# Patient Record
Sex: MALE | Race: BLACK OR AFRICAN AMERICAN | Employment: OTHER | URBAN - NONMETROPOLITAN AREA
[De-identification: names, ages, dates, MRNs, and addresses within clinical notes are randomized per-mention and may not be internally consistent; named-entity substitution may affect disease eponyms.]

---

## 2017-01-06 RX ORDER — OXYBUTYNIN CHLORIDE 5 MG/1
5 TABLET ORAL 2 TIMES DAILY
Qty: 90 TABLET | Refills: 1 | Status: SHIPPED | OUTPATIENT
Start: 2017-01-06 | End: 2017-03-02 | Stop reason: SDUPTHER

## 2017-01-06 RX ORDER — FINASTERIDE 5 MG/1
5 TABLET, FILM COATED ORAL DAILY
Qty: 90 TABLET | Refills: 0 | Status: SHIPPED | OUTPATIENT
Start: 2017-01-06 | End: 2017-03-02 | Stop reason: SDUPTHER

## 2017-03-02 ENCOUNTER — OFFICE VISIT (OUTPATIENT)
Dept: UROLOGY | Age: 82
End: 2017-03-02

## 2017-03-02 VITALS
WEIGHT: 231 LBS | SYSTOLIC BLOOD PRESSURE: 134 MMHG | HEIGHT: 69 IN | BODY MASS INDEX: 34.21 KG/M2 | DIASTOLIC BLOOD PRESSURE: 80 MMHG

## 2017-03-02 DIAGNOSIS — N13.8 BPH (BENIGN PROSTATIC HYPERTROPHY) WITH URINARY OBSTRUCTION: Primary | ICD-10-CM

## 2017-03-02 DIAGNOSIS — R31.9 HEMATURIA: ICD-10-CM

## 2017-03-02 DIAGNOSIS — N40.1 BPH (BENIGN PROSTATIC HYPERTROPHY) WITH URINARY OBSTRUCTION: Primary | ICD-10-CM

## 2017-03-02 DIAGNOSIS — C61 PROSTATE CANCER (HCC): ICD-10-CM

## 2017-03-02 DIAGNOSIS — R35.1 NOCTURIA: ICD-10-CM

## 2017-03-02 LAB — POST VOID RESIDUAL (PVR): 12 ML

## 2017-03-02 PROCEDURE — G8427 DOCREV CUR MEDS BY ELIG CLIN: HCPCS | Performed by: UROLOGY

## 2017-03-02 PROCEDURE — G8417 CALC BMI ABV UP PARAM F/U: HCPCS | Performed by: UROLOGY

## 2017-03-02 PROCEDURE — 51798 US URINE CAPACITY MEASURE: CPT | Performed by: UROLOGY

## 2017-03-02 PROCEDURE — 99214 OFFICE O/P EST MOD 30 MIN: CPT | Performed by: UROLOGY

## 2017-03-02 PROCEDURE — 4040F PNEUMOC VAC/ADMIN/RCVD: CPT | Performed by: UROLOGY

## 2017-03-02 PROCEDURE — 1123F ACP DISCUSS/DSCN MKR DOCD: CPT | Performed by: UROLOGY

## 2017-03-02 PROCEDURE — G8598 ASA/ANTIPLAT THER USED: HCPCS | Performed by: UROLOGY

## 2017-03-02 PROCEDURE — 1036F TOBACCO NON-USER: CPT | Performed by: UROLOGY

## 2017-03-02 PROCEDURE — G8484 FLU IMMUNIZE NO ADMIN: HCPCS | Performed by: UROLOGY

## 2017-03-02 RX ORDER — FINASTERIDE 5 MG/1
5 TABLET, FILM COATED ORAL DAILY
Qty: 90 TABLET | Refills: 3 | Status: SHIPPED | OUTPATIENT
Start: 2017-03-02 | End: 2018-04-24 | Stop reason: SDUPTHER

## 2017-03-02 RX ORDER — TAMSULOSIN HYDROCHLORIDE 0.4 MG/1
0.4 CAPSULE ORAL NIGHTLY
Qty: 90 CAPSULE | Refills: 3 | Status: SHIPPED | OUTPATIENT
Start: 2017-03-02 | End: 2017-05-31 | Stop reason: SDUPTHER

## 2017-03-02 RX ORDER — OXYBUTYNIN CHLORIDE 5 MG/1
5 TABLET ORAL 2 TIMES DAILY
Qty: 270 TABLET | Refills: 3 | Status: SHIPPED | OUTPATIENT
Start: 2017-03-02 | End: 2018-04-24 | Stop reason: SDUPTHER

## 2017-03-02 RX ORDER — METOPROLOL SUCCINATE 25 MG/1
25 TABLET, EXTENDED RELEASE ORAL DAILY
COMMUNITY
End: 2017-04-11 | Stop reason: ALTCHOICE

## 2017-03-02 ASSESSMENT — ENCOUNTER SYMPTOMS
COLOR CHANGE: 0
CHEST TIGHTNESS: 0
EYE REDNESS: 0
BACK PAIN: 0
SHORTNESS OF BREATH: 0
VOMITING: 0
DIARRHEA: 0
EYE PAIN: 0
FACIAL SWELLING: 0

## 2017-04-03 RX ORDER — FINASTERIDE 5 MG/1
5 TABLET, FILM COATED ORAL DAILY
Qty: 90 TABLET | Refills: 3 | Status: CANCELLED | OUTPATIENT
Start: 2017-04-03

## 2017-04-06 LAB
ANION GAP SERPL CALCULATED.3IONS-SCNC: 14 MMOL/L
BUN BLDV-MCNC: 22 MG/DL (ref 9–24)
CALCIUM SERPL-MCNC: 10 MG/DL (ref 8.7–10.8)
CHLORIDE BLD-SCNC: 106 MMOL/L (ref 95–111)
CO2: 26 MMOL/L (ref 21–32)
CREAT SERPL-MCNC: 1.2 MG/DL (ref 0.5–1.3)
CREATINE, URINE: 177.2 MG/DL
EGFR AFRICAN AMERICAN: 52
EGFR IF NONAFRICAN AMERICAN: 43
GLUCOSE: 92 MG/DL (ref 70–100)
MICROALBUMIN/CREAT 24H UR: 13.4 MG/DL
MICROALBUMIN/CREAT UR-RTO: 76 UG/MG
POTASSIUM SERPL-SCNC: 4.3 MMOL/L (ref 3.5–5.4)
PROTEIN, URINE: 38 MG/DL
SODIUM BLD-SCNC: 142 MMOL/L (ref 134–147)

## 2017-04-11 ENCOUNTER — OFFICE VISIT (OUTPATIENT)
Dept: NEPHROLOGY | Age: 82
End: 2017-04-11

## 2017-04-11 VITALS
SYSTOLIC BLOOD PRESSURE: 148 MMHG | HEART RATE: 71 BPM | WEIGHT: 232 LBS | DIASTOLIC BLOOD PRESSURE: 100 MMHG | BODY MASS INDEX: 34.26 KG/M2 | OXYGEN SATURATION: 96 %

## 2017-04-11 DIAGNOSIS — I10 HYPERTENSION, ESSENTIAL, BENIGN: ICD-10-CM

## 2017-04-11 DIAGNOSIS — N18.30 CHRONIC KIDNEY DISEASE, STAGE III (MODERATE) (HCC): Primary | ICD-10-CM

## 2017-04-11 DIAGNOSIS — R80.9 PROTEINURIA: ICD-10-CM

## 2017-04-11 PROCEDURE — G8598 ASA/ANTIPLAT THER USED: HCPCS | Performed by: INTERNAL MEDICINE

## 2017-04-11 PROCEDURE — 1036F TOBACCO NON-USER: CPT | Performed by: INTERNAL MEDICINE

## 2017-04-11 PROCEDURE — 1123F ACP DISCUSS/DSCN MKR DOCD: CPT | Performed by: INTERNAL MEDICINE

## 2017-04-11 PROCEDURE — G8417 CALC BMI ABV UP PARAM F/U: HCPCS | Performed by: INTERNAL MEDICINE

## 2017-04-11 PROCEDURE — 99213 OFFICE O/P EST LOW 20 MIN: CPT | Performed by: INTERNAL MEDICINE

## 2017-04-11 PROCEDURE — 4040F PNEUMOC VAC/ADMIN/RCVD: CPT | Performed by: INTERNAL MEDICINE

## 2017-04-11 PROCEDURE — G8427 DOCREV CUR MEDS BY ELIG CLIN: HCPCS | Performed by: INTERNAL MEDICINE

## 2017-04-11 RX ORDER — PRAVASTATIN SODIUM 20 MG
20 TABLET ORAL NIGHTLY
Status: ON HOLD | COMMUNITY
End: 2020-10-23 | Stop reason: HOSPADM

## 2017-05-31 RX ORDER — TAMSULOSIN HYDROCHLORIDE 0.4 MG/1
0.4 CAPSULE ORAL NIGHTLY
Qty: 90 CAPSULE | Refills: 3 | Status: SHIPPED | OUTPATIENT
Start: 2017-05-31 | End: 2018-04-24 | Stop reason: SDUPTHER

## 2018-04-11 LAB
A/G RATIO: NORMAL
ALBUMIN SERPL-MCNC: 4.2 G/DL
ALP BLD-CCNC: 72 U/L
ALT SERPL-CCNC: 13 U/L
AST SERPL-CCNC: 16 U/L
AVERAGE GLUCOSE: NORMAL
BILIRUB SERPL-MCNC: 0.5 MG/DL (ref 0.1–1.4)
BILIRUBIN DIRECT: NORMAL MG/DL
BILIRUBIN, INDIRECT: NORMAL
CHOLESTEROL, TOTAL: 218 MG/DL
CHOLESTEROL/HDL RATIO: 4.1
CREATININE, URINE: 613.7
GLOBULIN: NORMAL
HBA1C MFR BLD: 5.5 %
HDLC SERPL-MCNC: 53 MG/DL (ref 35–70)
LDL CHOLESTEROL CALCULATED: 134 MG/DL (ref 0–160)
MICROALBUMIN/CREAT 24H UR: 22.8 MG/G{CREAT}
MICROALBUMIN/CREAT UR-RTO: 37
PROTEIN TOTAL: 7.6 G/DL
TRIGL SERPL-MCNC: 157 MG/DL
VITAMIN D 25-HYDROXY: 39
VITAMIN D2, 25 HYDROXY: NORMAL
VITAMIN D3,25 HYDROXY: NORMAL
VLDLC SERPL CALC-MCNC: 31 MG/DL

## 2018-04-12 LAB
ANION GAP SERPL CALCULATED.3IONS-SCNC: 13 MEQ/L (ref 10–19)
BUN BLDV-MCNC: 22 MG/DL (ref 8–23)
CALCIUM SERPL-MCNC: 10.1 MG/DL (ref 8.5–10.5)
CHLORIDE BLD-SCNC: 105 MEQ/L (ref 95–107)
CO2: 26 MEQ/L (ref 19–31)
CREAT SERPL-MCNC: 1.2 MG/DL (ref 0.6–1.3)
EGFR AFRICAN AMERICAN: 49.1 ML/MIN/1.73 M2
EGFR IF NONAFRICAN AMERICAN: 42.3 ML/MIN/1.73 M2
GLUCOSE: 81 MG/DL (ref 70–99)
POTASSIUM SERPL-SCNC: 4.8 MEQ/L (ref 3.5–5.4)
PROTEIN, URINE: 178 MG/DL
SODIUM BLD-SCNC: 144 MEQ/L (ref 135–146)

## 2018-04-16 ENCOUNTER — OFFICE VISIT (OUTPATIENT)
Dept: NEPHROLOGY | Age: 83
End: 2018-04-16
Payer: MEDICARE

## 2018-04-16 VITALS
BODY MASS INDEX: 33.26 KG/M2 | DIASTOLIC BLOOD PRESSURE: 82 MMHG | OXYGEN SATURATION: 97 % | WEIGHT: 225.2 LBS | HEART RATE: 73 BPM | SYSTOLIC BLOOD PRESSURE: 158 MMHG

## 2018-04-16 DIAGNOSIS — I10 HYPERTENSION, ESSENTIAL, BENIGN: ICD-10-CM

## 2018-04-16 DIAGNOSIS — N06.9 ISOLATED PROTEINURIA WITH MORPHOLOGIC LESION: ICD-10-CM

## 2018-04-16 DIAGNOSIS — N18.30 CHRONIC KIDNEY DISEASE, STAGE III (MODERATE) (HCC): Primary | ICD-10-CM

## 2018-04-16 LAB
ALBUMIN SERPL-MCNC: 4.2 G/DL
ALP BLD-CCNC: 72 U/L
ALT SERPL-CCNC: 13 U/L
ANION GAP SERPL CALCULATED.3IONS-SCNC: 16 MMOL/L
AST SERPL-CCNC: 21 U/L
AVERAGE GLUCOSE: 105
BILIRUB SERPL-MCNC: 0.9 MG/DL (ref 0.1–1.4)
BUN BLDV-MCNC: 17 MG/DL
CALCIUM SERPL-MCNC: 9.9 MG/DL
CHLORIDE BLD-SCNC: 108 MMOL/L
CHOLESTEROL, TOTAL: 247 MG/DL
CHOLESTEROL/HDL RATIO: 4.5
CO2: 22 MMOL/L
CREAT SERPL-MCNC: 1.2 MG/DL
CREATININE, URINE: 381
GFR CALCULATED: 70
GLUCOSE BLD-MCNC: 87 MG/DL
HBA1C MFR BLD: 5.3 %
HDLC SERPL-MCNC: 55 MG/DL (ref 35–70)
LDL CHOLESTEROL CALCULATED: 162 MG/DL (ref 0–160)
MICROALBUMIN/CREAT 24H UR: 20 MG/G{CREAT}
MICROALBUMIN/CREAT UR-RTO: 52
POTASSIUM SERPL-SCNC: 4.3 MMOL/L
SODIUM BLD-SCNC: 142 MMOL/L
TOTAL PROTEIN: 7.5
TRIGL SERPL-MCNC: 149 MG/DL
VITAMIN D 25-HYDROXY: 47
VITAMIN D2, 25 HYDROXY: NORMAL
VITAMIN D3,25 HYDROXY: NORMAL
VLDLC SERPL CALC-MCNC: 30 MG/DL

## 2018-04-16 PROCEDURE — G8598 ASA/ANTIPLAT THER USED: HCPCS | Performed by: INTERNAL MEDICINE

## 2018-04-16 PROCEDURE — 99213 OFFICE O/P EST LOW 20 MIN: CPT | Performed by: INTERNAL MEDICINE

## 2018-04-16 PROCEDURE — 4040F PNEUMOC VAC/ADMIN/RCVD: CPT | Performed by: INTERNAL MEDICINE

## 2018-04-16 PROCEDURE — G8427 DOCREV CUR MEDS BY ELIG CLIN: HCPCS | Performed by: INTERNAL MEDICINE

## 2018-04-16 PROCEDURE — G8417 CALC BMI ABV UP PARAM F/U: HCPCS | Performed by: INTERNAL MEDICINE

## 2018-04-16 PROCEDURE — 1123F ACP DISCUSS/DSCN MKR DOCD: CPT | Performed by: INTERNAL MEDICINE

## 2018-04-16 PROCEDURE — 1036F TOBACCO NON-USER: CPT | Performed by: INTERNAL MEDICINE

## 2018-04-16 RX ORDER — AMPICILLIN 500 MG/1
500 CAPSULE ORAL 3 TIMES DAILY
COMMUNITY
End: 2019-04-15

## 2018-04-16 RX ORDER — FAMOTIDINE 20 MG/1
20 TABLET, FILM COATED ORAL NIGHTLY
COMMUNITY
End: 2019-04-15

## 2018-04-24 ENCOUNTER — OFFICE VISIT (OUTPATIENT)
Dept: UROLOGY | Age: 83
End: 2018-04-24
Payer: MEDICARE

## 2018-04-24 VITALS
BODY MASS INDEX: 32.88 KG/M2 | DIASTOLIC BLOOD PRESSURE: 95 MMHG | WEIGHT: 222 LBS | SYSTOLIC BLOOD PRESSURE: 151 MMHG | HEIGHT: 69 IN

## 2018-04-24 DIAGNOSIS — N40.1 BENIGN PROSTATIC HYPERPLASIA WITH URINARY OBSTRUCTION: ICD-10-CM

## 2018-04-24 DIAGNOSIS — C61 PROSTATE CANCER (HCC): Primary | ICD-10-CM

## 2018-04-24 DIAGNOSIS — N13.8 BENIGN PROSTATIC HYPERPLASIA WITH URINARY OBSTRUCTION: ICD-10-CM

## 2018-04-24 LAB — POST VOID RESIDUAL (PVR): 25 ML

## 2018-04-24 PROCEDURE — G8598 ASA/ANTIPLAT THER USED: HCPCS | Performed by: NURSE PRACTITIONER

## 2018-04-24 PROCEDURE — 1123F ACP DISCUSS/DSCN MKR DOCD: CPT | Performed by: NURSE PRACTITIONER

## 2018-04-24 PROCEDURE — 4040F PNEUMOC VAC/ADMIN/RCVD: CPT | Performed by: NURSE PRACTITIONER

## 2018-04-24 PROCEDURE — G8417 CALC BMI ABV UP PARAM F/U: HCPCS | Performed by: NURSE PRACTITIONER

## 2018-04-24 PROCEDURE — 99213 OFFICE O/P EST LOW 20 MIN: CPT | Performed by: NURSE PRACTITIONER

## 2018-04-24 PROCEDURE — G8428 CUR MEDS NOT DOCUMENT: HCPCS | Performed by: NURSE PRACTITIONER

## 2018-04-24 PROCEDURE — 51798 US URINE CAPACITY MEASURE: CPT | Performed by: NURSE PRACTITIONER

## 2018-04-24 PROCEDURE — 1036F TOBACCO NON-USER: CPT | Performed by: NURSE PRACTITIONER

## 2018-04-24 RX ORDER — FINASTERIDE 5 MG/1
5 TABLET, FILM COATED ORAL DAILY
Qty: 90 TABLET | Refills: 3 | Status: SHIPPED | OUTPATIENT
Start: 2018-04-24 | End: 2019-04-23 | Stop reason: SDUPTHER

## 2018-04-24 RX ORDER — TAMSULOSIN HYDROCHLORIDE 0.4 MG/1
0.4 CAPSULE ORAL NIGHTLY
Qty: 90 CAPSULE | Refills: 3 | Status: SHIPPED | OUTPATIENT
Start: 2018-04-24 | End: 2019-04-23 | Stop reason: SDUPTHER

## 2018-04-24 RX ORDER — OXYBUTYNIN CHLORIDE 5 MG/1
5 TABLET ORAL 2 TIMES DAILY
Qty: 270 TABLET | Refills: 3 | Status: SHIPPED | OUTPATIENT
Start: 2018-04-24 | End: 2019-04-23 | Stop reason: SDUPTHER

## 2019-04-12 LAB
ANION GAP SERPL CALCULATED.3IONS-SCNC: 13 MMOL/L (ref 4–12)
BUN BLDV-MCNC: 22 MG/DL (ref 5–27)
CALCIUM SERPL-MCNC: 9.6 MG/DL (ref 8.5–10.5)
CHLORIDE BLD-SCNC: 106 MMOL/L (ref 98–109)
CO2: 26 MMOL/L (ref 22–32)
CREAT SERPL-MCNC: 1.35 MG/DL (ref 0.4–1)
CREATINE, URINE: 444.9 MG/DL (ref 28–217)
EGFR AFRICAN AMERICAN: 45 ML/MIN/1.73SQ.M
EGFR IF NONAFRICAN AMERICAN: 38 ML/MIN/1.73SQ.M
GLUCOSE: 102 MG/DL (ref 65–99)
MICROALBUMIN/CREAT 24H UR: 6.7 MG/DL (ref 1.2–40)
MICROALBUMIN/CREAT UR-RTO: 15 MG/G
POTASSIUM SERPL-SCNC: 4 MMOL/L (ref 3.5–5)
PROTEIN, URINE: 51 MG/DL
SODIUM BLD-SCNC: 145 MMOL/L (ref 134–146)

## 2019-04-15 ENCOUNTER — OFFICE VISIT (OUTPATIENT)
Dept: NEPHROLOGY | Age: 84
End: 2019-04-15
Payer: MEDICARE

## 2019-04-15 VITALS
SYSTOLIC BLOOD PRESSURE: 136 MMHG | BODY MASS INDEX: 33.52 KG/M2 | HEART RATE: 78 BPM | WEIGHT: 227 LBS | DIASTOLIC BLOOD PRESSURE: 83 MMHG | OXYGEN SATURATION: 95 %

## 2019-04-15 DIAGNOSIS — N18.30 CKD (CHRONIC KIDNEY DISEASE), STAGE III (HCC): Primary | ICD-10-CM

## 2019-04-15 DIAGNOSIS — I10 HYPERTENSION, ESSENTIAL, BENIGN: ICD-10-CM

## 2019-04-15 PROCEDURE — G8417 CALC BMI ABV UP PARAM F/U: HCPCS | Performed by: INTERNAL MEDICINE

## 2019-04-15 PROCEDURE — G8427 DOCREV CUR MEDS BY ELIG CLIN: HCPCS | Performed by: INTERNAL MEDICINE

## 2019-04-15 PROCEDURE — 4040F PNEUMOC VAC/ADMIN/RCVD: CPT | Performed by: INTERNAL MEDICINE

## 2019-04-15 PROCEDURE — 99213 OFFICE O/P EST LOW 20 MIN: CPT | Performed by: INTERNAL MEDICINE

## 2019-04-15 PROCEDURE — 1036F TOBACCO NON-USER: CPT | Performed by: INTERNAL MEDICINE

## 2019-04-15 PROCEDURE — G8598 ASA/ANTIPLAT THER USED: HCPCS | Performed by: INTERNAL MEDICINE

## 2019-04-15 PROCEDURE — 1123F ACP DISCUSS/DSCN MKR DOCD: CPT | Performed by: INTERNAL MEDICINE

## 2019-04-15 RX ORDER — BUMETANIDE 1 MG/1
2 TABLET ORAL PRN
Status: ON HOLD | COMMUNITY
End: 2019-04-28

## 2019-04-15 RX ORDER — ALBUTEROL SULFATE 90 UG/1
1 AEROSOL, METERED RESPIRATORY (INHALATION) EVERY 4 HOURS PRN
COMMUNITY

## 2019-04-15 RX ORDER — DILTIAZEM HYDROCHLORIDE 120 MG/1
120 CAPSULE, EXTENDED RELEASE ORAL DAILY
Status: ON HOLD | COMMUNITY
End: 2019-04-28

## 2019-04-15 RX ORDER — NAPROXEN 375 MG/1
325 TABLET ORAL EVERY 12 HOURS PRN
Status: ON HOLD | COMMUNITY
End: 2019-04-29 | Stop reason: HOSPADM

## 2019-04-15 NOTE — PROGRESS NOTES
tablet 3    oxybutynin (DITROPAN) 5 MG tablet Take 1 tablet by mouth 2 times daily 270 tablet 3    tamsulosin (FLOMAX) 0.4 MG capsule Take 1 capsule by mouth nightly 90 capsule 3    metoprolol tartrate (LOPRESSOR) 25 MG tablet Take 25 mg by mouth 2 times daily      pravastatin (PRAVACHOL) 20 MG tablet Take 20 mg by mouth nightly      Cholecalciferol (VITAMIN D3) 5000 UNITS TABS Take 2,000 Units by mouth daily       acetaminophen (TYLENOL) 500 MG tablet Take 500 mg by mouth every 4 hours as needed for Pain      aspirin 325 MG tablet Take 325 mg by mouth daily         Vitals     /83 (Site: Right Upper Arm, Position: Sitting, Cuff Size: Medium Adult)   Pulse 78   Wt 227 lb (103 kg)   SpO2 95%   BMI 33.52 kg/m²  Wt Readings from Last 3 Encounters:   04/15/19 227 lb (103 kg)   04/24/18 222 lb (100.7 kg)   04/16/18 225 lb 3.2 oz (102.2 kg)        Physical Exam     General -- no distress  Lungs -- clear  Heart -- S1, S2 heard, JVD - no  Abdomen - soft, non-tender  Extremities -- mild edema  CNS - awake and alert    Labs, Radiology and Tests    Labs -    4/16 4/17 4/18 4/19        Potassium 4.2 4.3 4.8 4.0        BUN 21 22 22 22        Creatinine 1.3 1.2 1.2 1.35        eGFR  52 49 45                    UPCR  250 290 100        UMCR  38 37 15                      Renal Ultrasound Scan -- 3/2016  Right kidney 12 cm left kidney 12.3 cm   Bilateral hydronephrosis and left hydroureter nephrosis   Increase in resistive indices consistent with medical renal disease   2.9 cm cyst on the right kidney      Assessment    1. Chronic kidney disease stage III - chronic kidney disease secondary to contrast-induced nephropathy and long-standing hypertension and senile nephrosclerosis. - Patient's GFR is 45 ML/minute this is very stable at this time   - take only Prn Naproxen  2. Essential hypertension-running well continue current meds  3. Extensive DVT in the past - currently is only on aspirin.   4. BPH-stable,

## 2019-04-17 LAB — PSA, ULTRASENSITIVE: 0.52 NG/ML (ref 0–4)

## 2019-04-23 ENCOUNTER — OFFICE VISIT (OUTPATIENT)
Dept: UROLOGY | Age: 84
End: 2019-04-23
Payer: MEDICARE

## 2019-04-23 VITALS
WEIGHT: 230 LBS | HEIGHT: 69 IN | DIASTOLIC BLOOD PRESSURE: 88 MMHG | BODY MASS INDEX: 34.07 KG/M2 | SYSTOLIC BLOOD PRESSURE: 138 MMHG

## 2019-04-23 DIAGNOSIS — N13.8 BENIGN PROSTATIC HYPERPLASIA WITH URINARY OBSTRUCTION: ICD-10-CM

## 2019-04-23 DIAGNOSIS — C61 PROSTATE CANCER (HCC): Primary | ICD-10-CM

## 2019-04-23 DIAGNOSIS — N40.1 BENIGN PROSTATIC HYPERPLASIA WITH URINARY OBSTRUCTION: ICD-10-CM

## 2019-04-23 PROCEDURE — 4040F PNEUMOC VAC/ADMIN/RCVD: CPT | Performed by: NURSE PRACTITIONER

## 2019-04-23 PROCEDURE — G8598 ASA/ANTIPLAT THER USED: HCPCS | Performed by: NURSE PRACTITIONER

## 2019-04-23 PROCEDURE — 1123F ACP DISCUSS/DSCN MKR DOCD: CPT | Performed by: NURSE PRACTITIONER

## 2019-04-23 PROCEDURE — G8417 CALC BMI ABV UP PARAM F/U: HCPCS | Performed by: NURSE PRACTITIONER

## 2019-04-23 PROCEDURE — G8427 DOCREV CUR MEDS BY ELIG CLIN: HCPCS | Performed by: NURSE PRACTITIONER

## 2019-04-23 PROCEDURE — 1036F TOBACCO NON-USER: CPT | Performed by: NURSE PRACTITIONER

## 2019-04-23 PROCEDURE — 99213 OFFICE O/P EST LOW 20 MIN: CPT | Performed by: NURSE PRACTITIONER

## 2019-04-23 RX ORDER — TAMSULOSIN HYDROCHLORIDE 0.4 MG/1
0.4 CAPSULE ORAL NIGHTLY
Qty: 90 CAPSULE | Refills: 3 | Status: SHIPPED | OUTPATIENT
Start: 2019-04-23 | End: 2020-05-12 | Stop reason: SDUPTHER

## 2019-04-23 RX ORDER — OXYBUTYNIN CHLORIDE 5 MG/1
5 TABLET ORAL 2 TIMES DAILY
Qty: 270 TABLET | Refills: 3 | Status: SHIPPED | OUTPATIENT
Start: 2019-04-23 | End: 2020-05-12 | Stop reason: SDUPTHER

## 2019-04-23 RX ORDER — FINASTERIDE 5 MG/1
5 TABLET, FILM COATED ORAL DAILY
Qty: 90 TABLET | Refills: 3 | Status: SHIPPED | OUTPATIENT
Start: 2019-04-23 | End: 2020-05-12 | Stop reason: SDUPTHER

## 2019-04-23 NOTE — PROGRESS NOTES
[address] [place]  Dept: [phone]  Loc: [phone]  Visit Date: 4/23/2019      HPI:     Severo Beth f/u today for Prostate Cancer. Patient has a hx of Adenocarcinoma of the Prostate, [place] score 7, PSA 7, Clinical stage, T1c. He had Interstitial Brachytherapy treatment in 2006. Trend of PSA:  0.52 04/2019  0.28 01/2013  He continues to do well, is currently on Flomax, Finasteride & Ditropan 5 mg BID. Has nocturia x 3, denies weak stream, urgency or frequency. PVR is 15 ml. No side effects reported. He comes in today by himself. Hx is obtained from the patient and medical record.      Current Outpatient Medications   Medication Sig Dispense Refill    tamsulosin (FLOMAX) 0.4 MG capsule Take 1 capsule by mouth nightly 90 capsule 3    oxybutynin (DITROPAN) 5 MG tablet Take 1 tablet by mouth 2 times daily 270 tablet 3    finasteride (PROSCAR) 5 MG tablet Take 1 tablet by mouth daily 90 tablet 3    mometasone-formoterol (DULERA) 100-5 MCG/ACT inhaler Inhale 2 puffs into the lungs 2 times daily      naproxen (NAPROSYN) 375 MG tablet Take 325 mg by mouth every 12 hours as needed for Pain      bumetanide (BUMEX) 1 MG tablet Take 2 mg by mouth as needed      diltiazem (CARDIZEM 12 HR) 120 MG extended release capsule Take 120 mg by mouth daily      albuterol sulfate  (90 Base) MCG/ACT inhaler Inhale 1 puff into the lungs every 4 hours as needed for Wheezing      tiotropium (SPIRIVA) 18 MCG inhalation capsule Inhale 18 mcg into the lungs daily      metoprolol tartrate (LOPRESSOR) 25 MG tablet Take 25 mg by mouth 2 times daily      pravastatin (PRAVACHOL) 20 MG tablet Take 20 mg by mouth nightly      Cholecalciferol (VITAMIN D3) 5000 UNITS TABS Take 2,000 Units by mouth daily       acetaminophen (TYLENOL) 500 MG tablet Take 500 mg by mouth every 4 hours as needed for Pain      aspirin 325 MG tablet Take 325 mg by mouth daily       No current facility-administered medications for this visit. Past Medical History  Anya Hopson  has a past medical history of ALDA (acute kidney injury) ([facility] [address] Blood circulation, collateral, CAD (coronary artery disease), Cancer ([facility] [address] Chronic kidney disease, stage III (moderate) ([facility] [address] Hypercholesteremia, Hypertension, and Osteoarthritis. Past Surgical History  The patient  has a past surgical history that includes knee surgery; joint replacement; Neck surgery; Rotator cuff repair; Cardiac surgery; back surgery (2006); other surgical history (5/29/2015); Endoscopy, colon, diagnostic; Cosmetic surgery; and Total hip arthroplasty (Bilateral). Family History  This patient's family history includes Heart Disease in his mother; High Blood Pressure in his mother. Social History  Stone  reports that he quit smoking about 22 years ago. His smoking use included cigarettes. He has a 12.50 pack-year smoking history. He has never used smokeless tobacco. He reports that he does not drink alcohol or use drugs. Subjective:     Review of Systems  No problems with ears, nose or throat. No problems with eyes. No chest pain, shortness of breath, abdominal pain, extremity pain or weakness, and no neurological deficits. No rashes.  symptoms per HPI. The remainder of the review of symptoms is negative. Objective:     PE:   Vitals:    04/23/19 1306   BP: 138/88   Weight: 230 lb (104.3 kg)   Height: 5' 9\" (1.753 m)       Constitutional: Alert and oriented times 3, no acute distress and cooperative to examination with appropriate mood and affect. HENT:   Head:        Normocephalic and atraumatic. Mouth/Throat:         Mucous membranes are normal.   Eyes:         EOM are normal. No scleral icterus. PERRLA. Neck:        Supple, symmetrical, trachea midline  Pulmonary/Chest:      Chest symmetric with normal A/P diameter. Normal respiratory rate and rhthym.   No use of accessory muscles. Abdominal:         Soft. No tenderness. Bowel sounds present. Musculoskeletal:         Normal range of motion. No edema or tenderness of lower extremities. Ambulates with a cane, unsteady gait    Extremities: No cyanosis, clubbing, or edema present. Neurological:        Alert and oriented. No cranial nerve deficit. Psychiatric:        Normal mood and affect. Labs   Urine dip demonstrates   No results found for this visit on 04/23/19. Patients recent PSA values are as follows  No results found for: PSA, PSADIA     Recent BUN/Creatinine:  Lab Results   Component Value Date    BUN 22 04/11/2019    CREATININE 1.35 04/11/2019       Radiology  No recent imaging        Assessment & Plan:     BPH w/ LUTS  Prostate Cancer    Stone f/u today for Prostate cancer & BPH. LUTS are stable, PVR is 15 ml. Remains on Flomax, Proscar and Ditropan, no side effects reported. PSA stable 0.52, showing excellent cancer control      Return in about 1 year (around 4/23/2020) for Prostate Cancer, BPH.     ALESSANDRA Valentin  Urology

## 2019-04-28 ENCOUNTER — APPOINTMENT (OUTPATIENT)
Dept: CT IMAGING | Age: 84
DRG: 071 | End: 2019-04-28
Payer: MEDICARE

## 2019-04-28 ENCOUNTER — HOSPITAL ENCOUNTER (INPATIENT)
Age: 84
LOS: 1 days | Discharge: HOME | DRG: 071 | End: 2019-04-29
Attending: FAMILY MEDICINE
Payer: MEDICARE

## 2019-04-28 DIAGNOSIS — R41.82 ALTERED MENTAL STATUS, UNSPECIFIED ALTERED MENTAL STATUS TYPE: Primary | ICD-10-CM

## 2019-04-28 PROBLEM — G93.40 ENCEPHALOPATHY: Status: ACTIVE | Noted: 2019-04-28

## 2019-04-28 LAB
ALBUMIN SERPL-MCNC: 3.6 G/DL (ref 3.5–5.1)
ALP BLD-CCNC: 65 U/L (ref 38–126)
ALT SERPL-CCNC: 10 U/L (ref 11–66)
ANION GAP SERPL CALCULATED.3IONS-SCNC: 14 MEQ/L (ref 8–16)
APTT: 26.4 SECONDS (ref 22–38)
AST SERPL-CCNC: 20 U/L (ref 5–40)
BASOPHILS # BLD: 0.7 %
BASOPHILS ABSOLUTE: 0 THOU/MM3 (ref 0–0.1)
BILIRUB SERPL-MCNC: 0.9 MG/DL (ref 0.3–1.2)
BILIRUBIN URINE: NEGATIVE
BLOOD, URINE: NEGATIVE
BUN BLDV-MCNC: 22 MG/DL (ref 7–22)
CALCIUM SERPL-MCNC: 8.8 MG/DL (ref 8.5–10.5)
CHARACTER, URINE: CLEAR
CHLORIDE BLD-SCNC: 104 MEQ/L (ref 98–111)
CO2: 23 MEQ/L (ref 23–33)
COLOR: YELLOW
CREAT SERPL-MCNC: 1.4 MG/DL (ref 0.4–1.2)
EOSINOPHIL # BLD: 2.6 %
EOSINOPHILS ABSOLUTE: 0.1 THOU/MM3 (ref 0–0.4)
ERYTHROCYTE [DISTWIDTH] IN BLOOD BY AUTOMATED COUNT: 13.2 % (ref 11.5–14.5)
ERYTHROCYTE [DISTWIDTH] IN BLOOD BY AUTOMATED COUNT: 47.2 FL (ref 35–45)
GFR SERPL CREATININE-BSD FRML MDRD: 59 ML/MIN/1.73M2
GLUCOSE BLD-MCNC: 145 MG/DL (ref 70–108)
GLUCOSE URINE: NEGATIVE MG/DL
HCT VFR BLD CALC: 49.8 % (ref 42–52)
HEMOGLOBIN: 15.5 GM/DL (ref 14–18)
IMMATURE GRANS (ABS): 0.02 THOU/MM3 (ref 0–0.07)
IMMATURE GRANULOCYTES: 0.4 %
INR BLD: 1.02 (ref 0.85–1.13)
KETONES, URINE: NEGATIVE
LEUKOCYTE ESTERASE, URINE: NEGATIVE
LIPASE: 13.6 U/L (ref 5.6–51.3)
LYMPHOCYTES # BLD: 14.2 %
LYMPHOCYTES ABSOLUTE: 0.8 THOU/MM3 (ref 1–4.8)
MCH RBC QN AUTO: 30.3 PG (ref 26–33)
MCHC RBC AUTO-ENTMCNC: 31.1 GM/DL (ref 32.2–35.5)
MCV RBC AUTO: 97.5 FL (ref 80–94)
MONOCYTES # BLD: 5.5 %
MONOCYTES ABSOLUTE: 0.3 THOU/MM3 (ref 0.4–1.3)
NITRITE, URINE: NEGATIVE
NUCLEATED RED BLOOD CELLS: 0 /100 WBC
OSMOLALITY CALCULATION: 287.2 MOSMOL/KG (ref 275–300)
PH UA: 6 (ref 5–9)
PLATELET # BLD: 172 THOU/MM3 (ref 130–400)
PMV BLD AUTO: 9.8 FL (ref 9.4–12.4)
POTASSIUM SERPL-SCNC: 4 MEQ/L (ref 3.5–5.2)
PRO-BNP: 90.6 PG/ML (ref 0–1800)
PROTEIN UA: NEGATIVE
RBC # BLD: 5.11 MILL/MM3 (ref 4.7–6.1)
SEG NEUTROPHILS: 76.6 %
SEGMENTED NEUTROPHILS ABSOLUTE COUNT: 4.1 THOU/MM3 (ref 1.8–7.7)
SODIUM BLD-SCNC: 141 MEQ/L (ref 135–145)
SPECIFIC GRAVITY, URINE: 1.01 (ref 1–1.03)
T4 FREE: 1.22 NG/DL (ref 0.93–1.76)
TOTAL PROTEIN: 6.8 G/DL (ref 6.1–8)
TROPONIN T: < 0.01 NG/ML
TROPONIN T: < 0.01 NG/ML
TSH SERPL DL<=0.05 MIU/L-ACNC: 9.24 UIU/ML (ref 0.4–4.2)
UROBILINOGEN, URINE: 1 EU/DL (ref 0–1)
WBC # BLD: 5.4 THOU/MM3 (ref 4.8–10.8)

## 2019-04-28 PROCEDURE — 84443 ASSAY THYROID STIM HORMONE: CPT

## 2019-04-28 PROCEDURE — 36415 COLL VENOUS BLD VENIPUNCTURE: CPT

## 2019-04-28 PROCEDURE — 99285 EMERGENCY DEPT VISIT HI MDM: CPT

## 2019-04-28 PROCEDURE — 85730 THROMBOPLASTIN TIME PARTIAL: CPT

## 2019-04-28 PROCEDURE — 83880 ASSAY OF NATRIURETIC PEPTIDE: CPT

## 2019-04-28 PROCEDURE — 84439 ASSAY OF FREE THYROXINE: CPT

## 2019-04-28 PROCEDURE — 1200000003 HC TELEMETRY R&B

## 2019-04-28 PROCEDURE — 85025 COMPLETE CBC W/AUTO DIFF WBC: CPT

## 2019-04-28 PROCEDURE — 6370000000 HC RX 637 (ALT 250 FOR IP): Performed by: FAMILY MEDICINE

## 2019-04-28 PROCEDURE — 94640 AIRWAY INHALATION TREATMENT: CPT

## 2019-04-28 PROCEDURE — 80053 COMPREHEN METABOLIC PANEL: CPT

## 2019-04-28 PROCEDURE — 81003 URINALYSIS AUTO W/O SCOPE: CPT

## 2019-04-28 PROCEDURE — 2500000003 HC RX 250 WO HCPCS: Performed by: FAMILY MEDICINE

## 2019-04-28 PROCEDURE — 96374 THER/PROPH/DIAG INJ IV PUSH: CPT

## 2019-04-28 PROCEDURE — 2580000003 HC RX 258: Performed by: NURSE PRACTITIONER

## 2019-04-28 PROCEDURE — 2709999900 HC NON-CHARGEABLE SUPPLY

## 2019-04-28 PROCEDURE — 93005 ELECTROCARDIOGRAM TRACING: CPT | Performed by: FAMILY MEDICINE

## 2019-04-28 PROCEDURE — 6360000002 HC RX W HCPCS: Performed by: NURSE PRACTITIONER

## 2019-04-28 PROCEDURE — 6370000000 HC RX 637 (ALT 250 FOR IP): Performed by: NURSE PRACTITIONER

## 2019-04-28 PROCEDURE — 99223 1ST HOSP IP/OBS HIGH 75: CPT | Performed by: NURSE PRACTITIONER

## 2019-04-28 PROCEDURE — 85610 PROTHROMBIN TIME: CPT

## 2019-04-28 PROCEDURE — 70450 CT HEAD/BRAIN W/O DYE: CPT

## 2019-04-28 PROCEDURE — 84484 ASSAY OF TROPONIN QUANT: CPT

## 2019-04-28 PROCEDURE — 83690 ASSAY OF LIPASE: CPT

## 2019-04-28 RX ORDER — SODIUM CHLORIDE 0.9 % (FLUSH) 0.9 %
10 SYRINGE (ML) INJECTION EVERY 12 HOURS SCHEDULED
Status: DISCONTINUED | OUTPATIENT
Start: 2019-04-28 | End: 2019-04-29 | Stop reason: HOSPADM

## 2019-04-28 RX ORDER — ATORVASTATIN CALCIUM 40 MG/1
40 TABLET, FILM COATED ORAL NIGHTLY
Status: DISCONTINUED | OUTPATIENT
Start: 2019-04-28 | End: 2019-04-28 | Stop reason: ALTCHOICE

## 2019-04-28 RX ORDER — PRAVASTATIN SODIUM 20 MG
20 TABLET ORAL NIGHTLY
Status: DISCONTINUED | OUTPATIENT
Start: 2019-04-28 | End: 2019-04-29 | Stop reason: HOSPADM

## 2019-04-28 RX ORDER — TAMSULOSIN HYDROCHLORIDE 0.4 MG/1
0.4 CAPSULE ORAL NIGHTLY
Status: DISCONTINUED | OUTPATIENT
Start: 2019-04-28 | End: 2019-04-29 | Stop reason: HOSPADM

## 2019-04-28 RX ORDER — ASPIRIN 81 MG/1
324 TABLET, CHEWABLE ORAL ONCE
Status: COMPLETED | OUTPATIENT
Start: 2019-04-28 | End: 2019-04-28

## 2019-04-28 RX ORDER — ONDANSETRON 2 MG/ML
4 INJECTION INTRAMUSCULAR; INTRAVENOUS EVERY 6 HOURS PRN
Status: DISCONTINUED | OUTPATIENT
Start: 2019-04-28 | End: 2019-04-29 | Stop reason: HOSPADM

## 2019-04-28 RX ORDER — NITROGLYCERIN 0.4 MG/1
0.4 TABLET SUBLINGUAL EVERY 5 MIN PRN
Status: DISCONTINUED | OUTPATIENT
Start: 2019-04-28 | End: 2019-04-29 | Stop reason: HOSPADM

## 2019-04-28 RX ORDER — ASPIRIN 325 MG
325 TABLET ORAL DAILY
Status: DISCONTINUED | OUTPATIENT
Start: 2019-04-28 | End: 2019-04-29 | Stop reason: HOSPADM

## 2019-04-28 RX ORDER — FINASTERIDE 5 MG/1
5 TABLET, FILM COATED ORAL DAILY
Status: DISCONTINUED | OUTPATIENT
Start: 2019-04-28 | End: 2019-04-29 | Stop reason: HOSPADM

## 2019-04-28 RX ORDER — OXYBUTYNIN CHLORIDE 5 MG/1
5 TABLET ORAL 2 TIMES DAILY
Status: DISCONTINUED | OUTPATIENT
Start: 2019-04-28 | End: 2019-04-29 | Stop reason: HOSPADM

## 2019-04-28 RX ORDER — SENNA PLUS 8.6 MG/1
1 TABLET ORAL DAILY PRN
Status: DISCONTINUED | OUTPATIENT
Start: 2019-04-28 | End: 2019-04-29 | Stop reason: HOSPADM

## 2019-04-28 RX ORDER — SODIUM CHLORIDE 0.9 % (FLUSH) 0.9 %
10 SYRINGE (ML) INJECTION PRN
Status: DISCONTINUED | OUTPATIENT
Start: 2019-04-28 | End: 2019-04-29 | Stop reason: HOSPADM

## 2019-04-28 RX ORDER — ALBUTEROL SULFATE 90 UG/1
1 AEROSOL, METERED RESPIRATORY (INHALATION) EVERY 4 HOURS PRN
Status: DISCONTINUED | OUTPATIENT
Start: 2019-04-28 | End: 2019-04-29 | Stop reason: HOSPADM

## 2019-04-28 RX ORDER — BUMETANIDE 0.25 MG/ML
2 INJECTION, SOLUTION INTRAMUSCULAR; INTRAVENOUS ONCE
Status: COMPLETED | OUTPATIENT
Start: 2019-04-28 | End: 2019-04-28

## 2019-04-28 RX ORDER — FAMOTIDINE 20 MG/1
20 TABLET, FILM COATED ORAL NIGHTLY
Status: DISCONTINUED | OUTPATIENT
Start: 2019-04-28 | End: 2019-04-29 | Stop reason: HOSPADM

## 2019-04-28 RX ORDER — FAMOTIDINE 20 MG/1
20 TABLET, FILM COATED ORAL NIGHTLY
COMMUNITY
End: 2020-07-07 | Stop reason: ALTCHOICE

## 2019-04-28 RX ADMIN — BUMETANIDE 2 MG: 0.25 INJECTION INTRAMUSCULAR; INTRAVENOUS at 13:04

## 2019-04-28 RX ADMIN — FAMOTIDINE 20 MG: 20 TABLET ORAL at 20:19

## 2019-04-28 RX ADMIN — Medication 10 ML: at 20:19

## 2019-04-28 RX ADMIN — TAMSULOSIN HYDROCHLORIDE 0.4 MG: 0.4 CAPSULE ORAL at 20:19

## 2019-04-28 RX ADMIN — Medication 2 PUFF: at 21:59

## 2019-04-28 RX ADMIN — ENOXAPARIN SODIUM 40 MG: 40 INJECTION SUBCUTANEOUS at 17:34

## 2019-04-28 RX ADMIN — METOPROLOL TARTRATE 25 MG: 25 TABLET ORAL at 20:19

## 2019-04-28 RX ADMIN — PRAVASTATIN SODIUM 20 MG: 20 TABLET ORAL at 20:19

## 2019-04-28 RX ADMIN — ASPIRIN 81 MG 324 MG: 81 TABLET ORAL at 13:04

## 2019-04-28 RX ADMIN — OXYBUTYNIN CHLORIDE 5 MG: 5 TABLET ORAL at 20:19

## 2019-04-28 ASSESSMENT — ENCOUNTER SYMPTOMS
SHORTNESS OF BREATH: 0
NAUSEA: 0
RESPIRATORY NEGATIVE: 1
VOMITING: 0
ABDOMINAL PAIN: 0
VOICE CHANGE: 0
BACK PAIN: 0
COUGH: 0
TROUBLE SWALLOWING: 0

## 2019-04-28 NOTE — ED TRIAGE NOTES
Pt BIBA from home where he was unresponsive this morning. Pt wife states he wasn't responding when she was shaking him. Pt states he remembers hearing his wife but was in pain and that's why he didn't respond.

## 2019-04-28 NOTE — ED PROVIDER NOTES
pain and neck pain. Skin: Negative. Neurological: Positive for weakness. Negative for dizziness, syncope, speech difficulty, numbness and headaches. Hematological: Negative. All other systems reviewed and are negative. PASTMEDICAL HISTORY   [unfilled]    SURGICAL HISTORY      has a past surgical history that includes knee surgery; joint replacement; Neck surgery; Rotator cuff repair; Cardiac surgery; back surgery (); other surgical history (2015); Endoscopy, colon, diagnostic; Cosmetic surgery; and Total hip arthroplasty (Bilateral). CURRENT MEDICATIONS       Previous Medications    ACETAMINOPHEN (TYLENOL) 500 MG TABLET    Take 500 mg by mouth every 4 hours as needed for Pain    ALBUTEROL SULFATE  (90 BASE) MCG/ACT INHALER    Inhale 1 puff into the lungs every 4 hours as needed for Wheezing    ASPIRIN 325 MG TABLET    Take 325 mg by mouth daily    BUMETANIDE (BUMEX) 1 MG TABLET    Take 2 mg by mouth as needed    CHOLECALCIFEROL (VITAMIN D3) 5000 UNITS TABS    Take 2,000 Units by mouth daily     DILTIAZEM (CARDIZEM 12 HR) 120 MG EXTENDED RELEASE CAPSULE    Take 120 mg by mouth daily    FINASTERIDE (PROSCAR) 5 MG TABLET    Take 1 tablet by mouth daily    METOPROLOL TARTRATE (LOPRESSOR) 25 MG TABLET    Take 25 mg by mouth 2 times daily    MOMETASONE-FORMOTEROL (DULERA) 100-5 MCG/ACT INHALER    Inhale 2 puffs into the lungs 2 times daily    NAPROXEN (NAPROSYN) 375 MG TABLET    Take 325 mg by mouth every 12 hours as needed for Pain    OXYBUTYNIN (DITROPAN) 5 MG TABLET    Take 1 tablet by mouth 2 times daily    PRAVASTATIN (PRAVACHOL) 20 MG TABLET    Take 20 mg by mouth nightly    TAMSULOSIN (FLOMAX) 0.4 MG CAPSULE    Take 1 capsule by mouth nightly    TIOTROPIUM (SPIRIVA) 18 MCG INHALATION CAPSULE    Inhale 18 mcg into the lungs daily       ALLERGIES     has No Known Allergies. FAMILY HISTORY     indicated that his mother is . He indicated that his father is .  He indicated RESULTS     EKG: All EKG's are interpreted by the Emergency Department Physician who either signs or Co-signs this chart inthe absence of a cardiologist.  Normal sinus rhythm 63 bpm normal intervals QTc of 419 slightly peaked T waves in inferior leads nonspecific at this time. RADIOLOGY: non-plain film images(s) such as CT, Ultrasound and MRI are read by the radiologist.  Plain radiographic images are visualized and preliminarily interpreted by the emergency physician unless otherwise stated below. CT Head WO Contrast   Final Result   No acute intracranial process. No change from prior study. **This report has been created using voice recognition software. It may contain minor errors which are inherent in voice recognition technology. **      Final report electronically signed by Dr. Cameron Issa on 4/28/2019 2:40 PM          LABS:   Labs Reviewed   CBC WITH AUTO DIFFERENTIAL - Abnormal; Notable for the following components:       Result Value    MCV 97.5 (*)     MCHC 31.1 (*)     RDW-SD 47.2 (*)     Lymphocytes # 0.8 (*)     Monocytes # 0.3 (*)     All other components within normal limits   COMPREHENSIVE METABOLIC PANEL - Abnormal; Notable for the following components:    Glucose 145 (*)     CREATININE 1.4 (*)     ALT 10 (*)     All other components within normal limits   GLOMERULAR FILTRATION RATE, ESTIMATED - Abnormal; Notable for the following components:    Est, Glom Filt Rate 59 (*)     All other components within normal limits   TSH WITHOUT REFLEX - Abnormal; Notable for the following components:    TSH 9.240 (*)     All other components within normal limits   ANION GAP   OSMOLALITY   LIPASE   TROPONIN   BRAIN NATRIURETIC PEPTIDE   PROTIME-INR   APTT   URINE RT REFLEX TO CULTURE       EMERGENCY DEPARTMENT COURSE:   Vitals:    Vitals:    04/28/19 1128 04/28/19 1232 04/28/19 1330 04/28/19 1449   BP: 130/77 132/82 (!) 153/96 136/87   Pulse: 69 70 76 83   Resp: 20 18 22 22   Temp: 97.5 °F (36.4 °C)      TempSrc: Oral      SpO2: 92% 94% 91% 95%     MDM    Patient presented with   acute confusional state and slumped over the table -now resolved. Because of multiple comorbidities he will need a period  observation and rule out acute coronary syndrome among others. He is noted to be throwing multiple PVCs but the rest of the workup including urinalysis CBC chemistries troponin CT head and chest x-ray are normal.    CONSULTS:    2:50 PM patient is discussed with the hospitalist who kindly agrees to the patient. FINAL IMPRESSION      1. Altered mental status, unspecified altered mental status type          DISPOSITION/PLAN       DISPOSITION patient is admitted. PATIENT REFERRED TO:  No follow-up provider specified.     DISCHARGE MEDICATIONS:  New Prescriptions    No medications on file       (Please note that portions of this note were completed with avoice recognition program.  Efforts were made to edit the dictations but occasionally words are mis-transcribed.)    MD Janice Cheng MD  04/28/19 [id number]

## 2019-04-28 NOTE — ED NOTES
Pt BIBA from home this AM where pt wife states he was not responsive. Pt states he felt a lot of pain in his back and he was overcome by the pain. Pt was diaphoretic upon EMS arrival. Pt attached to cardiac monitor, EKG done at bedside, IV started and blood values drawn. Pt wife at bedside states this has happened once before and they checked him out and everything was ok.       Robert Wolf RN  04/28/19 [id number]

## 2019-04-28 NOTE — H&P
History & Physical        Patient:  Neville Nunez  YOB: 1935    MRN: [id number]     Acct: [de-identified]    PCP: Dimitrios Agosto MD    Date of Admission: 4/28/2019    Date of Service: Pt seen/examined on 04/28/19  and Admitted to Inpatient with expected LOS greater than two midnights due to medical therapy. Chief Complaint:  LOC      History Of Present Illness:    81 y.o. male who presented to [address] [place] [address] with LOC at home; wife states he was sitting at the table and she thought he was eating breakfast however he was slumped over the table and would not respond to her; she thinks this was going on for about 15 minutes and then she called EMS; he was diaphoretic; he is currently awake and alert and denies any complaints at all except chronic low back pain; wife at bedside; both are poor historians; in ED work up (-) however multiple PVC's noted on tele; he is being admitted to the Hospitalist Service for further care and evaluation. Past Medical History:          Diagnosis Date    ALDA (acute kidney injury) ([address] 7/17/2015    Blood circulation, collateral     CAD (coronary artery disease)     Cancer (HCC)     prostate; seed implants    Chronic kidney disease, stage III (moderate) (HCC)     Hypercholesteremia     Hypertension     Osteoarthritis        Past Surgical History:          Procedure Laterality Date    BACK SURGERY  2006    fusion    CARDIAC SURGERY      2 stents    COSMETIC SURGERY      ENDOSCOPY, COLON, DIAGNOSTIC      JOINT REPLACEMENT      Bilateral hips    KNEE SURGERY      NECK SURGERY      OTHER SURGICAL HISTORY  5/29/2015    DECOMPRESSIVE LUMBAR LAMINECTOMY L2-3    ROTATOR CUFF REPAIR      TOTAL HIP ARTHROPLASTY Bilateral        Medications Prior to Admission:      Prior to Admission medications    Medication Sig Start Date End Date Taking?  Authorizing Provider   tamsulosin (FLOMAX) 0.4 MG capsule Take 1 capsule by mouth nightly 4/23/19 4/22/20 Hui Peak, APRN - CNP   oxybutynin (DITROPAN) 5 MG tablet Take 1 tablet by mouth 2 times daily 4/23/19   Hui Peak, APRN - CNP   finasteride (PROSCAR) 5 MG tablet Take 1 tablet by mouth daily 4/23/19   Hui Peak, APRN - CNP   mometasone-formoterol (DULERA) 100-5 MCG/ACT inhaler Inhale 2 puffs into the lungs 2 times daily    Historical Provider, MD   naproxen (NAPROSYN) 375 MG tablet Take 325 mg by mouth every 12 hours as needed for Pain    Historical Provider, MD   bumetanide (BUMEX) 1 MG tablet Take 2 mg by mouth as needed    Historical Provider, MD   diltiazem (CARDIZEM 12 HR) 120 MG extended release capsule Take 120 mg by mouth daily    Historical Provider, MD   albuterol sulfate  (90 Base) MCG/ACT inhaler Inhale 1 puff into the lungs every 4 hours as needed for Wheezing    Historical Provider, MD   tiotropium (SPIRIVA) 18 MCG inhalation capsule Inhale 18 mcg into the lungs daily    Historical Provider, MD   metoprolol tartrate (LOPRESSOR) 25 MG tablet Take 25 mg by mouth 2 times daily    Historical Provider, MD   pravastatin (PRAVACHOL) 20 MG tablet Take 20 mg by mouth nightly    Historical Provider, MD   Cholecalciferol (VITAMIN D3) 5000 UNITS TABS Take 2,000 Units by mouth daily     Historical Provider, MD   acetaminophen (TYLENOL) 500 MG tablet Take 500 mg by mouth every 4 hours as needed for Pain    Historical Provider, MD   aspirin 325 MG tablet Take 325 mg by mouth daily    Historical Provider, MD       Allergies:  Patient has no known allergies. Social History:      The patient currently lives with wife. TOBACCO:   reports that he quit smoking about 22 years ago. His smoking use included cigarettes. He has a 12.50 pack-year smoking history. He has never used smokeless tobacco.  ETOH:   reports that he does not drink alcohol.       Family History:      Positive as follows:        Problem Relation Age of Onset    Heart Disease Mother     High Blood Pressure Mother     Prostate Cancer Neg Hx     Cancer Neg Hx     Diabetes Neg Hx     Kidney Disease Neg Hx     Stroke Neg Hx        REVIEW OF SYSTEMS:     Constitutional: ROS: negative for - chills or fever  Head: no headache, no head injury, no migraine   Eyes ROS: denies blurred/double vision  Ears ROS: no hearing difficulty, no tinnitus  Mouth and Throat ROS: no ulceration, dysphagia, dental caries  Psychological ROS: no depression, no anxiety, no panic attacks, denies suicide/homicide ideation  Endocrine ROS: denies polyuria, polydypsia, no heat or cold intolerance  Respiratory ROS: no cough, shortness of breath, or wheezing  Cardiovascular ROS: no chest pain or dyspnea on exertion  Gastrointestinal ROS: no abdominal pain, change in bowel habits, or black or bloody stools  Genito-Urinary ROS: denies dysuria, frequency, urgency; denies hematuria  Musculoskeletal ROS: positive for - pain in lower, chronic back  Neurological ROS: no syncope, no seizures, no numbness or tingling of hands, no numbness or tingling of feet, no paresis  Dermatology: no skin rash, no eczema  Endocrine: no polyuria, polydypsia, no heat/cold intolerance  Hematology: denies bruising easily, denies bleeding problems, denies clotting disorders    PHYSICAL EXAM:    /87   Pulse 83   Temp 97.5 °F (36.4 °C) (Oral)   Resp 22   SpO2 95%     General appearance:  No apparent distress, appears stated age and cooperative. HEENT:  Normal cephalic, atraumatic without obvious deformity. Pupils equal, round, and reactive to light. Conjunctivae/corneas clear. Neck: Supple, with full range of motion. No jugular venous distention. Trachea midline. Respiratory:  Normal respiratory effort. Clear to auscultation, bilaterally without Rales/Wheezes/Rhonchi. Cardiovascular:  Regular rate and rhythm with normal S1/S2 without murmurs, rubs or gallops. Abdomen: Soft, non-tender, non-distended with normal bowel sounds. Musculoskeletal:  (++) edema bilaterally.   Full range of motion without deformity. Skin: Skin color, texture, turgor normal.   Neurologic:  Neurovascularly intact without any focal sensory/motor deficits. Cranial nerves: II-XII intact, grossly non-focal.  Psychiatric:  Alert and oriented x 4, thought content appropriate  Capillary Refill: Brisk,< 3 seconds   Peripheral Pulses: +2 palpable, equal bilaterally       Labs:     Recent Labs     04/28/19  1137   WBC 5.4   HGB 15.5   HCT 49.8        Recent Labs     04/28/19  1137      K 4.0      CO2 23   BUN 22   CREATININE 1.4*   CALCIUM 8.8     Recent Labs     04/28/19  1137   AST 20   ALT 10*   BILITOT 0.9   ALKPHOS 65     Recent Labs     04/28/19  1137   INR 1.02     No results for input(s): Anila Elizabethitz in the last 72 hours. Urinalysis:      Lab Results   Component Value Date    NITRU NEGATIVE 04/28/2019    WBCUA > 200 03/30/2016    BACTERIA NONE 03/30/2016    RBCUA 10-15 03/30/2016    BLOODU NEGATIVE 04/28/2019    SPECGRAV 1.022 03/30/2016    GLUCOSEU NEGATIVE 04/28/2019       Radiology:       CT Head WO Contrast   Final Result   No acute intracranial process. No change from prior study. **This report has been created using voice recognition software. It may contain minor errors which are inherent in voice recognition technology. **      Final report electronically signed by Dr. Jeromy Singh on 4/28/2019 2:40 PM          EKG:  I have reviewed the EKG with the following interpretation: SR HR 65    DVT prophylaxis: [x] Lovenox                                 [] SCDs                                 [] SQ Heparin                                 [] Encourage ambulation           [] Already on Anticoagulation    Code Status: Prior      Disposition:    [x] Home       [] TCU       [] Rehab       [] Psych       [] SNF       [] [place]       [] Other-    PROBLEM LIST:    C/Marian Ruiz 1106 Problems    Diagnosis Date Noted    Encephalopathy [G93.40] 04/28/2019 ASSESSMENT/PLAN:    1. Altered mental status--R/O ACS; maintain tele; check orthostatics; U/A is clean; CT head (-) acute; resolved now   2. Possible Acute on Chronic Diastolic HF--EF was 60-21% on 6/24/2015; received Bumex in ED; BNP normal  3. Possible Hypothyroidism--check FT4  4. Essential HTN, uncontrolled--BB; monitor  5. CAD--follows with Dr Carolina Blandon; on BB, ASA, statin  6. Prostate cancer--[place] score 7, PSA 7, Clinical stage, T1c; had Interstitial Brachytherapy treatment in 2006--follows with Dr Kaley Linda; on Flomax, Proscar, Ditropan  7. CBP          Thank you Stephanie Marina MD for the opportunity to be involved in this patient's care.     Electronically signed by ALESSANDRA Padilla CNP on 4/28/2019 at 3:05 PM

## 2019-04-29 ENCOUNTER — APPOINTMENT (OUTPATIENT)
Dept: GENERAL RADIOLOGY | Age: 84
DRG: 071 | End: 2019-04-29
Payer: MEDICARE

## 2019-04-29 VITALS
HEART RATE: 60 BPM | WEIGHT: 217 LBS | SYSTOLIC BLOOD PRESSURE: 178 MMHG | BODY MASS INDEX: 32.14 KG/M2 | TEMPERATURE: 97.7 F | DIASTOLIC BLOOD PRESSURE: 83 MMHG | HEIGHT: 69 IN | OXYGEN SATURATION: 96 % | RESPIRATION RATE: 18 BRPM

## 2019-04-29 LAB
ANION GAP SERPL CALCULATED.3IONS-SCNC: 15 MEQ/L (ref 8–16)
BUN BLDV-MCNC: 20 MG/DL (ref 7–22)
CALCIUM SERPL-MCNC: 8.8 MG/DL (ref 8.5–10.5)
CHLORIDE BLD-SCNC: 106 MEQ/L (ref 98–111)
CO2: 23 MEQ/L (ref 23–33)
CREAT SERPL-MCNC: 1.2 MG/DL (ref 0.4–1.2)
EKG ATRIAL RATE: 63 BPM
EKG ATRIAL RATE: 65 BPM
EKG P AXIS: 45 DEGREES
EKG P AXIS: 50 DEGREES
EKG P-R INTERVAL: 180 MS
EKG P-R INTERVAL: 192 MS
EKG Q-T INTERVAL: 410 MS
EKG Q-T INTERVAL: 414 MS
EKG QRS DURATION: 82 MS
EKG QRS DURATION: 82 MS
EKG QTC CALCULATION (BAZETT): 419 MS
EKG QTC CALCULATION (BAZETT): 430 MS
EKG R AXIS: -15 DEGREES
EKG R AXIS: 56 DEGREES
EKG T AXIS: 56 DEGREES
EKG T AXIS: 65 DEGREES
EKG VENTRICULAR RATE: 63 BPM
EKG VENTRICULAR RATE: 65 BPM
ERYTHROCYTE [DISTWIDTH] IN BLOOD BY AUTOMATED COUNT: 13.3 % (ref 11.5–14.5)
ERYTHROCYTE [DISTWIDTH] IN BLOOD BY AUTOMATED COUNT: 49.3 FL (ref 35–45)
FOLATE: 18.6 NG/ML (ref 4.8–24.2)
GFR SERPL CREATININE-BSD FRML MDRD: 70 ML/MIN/1.73M2
GLUCOSE BLD-MCNC: 87 MG/DL (ref 70–108)
HCT VFR BLD CALC: 47.3 % (ref 42–52)
HEMOGLOBIN: 14.5 GM/DL (ref 14–18)
MAGNESIUM: 2.2 MG/DL (ref 1.6–2.4)
MCH RBC QN AUTO: 30.5 PG (ref 26–33)
MCHC RBC AUTO-ENTMCNC: 30.7 GM/DL (ref 32.2–35.5)
MCV RBC AUTO: 99.6 FL (ref 80–94)
PLATELET # BLD: 161 THOU/MM3 (ref 130–400)
PMV BLD AUTO: 10.3 FL (ref 9.4–12.4)
POTASSIUM REFLEX MAGNESIUM: 3.7 MEQ/L (ref 3.5–5.2)
RBC # BLD: 4.75 MILL/MM3 (ref 4.7–6.1)
SODIUM BLD-SCNC: 144 MEQ/L (ref 135–145)
TROPONIN T: < 0.01 NG/ML
VITAMIN B-12: 683 PG/ML (ref 211–911)
WBC # BLD: 4 THOU/MM3 (ref 4.8–10.8)

## 2019-04-29 PROCEDURE — 94640 AIRWAY INHALATION TREATMENT: CPT

## 2019-04-29 PROCEDURE — 82607 VITAMIN B-12: CPT

## 2019-04-29 PROCEDURE — 99233 SBSQ HOSP IP/OBS HIGH 50: CPT | Performed by: HOSPITALIST

## 2019-04-29 PROCEDURE — 85027 COMPLETE CBC AUTOMATED: CPT

## 2019-04-29 PROCEDURE — 6370000000 HC RX 637 (ALT 250 FOR IP): Performed by: NURSE PRACTITIONER

## 2019-04-29 PROCEDURE — 2580000003 HC RX 258: Performed by: NURSE PRACTITIONER

## 2019-04-29 PROCEDURE — 80048 BASIC METABOLIC PNL TOTAL CA: CPT

## 2019-04-29 PROCEDURE — 36415 COLL VENOUS BLD VENIPUNCTURE: CPT

## 2019-04-29 PROCEDURE — 83735 ASSAY OF MAGNESIUM: CPT

## 2019-04-29 PROCEDURE — 71046 X-RAY EXAM CHEST 2 VIEWS: CPT

## 2019-04-29 PROCEDURE — 93010 ELECTROCARDIOGRAM REPORT: CPT | Performed by: INTERNAL MEDICINE

## 2019-04-29 PROCEDURE — 82746 ASSAY OF FOLIC ACID SERUM: CPT

## 2019-04-29 PROCEDURE — 6370000000 HC RX 637 (ALT 250 FOR IP): Performed by: HOSPITALIST

## 2019-04-29 PROCEDURE — 84484 ASSAY OF TROPONIN QUANT: CPT

## 2019-04-29 RX ORDER — LEVOTHYROXINE SODIUM 0.05 MG/1
50 TABLET ORAL DAILY
Qty: 30 TABLET | Refills: 1 | Status: ON HOLD | OUTPATIENT
Start: 2019-04-29 | End: 2020-10-22

## 2019-04-29 RX ORDER — AMLODIPINE BESYLATE 5 MG/1
5 TABLET ORAL DAILY
Status: DISCONTINUED | OUTPATIENT
Start: 2019-04-29 | End: 2019-04-29 | Stop reason: HOSPADM

## 2019-04-29 RX ORDER — LEVOTHYROXINE SODIUM 0.05 MG/1
50 TABLET ORAL DAILY
Status: DISCONTINUED | OUTPATIENT
Start: 2019-04-29 | End: 2019-04-29 | Stop reason: HOSPADM

## 2019-04-29 RX ORDER — AMLODIPINE BESYLATE 5 MG/1
5 TABLET ORAL DAILY
Qty: 30 TABLET | Refills: 1 | Status: ON HOLD | OUTPATIENT
Start: 2019-04-29 | End: 2020-10-22

## 2019-04-29 RX ADMIN — LEVOTHYROXINE SODIUM 50 MCG: 50 TABLET ORAL at 13:52

## 2019-04-29 RX ADMIN — METOPROLOL TARTRATE 25 MG: 25 TABLET ORAL at 09:07

## 2019-04-29 RX ADMIN — Medication 10 ML: at 09:07

## 2019-04-29 RX ADMIN — VITAMIN D, TAB 1000IU (100/BT) 2000 UNITS: 25 TAB at 09:07

## 2019-04-29 RX ADMIN — OXYBUTYNIN CHLORIDE 5 MG: 5 TABLET ORAL at 09:07

## 2019-04-29 RX ADMIN — FINASTERIDE 5 MG: 5 TABLET, FILM COATED ORAL at 09:07

## 2019-04-29 RX ADMIN — ASPIRIN 325 MG: 325 TABLET ORAL at 09:07

## 2019-04-29 RX ADMIN — AMLODIPINE BESYLATE 5 MG: 5 TABLET ORAL at 13:51

## 2019-04-29 RX ADMIN — Medication 2 PUFF: at 09:46

## 2019-04-29 NOTE — CARE COORDINATION
4/29/19, 12:39 PM      Jorge A Quevedo       Admitted from: ED 4/28/2019/ [facility] day: 1   Location: [address] [place] [address] Reason for admit: Encephalopathy [G93.40] Status: IP  Admit order signed?: yes  PMH:  has a past medical history of ALDA (acute kidney injury) ([facility] [address] Blood circulation, collateral, CAD (coronary artery disease), Cancer ([facility] [address] Chronic kidney disease, stage III (moderate) ([facility] [address] Hypercholesteremia, Hypertension, and Osteoarthritis. Procedure: None  Pertinent abnormal Imaging: None  Medications:  Scheduled Meds:   levothyroxine  50 mcg Oral Daily    amLODIPine  5 mg Oral Daily    aspirin  325 mg Oral Daily    vitamin D  2,000 Units Oral Daily    famotidine  20 mg Oral Nightly    finasteride  5 mg Oral Daily    metoprolol tartrate  25 mg Oral BID    mometasone-formoterol  2 puff Inhalation BID    oxybutynin  5 mg Oral BID    pravastatin  20 mg Oral Nightly    tamsulosin  0.4 mg Oral Nightly    sodium chloride flush  10 mL Intravenous 2 times per day    enoxaparin  40 mg Subcutaneous Daily     Continuous Infusions:   Pertinent Info/Orders/Treatment Plan:  Pt admitted through ED with complaints of altered mental status, pt was found slumped over on the table at home. Wife called the squad and brought pt to the ED. By the time he arrived to ED he was back to baseline. Troponin levels negative. Diet: DIET LOW SODIUM 2 GM; No Caffeine   Smoking status:  reports that he quit smoking about 22 years ago. His smoking use included cigarettes. He has a 12.50 pack-year smoking history.  He has never used smokeless tobacco.   PCP: Owens Spurling, MD  Readmission: No  Readmission Risk Score: 10%    Discharge Planning  Current Residence:  Private Residence  Living Arrangements:  Spouse/Significant Other   Support Systems:  Spouse/Significant Other, Children  Current Services PTA:     Potential Assistance Needed:  N/A  Potential Assistance Purchasing Medications:  No  Does patient want to participate in local refill/ meds to beds program?  No  Type of Home Care Services:  None  Patient expects to be discharged to:  home with wife  Expected Discharge date:  05/02/19  Follow Up Appointment: Best Day/ Time: Wednesday AM    Discharge Plan: Pt lives at home with wife. Plans are to return there at discharge. Pt states he uses a cane at home. States his wife drives him to his appointments as he does not drive. His wife is currently being seen at home by [facility], but pt denies any needs for himself. Discharge orders received. Planning home today.  Evaluation: no    4/29/19, 12:45 PM    Discharge plan discussed by  and . Discharge plan reviewed with patient/ family. Patient/ family verbalize understanding of discharge plan and are in agreement with plan. Understanding was demonstrated using the teach back method.        IMM Letter  IMM Letter given to Patient/Family/Significant other/Guardian/POA/by[de-identified] Pt Access  IMM Letter date given[de-identified] 04/28/19  IMM Letter time given[de-identified] 0027

## 2019-04-29 NOTE — PLAN OF CARE
Problem: Falls - Risk of:  Goal: Will remain free from falls  Description  Will remain free from falls  Outcome: Ongoing  Note:   No falls noted at this time. Bed in low position. Call light within reach. Problem: Daily Care:  Goal: Daily care needs are met  Description  Daily care needs are met  Outcome: Ongoing  Note:   Care needs met. Pt able to make needs known and use call system. Problem: Discharge Planning:  Goal: Patients continuum of care needs are met  Description  Patients continuum of care needs are met  Outcome: Ongoing  Note:   Pt plans to be discharged home at time of discharge. Care plan reviewed with patient and family. Patient and family verbalize understanding of the plan of care and contribute to goal setting.

## 2019-04-29 NOTE — PLAN OF CARE
Problem: Impaired respiratory status  Goal: Clear lung sounds  Outcome: Ongoing   Breath sounds are clear and diminished at this time. Continue with maintenance/home medication.

## 2019-04-29 NOTE — DISCHARGE SUMMARY
Hospital Medicine Discharge Summary      Patient Identification:   Suman Larios   : 1935  MRN: [id number]   Account: [de-identified]      Patient's PCP: Jeremías Rosas MD    Admit Date: 2019     Discharge Date:   2019    Admitting Physician: Chavo Hernandez MD     Discharge Physician: Shelley Caldera MD     Discharge Diagnoses: Active Hospital Problems    Diagnosis Date Noted    Encephalopathy [G93.40] 2019    Altered mental status [R41.82]        The patient was seen and examined on day of discharge and this discharge summary is in conjunction with any daily progress note from day of discharge. Hospital Course:   Suman Larios is a 81 y.o. male admitted to [address] [place] [address] on 2019 for query AMS. He remained clinically stable and work-up for AMS was non-contributory. He was discharged home in stable medical condition. Assessment/Plan:    1. - Query AMS: no syncope, no H/I or fall. W/u non-contributpry. Tele except from PVCs last night unremarkable. Serial trop negative and ECG negative for any new/dynamic ischemic changes. Mg 2.2. CXR aside from hyperaeration showed nil acute. No CHF. Folate and Vit B12 levels sent as well. Lastly suggested we could consider doing MR brain although yield would be low. Pt and wife understand and appreciate similar episode happen on two separate occasions for which w/u was non-contributory. 2. TSH elevated at 9.2 with free T4 WNLs. C/w subclinical Hypothyroidism, started on low-dose 50 mcg synthroid. Will need repeat TSH in 6 wks with dose adjsutment accordingly  3. Essential HTN: on Metoprolol 25 mg BID. Will add 2th agent Amlodipine 5 mg QD. D/c'ed home NSAID. PCP to monitor BP and adjust regimen accoridngly  4. CAD- follows with Dr Fox Russell; on BB, ASA, statin. stable  5.  Prostate cancer--Summit score 7, PSA 7, Clinical stage, T1c; had Interstitial Brachytherapy treatment in --follows with Dr Deepthi Chadwick; on Flomax, Proscar, Ditropan  6. Dispo: pt lives at home with wife and wishes to return home            Exam:     Vitals:  Vitals:    04/28/19 2014 04/28/19 2326 04/29/19 0502 04/29/19 0845   BP: (!) 147/75 (!) 142/69 (!) 159/71 (!) 144/65   Pulse: 77 63 71 77   Resp: 18 16 16 18   Temp: 98.2 °F (36.8 °C) 98 °F (36.7 °C) 97.4 °F (36.3 °C) 97.6 °F (36.4 °C)   TempSrc: Oral Oral Oral Oral   SpO2: 93% 96% 97% 96%   Weight:   217 lb (98.4 kg)    Height:         Weight: Weight: 217 lb (98.4 kg)     24 hour intake/output:    Intake/Output Summary (Last 24 hours) at 4/29/2019 1203  Last data filed at 4/29/2019 0505  Gross per 24 hour   Intake 680 ml   Output 760 ml   Net -80 ml           General appearance: A&O x3, Not ill or toxic, in no apparent distress  HEENT:  ARIS  EOM intact. Neck: Supple, with full range of motion. No jugular venous distention. Trachea midline. Respiratory:   NL A/E bilat with no adventitious sounds   Cardiovascular:  normal S1/S2 with no murmurs/gallops  Abdomen: Soft, non-tender, non-distended, no rigidity or peritoneal signs  Musculoskeletal: NL symmetrical A/PROM bilat U/L extremities   Skin: No rashes. No edema  Neurologic:  CN II-XII intact. NL symmetrical reflexes. NL gait and stance. NL Cerebellar exam. Power 5/5 all muscle groups U/L extremities. Toes downgoing  Capillary Refill: Brisk,< 3 seconds   Peripheral Pulses: +2 palpable, equal bilaterally              Labs:  For convenience and continuity at follow-up the following most recent labs are provided:      CBC:    Lab Results   Component Value Date    WBC 4.0 04/29/2019    HGB 14.5 04/29/2019    HCT 47.3 04/29/2019     04/29/2019       Renal:    Lab Results   Component Value Date     04/29/2019    K 3.7 04/29/2019     04/29/2019    CO2 23 04/29/2019    BUN 20 04/29/2019    CREATININE 1.2 04/29/2019    CALCIUM 8.8 04/29/2019    PHOS 3.0 11/02/2015         Significant Diagnostic Studies    Radiology:   XR CHEST STANDARD (2 VW)   Final Result   1. No perihilar infiltrates are demonstrated and may be related to minimal underlying interstitial edema. 2. Hyperinflation is noted. **This report has been created using voice recognition software. It may contain minor errors which are inherent in voice recognition technology. **      Final report electronically signed by Dr. Bishop Juarez on 4/29/2019 10:33 AM      CT Head WO Contrast   Final Result   No acute intracranial process. No change from prior study. **This report has been created using voice recognition software. It may contain minor errors which are inherent in voice recognition technology. **      Final report electronically signed by Dr. Malick Saldaña on 4/28/2019 2:40 PM             Consults:     None    Disposition:    [x] Home       [] TCU       [] Rehab       [] Psych       [] SNF       [] [place]       [] Other-    Condition at Discharge: Stable    Code Status:  Full Code     Patient Instructions:    Discharge lab work: TSH in 6 wks  Activity: activity as tolerated  Diet: DIET LOW SODIUM 2 GM; No Caffeine      Follow-up visits:   Benita Conroy MD  [place] [phone]               Discharge Medications:      Meghan Herrera   Home Medication Instructions [facility]:[id number]    Printed on:04/29/19 1203   Medication Information                      acetaminophen (TYLENOL) 500 MG tablet  Take 500 mg by mouth every 4 hours as needed for Pain             albuterol sulfate  (90 Base) MCG/ACT inhaler  Inhale 1 puff into the lungs every 4 hours as needed for Wheezing             aspirin 325 MG tablet  Take 325 mg by mouth daily             Cholecalciferol (VITAMIN D3) 5000 UNITS TABS  Take 2,000 Units by mouth daily              famotidine (PEPCID) 20 MG tablet  Take 20 mg by mouth nightly             finasteride (PROSCAR) 5 MG tablet  Take 1 tablet by mouth daily             metoprolol tartrate (LOPRESSOR) 25 MG tablet  Take 25 mg by mouth 2 times daily             mometasone-formoterol (DULERA) 100-5 MCG/ACT inhaler  Inhale 2 puffs into the lungs 2 times daily             naproxen (NAPROSYN) 375 MG tablet  Take 325 mg by mouth every 12 hours as needed for Pain             oxybutynin (DITROPAN) 5 MG tablet  Take 1 tablet by mouth 2 times daily             pravastatin (PRAVACHOL) 20 MG tablet  Take 20 mg by mouth nightly             tamsulosin (FLOMAX) 0.4 MG capsule  Take 1 capsule by mouth nightly                 Time Spent on discharge is more than 30 minutes in the examination, evaluation, counseling and review of medications and discharge plan. With RN in room, patient and wife were updated about the treatment plan, all the questions and concerns were addressed. Alarming signs and symptoms to return to ED were explained in length. Signed: Thank you Bessie Nagel MD for the opportunity to be involved in this patient's care.     Electronically signed by Jacqueline Sousa MD on 4/29/2019 at 12:03 PM

## 2019-07-10 LAB
ANION GAP SERPL CALCULATED.3IONS-SCNC: 14 MMOL/L (ref 4–12)
BUN BLDV-MCNC: 24 MG/DL (ref 5–27)
CALCIUM SERPL-MCNC: 9.8 MG/DL (ref 8.5–10.5)
CHLORIDE BLD-SCNC: 107 MMOL/L (ref 98–109)
CO2: 25 MMOL/L (ref 22–32)
CREAT SERPL-MCNC: 1.31 MG/DL (ref 0.4–1)
CREATINE, URINE: 438.37 MG/DL
EGFR AFRICAN AMERICAN: 47 ML/MIN/1.73SQ.M
EGFR IF NONAFRICAN AMERICAN: 39 ML/MIN/1.73SQ.M
GLUCOSE: 100 MG/DL (ref 65–99)
MICROALBUMIN/CREAT 24H UR: 8.3 MG/DL (ref 0–1.9)
MICROALBUMIN/CREAT UR-RTO: 18.9 MG/G CREAT (ref 0–30)
POTASSIUM SERPL-SCNC: 4.1 MMOL/L (ref 3.5–5)
PROTEIN, URINE: 470 MG/L
SODIUM BLD-SCNC: 146 MMOL/L (ref 134–146)

## 2020-05-12 NOTE — TELEPHONE ENCOUNTER
Negar Melendez called requesting a refill on the following medications:  Requested Prescriptions     Pending Prescriptions Disp Refills    oxybutynin (DITROPAN) 5 MG tablet 180 tablet 0     Sig: Take 1 tablet by mouth 2 times daily    finasteride (PROSCAR) 5 MG tablet 90 tablet 0     Sig: Take 1 tablet by mouth daily    tamsulosin (FLOMAX) 0.4 MG capsule 90 capsule 0     Sig: Take 1 capsule by mouth nightly       Date of last visit: 4/23/19  Date of next visit (if applicable): 8/6/68     Gave him enough to last until next appt.

## 2020-05-13 RX ORDER — TAMSULOSIN HYDROCHLORIDE 0.4 MG/1
0.4 CAPSULE ORAL NIGHTLY
Qty: 90 CAPSULE | Refills: 0 | Status: SHIPPED | OUTPATIENT
Start: 2020-05-13 | End: 2020-08-19

## 2020-05-13 RX ORDER — FINASTERIDE 5 MG/1
5 TABLET, FILM COATED ORAL DAILY
Qty: 90 TABLET | Refills: 0 | Status: SHIPPED | OUTPATIENT
Start: 2020-05-13 | End: 2020-08-21

## 2020-05-13 RX ORDER — OXYBUTYNIN CHLORIDE 5 MG/1
5 TABLET ORAL 2 TIMES DAILY
Qty: 180 TABLET | Refills: 0 | Status: SHIPPED | OUTPATIENT
Start: 2020-05-13 | End: 2020-09-25

## 2020-06-18 LAB — PSA, ULTRASENSITIVE: 0.59 NG/ML (ref 0–4)

## 2020-07-07 ENCOUNTER — OFFICE VISIT (OUTPATIENT)
Dept: UROLOGY | Age: 85
End: 2020-07-07
Payer: MEDICARE

## 2020-07-07 VITALS — HEIGHT: 69 IN | WEIGHT: 224 LBS | TEMPERATURE: 96.7 F | BODY MASS INDEX: 33.18 KG/M2

## 2020-07-07 LAB
BACTERIA: ABNORMAL /HPF
BILIRUBIN URINE: NEGATIVE
BILIRUBIN URINE: NEGATIVE
BLOOD URINE, POC: ABNORMAL
BLOOD, URINE: ABNORMAL
CASTS 2: ABNORMAL /LPF
CASTS UA: ABNORMAL /LPF
CHARACTER, URINE: ABNORMAL
CHARACTER, URINE: CLEAR
COLOR, URINE: YELLOW
COLOR: YELLOW
CRYSTALS, UA: ABNORMAL
EPITHELIAL CELLS, UA: ABNORMAL /HPF
GLUCOSE URINE: NEGATIVE MG/DL
GLUCOSE URINE: NEGATIVE MG/DL
KETONES, URINE: ABNORMAL
KETONES, URINE: NEGATIVE
LEUKOCYTE CLUMPS, URINE: ABNORMAL
LEUKOCYTE ESTERASE, URINE: ABNORMAL
MISCELLANEOUS 2: ABNORMAL
NITRITE, URINE: NEGATIVE
NITRITE, URINE: POSITIVE
PH UA: 5 (ref 5–9)
PH, URINE: 5.5 (ref 5–9)
POST VOID RESIDUAL (PVR): 0 ML
PROTEIN UA: 30
PROTEIN, URINE: 30 MG/DL
RBC URINE: ABNORMAL /HPF
RENAL EPITHELIAL, UA: ABNORMAL
SPECIFIC GRAVITY, URINE: 1.02 (ref 1–1.03)
SPECIFIC GRAVITY, URINE: 1.02 (ref 1–1.03)
UROBILINOGEN, URINE: 0.2 EU/DL (ref 0–1)
UROBILINOGEN, URINE: 1 EU/DL (ref 0–1)
WBC UA: > 200 /HPF
YEAST: ABNORMAL

## 2020-07-07 PROCEDURE — 99214 OFFICE O/P EST MOD 30 MIN: CPT | Performed by: NURSE PRACTITIONER

## 2020-07-07 PROCEDURE — 51798 US URINE CAPACITY MEASURE: CPT | Performed by: NURSE PRACTITIONER

## 2020-07-07 PROCEDURE — 4040F PNEUMOC VAC/ADMIN/RCVD: CPT | Performed by: NURSE PRACTITIONER

## 2020-07-07 PROCEDURE — 1123F ACP DISCUSS/DSCN MKR DOCD: CPT | Performed by: NURSE PRACTITIONER

## 2020-07-07 PROCEDURE — G8427 DOCREV CUR MEDS BY ELIG CLIN: HCPCS | Performed by: NURSE PRACTITIONER

## 2020-07-07 PROCEDURE — 1036F TOBACCO NON-USER: CPT | Performed by: NURSE PRACTITIONER

## 2020-07-07 PROCEDURE — 81003 URINALYSIS AUTO W/O SCOPE: CPT | Performed by: NURSE PRACTITIONER

## 2020-07-07 PROCEDURE — G8417 CALC BMI ABV UP PARAM F/U: HCPCS | Performed by: NURSE PRACTITIONER

## 2020-07-07 RX ORDER — DILTIAZEM HYDROCHLORIDE 120 MG/1
120 CAPSULE, EXTENDED RELEASE ORAL DAILY
COMMUNITY
End: 2020-07-28

## 2020-07-07 RX ORDER — CIPROFLOXACIN 500 MG/1
500 TABLET, FILM COATED ORAL 2 TIMES DAILY
Qty: 20 TABLET | Refills: 0 | Status: SHIPPED | OUTPATIENT
Start: 2020-07-07 | End: 2020-07-17

## 2020-07-07 RX ORDER — BUMETANIDE 1 MG/1
2 TABLET ORAL DAILY
COMMUNITY
End: 2020-07-28

## 2020-07-07 NOTE — PROGRESS NOTES
pravastatin (PRAVACHOL) 20 MG tablet Take 20 mg by mouth nightly      Cholecalciferol (VITAMIN D3) 5000 UNITS TABS Take 2,000 Units by mouth daily       acetaminophen (TYLENOL) 500 MG tablet Take 500 mg by mouth every 4 hours as needed for Pain      aspirin 325 MG tablet Take 325 mg by mouth daily      levothyroxine (SYNTHROID) 50 MCG tablet Take 1 tablet by mouth Daily 30 tablet 1    amLODIPine (NORVASC) 5 MG tablet Take 1 tablet by mouth daily 30 tablet 1     No current facility-administered medications for this visit. Past Medical History  Laverne Reaves  has a past medical history of ALDA (acute kidney injury) ([facility] [address] Blood circulation, collateral, CAD (coronary artery disease), Cancer ([facility] [address] Chronic kidney disease, stage III (moderate) ([facility] [address] Hypercholesteremia, Hypertension, and Osteoarthritis. Past Surgical History  The patient  has a past surgical history that includes knee surgery; joint replacement; Neck surgery; Rotator cuff repair; Cardiac surgery; back surgery (2006); other surgical history (5/29/2015); Endoscopy, colon, diagnostic; Cosmetic surgery; and Total hip arthroplasty (Bilateral). Family History  This patient's family history includes Heart Disease in his mother; High Blood Pressure in his mother. Social History  Stone  reports that he quit smoking about 23 years ago. His smoking use included cigarettes. He has a 12.50 pack-year smoking history. He has never used smokeless tobacco. He reports that he does not drink alcohol or use drugs. Subjective:     Review of Systems  No problems with ears, nose or throat. No problems with eyes. No chest pain, shortness of breath, abdominal pain, extremity pain or weakness, and no neurological deficits. No rashes.  symptoms per HPI. The remainder of the review of symptoms is negative.     Objective:     PE:   Vitals:    07/07/20 1300   Temp: 96.7 °F (35.9 °C)   TempSrc: Temporal   Weight: 224 lb (101.6 kg)   Height: 5' 9\" (1.753 m) Constitutional: Alert and oriented times 3, no acute distress and cooperative to examination with appropriate mood and affect. HENT:   Head:        Normocephalic and atraumatic. Mouth/Throat:         Mucous membranes are normal.   Eyes:         EOM are normal. No scleral icterus. PERRLA. Neck:        Supple, symmetrical, trachea midline  Cardiovascular:        Normal rate, regular rhythm, S1 S2 heart sounds. No murmurs, rub, or gallops. Pulses:       Radial pulses are 2+/4 bilateral and equal. Posterior tibialis 2+/4 bilateral and equal  Pulmonary/Chest:      Chest symmetric with normal A/P diameter,  CTA with no wheezes, rales, or rhonchi noted. Normal respiratory rate and rhthym. No use of accessory muscles. Abdominal:         Soft. No tenderness. Bowel sounds present. :        The prostate is 5x5 cm, soft and benign in consistency. The median furrow is palpable. The lateral sulci are easily appreciated. The prostate is freely moveable in the pelvis. There are no other rectal masses. Scrotum slightly enlarged, no testicular abnormalities on palpation, mild left>right hydroceles, fluid accumulation, no erythema or indication of infection. Normal uncircumcised penis with easily retracted foreskin. Midline urethral meatus. Musculoskeletal:         Eleuterio Asher for mobility. +2 edema of lower extremities. Extremities: No cyanosis, clubbing, present. Neurological:        Alert and oriented. No cranial nerve deficit. [facility]:        Normal mood and affect.       Labs   Urine dip demonstrates   Results for POC orders placed in visit on 07/07/20   POCT Urinalysis No Micro (Auto)   Result Value Ref Range    Glucose, Ur Negative NEGATIVE mg/dl    Bilirubin Urine Negative     Ketones, Urine Negative NEGATIVE    Specific Gravity, Urine 1.025 1.002 - 1.03    Blood, UA POC Moderate (A) NEGATIVE    pH, Urine 5.50 5.0 - 9.0    Protein, Urine 30 (A) NEGATIVE mg/dl    Urobilinogen, Urine 0.20 0.0 - 1.0 eu/dl    Nitrite, Urine Positive (A) NEGATIVE    Leukocyte Clumps, Urine Moderate (A) NEGATIVE    Color, Urine Yellow YELLOW-STR    Character, Urine Clear [id number]   poct post void residual   Result Value Ref Range    post void residual 0 ml       Patients recent PSA values are as follows  No results found for: PSA, PSADIA     Recent BUN/Creatinine:  Lab Results   Component Value Date    BUN 24 07/09/2019    CREATININE 1.31 07/09/2019          Assessment & Plan:     Malu Lloyd was seen today for follow-up. Diagnoses and all orders for this visit:    Prostate cancer ([facility] [address]  -     POCT Urinalysis No Micro (Auto)  -     poct post void residual  -     PSA Prostatic Specific Antigen; Future    BPH with obstruction/lower urinary tract symptoms  -     POCT Urinalysis No Micro (Auto)  -     poct post void residual    Scrotal swelling    Acute cystitis without hematuria    Other orders  -     ciprofloxacin (CIPRO) 500 MG tablet; Take 1 tablet by mouth 2 times daily for 10 days    Stone was seen today in follow-up for prostate cancer and BPH. His PSA is 0.59 and shows great cancer control. LUTS are worse at night. He is supposed to be taking oxybutynin twice daily but currently only taking once daily in the morning. Advised he should also take at night. PVR today 0. His urine positive nitrites, moderate leuk esterase, moderate blood- send urine for culture. Cipro 500 mg twice daily for 10 days. Advised to call our office if he has not improved or symptoms worsen. For scrotal swelling appears to be from dependent fluid in a combination of hydrocele. Advised for him to elevate his scrotum and feet when dependent and use a sling for support. Continue finasteride and Flomax. Return in about 1 year (around 7/7/2021) for PSA prior.     ALESSANDRA N[place]-CNP  Urology

## 2020-07-09 ENCOUNTER — TELEPHONE (OUTPATIENT)
Dept: UROLOGY | Age: 85
End: 2020-07-09

## 2020-07-09 LAB
ORGANISM: ABNORMAL
URINE CULTURE REFLEX: ABNORMAL

## 2020-07-21 LAB
BUN BLDV-MCNC: 23 MG/DL
CALCIUM SERPL-MCNC: 9.7 MG/DL
CHLORIDE BLD-SCNC: 106 MMOL/L
CO2: 28 MMOL/L
CREAT SERPL-MCNC: 1.32 MG/DL
CREATININE, URINE: 262.68
GFR CALCULATED: 52
GLUCOSE BLD-MCNC: 104 MG/DL
MICROALBUMIN/CREAT 24H UR: 3.9 MG/G{CREAT}
MICROALBUMIN/CREAT UR-RTO: 14.8
POTASSIUM SERPL-SCNC: 4.2 MMOL/L
SODIUM BLD-SCNC: 146 MMOL/L

## 2020-07-22 LAB
ANION GAP SERPL CALCULATED.3IONS-SCNC: 12 MMOL/L (ref 5–15)
BUN BLDV-MCNC: 23 MG/DL (ref 5–27)
CALCIUM SERPL-MCNC: 9.7 MG/DL (ref 8.5–10.5)
CHLORIDE BLD-SCNC: 106 MMOL/L (ref 98–109)
CO2: 28 MMOL/L (ref 22–32)
CREAT SERPL-MCNC: 1.32 MG/DL (ref 0.6–1.3)
CREATINE, URINE: 262.68 MG/DL
CREATINE, URINE: 262.68 MG/DL
EGFR AFRICAN AMERICAN: >60 ML/MIN/1.73SQ.M
EGFR IF NONAFRICAN AMERICAN: 52 ML/MIN/1.73SQ.M
GLUCOSE: 104 MG/DL (ref 65–99)
MICROALBUMIN/CREAT 24H UR: 3.9 MG/DL (ref 0–1.9)
MICROALBUMIN/CREAT UR-RTO: 14.8 MG/G CREAT (ref 0–30)
POTASSIUM SERPL-SCNC: 4.2 MMOL/L (ref 3.5–5)
PROTEIN, URINE: 230 MG/L
PROTEIN/CREAT RATIO: 0.09
SODIUM BLD-SCNC: 146 MMOL/L (ref 134–146)

## 2020-07-28 ENCOUNTER — OFFICE VISIT (OUTPATIENT)
Dept: NEPHROLOGY | Age: 85
End: 2020-07-28
Payer: MEDICARE

## 2020-07-28 VITALS
TEMPERATURE: 98 F | OXYGEN SATURATION: 95 % | SYSTOLIC BLOOD PRESSURE: 138 MMHG | HEART RATE: 70 BPM | WEIGHT: 226 LBS | DIASTOLIC BLOOD PRESSURE: 68 MMHG | BODY MASS INDEX: 33.37 KG/M2

## 2020-07-28 PROCEDURE — G8427 DOCREV CUR MEDS BY ELIG CLIN: HCPCS | Performed by: INTERNAL MEDICINE

## 2020-07-28 PROCEDURE — 1036F TOBACCO NON-USER: CPT | Performed by: INTERNAL MEDICINE

## 2020-07-28 PROCEDURE — G8417 CALC BMI ABV UP PARAM F/U: HCPCS | Performed by: INTERNAL MEDICINE

## 2020-07-28 PROCEDURE — 99213 OFFICE O/P EST LOW 20 MIN: CPT | Performed by: INTERNAL MEDICINE

## 2020-07-28 PROCEDURE — 4040F PNEUMOC VAC/ADMIN/RCVD: CPT | Performed by: INTERNAL MEDICINE

## 2020-07-28 PROCEDURE — 1123F ACP DISCUSS/DSCN MKR DOCD: CPT | Performed by: INTERNAL MEDICINE

## 2020-07-28 RX ORDER — BUMETANIDE 1 MG/1
1 TABLET ORAL DAILY
Qty: 30 TABLET | Refills: 3 | Status: SHIPPED | OUTPATIENT
Start: 2020-07-28 | End: 2021-07-27

## 2020-07-28 NOTE — PROGRESS NOTES
pravastatin (PRAVACHOL) 20 MG tablet Take 20 mg by mouth nightly      Cholecalciferol (VITAMIN D3) 5000 UNITS TABS Take 2,000 Units by mouth daily       acetaminophen (TYLENOL) 500 MG tablet Take 500 mg by mouth every 4 hours as needed for Pain      aspirin 325 MG tablet Take 325 mg by mouth daily         Vitals     /68 (Site: Right Upper Arm, Position: Sitting, Cuff Size: Medium Adult)   Pulse 70   Temp 98 °F (36.7 °C) (Temporal)   Wt 226 lb (102.5 kg)   SpO2 95%   BMI 33.37 kg/m²  Wt Readings from Last 3 Encounters:   07/28/20 226 lb (102.5 kg)   07/07/20 224 lb (101.6 kg)   04/29/19 217 lb (98.4 kg)        Physical Exam     General -- no distress  Lungs -- clear  Heart -- S1, S2 heard, JVD - no  Abdomen - soft, non-tender  Extremities -- edema noted  CNS - awake and alert    Labs, Radiology and Tests    Labs -    4/16 4/17 4/18 4/19 7/20       Potassium 4.2 4.3 4.8 4.0 4.2       BUN 21 22 22 22 23       Creatinine 1.3 1.2 1.2 1.35 1.32       eGFR  52 49 45 52                   UPCR  250 290 100 90       UMCR  38 37 15 3.9                     Renal Ultrasound Scan -- 3/2016  Right kidney 12 cm left kidney 12.3 cm   Bilateral hydronephrosis and left hydroureter nephrosis   Increase in resistive indices consistent with medical renal disease   2.9 cm cyst on the right kidney      Assessment    1. Chronic kidney disease stage III - chronic kidney disease secondary to contrast-induced nephropathy and long-standing hypertension and senile nephrosclerosis. - Patient's GFR is stable. Fluid status is worsening   -He is only taking Bumex as needed advised to take Bumex every day 1 mg   -We will check a BMP in 2 weeks  2. Essential hypertension-running well continue current meds  3. Extensive DVT in the past - currently is only on aspirin. 4. BPH/prostrate cancer-stable, follows up with urology. 5. Cyst on the right kidney. 6. Fluid overload - plan as above  7.  FU in 1 year    Tests and orders placed this Encounter     Orders Placed This Encounter   Procedures    Protein / creatinine ratio, urine    Urinalysis with Microscopic    Microalbumin / Creatinine Urine Ratio    Comprehensive Metabolic Panel    BUN    Creatinine, Serum    Potassium       Sonali Navarro M.D  [facility].

## 2020-08-11 LAB — PSA, ULTRASENSITIVE: 0.58 NG/ML (ref 0–4)

## 2020-08-17 ENCOUNTER — TELEPHONE (OUTPATIENT)
Dept: UROLOGY | Age: 85
End: 2020-08-17

## 2020-08-17 NOTE — TELEPHONE ENCOUNTER
----- [facility] ALESSANDRA Ballesteros CNP sent at 8/16/2020  3:15 PM EDT -----  psa stable at 0.58

## 2020-08-19 RX ORDER — TAMSULOSIN HYDROCHLORIDE 0.4 MG/1
CAPSULE ORAL
Qty: 90 CAPSULE | Refills: 3 | Status: ON HOLD | OUTPATIENT
Start: 2020-08-19 | End: 2020-12-16 | Stop reason: SDUPTHER

## 2020-08-19 NOTE — TELEPHONE ENCOUNTER
Pharmacy is requesting a refill on the following medications:  Requested Prescriptions     Pending Prescriptions Disp Refills    tamsulosin (FLOMAX) 0.4 MG capsule [Pharmacy Med Name: TAMSULOSIN HCL CAPS 0.4MG] 90 capsule 3     Sig: TAKE 1 CAPSULE NIGHTLY     Pharmacy verified:  [email or web address]      Date of last visit: 07/07/20  Date of next visit (if applicable): Visit date not found

## 2020-08-21 RX ORDER — FINASTERIDE 5 MG/1
TABLET, FILM COATED ORAL
Qty: 90 TABLET | Refills: 3 | Status: SHIPPED | OUTPATIENT
Start: 2020-08-21 | End: 2021-09-30

## 2020-08-21 NOTE — TELEPHONE ENCOUNTER
Pharmacy is requesting a refill on the following medications:  Requested Prescriptions     Pending Prescriptions Disp Refills    finasteride (PROSCAR) 5 MG tablet [Pharmacy Med Name: FINASTERIDE TABS 5MG 5MG] 90 tablet 3     Sig: TAKE 1 TABLET DAILY     Pharmacy verified:  [email or web address]      Date of last visit:  07/07/21   Date of next visit (if applicable): Visit date not found

## 2020-09-25 RX ORDER — OXYBUTYNIN CHLORIDE 5 MG/1
TABLET ORAL
Qty: 180 TABLET | Refills: 3 | Status: SHIPPED | OUTPATIENT
Start: 2020-09-25 | End: 2021-12-01

## 2020-09-25 NOTE — TELEPHONE ENCOUNTER
Quang Giles called requesting a refill on the following medications:  Requested Prescriptions     Pending Prescriptions Disp Refills    oxybutynin (DITROPAN) 5 MG tablet [Pharmacy Med Name: OXYBUTYNIN CHLORIDE TABS 5MG] 180 tablet 3     Sig: TAKE 1 [place] verified:  .[email or web address]      Date of last visit: 07/07/2020  Date of next visit (if applicable): 41/56/4492

## 2020-10-19 ENCOUNTER — HOSPITAL ENCOUNTER (OUTPATIENT)
Age: 85
Setting detail: SPECIMEN
Discharge: HOME | End: 2020-10-19
Payer: MEDICARE

## 2020-10-19 PROCEDURE — U0003 INFECTIOUS AGENT DETECTION BY NUCLEIC ACID (DNA OR RNA); SEVERE ACUTE RESPIRATORY SYNDROME CORONAVIRUS 2 (SARS-COV-2) (CORONAVIRUS DISEASE [COVID-19]), AMPLIFIED PROBE TECHNIQUE, MAKING USE OF HIGH THROUGHPUT TECHNOLOGIES AS DESCRIBED BY CMS-2020-01-R: HCPCS

## 2020-10-20 LAB
PERFORMING LAB: NORMAL
REPORT: NORMAL
SARS-COV-2: NOT DETECTED

## 2020-11-03 PROBLEM — I10 HYPERTENSION: Status: RESOLVED | Noted: 2020-11-03 | Resolved: 2020-11-03

## 2020-11-18 ENCOUNTER — HOSPITAL ENCOUNTER (OUTPATIENT)
Dept: INTERVENTIONAL RADIOLOGY/VASCULAR | Age: 85
Discharge: HOME | End: 2020-11-18
Payer: MEDICARE

## 2020-11-18 PROCEDURE — 93970 EXTREMITY STUDY: CPT

## 2020-11-26 ENCOUNTER — APPOINTMENT (OUTPATIENT)
Dept: GENERAL RADIOLOGY | Age: 85
DRG: 083 | End: 2020-11-26
Payer: MEDICARE

## 2020-11-26 ENCOUNTER — HOSPITAL ENCOUNTER (INPATIENT)
Age: 85
LOS: 4 days | Discharge: HOME | DRG: 083 | End: 2020-12-01
Attending: EMERGENCY MEDICINE | Admitting: SURGERY
Payer: MEDICARE

## 2020-11-26 ENCOUNTER — APPOINTMENT (OUTPATIENT)
Dept: CT IMAGING | Age: 85
DRG: 083 | End: 2020-11-26
Payer: MEDICARE

## 2020-11-26 LAB
ALBUMIN SERPL-MCNC: 3.8 G/DL (ref 3.5–5.1)
ALP BLD-CCNC: 63 U/L (ref 38–126)
ALT SERPL-CCNC: 18 U/L (ref 11–66)
ANION GAP SERPL CALCULATED.3IONS-SCNC: 11 MEQ/L (ref 8–16)
APTT: 26.2 SECONDS (ref 22–38)
AST SERPL-CCNC: 21 U/L (ref 5–40)
BACTERIA: ABNORMAL /HPF
BASOPHILS # BLD: 0.8 %
BASOPHILS ABSOLUTE: 0 THOU/MM3 (ref 0–0.1)
BILIRUB SERPL-MCNC: 0.5 MG/DL (ref 0.3–1.2)
BILIRUBIN URINE: NEGATIVE
BLOOD, URINE: NEGATIVE
BUN BLDV-MCNC: 26 MG/DL (ref 7–22)
CALCIUM SERPL-MCNC: 9.8 MG/DL (ref 8.5–10.5)
CASTS 2: ABNORMAL /LPF
CASTS UA: ABNORMAL /LPF
CHARACTER, URINE: CLEAR
CHLORIDE BLD-SCNC: 107 MEQ/L (ref 98–111)
CO2: 24 MEQ/L (ref 23–33)
COLOR: YELLOW
CREAT SERPL-MCNC: 1.4 MG/DL (ref 0.4–1.2)
CRYSTALS, UA: ABNORMAL
EKG ATRIAL RATE: 58 BPM
EKG P AXIS: 79 DEGREES
EKG P-R INTERVAL: 204 MS
EKG Q-T INTERVAL: 394 MS
EKG QRS DURATION: 78 MS
EKG QTC CALCULATION (BAZETT): 386 MS
EKG R AXIS: 36 DEGREES
EKG T AXIS: 68 DEGREES
EKG VENTRICULAR RATE: 58 BPM
EOSINOPHIL # BLD: 2.5 %
EOSINOPHILS ABSOLUTE: 0.1 THOU/MM3 (ref 0–0.4)
EPITHELIAL CELLS, UA: ABNORMAL /HPF
ERYTHROCYTE [DISTWIDTH] IN BLOOD BY AUTOMATED COUNT: 12.8 % (ref 11.5–14.5)
ERYTHROCYTE [DISTWIDTH] IN BLOOD BY AUTOMATED COUNT: 47.3 FL (ref 35–45)
GFR SERPL CREATININE-BSD FRML MDRD: 58 ML/MIN/1.73M2
GLUCOSE BLD-MCNC: 93 MG/DL (ref 70–108)
GLUCOSE URINE: NEGATIVE MG/DL
HCT VFR BLD CALC: 49.4 % (ref 42–52)
HEMOGLOBIN: 15.4 GM/DL (ref 14–18)
IMMATURE GRANS (ABS): 0.1 THOU/MM3 (ref 0–0.07)
IMMATURE GRANULOCYTES: 2.1 %
INR BLD: 1.03 (ref 0.85–1.13)
KETONES, URINE: NEGATIVE
LEUKOCYTE ESTERASE, URINE: NEGATIVE
LYMPHOCYTES # BLD: 16.3 %
LYMPHOCYTES ABSOLUTE: 0.8 THOU/MM3 (ref 1–4.8)
MCH RBC QN AUTO: 31.2 PG (ref 26–33)
MCHC RBC AUTO-ENTMCNC: 31.2 GM/DL (ref 32.2–35.5)
MCV RBC AUTO: 100.2 FL (ref 80–94)
MISCELLANEOUS 2: ABNORMAL
MONOCYTES # BLD: 10.8 %
MONOCYTES ABSOLUTE: 0.5 THOU/MM3 (ref 0.4–1.3)
NITRITE, URINE: NEGATIVE
NUCLEATED RED BLOOD CELLS: 0 /100 WBC
OSMOLALITY CALCULATION: 287.6 MOSMOL/KG (ref 275–300)
PH UA: 5 (ref 5–9)
PLATELET # BLD: 153 THOU/MM3 (ref 130–400)
PMV BLD AUTO: 10.9 FL (ref 9.4–12.4)
POTASSIUM REFLEX MAGNESIUM: 5.4 MEQ/L (ref 3.5–5.2)
PRO-BNP: 275.1 PG/ML (ref 0–1800)
PROTEIN UA: 30
RBC # BLD: 4.93 MILL/MM3 (ref 4.7–6.1)
RBC URINE: ABNORMAL /HPF
RENAL EPITHELIAL, UA: ABNORMAL
SEG NEUTROPHILS: 67.5 %
SEGMENTED NEUTROPHILS ABSOLUTE COUNT: 3.2 THOU/MM3 (ref 1.8–7.7)
SODIUM BLD-SCNC: 142 MEQ/L (ref 135–145)
SPECIFIC GRAVITY, URINE: 1.03 (ref 1–1.03)
TOTAL PROTEIN: 6.5 G/DL (ref 6.1–8)
TROPONIN T: 0.01 NG/ML
UROBILINOGEN, URINE: 0.2 EU/DL (ref 0–1)
WBC # BLD: 4.8 THOU/MM3 (ref 4.8–10.8)
WBC UA: ABNORMAL /HPF
YEAST: ABNORMAL

## 2020-11-26 PROCEDURE — 72125 CT NECK SPINE W/O DYE: CPT

## 2020-11-26 PROCEDURE — 6370000000 HC RX 637 (ALT 250 FOR IP): Performed by: STUDENT IN AN ORGANIZED HEALTH CARE EDUCATION/TRAINING PROGRAM

## 2020-11-26 PROCEDURE — 93005 ELECTROCARDIOGRAM TRACING: CPT | Performed by: EMERGENCY MEDICINE

## 2020-11-26 PROCEDURE — 99223 1ST HOSP IP/OBS HIGH 75: CPT | Performed by: SURGERY

## 2020-11-26 PROCEDURE — 71045 X-RAY EXAM CHEST 1 VIEW: CPT

## 2020-11-26 PROCEDURE — 81001 URINALYSIS AUTO W/SCOPE: CPT

## 2020-11-26 PROCEDURE — 80053 COMPREHEN METABOLIC PANEL: CPT

## 2020-11-26 PROCEDURE — 85025 COMPLETE CBC W/AUTO DIFF WBC: CPT

## 2020-11-26 PROCEDURE — 36415 COLL VENOUS BLD VENIPUNCTURE: CPT

## 2020-11-26 PROCEDURE — 99285 EMERGENCY DEPT VISIT HI MDM: CPT

## 2020-11-26 PROCEDURE — 83880 ASSAY OF NATRIURETIC PEPTIDE: CPT

## 2020-11-26 PROCEDURE — APPSS60 APP SPLIT SHARED TIME 46-60 MINUTES: Performed by: PHYSICIAN ASSISTANT

## 2020-11-26 PROCEDURE — 6820000001 HC L2 TRAUMA SURGERY EVALUATION: Performed by: SURGERY

## 2020-11-26 PROCEDURE — 85610 PROTHROMBIN TIME: CPT

## 2020-11-26 PROCEDURE — 6360000004 HC RX CONTRAST MEDICATION: Performed by: EMERGENCY MEDICINE

## 2020-11-26 PROCEDURE — 71275 CT ANGIOGRAPHY CHEST: CPT

## 2020-11-26 PROCEDURE — 70486 CT MAXILLOFACIAL W/O DYE: CPT

## 2020-11-26 PROCEDURE — 71046 X-RAY EXAM CHEST 2 VIEWS: CPT

## 2020-11-26 PROCEDURE — 70450 CT HEAD/BRAIN W/O DYE: CPT

## 2020-11-26 PROCEDURE — 85730 THROMBOPLASTIN TIME PARTIAL: CPT

## 2020-11-26 PROCEDURE — 84484 ASSAY OF TROPONIN QUANT: CPT

## 2020-11-26 RX ORDER — ACETAMINOPHEN 325 MG/1
650 TABLET ORAL ONCE
Status: COMPLETED | OUTPATIENT
Start: 2020-11-26 | End: 2020-11-26

## 2020-11-26 RX ORDER — ASPIRIN 81 MG/1
324 TABLET, CHEWABLE ORAL ONCE
Status: DISCONTINUED | OUTPATIENT
Start: 2020-11-26 | End: 2020-11-26

## 2020-11-26 RX ORDER — METOPROLOL TARTRATE 50 MG/1
25 TABLET, FILM COATED ORAL ONCE
Status: COMPLETED | OUTPATIENT
Start: 2020-11-26 | End: 2020-11-26

## 2020-11-26 RX ADMIN — ACETAMINOPHEN 650 MG: 325 TABLET ORAL at 22:59

## 2020-11-26 RX ADMIN — IOPAMIDOL 80 ML: 755 INJECTION, SOLUTION INTRAVENOUS at 22:44

## 2020-11-26 RX ADMIN — METOPROLOL TARTRATE 25 MG: 50 TABLET, FILM COATED ORAL at 22:59

## 2020-11-26 ASSESSMENT — PAIN SCALES - GENERAL: PAINLEVEL_OUTOF10: 4

## 2020-11-27 ENCOUNTER — APPOINTMENT (OUTPATIENT)
Dept: CT IMAGING | Age: 85
DRG: 083 | End: 2020-11-27
Payer: MEDICARE

## 2020-11-27 PROBLEM — S22.42XA: Status: ACTIVE | Noted: 2020-11-27

## 2020-11-27 PROBLEM — R77.8 ELEVATED TROPONIN: Status: ACTIVE | Noted: 2020-11-27

## 2020-11-27 PROBLEM — I61.5 INTRAVENTRICULAR HEMORRHAGE (HCC): Status: ACTIVE | Noted: 2020-11-27

## 2020-11-27 PROBLEM — S02.32XA FRACTURE OF LEFT ORBITAL FLOOR (HCC): Status: ACTIVE | Noted: 2020-11-27

## 2020-11-27 PROBLEM — S02.2XXA NASAL BONE FRACTURE: Status: ACTIVE | Noted: 2020-11-27

## 2020-11-27 PROBLEM — R79.89 ELEVATED TROPONIN: Status: ACTIVE | Noted: 2020-11-27

## 2020-11-27 LAB
ANION GAP SERPL CALCULATED.3IONS-SCNC: 11 MEQ/L (ref 8–16)
BASOPHILS # BLD: 0.3 %
BASOPHILS ABSOLUTE: 0 THOU/MM3 (ref 0–0.1)
BUN BLDV-MCNC: 23 MG/DL (ref 7–22)
CALCIUM SERPL-MCNC: 9.3 MG/DL (ref 8.5–10.5)
CHLORIDE BLD-SCNC: 107 MEQ/L (ref 98–111)
CO2: 22 MEQ/L (ref 23–33)
CREAT SERPL-MCNC: 1.3 MG/DL (ref 0.4–1.2)
EOSINOPHIL # BLD: 1.2 %
EOSINOPHILS ABSOLUTE: 0.1 THOU/MM3 (ref 0–0.4)
ERYTHROCYTE [DISTWIDTH] IN BLOOD BY AUTOMATED COUNT: 12.8 % (ref 11.5–14.5)
ERYTHROCYTE [DISTWIDTH] IN BLOOD BY AUTOMATED COUNT: 45.9 FL (ref 35–45)
GFR SERPL CREATININE-BSD FRML MDRD: 64 ML/MIN/1.73M2
GLUCOSE BLD-MCNC: 130 MG/DL (ref 70–108)
HCT VFR BLD CALC: 47 % (ref 42–52)
HEMOGLOBIN: 14.7 GM/DL (ref 14–18)
IMMATURE GRANS (ABS): 0.05 THOU/MM3 (ref 0–0.07)
IMMATURE GRANULOCYTES: 0.7 %
LYMPHOCYTES # BLD: 9 %
LYMPHOCYTES ABSOLUTE: 0.7 THOU/MM3 (ref 1–4.8)
MCH RBC QN AUTO: 30.9 PG (ref 26–33)
MCHC RBC AUTO-ENTMCNC: 31.3 GM/DL (ref 32.2–35.5)
MCV RBC AUTO: 98.7 FL (ref 80–94)
MONOCYTES # BLD: 6.8 %
MONOCYTES ABSOLUTE: 0.5 THOU/MM3 (ref 0.4–1.3)
MRSA SCREEN RT-PCR: NEGATIVE
NUCLEATED RED BLOOD CELLS: 0 /100 WBC
PLATELET # BLD: 144 THOU/MM3 (ref 130–400)
PMV BLD AUTO: 10.5 FL (ref 9.4–12.4)
POTASSIUM REFLEX MAGNESIUM: 4.1 MEQ/L (ref 3.5–5.2)
RBC # BLD: 4.76 MILL/MM3 (ref 4.7–6.1)
SEG NEUTROPHILS: 82 %
SEGMENTED NEUTROPHILS ABSOLUTE COUNT: 6.1 THOU/MM3 (ref 1.8–7.7)
SODIUM BLD-SCNC: 140 MEQ/L (ref 135–145)
TROPONIN T: 0.02 NG/ML
VANCOMYCIN RESISTANT ENTEROCOCCUS: NEGATIVE
WBC # BLD: 7.4 THOU/MM3 (ref 4.8–10.8)

## 2020-11-27 PROCEDURE — 99232 SBSQ HOSP IP/OBS MODERATE 35: CPT | Performed by: SURGERY

## 2020-11-27 PROCEDURE — 94010 BREATHING CAPACITY TEST: CPT

## 2020-11-27 PROCEDURE — 93010 ELECTROCARDIOGRAM REPORT: CPT | Performed by: INTERNAL MEDICINE

## 2020-11-27 PROCEDURE — 2500000003 HC RX 250 WO HCPCS: Performed by: PHYSICIAN ASSISTANT

## 2020-11-27 PROCEDURE — 97535 SELF CARE MNGMENT TRAINING: CPT

## 2020-11-27 PROCEDURE — 2580000003 HC RX 258: Performed by: STUDENT IN AN ORGANIZED HEALTH CARE EDUCATION/TRAINING PROGRAM

## 2020-11-27 PROCEDURE — 80048 BASIC METABOLIC PNL TOTAL CA: CPT

## 2020-11-27 PROCEDURE — 6370000000 HC RX 637 (ALT 250 FOR IP): Performed by: PHYSICIAN ASSISTANT

## 2020-11-27 PROCEDURE — 84484 ASSAY OF TROPONIN QUANT: CPT

## 2020-11-27 PROCEDURE — 92523 SPEECH SOUND LANG COMPREHEN: CPT

## 2020-11-27 PROCEDURE — 97166 OT EVAL MOD COMPLEX 45 MIN: CPT

## 2020-11-27 PROCEDURE — 2500000003 HC RX 250 WO HCPCS: Performed by: STUDENT IN AN ORGANIZED HEALTH CARE EDUCATION/TRAINING PROGRAM

## 2020-11-27 PROCEDURE — 70450 CT HEAD/BRAIN W/O DYE: CPT

## 2020-11-27 PROCEDURE — 87641 MR-STAPH DNA AMP PROBE: CPT

## 2020-11-27 PROCEDURE — 99222 1ST HOSP IP/OBS MODERATE 55: CPT | Performed by: NEUROLOGICAL SURGERY

## 2020-11-27 PROCEDURE — 36415 COLL VENOUS BLD VENIPUNCTURE: CPT

## 2020-11-27 PROCEDURE — 97530 THERAPEUTIC ACTIVITIES: CPT

## 2020-11-27 PROCEDURE — 94799 UNLISTED PULMONARY SVC/PX: CPT

## 2020-11-27 PROCEDURE — 87081 CULTURE SCREEN ONLY: CPT

## 2020-11-27 PROCEDURE — 85025 COMPLETE CBC W/AUTO DIFF WBC: CPT

## 2020-11-27 PROCEDURE — 2580000003 HC RX 258: Performed by: PHYSICIAN ASSISTANT

## 2020-11-27 PROCEDURE — 87500 VANOMYCIN DNA AMP PROBE: CPT

## 2020-11-27 PROCEDURE — 92610 EVALUATE SWALLOWING FUNCTION: CPT

## 2020-11-27 PROCEDURE — 2000000000 HC ICU R&B

## 2020-11-27 PROCEDURE — APPSS60 APP SPLIT SHARED TIME 46-60 MINUTES: Performed by: PHYSICIAN ASSISTANT

## 2020-11-27 PROCEDURE — 99223 1ST HOSP IP/OBS HIGH 75: CPT | Performed by: INTERNAL MEDICINE

## 2020-11-27 RX ORDER — HYDROCODONE BITARTRATE AND ACETAMINOPHEN 5; 325 MG/1; MG/1
2 TABLET ORAL EVERY 4 HOURS PRN
Status: DISCONTINUED | OUTPATIENT
Start: 2020-11-27 | End: 2020-12-01 | Stop reason: HOSPADM

## 2020-11-27 RX ORDER — PRAVASTATIN SODIUM 40 MG
40 TABLET ORAL DAILY
Status: DISCONTINUED | OUTPATIENT
Start: 2020-11-27 | End: 2020-12-01 | Stop reason: HOSPADM

## 2020-11-27 RX ORDER — TAMSULOSIN HYDROCHLORIDE 0.4 MG/1
0.4 CAPSULE ORAL NIGHTLY
Status: DISCONTINUED | OUTPATIENT
Start: 2020-11-27 | End: 2020-12-01 | Stop reason: HOSPADM

## 2020-11-27 RX ORDER — BUMETANIDE 1 MG/1
1 TABLET ORAL DAILY
Status: DISCONTINUED | OUTPATIENT
Start: 2020-11-27 | End: 2020-12-01 | Stop reason: HOSPADM

## 2020-11-27 RX ORDER — MORPHINE SULFATE 4 MG/ML
4 INJECTION, SOLUTION INTRAMUSCULAR; INTRAVENOUS
Status: DISCONTINUED | OUTPATIENT
Start: 2020-11-27 | End: 2020-12-01 | Stop reason: HOSPADM

## 2020-11-27 RX ORDER — LOSARTAN POTASSIUM 50 MG/1
50 TABLET ORAL DAILY
Status: DISCONTINUED | OUTPATIENT
Start: 2020-11-27 | End: 2020-11-29

## 2020-11-27 RX ORDER — HYDROCODONE BITARTRATE AND ACETAMINOPHEN 5; 325 MG/1; MG/1
1 TABLET ORAL EVERY 4 HOURS PRN
Status: DISCONTINUED | OUTPATIENT
Start: 2020-11-27 | End: 2020-12-01 | Stop reason: HOSPADM

## 2020-11-27 RX ORDER — LIDOCAINE 4 G/G
1 PATCH TOPICAL DAILY
Status: DISCONTINUED | OUTPATIENT
Start: 2020-11-27 | End: 2020-12-01 | Stop reason: HOSPADM

## 2020-11-27 RX ORDER — SODIUM CHLORIDE 0.9 % (FLUSH) 0.9 %
10 SYRINGE (ML) INJECTION EVERY 12 HOURS SCHEDULED
Status: DISCONTINUED | OUTPATIENT
Start: 2020-11-27 | End: 2020-12-01 | Stop reason: HOSPADM

## 2020-11-27 RX ORDER — PROMETHAZINE HYDROCHLORIDE 25 MG/1
12.5 TABLET ORAL EVERY 6 HOURS PRN
Status: DISCONTINUED | OUTPATIENT
Start: 2020-11-27 | End: 2020-12-01 | Stop reason: HOSPADM

## 2020-11-27 RX ORDER — ONDANSETRON 2 MG/ML
4 INJECTION INTRAMUSCULAR; INTRAVENOUS EVERY 6 HOURS PRN
Status: DISCONTINUED | OUTPATIENT
Start: 2020-11-27 | End: 2020-12-01 | Stop reason: HOSPADM

## 2020-11-27 RX ORDER — MORPHINE SULFATE 2 MG/ML
2 INJECTION, SOLUTION INTRAMUSCULAR; INTRAVENOUS
Status: DISCONTINUED | OUTPATIENT
Start: 2020-11-27 | End: 2020-12-01 | Stop reason: HOSPADM

## 2020-11-27 RX ORDER — POLYETHYLENE GLYCOL 3350 17 G/17G
17 POWDER, FOR SOLUTION ORAL DAILY PRN
Status: DISCONTINUED | OUTPATIENT
Start: 2020-11-27 | End: 2020-12-01 | Stop reason: HOSPADM

## 2020-11-27 RX ORDER — ACETAMINOPHEN 325 MG/1
650 TABLET ORAL EVERY 6 HOURS PRN
Status: DISCONTINUED | OUTPATIENT
Start: 2020-11-27 | End: 2020-12-01 | Stop reason: HOSPADM

## 2020-11-27 RX ORDER — SODIUM CHLORIDE 0.9 % (FLUSH) 0.9 %
10 SYRINGE (ML) INJECTION PRN
Status: DISCONTINUED | OUTPATIENT
Start: 2020-11-27 | End: 2020-12-01 | Stop reason: HOSPADM

## 2020-11-27 RX ORDER — SODIUM CHLORIDE 9 MG/ML
INJECTION, SOLUTION INTRAVENOUS CONTINUOUS
Status: DISCONTINUED | OUTPATIENT
Start: 2020-11-27 | End: 2020-11-28

## 2020-11-27 RX ORDER — ACETAMINOPHEN 650 MG/1
650 SUPPOSITORY RECTAL EVERY 6 HOURS PRN
Status: DISCONTINUED | OUTPATIENT
Start: 2020-11-27 | End: 2020-12-01 | Stop reason: HOSPADM

## 2020-11-27 RX ADMIN — ACETAMINOPHEN 650 MG: 325 TABLET ORAL at 12:17

## 2020-11-27 RX ADMIN — NICARDIPINE HYDROCHLORIDE 8.5 MG/HR: 2.5 INJECTION, SOLUTION INTRAVENOUS at 14:42

## 2020-11-27 RX ADMIN — SODIUM CHLORIDE: 9 INJECTION, SOLUTION INTRAVENOUS at 01:06

## 2020-11-27 RX ADMIN — METOPROLOL TARTRATE 25 MG: 25 TABLET, FILM COATED ORAL at 20:27

## 2020-11-27 RX ADMIN — PRAVASTATIN SODIUM 40 MG: 40 TABLET ORAL at 12:16

## 2020-11-27 RX ADMIN — NICARDIPINE HYDROCHLORIDE 5 MG/HR: 2.5 INJECTION, SOLUTION INTRAVENOUS at 00:00

## 2020-11-27 RX ADMIN — NICARDIPINE HYDROCHLORIDE 8.5 MG/HR: 2.5 INJECTION, SOLUTION INTRAVENOUS at 06:26

## 2020-11-27 RX ADMIN — METOPROLOL TARTRATE 25 MG: 25 TABLET, FILM COATED ORAL at 12:16

## 2020-11-27 RX ADMIN — NICARDIPINE HYDROCHLORIDE 11 MG/HR: 2.5 INJECTION, SOLUTION INTRAVENOUS at 03:02

## 2020-11-27 RX ADMIN — BUMETANIDE 1 MG: 1 TABLET ORAL at 12:17

## 2020-11-27 RX ADMIN — ACETAMINOPHEN 650 MG: 325 TABLET ORAL at 20:27

## 2020-11-27 RX ADMIN — NICARDIPINE HYDROCHLORIDE 6.5 MG/HR: 2.5 INJECTION, SOLUTION INTRAVENOUS at 18:53

## 2020-11-27 RX ADMIN — NICARDIPINE HYDROCHLORIDE 8.5 MG/HR: 2.5 INJECTION, SOLUTION INTRAVENOUS at 10:30

## 2020-11-27 RX ADMIN — SODIUM CHLORIDE: 9 INJECTION, SOLUTION INTRAVENOUS at 14:42

## 2020-11-27 RX ADMIN — FAMOTIDINE 20 MG: 10 INJECTION INTRAVENOUS at 12:17

## 2020-11-27 RX ADMIN — NICARDIPINE HYDROCHLORIDE 5.5 MG/HR: 2.5 INJECTION, SOLUTION INTRAVENOUS at 23:46

## 2020-11-27 RX ADMIN — LOSARTAN POTASSIUM 50 MG: 50 TABLET, FILM COATED ORAL at 12:17

## 2020-11-27 RX ADMIN — TAMSULOSIN HYDROCHLORIDE 0.4 MG: 0.4 CAPSULE ORAL at 20:27

## 2020-11-27 ASSESSMENT — ENCOUNTER SYMPTOMS
SHORTNESS OF BREATH: 0
CHEST TIGHTNESS: 0
BACK PAIN: 0
WHEEZING: 0
NAUSEA: 0
COUGH: 0
EYE PAIN: 0
COLOR CHANGE: 0
VOMITING: 0
CONSTIPATION: 0
ABDOMINAL DISTENTION: 0
SINUS PAIN: 0
SORE THROAT: 0
ABDOMINAL PAIN: 0
SINUS PRESSURE: 0
SHORTNESS OF BREATH: 1
EYE REDNESS: 0
DIARRHEA: 0

## 2020-11-27 ASSESSMENT — PAIN DESCRIPTION - PAIN TYPE
TYPE: CHRONIC PAIN
TYPE: ACUTE PAIN
TYPE: CHRONIC PAIN

## 2020-11-27 ASSESSMENT — PAIN DESCRIPTION - PROGRESSION
CLINICAL_PROGRESSION: NOT CHANGED

## 2020-11-27 ASSESSMENT — PAIN SCALES - GENERAL
PAINLEVEL_OUTOF10: 8
PAINLEVEL_OUTOF10: 5
PAINLEVEL_OUTOF10: 7
PAINLEVEL_OUTOF10: 0
PAINLEVEL_OUTOF10: 8
PAINLEVEL_OUTOF10: 8

## 2020-11-27 ASSESSMENT — PAIN - FUNCTIONAL ASSESSMENT
PAIN_FUNCTIONAL_ASSESSMENT: ACTIVITIES ARE NOT PREVENTED

## 2020-11-27 ASSESSMENT — PAIN DESCRIPTION - ORIENTATION
ORIENTATION: OTHER (COMMENT)
ORIENTATION: RIGHT;LEFT
ORIENTATION: LEFT;RIGHT

## 2020-11-27 ASSESSMENT — PAIN DESCRIPTION - LOCATION
LOCATION: KNEE

## 2020-11-27 ASSESSMENT — PAIN DESCRIPTION - DESCRIPTORS
DESCRIPTORS: ACHING;CONSTANT
DESCRIPTORS: ACHING;CONSTANT
DESCRIPTORS: ACHING;SHOOTING

## 2020-11-27 ASSESSMENT — PAIN DESCRIPTION - FREQUENCY
FREQUENCY: CONTINUOUS

## 2020-11-27 ASSESSMENT — PAIN DESCRIPTION - ONSET
ONSET: ON-GOING

## 2020-11-27 NOTE — ED NOTES
Pt. Resting in bed with even and unlabored respirations. Pt. Denies any pain at this time. Pt. Updated about plan of care and treatment. Family at bedside. Per wife pt has been repeating himself and states she feels the pt mentation has changed. Pt however is alert and oriented to person, place and time. Pt. Has no further concerns, questions or needs at this time. Call light within reach.         Fran Acosta RN  11/26/20 [id number]

## 2020-11-27 NOTE — PROGRESS NOTES
[address] THERAPY  STR ICU 4D  Speech - Language - Cognitive Evaluation + Bedside Swallow Evaluation    SLP Individual Minutes  Time In: 5730  Time Out: 0940  Minutes: 35  Timed Code Treatment Minutes: 0 Minutes   BSE= 10 minutes   Cognitive assessment= 25 minutes        Date: 2020  Patient Name: Abbe Benavides      CSN: [id number]   : 1935  (81 y.o.)  Gender: male   Referring Physician:  ASMITA Vanegas   Diagnosis: Intraventricular hemorrhage   Secondary Diagnosis: Cognitive-linguistic deficits  Precautions: Fall risk   History of Present Illness/Injury: Pt admit with above dx. Per chart review, pt with syncope, collapse and head trauma. CT of head results indicative of:   1.  Small focal area of intraventricular hemorrhage in the midportion of    the septum pellucidum measuring 0.7 x 0.4 cm. 2.  Hemorrhagic fluid in the left maxillary sinus. Please refer to H&P for full pt medical hx. ST consult for BSE and cognitive evaluation to determine pt appropriateness to initiate PO means of nutrition and further establish Goals/POC as indicated given presence of CHI. Past Medical History:   Diagnosis Date    ALDA (acute kidney injury) ([facility] [address] 2015    Blood circulation, collateral     CAD (coronary artery disease)     Cancer (HCC)     prostate; seed implants    Chronic kidney disease, stage III (moderate)     Hypercholesteremia     Hypertension     Osteoarthritis        Pain: No pain reported. Subjective:  Pt seen upright in bed. Alert and cooperative. No family present. SOCIAL HISTORY:   Living Arrangements: Lives with wife-- has 4 children who live within the area per pt report   Work History: Retired from [employer] Level: 11 years   Driving Status: Does not drive  Finance Management: Dependent/Unable  Medication Management: Dependent/Unable  ADL's: Independent. Pt reports he was fully independent with toileting and bathing tasks.  Wife manages cooking/cleaning tasks. Hobbies: N/a   Vision Status: Wears glasses for reading   Hearing: No assistive devices, repetitions required given decreased hearing acuity        SPEECH / VOICE:  Speech: Mild dysarthria characterized by blended word boundaries. Pt reports speech clarity is at prior baseline. Speech intelligibility at 50-75% within conversation. Voice: WFL    LANGUAGE:  Receptive:  2 Step Commands: 2/3 indep, 1/3 extra time   Complex Yes/No Questions: 2/4  3 step commands: 1/1    Expressive:  Confrontational Naming: 3/3 objects- 1/3 MOCA  Responsive Namin/3  Divergent Naming (concrete): x5 indep 1 minute (N=11)  Divergent Naming (abstract): x3 indep 1 minute (N=11)  Sentence Formulation: WFL  Conversational Speech: WFL  Paraphasias: None noted   Repetition: sentence level- 0/2    COGNITION:  Gerald Cognitive Assessment (MOCA) version 7.2 completed. Pt scored 9/30. Normal is greater than or equal to 26/30.   Inclusion of +1 point given highest level of education achieved less than/equal to 12th grade or GED with limited-0 post-secondary schooling     Orientation: 4/6  Immediate Recall: 3/5  Short-Term Recall: 2/5 indep, 1/5 FO2, 2/5 unable to elicit despite cues   Divergent Naming: impaired (see above)   Problem Solving: impaired basic problem solving  Reasoning: verbal abstraction- 0/2  Thought Organization: poor   Insight: poor  Attention: poor basic and higher level attention   Math Computation: 0/1  Executive Functionin/5    SWALLOWING:    Respiratory Status: Independent      Behavioral Observation: Alert and cooperative     ORAL MECHANISM EVALUATION:      Facial / Labial WFL    Lingual Impaired Decreased coordination    Dentition Impaired Natural dentition, missing some teeth    Velum Not Tested Unable to visualize    Vocal Quality WFL    Sensation Not Tested    Cough Not Tested      PATIENT WAS EVALUATED USING:  Puree, hard solid, thin liquids by cup/straw     ORAL PHASE: WFL    PHARYNGEAL PHASE:  WFL:  Pharyngeal phase appears WFL but cannot rule out pharyngeal phase deficits from a bedside swallowing evaluation alone. SIGNS AND SYMPTOMS OF LARYNGEAL PENETRATION / ASPIRATION:  No signs/symptoms of aspiration evident in this evaluation, but cannot rule out silent aspiration. Modified Barium Swallow: Not indicated    DIET RECOMMENDATIONS:  Regular diet and thin liquids     STRATEGIES: Full Upright Position, Small Bite/Sip, Pulmonary Monitoring, Direct 1:1 Supervision, Alternate Solids and Liquids, Limit Distractions and Monitor for Fatigue          RECOMMENDATIONS/ASSESSMENT:  DIAGNOSTIC IMPRESSIONS:  Pt presents with severe cognitive impairments evidenced by derived MOCA score of 9/30. Deficits within the areas of functional carryover (I.e. orientation, immediate/delayed recall, working memory), impaired functional problem solving, insight and basic safety awareness, poor verbal reasoning, thought organization, mathematical computation and executive functioning. Pt with low level cognitive demands within the home setting; however, fully independent with ability to complete basic ADL's (I.e. toileting/bathing) per pt reports. Pt with at least mild receptive language deficits evidenced by decreased auditory comprehension with poor ability to answer complex yes/no questions and mild difficulty following complex commands with reliance on extra time d/t slow processing and impaired organization/mental manipulation. Pt with mild-moderate expressive language deficits evidenced by impairments within higher level naming tasks (confrontational, responsive), basic organizational naming, presence of anomia and impaired sentence repetition. Mild dysarthria characterized by blended word boundaries.  Pt would highly benefit from ongoing ST intervention in order to progress cognitive-linguistic functioning to a supervision level of assist for safe, functional return to home setting with wife upon discharge. In regards to swallowing performance, pt presenting with Mod I level of oropharyngeal swallow function. Demonstrations of slow; however, effective mastication and AP transit when provided with extra time and liquid wash. Appropriate oral clearance to follow. Pt with what appeared to be decreased laryngeal elevation upon manual palpation; however, effective tolerance with absence of s/s of aspiration noted s/p consumption of PO intake. Certainly cannot r/o silent aspiration and/or pharyngeal dysfunction without completion of formal imaging. Pt with impulsivity evidenced by large PO consumption of thin liquids by cup/straw as well as large bites of hard solids. Recommendations to initiate a regular diet and thin liquids with supervision intermittently provided to ensure successful recall/carryover of skilled swallowing strategies. Rehabilitation Potential: excellent    EDUCATION:  Learner: Patient  Education:  Reviewed results and recommendations of this evaluation, Reviewed diet and strategies, Reviewed ST goals and Plan of Care and Reviewed recommendations for follow-up  Evaluation of Education: Verbalizes understanding, Demonstrates with assistance, Needs further instruction and Family not present    PLAN:  Skilled SLP intervention on acute care 3-5 x per week or until goals met and/or pt plateaus in function. Specific interventions for next session may include: memory, organization, verbal expression, complex auditory comprehension . PATIENT GOAL:    Did not state. Will further assess during treatment. SHORT TERM GOALS:  Short-term Goals  Timeframe for Short-term Goals: 2 weeks  Goal 1: Pt will complete functional carryover tasks (i.e. orientation, immediate/delayed, working) with 60% accuracy, mod cues and focus on compensatory memory strategies to enhance retention of newly learned medical information.   Goal 2: Pt will complete functional problem solving/safety awareness (i.e. time management, safety scenarios, verbal reasoning) with 60% accuracy, mod cues for enhanced safety and ADL contribution. Goal 3: Pt will complete expressive language tasks (i.e. higher level confrontational/responsive naming, verbal sequencing, divergent naming) with 80% accuracy, mod cues for improved expression of complex thoughts. Goal 4: Pt will complete high level auditory/reading comprehension tasks (i.e. complex y/n questions, multi step and complex commands, prescription labels/directories, etc) with 80% accuracy, mod cues for improved understanding of complex medical information. Goal 5: Pt will consume a regular diet and thin liquids with adherence to skilled swallowing strategies (i.e. small bites/sips, slow rate) without oert s/s of aspiration to meet nutrition/hydration measures safely.     LONG TERM GOALS:  No established LTG's given short ARTURO Varela M.S. Mario Hyman 11/27/2020

## 2020-11-27 NOTE — PROGRESS NOTES
[facility] RESPIRATORY ASSESSMENT PROGRAM (RAP)  30 kg and over      [facility]"" Room#: 4D-13013-A : 1935     Admitting diagnosis:      ASSESSMENT     Vitals  Pulse: 79   Resp: 20  BP: 128/69  SpO2: 96 %  Temp: 98.8 °F (37.1 °C)  Breath Sounds:  [facility] RESPIRATORY ASSESSMENT PROGRAM (RAP)  30 kg and over    [facility]: [facility]"" Room#: 4D-13013-A : 1935     Admitting diagnosis: Intraventricular hemorrhage ([facility] [address] [I61.5]        PATIENT HISTORY AND PHYSICAL ASSESSMENT     Surgical History   [x] None  []General []Lower abdominal []Thoracic or upper  []Thoracic or upper with pulmonary disease  Chest X-ray    []Clear or none available   []Chronic radiological changes   []Infiltrates or atelectasis in one lobe   [x]Infiltrates or atelectasis in one or more lobe or rib fractures   []Infiltrates or atelectasis in one or more lobe AND rib fractures, two or more  Pulmonary History   [] Non- smoker no previous history   []Smoking hx quit > 6 months   []Current smoking history   []Previous pulmonary disease or acute pulmonary process    []Severe dyspnea or exacerbated chronic lung disease  Current Respiratory status   [x] Regular pattern: RR 10-20 bpm   [] Tachypnea RR 20-25 bpm   [] Dyspnea on exertion.  Irregular pattern   [] Use of accessory muscles, prolonged exhalation or RR > 25bpm   [] Use of accessory muscles, prolonged exhalation or RR > 25bpm   [] Severe dyspnea; use of accessory muscles or RR > 30 bpm  Cough   [x]Strong non-productive   []Strong productive   []Weak, non-productive    []Weak, productive    []No spontaneous cough/may require suctioning  Sputum Color   []Clear/white   []Yellow   []Green   []Brown/tan/bloody   [x]None observed  Sputum Amount   [x]None   []Scant < 1 ml   []Small 1-3 ml   []Moderate 4-10 ml   []Large > 10 ml  Sputum Consistency   []Thin   []Thick   []Tenacious   []Unknown   []Other   Mental Status   [x]Alert, oriented and cooperative   []Disoriented sedated but follow commands   []Uncooperative or combative, does not follow commands   []Obtunded   []Comatose  Level of Activity   []Ambulatory   [x]Ambulates with assistance   []Temporarily non-ambulatory   []Paraplegic or bed rest, able to position self   []Quadriplegic or bed rest, unable to position self  Pulmonary Function Test   [x]None available   []Normal   []Obstructive  []Restrictive   []Diffusion defect        LAB  Hematology:   Lab Results   Component Value Date    WBC 7.4 11/27/2020    RBC 4.76 11/27/2020    RBC 0 02/26/2016    HGB 14.7 11/27/2020    HCT 47.0 11/27/2020     11/27/2020     Chemistry:   Lab Results   Component Value Date    PH 5.5 02/26/2016     Blood Culture: none  Sputum Culture: none    Home pulmonary medications: albuterol dulera  Home Bipap or CPAP No  Pulmonary Diagnosis none      Comment:     PEF   Predicted:   Personal Best:      Inspiratory Capacity:   Preoperative/predictive value: 2700 ml   33% of value: 890 ml      75% of value: 2025 ml   10 ml/kg of IBW: 710 ml   Patient's Actual Inspiratory Capacity: 2000 ml    CARE PLAN  All Care Plan selections must be based on the approved algorithms located on line in the Policy and Procedures under Respiratory Assessment Adult Protocol Handbook    INDICATIONS FOR THERAPY BASED ON HISTORY AND ASSESSMENT    Bronchial Hygiene Goal: Improvement in sputum mobilization in patients with ineffective airway clearance. Reverse the atelectasis. No indications  Volume Expansion Goal: Prevent atelectasis, Absence or improvement in signs of atelectasis, improve respiratory muscle performance   Presence of pulmonary atelectasis or conditions predisposing to the development of pulmonary atelectasis. Example: Upper/lower abdominal surgery, thoracic surgery, surgery in COPD patient, prolonged bed rest, lack of pain control, presence of thoracic or abdominal binders.   Inhaled Medications Goal: Improve respiratory functions in patients with airway disease and decrease WOB  Albuterol Indications   No indications. Ipratropium Bromide Indications   No indications  Oxygenation Goal: Reverse hypoxemia and improve tissue oxygenation. (See oxygen protocol order)   No indications    THERAPIES SELECTED BASED ON ALGORITHMS    Bronchial Hygiene   No therapy recommended  Volume expansion   Incentive spirometry  Inhaled Medication   No therapy recommended   Oxygenation   No therapy recommended      ASSESSMENT OUTCOMES     Bronchial Hygiene    Not assessed. Volume Expansion   Other cont with IS     Inhaled Medication   Not assessed. Oxygenation   Not assessed. Action Plan Based on Assessment:    Continue plan as ordered.     Comments:

## 2020-11-27 NOTE — ED PROVIDER NOTES
[address] [address]          Pt Name: Teresa Washburn  MRN: [id number]  [place] 1935  Date of evaluation: 11/26/2020  Treating Resident Physician: Florinda Salinas MD  Supervising Physician: Dr. Larry Nava, DO    [address]       Chief Complaint   Patient presents with    Loss of Consciousness     History obtained from the patient and wife at bedside. HISTORY OF PRESENT ILLNESS    HPI  Teresa Washburn is a 81 y.o. male who presents to the emergency department for evaluation of syncope with head trauma on anticoagulation. Patient is on Brilinta. Patient's wife at bedside states she returned from the restroom and found patient laying on floor in her kitchen. Patient has abrasion to left forehead and will lower lip laceration. Patient does not remember this event and is unable to confirm or deny any symptoms prior to this event. In ED patient denies any pain. Patient denies any head or neck pain. Patient denies any chest pain shortness of breath. Patient's wife states that he is acting different and that he is asking repetitive questions. Upon initial evaluation patient does not ask any repetitive questions, but nursing staff and attending physician patient repetitive questioning. Patient is alert and oriented x3. Patient denies pain anywhere else. Patient denies any headache fever chills cough chest pain shortness of breath abdominal pain nausea vomiting diarrhea constipation leg swelling    Patient has a past medical history that includes: Hypertension, hypercholesterolemia, coronary artery disease, DVT, IVC filter      Patient denies any new headache fever chills cough chest pain shortness of breath abdominal pain nausea vomiting diarrhea constipation leg swelling. The patient has no other acute complaints at this time. REVIEW OF SYSTEMS   Review of Systems   Constitutional: Negative for chills and fever.    HENT: Negative for ear pain and sinus pain. Eyes: Negative for pain. Respiratory: Negative for cough and shortness of breath. Cardiovascular: Negative for chest pain and leg swelling. Gastrointestinal: Negative for abdominal pain, constipation, diarrhea, nausea and vomiting. Genitourinary: Negative for dysuria. Musculoskeletal: Negative for back pain and neck pain. Skin: Positive for wound. Neurological: Positive for syncope. Negative for headaches. Psychiatric/Behavioral: Positive for confusion.          PAST MEDICAL AND SURGICAL HISTORY     Past Medical History:   Diagnosis Date    ALDA (acute kidney injury) ([facility] [address] 7/17/2015    Blood circulation, collateral     CAD (coronary artery disease)     Cancer (HCC)     prostate; seed implants    Chronic kidney disease, stage III (moderate)     Hypercholesteremia     Hypertension     Osteoarthritis      Past Surgical History:   Procedure Laterality Date    BACK SURGERY  2006    fusion    CARDIAC SURGERY      2 stents    COSMETIC SURGERY      ENDOSCOPY, COLON, DIAGNOSTIC      JOINT REPLACEMENT      Bilateral hips    KNEE SURGERY      NECK SURGERY      OTHER SURGICAL HISTORY  5/29/2015    DECOMPRESSIVE LUMBAR LAMINECTOMY L2-3    ROTATOR CUFF REPAIR      TOTAL HIP ARTHROPLASTY Bilateral          MEDICATIONS     Current Facility-Administered Medications:     sodium chloride flush 0.9 % injection 10 mL, 10 mL, Intravenous, 2 times per day, Carvel Patella Pappa, PA    sodium chloride flush 0.9 % injection 10 mL, 10 mL, Intravenous, PRN, Carvel Patella Shanepa, PA    acetaminophen (TYLENOL) tablet 650 mg, 650 mg, Oral, Q6H PRN **OR** acetaminophen (TYLENOL) suppository 650 mg, 650 mg, Rectal, Q6H PRN, Sotero Lynnpa, PA    polyethylene glycol (GLYCOLAX) packet 17 g, 17 g, Oral, Daily PRN, Cardonna Elizalde, PA    promethazine (PHENERGAN) tablet 12.5 mg, 12.5 mg, Oral, Q6H PRN **OR** ondansetron (ZOFRAN) injection 4 mg, 4 mg, Intravenous, Q6H PRN, ASMITA Gallo   0.9 % sodium chloride infusion, , Intravenous, Continuous, ASMITA Torrez, Last Rate: 75 mL/hr at 20    famotidine (PEPCID) injection 20 mg, 20 mg, Intravenous, Daily, Rick Single Pappa, PA    lidocaine 4 % external patch 1 patch, 1 patch, Transdermal, Daily, Rick Single Pappa, PA    morphine (PF) injection 2 mg, 2 mg, Intravenous, Q2H PRN **OR** morphine injection 4 mg, 4 mg, Intravenous, Q2H PRN, Rick Single Pappa, PA    niCARdipine (CARDENE) 25 mg in dextrose 5 % 250 mL infusion, 5 mg/hr, Intravenous, Continuous, Elana Lozano MD, Last Rate: 110 mL/hr at 20, 11 mg/hr at 20      SOCIAL HISTORY     Social History     Social History Narrative    Not on file     Social History     Tobacco Use    Smoking status: Former Smoker     Packs/day: 0.50     Years: 25.00     Pack years: 12.50     Types: Cigarettes     Last attempt to quit: 1997     Years since quittin.6    Smokeless tobacco: Never Used   Substance Use Topics    Alcohol use: No     Alcohol/week: 0.0 standard drinks    Drug use: No         ALLERGIES   No Known Allergies      FAMILY HISTORY     Family History   Problem Relation Age of Onset    Heart Disease Mother     High Blood Pressure Mother     Prostate Cancer Neg Hx     Cancer Neg Hx     Diabetes Neg Hx     Kidney Disease Neg Hx     Stroke Neg Hx          PREVIOUS RECORDS   Previous records reviewed: Patient was seen most recently in hospital on 10/22/2020 where he had a cardiac cath procedure performed by Dr. Kelin Saleem. PHYSICAL EXAM     ED Triage Vitals [20]   BP Temp Temp src Pulse Resp SpO2 Height Weight   (!) 194/73 98.1 °F (36.7 °C) -- 68 16 95 % 5' 9\" (1.753 m) 225 lb (102.1 kg)     Initial vital signs and nursing assessment reviewed and vitals are/show: abnormal from Hypertensive. Pulsoximetry is normal per my interpretation.     Additional Vital Signs:  Vitals:    20 0130   BP: (!) 149/126   Pulse: 88   Resp: 28   Temp: SpO2: 94%       Physical Exam  Constitutional:       General: He is not in acute distress. Appearance: Normal appearance. He is obese. He is not ill-appearing, toxic-appearing or diaphoretic. HENT:      Head: Normocephalic. Comments: Abrasion to left forehead  Minimal laceration to bottom lip  No duggan sign  No raccoon eyes  No CSF rhinorrhea  No hemotympanum   No palpable skull fractures     Right Ear: Ear canal and external ear normal. There is impacted cerumen. Left Ear: Tympanic membrane, ear canal and external ear normal.      Nose: Nose normal.   Eyes:      General: No scleral icterus. Right eye: No discharge. Left eye: No discharge. Extraocular Movements: Extraocular movements intact. Conjunctiva/sclera: Conjunctivae normal.      Pupils: Pupils are equal, round, and reactive to light. Neck:      Musculoskeletal: Normal range of motion and neck supple. No muscular tenderness. Comments: No midline cervical spinal tenderness to palpation  Cardiovascular:      Rate and Rhythm: Normal rate and regular rhythm. Pulses: Normal pulses. Heart sounds: Normal heart sounds. No murmur. No friction rub. No gallop. Pulmonary:      Effort: Pulmonary effort is normal. No respiratory distress. Breath sounds: Normal breath sounds. No wheezing or rales. Chest:      Chest wall: No tenderness. Abdominal:      General: Abdomen is flat. There is no distension. Palpations: Abdomen is soft. Tenderness: There is no abdominal tenderness. There is no guarding or rebound. Musculoskeletal:      Right lower leg: No edema. Left lower leg: No edema. Lymphadenopathy:      Cervical: No cervical adenopathy. Skin:     General: Skin is warm and dry. Comments: Abrasion to left forehead  Laceration to inferior lip   Neurological:      Mental Status: He is alert and oriented to person, place, and time. Mental status is at baseline.       Sensory: No sensory deficit. Motor: No weakness. Gait: Gait normal.      Comments: ACS 15  Alert and oriented x3  PERRLA  EOM intact  Bilateral facial motor and sensation intact in distribution of V1 V2 V3  Bilateral upper extremity motor sensation intact  Bilateral lower extremity motor and sensation intact  No gait ataxia  No weakness  No repetitive questioning during examination for me   Psychiatric:         Mood and Affect: Mood normal.         Behavior: Behavior normal.         Thought Content: Thought content normal.         Judgment: Judgment normal.             MEDICAL DECISION MAKING   Initial Assessment:   Syncope on anticoagulation with head trauma and AMS    Plan:     Labs: CBC, CMP, APTT, BNP, troponin, INR, UA  Imaging: CT head, CT cervical spine, CT facial bones, chest x-ray  EKG  Analgesia   Level 2 trauma called. Later downgraded to trauma consult    Lab work showed a slight elevation of troponin and patient has history of DVT with IVC filter. CTA chest ordered. Patient's blood pressure dropped was 185, he states he has not taken his second dose of metoprolol. Ordered oral antihypertensive. Imaging shows small intraventricular hemorrhage and left rib fracture, no PE. Additional IV antihypertensive ordered.    sICH score: 1 = 1.5% no need for CTA    Trauma admission        ED RESULTS   Laboratory results:  Labs Reviewed   CBC WITH AUTO DIFFERENTIAL - Abnormal; Notable for the following components:       Result Value    .2 (*)     MCHC 31.2 (*)     RDW-SD 47.3 (*)     Lymphocytes Absolute 0.8 (*)     Immature Grans (Abs) 0.10 (*)     All other components within normal limits   COMPREHENSIVE METABOLIC PANEL W/ REFLEX TO MG FOR LOW K - Abnormal; Notable for the following components:    CREATININE 1.4 (*)     BUN 26 (*)     Potassium reflex Magnesium 5.4 (*)     All other components within normal limits   TROPONIN - Abnormal; Notable for the following components:    Troponin T 0.012 (*) All other components within normal limits   GLOMERULAR FILTRATION RATE, ESTIMATED - Abnormal; Notable for the following components:    Est, Glom Filt Rate 58 (*)     All other components within normal limits   URINE WITH REFLEXED MICRO - Abnormal; Notable for the following components:    Protein, UA 30 (*)     All other components within normal limits   CULTURE, MRSA, SCREENING   VRE SCREEN BY PCR   ANION GAP   OSMOLALITY   PROTIME-INR   APTT   BRAIN NATRIURETIC PEPTIDE   BASIC METABOLIC PANEL W/ REFLEX TO MG FOR LOW K   CBC WITH AUTO DIFFERENTIAL   TROPONIN   TROPONIN   MRSA BY PCR       Radiologic studies results:  CT Head WO Contrast   Final Result   1. Small focal area of intraventricular hemorrhage in the midportion of    the septum pellucidum measuring 0.7 x 0.4 cm.   2.  Hemorrhagic fluid in the left maxillary sinus. This document has been electronically signed by: Anahy Quinones MD on    11/26/2020 11:26 PM      All CT scans at this facility use dose modulation, iterative    reconstruction, and/or weight-based   dosing when appropriate to reduce radiation dose to as low as reasonably    achievable. CT Cervical Spine WO Contrast   Final Result   1. No evidence of cervical spine fracture. 2. Prior anterior metallic and interbody fusion extending from C4-C6. Appropriate alignment. No evidence of hardware failure. This document has been electronically signed by: Flaco Vaughn MD on    11/26/2020 11:37 PM      All CT scans at this facility use dose modulation, iterative    reconstruction, and/or weight-based   dosing when appropriate to reduce radiation dose to as low as reasonably    achievable. CTA CHEST W WO CONTRAST   Final Result   1. Nondisplaced left anterior 6th rib fracture. No pneumothorax or other    acute visceral or vascular injury. 2.  Small hiatal hernia.       This document has been electronically signed by: Anahy Quinones MD on    11/26/2020 11:34 PM      All CT scans at this facility use dose modulation, iterative    reconstruction, and/or weight-based   dosing when appropriate to reduce radiation dose to as low as reasonably    achievable. CT FACIAL BONES WO CONTRAST   Final Result   1. Large displaced left orbital floor fracture. Herniation of the inferior    rectus muscle through the resulting orbital floor defect. 2. Fracture of the posterior aspect of the left lamina papyracea. 3. Deformity of the nasal bone compatible with fracture, of uncertain age. This document has been electronically signed by: Maday Lazo MD on    11/27/2020 01:28 AM      All CT scans at this facility use dose modulation, iterative    reconstruction, and/or weight-based   dosing when appropriate to reduce radiation dose to as low as reasonably    achievable. XR CHEST PORTABLE   Final Result   No acute findings.       This document has been electronically signed by: Yareli Downs MD on    11/26/2020 10:57 PM             ED Medications administered this visit:   Medications   niCARdipine (CARDENE) 25 mg in dextrose 5 % 250 mL infusion (11 mg/hr Intravenous Rate/Dose Change 11/27/20 0105)   sodium chloride flush 0.9 % injection 10 mL (has no administration in time range)   sodium chloride flush 0.9 % injection 10 mL (has no administration in time range)   acetaminophen (TYLENOL) tablet 650 mg (has no administration in time range)     Or   acetaminophen (TYLENOL) suppository 650 mg (has no administration in time range)   polyethylene glycol (GLYCOLAX) packet 17 g (has no administration in time range)   promethazine (PHENERGAN) tablet 12.5 mg (has no administration in time range)     Or   ondansetron (ZOFRAN) injection 4 mg (has no administration in time range)   0.9 % sodium chloride infusion ( Intravenous New Bag 11/27/20 0106)   famotidine (PEPCID) injection 20 mg (has no administration in time range)   lidocaine 4 % external patch 1 patch (has no administration in time range)   morphine (PF) injection 2 mg (has no administration in time range)     Or   morphine injection 4 mg (has no administration in time range)   acetaminophen (TYLENOL) tablet 650 mg (650 mg Oral Given 11/26/20 2259)   metoprolol tartrate (LOPRESSOR) tablet 25 mg (25 mg Oral Given 11/26/20 2259)   iopamidol (ISOVUE-370) 76 % injection 80 mL (80 mLs Intravenous Given 11/26/20 2244)     EKG Interpretation:   No STEMI  Sinus bradycardia with PVCs  Ventricular Rate: 58 bpm  NV Interval: 204 ms   QRS Duration: 78 ms   QT Interval: 394 ms   QTc: 386 ms      ED COURSE             MEDICATION CHANGES     Current Discharge Medication List            FINAL DISPOSITION     Final diagnoses:   Syncope and collapse   Closed fracture of one rib of left side, initial encounter   Intraventricular hemorrhage (HCC)     Condition: condition: stable  Dispo: Admit to med/surg floor      This transcription was electronically signed. Parts of this transcriptions may have been dictated by use of voice recognition software and electronically transcribed, and parts may have been transcribed with the assistance of an ED scribe. The transcription may contain errors not detected in proofreading. Please refer to my supervising physician's documentation if my documentation differs.     Electronically Signed: Modesta Carr, 11/27/20, 1:43 AM       Modesta Carr, MD  Resident  11/27/20 0108

## 2020-11-27 NOTE — PROGRESS NOTES
Attempted SBIRT per trauma services. Multiple medical staff with patient at this time. Unable to complete.      Brief Intervention and Referral to Treatment Summary    Patient was provided PHQ-9, AUDIT and DAST Screening:      PHQ-9 Score:   AUDIT Score:    DAST Score:      Patients substance use is considered     Low Risk/Healthy  Moderate Risk  Harmful  Dependent    Patients depression is considered:     Minimal  Mild   Moderate  Moderately Severe  Severe    Brief Education Was Provided    Patient was receptive  Patient was not receptive      Brief Intervention Is Provided (Only for AUDIT or DAST)     Patient reports readiness to decrease and/or stop use and a plan was discussed   Patient denies readiness to decrease and/or stop use and a plan was not discussed      Recommendations/Referrals for Brief and/or Specialized Treatment Provided to Patient

## 2020-11-27 NOTE — CONSULTS
[facility] [address]                                          NEUROSURGICAL CONSULTATION NOTE       Gisella Sandy   YOB: 1935  Account Number: [de-identified]   Date of Examination: 11/27/2020    ASSESSMENT:    -Patient was seen and examined in the ICU. -This is an 81-year-old male S/P  ground-level fall who underwent brain CT that showed small intracranial bleed. -There is no neuro focal neurological deficit at this time. - GCS: 15/15  - Following command by 4 good strength throughout.  -Repeated brain CT showed stable exam.    PLAN:    -Admit to the ICU . -Medical management per ICU team and patient primary. -TXA trauma dose to be given to the patient.  -Keep systolic blood pressure less than 160 and above 100.  -Coagulation profile.  -She can be transferred to the floor tomorrow morning if there is no deterioration in his neurological status and if he does well with PT and OT.  -New brain CT after 2 days (long as there is no deterioration patient's neurological status). -Management of other injuries are per the corresponding service.  -Stop all anticoagulation medications at this time.  -Neurosurgery will follow. HISTORY OF PRESENT ILLNESS:    Gisella Sandy is a 81 y.o. male, admitted on :11/26/2020  8:15 PM  This is an 81-year-old male who was brought to the ER after he was experienced a ground-level fall with positive history of loss of consciousness. Upon patient arrival to the ER he underwent brain CT that showed:    1.  Small focal area of intraventricular hemorrhage in the midportion of    the septum pellucidum measuring 0.7 x 0.4 cm. 2.  Hemorrhagic fluid in the left maxillary sinus. This reason neurosurgery team was consulted. No history of new focal weakness or numbness. Upon patient arrival evaluation his GCS was 15/15, he was following command by 4 good strength throughout.   Patient is on Brilinta. ROS:    Review of Systems   HENT: Negative for tinnitus. Eyes: Positive for visual disturbance. Respiratory: Negative for chest tightness. Cardiovascular: Negative for chest pain. Gastrointestinal: Negative for abdominal pain. Genitourinary: Negative for difficulty urinating. Musculoskeletal: Negative for back pain. Neurological: Negative for weakness. Psychiatric/Behavioral: Positive for confusion. Negative for agitation. PROBLEM LIST:  Patient Active Problem List   Diagnosis    CA prostate, adenoca ([facility] [address]    Chronic kidney disease, stage III (moderate)    Hypercholesteremia    Osteoarthritis    CAD (coronary artery disease)    H/O class III angina pectoris    Gastritis    Hypertension, essential, benign    HLD (hyperlipidemia)    Spinal stenosis, lumbar, s/p laminectomy    S/P laminectomy    ALDA (acute kidney injury) ([facility] [address]    DVT, lower extremity (HCC)    Fluid overload    Benign prostatic hyperplasia with urinary obstruction    PVD (peripheral vascular disease) ([facility] [address]    DVT (deep venous thrombosis) (HCC)    Presence of IVC filter    Gross hematuria    Hematuria    History of DVT (deep vein thrombosis)    Benign non-nodular prostatic hyperplasia with lower urinary tract symptoms    History of prostate cancer    H/O prostate cancer    Proteinuria    Encephalopathy    Altered mental status    Intraventricular hemorrhage (HCC)    Elevated troponin       MEDICATIONS:   Prior to Admission medications    Medication Sig Start Date End Date Taking?  Authorizing Provider   ticagrelor (BRILINTA) 90 MG TABS tablet Take 1 tablet by mouth 2 times daily 10/23/20  Yes Gema English MD   losartan (COZAAR) 50 MG tablet Take 1 tablet by mouth daily 10/23/20  Yes Gema English MD   aspirin 81 MG chewable tablet Take 1 tablet by mouth daily 10/24/20  Yes Gema English MD   pravastatin (PRAVACHOL) 40 MG tablet Take 1 tablet by mouth daily 10/23/20  Yes Lord Rayna CHOI Lyly Gibbs MD   potassium chloride (KLOR-CON) 20 MEQ packet Take 20 mEq by mouth daily   Yes Historical Provider, MD   oxybutynin (DITROPAN) 5 MG tablet TAKE 1 TABLET TWICE A DAY 9/25/20  Yes Brandy Thomson PA-C   finasteride (PROSCAR) 5 MG tablet TAKE 1 TABLET DAILY 8/21/20  Yes Leah Sorensen MD   tamsulosin (FLOMAX) 0.4 MG capsule TAKE 1 CAPSULE NIGHTLY 8/19/20  Yes Charity Huh PingleALESSANDRA - CNP   bumetanide (BUMEX) 1 MG tablet Take 1 tablet by mouth daily 7/28/20  Yes Radha Castro MD   mometasone-formoterol (DULERA) 100-5 MCG/ACT inhaler Inhale 2 puffs into the lungs 2 times daily   Yes Historical Provider, MD   metoprolol tartrate (LOPRESSOR) 25 MG tablet Take 25 mg by mouth 2 times daily   Yes Historical Provider, MD   Cholecalciferol (VITAMIN D3) 5000 UNITS TABS Take 2,000 Units by mouth daily    Yes Historical Provider, MD   acetaminophen (TYLENOL) 500 MG tablet Take 500 mg by mouth every 4 hours as needed for Pain   Yes Historical Provider, MD   nitroGLYCERIN (NITROSTAT) 0.4 MG SL tablet Place 1 tablet under the tongue every 5 minutes as needed for Chest pain up to max of 3 total doses.  If no relief after 1 dose, call 911. 10/23/20   Claudia Thakur MD   aspirin EC 81 MG EC tablet Take 1 tablet by mouth daily 10/23/20   Souleymane Hawthorne MD   albuterol sulfate  (90 Base) MCG/ACT inhaler Inhale 1 puff into the lungs every 4 hours as needed for Wheezing    Historical Provider, MD       Current Facility-Administered Medications   Medication Dose Route Frequency Provider Last Rate Last Dose    sodium chloride flush 0.9 % injection 10 mL  10 mL Intravenous 2 times per day ASMITA Hernandez        sodium chloride flush 0.9 % injection 10 mL  10 mL Intravenous PRN ASMITA Hernandez        acetaminophen (TYLENOL) tablet 650 mg  650 mg Oral Q6H PRN ASMITA Torrez        Or    acetaminophen (TYLENOL) suppository 650 mg  650 mg Rectal Q6H PRN ASMITA Banuelos        polyethylene glycol (GLYCOLAX) packet 17 g  17 g Oral Daily PRN ASMITA Santamaria        promethazine (PHENERGAN) tablet 12.5 mg  12.5 mg Oral Q6H PRN ASMITA Torrez        Or    ondansetron (ZOFRAN) injection 4 mg  4 mg Intravenous Q6H PRN ASMITA Torrez        0.9 % sodium chloride infusion   Intravenous Continuous ASMITA Torrez 75 mL/hr at 11/27/20 0106      famotidine (PEPCID) injection 20 mg  20 mg Intravenous Daily ASMITA Torrez        lidocaine 4 % external patch 1 patch  1 patch Transdermal Daily ASMITA Torrez        morphine (PF) injection 2 mg  2 mg Intravenous Q2H PRN ASMITA Torrez        Or    morphine injection 4 mg  4 mg Intravenous Q2H PRN ASMITA Torrez        niCARdipine (CARDENE) 25 mg in dextrose 5 % 250 mL infusion  5 mg/hr Intravenous Continuous Cornel Muniz MD 85 mL/hr at 11/27/20 0626 8.5 mg/hr at 11/27/20 0626        sodium chloride flush, 10 mL, PRN  acetaminophen, 650 mg, Q6H PRN    Or  acetaminophen, 650 mg, Q6H PRN  polyethylene glycol, 17 g, Daily PRN  promethazine, 12.5 mg, Q6H PRN    Or  ondansetron, 4 mg, Q6H PRN  morphine, 2 mg, Q2H PRN    Or  morphine, 4 mg, Q2H PRN         sodium chloride 75 mL/hr at 11/27/20 0106    niCARdipine 8.5 mg/hr (11/27/20 0626)         ALLERGIES:   Patient has no known allergies. PAST MEDICAL  HISTORY:    has a past medical history of ALDA (acute kidney injury) ([facility] [address] Blood circulation, collateral, CAD (coronary artery disease), Cancer ([facility] [address] Chronic kidney disease, stage III (moderate), Hypercholesteremia, Hypertension, and Osteoarthritis. PAST SURGICAL  HISTORY:    has a past surgical history that includes knee surgery; joint replacement; Neck surgery; Rotator cuff repair; Cardiac surgery; back surgery (2006); other surgical history (5/29/2015); Endoscopy, colon, diagnostic; Cosmetic surgery; and Total hip arthroplasty (Bilateral).     SOCIAL HISTORY:   Social History     Tobacco Use    Smoking status: Former Smoker     Packs/day: 0.50 Years: 25.00     Pack years: 12.50     Types: Cigarettes     Last attempt to quit: 1997     Years since quittin.6    Smokeless tobacco: Never Used   Substance Use Topics    Alcohol use: No     Alcohol/week: 0.0 standard drinks       FAMILY HISTORY:  Family History   Problem Relation Age of Onset    Heart Disease Mother     High Blood Pressure Mother     Prostate Cancer Neg Hx     Cancer Neg Hx     Diabetes Neg Hx     Kidney Disease Neg Hx     Stroke Neg Hx        LABS  CBC:   Lab Results   Component Value Date    WBC 7.4 2020    RBC 4.76 2020    RBC 0 2016    HGB 14.7 2020    HCT 47.0 2020    MCV 98.7 2020    MCH 30.9 2020    MCHC 31.3 2020    RDW 14.8 2016     2020    MPV 10.5 2020     BMP:    Lab Results   Component Value Date     2020    K 4.1 2020     2020    CO2 22 2020    BUN 23 2020    LABALBU 3.8 2020    CREATININE 1.3 2020    CALCIUM 9.3 2020    LABGLOM 64 2020    GLUCOSE 130 2020    GLUCOSE 104 2020     Uric Acid:    Lab Results   Component Value Date    URICACID 7.2 2012       RADIOLOGY:  Pertinent images have been reviewed. Lovina Avery CT showed:    1.  Small focal area of intraventricular hemorrhage in the midportion of    the septum pellucidum measuring 0.7 x 0.4 cm. 2.  Hemorrhagic fluid in the left maxillary sinus. EXAMINATION:    Patient awake alert and oriented x3 (slightly confused). GCS: 15/15   Pupils: equal and reactive   FCx4 with good strength through out   Sensory: grossly intact  Reflexes: 1+ through out. Planter reflex: Down response    *. The case was discussed in detalied with the trauma team, with ICU team and with patient. **Time spent with the patient was 50 minutes. More than 50% was spent counseling/coordinating the patient's care.        Neri Tang MD  Electronically signed 2020

## 2020-11-27 NOTE — CARE COORDINATION
DISASTER CHARTING    11/27/20, 11:44 AM EST    DISCHARGE ONGOING EVALUATION:     [address] day: 0  Location: 4D-13/013-A Reason for admit: Intraventricular hemorrhage ([facility] [address] [I61.5]   Barriers to Discharge: not medically ready  PCP: Rakesh Perez MD  Patient Goals/Plan/Treatment Preferences: Presented following unwitnessed fall with unknown LOC. Wife was in bathroom, heard sound in kitchen, opened bathroom door to see her  lying face down on the ground. He did have his walker at time of fall. Wife reports he appeared confused after fall with repetitive speech. Pt c/o mild discomfort over left face. Neurosurgery consulted for intraventricular hemorrhage. ENT consulted for nasal fractures. Opthamology consulted for orbital floor blow out fracture with herniating rectus muscle. Intensivist consulted for management of hypertension; started on cardene drip. Pt also has Left 6th rib fracture - on rib fracture protocol. SLP/PT/OT. Tmax 99.9. NSR. On room air. Ox4. NIH 0. IVF, cardene @ 8.5 mg/hr, po bumex daily, pepcid, lidoderm patch x1, cozaar, lopressor. K+ 5.4 - now 4.1, bun 26 - now 23, Creat 1.4 - now 1.3, trop 0.012 - then 0.017. Spoke with Pelon Uribe; states he lives at home with his wife and uses a walker and cares for himself independently. He did not have any HH services PTA. He does not drive, his wife takes him to appointments and he has a PCP. Pelon Uribe states he plans to return home with his wife at discharge, denies needs, and declines HH. Monitor therapy evals for possible needs.

## 2020-11-27 NOTE — FLOWSHEET NOTE
11/27/20 1123   Encounter Summary   Services provided to: Patient   Referral/Consult From: Rounding   Continue Visiting Yes  (11/27 NR)   Complexity of Encounter Low   Length of Encounter 15 minutes   Routine   Type Initial   Assessment Unable to respond   Intervention Nurtured hope   Outcome Did not respond   In my encounter with the 81 yr old patient, I attempted to see the patient but they were unresponsive at this time. No family was present in the room. A  will attempt to see the patient at a later time as a follow up.

## 2020-11-27 NOTE — CONSULTS
Patient:  Libby Buerger    Unit/Bed:4D-13/013-A  MRN: [id number]   PCP: Day Antonio MD  Date of Admission: 11/26/2020    Assessment and Plan(All pulmonary edema, renal failure, PE, and respiratory failure diagnoses are acute in nature unless otherwise specified):        1. Intraventricular hemorrhage: Secondary to fall. Small on CT head 11/26/2020. Neurosurgery following. Repeat CT today. Keep -160. HOB 30. PT/OT/ST. NIH per protocol, neuro checks. 2. Left orbital floor fracture with herniation of the inferior rectus, fracture posterior left lamina papyracea: plastics, ENT, opthalmology consulted. Per Dr. Yamilex Suárez the patient will be added to his OR scheduled Monday. Pain control. 3. Elevated troponin: See #4. Troponin 0.017. Repeat. Denies chest pain. Mild dyspnea, unchanged from baseline, but reports it started about one month ago. ECG without acute ischemic changes. 4. CAD: Last cardiac cath 10/22/2020, EF 60%, showing significant disease of circumflex and LAD. S/p stent of left circumflex at that time by Dr. Carin Marcus. He was to be considered for rotablator pending his clinical course. Discharged w/ follow-up instructions to start dual-antiplatelet therapy, start cardiac rehab. Patient reports he did not follow up. Also reports non-adherence to recommended antiplatelet therapy. Denies chest pain. Mild dyspnea at rest. Troponin 0.017. ECG without acute ischemic changes. Recommend considering consult to cardiology, Dr. Carin Marcus. Hold aspirin, brilinta until cleared by neurosurgery, pending repeat CT head results. 5. HTN: Keep -160 as above. On nicardipine drip. Reinstitute home BP meds today: losartan, metoprolol. Wean nicardipine drip as able. 6. Left sixth rib fracture: rib fracture protocol. Incentive spirometry. Pain control. 7. CKD stage III:  Creatinine 1.3, baseline. Urine output adequate. I/O q8h. BMP daily. Avoid nephrotoxic substances. 8. HLD, history of: continue statin. CC:  Loss of consciousness  HPI: Patrick Akhtar is an 82 y/o male former-smoker w/ PMH of CAD s/p stent, CKD stage III, HTN, HLD, DVT status post IVC filter, osteoarthritis, prostate cancer, back surgery (2006). He presented to the emergency department on 11/26/2020 for evaluation of fall with head trauma. He is on chronic aspirin, and prescribed Brilinta but has not been taking. Patient's wife is available for history. She reported that she returned from the restroom and found the patient lying on the floor in her kitchen. Patient stated he normally ambulates with a walker, and did have a walker at the time of fall. The patient had an abrasion to the left forehead and lower lip laceration. The patient did not remember this event, and was unable to confirm or deny any symptoms prior to the event. It is unclear whether this was preceded by a syncopal episode or not. In the ED, patient denied any pain. He denied any chest pain, shortness of breath. He denied headache, fever, chills, cough, chest pain, shortness of breath, abdominal pain, nausea, vomiting, diarrhea, patient, leg swelling. The patient's wife stated that he has been acting differently post-fall, and that he is been asking repetitive questions. Upon initial evaluation, the patient was not asking any repetitive questions. He is alert and oriented x3. He had no other acute complaints. /126, HR 88, SPO2 94%. CT imaging showed showed small intraventricular hemorrhage and left anterior sixth rib fracture, no PE. There is also a large displaced left orbital floor fracture with herniation of the inferior rectus, and fracture posterior left lamina papyracea. There is no cervical spine fracture. He was admitted under trauma services, and brought to the ICU. Neurosurgery, ENT, Ophthalmology, plastics were consulted. ICU team was consulted.     Of note, he had a recent cardiac cath procedure completed 10/22/2020 under Dr. Yazmin Le, showing significant disease of circumflex and LAD. S/p stent of left circumflex at that time by Dr. Zoltan Day. He was to be considered for rotablator pending his clinical course. Discharged w/ follow-up instructions to start dual-antiplatelet therapy, start cardiac rehab. Pt reportedly declined cardiac rehab. Please see assessment and pan for further details. ROS: Review of Systems   Constitutional: Negative for chills, diaphoresis and fever. HENT: Negative for hearing loss, sinus pressure and sore throat. Eyes: Negative for redness and visual disturbance. Respiratory: Positive for shortness of breath (\"a little bit. \" \"No more than usual.\" \"started about a month ago. \"). Negative for cough, chest tightness and wheezing. Cardiovascular: Negative for chest pain, palpitations and leg swelling. Gastrointestinal: Negative for abdominal distention, abdominal pain, nausea and vomiting. Genitourinary: Negative for difficulty urinating and dysuria. Musculoskeletal: Negative for back pain and neck pain. Skin: Negative for color change and pallor. Neurological: Positive for weakness (\"been feeling weak in the knees lately. \" \"I use a walker at home. \"). Negative for dizziness, seizures, facial asymmetry, speech difficulty, light-headedness, numbness and headaches. Psychiatric/Behavioral: Negative for agitation and confusion. The patient is not nervous/anxious. PMH:  Per HPI  SHX:  Former-smoker. Quit 1997.  12.5-pack-year history. Denies alcohol use. Denies substance use. FHX: mother-heart disease, hypertension.   Allergies: NKDA  Medications:     sodium chloride 75 mL/hr at 11/27/20 0106    niCARdipine 8.5 mg/hr (11/27/20 0626)      sodium chloride flush  10 mL Intravenous 2 times per day    famotidine (PEPCID) injection  20 mg Intravenous Daily    lidocaine  1 patch Transdermal Daily    bumetanide  1 mg Oral Daily    losartan  50 mg Oral Daily    metoprolol tartrate  25 mg Oral BID  pravastatin  40 mg Oral Daily    tamsulosin  0.4 mg Oral Nightly       Vital Signs:   T: 98.8: P: 80 RR: 20 B/P: 144/75: FiO2: RA: O2 Sat:96%: I/O: 304/550 (+354) GCS: 15  Body mass index is 33.23 kg/m². Agnes Da General:   Acute on chronically ill adult male. Appears stated age. HEENT:  normocephalic and atraumatic. No scleral icterus. PERR. Left forehead abrasion, lower lip. Neck: supple. No Thyromegaly. Lungs: clear to auscultation. Normal rate, pattern. No retractions  Cardiac: RRR. S1/S2. No murmur. No JVD. Abdomen: soft. Obese. Non-distended. Nontender. BS active x4. Extremities:  No clubbing, cyanosis. 1+ LE edema bilaterally. Vasculature: capillary refill < 3 seconds. Palpable 1+ dorsalis pedis pulses. Skin:  warm and dry. Psych:  Alert and oriented x3. Affect appropriate  Lymph:  No supraclavicular adenopathy. Neurologic:  No focal deficit. No seizures. Follows commands. Appropriate. Purposeful x4 extremities. Strong upper extremities x2. Moderate strength LE's x2. Data: (All radiographs, tracings, PFTs, and imaging are personally viewed and interpreted unless otherwise noted).  Telemetry: NSR. HR 80. No ectopy.  12-lead ECG 11/26/2020: Sinus bradycardia with occasional PACs. Ventricular rate 58.  Diagnostic cardiac cath 10/22/2020 report: Preserved systolic function of the left ventricle, ejection fraction 60%. No mitral regurg. No gradient across the aortic valve. Mild diastolic dysfunction of the left ventricle. Moderate disease of the left proximal RCA, diffuse disease mid RCA, RCA is codominant. Subtotal stenosis mid circumflex and subtotal stenosis of the marginal branch of the circumflex. A 70% narrowing of the mid LAD, to be followed by another 90% narrowing, severe stenosis of the apical portion of the LAD.   Subtotal complex angioplasty and stenting of the marginal branch of the circumflex utilizing resolute drug-eluting stent reducing the stenosis from 90% to 0% NIKKI-3 flow.  The patient could be considered for Rotablator of the LAD pending his clinical course.  Sodium 140 potassium 4.1 chloride 107 CO2 22 BUN 23 creatinine 1.3 glucose 130 calcium 9.3   WBC 7.4 hemoglobin 14.7 hematocrit 47.0 platelets 653   proBNP 275.1 troponin 0.017   Albumin 3.8 alk phos 63 ALT 18 AST 21 bilirubin 0.5 total protein 6.5   INR 1.03 APTT 26.2   UA: Yellow, protein 30, negative leukocyte, WBC 2-4, no bacteria   MRSA screen: negative   VRE screen: Negative   CT head 11/26/2020 report: Small focal area of intraventricular hemorrhage in the midportion of the septum pellucidum measuring 0.7 x 0.4 cm. Hemorrhagic fluid in the left maxillary sinus.  CT facial bones w/out contrast report: Large displaced left orbital floor fracture. Herniation of the inferior rectus muscle through the resulting orbital floor defect. Fracture of the posterior aspect of the left lamina papyracea. Deformity of the nasal bone compatible with fracture, of uncertain age.  CTA chest w/ and w/out contrast report: Nondisplaced left anterior 6th rib fracture.  No pneumothorax or other acute visceral or vascular injury. Small hiatal hernia.  CT cervical spine without contrast 11/26/2020 report: No evidence of cervical spine fracture. Prior anterior metallic and interbody fusion extending from C4-C6. Appropriate alignment. No evidence of hardware failure.  CXR 11/26/2020 report: No acute findings. Case discussed w/ Dr. Tyron Cervantes.     Electronically signed by ALESSANDRA So-CNP

## 2020-11-27 NOTE — ED NOTES
Pt down graded to a trauma consult at this time.  No fast exam needed per trauma ASMITA Vaughn, RN  11/26/20 5339

## 2020-11-27 NOTE — PROGRESS NOTES
I have independently performed an evaluation on Stone . I have reviewed the above documentation completed by the [facility]. Please see my additional contributions to the HPI, physical exam, assessment/medical decision making. Pain controlled, resting. Repeat scan pending at time of exam. Periorbital edema and bruising unchanged. Plastics tentatively planning for OR Monday. Repeat CT head today   Continue ICU per NS    Electronically signed by Yash Gomez MD on 11/27/2020 at 1:24 PM    Samantha Bhandari  Daily Progress Note    Pt Name: Lexy De La Paz Record Number: [id number]  Date of Birth 1935   Today's Date: 11/27/2020    HD: # 0    CC: None offered. ASSESSMENT    Active Hospital Problems    Diagnosis Date Noted    Intraventricular hemorrhage ([address] [I61.5] 11/27/2020    Elevated troponin [R77.8] 11/27/2020    Fracture of left orbital floor ([address] [S02.32XA] 11/27/2020    Nasal bone fracture [S02. 2XXA] 11/27/2020    Closed fracture of six ribs of left side [S22.42XA] 11/27/2020       PLAN  Patient admitted under Trauma Services to ICU    Intraventricular hemorrhage              - Consult Neurosurgery   - Maintain systolic blood pressure between 100-160   - Seizure precautions              - Neuro checks   - Repeat CT head this AM pending   - Further intervention pending Neurosurgery recommendations    Left 6th rib fracture              - Rib fracture protocol   - IS/C&DB   - Monitor respiratory status              - Flexeril   - Lidoderm patches              - Pain control                Displaced left orbital floor fracture with herniation of inferior rectus, fracture posterior left lamina papyracea, deformity of nasal bone suspicious for fracture              - Consult Plastics, Ophthalmology and ENT   - Per Dr. Colt Donald the patient will be added to his OR scheduled Monday   - Visual checks              - Ice PRN   - Pain control     Elevated troponin              - Consult Intensivist              - Serial troponins ordered   - Remains on Cardene drip at this time    Consults: Neurosurgery, Marcelo Lancaster, Ophthalmology, ENT, Plastics     Pain Management              - Morphine, Norco     Prophylaxis: SCDs, Incentive Spirometry, GlycoLax, Pepcid, Zofran     NPO pending repeat CT head     IVF Management  Regular Neurovascular Checks  Repeat Labs Tomorrow AM  PT/OT/SLP Eval and Treat  Bedrest pending NS evaluation     Planned Discharge discharge pending clinical course    SUBJECTIVE  Patient seen in the ICU this morning. He denies any pain or complaints. He states he does not remember why he fell. He is asking to eat this morning. He reports overnight he felt a little short of breath. He states this has since resolved. He denies any headaches, dizziness, chest pain, abdominal pain, nausea, vomiting. Repeat CT head and further Neurosurgery evaluation pending this AM. Intensivist on board managing HTN and elevated troponins. He is being evaluated for orbital floor blowout fracture and nasal bone fracture this morning as well. Per Dr. Shelton Ames the patient will be added to his OR scheduled Monday. He remains stable from a Trauma perspective.     Wt Readings from Last 3 Encounters:   11/26/20 225 lb (102.1 kg)   10/22/20 225 lb (102.1 kg)   10/15/20 225 lb (102.1 kg)     Temp Readings from Last 3 Encounters:   11/27/20 98.8 °F (37.1 °C) (Oral)   10/23/20 98.5 °F (36.9 °C) (Oral)   07/28/20 98 °F (36.7 °C) (Temporal)     BP Readings from Last 3 Encounters:   11/27/20 (!) 152/80   10/23/20 135/86   07/28/20 138/68     Pulse Readings from Last 3 Encounters:   11/27/20 85   10/23/20 75   07/28/20 70       24 HR INTAKE/OUTPUT :     Intake/Output Summary (Last 24 hours) at 11/27/2020 0830  Last data filed at 11/27/2020 0600  Gross per 24 hour   Intake 903.89 ml   Output 550 ml   Net 353.89 ml     Diet NPO Effective Now    OBJECTIVE  CURRENT VITALS BP (!) 152/80   Pulse 85   Temp 98.8 °F (37.1 °C) (Oral)   Resp 23   Ht 5' 9\" (1.753 m)   Wt 225 lb (102.1 kg)   SpO2 95%   BMI 33.23 kg/m²     GENERAL: Awake, alert NAD. Left periorbital swelling and ecchymosis. NEURO: PERRL. CN 2-12 grossly intact. Moves all extremities spontaneously. LUNGS: Clear to auscultation bilaterally- no wheezes, rales or rhonchi, normal air movement, no respiratory distress. HEART: Normal rate, normal S1 and S2, no gallops and intact distal pulses. ABDOMEN: Abdomen is softly distended with normoactive bowel sounds. WOUNDS: Abrasion to lip without any bleeding or drainage. EXTREMITY: No cyanosis and no clubbing. LABS  CBC :   Recent Labs     11/26/20 2115 11/27/20 0328   WBC 4.8 7.4   HGB 15.4 14.7   HCT 49.4 47.0   .2* 98.7*    144     BMP:   Recent Labs     11/26/20 2115 11/27/20 0328    140   K 5.4* 4.1    107   CO2 24 22*   BUN 26* 23*   CREATININE 1.4* 1.3*     COAGS:   Recent Labs     11/26/20 2115   APTT 26.2   PROT 6.5   INR 1.03     Pancreas/HFP:  No results for input(s): LIPASE, AMYLASE in the last 72 hours. Recent Labs     11/26/20 2115   AST 21   ALT 18   BILITOT 0.5   ALKPHOS 63     RADIOLOGY:  Repeat CT head this AM pending.     Electronically signed by Rocky Guzman PA-C on 11/27/2020 at 8:30 AM

## 2020-11-27 NOTE — ED TRIAGE NOTES
Pt presents to the ED by EMS with c/o loss of conciousness. Pt was at home when he blacnked out and fell and hit his head. Pt states he is not having any pain. Abrasion noted to left side of forehead and lip. Bleeding controlled. Pt not on any blood thinners. Pt oriented to person and place. EKG completed. Tele applied.

## 2020-11-27 NOTE — ED NOTES
Patient Alert and oriented to place, name, and situation, but disoriented to time at this time. Patient is able to name the president.      Tyrese Serrano RN  11/26/20 4142

## 2020-11-27 NOTE — CONSULTS
 Proteinuria    Encephalopathy    Altered mental status    Intraventricular hemorrhage (HCC)    Elevated troponin    Fracture of left orbital floor (HCC)    Nasal bone fracture    Closed fracture of six ribs of left side       Plan:    Patient admitted under Trauma Services to ICU    Intraventricular hemorrhage   -Consult to neurosurgery    -Regular neuro checks   -Maintain systolic blood pressure between 100-160   -repeat head CT in a.m.   -Further intervention pending neurosurgery recommendation    Left 6th rib fracture   -Rib fracture protocol   -Flexeril   -Pain control   -Lidoderm patches   -Incentive spirometry/cough and deep breathing   -Monitor respiratory status    Displaced left orbital floor fracture with herniation of inferior rectus, fracture posterior left lamina papyracea, deformity of nasal bone suspicious for fracture.   -Consult ophthalmology for orbital floor fracture and herniation   -Consult to ENT for nasal fractures   -Elevate head of bed    Elevated troponin, hypertension   -Consult to intensivist for management   -Serial troponins ordered   -On Cardene drip from ED    Consults neurosurgery, ophthalmology, otolaryngology, intensivist    Pain Management   -Morphine, Norco    Prophylaxis: SCD's, Incentive Spirometry, GlycoLax, Pepcid, Zofran    General Diet    IVF Management  Regular Neurovascular Checks  Repeat Labs Tomorrow AM  PT/OT/SLP Eval and Treat  Activity as tolerated, pt up with assistance    Planned Discharge discharge pending clinical course      Activation:    [] Level I (Trauma Alert)   [x] Level II (Injury Call)   [x] Level III (Trauma Consult)   [x] Downgraded (Time: 3864)   Mode of Arrival: EMS transportation  Referring Facility: NA  Loss of Consciousness []No []Yes[x]Unknown  Duration(min)  Mechanism of Injury:  []Motor Vehicle crash   []Single Vehicle [] []Passenger []Scene Fatality []Front Seat  []Restrained   []Air Bag Deployed   []Ejected []Rollover with walker, did have a walker at the time of fall, unclear if fall was precipitated by syncopal episode. Wife states that  does appear to be confused post fall, has been repetitive in speech since incident. Patient reports some mild discomfort over left face otherwise has no complaints on exam. Patient shortness of breath, numbness/paresthesias, nausea, vomiting, or weakness. States that patient takes 81 mg of aspirin daily, has not taken 325 mg aspirin in over 2 months, does have prescription for Brilinta to be taken post stent, wife states she has not refilled Brilinta or been aware of patient taking occasion in recent past. Care in coordination with trauma surgeon Dr. Alanda Favre. Review of Systems:   Patient unable to answer remaining review of system questions secondary to fixation on facial abrasions. Patient has no known allergies.   Past Surgical History:   Procedure Laterality Date    BACK SURGERY  2006    fusion    CARDIAC SURGERY      2 stents    COSMETIC SURGERY      ENDOSCOPY, COLON, DIAGNOSTIC      JOINT REPLACEMENT      Bilateral hips    KNEE SURGERY      NECK SURGERY      OTHER SURGICAL HISTORY  5/29/2015    DECOMPRESSIVE LUMBAR LAMINECTOMY L2-3    ROTATOR CUFF REPAIR      TOTAL HIP ARTHROPLASTY Bilateral      Past Medical History:   Diagnosis Date    ALDA (acute kidney injury) ([address] 7/17/2015    Blood circulation, collateral     CAD (coronary artery disease)     Cancer (HCC)     prostate; seed implants    Chronic kidney disease, stage III (moderate)     Hypercholesteremia     Hypertension     Osteoarthritis      Past Surgical History:   Procedure Laterality Date    BACK SURGERY  2006    fusion    CARDIAC SURGERY      2 stents    COSMETIC SURGERY      ENDOSCOPY, COLON, DIAGNOSTIC      JOINT REPLACEMENT      Bilateral hips    KNEE SURGERY      NECK SURGERY      OTHER SURGICAL HISTORY  5/29/2015    DECOMPRESSIVE LUMBAR LAMINECTOMY L2-3    ROTATOR CUFF REPAIR  TOTAL HIP ARTHROPLASTY Bilateral      Social History     Socioeconomic History    Marital status:      Spouse name: Agnes Gonzalez Number of children: None    Years of education: None    Highest education level: None   Occupational History    None   Social Needs    Financial resource strain: None    Food insecurity     Worry: None     Inability: None    Transportation needs     Medical: None     Non-medical: None   Tobacco Use    Smoking status: Former Smoker     Packs/day: 0.50     Years: 25.00     Pack years: 12.50     Types: Cigarettes     Last attempt to quit: 1997     Years since quittin.6    Smokeless tobacco: Never Used   Substance and Sexual Activity    Alcohol use: No     Alcohol/week: 0.0 standard drinks    Drug use: No    Sexual activity: Not Currently   Lifestyle    Physical activity     Days per week: None     Minutes per session: None    Stress: None   Relationships    Social connections     Talks on phone: None     Gets together: None     Attends [employer] service: None     Active member of club or organization: None     Attends meetings of clubs or organizations: None     Relationship status: None    Intimate partner violence     Fear of current or ex partner: None     Emotionally abused: None     Physically abused: None     Forced sexual activity: None   Other Topics Concern    None   Social History Narrative    None     Family History   Problem Relation Age of Onset    Heart Disease Mother     High Blood Pressure Mother     Prostate Cancer Neg Hx     Cancer Neg Hx     Diabetes Neg Hx     Kidney Disease Neg Hx     Stroke Neg Hx        Home medications:    Current Discharge Medication List      CONTINUE these medications which have NOT CHANGED    Details   ticagrelor (BRILINTA) 90 MG TABS tablet Take 1 tablet by mouth 2 times daily  Qty: 60 tablet, Refills: 3      losartan (COZAAR) 50 MG tablet Take 1 tablet by mouth daily  Qty: 30 tablet, Refills: 3 aspirin 81 MG chewable tablet Take 1 tablet by mouth daily  Qty: 30 tablet, Refills: 3      pravastatin (PRAVACHOL) 40 MG tablet Take 1 tablet by mouth daily  Qty: 30 tablet, Refills: 3      potassium chloride (KLOR-CON) 20 MEQ packet Take 20 mEq by mouth daily      oxybutynin (DITROPAN) 5 MG tablet TAKE 1 TABLET TWICE A DAY  Qty: 180 tablet, Refills: 3      finasteride (PROSCAR) 5 MG tablet TAKE 1 TABLET DAILY  Qty: 90 tablet, Refills: 3      tamsulosin (FLOMAX) 0.4 MG capsule TAKE 1 CAPSULE NIGHTLY  Qty: 90 capsule, Refills: 3      bumetanide (BUMEX) 1 MG tablet Take 1 tablet by mouth daily  Qty: 30 tablet, Refills: 3      mometasone-formoterol (DULERA) 100-5 MCG/ACT inhaler Inhale 2 puffs into the lungs 2 times daily      metoprolol tartrate (LOPRESSOR) 25 MG tablet Take 25 mg by mouth 2 times daily      Cholecalciferol (VITAMIN D3) 5000 UNITS TABS Take 2,000 Units by mouth daily       acetaminophen (TYLENOL) 500 MG tablet Take 500 mg by mouth every 4 hours as needed for Pain      nitroGLYCERIN (NITROSTAT) 0.4 MG SL tablet Place 1 tablet under the tongue every 5 minutes as needed for Chest pain up to max of 3 total doses. If no relief after 1 dose, call 911.   Qty: 25 tablet, Refills: 3      aspirin EC 81 MG EC tablet Take 1 tablet by mouth daily  Qty: 30 tablet, Refills: 0      albuterol sulfate  (90 Base) MCG/ACT inhaler Inhale 1 puff into the lungs every 4 hours as needed for Wheezing             Hospital medications:  Scheduled Meds:   sodium chloride flush  10 mL Intravenous 2 times per day    famotidine (PEPCID) injection  20 mg Intravenous Daily    lidocaine  1 patch Transdermal Daily     Continuous Infusions:   sodium chloride 75 mL/hr at 11/27/20 0106    niCARdipine 8.5 mg/hr (11/27/20 0626)     PRN Meds:  Objective   ED TRIAGE VITALS  BP: (!) 152/80, Temp: 98.8 °F (37.1 °C), Pulse: 85, Resp: 23, SpO2: 95 %  [place] Coma Scale  Eye Opening: Spontaneous  Best Verbal Response: Oriented  Best mOsmol/kg   Protime-INR   Result Value Ref Range    INR 1.03 0.85 - 1.13   APTT   Result Value Ref Range    aPTT 26.2 22.0 - 38.0 seconds   Brain Natriuretic Peptide   Result Value Ref Range    Pro-.1 0.0 - 1800.0 pg/mL   Urine with Reflexed Micro   Result Value Ref Range    Glucose, Ur NEGATIVE NEGATIVE mg/dl    Bilirubin Urine NEGATIVE NEGATIVE    Ketones, Urine NEGATIVE NEGATIVE    Specific Gravity, Urine 1.029 1.002 - 1.030    Blood, Urine NEGATIVE NEGATIVE    pH, UA 5.0 5.0 - 9.0    Protein, UA 30 (A) NEGATIVE    Urobilinogen, Urine 0.2 0.0 - 1.0 eu/dl    Nitrite, Urine NEGATIVE NEGATIVE    Leukocyte Esterase, Urine NEGATIVE NEGATIVE    Color, UA YELLOW STRAW-YELLOW    Character, Urine CLEAR CLEAR-SL CLOUD    RBC, UA 0-2 0-2/hpf /hpf    WBC, UA 2-4 0-4/hpf /hpf    Epithelial Cells, UA 0-2 3-5/hpf /hpf    Bacteria, UA NONE SEEN FEW/NONE SEEN /hpf    Casts UA 4-8 HYALINE NONE SEEN /lpf    Crystals, UA NONE SEEN NONE SEEN    Renal Epithelial, UA NONE SEEN NONE SEEN    Yeast, UA NONE SEEN NONE SEEN    CASTS 2 NONE SEEN NONE SEEN /lpf    MISCELLANEOUS 2 NONE SEEN    Basic Metabolic Panel w/ Reflex to MG   Result Value Ref Range    Sodium 140 135 - 145 meq/L    Potassium reflex Magnesium 4.1 3.5 - 5.2 meq/L    Chloride 107 98 - 111 meq/L    CO2 22 (L) 23 - 33 meq/L    Glucose 130 (H) 70 - 108 mg/dL    BUN 23 (H) 7 - 22 mg/dL    CREATININE 1.3 (H) 0.4 - 1.2 mg/dL    Calcium 9.3 8.5 - 10.5 mg/dL   CBC auto differential   Result Value Ref Range    WBC 7.4 4.8 - 10.8 thou/mm3    RBC 4.76 4.70 - 6.10 mill/mm3    Hemoglobin 14.7 14.0 - 18.0 gm/dl    Hematocrit 47.0 42.0 - 52.0 %    MCV 98.7 (H) 80.0 - 94.0 fL    MCH 30.9 26.0 - 33.0 pg    MCHC 31.3 (L) 32.2 - 35.5 gm/dl    RDW-CV 12.8 11.5 - 14.5 %    RDW-SD 45.9 (H) 35.0 - 45.0 fL    Platelets 546 556 - 241 thou/mm3    MPV 10.5 9.4 - 12.4 fL    Seg Neutrophils 82.0 %    Lymphocytes 9.0 %    Monocytes 6.8 %    Eosinophils 1.2 %    Basophils 0.3 %    Immature 11/27/20 0006 (!) 185/86 -- -- 66 21 95 % -- --   11/26/20 2352 (!) 178/93 -- -- 65 23 95 % -- --   11/26/20 2340 (!) 178/93 -- -- 63 23 95 % -- --   11/26/20 2259 (!) 187/70 -- -- 70 -- -- -- --   11/26/20 2258 (!) 187/70 -- -- 70 24 97 % -- --   11/26/20 2222 (!) 183/81 -- -- 70 20 97 % -- --   11/26/20 2132 (!) 179/68 -- -- 60 18 95 % -- --   11/26/20 2017 (!) 194/73 98.1 °F (36.7 °C) -- 68 16 95 % 5' 9\" (1.753 m) 225 lb (102.1 kg)     Primary Assessment:  Airway: Patent, trachea midline  Breathing: Breath sounds present and equal bilaterally, spontaneous, and unlabored  Circulation: Hemodynamically stable, 2+  peripheral pulses. Disability: BIANCHI x 4, following commands. GCS =15    Secondary Assessment:  GENERAL: Presents sitting upright in bed unassisted, A&Ox4 to person, place, time, and events; In no acute distress and well nourished  HEAD: Small abrasion noted to left forehead and lower lip. Swelling noticed over bridge of nose, no tenderness to palpation. No raccoon eyes, duggan signs or visible CSF leakage. EYES: No apparent trauma, discharge, or hematoma bilaterally. PERRL at 3mm, EOM intact. Left subconjunctival hemorrhage. EARS: Set evenly on head. No apparent external trauma, bleeding, or deformity. Auditory canal without erythema, edema, lesions, bleeding, or discharge. Hearing 4/4. TM grey and translucent, with anterior/inferior position of cone of light, no fluid lines, bubbles, or hemotympanum. THROAT: Teeth present with none missing, cracked or bleeding. Mucosa is pink, moist, absent of lesions, bleeding, or patches. Tongue mobile, no deviation, no vesiculations, trauma, or bleeding. NECK: Neck is atraumatic, trachea midline, no visible lacerations, step off deformity, or expanding swelling. Trachea is midline, neck freely mobile in all direction without pain. No tenderness to palpation. CARDIO: No visible chest wall deformity. No heaves or lifts. Strong/regular S1/S2 rate and rhythm.  No rubs murmurs, or gallops. 2+ radial, brachial, femoral, posterior tibial, and dorsalis pedal pulses. Capillary refill <2 sec. No extremity edema noted. PULMONARY:  Trachea midline, no audible wheezing, increased respiratory effort, accessory muscle use, or asymmetrical chest wall movement. Lung sounds are clear and audible to ascultation in superior and inferior fields. No wheezing, crackles, or rhonchi. ABDOMEN: Abdomen is protuberant, but not abnormally distended. Normoactive bowel sounds. Firm and nontender to palpation in all quadrants  MSK: Extremities intact and present. No evident bruising, swelling, deformity, discoloration or bleeding. No tenderness to muscular or bony structure palpation. Comparments soft and no joint laxity. All extremity joints mobile with 5/5 strength and full ROM.  strength 5/5 and equal bilaterally. Cervical, thoracic, and lumbar spine abdomen without tenderness at midline, no step-off bruising or paraspinal muscular tenderness. NEURO: Follows commands, unable to recalls recent events. PMS intact, moves limbs freely. No focal neurological deficits  SKIN: Pink, warm and dry. WOUND: Small abrasions noted to left forehead and anterior lower lip. No surrounding calor, dolor, rubor odor, discharge, swelling, bleeding present, or foreign body suspected. Medical Decision Making: On arrival patient vital signs stable. Patient sent for CT head, neck, maxillofacial bones, and CTA chest showing mall intraventricular hemorrhage, left anterior sixth rib fracture, and multiple facial fractures with orbital floor fracture and inferior rectus muscle herniation. Neurosurgery consulted for management of intraventricular hemorrhage, service recommended admit to ICU, [facility] surgery and ophthalmology consulted for maxillofacial/orbital fractures. Patient to be mated to ICU for further monitoring.      Radiology:     CT Head WO Contrast   Final Result   1. Small focal area of intraventricular hemorrhage in the midportion of    the septum pellucidum measuring 0.7 x 0.4 cm.   2.  Hemorrhagic fluid in the left maxillary sinus. This document has been electronically signed by: Myriam Fung MD on    11/26/2020 11:26 PM      All CT scans at this facility use dose modulation, iterative    reconstruction, and/or weight-based   dosing when appropriate to reduce radiation dose to as low as reasonably    achievable. CT Cervical Spine WO Contrast   Final Result   1. No evidence of cervical spine fracture. 2. Prior anterior metallic and interbody fusion extending from C4-C6. Appropriate alignment. No evidence of hardware failure. This document has been electronically signed by: Conrad Almeida MD on    11/26/2020 11:37 PM      All CT scans at this facility use dose modulation, iterative    reconstruction, and/or weight-based   dosing when appropriate to reduce radiation dose to as low as reasonably    achievable. CTA CHEST W WO CONTRAST   Final Result   1. Nondisplaced left anterior 6th rib fracture. No pneumothorax or other    acute visceral or vascular injury. 2.  Small hiatal hernia. This document has been electronically signed by: Myriam Fung MD on    11/26/2020 11:34 PM      All CT scans at this facility use dose modulation, iterative    reconstruction, and/or weight-based   dosing when appropriate to reduce radiation dose to as low as reasonably    achievable. CT FACIAL BONES WO CONTRAST   Final Result   1. Large displaced left orbital floor fracture. Herniation of the inferior    rectus muscle through the resulting orbital floor defect. 2. Fracture of the posterior aspect of the left lamina papyracea. 3. Deformity of the nasal bone compatible with fracture, of uncertain age.       This document has been electronically signed by: Conrad Almeida MD on    11/27/2020 01:28 AM      All CT scans at this facility use dose modulation, iterative    reconstruction, and/or weight-based   dosing when appropriate to reduce radiation dose to as low as reasonably    achievable. XR CHEST PORTABLE   Final Result   No acute findings.       This document has been electronically signed by: Sherry Marquez MD on    11/26/2020 10:57 PM         CT HEAD WO CONTRAST    (Results Pending)     Fast Exam: No -isolated head injury      Electronically signed by ASMITA Cohen on 11/27/2020 at 8:19 AM

## 2020-11-27 NOTE — ED NOTES
Rn spoke with wife about current med list. Pt had stents placed around the end of october and was discharged with brilinta. Wife states she fills the pt medications and does not recall the pt taking that medication. Pt states he does not remember taking that medication as well. Pt call out as a level 2  trauma at this time.       Karen Ha RN  11/26/20 7874

## 2020-11-27 NOTE — ED NOTES
Patient remains AOx4. Vitals stable at this time. Patient resting in bed. Respirations easy and unlabored. No distress noted.         Kari Hendricks RN  11/27/20 0406

## 2020-11-27 NOTE — ED NOTES
Pt. Off the floor with RN in stable condition for CT scan.         Irwin Padilla, RN  11/26/20 [address], RN  11/26/20 7580

## 2020-11-27 NOTE — PROGRESS NOTES
Respiratory Care is following the rib fracture order set. Patient's position when testing was done was semifowlers. A Negative Inspiratory Force (NIF) was performed with patient achieving a NIF of >-40 cm H2O. The NIF was greater than 25 cm H2O. A Forced Vital Capacity (FVC) was obtained with patient achieving an FVC of 2.09 liters. The patient's calculated ideal body weight, (IBW) is  71 kg. 0.020 liters/kg of the patient's IBW is 1.42 liters. The patient's FVC was greater than 0.020 liters/kg of IBW. Based on the spirometry measurement alone, patient does not meet ICU admission criteria. Previous FVC was 1st attempt liters and previous NIF was 1st attempt cm H2O. Patient's NIF and FVC show no change from previous screening(s). Last pain medication was given on  11/27 @ 1217. Physician was not called regarding spirometry measurement.

## 2020-11-28 LAB
ANION GAP SERPL CALCULATED.3IONS-SCNC: 10 MEQ/L (ref 8–16)
AVERAGE GLUCOSE: 96 MG/DL (ref 70–126)
BUN BLDV-MCNC: 17 MG/DL (ref 7–22)
CALCIUM SERPL-MCNC: 9.2 MG/DL (ref 8.5–10.5)
CHLORIDE BLD-SCNC: 107 MEQ/L (ref 98–111)
CHOLESTEROL, TOTAL: 166 MG/DL (ref 100–199)
CO2: 21 MEQ/L (ref 23–33)
CREAT SERPL-MCNC: 1.2 MG/DL (ref 0.4–1.2)
ERYTHROCYTE [DISTWIDTH] IN BLOOD BY AUTOMATED COUNT: 12.8 % (ref 11.5–14.5)
ERYTHROCYTE [DISTWIDTH] IN BLOOD BY AUTOMATED COUNT: 47.8 FL (ref 35–45)
GFR SERPL CREATININE-BSD FRML MDRD: 70 ML/MIN/1.73M2
GLUCOSE BLD-MCNC: 118 MG/DL (ref 70–108)
HBA1C MFR BLD: 5.2 % (ref 4.4–6.4)
HCT VFR BLD CALC: 47.4 % (ref 42–52)
HDLC SERPL-MCNC: 49 MG/DL
HEMOGLOBIN: 14.6 GM/DL (ref 14–18)
LDL CHOLESTEROL CALCULATED: 98 MG/DL
MCH RBC QN AUTO: 31.1 PG (ref 26–33)
MCHC RBC AUTO-ENTMCNC: 30.8 GM/DL (ref 32.2–35.5)
MCV RBC AUTO: 101.1 FL (ref 80–94)
PLATELET # BLD: 134 THOU/MM3 (ref 130–400)
PMV BLD AUTO: 10.3 FL (ref 9.4–12.4)
POTASSIUM SERPL-SCNC: 4 MEQ/L (ref 3.5–5.2)
RBC # BLD: 4.69 MILL/MM3 (ref 4.7–6.1)
SODIUM BLD-SCNC: 138 MEQ/L (ref 135–145)
TRIGL SERPL-MCNC: 96 MG/DL (ref 0–199)
WBC # BLD: 6 THOU/MM3 (ref 4.8–10.8)

## 2020-11-28 PROCEDURE — 99291 CRITICAL CARE FIRST HOUR: CPT | Performed by: INTERNAL MEDICINE

## 2020-11-28 PROCEDURE — 99221 1ST HOSP IP/OBS SF/LOW 40: CPT | Performed by: PSYCHIATRY & NEUROLOGY

## 2020-11-28 PROCEDURE — 80061 LIPID PANEL: CPT

## 2020-11-28 PROCEDURE — 83036 HEMOGLOBIN GLYCOSYLATED A1C: CPT

## 2020-11-28 PROCEDURE — 6370000000 HC RX 637 (ALT 250 FOR IP): Performed by: PHYSICIAN ASSISTANT

## 2020-11-28 PROCEDURE — 85027 COMPLETE CBC AUTOMATED: CPT

## 2020-11-28 PROCEDURE — APPSS180 APP SPLIT SHARED TIME > 60 MINUTES: Performed by: NURSE PRACTITIONER

## 2020-11-28 PROCEDURE — 2580000003 HC RX 258: Performed by: NURSE PRACTITIONER

## 2020-11-28 PROCEDURE — 2500000003 HC RX 250 WO HCPCS: Performed by: STUDENT IN AN ORGANIZED HEALTH CARE EDUCATION/TRAINING PROGRAM

## 2020-11-28 PROCEDURE — 2580000003 HC RX 258: Performed by: PHYSICIAN ASSISTANT

## 2020-11-28 PROCEDURE — 6360000002 HC RX W HCPCS: Performed by: NURSE PRACTITIONER

## 2020-11-28 PROCEDURE — 36415 COLL VENOUS BLD VENIPUNCTURE: CPT

## 2020-11-28 PROCEDURE — APPSS60 APP SPLIT SHARED TIME 46-60 MINUTES: Performed by: NURSE PRACTITIONER

## 2020-11-28 PROCEDURE — 99232 SBSQ HOSP IP/OBS MODERATE 35: CPT | Performed by: SURGERY

## 2020-11-28 PROCEDURE — 80048 BASIC METABOLIC PNL TOTAL CA: CPT

## 2020-11-28 PROCEDURE — 94010 BREATHING CAPACITY TEST: CPT

## 2020-11-28 PROCEDURE — 99233 SBSQ HOSP IP/OBS HIGH 50: CPT | Performed by: NEUROLOGICAL SURGERY

## 2020-11-28 PROCEDURE — 2580000003 HC RX 258: Performed by: STUDENT IN AN ORGANIZED HEALTH CARE EDUCATION/TRAINING PROGRAM

## 2020-11-28 PROCEDURE — 94760 N-INVAS EAR/PLS OXIMETRY 1: CPT

## 2020-11-28 PROCEDURE — APPSS30 APP SPLIT SHARED TIME 16-30 MINUTES: Performed by: PHYSICIAN ASSISTANT

## 2020-11-28 PROCEDURE — 2060000000 HC ICU INTERMEDIATE R&B

## 2020-11-28 PROCEDURE — 2500000003 HC RX 250 WO HCPCS: Performed by: PHYSICIAN ASSISTANT

## 2020-11-28 RX ORDER — METOPROLOL TARTRATE 50 MG/1
50 TABLET, FILM COATED ORAL 2 TIMES DAILY
Status: DISCONTINUED | OUTPATIENT
Start: 2020-11-28 | End: 2020-12-01 | Stop reason: HOSPADM

## 2020-11-28 RX ADMIN — NICARDIPINE HYDROCHLORIDE 6.5 MG/HR: 2.5 INJECTION, SOLUTION INTRAVENOUS at 03:59

## 2020-11-28 RX ADMIN — PRAVASTATIN SODIUM 40 MG: 40 TABLET ORAL at 07:42

## 2020-11-28 RX ADMIN — METOPROLOL TARTRATE 25 MG: 25 TABLET, FILM COATED ORAL at 07:42

## 2020-11-28 RX ADMIN — BUMETANIDE 1 MG: 1 TABLET ORAL at 07:42

## 2020-11-28 RX ADMIN — SODIUM CHLORIDE: 9 INJECTION, SOLUTION INTRAVENOUS at 03:32

## 2020-11-28 RX ADMIN — SODIUM CHLORIDE, PRESERVATIVE FREE 10 ML: 5 INJECTION INTRAVENOUS at 20:03

## 2020-11-28 RX ADMIN — METOPROLOL TARTRATE 50 MG: 50 TABLET, FILM COATED ORAL at 20:03

## 2020-11-28 RX ADMIN — NICARDIPINE HYDROCHLORIDE 4 MG/HR: 2.5 INJECTION, SOLUTION INTRAVENOUS at 08:33

## 2020-11-28 RX ADMIN — LEVETIRACETAM 1000 MG: 100 INJECTION, SOLUTION INTRAVENOUS at 01:41

## 2020-11-28 RX ADMIN — FAMOTIDINE 20 MG: 10 INJECTION INTRAVENOUS at 07:43

## 2020-11-28 RX ADMIN — LOSARTAN POTASSIUM 50 MG: 50 TABLET, FILM COATED ORAL at 07:42

## 2020-11-28 RX ADMIN — SODIUM CHLORIDE, PRESERVATIVE FREE 10 ML: 5 INJECTION INTRAVENOUS at 07:43

## 2020-11-28 RX ADMIN — TAMSULOSIN HYDROCHLORIDE 0.4 MG: 0.4 CAPSULE ORAL at 20:03

## 2020-11-28 ASSESSMENT — PAIN SCALES - GENERAL
PAINLEVEL_OUTOF10: 0

## 2020-11-28 ASSESSMENT — ENCOUNTER SYMPTOMS
VOMITING: 0
SORE THROAT: 0
TROUBLE SWALLOWING: 0
SHORTNESS OF BREATH: 0
PHOTOPHOBIA: 0
BACK PAIN: 0
CHEST TIGHTNESS: 0
NAUSEA: 0
WHEEZING: 0
COLOR CHANGE: 0

## 2020-11-28 NOTE — PROGRESS NOTES
I have independently performed an evaluation on Stone . I have reviewed the above documentation completed by the [facility]. Please see my additional contributions to the HPI, physical exam, assessment/medical decision making. Complains only of pain with inspiration. Denies any chest pain. CT head yesterday stable, repeat tomorrow plan per neurosurgery. ICU team following troponin syncopal event. Work-up per them. Plastics plans to eventually Monday. Will be n.p.o. Sunday at midnight. Electronically signed by Joshua Hendrickson MD on 11/28/2020 at 1:58 PM    Holly Babb  Daily Progress Note    Pt Name: Gladis Santana  Medical Record Number: [id number]  Date of Birth 1935   Today's Date: 11/28/2020    HD: # 1    CC: None offered. ASSESSMENT    Active Hospital Problems    Diagnosis Date Noted    Intraventricular hemorrhage ([facility] [address] [I61.5] 11/27/2020    Elevated troponin [R77.8] 11/27/2020    Fracture of left orbital floor ([address] [S02.32XA] 11/27/2020    Nasal bone fracture [S02. 2XXA] 11/27/2020    Closed fracture of six ribs of left side [S22.42XA] 11/27/2020    Closed fracture of one rib of left side [S22.32XA]        PLAN  Patient admitted under Trauma Services to ICU   - May transfer to  today    Intraventricular hemorrhage              - Neurosurgery consulted, managing non operatively    - Maintain systolic blood pressure between 100-160   - Seizure precautions              - Neuro checks   - Repeat CT head yesterday stable, repeat again tomorrow morning    Left 6th rib fracture              - Rib fracture protocol - tolerating well   - IS/C&DB   - Monitor respiratory status              - Flexeril   - Lidoderm patches              - Pain control                Displaced left orbital floor fracture with herniation of inferior rectus, fracture posterior left lamina papyracea, deformity of nasal bone suspicious for fracture              - Consults to Plastics, Ophthalmology and ENT   - Per Dr. Glendy Schmitt the patient will be added to his OR scheduled Monday   - Visual checks              - Ice PRN   - Pain control     Elevated troponin              - Intensivist managing - to hand off to Hospitalist at transfer              - Serial troponins ordered   - Remains on Cardene drip at this time    Syncope and collapse   - Echo pending   - Holter at discharge due to bradycardia   - Continue telemetry monitor   - Further work up per hospitalist    HTN   - Remains on Cardene gtt   - Wean as able   - Home meds resumed   - Case discussed with Hospitalist    Consults: Neurosurgery, Deette Dynes, Ophthalmology, ENT, Plastics     Pain Management              - Morphine, Norco     Prophylaxis: SCDs, Incentive Spirometry, GlycoLax, Pepcid, Zofran    General diet   IVF Management  Regular Neurovascular Checks  Repeat Labs Tomorrow AM  PT/OT/SLP Eval and Treat  Bedrest pending NS evaluation     Planned Discharge discharge pending clinical course    SUBJECTIVE  Patient seen in the ICU this morning. He denies any headache. States that he feels pretty good, just has some pain in the left side with deep inspiration. Tolerating a general diet. Has not been out of bed yet. Will be transferred to  when bed is available.       Wt Readings from Last 3 Encounters:   11/28/20 229 lb 8 oz (104.1 kg)   10/22/20 225 lb (102.1 kg)   10/15/20 225 lb (102.1 kg)     Temp Readings from Last 3 Encounters:   11/28/20 97.9 °F (36.6 °C) (Oral)   10/23/20 98.5 °F (36.9 °C) (Oral)   07/28/20 98 °F (36.7 °C) (Temporal)     BP Readings from Last 3 Encounters:   11/28/20 134/64   10/23/20 135/86   07/28/20 138/68     Pulse Readings from Last 3 Encounters:   11/28/20 80   10/23/20 75   07/28/20 70       24 HR INTAKE/OUTPUT :     Intake/Output Summary (Last 24 hours) at 11/28/2020 0906  Last data filed at 11/28/2020 0330  Gross per 24 hour   Intake 3529 ml   Output 2220 ml   Net 1309 ml     DIET GENERAL; No Added Salt (3-4 GM); Cardiac    OBJECTIVE  CURRENT VITALS /64   Pulse 80   Temp 97.9 °F (36.6 °C) (Oral)   Resp 16   Ht 5' 9\" (1.753 m)   Wt 229 lb 8 oz (104.1 kg)   SpO2 94%   BMI 33.89 kg/m²     GENERAL: Awake, alert NAD. Left periorbital swelling and ecchymosis. NEURO: PERRL. CN 2-12 grossly intact. Moves all extremities spontaneously. LUNGS: Clear to auscultation bilaterally- no wheezes, rales or rhonchi, normal air movement, no respiratory distress. HEART: Normal rate, normal S1 and S2, no gallops and intact distal pulses. ABDOMEN: Abdomen is softly distended with normoactive bowel sounds. WOUNDS: Abrasion to lip without any bleeding or drainage. EXTREMITY: No cyanosis and no clubbing. LABS  CBC :   Recent Labs     11/26/20 2115 11/27/20 0328 11/28/20  0402   WBC 4.8 7.4 6.0   HGB 15.4 14.7 14.6   HCT 49.4 47.0 47.4   .2* 98.7* 101.1*    144 134     BMP:   Recent Labs     11/26/20 2115 11/27/20 0328 11/28/20  0402    140 138   K 5.4* 4.1 4.0    107 107   CO2 24 22* 21*   BUN 26* 23* 17   CREATININE 1.4* 1.3* 1.2     COAGS:   Recent Labs     11/26/20 2115   APTT 26.2   PROT 6.5   INR 1.03     Pancreas/HFP:  No results for input(s): LIPASE, AMYLASE in the last 72 hours. Recent Labs     11/26/20 2115   AST 21   ALT 18   BILITOT 0.5   ALKPHOS 63     RADIOLOGY:  Narrative    CT head without contrast         Comparison:  CT,SR  - CT HEAD WO CONTRAST  - 11/26/2020 10:44 PM EST         Findings:    No intracranial mass, midline shift or hydrocephalus. Involutional change of brain parenchyma, compatible with advanced age. Severe white matter disease.         Hematoma within the left maxillary sinus. Left orbital floor fracture without change.              Impression    1. Small focus of intraventricular hemorrhage associated with the    midportion of the septum pellucidum, without significant change.     2. Left orbital floor fracture with herniation of periorbital fat and the    inferior rectus muscle into the maxillary sinus.         This document has been electronically signed by: Katie Handy MD on    11/28/2020 12:27 AM         All CT scans at this facility use dose modulation, iterative    reconstruction, and/or weight-based    dosing when appropriate to reduce radiation dose to as low as reasonably    achievable. Narrative    PROCEDURE: CT HEAD WO CONTRAST         CLINICAL INFORMATION: follow-up intraventricular hemorrhage. Headache.         COMPARISON: Head CT 11/26/2020.         TECHNIQUE: Noncontrast 5 mm axial images were obtained through the brain. Sagittal and coronal reconstructions were obtained.         All CT scans at this facility use dose modulation, iterative reconstruction, and/or weight-based dosing when appropriate to reduce radiation dose to as low as reasonably achievable.         FINDINGS:                   There are blood products layering in the occipital horns of the lateral ventricles bilaterally consistent with intraventricular hemorrhage. There is a stable area of hemorrhage within the septum pellucidum. No parenchymal hemorrhage is noted. There is a    moderate-severe amount of abnormal low attenuation in the white matter the brain suggesting chronic small vessel ischemic changes. This is stable.  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         There is no midline shift. There is no hydrocephalus.         The paranasal sinuses are normally aerated.  There is some fluid attenuation in the inferior mastoid air cells on the right. There is no suspicious calvarial abnormality.                               Impression         1. Small amount of intraventricular hemorrhage layering in the occipital horns of lateral ventricle. There is also small amount of hemorrhage within the septum pellucidum.     2. Moderate-severe chronic small vessel ischemic changes.                        **This report has been created using voice recognition software. It may contain minor errors which are inherent in voice recognition technology. **         Final report electronically signed by Dr. Gypsy Miller on 11/27/2020 1:18 PM          Electronically signed by ALESSANDRA Lyle CNP on 11/28/2020 at 9:06 AM

## 2020-11-28 NOTE — PROGRESS NOTES
[address] [address] THERAPY  [facility] ICU 4D  EVALUATION    Time:   Time In:   Time Out:   Timed Code Treatment Minutes: 23 Minutes  Minutes: 38          Date: 2020  Patient Name: Bala Duque,   Gender: male      MRN: [id number]  : 1935  (81 y.o.)  Referring Practitioner: ASMITA Thomas  Diagnosis: Intraventricular Hemorrhage  Additional Pertinent Hx: Pt presenting at [facility] by activation of level 2 trauma, brought by EMS following a unwitnessed fall with unknown LOC; past medical history includes recent cardiac sten placement x2, hypertension, chronic kidney disease, CAD. Per wife report, she was standing in the bathroom when she heard a sound in the kitchen she open the bathroom door to see her  laying face down on the ground. Patient states he normally ambulates with walker, did have a walker at the time of fall, unclear if fall was precipitated by syncopal episode. Wife states that  does appear to be confused post fall, has been repetitive in speech since incident. Patient reports some mild discomfort over left face otherwise has no complaints on exam. Patient  had no shortness of breath, numbness/paresthesias, nausea, vomiting, or weakness upon admission. Restrictions/Precautions:  Restrictions/Precautions: Fall Risk  Position Activity Restriction  Other position/activity restrictions: Keep systolic blood pressure between 100-160 while moving. Subjective  Chart Reviewed: Yes, Orders, History and Physical    Subjective: Pleasant and cooperative  Comments: RN approved session. Pt agreed to try sitting up in the chair to eat supper. He reported pain in his L rib especially while coughing and also in his knees when standing and walking. Pt did not request any medication before eating his supper. His nurse was notified of his pain complaint.     Pain:  Pain Assessment  Patient Currently in Pain: Yes  Pain Assessment: 0-10  Pain Level: Report: Diplopia, Unable to keep objects in focus  Vision Comments: Pt did suffer L orbital fx during his fall. Functional Mobility:  Bed mobility  Rolling to Left: Stand by assistance(using the bedrail)  Supine to Sit: Minimal assistance  Scooting: Contact guard assistance    Functional Mobility  Functional - Mobility Device: Standard Walker  Activity: Other(2 ft forward,turning and 2 ft backwards as he went from the bed to the chair)  Assist Level: Minimal assistance  Functional Mobility Comments: Pt had very short shuffling steps and kept the walker close at all times. Unsteadiness noted. Balance:  Balance  Sitting Balance: Supervision  Standing Balance: Contact guard assistance(preparing to walk)  Standing Balance  Time: 25 seconds  Activity: preparing to walk    Transfers:  Sit to stand: Minimal assistance(from the edge of bed with cues to push off of the matress)  Stand to sit: Contact guard assistance(to the chair with armrests with tactile cues to reach back)       Upper Extremity Assessment:Hand Dominance: Right  LUE AROM : WFL  Left Hand AROM: WFL  RUE AROM : WFL  Right Hand AROM: WFL    LUE Strength  L Hand General: 4/5  LUE Strength Comment: NT secondary to pain in his ribs on this side. RUE Strength  R Hand General: 4/5  RUE Strength Comment: 3+/5 deltoid; 3+/5 pectoral; 4-/5 biceps/triceps      Sensation  Overall Sensation Status: WFL  Fine Motor Skills  Fine Motor Comment: Pt could use B hands for functional tasks. Pt showed that he struggled with using his fork to cut the meat on the plate. Activity Tolerance: Patient limited by fatigue, Patient limited by pain  Pt was SOB while geting to the chair. His O2 saturation remained at 94% or higher throughout. Pt was using room air. Pt's pain level was elevated after getting up to the chair. He agreed to remain in the chair at least until he finished eating. His nurse was notified of his pain complaint.     Assessment:  Assessment: Patient would benefit from continued skilled OT services to address above deficits. He presents with intraventricular hemorrhage. He had fallen at home and sustained a rib fx and L orbital fx. Pt has a history of chronic pain in B knees. He walked with standard walker short distances prior to admission. He did his own self care. He had help with transportation. Pt demonstrated transfer from the bed with MIN A and taking a few steps with a standard walker with MIN A. He had increased SOB and knee pain with standing/walking. Pt tolerated standing for a very short period. Pt used the urinal with Supervision. Pt had reported double vision since his fall. Performance deficits / Impairments: Decreased functional mobility , Decreased ADL status, Decreased ROM, Decreased endurance, Decreased vision/visual deficit, Decreased safe awareness  Prognosis: Good  REQUIRES OT FOLLOW UP: Yes  Decision Making: Medium Complexity    Treatment Initiated: Treatment and education initiated within context of evaluation. Evaluation time included review of current medical information, gathering information related to past medical, social and functional history, completion of standardized testing, formal and informal observation of tasks, assessment of data and development of plan of care and goals. Treatment time included skilled education and facilitation of tasks to increase safety and independence with ADL's for improved functional independence and quality of life. Discussed with pt his goal.  Pt agreed to work with therapy. He indicates that he has not been able to ambulate long distances secondary to chronic knee pain. The possibility of his new onset of double vision being related to the injury to his L orbit was discussed and that the surgeon was considering to doing surgery in the next several days to address the fx.     Discharge Recommendations:  Patient would benefit from continued therapy after discharge, Continue to assess pending progress, IP Rehab    Patient Education:  OT Education: OT Role, Plan of Care, Low Vision Education    Equipment Recommendations:  Equipment Needed: Yes  Other: May princess from [facility] and an elevated toilet seat with armrests or commode. Plan:  Times per week: 6x  Current Treatment Recommendations: Functional Mobility Training, Endurance Training, Self-Care / ADL, Safety Education & Training  Plan Comment: Pt would benefit from continued skilled OT services when medically stable and discharged from Acute. IP Rehab would benefit. Specific instructions for Next Treatment: Functional mobility; ADLs and adaptations; upper body exercises and steady breathing    Goals:  Patient goals : \"To get back to doing my home routine. \" pt states. Short term goals  Time Frame for Short term goals: By discharge  Short term goal 1: Pt will demonstrate functional mobility walking to/from the bathroom or bedside chair with SBA while using his rolling walker with min cues for aligning himself as neeed to prepare for doing self care. Short term goal 2: Pt will complete lower body ADLs while using any AE needed with CGA to increase his independence with self care. Short term goal 3: Pt will complete transfers to/from various surfaces including a bedside commode with armrests with SBA and min cues for alignment prior to sitting to increase his independence with toileting routine. Short term goal 4: Pt will complete BUE ROM and light resistance exercises while following verbal cues to increase his pain free movement for ease of doing self care. See long-term goal time frame for expected duration of plan of care. If no long-term goals established, a short length of stay is anticipated. Following session, patient left in safe position with all fall risk precautions in place.

## 2020-11-28 NOTE — PROGRESS NOTES
CRITICAL CARE PROGRESS NOTE      Patient:  Myrna Enamorado    Unit/Bed:4D-13/013-A  YOB: 1935  MRN: [id number]   PCP: Man Sanchez MD  Date of Admission: 11/26/2020  Chief Complaint:-LOC    Assessment and Plan:      1. Intraventricular hemorrhage: Neurosurgery following. Stable. Plans for repeat head CT tomorrow. Maintain blood pressure 100-160.  2. Syncope and collapse: Echo cardiogram pending, patient does have bradycardia and may need to be considered for Holter monitor on discharge. Known CAD and follows with Dr. Ebony Do. 3. Left orbital fracture: Ophthalmology consulted, plastics consulted. Plans for OR Monday  4. Elevated troponin: Mildly elevated troponin, she has known CAD and does follow with Dr. Ebony Do outpatient. Recent PCI noted. 5. CAD: ASA held secondary to #1. Recent PCI, patient does state he is noncompliant with his DAPT. 6. Hypertension: Cozaar, Lopressor. Wean off Cardene drip  7. Left 6 rib fracture: Known, followed with trauma services, IS. Pain control  8. CKD stage III: At baseline  9. Hyperlipidemia: Pravastatin      INITIAL H AND P AND ICU COURSE:  Nia Long is an 81-year-old black male who presented to [facility] on [facility]/2020 to be evaluated after suffering a fall with head trauma. He has a past medical history of reformed smoker, CAD, CKD stage III, hypertension, hyperlipidemia, DVT, prostate cancer, back surgery, osteoarthritis. Per report patient is on chronic aspirin and was prescribed Brilinta but was not taking it. Per report wife states that she was in the restroom when she returned she found the patient laying on the kitchen floor. Patient normally at baseline ambulates with a walker. He did have a noted abrasion to his left forehead and a lower lip laceration. Patient does not remember the event. He was unable to provide any information on symptoms prior to the event.   In the emergency department he denied any shortness of breath, chest pain. The wife did state that post fall he was acting different and asking questions repetitively. CT head did reveal a small intraventricular hemorrhage and a left anterior sixth rib fracture. He also had noted large displaced left orbital floor fracture with herniation of the anterior rectus. There was no cervical spine fracture. He was admitted to ICU via trauma services with a consult to the ICU team.    Past Medical History: Per HPI  Family History: Mother: Heart disease, hypertension  Social History: Reformed smoker, denies alcohol use, denies drug use. Review of Systems   Constitutional: Negative for fatigue and fever. HENT: Negative for sore throat and trouble swallowing. Eyes: Negative for photophobia and visual disturbance. Respiratory: Negative for chest tightness, shortness of breath and wheezing. Cardiovascular: Negative for chest pain and leg swelling. Gastrointestinal: Negative for nausea and vomiting. Endocrine: Negative for polydipsia and polyphagia. Genitourinary: Negative for decreased urine volume and flank pain. Musculoskeletal: Negative for back pain and neck pain. Skin: Positive for wound. Negative for color change, pallor and rash. Allergic/Immunologic: Negative for food allergies and immunocompromised state. Neurological: Positive for weakness. Negative for dizziness and numbness. Hematological: Negative for adenopathy. Psychiatric/Behavioral: Negative for agitation and confusion. The patient is not nervous/anxious.            Scheduled Meds:   levetiracetam  500 mg Intravenous Q12H    sodium chloride flush  10 mL Intravenous 2 times per day    famotidine (PEPCID) injection  20 mg Intravenous Daily    lidocaine  1 patch Transdermal Daily    bumetanide  1 mg Oral Daily    losartan  50 mg Oral Daily    metoprolol tartrate  25 mg Oral BID    pravastatin  40 mg Oral Daily    tamsulosin  0.4 mg Oral Nightly     Continuous Infusions:   sodium chloride 75 mL/hr at 11/28/20 0332    niCARdipine 2 mg/hr (11/28/20 1030)       PHYSICAL EXAMINATION:  T:  97.9.  P:  80. RR:  16. B/P:  134/  FiO2:  0. O2 Sat:  94.  I/O:  3529/2200  Body mass index is 33.89 kg/m². GCS: 15  General:   Acute on chronically ill-appearing black male  HEENT:  normocephalic and + traumatic. No scleral icterus. PERR. Left forehead abrasion  Neck: supple. No Thyromegaly. Lungs: clear to auscultation. No retractions  Cardiac: RRR. No JVD. Abdomen: soft. Nontender. Extremities:  No clubbing, cyanosis, or edema x 4. Vasculature: capillary refill < 3 seconds. Palpable dorsalis pedis pulses. Skin:  warm and dry. Psych:  Alert and oriented x3. Affect appropriate  Lymph:  No supraclavicular adenopathy. Neurologic:  No focal deficit. No seizures. Data: (All radiographs, tracings, PFTs, and imaging are personally viewed and interpreted unless otherwise noted).  Sodium 138, potassium 4.0, chloride 107, CO2 21, BUN 17, creatinine 1.2, anion gap 10.0, glucose 118.  WBC 6.0, hemoglobin 14.6, hematocrit 47.4, platelet count 828   Telemetry shows normal sinus rhythm   CT head 11/27/2020 reports small focus of intraventricular hemorrhage. Left orbital floor fracture.  CT facial bones 11/26/2020 reports large displaced left orbital floor fracture, herniation of the inferior rectus muscle through and resulting in orbital floor defect.  CTA chest 11/26/2020 reports nondisplaced left anterior rib fracture, no pneumothorax, no PE   CT cervical spine 11/26/2020 no evidence of  cervical spine fracture.      Chest x-ray 11/26/2020 reports no acute findings   EKG 11/26/2020 shows sinus bradycardia with PVCs   Echocardiogram 6/24/2015 reports ejection fraction 65%      Meets Continued ICU Level Care Criteria:    [] Yes   [x] No - Transfer Planned to listed location: 4A  [x] HOSPITALIST ASMITA Ramey    Case and plan discussed with Dr. Arsenio Hussein, CNP trauma patient and Dr. Janet Francisco         Electronically signed by Criss Yanez.  ALESSANDRA Flores - CNP  CRITICAL CARE SPECIALIST

## 2020-11-28 NOTE — PLAN OF CARE
Hospitalist Progress Note      Patient:  Jarad Montejo    Unit/Bed:4A-07/007-A  YOB: 1935  MRN: [id number]   Acct: [de-identified]     PCP: Sam Aguayo MD  Date of Admission: 11/26/2020      Received sign out from Heladio Martinez CNP. Patient on Cardene drip with ICH to maintain SBP <160. Home medications were resumed, but patient's BP still mildly elevated. Uptitrate BB to 50mg BID and monitor response, goal wean Cardene off. Hospitalist to follow.      Meagan Simmons PA-C

## 2020-11-28 NOTE — PROGRESS NOTES
Respiratory Care is following the rib fracture order set. Patient's position when testing was done was semi-fowlers. A Negative Inspiratory Force (NIF) was performed with patient achieving a NIF of >-40 cm H2O. The NIF was greater than 25 cm H2O. A Forced Vital Capacity (FVC) was obtained with patient achieving an FVC of 1.85 liters. The patient's calculated ideal body weight, (IBW) is  71 kg. 0.020 liters/kg of the patient's IBW is 1.42 liters. The patient's FVC was greater than 0.020 liters/kg of IBW. Based on the spirometry measurement alone, patient does not meet ICU admission criteria. Previous FVC was 2.09 liters and previous NIF was >-40 cm H2O. Patient's NIF show no change and the FVC is lower than  from previous screening(s). Last pain medication was given on  20 @ 0742. Physician was not called regarding spirometry measurement. [facility] RESPIRATORY ASSESSMENT PROGRAM (RAP)  30 kg and over      Patient Name: Myrtle Acosta Room#: 4D-13/013-A : 1935     Admitting diagnosis:      ASSESSMENT     Vitals  Pulse: 80   Resp: 16  BP: 134/64  SpO2: 94 %  Temp: 97.9 °F (36.6 °C)  Breath Sounds:clear and diminished  Comment: strong npc    PEF   Predicted:  Personal Best:      Inspiratory Capacity:   Preoperative/predictive value: 2700 ml   33% of value: 890 ml      75% of value: 2025 ml   10 ml/kg of IBW: 710 ml   Patient's Actual Inspiratory Capacity: 7293-9239 ml    CARE PLAN  All Care Plan selections must be based on the approved algorithms located on line in the Policy and Procedures under Respiratory Assessment Adult Protocol Handbook    INDICATIONS FOR THERAPY BASED ON HISTORY AND ASSESSMENT    Bronchial Hygiene Goal: Improvement in sputum mobilization in patients with ineffective airway clearance. Reverse the atelectasis.    No indications  Volume Expansion Goal: Prevent atelectasis, Absence or improvement in signs of atelectasis, improve respiratory muscle performance   Presence of pulmonary atelectasis or conditions predisposing to the development of pulmonary atelectasis. Example: Upper/lower abdominal surgery, thoracic surgery, surgery in COPD patient, prolonged bed rest, lack of pain control, presence of thoracic or abdominal binders. Inhaled Medications Goal: Improve respiratory functions in patients with airway disease and decrease WOB  Albuterol Indications   No indications. Ipratropium Bromide Indications   No indications  Oxygenation Goal: Reverse hypoxemia and improve tissue oxygenation. (See oxygen protocol order)   No indications    THERAPIES SELECTED BASED ON ALGORITHMS    Bronchial Hygiene   No therapy recommended  Volume expansion   Incentive spirometry - rib fracture pt  Inhaled Medication   No therapy recommended   Oxygenation   No therapy recommended    ASSESSMENT OUTCOMES     Bronchial Hygiene    Not assessed. Volume Expansion   Return of IC to 75% of preop/predicted goal or an increase to 15 ml/kg of ideal body weight      Inhaled Medication   Not assessed. Oxygenation   Not assessed. Action Plan Based on Assessment:    Continue plan as ordered. Comments: RCP encourage pt to keep working on his IS Q 1-2 hrs w/a.

## 2020-11-28 NOTE — PROGRESS NOTES
Addendum by Dr. Maryam Alejandro MD:  I have seen and examined the patient independently. Face to face evaluation and examination was performed. The below evaluation and note have been reviewed. Labs and radiographs were reviewed. I Have discussed with Neurosurgery PA about this patient in detail. The above assessment and plan have been reviewed. Please see my modifications mentioned below. My additional comments and modifications:    -No acute event over the night.  -There is no significant change in patient surgical exam comparing with yesterday. Patient is awake and alert but confused, follows command by 4 good strength throughout.  -Continue the current medical management per ICU team.  -Follow-up the neurology recommendation.  -I discussed the case with Dr. Amy Red and we in agreement to request for the patient brain MRI with and without contrast to rule out any potential tumor or stroke tomorrow.  -Seizures precaution. -PT and OT.  -Up to the chair.  -Neurosurgery will follow. Rk Bhakta MD    * I spend a total of 35 minutes with greater than 60% of time spent face to face, counseling, coordinating care, examining patient, reviewing images and labs personally, and speaking with team.      Neurosurgery Progress Note    Patient:  Zoë Anderson      Unit/Bed:4D-13/013-A    YOB: 1935    MRN: [id number]     Acct: [de-identified]     Admit date: 11/26/2020    Chief Complaint   Patient presents with    Loss of Consciousness       Patient Seen, Chart, Physician notes, Labs, Radiology studies reviewed. Subjective: Patient is seen and evaluated in the ICU setting with evaluation and exam findings discussed with Dr. Jessica Roth. Patient is comfortable today and without significant complaints with pain well-controlled. Denied any headache or visual disturbances on exam this morning. Past, Family, Social History unchanged from admission.     Diet:  DIET GENERAL; No Added Salt (3-4 GM); Cardiac    Medications:  Scheduled Meds:   levetiracetam  500 mg Intravenous Q12H    sodium chloride flush  10 mL Intravenous 2 times per day    famotidine (PEPCID) injection  20 mg Intravenous Daily    lidocaine  1 patch Transdermal Daily    bumetanide  1 mg Oral Daily    losartan  50 mg Oral Daily    metoprolol tartrate  25 mg Oral BID    pravastatin  40 mg Oral Daily    tamsulosin  0.4 mg Oral Nightly     Continuous Infusions:   sodium chloride 75 mL/hr at 11/28/20 0332    niCARdipine 4 mg/hr (11/28/20 0833)     PRN Meds:sodium chloride flush, acetaminophen **OR** acetaminophen, polyethylene glycol, promethazine **OR** ondansetron, morphine **OR** morphine, HYDROcodone 5 mg - acetaminophen **OR** HYDROcodone 5 mg - acetaminophen    Objective: Is sitting up in bed with a head of the bed elevated greater than 30 degrees enjoying breakfast this morning was without significant complaints on exam.  He remained stable and grossly intact to his baseline on exam with no changes noted to the patient chart overnight. GCS remains 15 out of 15 and he is following commands with excellent strength for upper and lower extremities bilaterally and symmetrically and was intact to pinprick sensation. Vitals: /64   Pulse 80   Temp 97.9 °F (36.6 °C) (Oral)   Resp 16   Ht 5' 9\" (1.753 m)   Wt 229 lb 8 oz (104.1 kg)   SpO2 94%   BMI 33.89 kg/m²   Physical Exam:  Alert and attentive. Language appropriate, with no aphasia. Pupils equal.  Facial strength symmetric. Physical Exam  Patient remained stable and grossly intact on exam this morning with no significant complaints and no changes noted to the patient chart overnight. And is tolerating PT and OT.    ROS:  Review of Systems  HENT: Negative for tinnitus. Eyes: Positive for visual disturbance. Respiratory: Negative for chest tightness. Cardiovascular: Negative for chest pain. Gastrointestinal: Negative for abdominal pain.    Genitourinary: has been electronically signed by: Laurie Shah MD on 11/28/2020 12:27 AM All CT scans at this facility use dose modulation, iterative reconstruction, and/or weight-based dosing when appropriate to reduce radiation dose to as low as reasonably achievable. Ct Head Wo Contrast    Result Date: 11/27/2020  PROCEDURE: CT HEAD WO CONTRAST CLINICAL INFORMATION: follow-up intraventricular hemorrhage. Headache. COMPARISON: Head CT 11/26/2020. TECHNIQUE: Noncontrast 5 mm axial images were obtained through the brain. Sagittal and coronal reconstructions were obtained. All CT scans at this facility use dose modulation, iterative reconstruction, and/or weight-based dosing when appropriate to reduce radiation dose to as low as reasonably achievable. FINDINGS: There are blood products layering in the occipital horns of the lateral ventricles bilaterally consistent with intraventricular hemorrhage. There is a stable area of hemorrhage within the septum pellucidum. No parenchymal hemorrhage is noted. There is a moderate-severe amount of abnormal low attenuation in the white matter the brain suggesting chronic small vessel ischemic changes. This is stable. 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 There is no midline shift. There is no hydrocephalus. The paranasal sinuses are normally aerated. There is some fluid attenuation in the inferior mastoid air cells on the right. There is no suspicious calvarial abnormality. 1. Small amount of intraventricular hemorrhage layering in the occipital horns of lateral ventricle. There is also small amount of hemorrhage within the septum pellucidum. 2. Moderate-severe chronic small vessel ischemic changes. **This report has been created using voice recognition software. It may contain minor errors which are inherent in voice recognition technology. ** Final report electronically signed by Dr. London Gauthier on 11/27/2020 1:18 PM    Ct Head Wo Contrast    Result as reasonably achievable. Cta Chest W Wo Contrast    Result Date: 11/26/2020  CT angiography chest with contrast. 3D Postprocessing. Comparison:  CR,SR  - XR CHEST PORTABLE  - 11/26/2020 10:02 PM EST Findings: The pulmonary arteries are well opacified. Scattered plaque is seen in the thoracic aorta without aneurysm or dissection. The heart is not enlarged. There is no pericardial effusion. Coronary artery calcifications are seen. There are no enlarged mediastinal or hilar lymph nodes. The thyroid is unremarkable. No focal airspace consolidation, pleural effusion, or pneumothorax is seen. There are no worrisome pulmonary masses or nodules. Hypodense cysts in the liver. Small hiatal hernia. Nondisplaced left anterior 6th rib fracture. Degenerative changes in the spine with intact anterior cervical fusion. 1.  Nondisplaced left anterior 6th rib fracture. No pneumothorax or other acute visceral or vascular injury. 2.  Small hiatal hernia. This document has been electronically signed by: Destinee Penn MD on 11/26/2020 11:34 PM All CT scans at this facility use dose modulation, iterative reconstruction, and/or weight-based dosing when appropriate to reduce radiation dose to as low as reasonably achievable. Ct Cervical Spine Wo Contrast    Result Date: 11/26/2020  CT cervical spine without contrast Comparison: None Findings: Visualized intracranial contents are unremarkable. No cervical fluid collections or masses. Lung apices are clear. Normal vertebral body alignment. No acute fractures or dislocations. 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 1. No evidence of cervical spine fracture. 2. Prior anterior metallic and interbody fusion extending from C4-C6. Appropriate alignment. No evidence of hardware failure.  This document has been electronically signed by: Jodi Jones MD on 11/26/2020 11:37 PM All CT scans at this facility use dose modulation, iterative reconstruction, and/or weight-based dosing when appropriate to reduce radiation dose to as low as reasonably achievable. Xr Chest Portable    Result Date: 11/26/2020  1 view chest x-ray Comparison:  CR,SR  - XR CHEST STANDARD (2 VW)  - 04/29/2019 09:36 AM EDT Findings: The lungs are clear. Heart size is normal. No pulmonary vascular congestion. Stable cervical fusion hardware. Stable degenerative changes in the right shoulder. No acute findings. This document has been electronically signed by: Lorenza Viramontes MD on 11/26/2020 10:57 PM     Vl Dup Lower Extremity Venous Bilateral    Result Date: 11/19/2020  PROCEDURE: VL DUP LOWER EXTREMITY VENOUS BILATERAL CLINICAL INFORMATION: 21-year-old male with bilateral leg pain. COMPARISON: Ultrasound dated 3/30/2016. TECHNIQUE: Venous doppler ultrasound was performed of the bilateral lower extremities using gray scale, color flow and spectral doppler imaging. FINDINGS: There is normal color flow, spectral analysis and compressibility of the common femoral vein, superficial femoral vein and popliteal vein bilaterally . There is normal color flow and compressibility in the posterior tibial veins, anterior tibial veins and peroneal veins. No evidence of a DVT. **This report has been created using voice recognition software. It may contain minor errors which are inherent in voice recognition technology. ** Final report electronically signed by Dr Jessica Silverman on 11/19/2020 8:16 AM    A/P: S/P: Patient is seen and evaluated in the ICU setting and again this morning with evaluation and exam findings discussed and reviewed with Dr. Afua Graham. Patient remained stable and grossly neurologically intact on exam with no significant changes noted the patient chart overnight. He is awake alert and oriented and denied headache on exam this morning was enjoying breakfast at the time of evaluation.   CT scan of the head imaged without contrast today reveals a small focus of intraventricular hemorrhage associated with the midportion of the septum pellucidum without any significant changes as compared to prior imaging. A repeat of the CT scan is recommended by neurosurgery for comparison and review in the morning with a transition from ICU to the floor as space is available.   Neurosurgery to follow    Electronically signed by Jarek Reaves PA-C on 11/28/2020 at 10:11 AM

## 2020-11-28 NOTE — FLOWSHEET NOTE
11/27/20 2307   Provider Notification   Reason for Communication Evaluate   Provider Name Shira Henao   Provider Notification Physician   Method of Communication Secure Message   Response See orders   Notification Time 2307     2307- RN notified Dr. Shira Henao patient's NIH was 2, when it has been a 0 previously. Patient have difficulty identifying objects and has left arm drift. 200- Dr. Shira Henao ordered CT head and requested for stroke/neurology team to be notified.

## 2020-11-28 NOTE — SIGNIFICANT EVENT
Change in neuro assessment was noted by nursing. NIH equals 2 based on left upper extremity arm drift and difficulty in identifying objects. Dr. Jason Braga was notified of such and requested evaluation by neurology via stroke bot. 11/27/2020 2346 a STAT CT of Head was completed-small focus of intraventricular hemorrhage associated with the mid portion of the septum pellucidum without significant change. Dr. Ronald Trujillo reviewed above assessment with request to start the patient on Keppra. Neurology consult has been placed.

## 2020-11-28 NOTE — FLOWSHEET NOTE
11/28/20 1201   NIH/MNHISS Stroke Scale   Interval Reassessment   Level of Consciousness (1a. ) 0   LOC Questions (1b. ) 0   LOC Commands (1c. ) 0   Best Gaze (2. ) 0   Visual (3. ) 0   Facial Palsy (4. ) (!) 1   Motor Arm, Left (5a. ) 1   Motor Arm, Right (5b. ) 0   Motor Leg, Left (6a. ) 1   Motor Leg, Right (6b. ) 0   Limb Ataxia (7. ) (!) 1   Sensory (8. ) 0   Best Language (9. ) 1   Dysarthria (10. ) 1   Extinction and Inattention (11) 0   Modified Total 6   Total 6     Dr. Olegario Cardona notified of NIH score. No orders at this time.

## 2020-11-28 NOTE — CONSULTS
NEUROLOGY CONSULT NOTE      Requesting Physician: Dia Kirkland MD    Reason for Consult:  Evaluate for sep    History of Present Illness:  Vadim Smith is a 81 y.o. male admitted to [address] [place] [address] on 11/26/2020.        81year old man with CAD on aspirin and brillinta, prostate cancer, HTN, patient came to the ED yesterday because he fell and loss conscious, and had abrasion to the fore headache. Patient denies any symptoms prior to that and can't remember anything during the fall. In the ED, he was found to have a small septum pellucidum ICH. Overnight patient was reported to have a left upper arm drift. Reports no chest pain. No shortness of breath with exertion. Reports no neck pain. No vision changes. No dysphagia. No fever. No rash. No weight loss.     Past Medical History:        Diagnosis Date    ALDA (acute kidney injury) ([facility] [address] 7/17/2015    Blood circulation, collateral     CAD (coronary artery disease)     Cancer (HCC)     prostate; seed implants    Chronic kidney disease, stage III (moderate)     Hypercholesteremia     Hypertension     Osteoarthritis            Procedure Laterality Date    BACK SURGERY  2006    fusion    CARDIAC SURGERY      2 stents    COSMETIC SURGERY      ENDOSCOPY, COLON, DIAGNOSTIC      JOINT REPLACEMENT      Bilateral hips    KNEE SURGERY      NECK SURGERY      OTHER SURGICAL HISTORY  5/29/2015    DECOMPRESSIVE LUMBAR LAMINECTOMY L2-3    ROTATOR CUFF REPAIR      TOTAL HIP ARTHROPLASTY Bilateral        Allergies:    No Known Allergies     Current Medications:   levETIRAcetam (KEPPRA) 500 mg in sodium chloride 0.9 % 100 mL IVPB, Q12H  sodium chloride flush 0.9 % injection 10 mL, 2 times per day  sodium chloride flush 0.9 % injection 10 mL, PRN  acetaminophen (TYLENOL) tablet 650 mg, Q6H PRN    Or  acetaminophen (TYLENOL) suppository 650 mg, Q6H PRN  polyethylene glycol (GLYCOLAX) packet 17 g, Daily PRN  promethazine (PHENERGAN) tablet 12.5 mg, Q6H PRN    Or  ondansetron (ZOFRAN) injection 4 mg, Q6H PRN  famotidine (PEPCID) injection 20 mg, Daily  lidocaine 4 % external patch 1 patch, Daily  morphine (PF) injection 2 mg, Q2H PRN    Or  morphine injection 4 mg, Q2H PRN  HYDROcodone-acetaminophen (NORCO) 5-325 MG per tablet 1 tablet, Q4H PRN    Or  HYDROcodone-acetaminophen (NORCO) 5-325 MG per tablet 2 tablet, Q4H PRN  bumetanide (BUMEX) tablet 1 mg, Daily  losartan (COZAAR) tablet 50 mg, Daily  metoprolol tartrate (LOPRESSOR) tablet 25 mg, BID  pravastatin (PRAVACHOL) tablet 40 mg, Daily  tamsulosin (FLOMAX) capsule 0.4 mg, Nightly  niCARdipine (CARDENE) 25 mg in dextrose 5 % 250 mL infusion, Continuous         Social History:  Social History     Tobacco Use   Smoking Status Former Smoker    Packs/day: 0.50    Years: 25.00    Pack years: 12.50    Types: Cigarettes    Last attempt to quit: 1997    Years since quittin.6   Smokeless Tobacco Never Used     Social History     Substance and Sexual Activity   Alcohol Use No    Alcohol/week: 0.0 standard drinks     Social History     Substance and Sexual Activity   Drug Use No         Family History:       Problem Relation Age of Onset    Heart Disease Mother     High Blood Pressure Mother     Prostate Cancer Neg Hx     Cancer Neg Hx     Diabetes Neg Hx     Kidney Disease Neg Hx     Stroke Neg Hx        Review of Systems:  All systems reviewed and are all negative except what is mentioned in history of present illness. I reviewed the patient PMH,medications, family history, social history. Physical Exam:  /77   Pulse 76   Temp 97.6 °F (36.4 °C) (Oral)   Resp 17   Ht 5' 9\" (1.753 m)   Wt 229 lb 8 oz (104.1 kg)   SpO2 94%   BMI 33.89 kg/m²  I Body mass index is 33.89 kg/m².  I   Wt Readings from Last 1 Encounters:   20 229 lb 8 oz (104.1 kg)           General appearance - alert, in no distress, oriented to person, place, and time and weight  Mental status -Level of Alertness: awake  Orientation: person, place, time  Memory: normal  Fund of Knowledge: normal  Attention/Concentration: normal  Language: normal. Mood is normal  Neck - supple, no neck adenopathy, carotids upstroke normal bilaterally, no carotid bruits. There is no LAP in the neck. There is no thyroid enlargement. Neurological -   Cranial Lhkwze-KU-BGR:   Cranial nerve II: Normal. There is full visual fields  Cranial nerve III: Pupils: equal, round, reactive to light   Cranial nerves III, IV, VI: Extraocular Movements: intact   Cranial nerve V: Facial sensation: intact   Cranial nerve VII:Facial strength: intact   Cranial nerve VIII: Hearing: intact   Cranial nerve IX: Palate Elevation intact bilaterally  Cranial nerve XI: Shoulder shrug intact bilaterally  Cranial nerve XII: Tongue midline   neck supple without rigidity    Motor exam is 5/5 in the upper and lower extremities with left arm mild drift. Normal muscle tone . There is no muscle atrophy. There is no muscle fasciculation   Sensory is intact   Coordination: finger to nose intact  Gait and station not tested    Abnormal movement none. Long tracts are intact    Skin - no rashes or lesions, warm and dry to touch  Superficial temporal artery pulses are normal.   Musculoskeletal: Has no hand arthritis, no limitation of ROM in any of the four extremities, . no joint tenderness, deformity or swelling. There is no leg edema. The Heart was regular in rate and rhythm. No heart murmur  Chest- Clear  Abdomen: soft, intact bowel sounds.         Labs:    CBC:   Recent Labs     11/26/20 2115 11/27/20 0328 11/28/20  0402   WBC 4.8 7.4 6.0   HGB 15.4 14.7 14.6    144 134   .2* 98.7* 101.1*   MCH 31.2 30.9 31.1   MCHC 31.2* 31.3* 30.8*     CMP:  Recent Labs     11/26/20 2115 11/27/20 0328 11/28/20  0402    140 138   K 5.4* 4.1 4.0    107 107   CO2 24 22* 21*   BUN 26* 23* 17   CREATININE 1.4* 1.3* 1.2   LABGLOM 58* 64* 70* GLUCOSE 93 130* 118*   CALCIUM 9.8 9.3 9.2     Liver:   Recent Labs     11/26/20  2115   AST 21   ALT 18   ALKPHOS 63   PROT 6.5   LABALBU 3.8   BILITOT 0.5     MRI BRAIN:  No results found for this or any previous visit. No results found for this or any previous visit. No results found for this or any previous visit. No results found for this or any previous visit. No results found for this or any previous visit. No results found for this or any previous visit. No results found for this or any previous visit. Results for orders placed during the hospital encounter of 11/26/20   CT HEAD WO CONTRAST    Narrative CT head without contrast    Comparison:  CT,SR  - CT HEAD WO CONTRAST  - 11/26/2020 10:44 PM EST    Findings:  No intracranial mass, midline shift or hydrocephalus. Involutional change of brain parenchyma, compatible with advanced age. Severe white matter disease. Hematoma within the left maxillary sinus. Left orbital floor fracture without change. Impression 1. Small focus of intraventricular hemorrhage associated with the   midportion of the septum pellucidum, without significant change. 2. Left orbital floor fracture with herniation of periorbital fat and the   inferior rectus muscle into the maxillary sinus. This document has been electronically signed by: Laurie Shah MD on   11/28/2020 12:27 AM    All CT scans at this facility use dose modulation, iterative   reconstruction, and/or weight-based  dosing when appropriate to reduce radiation dose to as low as reasonably   achievable.            We reviewed the patient records and available information in the EHR     ICH Score       Lisset Coma Score           Last Lisset Coma Scale Score: 15  13-15= 0 points   5-12= 1 point       3-4= 2 points 0   Age greater than or equal to [de-identified]          81 y.o.     Yes= 1 point       No= 0 points 1   ICH Volume greater than or equal to 30ml    Yes= 1 point        No= 0 points 0 Intraventricular Hemorrhage   Yes= 1 point      No=0 points 1   Infratentorial origin of hemorrhage    Yes= 1 point        No= 0 points 0    Total= 2     Score interpretation:    ICH Score Mortality Rate   0 0%   1 13%   2 26%   3 72%   4 94%   5 100%   6 100%             Impression:    81year old man with hx of CAD with stent on aspirin and brillinta, now fell and found to have a septum pallucidum small ICH. ICH score of 2 and NIHSS of 1    Recommendations:    - hold all antiplatelets for now  - this is a very unusal ICH location  - this is not due to hypertension  - this could be due to the combination of aspirin and brillinta  - will get MRI brain w/wo contrast to r/o a stroke or tumor  - SCD for DVT prophylaxis  - resume all home BP meds, keep SBP below 160 at all time. - reversal of antiplatelets not recommended  - pt has no seizure, this bleeding is also not in a cortical location, no keppra not needed, seizure prophylaxis not recommended per 2015 AHA/ASA ICH guideline. 1. Discussed with primary service. 2. Please call if any questions. Time spent was at least 63 min of time evaluating patient, discussion with staff,  planning for treatment and evaluation. The time was spent examining patient, reviewing the images personally, reviewing the chart, perform high complexity decision making and speaking with the nursing staff regarding recommendations.      Electronically signed by Gillian Juan MD on 11/28/2020 at 1:00 PM    Davian Álvarez MD  Attending Neurologist/Neurointensivist

## 2020-11-29 ENCOUNTER — APPOINTMENT (OUTPATIENT)
Dept: MRI IMAGING | Age: 85
DRG: 083 | End: 2020-11-29
Payer: MEDICARE

## 2020-11-29 LAB
ANION GAP SERPL CALCULATED.3IONS-SCNC: 11 MEQ/L (ref 8–16)
BUN BLDV-MCNC: 20 MG/DL (ref 7–22)
CALCIUM SERPL-MCNC: 9.3 MG/DL (ref 8.5–10.5)
CHLORIDE BLD-SCNC: 106 MEQ/L (ref 98–111)
CO2: 23 MEQ/L (ref 23–33)
CREAT SERPL-MCNC: 0.9 MG/DL (ref 0.4–1.2)
ERYTHROCYTE [DISTWIDTH] IN BLOOD BY AUTOMATED COUNT: 12.7 % (ref 11.5–14.5)
ERYTHROCYTE [DISTWIDTH] IN BLOOD BY AUTOMATED COUNT: 46.8 FL (ref 35–45)
GFR SERPL CREATININE-BSD FRML MDRD: > 90 ML/MIN/1.73M2
GLUCOSE BLD-MCNC: 106 MG/DL (ref 70–108)
HCT VFR BLD CALC: 49.8 % (ref 42–52)
HEMOGLOBIN: 15.5 GM/DL (ref 14–18)
MCH RBC QN AUTO: 31 PG (ref 26–33)
MCHC RBC AUTO-ENTMCNC: 31.1 GM/DL (ref 32.2–35.5)
MCV RBC AUTO: 99.6 FL (ref 80–94)
MRSA SCREEN: NORMAL
PLATELET # BLD: 150 THOU/MM3 (ref 130–400)
PMV BLD AUTO: 10.3 FL (ref 9.4–12.4)
POTASSIUM SERPL-SCNC: 4.2 MEQ/L (ref 3.5–5.2)
RBC # BLD: 5 MILL/MM3 (ref 4.7–6.1)
SARS-COV-2, PCR: NOT DETECTED
SODIUM BLD-SCNC: 140 MEQ/L (ref 135–145)
WBC # BLD: 6.2 THOU/MM3 (ref 4.8–10.8)

## 2020-11-29 PROCEDURE — 2580000003 HC RX 258: Performed by: PHYSICIAN ASSISTANT

## 2020-11-29 PROCEDURE — APPSS30 APP SPLIT SHARED TIME 16-30 MINUTES: Performed by: PHYSICIAN ASSISTANT

## 2020-11-29 PROCEDURE — 2060000000 HC ICU INTERMEDIATE R&B

## 2020-11-29 PROCEDURE — 99232 SBSQ HOSP IP/OBS MODERATE 35: CPT | Performed by: SURGERY

## 2020-11-29 PROCEDURE — 6370000000 HC RX 637 (ALT 250 FOR IP): Performed by: PHYSICIAN ASSISTANT

## 2020-11-29 PROCEDURE — A9579 GAD-BASE MR CONTRAST NOS,1ML: HCPCS | Performed by: PSYCHIATRY & NEUROLOGY

## 2020-11-29 PROCEDURE — 97530 THERAPEUTIC ACTIVITIES: CPT

## 2020-11-29 PROCEDURE — 97112 NEUROMUSCULAR REEDUCATION: CPT

## 2020-11-29 PROCEDURE — 6360000004 HC RX CONTRAST MEDICATION: Performed by: PSYCHIATRY & NEUROLOGY

## 2020-11-29 PROCEDURE — 99233 SBSQ HOSP IP/OBS HIGH 50: CPT | Performed by: PHYSICIAN ASSISTANT

## 2020-11-29 PROCEDURE — 70553 MRI BRAIN STEM W/O & W/DYE: CPT

## 2020-11-29 PROCEDURE — 99233 SBSQ HOSP IP/OBS HIGH 50: CPT | Performed by: PSYCHIATRY & NEUROLOGY

## 2020-11-29 PROCEDURE — 97162 PT EVAL MOD COMPLEX 30 MIN: CPT

## 2020-11-29 PROCEDURE — 97116 GAIT TRAINING THERAPY: CPT

## 2020-11-29 PROCEDURE — U0002 COVID-19 LAB TEST NON-CDC: HCPCS

## 2020-11-29 PROCEDURE — 80048 BASIC METABOLIC PNL TOTAL CA: CPT

## 2020-11-29 PROCEDURE — 36415 COLL VENOUS BLD VENIPUNCTURE: CPT

## 2020-11-29 PROCEDURE — 85027 COMPLETE CBC AUTOMATED: CPT

## 2020-11-29 PROCEDURE — 99231 SBSQ HOSP IP/OBS SF/LOW 25: CPT | Performed by: NEUROLOGICAL SURGERY

## 2020-11-29 PROCEDURE — APPSS60 APP SPLIT SHARED TIME 46-60 MINUTES: Performed by: PHYSICIAN ASSISTANT

## 2020-11-29 RX ORDER — SODIUM CHLORIDE 9 MG/ML
INJECTION, SOLUTION INTRAVENOUS CONTINUOUS
Status: DISCONTINUED | OUTPATIENT
Start: 2020-11-30 | End: 2020-11-30

## 2020-11-29 RX ORDER — LOSARTAN POTASSIUM 100 MG/1
100 TABLET ORAL DAILY
Status: DISCONTINUED | OUTPATIENT
Start: 2020-11-29 | End: 2020-12-01 | Stop reason: HOSPADM

## 2020-11-29 RX ORDER — HYDRALAZINE HYDROCHLORIDE 20 MG/ML
5 INJECTION INTRAMUSCULAR; INTRAVENOUS EVERY 6 HOURS PRN
Status: DISCONTINUED | OUTPATIENT
Start: 2020-11-29 | End: 2020-11-30

## 2020-11-29 RX ADMIN — METOPROLOL TARTRATE 50 MG: 50 TABLET, FILM COATED ORAL at 21:42

## 2020-11-29 RX ADMIN — PRAVASTATIN SODIUM 40 MG: 40 TABLET ORAL at 08:43

## 2020-11-29 RX ADMIN — TAMSULOSIN HYDROCHLORIDE 0.4 MG: 0.4 CAPSULE ORAL at 21:42

## 2020-11-29 RX ADMIN — SODIUM CHLORIDE, PRESERVATIVE FREE 10 ML: 5 INJECTION INTRAVENOUS at 09:00

## 2020-11-29 RX ADMIN — BUMETANIDE 1 MG: 1 TABLET ORAL at 08:43

## 2020-11-29 RX ADMIN — METOPROLOL TARTRATE 50 MG: 50 TABLET, FILM COATED ORAL at 08:43

## 2020-11-29 RX ADMIN — SODIUM CHLORIDE, PRESERVATIVE FREE 10 ML: 5 INJECTION INTRAVENOUS at 21:42

## 2020-11-29 RX ADMIN — GADOTERIDOL 15 ML: 279.3 INJECTION, SOLUTION INTRAVENOUS at 13:23

## 2020-11-29 RX ADMIN — SODIUM CHLORIDE: 9 INJECTION, SOLUTION INTRAVENOUS at 23:32

## 2020-11-29 RX ADMIN — LOSARTAN POTASSIUM 100 MG: 100 TABLET, FILM COATED ORAL at 08:43

## 2020-11-29 ASSESSMENT — PAIN SCALES - GENERAL
PAINLEVEL_OUTOF10: 0

## 2020-11-29 NOTE — PROGRESS NOTES
Addendum by Dr. Zahra Xavier MD:  I have seen and examined the patient independently. Face to face evaluation and examination was performed. The below evaluation and note have been reviewed. Labs and radiographs were reviewed. I Have discussed with Neurosurgery PA about this patient in detail. The below assessment and plan have been reviewed. Please see my modifications mentioned below.      My additional comments and modifications:     -No acute event over the night.  -There is no significant change in patient surgical exam comparing with yesterday. Patient is awake and alert but confused, follows command by 4 good strength throughout.  -Continue the current medical management per hospitalist. service and pt's primary.  -Follow-up the neurology recommendation.  -Follow the results of brain MRI.  -PT and OT.  -Up to the chair.  -Neurosurgery will follow.  Meme Rogers MD    Neurosurgery Progress Note    Patient:  Reji Lacy      Unit/Bed:4A-07/007-A    YOB: 1935    MRN: [id number]     Acct: [de-identified]     Admit date: 11/26/2020    Chief Complaint   Patient presents with    Loss of Consciousness       Patient Seen, Chart, Physician notes, Labs, Radiology studies reviewed. Subjective: Mr Jenniffer Jones is seen and evaluated on the floor having transition from the ICU without incident with exam findings discussed with Dr. Juan Cameron. Patient is comfortable today and without significant complaints with pain well-controlled. Denied any headache or visual disturbances on exam again this morning. Past, Family, Social History unchanged from admission. Diet:  DIET GENERAL; No Added Salt (3-4 GM);  Cardiac    Medications:  Scheduled Meds:   losartan  100 mg Oral Daily    metoprolol tartrate  50 mg Oral BID    sodium chloride flush  10 mL Intravenous 2 times per day    lidocaine  1 patch Transdermal Daily    bumetanide  1 mg Oral Daily    pravastatin  40 mg Oral Daily    tamsulosin  0.4 mg Oral Nightly     Continuous Infusions:   niCARdipine Stopped (11/28/20 1226)     PRN Meds:sodium chloride flush, acetaminophen **OR** acetaminophen, polyethylene glycol, promethazine **OR** ondansetron, morphine **OR** morphine, HYDROcodone 5 mg - acetaminophen **OR** HYDROcodone 5 mg - acetaminophen    Objective: Is sitting up in bed with a head of the bed elevated greater than 30 degrees enjoying breakfast this morning was without significant complaints on exam.  He remained stable and grossly intact to his baseline on exam with no changes noted to the patient chart overnight. GCS remains 15 out of 15 and he is following commands with excellent strength for upper and lower extremities bilaterally and symmetrically and was intact to pinprick sensation. Vitals: BP (!) 140/83   Pulse 107   Temp 98.3 °F (36.8 °C) (Oral)   Resp 17   Ht 5' 9\" (1.753 m)   Wt 224 lb 12.8 oz (102 kg)   SpO2 97%   BMI 33.20 kg/m²   Physical Exam:  Alert and attentive. Language appropriate, with no aphasia. Pupils equal.  Facial strength symmetric. Physical Exam  Patient remained stable and grossly intact on exam this morning with no significant complaints and no changes noted to the patient chart overnight. And is tolerating PT and OT.     ROS:  Review of Systems  Review of Systems  HENT: Negative for tinnitus.    Eyes: Positive for visual disturbance. Respiratory: Negative for chest tightness.    Cardiovascular: Negative for chest pain. Gastrointestinal: Negative for abdominal pain. Genitourinary: Negative for difficulty urinating. Musculoskeletal: Negative for back pain. Neurological: Negative for weakness. Psychiatric/Behavioral: Positive for confusion, improved from yesterday. . Negative for agitation    24 hour intake/output:    Intake/Output Summary (Last 24 hours) at 11/29/2020 0939  Last data filed at 11/29/2020 0325  Gross per 24 hour   Intake 120 ml   Output 1150 ml   Net -1030 ml Last 3 weights: Wt Readings from Last 3 Encounters:   11/29/20 224 lb 12.8 oz (102 kg)   10/22/20 225 lb (102.1 kg)   10/15/20 225 lb (102.1 kg)         CBC:   Recent Labs     11/27/20 0328 11/28/20 0402 11/29/20  0355   WBC 7.4 6.0 6.2   HGB 14.7 14.6 15.5    134 150     BMP:    Recent Labs     11/27/20 0328 11/28/20 0402 11/29/20  0355    138 140   K 4.1 4.0 4.2    107 106   CO2 22* 21* 23   BUN 23* 17 20   CREATININE 1.3* 1.2 0.9   GLUCOSE 130* 118* 106     Calcium:  Recent Labs     11/29/20 0355   CALCIUM 9.3     Magnesium:No results for input(s): MG in the last 72 hours. Glucose:No results for input(s): POCGLU in the last 72 hours. HgbA1C:   Recent Labs     11/28/20 0402   LABA1C 5.2     Lipids:   Recent Labs     11/28/20 0402   CHOL 166   TRIG 96   HDL 49   LDLCALC 98       Radiology reports as per the Radiologist  Radiology: Ct Head Wo Contrast    Result Date: 11/28/2020  CT head without contrast Comparison:  CT,SR  - CT HEAD WO CONTRAST  - 11/26/2020 10:44 PM EST Findings: No intracranial mass, midline shift or hydrocephalus. Involutional change of brain parenchyma, compatible with advanced age. Severe white matter disease. Hematoma within the left maxillary sinus. Left orbital floor fracture without change. 1. Small focus of intraventricular hemorrhage associated with the midportion of the septum pellucidum, without significant change. 2. Left orbital floor fracture with herniation of periorbital fat and the inferior rectus muscle into the maxillary sinus. This document has been electronically signed by: Sheila Og MD on 11/28/2020 12:27 AM All CT scans at this facility use dose modulation, iterative reconstruction, and/or weight-based dosing when appropriate to reduce radiation dose to as low as reasonably achievable. Ct Head Wo Contrast    Result Date: 11/27/2020  PROCEDURE: CT HEAD WO CONTRAST CLINICAL INFORMATION: follow-up intraventricular hemorrhage. Headache. COMPARISON: Head CT 11/26/2020. TECHNIQUE: Noncontrast 5 mm axial images were obtained through the brain. Sagittal and coronal reconstructions were obtained. All CT scans at this facility use dose modulation, iterative reconstruction, and/or weight-based dosing when appropriate to reduce radiation dose to as low as reasonably achievable. FINDINGS: There are blood products layering in the occipital horns of the lateral ventricles bilaterally consistent with intraventricular hemorrhage. There is a stable area of hemorrhage within the septum pellucidum. No parenchymal hemorrhage is noted. There is a moderate-severe amount of abnormal low attenuation in the white matter the brain suggesting chronic small vessel ischemic changes. This is stable. 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 There is no midline shift. There is no hydrocephalus. The paranasal sinuses are normally aerated. There is some fluid attenuation in the inferior mastoid air cells on the right. There is no suspicious calvarial abnormality. 1. Small amount of intraventricular hemorrhage layering in the occipital horns of lateral ventricle. There is also small amount of hemorrhage within the septum pellucidum. 2. Moderate-severe chronic small vessel ischemic changes. **This report has been created using voice recognition software. It may contain minor errors which are inherent in voice recognition technology. ** Final report electronically signed by Dr. Gypsy Miller on 11/27/2020 1:18 PM    Ct Head Wo Contrast    Result Date: 11/26/2020   CT head without contrast Comparison:  CT,SR  - CT HEAD WO CONTRAST  - 04/28/2019 02:18 PM EDT Findings: New 0.7 x 0.4 cm hyperdense area in the midportion of the septum pellucidum. No midline shift or hydrocephalus. Stable mild generalized atrophy. Stable periventricular and centrum semiovale hypoattenuation may relate to chronic small vessel ischemic changes.  Stable old lacunar infarct in the left basal ganglia. Hemorrhagic secretions in the left maxillary sinus. Partial opacification of the right mastoid air cells inferiorly. The orbits are unremarkable. No skull fracture. 1.  Small focal area of intraventricular hemorrhage in the midportion of the septum pellucidum measuring 0.7 x 0.4 cm. 2.  Hemorrhagic fluid in the left maxillary sinus. This document has been electronically signed by: Annabelle Medina MD on 11/26/2020 11:26 PM All CT scans at this facility use dose modulation, iterative reconstruction, and/or weight-based dosing when appropriate to reduce radiation dose to as low as reasonably achievable. Ct Facial Bones Wo Contrast    Result Date: 11/27/2020  CT maxillofacial without contrast Comparison:  CT,SR  - CT HEAD WO CONTRAST  - 04/28/2019 02:18 PM EDT Findings: Temporomandibular joints are intact. Small hematoma within the left maxillary sinus. Visualized intracranial contents are within normal limits. Postsurgical changes of the cervical spine. No foreign bodies. 1. Large displaced left orbital floor fracture. Herniation of the inferior rectus muscle through the resulting orbital floor defect. 2. Fracture of the posterior aspect of the left lamina papyracea. 3. Deformity of the nasal bone compatible with fracture, of uncertain age. This document has been electronically signed by: Benjamín Verduzco MD on 11/27/2020 01:28 AM All CT scans at this facility use dose modulation, iterative reconstruction, and/or weight-based dosing when appropriate to reduce radiation dose to as low as reasonably achievable. Cta Chest W Wo Contrast    Result Date: 11/26/2020  CT angiography chest with contrast. 3D Postprocessing. Comparison:  CR,SR  - XR CHEST PORTABLE  - 11/26/2020 10:02 PM EST Findings: The pulmonary arteries are well opacified. Scattered plaque is seen in the thoracic aorta without aneurysm or dissection. The heart is not enlarged. There is no pericardial effusion. Coronary artery calcifications are seen. There are no enlarged mediastinal or hilar lymph nodes. The thyroid is unremarkable. No focal airspace consolidation, pleural effusion, or pneumothorax is seen. There are no worrisome pulmonary masses or nodules. Hypodense cysts in the liver. Small hiatal hernia. Nondisplaced left anterior 6th rib fracture. Degenerative changes in the spine with intact anterior cervical fusion. 1.  Nondisplaced left anterior 6th rib fracture. No pneumothorax or other acute visceral or vascular injury. 2.  Small hiatal hernia. This document has been electronically signed by: Deborah Jules MD on 11/26/2020 11:34 PM All CT scans at this facility use dose modulation, iterative reconstruction, and/or weight-based dosing when appropriate to reduce radiation dose to as low as reasonably achievable. Ct Cervical Spine Wo Contrast    Result Date: 11/26/2020  CT cervical spine without contrast Comparison: None Findings: Visualized intracranial contents are unremarkable. No cervical fluid collections or masses. Lung apices are clear. Normal vertebral body alignment. No acute fractures or dislocations. 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 1. No evidence of cervical spine fracture. 2. Prior anterior metallic and interbody fusion extending from C4-C6. Appropriate alignment. No evidence of hardware failure. This document has been electronically signed by: Katie Handy MD on 11/26/2020 11:37 PM All CT scans at this facility use dose modulation, iterative reconstruction, and/or weight-based dosing when appropriate to reduce radiation dose to as low as reasonably achievable. Xr Chest Portable    Result Date: 11/26/2020  1 view chest x-ray Comparison:  CR,SR  - XR CHEST STANDARD (2 VW)  - 04/29/2019 09:36 AM EDT Findings: The lungs are clear. Heart size is normal. No pulmonary vascular congestion. Stable cervical fusion hardware. Stable degenerative changes in the right shoulder. No acute findings. This document has been electronically signed by: Juancho Zaidi MD on 11/26/2020 10:57 PM     Vl Dup Lower Extremity Venous Bilateral    Result Date: 11/19/2020  PROCEDURE: VL DUP LOWER EXTREMITY VENOUS BILATERAL CLINICAL INFORMATION: 81-year-old male with bilateral leg pain. COMPARISON: Ultrasound dated 3/30/2016. TECHNIQUE: Venous doppler ultrasound was performed of the bilateral lower extremities using gray scale, color flow and spectral doppler imaging. FINDINGS: There is normal color flow, spectral analysis and compressibility of the common femoral vein, superficial femoral vein and popliteal vein bilaterally . There is normal color flow and compressibility in the posterior tibial veins, anterior tibial veins and peroneal veins. No evidence of a DVT. **This report has been created using voice recognition software. It may contain minor errors which are inherent in voice recognition technology. ** Final report electronically signed by Dr Carmen Byrd on 11/19/2020 8:16 AM    A/P: S/P patient is seen and evaluated on the floor having transition from the ICU setting without incident. Examination and evaluation findings are discussed and reviewed with Dr. Sean Lacey. The patient remains stable and grossly intact neurologically to his baseline with no significant changes noted to the patient chart overnight. Vivian awake and oriented without headache on exam this morning and was enjoying breakfast at the time of this morning's evaluation. Allergy has ordered an MRI which is pending review. Neurosurgery to follow.     Electronically signed by Renetta Pardo PA-C on 11/29/2020 at 9:59 AM

## 2020-11-29 NOTE — PROGRESS NOTES
I have independently performed an evaluation on Stone . I have reviewed the above documentation completed by the [facility]. Please see my additional contributions to the HPI, physical exam, assessment/medical decision making. Patientw ithout new complaints, miniaml pain with inspiration. Medicine manageing troponin and hypertension and syncope with plans for outpatient holter monitor. Plastics following for orbital floor frature with plan for OR tomrorow. Continue pain controla nd pulmonary tolilet     Electronically signed by Daisy Shea MD on 11/30/2020 at 12:46 PM    [facility] - Dr. Eder Rodriguez  Daily Progress Note    Pt Name: [address] [place] [address] Record Number: [id number]  Date of Birth 1935   Today's Date: 11/29/2020    HD: # 2    CC: \"Pretty good\"    [address] Problems    Diagnosis Date Noted    Intraventricular hemorrhage ([facility] [address] [I61.5] 11/27/2020    Elevated troponin [R77.8] 11/27/2020    Fracture of left orbital floor ([facility] [address] Reilly Dross 11/27/2020    Nasal bone fracture [S02. 2XXA] 11/27/2020    Closed fracture of six ribs of left side [S22.42XA] 11/27/2020    Closed fracture of one rib of left side [S22.32XA]        PLAN  Patient admitted under Trauma Services to ICU   - Transferred to  yesterday    Intraventricular hemorrhage              - Neurosurgery consulted, managing non operatively    - Maintain systolic blood pressure between 100-160   - Seizure precautions              - Neuro checks   - Repeat CT head 11/27 stable   - Hold all antiplatelets   - MRI brain today to rule out stroke or tumor   - Neurology following    Left 6th rib fracture              - Rib fracture protocol - tolerating well   - IS/C&DB   - Monitor respiratory status              - Flexeril   - Lidoderm patches              - Pain control                Displaced left orbital floor fracture with herniation of inferior rectus, fracture posterior left lamina papyracea, deformity of nasal bone suspicious for fracture              - Consults to Plastics, Ophthalmology and ENT   - Per Dr. Justina Meckel the patient will be added to his OR scheduled Monday   - Visual checks              - Ice PRN   - Pain control     Elevated troponin              - Intensivist managing - to hand off to Hospitalist at transfer              - Serial troponins ordered   - Cardiology consulted    Syncope and collapse   - Hospitalist managing   - Echo pending   - Holter at discharge    - Continue telemetry monitor    HTN   - Weaned off Cardene gtt yesterday   - Hospitalist managing   - Home meds resumed    Consults: Neurosurgery, Crystal Barnhart, Ophthalmology, ENT, Plastics, Hospitalist, Cardiology, PM&R     Pain Management              - Morphine, Norco     Prophylaxis: SCDs, Incentive Spirometry, GlycoLax, Pepcid, Zofran   - Lovenox added today per Neurology    General diet   NPO after midnight, IVF at midnight  Regular Neurovascular Checks  Repeat Labs   PT/OT/SLP Eval and Treat  Up with assistance     Planned Discharge discharge pending clinical course   - PT/OT recommending IP rehab, PM&R consulted    SUBJECTIVE  Patient seen on 4A this morning. Patient said he is doing \"pretty good\". Patient denied any acute pain or complaints. Patient only endorsed chronic and unchanged pain in his knees. Patient did endorse intermittent diplopia. Patient denied any headaches, lightheadedness, dizziness, chest pain, shortness of breath, abdominal pain, nausea/vomiting, and paresthesias. On exam patient was alert and orient x3, PMS intact in all 4 extremities, and strength 5/5 bilaterally. Extraocular motion appears intact. Patient denied any pain with eye movement. MRI of the brain was obtained today which showed no acute findings with subacute and old infarcts with small amount of recent hemorrhage. Neurology ordered Lovenox for DVT prophylaxis. Neurosurgery continue to follows.   Hospitalist following for medical management. Cardiology consulted. Reported plan for surgery tomorrow for displaced orbital floor fracture with plastic surgery. Order placed for n.p.o. at midnight and IV fluids. PT/OT recommending IP rehab, PM&R consulted. Case discussed with trauma surgeon, Dr. Estephania Mccabe. Wt Readings from Last 3 Encounters:   11/29/20 224 lb 12.8 oz (102 kg)   10/22/20 225 lb (102.1 kg)   10/15/20 225 lb (102.1 kg)     Temp Readings from Last 3 Encounters:   11/29/20 97.7 °F (36.5 °C) (Oral)   10/23/20 98.5 °F (36.9 °C) (Oral)   07/28/20 98 °F (36.7 °C) (Temporal)     BP Readings from Last 3 Encounters:   11/29/20 (!) 151/80   10/23/20 135/86   07/28/20 138/68     Pulse Readings from Last 3 Encounters:   11/29/20 76   10/23/20 75   07/28/20 70       24 HR INTAKE/OUTPUT :     Intake/Output Summary (Last 24 hours) at 11/29/2020 0837  Last data filed at 11/29/2020 0325  Gross per 24 hour   Intake 120 ml   Output 1150 ml   Net -1030 ml     DIET GENERAL; No Added Salt (3-4 GM); Cardiac    OBJECTIVE  CURRENT VITALS BP (!) 151/80   Pulse 76   Temp 97.7 °F (36.5 °C) (Oral)   Resp 16   Ht 5' 9\" (1.753 m)   Wt 224 lb 12.8 oz (102 kg)   SpO2 95%   BMI 33.20 kg/m²     GENERAL: Awake, alert, NAD. Very pleasant and cooperative with exam  EYES: Left periorbital swelling and ecchymosis. EOM appears intact. NEURO: Alert and oriented x3, conversing appropriately, PMS intact in all 4 extremities, strength 5/5 and equal bilaterally, no signs of focal neurological deficits  LUNGS: Clear to auscultation bilaterally- no wheezes, rales or rhonchi, normal air movement, no respiratory distress. HEART: Normal rate, normal S1 and S2, no gallops and intact distal pulses. ABDOMEN: Abdomen is soft, non-distended, non-tender, with normoactive bowel sounds. WOUNDS: Abrasion to left side of forehead without any bleeding or drainage. EXTREMITY: No cyanosis and no clubbing. PMS intact. Strength equal and strong bilaterally.  Incision scar noted to left knee. LABS  CBC :   Recent Labs     11/27/20 0328 11/28/20 0402 11/29/20  0355   WBC 7.4 6.0 6.2   HGB 14.7 14.6 15.5   HCT 47.0 47.4 49.8   MCV 98.7* 101.1* 99.6*    134 150     BMP:   Recent Labs     11/27/20 0328 11/28/20 0402 11/29/20  0355    138 140   K 4.1 4.0 4.2    107 106   CO2 22* 21* 23   BUN 23* 17 20   CREATININE 1.3* 1.2 0.9     COAGS:   Recent Labs     11/26/20 2115   APTT 26.2   PROT 6.5   INR 1.03     Pancreas/HFP:  No results for input(s): LIPASE, AMYLASE in the last 72 hours. Recent Labs     11/26/20 2115   AST 21   ALT 18   BILITOT 0.5   ALKPHOS 63     RADIOLOGY:  Narrative    PROCEDURE: MRI BRAIN W WO CONTRAST         CLINICAL INFORMATIONunusual septum pellucidum ICH/IVH, r/o tumor, stroke. Head trauma. Loss of consciousness.         COMPARISON: Head CT 11/27/2020.         TECHNIQUE: Multiplanar and multiple spin echo T1 and T2-weighted images were obtained through the brain before and after the administration of intravenous contrast.         FINDINGS:                   On the diffusion-weighted images, there is a faint area of high signal in the right thalamus. This does not have low signal on the ADC map. There is some high signal on the FLAIR and T2-weighted sequences. This is not consistent with an acute infarct. This may represent a late subacute infarct. The brain volume is reduced.         There is an area of susceptibility artifact within the leaflets of the septum pellucidum consistent with a small site of hemorrhage. There is also some blood products layering in the occipital horns of the lateral ventricles consistent with    intraventricular hemorrhage. There is mineralization in the medial aspects of the basal ganglia bilaterally.  There is an old microhemorrhage in the left frontal lobe. Suann Pillar is no hydrocephalus, midline shift or mass effect.              On the FLAIR and T2-weighted sequences, there is a moderate-severe amount of abnormal signal in the white matter the brain suggesting chronic small vessel ischemic changes. There are old lacunar type infarcts in the thalami and basal ganglia bilaterally.         There is no abnormal enhancement in the brain. The major intracranial vascular flow voids are present.  The midline craniocervical junction structures are normal.  The brainstem and pituitary gland are normal.                             Impression         1. No acute findings. 2. 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 3. 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 4. Moderate-severe chronic small vessel ischemic changes. 5. Small amount of recent hemorrhage at the level septum pellucidum and in the occipital horns of the lateral ventricles.              **This report has been created using voice recognition software. It may contain minor errors which are inherent in voice recognition technology. **              Final report electronically signed by Dr. Pearl Holguin on 11/29/2020 1:32 PM            Electronically signed by Tena Adler PA-C on 11/29/2020 at 8:37 AM

## 2020-11-29 NOTE — PROGRESS NOTES
Hospitalist Progress Note      Patient:  Erin Field    Unit/Bed:4A-07/007-A  YOB: 1935  MRN: [id number]   Acct: [de-identified]   PCP: Yasir Gutierrez MD  Date of Admission: 11/26/2020    Assessment/Plan:    1. Intraventricular Hemorrhage: Trauma primary. Neurosurgery following and stable. Goal is to maintain -160, anti-HTN meds as below. 1. Neurology consulted, recs for MRI brain, SCD for DVT prophylaxis, concern it is 2/2 DAPT and lower suspicion 2/2 HTN. 2. Syncope and Collapse: Echocardiogram pending. Reportedly bradycardic at times although VS appear HR in 70's-80's. Known history of CAD and follows with Dr. Sellers Nicely. Recommend Holter monitor on discharge for further evaluation. 3. Left orbital fracture: Ophtalmology/Plastic surgery consulted. Plan OR 11/30/20.   4. Mildly elevated Troponin, Hx of CAD: Follows with Dr. Sellers Niceranjit OP, with recent PCI to circumflex on 10/22/20 with recommendations for DAPT, and consideration of rotablator of LAD in future. 1. Trop on admission trended 0.012-0.017 with no acute chest pain. 2. ASA held on admission and noncompliant with DAPT OP. With recent PCI, elevated troponin, holding DAPT 2/2 IVH, recommend cardiology consult for further recommendations regarding antiplatelet regimen. 5. Essential HTN: Home meds Losartan/Lopressor. On Cardene drip in ICU with goal SBP <160. Weaned off Cardene 11/28/20, however still 's. Uptitrated BB and Losartan, monitor response. 6. Left 6th rib fracture: Trauma managing, IS, pain control. 7. CKD Stage III: Stable. 8. HLD: Statin. Disposition: OT recommends IPR when stable for discharge. Chief Complaint: LOC    Initial H and P:-    David Lopez is an 60-year-old black male who presented to [facility] on 11/26/2020 to be evaluated after suffering a fall with head trauma.   He has a past medical history of reformed smoker, CAD, CKD stage III, hypertension, hyperlipidemia, DVT, prostate cancer, back surgery, osteoarthritis. Per report patient is on chronic aspirin and was prescribed Brilinta but was not taking it. Per report wife states that she was in the restroom when she returned she found the patient laying on the kitchen floor. Patient normally at baseline ambulates with a walker. He did have a noted abrasion to his left forehead and a lower lip laceration. Patient does not remember the event. He was unable to provide any information on symptoms prior to the event. In the emergency department he denied any shortness of breath, chest pain. The wife did state that post fall he was acting different and asking questions repetitively. CT head did reveal a small intraventricular hemorrhage and a left anterior sixth rib fracture. He also had noted large displaced left orbital floor fracture with herniation of the anterior rectus. There was no cervical spine fracture. He was admitted to ICU via trauma services with a consult to the ICU team.    Subjective (past 24 hours):   Patient evaluated resting in his chair. He was eating his breakfast, denies any concerns or complaints. Patient denies feeling weak, but states that he did have trouble with there. .  Denies difficulty swallowing. No chest pain or shortness of breath. Past medical history, family history, social history and allergies reviewed again and is unchanged since admission. ROS (12 point review of systems completed. Pertinent positives noted.  Otherwise ROS is negative)     Medications:  Reviewed    Infusion Medications    niCARdipine Stopped (11/28/20 1226)     Scheduled Medications    losartan  100 mg Oral Daily    metoprolol tartrate  50 mg Oral BID    sodium chloride flush  10 mL Intravenous 2 times per day    lidocaine  1 patch Transdermal Daily    bumetanide  1 mg Oral Daily    pravastatin  40 mg Oral Daily    tamsulosin  0.4 mg Oral Nightly PRN Meds: sodium chloride flush, acetaminophen **OR** acetaminophen, polyethylene glycol, promethazine **OR** ondansetron, morphine **OR** morphine, HYDROcodone 5 mg - acetaminophen **OR** HYDROcodone 5 mg - acetaminophen      Intake/Output Summary (Last 24 hours) at 11/29/2020 0948  Last data filed at 11/29/2020 0325  Gross per 24 hour   Intake 120 ml   Output 1150 ml   Net -1030 ml       Diet:  DIET GENERAL; No Added Salt (3-4 GM); Cardiac    Exam:  BP (!) 140/83   Pulse 107   Temp 98.3 °F (36.8 °C) (Oral)   Resp 17   Ht 5' 9\" (1.753 m)   Wt 224 lb 12.8 oz (102 kg)   SpO2 97%   BMI 33.20 kg/m²   General appearance: No apparent distress, appears stated age and cooperative. 16-year-old -American male. HEENT: Pupils equal, round, and reactive to light. Conjunctivae/corneas clear. Left forehead abrasion. Neck: Supple, with full range of motion. No jugular venous distention. Trachea midline. Respiratory:  Normal respiratory effort. Clear to auscultation, bilaterally without Rales/Wheezes/Rhonchi. Cardiovascular: Regular rate and rhythm with normal S1/S2 without murmurs, rubs or gallops. Abdomen: Soft, non-tender, non-distended with normal bowel sounds. Musculoskeletal: passive and active ROM x 4 extremities. Skin: Skin color, texture, turgor normal.  No rashes or lesions. Neurologic:  Neurovascularly intact without any focal sensory/motor deficits, slow speech. . Cranial nerves: II-XII intact, grossly non-focal.  Psychiatric: Alert and oriented, thought content appropriate, normal insight  Capillary Refill: Brisk,< 3 seconds   Peripheral Pulses: +2 palpable, equal bilaterally     Labs:   Recent Labs     11/27/20 0328 11/28/20 0402 11/29/20  0355   WBC 7.4 6.0 6.2   HGB 14.7 14.6 15.5   HCT 47.0 47.4 49.8    134 150     Recent Labs     11/27/20 0328 11/28/20 0402 11/29/20  0355    138 140   K 4.1 4.0 4.2    107 106   CO2 22* 21* 23   BUN 23* 17 20   CREATININE 1.3* 1.2 0. 9   CALCIUM 9.3 9.2 9.3     Recent Labs     11/26/20 2115   AST 21   ALT 18   BILITOT 0.5   ALKPHOS 63     Recent Labs     11/26/20 2115   INR 1.03     No results for input(s): Lexii Garcia in the last 72 hours. Microbiology:    Blood culture #1:   Lab Results   Component Value Date    BC No growth-preliminary  No growth   12/30/2014       Blood culture #2:No results found for: Madi Baileykatarzynaamalia    Organism:  Lab Results   Component Value Date    ORG Escherichia coli 07/07/2020         Lab Results   Component Value Date    LABGRAM  04/24/2014     No segmented neutrophils observed. No epithelial cells observed. No bacteria seen. MRSA culture only:No results found for: [address] [place] [address]    Urine culture:   Lab Results   Component Value Date    LABURIN  03/30/2016     Current antibiotic therapy ineffective in vitro for  isolate. LABURIN [place] count: >100,000 CFU/mL 03/30/2016       Respiratory culture: No results found for: CULTRESP    Aerobic and Anaerobic :  Lab Results   Component Value Date    LABAERO  04/24/2014     right hip fluid-syringe  No growth-preliminary  No growth     Lab Results   Component Value Date    LABANAE No growth-preliminary  No growth 04/24/2014       Urinalysis:      Lab Results   Component Value Date    NITRU NEGATIVE 11/26/2020    WBCUA 2-4 11/26/2020    BACTERIA NONE SEEN 11/26/2020    RBCUA 0-2 11/26/2020    BLOODU NEGATIVE 11/26/2020    SPECGRAV 1.022 03/30/2016    GLUCOSEU NEGATIVE 11/26/2020       Radiology:  CT HEAD WO CONTRAST   Final Result   1. Small focus of intraventricular hemorrhage associated with the    midportion of the septum pellucidum, without significant change. 2. Left orbital floor fracture with herniation of periorbital fat and the    inferior rectus muscle into the maxillary sinus.       This document has been electronically signed by: Osvaldo Terrell MD on    11/28/2020 12:27 AM      All CT scans at this facility use dose modulation, iterative reconstruction, and/or weight-based   dosing when appropriate to reduce radiation dose to as low as reasonably    achievable. CT HEAD WO CONTRAST   Final Result       1. Small amount of intraventricular hemorrhage layering in the occipital horns of lateral ventricle. There is also small amount of hemorrhage within the septum pellucidum. 2. Moderate-severe chronic small vessel ischemic changes. **This report has been created using voice recognition software. It may contain minor errors which are inherent in voice recognition technology. **      Final report electronically signed by Dr. Bassam Lucio on 11/27/2020 1:18 PM      CT Head WO Contrast   Final Result   1. Small focal area of intraventricular hemorrhage in the midportion of    the septum pellucidum measuring 0.7 x 0.4 cm.   2.  Hemorrhagic fluid in the left maxillary sinus. This document has been electronically signed by: Ivey Collet, MD on    11/26/2020 11:26 PM      All CT scans at this facility use dose modulation, iterative    reconstruction, and/or weight-based   dosing when appropriate to reduce radiation dose to as low as reasonably    achievable. CT Cervical Spine WO Contrast   Final Result   1. No evidence of cervical spine fracture. 2. Prior anterior metallic and interbody fusion extending from C4-C6. Appropriate alignment. No evidence of hardware failure. This document has been electronically signed by: Jacky Laboy MD on    11/26/2020 11:37 PM      All CT scans at this facility use dose modulation, iterative    reconstruction, and/or weight-based   dosing when appropriate to reduce radiation dose to as low as reasonably    achievable. CTA CHEST W WO CONTRAST   Final Result   1. Nondisplaced left anterior 6th rib fracture. No pneumothorax or other    acute visceral or vascular injury. 2.  Small hiatal hernia.       This document has been electronically signed by: Ivey Collet, MD on 11/26/2020 11:34 PM      All CT scans at this facility use dose modulation, iterative    reconstruction, and/or weight-based   dosing when appropriate to reduce radiation dose to as low as reasonably    achievable. CT FACIAL BONES WO CONTRAST   Final Result   1. Large displaced left orbital floor fracture. Herniation of the inferior    rectus muscle through the resulting orbital floor defect. 2. Fracture of the posterior aspect of the left lamina papyracea. 3. Deformity of the nasal bone compatible with fracture, of uncertain age. This document has been electronically signed by: Prasad Russell MD on    11/27/2020 01:28 AM      All CT scans at this facility use dose modulation, iterative    reconstruction, and/or weight-based   dosing when appropriate to reduce radiation dose to as low as reasonably    achievable. XR CHEST PORTABLE   Final Result   No acute findings. This document has been electronically signed by: Becky Sands MD on    11/26/2020 10:57 PM         MRI BRAIN [address]    (Results Pending)     Ct Head Wo Contrast    Result Date: 11/28/2020  CT head without contrast Comparison:  CT,SR  - CT HEAD WO CONTRAST  - 11/26/2020 10:44 PM EST Findings: No intracranial mass, midline shift or hydrocephalus. Involutional change of brain parenchyma, compatible with advanced age. Severe white matter disease. Hematoma within the left maxillary sinus. Left orbital floor fracture without change. 1. Small focus of intraventricular hemorrhage associated with the midportion of the septum pellucidum, without significant change. 2. Left orbital floor fracture with herniation of periorbital fat and the inferior rectus muscle into the maxillary sinus.  This document has been electronically signed by: Prasad Russell MD on 11/28/2020 12:27 AM All CT scans at this facility use dose modulation, iterative reconstruction, and/or weight-based dosing when appropriate to reduce radiation dose to head without contrast Comparison:  CT,SR  - CT HEAD WO CONTRAST  - 04/28/2019 02:18 PM EDT Findings: New 0.7 x 0.4 cm hyperdense area in the midportion of the septum pellucidum. No midline shift or hydrocephalus. Stable mild generalized atrophy. Stable periventricular and centrum semiovale hypoattenuation may relate to chronic small vessel ischemic changes. Stable old lacunar infarct in the left basal ganglia. Hemorrhagic secretions in the left maxillary sinus. Partial opacification of the right mastoid air cells inferiorly. The orbits are unremarkable. No skull fracture. 1.  Small focal area of intraventricular hemorrhage in the midportion of the septum pellucidum measuring 0.7 x 0.4 cm. 2.  Hemorrhagic fluid in the left maxillary sinus. This document has been electronically signed by: Marc Cherry MD on 11/26/2020 11:26 PM All CT scans at this facility use dose modulation, iterative reconstruction, and/or weight-based dosing when appropriate to reduce radiation dose to as low as reasonably achievable. Ct Facial Bones Wo Contrast    Result Date: 11/27/2020  CT maxillofacial without contrast Comparison:  CT,SR  - CT HEAD WO CONTRAST  - 04/28/2019 02:18 PM EDT Findings: Temporomandibular joints are intact. Small hematoma within the left maxillary sinus. Visualized intracranial contents are within normal limits. Postsurgical changes of the cervical spine. No foreign bodies. 1. Large displaced left orbital floor fracture. Herniation of the inferior rectus muscle through the resulting orbital floor defect. 2. Fracture of the posterior aspect of the left lamina papyracea. 3. Deformity of the nasal bone compatible with fracture, of uncertain age.  This document has been electronically signed by: Javier Ahn MD on 11/27/2020 01:28 AM All CT scans at this facility use dose modulation, iterative reconstruction, and/or weight-based dosing when appropriate to reduce radiation dose to as low as reasonably achievable. Cta Chest W Wo Contrast    Result Date: 11/26/2020  CT angiography chest with contrast. 3D Postprocessing. Comparison:  CR,SR  - XR CHEST PORTABLE  - 11/26/2020 10:02 PM EST Findings: The pulmonary arteries are well opacified. Scattered plaque is seen in the thoracic aorta without aneurysm or dissection. The heart is not enlarged. There is no pericardial effusion. Coronary artery calcifications are seen. There are no enlarged mediastinal or hilar lymph nodes. The thyroid is unremarkable. No focal airspace consolidation, pleural effusion, or pneumothorax is seen. There are no worrisome pulmonary masses or nodules. Hypodense cysts in the liver. Small hiatal hernia. Nondisplaced left anterior 6th rib fracture. Degenerative changes in the spine with intact anterior cervical fusion. 1.  Nondisplaced left anterior 6th rib fracture. No pneumothorax or other acute visceral or vascular injury. 2.  Small hiatal hernia. This document has been electronically signed by: Flory Gillis MD on 11/26/2020 11:34 PM All CT scans at this facility use dose modulation, iterative reconstruction, and/or weight-based dosing when appropriate to reduce radiation dose to as low as reasonably achievable. Ct Cervical Spine Wo Contrast    Result Date: 11/26/2020  CT cervical spine without contrast Comparison: None Findings: Visualized intracranial contents are unremarkable. No cervical fluid collections or masses. Lung apices are clear. Normal vertebral body alignment. No acute fractures or dislocations. 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 1. No evidence of cervical spine fracture. 2. Prior anterior metallic and interbody fusion extending from C4-C6. Appropriate alignment. No evidence of hardware failure.  This document has been electronically signed by: Evi Le MD on 11/26/2020 11:37 PM All CT scans at this facility

## 2020-11-29 NOTE — PROGRESS NOTES
[facility]  INPATIENT PHYSICAL THERAPY  EVALUATION  [facility] 4A - 4A-07/007-A    Time In: 3271  Time Out: 8369  Timed Code Treatment Minutes: 23 Minutes  Minutes: 35          Date: 2020  Patient Name: Bala Duque,  Gender:  male        MRN: [id number]  : 1935  (81 y.o.)  Referral Date : 20   Referring Practitioner: Kristi Burris PA-C  Diagnosis: Intraventricular hemorrhage  Additional Pertinent Hx: Pt presenting at [facility] by activation of level 2 trauma, brought by EMS following a unwitnessed fall with unknown LOC; past medical history includes recent cardiac sten placement x2, hypertension, chronic kidney disease, CAD. Per wife report, she was standing in the bathroom when she heard a sound in the kitchen she open the bathroom door to see her  laying face down on the ground. Patient states he normally ambulates with walker, did have a walker at the time of fall, unclear if fall was precipitated by syncopal episode. Wife states that  does appear to be confused post fall, has been repetitive in speech since incident. Patient reports some mild discomfort over left face otherwise has no complaints on exam.  Found to have an intraventricular hemorrhage, a left orbital fx, nasal bone fx, and left 6th rib fx. Restrictions/Precautions:  Restrictions/Precautions: Fall Risk  Position Activity Restriction  Other position/activity restrictions: Keep systolic blood pressure between 100-160 while moving. Subjective:  Chart Reviewed: Yes  Patient assessed for rehabilitation services?: Yes  Family / Caregiver Present: No  Subjective: RN approved PT session. Patient in bed upon PT arrival, pleasant and agreeable to therapy.     General:  Follows Commands: Within Functional Limits    Vision: Impaired  Vision Exceptions: Wears glasses for reading    Hearing: Within functional limits         Pain: denies    Social/Functional History:    Lives With: Spouse  Type of Home: House  Home Layout: One level  Home Access: Stairs to enter with rails  Entrance Stairs - Number of Steps: 1 step to enter and 1 step once inside the house  Home Equipment: Standard walker     Bathroom Shower/Tub: Tub/Shower unit, Shower chair with back  [employer]: Grab bars in shower  Bathroom Accessibility: Accessible    Receives Help From: Family  ADL Assistance: Independent  Homemaking Assistance: Needs assistance  Homemaking Responsibilities: No  Ambulation Assistance: Independent  Transfer Assistance: Independent    Active : No  Occupation: Retired  Type of occupation: [employer]  Leisure & Hobbies: Traveling to visit friends or family; watching old reruns on TV  Additional Comments: Pt ambualated short distances with a standard walker. He did his own self care. He had help with all of the cooking and cleaning prior to admission. OBJECTIVE:  Range of Motion:  Right Lower Extremity: WNL  Left Lower Extremity: WNL    Strength:  Right Lower Extremity: hip flex 3+/5, knee flex/ext 4-/5  Left Lower Extremity: hip flex 3+/5, knee flex/ext 4-/5    Balance:  Static Standing Balance: Contact Guard Assistance  Dynamic Standing Balance: Minimal Assistance   --with B UE support on walker    Bed Mobility:  Supine to Sit: Moderate Assistance, with head of bed raised, with increased time for completion    Transfers:  Sit to Stand: Moderate Assistance, with increased time for completion, with verbal cues; from bed and toilet. Cues for upright posture once standing. Stand to Sit:Moderate Assistance    Ambulation:  Minimal Assistance, with verbal cues , with increased time for completion  Distance: 15', 15'  Surface: Level Tile  Device:Standard Walker  Gait Deviations:   Forward Flexed Posture, Slow [place], Decreased Step Length Bilaterally, Decreased Heel Strike Bilaterally and Unsteady Gait      Functional Outcome Measures: Completed  AM-PAC Inpatient Mobility Raw Score : 15  AM-PAC Inpatient T-Scale Score : 39.45    ASSESSMENT:  Activity Tolerance:  Patient tolerance of  treatment: fair. Treatment Initiated: Treatment and education initiated within context of evaluation. Evaluation time included review of current medical information, gathering information related to past medical, social and functional history, completion of standardized testing, formal and informal observation of tasks, assessment of data and development of plan of care and goals. Treatment time included skilled education and facilitation of tasks to increase safety and independence with functional mobility for improved independence and quality of life. Assessment: Body structures, Functions, Activity limitations: Decreased functional mobility , Decreased balance, Decreased posture, Decreased strength, Decreased endurance, Decreased safe awareness  Prognosis: Good    REQUIRES PT FOLLOW UP: Yes    Discharge Recommendations:  Discharge Recommendations: IP Rehab    Patient Education:  PT Education: PT Role, Plan of Care, Functional Mobility Training    Equipment Recommendations:  Equipment Needed: No    Plan:  Times per week: 6x N  Current Treatment Recommendations: Strengthening, Safety Education & Training, Balance Training, Endurance Training, Functional Mobility Training, Patient/Caregiver Education & Training, Transfer Training, Gait Training    Goals:  Patient goals : None stated  Short term goals  Time Frame for Short term goals: by discharge  Short term goal 1: Patient will complete bed mobility with SBA in order to get into/out of bed. Short term goal 2: Patient will transfer sit <--> stand with SBA in order to get up to ambulate. Short term goal 3: Patient will ambulate 48' with SBA and a SW for household mobility. Long term goals  Time Frame for Long term goals : no LTGs due to short ELOS    Following session, patient left in safe position with all fall risk precautions in place.     Pepe Schneider

## 2020-11-29 NOTE — PROGRESS NOTES
[address]  Occupational Therapy  Daily Note  Time:    Time In: 8178  Time Out: 0920  Timed Code Treatment Minutes: 24 Minutes  Minutes: 24      Date: 2020  Patient Name: Myrtle Acosta,   Gender: male      Room: [facility]007-A  MRN: [id number]  : 1935  (81 y.o.)  Referring Practitioner: ASMITA Chang  Diagnosis: Intraventricular Hemorrhage  Additional Pertinent Hx: Pt presenting at [address] [place] [address] by activation of level 2 trauma, brought by EMS following a unwitnessed fall with unknown LOC; past medical history includes recent cardiac sten placement x2, hypertension, chronic kidney disease, CAD. Per wife report, she was standing in the bathroom when she heard a sound in the kitchen she open the bathroom door to see her  laying face down on the ground. Patient states he normally ambulates with walker, did have a walker at the time of fall, unclear if fall was precipitated by syncopal episode. Wife states that  does appear to be confused post fall, has been repetitive in speech since incident. Patient reports some mild discomfort over left face otherwise has no complaints on exam. Patient  had no shortness of breath, numbness/paresthesias, nausea, vomiting, or weakness upon admission. Restrictions/Precautions:  Restrictions/Precautions: Fall Risk  Position Activity Restriction  Other position/activity restrictions: Keep systolic blood pressure between 100-160 while moving. SUBJECTIVE: First attempt, pt eating breakfast. 2nd attempt, pt finished and agreeable to OT. Nursing in during session and reports pt's HR elevated to 120-130 during ambulation. PAIN: 0 at rest 9/10 subhash knees: when standing or walking,      COGNITION: Slow Processing    BALANCE:  Sitting Balance:  Supervision. Standing Balance: Contact Guard Assistance, with verbal cues .  for upright posture as pt tends to lean over onto SW    Patient identified one of their personal goals is to be able to sustain functional standing positions in order to complete various ADL and IADL skills in standing position, such as sinkside grooming or washing dishes. Dynamic standing task was then facilitated. Patient required CGA, and demo'ed an endurance of 3 minutes. BED MOBILITY:  Sit to Supine: Moderate Assistance A with BLEs into bed     TRANSFERS:  Sit to Stand:  Minimal Assistance, X 1, with increased time for completion. Stand to Sit: Contact Guard Assistance. good hand placement, reaching back to EOB     FUNCTIONAL MOBILITY:  Assistive Device: Patient's SW from home   Assist Level:  Contact Guard Assistance. Distance: To and from bathroom, slow pace, HR increased to 130s      ASSESSMENT:  Activity Tolerance:  Patient tolerance of  treatment: fair. Discharge Recommendations: Patient would benefit from continued therapy after discharge, Continue to assess pending progress, IP Rehab    Equipment Recommendations: Equipment Needed: Yes  Other: Eve sánchez from [facility] and an elevated toilet seat with armrests or commode. Plan: Times per week: 6x  Specific instructions for Next Treatment: Functional mobility; ADLs and adaptations; upper body exercises and steady breathing  Current Treatment Recommendations: Functional Mobility Training, Endurance Training, Self-Care / ADL, Safety Education & Training  Plan Comment: Pt would benefit from continued skilled OT services when medically stable and discharged from Acute. IP Rehab would benefit. Patient Education  Patient Education: Reviewed Prior Education, Home Safety and Assistive Device Safety, improving safety at home,     Goals  Short term goals  Time Frame for Short term goals: By discharge  Short term goal 1: Pt will demonstrate functional mobility walking to/from the bathroom or bedside chair with SBA while using his rolling walker with min cues for aligning himself as neeed to prepare for doing self care.   Short term goal 2: Pt will complete lower body ADLs while using any AE needed with CGA to increase his independence with self care. Short term goal 3: Pt will complete transfers to/from various surfaces including a bedside commode with armrests with SBA and min cues for alignment prior to sitting to increase his independence with toileting routine. Short term goal 4: Pt will complete BUE ROM and light resistance exercises while following verbal cues to increase his pain free movement for ease of doing self care. Following session, patient left in safe position with all fall risk precautions in place.

## 2020-11-30 LAB
ANION GAP SERPL CALCULATED.3IONS-SCNC: 13 MEQ/L (ref 8–16)
BUN BLDV-MCNC: 23 MG/DL (ref 7–22)
CALCIUM SERPL-MCNC: 9 MG/DL (ref 8.5–10.5)
CHLORIDE BLD-SCNC: 105 MEQ/L (ref 98–111)
CO2: 23 MEQ/L (ref 23–33)
CREAT SERPL-MCNC: 1 MG/DL (ref 0.4–1.2)
ERYTHROCYTE [DISTWIDTH] IN BLOOD BY AUTOMATED COUNT: 12.9 % (ref 11.5–14.5)
ERYTHROCYTE [DISTWIDTH] IN BLOOD BY AUTOMATED COUNT: 47.5 FL (ref 35–45)
GFR SERPL CREATININE-BSD FRML MDRD: 86 ML/MIN/1.73M2
GLUCOSE BLD-MCNC: 99 MG/DL (ref 70–108)
HCT VFR BLD CALC: 48.8 % (ref 42–52)
HEMOGLOBIN: 15 GM/DL (ref 14–18)
LV EF: 58 %
LVEF MODALITY: NORMAL
MCH RBC QN AUTO: 30.8 PG (ref 26–33)
MCHC RBC AUTO-ENTMCNC: 30.7 GM/DL (ref 32.2–35.5)
MCV RBC AUTO: 100.2 FL (ref 80–94)
PLATELET # BLD: 141 THOU/MM3 (ref 130–400)
PMV BLD AUTO: 10.2 FL (ref 9.4–12.4)
POTASSIUM SERPL-SCNC: 4.1 MEQ/L (ref 3.5–5.2)
RBC # BLD: 4.87 MILL/MM3 (ref 4.7–6.1)
SODIUM BLD-SCNC: 141 MEQ/L (ref 135–145)
WBC # BLD: 6 THOU/MM3 (ref 4.8–10.8)

## 2020-11-30 PROCEDURE — 93306 TTE W/DOPPLER COMPLETE: CPT

## 2020-11-30 PROCEDURE — 6360000002 HC RX W HCPCS: Performed by: PSYCHIATRY & NEUROLOGY

## 2020-11-30 PROCEDURE — 97535 SELF CARE MNGMENT TRAINING: CPT

## 2020-11-30 PROCEDURE — 99232 SBSQ HOSP IP/OBS MODERATE 35: CPT | Performed by: PHYSICIAN ASSISTANT

## 2020-11-30 PROCEDURE — 6370000000 HC RX 637 (ALT 250 FOR IP): Performed by: PHYSICIAN ASSISTANT

## 2020-11-30 PROCEDURE — 2060000000 HC ICU INTERMEDIATE R&B

## 2020-11-30 PROCEDURE — 85027 COMPLETE CBC AUTOMATED: CPT

## 2020-11-30 PROCEDURE — 92507 TX SP LANG VOICE COMM INDIV: CPT

## 2020-11-30 PROCEDURE — 97116 GAIT TRAINING THERAPY: CPT

## 2020-11-30 PROCEDURE — 99223 1ST HOSP IP/OBS HIGH 75: CPT | Performed by: SURGERY

## 2020-11-30 PROCEDURE — 2500000003 HC RX 250 WO HCPCS: Performed by: PHYSICIAN ASSISTANT

## 2020-11-30 PROCEDURE — 99232 SBSQ HOSP IP/OBS MODERATE 35: CPT | Performed by: SURGERY

## 2020-11-30 PROCEDURE — 97530 THERAPEUTIC ACTIVITIES: CPT

## 2020-11-30 PROCEDURE — APPSS30 APP SPLIT SHARED TIME 16-30 MINUTES: Performed by: PHYSICIAN ASSISTANT

## 2020-11-30 PROCEDURE — 80048 BASIC METABOLIC PNL TOTAL CA: CPT

## 2020-11-30 PROCEDURE — 2580000003 HC RX 258: Performed by: PHYSICIAN ASSISTANT

## 2020-11-30 PROCEDURE — 92526 ORAL FUNCTION THERAPY: CPT

## 2020-11-30 PROCEDURE — 94760 N-INVAS EAR/PLS OXIMETRY 1: CPT

## 2020-11-30 PROCEDURE — 95819 EEG AWAKE AND ASLEEP: CPT | Performed by: PSYCHIATRY & NEUROLOGY

## 2020-11-30 PROCEDURE — 95819 EEG AWAKE AND ASLEEP: CPT

## 2020-11-30 PROCEDURE — 94010 BREATHING CAPACITY TEST: CPT

## 2020-11-30 PROCEDURE — 36415 COLL VENOUS BLD VENIPUNCTURE: CPT

## 2020-11-30 PROCEDURE — 99231 SBSQ HOSP IP/OBS SF/LOW 25: CPT | Performed by: NEUROLOGICAL SURGERY

## 2020-11-30 PROCEDURE — 94640 AIRWAY INHALATION TREATMENT: CPT

## 2020-11-30 PROCEDURE — 94799 UNLISTED PULMONARY SVC/PX: CPT

## 2020-11-30 RX ORDER — BUDESONIDE AND FORMOTEROL FUMARATE DIHYDRATE 80; 4.5 UG/1; UG/1
2 AEROSOL RESPIRATORY (INHALATION) 2 TIMES DAILY
Status: DISCONTINUED | OUTPATIENT
Start: 2020-11-30 | End: 2020-12-01 | Stop reason: HOSPADM

## 2020-11-30 RX ORDER — ENALAPRILAT 2.5 MG/2ML
1.25 INJECTION INTRAVENOUS EVERY 6 HOURS PRN
Status: DISCONTINUED | OUTPATIENT
Start: 2020-11-30 | End: 2020-12-01 | Stop reason: HOSPADM

## 2020-11-30 RX ORDER — AMLODIPINE BESYLATE 5 MG/1
5 TABLET ORAL DAILY
Status: DISCONTINUED | OUTPATIENT
Start: 2020-11-30 | End: 2020-12-01 | Stop reason: HOSPADM

## 2020-11-30 RX ORDER — ASPIRIN 81 MG/1
81 TABLET, CHEWABLE ORAL DAILY
Status: DISCONTINUED | OUTPATIENT
Start: 2020-12-01 | End: 2020-12-01 | Stop reason: HOSPADM

## 2020-11-30 RX ORDER — ASPIRIN 81 MG/1
81 TABLET ORAL ONCE
Status: COMPLETED | OUTPATIENT
Start: 2020-11-30 | End: 2020-11-30

## 2020-11-30 RX ORDER — ALBUTEROL SULFATE 2.5 MG/3ML
2.5 SOLUTION RESPIRATORY (INHALATION) EVERY 6 HOURS PRN
Status: DISCONTINUED | OUTPATIENT
Start: 2020-11-30 | End: 2020-12-01 | Stop reason: HOSPADM

## 2020-11-30 RX ADMIN — ENALAPRILAT 1.25 MG: 2.5 INJECTION INTRAVENOUS at 21:19

## 2020-11-30 RX ADMIN — SODIUM CHLORIDE, PRESERVATIVE FREE 10 ML: 5 INJECTION INTRAVENOUS at 20:27

## 2020-11-30 RX ADMIN — TAMSULOSIN HYDROCHLORIDE 0.4 MG: 0.4 CAPSULE ORAL at 20:27

## 2020-11-30 RX ADMIN — AMLODIPINE BESYLATE 5 MG: 5 TABLET ORAL at 10:07

## 2020-11-30 RX ADMIN — METOPROLOL TARTRATE 50 MG: 50 TABLET, FILM COATED ORAL at 20:27

## 2020-11-30 RX ADMIN — ASPIRIN 81 MG: 81 TABLET, COATED ORAL at 14:25

## 2020-11-30 RX ADMIN — BUDESONIDE AND FORMOTEROL FUMARATE DIHYDRATE 2 PUFF: 80; 4.5 AEROSOL RESPIRATORY (INHALATION) at 17:42

## 2020-11-30 RX ADMIN — PRAVASTATIN SODIUM 40 MG: 40 TABLET ORAL at 14:33

## 2020-11-30 RX ADMIN — METOPROLOL TARTRATE 50 MG: 50 TABLET, FILM COATED ORAL at 08:37

## 2020-11-30 RX ADMIN — LOSARTAN POTASSIUM 100 MG: 100 TABLET, FILM COATED ORAL at 08:37

## 2020-11-30 RX ADMIN — ENOXAPARIN SODIUM 40 MG: 40 INJECTION SUBCUTANEOUS at 14:25

## 2020-11-30 ASSESSMENT — PAIN SCALES - GENERAL: PAINLEVEL_OUTOF10: 0

## 2020-11-30 NOTE — PROGRESS NOTES
[facility] Laboratory Technician Worksheet      EEG Date: 2020    Name: Ed Schwab   : 1935   Age: 81 y.o. SEX: male    ROOM: [place] [address] MRN: [id number]           CSN: [id number]      Ordering Provider: Joan Alfredo  EEG Number: [id number] Time of Test:  6275    Hand: Right   Sedation: no    H.V. Done: No NOT DONE Photic: Yes    Sleep: Yes  Drowsy: Yes   Sleep Deprived: No    Seizures observed: no    Mentality: alert      Clinical History:FALL AT HOME MRI   1. No acute findings. 2. 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 3. 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 4. Moderate-severe chronic small vessel ischemic changes.     5. Small amount of recent hemorrhage at the level septum pellucidum and in the occipital horns of the lateral ventricles         Past Medical History:       Diagnosis Date    ALDA [facility]acute [facility] injury) ([facility] [address] 2015    Blood circulation, collateral     CAD (coronary artery disease)     Cancer (HCC)     prostate; seed implants    Chronic kidney disease, stage III (moderate)     Hypercholesteremia     Hypertension     Osteoarthritis        Scheduled Meds:   amLODIPine  5 mg Oral Daily    budesonide-formoterol  2 puff Inhalation BID    [START ON 2020] aspirin  81 mg Oral Daily    losartan  100 mg Oral Daily    enoxaparin  40 mg Subcutaneous Q24H    metoprolol tartrate  50 mg Oral BID    sodium chloride flush  10 mL Intravenous 2 times per day    lidocaine  1 patch Transdermal Daily    bumetanide  1 mg Oral Daily    pravastatin  40 mg Oral Daily    tamsulosin  0.4 mg Oral Nightly     Continuous Infusions:   sodium chloride 50 mL/hr at 20 1002    niCARdipine Stopped (20 1226)     PRN Meds:.enalaprilat, albuterol, sodium chloride flush, acetaminophen **OR** acetaminophen, polyethylene glycol, promethazine **OR** ondansetron, morphine **OR** morphine, HYDROcodone 5 mg - acetaminophen

## 2020-11-30 NOTE — PROGRESS NOTES
neurosurgical services, neurosurgery okay with order. Left 6th rib fracture              - Rib fracture protocol - tolerating well   - IS/C&DB   - Monitor respiratory status              - Flexeril   - Lidoderm patches              - Pain control                Displaced left orbital floor fracture with herniation of inferior rectus, fracture posterior left lamina papyracea, deformity of nasal bone suspicious for fracture              - Consults to Plastics, Ophthalmology and ENT   - Per Dr. Gricel Walters, surgery will be postponed until clarification of MRI results with radiology. - Visual checks              - Ice PRN   - Pain control     Elevated troponin              - Intensivist managing - to hand off to Hospitalist at transfer              - Serial troponins ordered   - Cardiology consulted requests to restart of an antiplatelet medication due to drug-eluting stent. States otherwise stable from a cardiac standpoint. Syncope and collapse   - Hospitalist managing   - Echo pending   - Holter at discharge    - Continue telemetry monitor    HTN   - Weaned off Cardene gtt 11/28   - Hospitalist managing   - Home meds resumed    Consults: Neurosurgery, [facility], Ophthalmology, ENT, Plastics, Hospitalist, Cardiology, PM&R     Pain Management              - Morphine, Norco     Prophylaxis: SCDs, Incentive Spirometry, GlycoLax, Pepcid, Zofran   - Lovenox added today per Neurology   -Start aspirin 81 mg following plastic surgery    General diet   NPO after midnight, IVF at midnight  Regular Neurovascular Checks  Repeat Labs   PT/OT/SLP Eval and Treat  Up with assistance     Planned Discharge discharge pending clinical course   - PT/OT recommending IP rehab, PM&R consulted    SUBJECTIVE  He is a 81 y.o. male who continues to present at [facility] on 4A following a a fall and intraventricular hemorrhage with left orbital floor fracture.  Patient reports he is feeling well today, is feeling some discomfort in left eye, and diplopia when he eats. Patient states that diplopia has been ongoing for some time, usually notices it when he watches TV. Patient states no other new symptoms, has no additional complaints on exam. Patient denies chest pain, shortness of breath, cough, headache, dizziness, lightheadedness, numbness, paraesthesias, weakness, chills, fevers, abdominal pain, nausea, vomiting, neck pain, or back pain. Nursing staff states patient is planning to go to the OR for surgical repair of orbital floor fracture with Dr. Angel Lewis at 1500 today. Plan to restart aspirin 81 mg after surgery, inpatient rehab consulted for placement discharge. Hemoglobin stable, WBC stable, no electrolyte abnormality,  vital signs mildly hypertensive on exam (hospitalist managing). Care in coordination with trauma surgeon Dr. Cy Daniel. Nursing communication to this provider indicated that Dr. Edilson Butcher was present in room to evaluate patient prior to surgery. Dr. Edilson Butcher is elected to cancel surgery today in order to further review MRI results with radiologist.  Hospitalist start aspirin 81 mg per neurology/cardiology recommendation, this provider cleared aspirin restart with neurosurgical services.       Wt Readings from Last 3 Encounters:   11/30/20 226 lb 3.2 oz (102.6 kg)   10/22/20 225 lb (102.1 kg)   10/15/20 225 lb (102.1 kg)     Temp Readings from Last 3 Encounters:   11/30/20 98.2 °F (36.8 °C) (Oral)   10/23/20 98.5 °F (36.9 °C) (Oral)   07/28/20 98 °F (36.7 °C) (Temporal)     BP Readings from Last 3 Encounters:   11/30/20 (!) 145/76   10/23/20 135/86   07/28/20 138/68     Pulse Readings from Last 3 Encounters:   11/30/20 62   10/23/20 75   07/28/20 70       24 HR INTAKE/OUTPUT :     Intake/Output Summary (Last 24 hours) at 11/30/2020 1119  Last data filed at 11/30/2020 0839  Gross per 24 hour   Intake 578.92 ml   Output 225 ml   Net 353.92 ml     Diet NPO, After Midnight    OBJECTIVE  CURRENT VITALS BP (!) 145/76 Pulse 62   Temp 98.2 °F (36.8 °C) (Oral)   Resp 17   Ht 5' 9\" (1.753 m)   Wt 226 lb 3.2 oz (102.6 kg)   SpO2 95%   BMI 33.40 kg/m²     GENERAL: Presents sitting upright in bed unassisted, Awake and alert x4 to person, place, time and event; In no acute distress and well nourished. SKIN: Appropriate for ethnicity, warm and dry. EYES: Reduced edema to left orbital region, left subconjunctival hemorrhage, PERRL at 3mm, extraocular movements appear to be intact, though patient does complain of left eye discomfort with movement of globe. CARDIO: No visible chest wall deformity. No heaves or lifts. Strong/regular S1/S2 rate and rhythm. No rubs murmurs, or gallops. 2+ radial and dorsalis pedal pulses. Capillary refill <2 sec. No extremity edema noted. PULMONARY:  Trachea midline, no audible wheezing, increased respiratory effort, accessory muscle use, or asymmetrical chest wall movement. Lung sounds are clear to ascultation in superior and inferior fields. No wheezing, crackles, or rhonchi. ABDOMEN: Abdomen is non distended. Bowel sounds present in all four quadrants. Soft and non tender to palpation in all quadrants. NEURO: Follows commands. PMS intact, moves limbs freely. No focal neurological deficits  MSK: Extremities intact and present. No new bruising, swelling, deformity, discoloration or bleeding. No new tenderness to muscular or bony structure palpation.  strength 5/5 and equal bilaterally. LABS  CBC :   Recent Labs     11/28/20  0402 11/29/20 0355 11/30/20  0331   WBC 6.0 6.2 6.0   HGB 14.6 15.5 15.0   HCT 47.4 49.8 48.8   .1* 99.6* 100.2*    150 141     BMP:   Recent Labs     11/28/20 0402 11/29/20  0355 11/30/20  0331    140 141   K 4.0 4.2 4.1    106 105   CO2 21* 23 23   BUN 17 20 23*   CREATININE 1.2 0.9 1.0     COAGS:   No results for input(s): APTT, PROT, INR in the last 72 hours.   Pancreas/HFP:  No results for input(s): LIPASE, AMYLASE in the last 72 hours. No results for input(s): AST, ALT, BILIDIR, BILITOT, ALKPHOS in the last 72 hours. RADIOLOGY:  Narrative    PROCEDURE: MRI BRAIN W WO CONTRAST         CLINICAL INFORMATIONunusual septum pellucidum ICH/IVH, r/o tumor, stroke. Head trauma. Loss of consciousness.         COMPARISON: Head CT 11/27/2020.         TECHNIQUE: Multiplanar and multiple spin echo T1 and T2-weighted images were obtained through the brain before and after the administration of intravenous contrast.         FINDINGS:                   On the diffusion-weighted images, there is a faint area of high signal in the right thalamus. This does not have low signal on the ADC map. There is some high signal on the FLAIR and T2-weighted sequences. This is not consistent with an acute infarct. This may represent a late subacute infarct. The brain volume is reduced.         There is an area of susceptibility artifact within the leaflets of the septum pellucidum consistent with a small site of hemorrhage. There is also some blood products layering in the occipital horns of the lateral ventricles consistent with    intraventricular hemorrhage. There is mineralization in the medial aspects of the basal ganglia bilaterally. There is an old microhemorrhage in the left frontal lobe. Sean Honour is no hydrocephalus, midline shift or mass effect.              On the FLAIR and T2-weighted sequences, there is a moderate-severe amount of abnormal signal in the white matter the brain suggesting chronic small vessel ischemic changes. There are old lacunar type infarcts in the thalami and basal ganglia bilaterally.         There is no abnormal enhancement in the brain. The major intracranial vascular flow voids are present.  The midline craniocervical junction structures are normal.  The brainstem and pituitary gland are normal.                             Impression         1. No acute findings. 2. 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     3. 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 4. Moderate-severe chronic small vessel ischemic changes. 5. Small amount of recent hemorrhage at the level septum pellucidum and in the occipital horns of the lateral ventricles.              **This report has been created using voice recognition software. It may contain minor errors which are inherent in voice recognition technology. **              Final report electronically signed by Dr. Marce Falcon on 11/29/2020 1:32 PM            Electronically signed by ASMITA Abdi on 11/30/2020 at 11:19 AM

## 2020-11-30 NOTE — CONSULTS
Consult History & Physical      Patient:  Lonnie Chapman  YOB: 1935    MRN: [id number]     Acct: [de-identified]      Chief Complaint:  Facial fractures    Date of Service: Pt seen/examined in consultation on 11/30/20    History Of Present Illness:      81 y.o. male who we are asked to see/evaluate by Vipul Stovall MD for medical management of facial fractures, specifically a left orbital floor fracture with blowout and nasal bone fractures. He was admitted on 11/26 following a fall at home as he was found at home by his wife after she her him fall in the kitchen. Unknown LOC, but he was confused and his speech was repetitive. He was brought to the ER by EMS and workup noted cerebral hemorrhage, rib fractures, and facial fractures. CT scan reading noted orbital floor fracture with possible impingement upon the inferior rectus muscle. .  Past Medical History:        Diagnosis Date    ALDA (acute kidney injury) ([facility] [address] 7/17/2015    Blood circulation, collateral     CAD (coronary artery disease)     Cancer (HCC)     prostate; seed implants    Chronic kidney disease, stage III (moderate)     Hypercholesteremia     Hypertension     Osteoarthritis        Past Surgical History:        Procedure Laterality Date    BACK SURGERY  2006    fusion    CARDIAC SURGERY      2 stents    COSMETIC SURGERY      ENDOSCOPY, COLON, DIAGNOSTIC      JOINT REPLACEMENT      Bilateral hips    KNEE SURGERY      NECK SURGERY      OTHER SURGICAL HISTORY  5/29/2015    DECOMPRESSIVE LUMBAR LAMINECTOMY L2-3    ROTATOR CUFF REPAIR      TOTAL HIP ARTHROPLASTY Bilateral        Medications Prior to Admission:    Prior to Admission medications    Medication Sig Start Date End Date Taking?  Authorizing Provider   ticagrelor (BRILINTA) 90 MG TABS tablet Take 1 tablet by mouth 2 times daily 10/23/20  Yes Juliana Lai MD   losartan (COZAAR) 50 MG tablet Take 1 tablet by mouth daily 10/23/20  Yes [employer] M Marlo Greene MD   aspirin 81 MG chewable tablet Take 1 tablet by mouth daily 10/24/20  Yes Ila Wilson MD   pravastatin (PRAVACHOL) 40 MG tablet Take 1 tablet by mouth daily 10/23/20  Yes Ila Wilson MD   potassium chloride (KLOR-CON) 20 MEQ packet Take 20 mEq by mouth daily   Yes Historical Provider, MD   oxybutynin (DITROPAN) 5 MG tablet TAKE 1 TABLET TWICE A DAY 9/25/20  Yes Brandy Thomson PA-C   finasteride (PROSCAR) 5 MG tablet TAKE 1 TABLET DAILY 8/21/20  Yes Kp Sylvester MD   tamsulosin (FLOMAX) 0.4 MG capsule TAKE 1 CAPSULE NIGHTLY 8/19/20  Yes ALESSANDRA Rojo CNP   bumetanide (BUMEX) 1 MG tablet Take 1 tablet by mouth daily 7/28/20  Yes Cole Harden MD   mometasone-formoterol (DULERA) 100-5 MCG/ACT inhaler Inhale 2 puffs into the lungs 2 times daily   Yes Historical Provider, MD   metoprolol tartrate (LOPRESSOR) 25 MG tablet Take 25 mg by mouth 2 times daily   Yes Historical Provider, MD   Cholecalciferol (VITAMIN D3) 5000 UNITS TABS Take 2,000 Units by mouth daily    Yes Historical Provider, MD   acetaminophen (TYLENOL) 500 MG tablet Take 500 mg by mouth every 4 hours as needed for Pain   Yes Historical Provider, MD   nitroGLYCERIN (NITROSTAT) 0.4 MG SL tablet Place 1 tablet under the tongue every 5 minutes as needed for Chest pain up to max of 3 total doses. If no relief after 1 dose, call 911. 10/23/20   Ila Wilson MD   aspirin EC 81 MG EC tablet Take 1 tablet by mouth daily 10/23/20   Ila Wilson MD   albuterol sulfate  (90 Base) MCG/ACT inhaler Inhale 1 puff into the lungs every 4 hours as needed for Wheezing    Historical Provider, MD       Allergies:  Patient has no known allergies. Social History:      The patient currently lives at home with his wife. TOBACCO:   reports that he quit smoking about 23 years ago. His smoking use included cigarettes. He has a 12.50 pack-year smoking history.  He has never used smokeless tobacco.  ETOH:   reports no history of alcohol use. Family History:      Reviewed in detail and negative for DM, CAD, Cancer, CVA. Positive as follows:        Problem Relation Age of Onset    Heart Disease Mother     High Blood Pressure Mother     Prostate Cancer Neg Hx     Cancer Neg Hx     Diabetes Neg Hx     Kidney Disease Neg Hx     Stroke Neg Hx        Diet:  Diet NPO, After Midnight    REVIEW OF SYSTEMS:   Pertinent positives as noted in the HPI. All other systems reviewed and negative. PHYSICAL EXAM:  BP (!) 182/82   Pulse 75   Temp 98.2 °F (36.8 °C) (Oral)   Resp 17   Ht 5' 9\" (1.753 m)   Wt 226 lb 3.2 oz (102.6 kg)   SpO2 95%   BMI 33.40 kg/m²   General appearance: No apparent distress, appears stated age and cooperative. HEENT: Normal cephalic, left periorbital edema and ecchymosis. Pupils equal, round, and reactive to light. Extra ocular muscles intact without dyplopia on upward gaze. Tenderness along the left infraorbital rim. Conjunctivae/corneas clear. Neck: Supple, with full range of motion. No jugular venous distention. Trachea midline. Respiratory:  Normal respiratory effort. Clear to auscultation, bilaterally without Rales/Wheezes/Rhonchi. Cardiovascular: Regular rate and rhythm with normal S1/S2 without murmurs, rubs or gallops. Abdomen: Soft, non-tender, non-distended with normal bowel sounds. Musculoskeletal:  No clubbing, cyanosis or edema bilaterally. Skin: Skin color, texture, turgor normal.  No rashes or lesions. Neurologic:  Neurovascularly intact without any focal sensory/motor deficits.  Cranial nerves: II-XII intact, grossly non-focal.  Psychiatric: Alert and oriented, thought content appropriate, normal insight  Capillary Refill: Brisk,< 3 seconds   Peripheral Pulses: +2 palpable, equal bilaterally     Labs:     Recent Labs     11/28/20  0402 11/29/20  0355 11/30/20  0331   WBC 6.0 6.2 6.0   HGB 14.6 15.5 15.0   HCT 47.4 49.8 48.8    150 141     Recent Labs     11/28/20  0402 Final Result       1. Small amount of intraventricular hemorrhage layering in the occipital horns of lateral ventricle. There is also small amount of hemorrhage within the septum pellucidum. 2. Moderate-severe chronic small vessel ischemic changes. **This report has been created using voice recognition software. It may contain minor errors which are inherent in voice recognition technology. **      Final report electronically signed by Dr. Juan Manuel Ross on 11/27/2020 1:18 PM      CT Head WO Contrast   Final Result   1. Small focal area of intraventricular hemorrhage in the midportion of    the septum pellucidum measuring 0.7 x 0.4 cm.   2.  Hemorrhagic fluid in the left maxillary sinus. This document has been electronically signed by: Elian Brumfield MD on    11/26/2020 11:26 PM      All CT scans at this facility use dose modulation, iterative    reconstruction, and/or weight-based   dosing when appropriate to reduce radiation dose to as low as reasonably    achievable. CT Cervical Spine WO Contrast   Final Result   1. No evidence of cervical spine fracture. 2. Prior anterior metallic and interbody fusion extending from C4-C6. Appropriate alignment. No evidence of hardware failure. This document has been electronically signed by: Damion Jones MD on    11/26/2020 11:37 PM      All CT scans at this facility use dose modulation, iterative    reconstruction, and/or weight-based   dosing when appropriate to reduce radiation dose to as low as reasonably    achievable. CTA CHEST W WO CONTRAST   Final Result   1. Nondisplaced left anterior 6th rib fracture. No pneumothorax or other    acute visceral or vascular injury. 2.  Small hiatal hernia.       This document has been electronically signed by: Elian Brumfield MD on    11/26/2020 11:34 PM      All CT scans at this facility use dose modulation, iterative    reconstruction, and/or weight-based   dosing when appropriate to of the left orbital floor without clinical evidence of inferior rectus impingement. No need to surgical intervention for the nasal bone fractures. Thank you for the consultation.     Electronically signed by Ezequiel Bennett MD on 11/30/2020 at 9:21 AM

## 2020-11-30 NOTE — CONSULTS
[address] [place] [address]                                  CONSULTATION    PATIENT NAME: Pete Peña                    :        1935  MED REC NO:   [id number]                           ROOM:       0007  ACCOUNT NO:   [de-identified]                           ADMIT DATE: 2020  PROVIDER:     Sharad Palacio. Bassam Vazquez M.D.    Andrea Master:  2020    HISTORY OF PRESENT ILLNESS:  This is a patient, who is an 81-year-old  gentleman, who is known to me from prior encounter. He did have  intervention of the circumflex artery recently. Apparently, the patient  was at home and his wife heard a sound in the kitchen and she found him  lying in the kitchen. He was brought to the emergency room where he was  found to have a thalamic infarct with mild bleed. The patient admitted to the ICU. This patient seen by neurology service. When I saw the patient, he  denies any chest pain. He denies palpitation. The patient, however, is  a very poor historian. Very hard to obtain appropriate history from  him. REVIEW OF SYSTEMS:  At this time, he presented with no neurological  deficit at this point. The patient has a history of hypertension, on medical treatment; history  of coronary artery disease, prior intervention. He does have a history  of dyslipidemia. He has chronic renal insufficiency, has a history of  prior infarction, has a history of prostate cancer. He has no fever or  chills or rigors. He has no change in his weight. No GI bleed. The  patient does have a history of rotator cuff surgery, hip surgery, and  knee surgery. The patient also had back surgery. SOCIAL HISTORY:  The patient denies smoking or alcohol abuse. He is a  former smoker. ALLERGIES:  He has no known allergies. CODE STATUS:  He is a full code. PHYSICAL EXAMINATION:  VITAL SIGNS:  Showed his blood pressure was 140/76.   He was in sinus  rhythm. He was afebrile. Respiratory rate was 18. NEUROLOGIC:  He has no motor deficit. He was slow in response to  questions. NECK:  The patient has no JVD. HEART:  There was no S3.  LUNGS:  Showed scattered expiratory rhonchi. ABDOMEN:  Soft. GENITAL/RECTAL:  Deferred. EXTREMITIES:  Lower limbs, there is no edema. LABORATORY DATA:  His lab work showed his BUN 23, creatinine is 1. His  troponin is slightly elevated at 0.012. The hemoglobin is 15.4,  hematocrit is 49. His cholesterol is 166. The patient's EKG showed sinus rhythm as well as sinus bradycardia. No  acute changes. IMPRESSION:  1. The patient with syncopal episode. 2.  The patient has a thalamic infarct with mild intraventricular bleed. 3.  Fracture of the floor of the left orbit. 4.  History of hypertension. 5.  Coronary artery disease with recent intervention of the circumflex  artery. 6.  History of hypercholesterolemia. 7.  History of hip and knee replacement. 8.  History of back surgery. 9.  History of neck surgery. From the cardiac standpoint, the patient is stable. The patient does  have drug-eluting stent which was recently placed into the circumflex  artery, thus carrying a high risk for stent thrombosis if antiplatelets  will not be considered; at least should be considered if this is okay  with the neuro service. Otherwise, cardiac wise is stable. Continue  monitoring. Thank you very much for allowing us to share in the management of this  patient.         Wes Mack M.D.    D: 11/30/2020 4:30:10       T: 11/30/2020 5:56:53     [email or web address]  Job#: [id number]     Doc#: [id number]    CC:

## 2020-11-30 NOTE — PROGRESS NOTES
Yamilet Kellogg [address] [place] [address]  Occupational Therapy  Daily Note  Time:   Time In: 7754  Time Out: 1653  Timed Code Treatment Minutes: 45 Minutes  Minutes: 38          Date: 2020  Patient Name: Natty Watts,   Gender: male      Room: [facility]-A  MRN: [id number]  : 1935  (81 y.o.)  Referring Practitioner: ASMITA Larsen  Diagnosis: Intraventricular Hemorrhage  Additional Pertinent Hx: Pt presenting at [facility] by activation of level 2 trauma, brought by EMS following a unwitnessed fall with unknown LOC; past medical history includes recent cardiac sten placement x2, hypertension, chronic kidney disease, CAD. Per wife report, she was standing in the bathroom when she heard a sound in the kitchen she open the bathroom door to see her  laying face down on the ground. Patient states he normally ambulates with walker, did have a walker at the time of fall, unclear if fall was precipitated by syncopal episode. Wife states that  does appear to be confused post fall, has been repetitive in speech since incident. Patient reports some mild discomfort over left face otherwise has no complaints on exam. Patient  had no shortness of breath, numbness/paresthesias, nausea, vomiting, or weakness upon admission. Restrictions/Precautions:  Restrictions/Precautions: Fall Risk  Position Activity Restriction  Other position/activity restrictions: Keep systolic blood pressure between 100-160 while moving. SUBJECTIVE: pt sitting up in bed when arrived. Pt agreeable to OT with some encouragement. PAIN: pt did state pain in eye area from fall and lip     COGNITION: Decreased Insight, Decreased Problem Solving and Decreased Safety Awareness    ADL:   Toileting: Maximum Assistance. to wipe bottom   Toilet Transfer: Minimal Assistance, Moderate Assistance, with verbal cues  and with increased time for completion. to [facility] .     BALANCE:  Standing Balance: Minimal Assistance, with verbal cues , with increased time for completion. at [facility]     BED MOBILITY:  Supine to Sit: Moderate Assistance, with head of bed raised, with verbal cues , with increased time for completion with use of bedrail and A for trunk off bed     TRANSFERS:  Sit to Stand: Moderate Assistance, with increased time for completion, cues for hand placement. from EOB and BSC   Stand to Sit: Moderate Assistance, Maximum Assistance, with increased time for completion, cues for hand placement. to [facility] and recliner     FUNCTIONAL MOBILITY:  Assistive Device: Walker  Assist Level:  Minimal Assistance, Moderate Assistance, with verbal cues  and with increased time for completion. Distance: from EOB to [facility] and BSC to recliner   Pt had no LOB, very unsteady and moves very slow with SW.        ASSESSMENT:     Activity Tolerance:  Patient tolerance of  treatment: fair. Discharge Recommendations: Patient would benefit from continued therapy after discharge, Continue to assess pending progress, IP Rehab   Equipment Recommendations: Equipment Needed: Yes  Other: May benfit from [facility] and an elevated toilet seat with armrests or commode. Plan: Times per week: 6x  Specific instructions for Next Treatment: Functional mobility; ADLs and adaptations; upper body exercises and steady breathing  Current Treatment Recommendations: Functional Mobility Training, Endurance Training, Self-Care / ADL, Safety Education & Training  Plan Comment: Pt would benefit from continued skilled OT services when medically stable and discharged from Acute. IP Rehab would benefit. Patient Education  Patient Education: ADL's and Importance of Increasing Activity    Goals  Short term goals  Time Frame for Short term goals: By discharge  Short term goal 1: Pt will demonstrate functional mobility walking to/from the bathroom or bedside chair with SBA while using his rolling walker with min cues for aligning himself as neeed to prepare for doing self care.   Short term goal 2: Pt will complete lower body ADLs while using any AE needed with CGA to increase his independence with self care. Short term goal 3: Pt will complete transfers to/from various surfaces including a bedside commode with armrests with SBA and min cues for alignment prior to sitting to increase his independence with toileting routine. Short term goal 4: Pt will complete BUE ROM and light resistance exercises while following verbal cues to increase his pain free movement for ease of doing self care. Following session, patient left in safe position with all fall risk precautions in place.

## 2020-11-30 NOTE — PROGRESS NOTES
[address] [place] [address]  INPATIENT PHYSICAL THERAPY  DAILY NOTE  [facility] - 4A-07/007-A    Time In: 1010  Time Out: 1049  Timed Code Treatment Minutes: 44 Minutes  Minutes: 39          Date: 2020  Patient Name: Zoë Anderson,  Gender:  male        MRN: [id number]  : 1935  (81 y.o.)  Referral Date : 20  Referring Practitioner: Brisa Webster PA-C  Diagnosis: Intraventricular hemorrhage  Additional Pertinent Hx: Pt presenting at [facility] by activation of level 2 trauma, brought by EMS following a unwitnessed fall with unknown LOC; past medical history includes recent cardiac sten placement x2, hypertension, chronic kidney disease, CAD. Per wife report, she was standing in the bathroom when she heard a sound in the kitchen she open the bathroom door to see her  laying face down on the ground. Patient states he normally ambulates with walker, did have a walker at the time of fall, unclear if fall was precipitated by syncopal episode. Wife states that  does appear to be confused post fall, has been repetitive in speech since incident. Patient reports some mild discomfort over left face otherwise has no complaints on exam.  Found to have an intraventricular hemorrhage, a left orbital fx, nasal bone fx, and left 6th rib fx.      Prior Level of Function:  Lives With: Spouse  Type of Home: House  Home Layout: One level  Home Access: Stairs to enter with rails  Entrance Stairs - Number of Steps: 1 step to enter and 1 step once inside the house  Home Equipment: Standard walker   Bathroom Shower/Tub: Tub/Shower unit, Shower chair with back  [employer]: Grab bars in shower  Bathroom Accessibility: Accessible    Receives Help From: Family  ADL Assistance: Independent  Homemaking Assistance: Needs assistance  Homemaking Responsibilities: No  Ambulation Assistance: Independent  Transfer Assistance: Independent  Active : No  Additional Comments: Pt ambualated short distances with a standard walker. He did his own self care. He had help with all of the cooking and cleaning prior to admission. Restrictions/Precautions:  Restrictions/Precautions: Fall Risk  Position Activity Restriction  Other position/activity restrictions: Keep systolic blood pressure between 100-160 while moving. SUBJECTIVE: RN approved PT session. Pt leaving for surgery secondary to orbital fracture later this afternoon. Pt in bedside chair upon entry and was agreeable to PT interventions. Post session, pt in bedside chair with all needs in reach. Chair alarm on. PAIN: 87/10: bilat knees    OBJECTIVE:  Bed Mobility:  Not Tested    Transfers:  Sit to Stand: Contact Guard Assistance, Minimal Assistance, with increased time for completion, cues for hand placement, to/from chair with arms  Stand to [facility], with increased time for completion, cues for hand placement, to/from chair with arms  Vmasi initially - pt progressing to [facility] with significant amount of time to complete transfers. Increased cueing provided for improved sequencing and safety, especially for anterior weight shifting and hand placement. Pt completed a total 5 stands this date. 1 major LOB with modAx1 for safety. Ambulation:  Contact Guard Assistance, Minimal Assistance, with cues for safety, with verbal cues , with increased time for completion  Distance: 15, 3+3  Surface: Level Tile  Device:SW and RW  Gait Deviations: Forward Flexed Posture, Slow [place], Decreased Step Length Bilaterally, Decreased Weight Shift Bilaterally and limited foot clearance, short and shuffling gait, pt froze when getting to threshold, VERY short step lengths  Cueing for improved sequencing, safety, heel strike, foot clearance and step lengths. Visual aide assisted in step lengths. Pt requires physical assist at times for 2WW mgmt.     Balance:  Static Standing Balance: Contact Guard Assistance, Minimal Assistance  Dynamic

## 2020-11-30 NOTE — PROGRESS NOTES
BMP:    Recent Labs     11/27/20  0328 11/28/20  0402 11/29/20  0355    138 140   K 4.1 4.0 4.2    107 106   CO2 22* 21* 23   BUN 23* 17 20   CREATININE 1.3* 1.2 0.9   GLUCOSE 130* 118* 106           No results found for this or any previous visit. No results found for this or any previous visit. No results found for this or any previous visit. No results found for this or any previous visit. No results found for this or any previous visit. Results for orders placed during the hospital encounter of 11/26/20   MRI BRAIN W WO CONTRAST    Narrative PROCEDURE: MRI BRAIN W [address] INFORMATIONunusual septum pellucidum ICH/IVH, r/o tumor, stroke. Head trauma. Loss of consciousness. COMPARISON: Head CT 11/27/2020. TECHNIQUE: Multiplanar and multiple spin echo T1 and T2-weighted images were obtained through the brain before and after the administration of intravenous contrast.    FINDINGS:        On the diffusion-weighted images, there is a faint area of high signal in the right thalamus. This does not have low signal on the ADC map. There is some high signal on the FLAIR and T2-weighted sequences. This is not consistent with an acute infarct. This may represent a late subacute infarct. The brain volume is reduced. There is an area of susceptibility artifact within the leaflets of the septum pellucidum consistent with a small site of hemorrhage. There is also some blood products layering in the occipital horns of the lateral ventricles consistent with   intraventricular hemorrhage. There is mineralization in the medial aspects of the basal ganglia bilaterally. There is an old microhemorrhage in the left frontal lobe. There is no hydrocephalus, midline shift or mass effect. On the FLAIR and T2-weighted sequences, there is a moderate-severe amount of abnormal signal in the white matter the brain suggesting chronic small vessel ischemic changes.  There are old lacunar type Problems:    Intraventricular hemorrhage (HCC)    Elevated troponin    Fracture of left orbital floor (HCC)    Nasal bone fracture    Closed fracture of six ribs of left side    Closed fracture of one rib of left side    Intracranial bleed ([address]  Resolved Problems:    * No resolved hospital problems. *    81 year old man with hx of CAD with stent on aspirin and brillinta, now fell and found to have a septum pallucidum small ICH. Plan:    1. MRI brain showed a stable small ICH in the septum pallucidum  2. Given patient cardiac stent, he needs to be on at least one antiplatelet, aspirin 22JQ daily can be resumed after his eye surgery  3. lovenox SQ for DVT prophylaxis  4. Patient can be discharged from the neurology standpoint after surgery  5.  Patient should see me in the neurology clinic somewhere in 2-4 weeks time for ICH follow up    I spend a total of 36 minutes with greater than 60% of time spent face to face, counseling, coordinating care, examining patient, reviewing images and labs personally, and speaking with team.      Delores Michaels MD, 11/29/2020 9:18 PM     Kyung Jimenez MD  Attending Neurologist/Neurointensivist

## 2020-11-30 NOTE — CARE COORDINATION
DISASTER CHARTING    11/30/20, 3:42 PM EST    DISCHARGE ONGOING EVALUATION:     [address] day: 3  Location: 4A-07/007-A Reason for admit: Intraventricular hemorrhage ([facility] [I61.5]   Barriers to Discharge:  transferred to [facility] from ICU: NPO for surgery today with Dr Catherine Corona, surgery on hold physician needing to clarify rads reading of scan, PT/OT, neurosurgery, trauma, IVF, Cardene gtt. PCP: Rachel Moreland MD  Patient Goals/Plan/Treatment Preferences: from home with wife; [facility].

## 2020-11-30 NOTE — PROGRESS NOTES
Respiratory Care is following the rib fracture order set. Patient's position when testing was done was fowlers. A Negative Inspiratory Force (NIF) was performed with patient achieving a NIF of >-40 cm H2O. The NIF was greater than 25 cm H2O. A Forced Vital Capacity (FVC) was obtained with patient achieving an FVC of 1.92 liters. The patient's calculated ideal body weight, (IBW) is  71 kg. 0.020 liters/kg of the patient's IBW is 1.42 liters. The patient's FVC was greater than 0.020 liters/kg of IBW. Based on the spirometry measurement alone, patient does not meet ICU admission criteria. Previous FVC was 1.42 liters and previous NIF was >-40 cm H2O. Patient's NIF and FVC are improving from previous screening(s). Physician was not called regarding spirometry measurement.

## 2020-11-30 NOTE — PROGRESS NOTES
Hospitalist Progress Note      Patient:  Odilia Austin    Unit/Bed:4A-07/007-A  YOB: 1935  MRN: [id number]   Acct: [de-identified]   PCP: Tr Granados MD  Date of Admission: 11/26/2020    Assessment/Plan:    1. Intraventricular Hemorrhage: Trauma primary. Neurosurgery/neurology following and stable. Goal is to maintain -160, anti-HTN meds as below. 1. 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 2. Lovenox for DVT prophylaxis post-operatively. 3. Okay to resume ASA qd following surgery per neuro. Recommend resuming Brilinta on OP basis after f/u with neurology. 2. Syncope and Collapse: Reportedly bradycardic at times although VS appear HR in 70's-80's. Known history of CAD and follows with Dr. Dirk Foley. 1. Echocardiogram pending. 2. Recommend Holter monitor on discharge for further evaluation. 3. Left orbital fracture: Ophtalmology/Plastic surgery consulted. Plan OR 11/30/20 with Dr. Tricia Carroll. 4. Mildly elevated Troponin, Hx of CAD: Follows with Dr. Dirk Foley OP, with recent PCI to circumflex on 10/22/20 with recommendations for DAPT, and consideration of rotablator of LAD in future. 1. Trop on admission trended 0.012-0.017 with no acute chest pain. 2. Neurology/Cardiology assisted. Resume antiplatelet as soon as possible to prevent in-stent restenosis. Following surgery, will reevaluate balance at outpatient. 5. Essential HTN: Home meds Losartan/Lopressor. On Cardene drip in ICU with goal SBP <160. Weaned off Cardene 11/28/20  1. BB/Losartan uptitrated during admission. Still HTN at times, added Norvasc to regimen, expect improvement. Monitor. 6. Left 6th rib fracture: Trauma managing, IS, pain control. 7. CKD Stage III: Stable. 8. HLD: Statin. Addendum: Per primary RN, no plans for surgical intervention with plastics today. Will resume ASA/Lovenox.      Chief Complaint: LOC    Initial H and P:-    \"Stone Enrique is an 26-year-old black male who presented to [facility] on 11/26/2020 to be evaluated after suffering a fall with head trauma. Sita Aldrich has a past medical history of reformed smoker, CAD, CKD stage III, hypertension, hyperlipidemia, DVT, prostate cancer, back surgery, osteoarthritis.  Per report patient is on chronic aspirin and was prescribed Brilinta but was not taking it.  Per report wife states that she was in the restroom when she returned she found the patient laying on the kitchen floor.  Patient normally at baseline ambulates with a walker. Sita Aldrich did have a noted abrasion to his left forehead and a lower lip laceration.  Patient does not remember the event. Sita Aldrich was unable to provide any information on symptoms prior to the event.  In the emergency department he denied any shortness of breath, chest pain.  The wife did state that post fall he was acting different and asking questions repetitively.  CT head did reveal a small intraventricular hemorrhage and a left anterior sixth rib fracture.  He also had noted large displaced left orbital floor fracture with herniation of the anterior rectus.  There was no cervical spine fracture.  He was admitted to ICU via trauma services with a consult to the ICU team.   \"    Subjective (past 24 hours):   Denies any new concerns or complaints at this time. States that he has pain in his left eye upward gaze. No blurred vision or double vision. Denies any unilateral numbness or weakness, states he has chronic bilateral knee pain. Denies any difficulty swallowing, dizziness, headache, palpitations, chest pain, or shortness of breath. Past medical history, family history, social history and allergies reviewed again and is unchanged since admission. ROS (12 point review of systems completed. Pertinent positives noted.  Otherwise ROS is negative)     Medications:  Reviewed    Infusion Medications    sodium chloride 50 mL/hr at intact, grossly non-focal.  Psychiatric: Alert and oriented, thought content appropriate, normal insight  Capillary Refill: Brisk,< 3 seconds   Peripheral Pulses: +2 palpable, equal bilaterally     Labs:   Recent Labs     11/28/20  0402 11/29/20  0355 11/30/20  0331   WBC 6.0 6.2 6.0   HGB 14.6 15.5 15.0   HCT 47.4 49.8 48.8    150 141     Recent Labs     11/28/20  0402 11/29/20  0355 11/30/20  0331    140 141   K 4.0 4.2 4.1    106 105   CO2 21* 23 23   BUN 17 20 23*   CREATININE 1.2 0.9 1.0   CALCIUM 9.2 9.3 9.0     No results for input(s): AST, ALT, BILIDIR, BILITOT, ALKPHOS in the last 72 hours. No results for input(s): INR in the last 72 hours. No results for input(s): Afsaneh Belts in the last 72 hours. Microbiology:    Blood culture #1:   Lab Results   Component Value Date    BC No growth-preliminary  No growth   12/30/2014       Blood culture #2:No results found for: Enma Cordova    Organism:  Lab Results   Component Value Date    ORG Escherichia coli 07/07/2020         Lab Results   Component Value Date    LABGRAM  04/24/2014     No segmented neutrophils observed. No epithelial cells observed. No bacteria seen. MRSA culture only:No results found for: [address] [place] [address]    Urine culture:   Lab Results   Component Value Date    LABURIN  03/30/2016     Current antibiotic therapy ineffective in vitro for  isolate.       LABURIN [place] count: >100,000 CFU/mL 03/30/2016       Respiratory culture: No results found for: CULTRESP    Aerobic and Anaerobic :  Lab Results   Component Value Date    LABAERO  04/24/2014     right hip fluid-syringe  No growth-preliminary  No growth     Lab Results   Component Value Date    LABANAE No growth-preliminary  No growth 04/24/2014       Urinalysis:      Lab Results   Component Value Date    NITRU NEGATIVE 11/26/2020    WBCUA 2-4 11/26/2020    BACTERIA NONE SEEN 11/26/2020    RBCUA 0-2 11/26/2020    BLOODU NEGATIVE 11/26/2020    SPECGRAV 1.022 03/30/2016 GLUCOSEU NEGATIVE 11/26/2020       Radiology:  MRI BRAIN W WO CONTRAST   Final Result       1. No acute findings. 2. 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 3. 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 4. Moderate-severe chronic small vessel ischemic changes. 5. Small amount of recent hemorrhage at the level septum pellucidum and in the occipital horns of the lateral ventricles. **This report has been created using voice recognition software. It may contain minor errors which are inherent in voice recognition technology. **         Final report electronically signed by Dr. Guicho Hernandez on 11/29/2020 1:32 PM      CT HEAD WO CONTRAST   Final Result   1. Small focus of intraventricular hemorrhage associated with the    midportion of the septum pellucidum, without significant change. 2. Left orbital floor fracture with herniation of periorbital fat and the    inferior rectus muscle into the maxillary sinus. This document has been electronically signed by: Wanda Rodriguez MD on    11/28/2020 12:27 AM      All CT scans at this facility use dose modulation, iterative    reconstruction, and/or weight-based   dosing when appropriate to reduce radiation dose to as low as reasonably    achievable. CT HEAD WO CONTRAST   Final Result       1. Small amount of intraventricular hemorrhage layering in the occipital horns of lateral ventricle. There is also small amount of hemorrhage within the septum pellucidum. 2. Moderate-severe chronic small vessel ischemic changes. **This report has been created using voice recognition software. It may contain minor errors which are inherent in voice recognition technology. **      Final report electronically signed by Dr. Guicho Hernandez on 11/27/2020 1:18 PM      CT Head WO Contrast   Final Result   1.   Small focal area of intraventricular hemorrhage in the midportion of    the septum pellucidum measuring 0.7 x 0.4 cm.   2. reasonably    achievable. XR CHEST PORTABLE   Final Result   No acute findings. This document has been electronically signed by: Venu Stratton MD on    11/26/2020 10:57 PM           Ct Head Wo Contrast    Result Date: 11/28/2020  CT head without contrast Comparison:  CT,SR  - CT HEAD WO CONTRAST  - 11/26/2020 10:44 PM EST Findings: No intracranial mass, midline shift or hydrocephalus. Involutional change of brain parenchyma, compatible with advanced age. Severe white matter disease. Hematoma within the left maxillary sinus. Left orbital floor fracture without change. 1. Small focus of intraventricular hemorrhage associated with the midportion of the septum pellucidum, without significant change. 2. Left orbital floor fracture with herniation of periorbital fat and the inferior rectus muscle into the maxillary sinus. This document has been electronically signed by: Felicity Do MD on 11/28/2020 12:27 AM All CT scans at this facility use dose modulation, iterative reconstruction, and/or weight-based dosing when appropriate to reduce radiation dose to as low as reasonably achievable. Ct Head Wo Contrast    Result Date: 11/27/2020  PROCEDURE: CT HEAD WO CONTRAST CLINICAL INFORMATION: follow-up intraventricular hemorrhage. Headache. COMPARISON: Head CT 11/26/2020. TECHNIQUE: Noncontrast 5 mm axial images were obtained through the brain. Sagittal and coronal reconstructions were obtained. All CT scans at this facility use dose modulation, iterative reconstruction, and/or weight-based dosing when appropriate to reduce radiation dose to as low as reasonably achievable. FINDINGS: There are blood products layering in the occipital horns of the lateral ventricles bilaterally consistent with intraventricular hemorrhage. There is a stable area of hemorrhage within the septum pellucidum. No parenchymal hemorrhage is noted.  There is a moderate-severe amount of abnormal low attenuation in the white matter the brain suggesting chronic small vessel ischemic changes. This is stable. 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 There is no midline shift. There is no hydrocephalus. The paranasal sinuses are normally aerated. There is some fluid attenuation in the inferior mastoid air cells on the right. There is no suspicious calvarial abnormality. 1. Small amount of intraventricular hemorrhage layering in the occipital horns of lateral ventricle. There is also small amount of hemorrhage within the septum pellucidum. 2. Moderate-severe chronic small vessel ischemic changes. **This report has been created using voice recognition software. It may contain minor errors which are inherent in voice recognition technology. ** Final report electronically signed by Dr. Yonny Ellis on 11/27/2020 1:18 PM    Ct Head Wo Contrast    Result Date: 11/26/2020  ** ADDENDUM #1 ** This report was discussed with Arturo Yadav RN on Nov 26, 2020 23:28:00 EST. This document has been electronically signed by: Rina Lorenzana on 11/26/2020 11:28 PM ** ORIGINAL REPORT ** CT head without contrast Comparison:  CT,SR  - CT HEAD WO CONTRAST  - 04/28/2019 02:18 PM EDT Findings: New 0.7 x 0.4 cm hyperdense area in the midportion of the septum pellucidum. No midline shift or hydrocephalus. Stable mild generalized atrophy. Stable periventricular and centrum semiovale hypoattenuation may relate to chronic small vessel ischemic changes. Stable old lacunar infarct in the left basal ganglia. Hemorrhagic secretions in the left maxillary sinus. Partial opacification of the right mastoid air cells inferiorly. The orbits are unremarkable. No skull fracture. 1.  Small focal area of intraventricular hemorrhage in the midportion of the septum pellucidum measuring 0.7 x 0.4 cm. 2.  Hemorrhagic fluid in the left maxillary sinus.  This document has been electronically signed by: Venu Stratton MD on 11/26/2020 11:26 PM All CT scans at this facility use dose modulation, iterative reconstruction, and/or weight-based dosing when appropriate to reduce radiation dose to as low as reasonably achievable. Ct Facial Bones Wo Contrast    Result Date: 11/27/2020  CT maxillofacial without contrast Comparison:  CT,SR  - CT HEAD WO CONTRAST  - 04/28/2019 02:18 PM EDT Findings: Temporomandibular joints are intact. Small hematoma within the left maxillary sinus. Visualized intracranial contents are within normal limits. Postsurgical changes of the cervical spine. No foreign bodies. 1. Large displaced left orbital floor fracture. Herniation of the inferior rectus muscle through the resulting orbital floor defect. 2. Fracture of the posterior aspect of the left lamina papyracea. 3. Deformity of the nasal bone compatible with fracture, of uncertain age. This document has been electronically signed by: Benjamín Verduzco MD on 11/27/2020 01:28 AM All CT scans at this facility use dose modulation, iterative reconstruction, and/or weight-based dosing when appropriate to reduce radiation dose to as low as reasonably achievable. Cta Chest W Wo Contrast    Result Date: 11/26/2020  CT angiography chest with contrast. 3D Postprocessing. Comparison:  CR,SR  - XR CHEST PORTABLE  - 11/26/2020 10:02 PM EST Findings: The pulmonary arteries are well opacified. Scattered plaque is seen in the thoracic aorta without aneurysm or dissection. The heart is not enlarged. There is no pericardial effusion. Coronary artery calcifications are seen. There are no enlarged mediastinal or hilar lymph nodes. The thyroid is unremarkable. No focal airspace consolidation, pleural effusion, or pneumothorax is seen. There are no worrisome pulmonary masses or nodules. Hypodense cysts in the liver. Small hiatal hernia. Nondisplaced left anterior 6th rib fracture. Degenerative changes in the spine with intact anterior cervical fusion. 1.  Nondisplaced left anterior 6th rib fracture.   No pneumothorax or other acute visceral or vascular injury. 2.  Small hiatal hernia. This document has been electronically signed by: Hill Cruz MD on 11/26/2020 11:34 PM All CT scans at this facility use dose modulation, iterative reconstruction, and/or weight-based dosing when appropriate to reduce radiation dose to as low as reasonably achievable. Ct Cervical Spine Wo Contrast    Result Date: 11/26/2020  CT cervical spine without contrast Comparison: None Findings: Visualized intracranial contents are unremarkable. No cervical fluid collections or masses. Lung apices are clear. Normal vertebral body alignment. No acute fractures or dislocations. 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 1. No evidence of cervical spine fracture. 2. Prior anterior metallic and interbody fusion extending from C4-C6. Appropriate alignment. No evidence of hardware failure. This document has been electronically signed by: Frank Britt MD on 11/26/2020 11:37 PM All CT scans at this facility use dose modulation, iterative reconstruction, and/or weight-based dosing when appropriate to reduce radiation dose to as low as reasonably achievable. Xr Chest Portable    Result Date: 11/26/2020  1 view chest x-ray Comparison:  CR,SR  - XR CHEST STANDARD (2 VW)  - 04/29/2019 09:36 AM EDT Findings: The lungs are clear. Heart size is normal. No pulmonary vascular congestion. Stable cervical fusion hardware. Stable degenerative changes in the right shoulder. No acute findings.  This document has been electronically signed by: Hill Cruz MD on 11/26/2020 10:57 PM       Electronically signed by Rachel Bailey PA-C on 11/30/2020 at 11:11 AM

## 2020-11-30 NOTE — CONSULTS
Patient was seen and examined face-to-face by me (Dr. Duane Ours, MD). The chart, progress notes, labs and radiographs were reviewed. Case discussed with patient's primary nurse. Questions and concerns were addressed.   I agree with the note as created with additional comments as noted    Dr. Duane Ours, MD   Department of Cardiology

## 2020-11-30 NOTE — PROGRESS NOTES
[facility] RESPIRATORY ASSESSMENT PROGRAM (RAP)  30 kg and over      Patient Name: Jarad Montejo Room#: 4A-07/007-A : 1935     Admitting diagnosis:      ASSESSMENT     Vitals  Pulse: 75   Resp: 17  BP: (!) 182/82  SpO2: 95 %  Temp: 98.2 °F (36.8 °C)  Breath Sounds: clear and dim      Inspiratory Capacity:   Preoperative/predictive value: 2700 ml   33% of value: 890 ml      75% of value: 2025 ml   10 ml/kg of IBW: 710 ml   Patient's Actual Inspiratory Capacity: 1500 ml    CARE PLAN  All Care Plan selections must be based on the approved algorithms located on line in the Policy and Procedures under Respiratory Assessment Adult Protocol Handbook    INDICATIONS FOR THERAPY BASED ON HISTORY AND ASSESSMENT    Bronchial Hygiene Goal: Improvement in sputum mobilization in patients with ineffective airway clearance. Reverse the atelectasis. No indications  Volume Expansion Goal: Prevent atelectasis, Absence or improvement in signs of atelectasis, improve respiratory muscle performance   Presence of pulmonary atelectasis or conditions predisposing to the development of pulmonary atelectasis. Example: Upper/lower abdominal surgery, thoracic surgery, surgery in COPD patient, prolonged bed rest, lack of pain control, presence of thoracic or abdominal binders. Inhaled Medications Goal: Improve respiratory functions in patients with airway disease and decrease WOB  Albuterol Indications   No indications. Ipratropium Bromide Indications   No indications  Oxygenation Goal: Reverse hypoxemia and improve tissue oxygenation. (See oxygen protocol order)   No indications    THERAPIES SELECTED BASED ON ALGORITHMS    Bronchial Hygiene   No therapy recommended  Volume expansion   Incentive spirometry  Inhaled Medication   No therapy recommended   Oxygenation   No therapy recommended  ASSESSMENT OUTCOMES     Bronchial Hygiene    Other . Volume Expansion   Other . Inhaled Medication   Other .     Oxygenation   Other .    Action Plan Based on Assessment:    Continue plan as ordered. Comments: Encouraged pt to work on IS on his own.

## 2020-11-30 NOTE — PROGRESS NOTES
[address] [address]  INPATIENT SPEECH THERAPY  [facility]A  DAILY NOTE    TIME   SLP Individual Minutes  Time In: 0747  Time Out: 1391  Minutes: 23  Timed Code Treatment Minutes: 0 Minutes      Dysphagia therapy - 8 minutes  Speech/Laguage therapy - 15 minutes    Date: 2020  Patient Name: Yves Gomez      CSN: [id number]   : 1935  (81 y.o.)  Gender: male   Referring Physician:  ASMITA Gore  Diagnosis: Intraventricular hemorrhage   Secondary Diagnosis: Cognitive-linguistic deficits  Precautions: Fall risk     Current Diet: Regular diet, Thin liquids   Swallowing Strategies: Standard Universal Swallow Precautions and Full upright position, small bite/sip, pulomary monitoring, direct 1:1 supervision, alternate solids and liquids, limit distractions and monitor for fatigue  Date of Last MBS: Not Applicable    Pain:  No pain reported. Subjective:  Pt was alert and resting in bed upon arrival. Spouse present. Short-Term Goals:  SHORT TERM GOAL #1:  Goal 1: Pt will complete functional carryover tasks (i.e. orientation, immediate/delayed, working) with 60% accuracy, mod cues and focus on compensatory memory strategies to enhance retention of newly learned medical information. INTERVENTIONS: Pt was oriented to self and place, but needed mod A for date and situation. Not aware that he hit his head. SHORT TERM GOAL #2:  Goal 2: Pt will complete functional problem solving/safety awareness (i.e. time management, safety scenarios, verbal reasoning) with 60% accuracy, mod cues for enhanced safety and ADL contribution. INTERVENTIONS: Discussed potential rehab post discharged. Asked pt to provide reasons why therapy may be beneficial following discharge. MAX A required to problem solve through this scenario. Pt felt that he was safe to return home, but also stated that he had difficulty walking with physical therapy earlier today.      SHORT TERM GOAL #3:  Goal 3: Pt will complete pathologist to progress toward achievement of established goals and plan of care. .     Plan for Next Session: cognitive and swallow therapy     Mihai Pereyra M.S. [id number] [facility]

## 2020-12-01 ENCOUNTER — HOSPITAL ENCOUNTER (INPATIENT)
Age: 85
LOS: 15 days | Discharge: HOME HEALTH | DRG: 949 | End: 2020-12-16
Attending: PHYSICAL MEDICINE & REHABILITATION | Admitting: PHYSICAL MEDICINE & REHABILITATION
Payer: MEDICARE

## 2020-12-01 VITALS
OXYGEN SATURATION: 96 % | SYSTOLIC BLOOD PRESSURE: 138 MMHG | TEMPERATURE: 98.2 F | RESPIRATION RATE: 18 BRPM | WEIGHT: 221.9 LBS | HEART RATE: 79 BPM | HEIGHT: 69 IN | BODY MASS INDEX: 32.87 KG/M2 | DIASTOLIC BLOOD PRESSURE: 75 MMHG

## 2020-12-01 LAB
ANION GAP SERPL CALCULATED.3IONS-SCNC: 13 MEQ/L (ref 8–16)
BUN BLDV-MCNC: 26 MG/DL (ref 7–22)
CALCIUM SERPL-MCNC: 9.5 MG/DL (ref 8.5–10.5)
CHLORIDE BLD-SCNC: 104 MEQ/L (ref 98–111)
CO2: 23 MEQ/L (ref 23–33)
CREAT SERPL-MCNC: 1.2 MG/DL (ref 0.4–1.2)
ERYTHROCYTE [DISTWIDTH] IN BLOOD BY AUTOMATED COUNT: 12.8 % (ref 11.5–14.5)
ERYTHROCYTE [DISTWIDTH] IN BLOOD BY AUTOMATED COUNT: 47.8 FL (ref 35–45)
GFR SERPL CREATININE-BSD FRML MDRD: 70 ML/MIN/1.73M2
GLUCOSE BLD-MCNC: 97 MG/DL (ref 70–108)
HCT VFR BLD CALC: 49.8 % (ref 42–52)
HEMOGLOBIN: 15 GM/DL (ref 14–18)
MCH RBC QN AUTO: 30.7 PG (ref 26–33)
MCHC RBC AUTO-ENTMCNC: 30.1 GM/DL (ref 32.2–35.5)
MCV RBC AUTO: 101.8 FL (ref 80–94)
PLATELET # BLD: 140 THOU/MM3 (ref 130–400)
PMV BLD AUTO: 10.4 FL (ref 9.4–12.4)
POTASSIUM SERPL-SCNC: 4.7 MEQ/L (ref 3.5–5.2)
RBC # BLD: 4.89 MILL/MM3 (ref 4.7–6.1)
SODIUM BLD-SCNC: 140 MEQ/L (ref 135–145)
WBC # BLD: 5.5 THOU/MM3 (ref 4.8–10.8)

## 2020-12-01 PROCEDURE — 97535 SELF CARE MNGMENT TRAINING: CPT

## 2020-12-01 PROCEDURE — 94760 N-INVAS EAR/PLS OXIMETRY 1: CPT

## 2020-12-01 PROCEDURE — 97530 THERAPEUTIC ACTIVITIES: CPT

## 2020-12-01 PROCEDURE — 93270 REMOTE 30 DAY ECG REV/REPORT: CPT

## 2020-12-01 PROCEDURE — 6360000002 HC RX W HCPCS: Performed by: SURGERY

## 2020-12-01 PROCEDURE — APPSS30 APP SPLIT SHARED TIME 16-30 MINUTES: Performed by: PHYSICIAN ASSISTANT

## 2020-12-01 PROCEDURE — 6370000000 HC RX 637 (ALT 250 FOR IP): Performed by: PHYSICIAN ASSISTANT

## 2020-12-01 PROCEDURE — 99232 SBSQ HOSP IP/OBS MODERATE 35: CPT | Performed by: PHYSICIAN ASSISTANT

## 2020-12-01 PROCEDURE — 99231 SBSQ HOSP IP/OBS SF/LOW 25: CPT | Performed by: NEUROLOGICAL SURGERY

## 2020-12-01 PROCEDURE — APPSS60 APP SPLIT SHARED TIME 46-60 MINUTES: Performed by: NURSE PRACTITIONER

## 2020-12-01 PROCEDURE — 94640 AIRWAY INHALATION TREATMENT: CPT

## 2020-12-01 PROCEDURE — 36415 COLL VENOUS BLD VENIPUNCTURE: CPT

## 2020-12-01 PROCEDURE — 1180000000 HC REHAB R&B

## 2020-12-01 PROCEDURE — 2580000003 HC RX 258: Performed by: PHYSICIAN ASSISTANT

## 2020-12-01 PROCEDURE — 6370000000 HC RX 637 (ALT 250 FOR IP): Performed by: SURGERY

## 2020-12-01 PROCEDURE — 80048 BASIC METABOLIC PNL TOTAL CA: CPT

## 2020-12-01 PROCEDURE — 2580000003 HC RX 258: Performed by: SURGERY

## 2020-12-01 PROCEDURE — 85027 COMPLETE CBC AUTOMATED: CPT

## 2020-12-01 RX ORDER — HYDROCODONE BITARTRATE AND ACETAMINOPHEN 5; 325 MG/1; MG/1
2 TABLET ORAL EVERY 4 HOURS PRN
Status: CANCELLED | OUTPATIENT
Start: 2020-12-01

## 2020-12-01 RX ORDER — PRAVASTATIN SODIUM 40 MG
40 TABLET ORAL DAILY
Status: CANCELLED | OUTPATIENT
Start: 2020-12-02

## 2020-12-01 RX ORDER — BUDESONIDE AND FORMOTEROL FUMARATE DIHYDRATE 80; 4.5 UG/1; UG/1
2 AEROSOL RESPIRATORY (INHALATION) 2 TIMES DAILY
Status: CANCELLED | OUTPATIENT
Start: 2020-12-01

## 2020-12-01 RX ORDER — METOPROLOL TARTRATE 50 MG/1
50 TABLET, FILM COATED ORAL 2 TIMES DAILY
Status: DISCONTINUED | OUTPATIENT
Start: 2020-12-01 | End: 2020-12-09

## 2020-12-01 RX ORDER — HYDROCODONE BITARTRATE AND ACETAMINOPHEN 5; 325 MG/1; MG/1
1 TABLET ORAL EVERY 4 HOURS PRN
Status: CANCELLED | OUTPATIENT
Start: 2020-12-01

## 2020-12-01 RX ORDER — PROMETHAZINE HYDROCHLORIDE 25 MG/1
12.5 TABLET ORAL EVERY 6 HOURS PRN
Status: CANCELLED | OUTPATIENT
Start: 2020-12-01

## 2020-12-01 RX ORDER — ACETAMINOPHEN 650 MG/1
650 SUPPOSITORY RECTAL EVERY 6 HOURS PRN
Status: DISCONTINUED | OUTPATIENT
Start: 2020-12-01 | End: 2020-12-16 | Stop reason: HOSPADM

## 2020-12-01 RX ORDER — ACETAMINOPHEN 325 MG/1
650 TABLET ORAL EVERY 6 HOURS PRN
Status: DISCONTINUED | OUTPATIENT
Start: 2020-12-01 | End: 2020-12-16 | Stop reason: HOSPADM

## 2020-12-01 RX ORDER — HYDROCODONE BITARTRATE AND ACETAMINOPHEN 5; 325 MG/1; MG/1
1 TABLET ORAL EVERY 4 HOURS PRN
Status: DISCONTINUED | OUTPATIENT
Start: 2020-12-01 | End: 2020-12-01

## 2020-12-01 RX ORDER — ACETAMINOPHEN 325 MG/1
650 TABLET ORAL EVERY 6 HOURS PRN
Status: CANCELLED | OUTPATIENT
Start: 2020-12-01

## 2020-12-01 RX ORDER — ENALAPRILAT 2.5 MG/2ML
1.25 INJECTION INTRAVENOUS EVERY 6 HOURS PRN
Status: DISCONTINUED | OUTPATIENT
Start: 2020-12-01 | End: 2020-12-05

## 2020-12-01 RX ORDER — MORPHINE SULFATE 2 MG/ML
2 INJECTION, SOLUTION INTRAMUSCULAR; INTRAVENOUS
Status: DISCONTINUED | OUTPATIENT
Start: 2020-12-01 | End: 2020-12-01

## 2020-12-01 RX ORDER — LOSARTAN POTASSIUM 100 MG/1
100 TABLET ORAL DAILY
Status: DISCONTINUED | OUTPATIENT
Start: 2020-12-02 | End: 2020-12-16 | Stop reason: HOSPADM

## 2020-12-01 RX ORDER — AMLODIPINE BESYLATE 5 MG/1
5 TABLET ORAL DAILY
Status: DISCONTINUED | OUTPATIENT
Start: 2020-12-02 | End: 2020-12-16 | Stop reason: HOSPADM

## 2020-12-01 RX ORDER — ACETAMINOPHEN 650 MG/1
650 SUPPOSITORY RECTAL EVERY 6 HOURS PRN
Status: CANCELLED | OUTPATIENT
Start: 2020-12-01

## 2020-12-01 RX ORDER — PROMETHAZINE HYDROCHLORIDE 25 MG/1
12.5 TABLET ORAL EVERY 6 HOURS PRN
Status: DISCONTINUED | OUTPATIENT
Start: 2020-12-01 | End: 2020-12-16 | Stop reason: HOSPADM

## 2020-12-01 RX ORDER — ONDANSETRON 2 MG/ML
4 INJECTION INTRAMUSCULAR; INTRAVENOUS EVERY 6 HOURS PRN
Status: DISCONTINUED | OUTPATIENT
Start: 2020-12-01 | End: 2020-12-05

## 2020-12-01 RX ORDER — ASPIRIN 81 MG/1
81 TABLET, CHEWABLE ORAL DAILY
Status: CANCELLED | OUTPATIENT
Start: 2020-12-02

## 2020-12-01 RX ORDER — SODIUM CHLORIDE 0.9 % (FLUSH) 0.9 %
10 SYRINGE (ML) INJECTION PRN
Status: CANCELLED | OUTPATIENT
Start: 2020-12-01

## 2020-12-01 RX ORDER — ENALAPRILAT 2.5 MG/2ML
1.25 INJECTION INTRAVENOUS EVERY 6 HOURS PRN
Status: CANCELLED | OUTPATIENT
Start: 2020-12-01

## 2020-12-01 RX ORDER — SENNA PLUS 8.6 MG/1
2 TABLET ORAL NIGHTLY PRN
Status: DISCONTINUED | OUTPATIENT
Start: 2020-12-02 | End: 2020-12-16 | Stop reason: HOSPADM

## 2020-12-01 RX ORDER — ONDANSETRON 2 MG/ML
4 INJECTION INTRAMUSCULAR; INTRAVENOUS EVERY 6 HOURS PRN
Status: CANCELLED | OUTPATIENT
Start: 2020-12-01

## 2020-12-01 RX ORDER — LOSARTAN POTASSIUM 100 MG/1
100 TABLET ORAL DAILY
Status: CANCELLED | OUTPATIENT
Start: 2020-12-02

## 2020-12-01 RX ORDER — MORPHINE SULFATE 2 MG/ML
4 INJECTION, SOLUTION INTRAMUSCULAR; INTRAVENOUS
Status: DISCONTINUED | OUTPATIENT
Start: 2020-12-01 | End: 2020-12-01

## 2020-12-01 RX ORDER — PRAVASTATIN SODIUM 40 MG
40 TABLET ORAL DAILY
Status: DISCONTINUED | OUTPATIENT
Start: 2020-12-02 | End: 2020-12-16 | Stop reason: HOSPADM

## 2020-12-01 RX ORDER — ALBUTEROL SULFATE 2.5 MG/3ML
2.5 SOLUTION RESPIRATORY (INHALATION) EVERY 6 HOURS PRN
Status: CANCELLED | OUTPATIENT
Start: 2020-12-01

## 2020-12-01 RX ORDER — METOPROLOL TARTRATE 50 MG/1
50 TABLET, FILM COATED ORAL 2 TIMES DAILY
Status: CANCELLED | OUTPATIENT
Start: 2020-12-01

## 2020-12-01 RX ORDER — SODIUM CHLORIDE 0.9 % (FLUSH) 0.9 %
10 SYRINGE (ML) INJECTION PRN
Status: DISCONTINUED | OUTPATIENT
Start: 2020-12-01 | End: 2020-12-05

## 2020-12-01 RX ORDER — HYDROCODONE BITARTRATE AND ACETAMINOPHEN 5; 325 MG/1; MG/1
2 TABLET ORAL EVERY 4 HOURS PRN
Status: DISCONTINUED | OUTPATIENT
Start: 2020-12-01 | End: 2020-12-01

## 2020-12-01 RX ORDER — TAMSULOSIN HYDROCHLORIDE 0.4 MG/1
0.4 CAPSULE ORAL NIGHTLY
Status: DISCONTINUED | OUTPATIENT
Start: 2020-12-01 | End: 2020-12-16 | Stop reason: HOSPADM

## 2020-12-01 RX ORDER — SODIUM CHLORIDE 0.9 % (FLUSH) 0.9 %
10 SYRINGE (ML) INJECTION EVERY 12 HOURS SCHEDULED
Status: CANCELLED | OUTPATIENT
Start: 2020-12-01

## 2020-12-01 RX ORDER — ASPIRIN 81 MG/1
81 TABLET, CHEWABLE ORAL DAILY
Status: DISCONTINUED | OUTPATIENT
Start: 2020-12-02 | End: 2020-12-16 | Stop reason: HOSPADM

## 2020-12-01 RX ORDER — LIDOCAINE 4 G/G
1 PATCH TOPICAL DAILY
Status: CANCELLED | OUTPATIENT
Start: 2020-12-02

## 2020-12-01 RX ORDER — BUMETANIDE 1 MG/1
1 TABLET ORAL DAILY
Status: DISCONTINUED | OUTPATIENT
Start: 2020-12-02 | End: 2020-12-16 | Stop reason: HOSPADM

## 2020-12-01 RX ORDER — ALBUTEROL SULFATE 2.5 MG/3ML
2.5 SOLUTION RESPIRATORY (INHALATION) EVERY 6 HOURS PRN
Status: DISCONTINUED | OUTPATIENT
Start: 2020-12-01 | End: 2020-12-16 | Stop reason: HOSPADM

## 2020-12-01 RX ORDER — SODIUM CHLORIDE 0.9 % (FLUSH) 0.9 %
10 SYRINGE (ML) INJECTION EVERY 12 HOURS SCHEDULED
Status: DISCONTINUED | OUTPATIENT
Start: 2020-12-01 | End: 2020-12-05

## 2020-12-01 RX ORDER — BUDESONIDE AND FORMOTEROL FUMARATE DIHYDRATE 80; 4.5 UG/1; UG/1
2 AEROSOL RESPIRATORY (INHALATION) 2 TIMES DAILY
Status: DISCONTINUED | OUTPATIENT
Start: 2020-12-01 | End: 2020-12-16 | Stop reason: HOSPADM

## 2020-12-01 RX ORDER — MORPHINE SULFATE 2 MG/ML
2 INJECTION, SOLUTION INTRAMUSCULAR; INTRAVENOUS
Status: CANCELLED | OUTPATIENT
Start: 2020-12-01

## 2020-12-01 RX ORDER — POLYETHYLENE GLYCOL 3350 17 G/17G
17 POWDER, FOR SOLUTION ORAL DAILY PRN
Status: CANCELLED | OUTPATIENT
Start: 2020-12-01

## 2020-12-01 RX ORDER — DOCUSATE SODIUM 100 MG/1
100 CAPSULE, LIQUID FILLED ORAL 2 TIMES DAILY PRN
Status: DISCONTINUED | OUTPATIENT
Start: 2020-12-01 | End: 2020-12-16 | Stop reason: HOSPADM

## 2020-12-01 RX ORDER — POLYETHYLENE GLYCOL 3350 17 G/17G
17 POWDER, FOR SOLUTION ORAL DAILY PRN
Status: DISCONTINUED | OUTPATIENT
Start: 2020-12-01 | End: 2020-12-16 | Stop reason: HOSPADM

## 2020-12-01 RX ORDER — LIDOCAINE 4 G/G
1 PATCH TOPICAL DAILY
Status: DISCONTINUED | OUTPATIENT
Start: 2020-12-02 | End: 2020-12-16 | Stop reason: HOSPADM

## 2020-12-01 RX ORDER — MORPHINE SULFATE 4 MG/ML
4 INJECTION, SOLUTION INTRAMUSCULAR; INTRAVENOUS
Status: CANCELLED | OUTPATIENT
Start: 2020-12-01

## 2020-12-01 RX ORDER — AMLODIPINE BESYLATE 5 MG/1
5 TABLET ORAL DAILY
Status: CANCELLED | OUTPATIENT
Start: 2020-12-02

## 2020-12-01 RX ORDER — TAMSULOSIN HYDROCHLORIDE 0.4 MG/1
0.4 CAPSULE ORAL NIGHTLY
Status: CANCELLED | OUTPATIENT
Start: 2020-12-01

## 2020-12-01 RX ORDER — BUMETANIDE 1 MG/1
1 TABLET ORAL DAILY
Status: CANCELLED | OUTPATIENT
Start: 2020-12-02

## 2020-12-01 RX ADMIN — ASPIRIN 81 MG: 81 TABLET, CHEWABLE ORAL at 10:07

## 2020-12-01 RX ADMIN — BUMETANIDE 1 MG: 1 TABLET ORAL at 10:07

## 2020-12-01 RX ADMIN — BUDESONIDE AND FORMOTEROL FUMARATE DIHYDRATE 2 PUFF: 80; 4.5 AEROSOL RESPIRATORY (INHALATION) at 18:36

## 2020-12-01 RX ADMIN — PRAVASTATIN SODIUM 40 MG: 40 TABLET ORAL at 10:07

## 2020-12-01 RX ADMIN — SODIUM CHLORIDE, PRESERVATIVE FREE 10 ML: 5 INJECTION INTRAVENOUS at 10:06

## 2020-12-01 RX ADMIN — BUDESONIDE AND FORMOTEROL FUMARATE DIHYDRATE 2 PUFF: 80; 4.5 AEROSOL RESPIRATORY (INHALATION) at 07:38

## 2020-12-01 RX ADMIN — AMLODIPINE BESYLATE 5 MG: 5 TABLET ORAL at 10:07

## 2020-12-01 RX ADMIN — METOPROLOL TARTRATE 50 MG: 50 TABLET, FILM COATED ORAL at 20:24

## 2020-12-01 RX ADMIN — TAMSULOSIN HYDROCHLORIDE 0.4 MG: 0.4 CAPSULE ORAL at 20:24

## 2020-12-01 RX ADMIN — SODIUM CHLORIDE, PRESERVATIVE FREE 10 ML: 5 INJECTION INTRAVENOUS at 20:24

## 2020-12-01 RX ADMIN — LOSARTAN POTASSIUM 100 MG: 100 TABLET, FILM COATED ORAL at 10:07

## 2020-12-01 RX ADMIN — ENOXAPARIN SODIUM 40 MG: 40 INJECTION SUBCUTANEOUS at 17:56

## 2020-12-01 RX ADMIN — METOPROLOL TARTRATE 50 MG: 50 TABLET, FILM COATED ORAL at 10:07

## 2020-12-01 ASSESSMENT — PAIN SCALES - GENERAL: PAINLEVEL_OUTOF10: 0

## 2020-12-01 NOTE — PROGRESS NOTES
Event monitor applied. Patient belongings gathered. Pt has no further questions at this time. Target Cumulative Dosage (In Mg/Kg): 135

## 2020-12-01 NOTE — PROGRESS NOTES
Spoke with patient about inpatient rehab. Patient stated that he would like to come to inpatient rehab. Plan rehab today. Patient will go to  [phone]. Yareli Xie RN made aware.

## 2020-12-01 NOTE — PROCEDURES
[address] [id number]                          ELECTROENCEPHALOGRAM REPORT    PATIENT NAME: Nathaly Caceres                    :        1935  MED REC NO:   [id number]                           ROOM:       0007  ACCOUNT NO:   [de-identified]                           ADMIT DATE: 2020  PROVIDER:     Kayy Thomas. Barb Corado MD    DATE OF EE2020    REFERRING PROVIDER:  Leanne Sandy. HERMINIA Junior    CLINICAL HISTORY:  An 81-year-old male presenting with fall at home. Medications listed are amlodipine, losartan, Lovenox, metoprolol,  lidocaine, bumetanide, pravastatin, tamsulosin, nicardipine. CLINICAL INTERPRETATION:  This is a 17-channel EEG performed without  sleep deprivation. Hyperventilation was not performed. Photic  stimulation was performed. The patient was described as alert. The background rhythm activity is noted to be 7 to 8 Hz in the posterior  parietal area, symmetric. Excessive slowing was seen in theta and  occasional delta range activity suggestive of cortical dysfunction such  as seen with encephalopathy. Clinical correlation is recommended. There was no definitive evidence of epileptiform activity appreciated  throughout this recording. Intermittent polymorphic delta activity was  noted suggestive of a paradoxical cortical dysfunction, or may indicate  seizure tendency in the proper clinical context. Light stages of sleep  are seen during the recording. IMPRESSION:  This is an abnormal EEG due to the presence of intermittent  polymorphic delta activity observed during the recording that are of  questionable significance though they may indicate paradoxical cortical  dysfunction, or if seizure tendency in the proper clinical context. No  clinical seizures were observed.   In addition, excessive slowing was  seen in theta and delta range activity suggestive of cortical  dysfunction such as seen with encephalopathy.         Balbina Wing MD    D: 12/01/2020 9:29:20       T: 12/01/2020 9:36:20     [email or web address]  Job#: [id number]     Doc#: [id number]    CC:

## 2020-12-01 NOTE — PROGRESS NOTES
Called report to [employer]. Notified EKG to apply 30 day event monitor today, per Rachel TIAN request.      Called spouse, Alfredo Bowers, and updated her on transfer to [facility] today.

## 2020-12-01 NOTE — PROCEDURES
30 day cardiac event monitor applied. Instructions given. Prep taught. Patient seemed overwhelmed with instructions. Reinforced instructions to patient. Patient seemed more comfortable with instructions. No further questions.   Jennifer, CCT

## 2020-12-01 NOTE — PROGRESS NOTES
Admitted to the Inpatient Rehabilitation Unit via wheelchair. Patient was then oriented to room and unit. Education provided on the rehabilitation routine: three hours of therapy five days per week and dining room for lunch. Explained patients right to have family, representative or physician notified of their admission. Patient has Declined for physician to be notified. Patient has Declined for family/representative to be notified. Admitting medication orders compared with acute stay medications; home medication list reviewed with patient/family. Medication issues identified No  Medication issue: No.  If yes, physician notified Dr Jesus Jackson. Bladder and Bowel Function Assessment:  1. Prior history of bladder problems: no problems with bladder  2. Number of pads used per day: 1  3. Frequency of night time voiding: twice  4. Fluid intake volume and pattern: 6-8 glasses  5. Last BM: 12/1/2020  6. Prior history of bowel problems: No      Incontinence      Frequent diarrhea      Constipation      Hemorrhoids      Diverticulitis      Bowel Surgery     Two nurse skin assessment performed by Shanda QUEEN  and Veronica Adame LPN. Care plan was created with patient's input and goals were agreed upon. Admission folder provided with education regarding patients diagnoses, fall prevention, skin care, and M in the box. \"Data Collection Information Summary for Patients in Inpatient Rehabilitation Facilities\" and \"Privacy Act Statement - Health Care Records\" provided. Please refer to the admission navigator for further information.

## 2020-12-01 NOTE — CARE COORDINATION
12/1/20, 11:10 AM EST    Patient goals/plan/ treatment preferences discussed by  and . Patient goals/plan/ treatment preferences reviewed with patient/ family. Patient/ family verbalize understanding of discharge plan and are in agreement with goal/plan/treatment preferences. Understanding was demonstrated using the teach back method. AVS provided by RN at time of discharge, which includes all necessary medical information pertaining to the patients current course of illness, treatment, post-discharge goals of care, and treatment preferences. IMM Letter  IMM Letter given to Patient/Family/Significant other/Guardian/POA/by[de-identified]   IMM Letter date given[de-identified] 12/01/20  IMM Letter time given[de-identified] 8023     Pt to be discharged to IP Rehab. He is in agreement.

## 2020-12-01 NOTE — PROGRESS NOTES
admission. Current functional status for upper extremity ADLs: minimal assistance    Current functional status for lower extremity ADLs:  Maximum assistance    Current functional status for bed, chair, wheelchair transfers: moderate assistance    Current functional status for toilet transfers: moderate assistance    Current functional status for locomotion:   Assist Level:  Minimal Assistance, Moderate Assistance, with verbal cues  and with increased time for completion. Distance: [facility] EOB [facility]   Pt had no LOB, very unsteady and moves very slow with SW.      Current functional status for bladder management: Modified independence    Current functional status for bowel management:Modified independence     Current functional status for comprehension: Moderate assistance    Current functional status for expression: Moderate assistance    Current functional status for social interaction: Moderate assistance    Current functional status for problem solving: Moderate assistance    Current functional status for memory:  Moderate assistance    Expected level of Improvement in Self-Care:  Complete independence    Expected level of Improvement in Sphincter Control:  Complete independence    Expected level of Improvement in Transfers: Complete independence    Expected level of Improvement in Locomotion:  Complete independence    Expected level of Improvement in Communication and Social Cognition: Complete independence    Expected length of time to achieve that level of improvement: 2 weeks    Current rehab issues: ADL dysfunction,bladder management,bowel management,carry over of therapy techniques, discharge planning, disease and co-morbidity management, gait/mobility dysfunction, medication management, nutrition and hydration management,Ongoing assessment of safety, Pain management, Patient and family education, Prevention of secondary complications, Skin Integrity,cognitive impairment, communication impairment. Required therapy: Physical Therapy, Occupational Therapy and Speech Therapy 3 hours per day, 5-6 days per week. Recreational Therapy 1 hour per week. Expected Discharge Destination: Home    Expected Post Discharge Treatments: Out Patient    Other information relevant to the care needs:     Acute Inpatient Rehabilitation Disclosure Statement provided to patient. Patient verbalized understanding. I have reviewed and concur with the findings and results of the pre-admission screening assessment completed by the Inpatient Rehabilitation Admissions Coordinator.     Tacos Duenas MD

## 2020-12-01 NOTE — PROGRESS NOTES
Addendum by Dr. Faviola Mcclure MD:  I have seen and examined the patient independently. Face to face evaluation and examination was performed. The below evaluation and note have been reviewed. Labs and radiographs were reviewed. I Have discussed with Neurosurgery PA about this patient in detail. The below assessment and plan have been reviewed. Please see my modifications mentioned below.      My additional comments and modifications:     -No acute event over the night.  -There is no significant change in patient surgical exam comparing with yesterday.  Patient is awake and alert but confused, follows command by 4 good strength throughout.  -Continue the current medical management per hospitalist. service and pt's primary.  -Follow-up the neurology recommendation.  -Brain MRI :   1. No acute findings. 2. 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 3. 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 4. Moderate-severe chronic small vessel ischemic changes. 5. Small amount of recent hemorrhage at the level septum pellucidum and in the occipital horns of the lateral ventricles.       -No acute neurosurgical intervention is indicated at this time. -PT and OT.  -Up to the chair.  -Patient can be discharged from neurosurgical perspective. He needs to follow-up with neurosurgery after 2 weeks with a new brain CT(this can be performed as an E-visit)  Maik Carter MD        Neurosurgery Progress Note    Patient:  Bala Duque      Unit/Bed:4A-07/007-A    YOB: 1935    MRN: [id number]     Acct: [de-identified]     Admit date: 11/26/2020    Chief Complaint   Patient presents with    Loss of Consciousness       Patient Seen, Chart, Physician notes, Labs, Radiology studies reviewed. Subjective: Patient is seen and evaluated on the floor this morning with evaluation and exam findings discussed and reviewed with Dr. Rosi Roberts.   Patient was comfortable this morning without any significant complaints and was enjoying physical therapy at the time of exam.    Past, Family, Social History unchanged from admission. Diet:  DIET GENERAL; No Added Salt (3-4 GM); Cardiac    Medications:  Scheduled Meds:   amLODIPine  5 mg Oral Daily    budesonide-formoterol  2 puff Inhalation BID    aspirin  81 mg Oral Daily    losartan  100 mg Oral Daily    enoxaparin  40 mg Subcutaneous Q24H    metoprolol tartrate  50 mg Oral BID    sodium chloride flush  10 mL Intravenous 2 times per day    lidocaine  1 patch Transdermal Daily    bumetanide  1 mg Oral Daily    pravastatin  40 mg Oral Daily    tamsulosin  0.4 mg Oral Nightly     Continuous Infusions:   niCARdipine Stopped (11/28/20 1226)     PRN Meds:enalaprilat, albuterol, sodium chloride flush, acetaminophen **OR** acetaminophen, polyethylene glycol, promethazine **OR** ondansetron, morphine **OR** morphine, HYDROcodone 5 mg - acetaminophen **OR** HYDROcodone 5 mg - acetaminophen    Objective: Patient is sitting up at the edge of the bed enjoying physical therapy with a therapist in the room at the time of exam.  Is comfortable and was without significant complaints and seemed more oriented and alert today on exam.  He is stable and grossly intact with no significant changes noted the patient chart overnight in anticipation of a transfer to TCU for additional inpatient rehabilitation anticipated. Vitals: /75   Pulse 79   Temp 98.2 °F (36.8 °C) (Oral)   Resp 18   Ht 5' 9\" (1.753 m)   Wt 221 lb 14.4 oz (100.7 kg)   SpO2 96%   BMI 32.77 kg/m²   Physical Exam:  Alert and attentive. Language appropriate, with no aphasia. Pupils equal.  Facial strength symmetric. Physical Exam  Patient is comfortable and remains grossly intact on exam to his baseline with no significant changes noted the patient chart overnight.   He was enjoying physical therapy with therapist in the room at the time of exam.      ROS:  Review of Systems  Denied headache, nausea or vomiting, or visual disturbances. He denied chest pain or shortness of breath. The review of systems is otherwise unremarkable. 24 hour intake/output:    Intake/Output Summary (Last 24 hours) at 12/1/2020 1008  Last data filed at 12/1/2020 0407  Gross per 24 hour   Intake 800 ml   Output 375 ml   Net 425 ml     Last 3 weights: Wt Readings from Last 3 Encounters:   12/01/20 221 lb 14.4 oz (100.7 kg)   10/22/20 225 lb (102.1 kg)   10/15/20 225 lb (102.1 kg)         CBC:   Recent Labs     11/29/20 0355 11/30/20  0331 12/01/20  0331   WBC 6.2 6.0 5.5   HGB 15.5 15.0 15.0    141 140     BMP:    Recent Labs     11/29/20 0355 11/30/20  0331 12/01/20  0331    141 140   K 4.2 4.1 4.7    105 104   CO2 23 23 23   BUN 20 23* 26*   CREATININE 0.9 1.0 1.2   GLUCOSE 106 99 97     Calcium:  Recent Labs     12/01/20 0331   CALCIUM 9.5     Magnesium:No results for input(s): MG in the last 72 hours. Glucose:No results for input(s): POCGLU in the last 72 hours. HgbA1C: No results for input(s): LABA1C in the last 72 hours. Lipids: No results for input(s): CHOL, TRIG, HDL, LDLCALC in the last 72 hours. Invalid input(s): LDL    Radiology reports as per the Radiologist  Radiology: Ct Head Wo Contrast    Result Date: 11/28/2020  CT head without contrast Comparison:  CT,SR  - CT HEAD WO CONTRAST  - 11/26/2020 10:44 PM EST Findings: No intracranial mass, midline shift or hydrocephalus. Involutional change of brain parenchyma, compatible with advanced age. Severe white matter disease. Hematoma within the left maxillary sinus. Left orbital floor fracture without change. 1. Small focus of intraventricular hemorrhage associated with the midportion of the septum pellucidum, without significant change. 2. Left orbital floor fracture with herniation of periorbital fat and the inferior rectus muscle into the maxillary sinus.  This document has been electronically signed by: Dewayne Ahmadi MD on 11/28/2020 12:27 AM All CT scans at this facility use dose modulation, iterative reconstruction, and/or weight-based dosing when appropriate to reduce radiation dose to as low as reasonably achievable. Ct Head Wo Contrast    Result Date: 11/27/2020  PROCEDURE: CT HEAD WO CONTRAST CLINICAL INFORMATION: follow-up intraventricular hemorrhage. Headache. COMPARISON: Head CT 11/26/2020. TECHNIQUE: Noncontrast 5 mm axial images were obtained through the brain. Sagittal and coronal reconstructions were obtained. All CT scans at this facility use dose modulation, iterative reconstruction, and/or weight-based dosing when appropriate to reduce radiation dose to as low as reasonably achievable. FINDINGS: There are blood products layering in the occipital horns of the lateral ventricles bilaterally consistent with intraventricular hemorrhage. There is a stable area of hemorrhage within the septum pellucidum. No parenchymal hemorrhage is noted. There is a moderate-severe amount of abnormal low attenuation in the white matter the brain suggesting chronic small vessel ischemic changes. This is stable. 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 There is no midline shift. There is no hydrocephalus. The paranasal sinuses are normally aerated. There is some fluid attenuation in the inferior mastoid air cells on the right. There is no suspicious calvarial abnormality. 1. Small amount of intraventricular hemorrhage layering in the occipital horns of lateral ventricle. There is also small amount of hemorrhage within the septum pellucidum. 2. Moderate-severe chronic small vessel ischemic changes. **This report has been created using voice recognition software. It may contain minor errors which are inherent in voice recognition technology. ** Final report electronically signed by Dr. Raffi Stevenson on 11/27/2020 1:18 PM    Ct Head Wo Contrast    Result Date: 11/26/2020  CT head without contrast Comparison: CT,SR  - CT HEAD WO CONTRAST  - 04/28/2019 02:18 PM EDT Findings: New 0.7 x 0.4 cm hyperdense area in the midportion of the septum pellucidum. No midline shift or hydrocephalus. Stable mild generalized atrophy. Stable periventricular and centrum semiovale hypoattenuation may relate to chronic small vessel ischemic changes. Stable old lacunar infarct in the left basal ganglia. Hemorrhagic secretions in the left maxillary sinus. Partial opacification of the right mastoid air cells inferiorly. The orbits are unremarkable. No skull fracture. 1.  Small focal area of intraventricular hemorrhage in the midportion of the septum pellucidum measuring 0.7 x 0.4 cm. 2.  Hemorrhagic fluid in the left maxillary sinus. This document has been electronically signed by: Annabelle Medina MD on 11/26/2020 11:26 PM All CT scans at this facility use dose modulation, iterative reconstruction, and/or weight-based dosing when appropriate to reduce radiation dose to as low as reasonably achievable. Ct Facial Bones Wo Contrast    Result Date: 11/27/2020  CT maxillofacial without contrast Comparison:  CT,SR  - CT HEAD WO CONTRAST  - 04/28/2019 02:18 PM EDT Findings: Temporomandibular joints are intact. Small hematoma within the left maxillary sinus. Visualized intracranial contents are within normal limits. Postsurgical changes of the cervical spine. No foreign bodies. 1. Large displaced left orbital floor fracture. Herniation of the inferior rectus muscle through the resulting orbital floor defect. 2. Fracture of the posterior aspect of the left lamina papyracea. 3. Deformity of the nasal bone compatible with fracture, of uncertain age. This document has been electronically signed by: Benjamín Verduzco MD on 11/27/2020 01:28 AM All CT scans at this facility use dose modulation, iterative reconstruction, and/or weight-based dosing when appropriate to reduce radiation dose to as low as reasonably achievable.      Cta Chest W Wo Contrast    Result Date: 11/26/2020  CT angiography chest with contrast. 3D Postprocessing. Comparison:  CR,SR  - XR CHEST PORTABLE  - 11/26/2020 10:02 PM EST Findings: The pulmonary arteries are well opacified. Scattered plaque is seen in the thoracic aorta without aneurysm or dissection. The heart is not enlarged. There is no pericardial effusion. Coronary artery calcifications are seen. There are no enlarged mediastinal or hilar lymph nodes. The thyroid is unremarkable. No focal airspace consolidation, pleural effusion, or pneumothorax is seen. There are no worrisome pulmonary masses or nodules. Hypodense cysts in the liver. Small hiatal hernia. Nondisplaced left anterior 6th rib fracture. Degenerative changes in the spine with intact anterior cervical fusion. 1.  Nondisplaced left anterior 6th rib fracture. No pneumothorax or other acute visceral or vascular injury. 2.  Small hiatal hernia. This document has been electronically signed by: Annabelle Medina MD on 11/26/2020 11:34 PM All CT scans at this facility use dose modulation, iterative reconstruction, and/or weight-based dosing when appropriate to reduce radiation dose to as low as reasonably achievable. Ct Cervical Spine Wo Contrast    Result Date: 11/26/2020  CT cervical spine without contrast Comparison: None Findings: Visualized intracranial contents are unremarkable. No cervical fluid collections or masses. Lung apices are clear. Normal vertebral body alignment. No acute fractures or dislocations. 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 1. No evidence of cervical spine fracture. 2. Prior anterior metallic and interbody fusion extending from C4-C6. Appropriate alignment. No evidence of hardware failure.  This document has been electronically signed by: Benjamín Verduzco MD on 11/26/2020 11:37 PM All CT scans at this facility use dose modulation, iterative reconstruction, and/or weight-based dosing when appropriate to reduce radiation dose to as low as reasonably achievable. Xr Chest Portable    Result Date: 11/26/2020  1 view chest x-ray Comparison:  CR,SR  - XR CHEST STANDARD (2 VW)  - 04/29/2019 09:36 AM EDT Findings: The lungs are clear. Heart size is normal. No pulmonary vascular congestion. Stable cervical fusion hardware. Stable degenerative changes in the right shoulder. No acute findings. This document has been electronically signed by: Myriam Fung MD on 11/26/2020 10:57 PM     Vl Dup Lower Extremity Venous Bilateral    Result Date: 11/19/2020  PROCEDURE: VL DUP LOWER EXTREMITY VENOUS BILATERAL CLINICAL INFORMATION: 73-year-old male with bilateral leg pain. COMPARISON: Ultrasound dated 3/30/2016. TECHNIQUE: Venous doppler ultrasound was performed of the bilateral lower extremities using gray scale, color flow and spectral doppler imaging. FINDINGS: There is normal color flow, spectral analysis and compressibility of the common femoral vein, superficial femoral vein and popliteal vein bilaterally . There is normal color flow and compressibility in the posterior tibial veins, anterior tibial veins and peroneal veins. No evidence of a DVT. **This report has been created using voice recognition software. It may contain minor errors which are inherent in voice recognition technology. ** Final report electronically signed by Dr Jon Hill on 11/19/2020 8:16 AM    Mri Brain W Wo Contrast    Result Date: 11/29/2020  PROCEDURE: MRI BRAIN W WO CONTRAST CLINICAL INFORMATIONunusual septum pellucidum ICH/IVH, r/o tumor, stroke. Head trauma. Loss of consciousness. COMPARISON: Head CT 11/27/2020.  TECHNIQUE: Multiplanar and multiple spin echo T1 and T2-weighted images were obtained through the brain before and after the administration of intravenous contrast. FINDINGS: On the diffusion-weighted images, there is a faint area of high signal in the right thalamus. This does not have low signal on the ADC map. There is some high signal on the FLAIR and T2-weighted sequences. This is not consistent with an acute infarct. This may represent a late subacute infarct. The brain volume is reduced. There is an area of susceptibility artifact within the leaflets of the septum pellucidum consistent with a small site of hemorrhage. There is also some blood products layering in the occipital horns of the lateral ventricles consistent with intraventricular hemorrhage. There is mineralization in the medial aspects of the basal ganglia bilaterally. There is an old microhemorrhage in the left frontal lobe. There is no hydrocephalus, midline shift or mass effect. On the FLAIR and T2-weighted sequences, there is a moderate-severe amount of abnormal signal in the white matter the brain suggesting chronic small vessel ischemic changes. There are old lacunar type infarcts in the thalami and basal ganglia bilaterally. There is no abnormal enhancement in the brain. The major intracranial vascular flow voids are present. The midline craniocervical junction structures are normal.  The brainstem and pituitary gland are normal.      1. No acute findings. 2. 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 3. 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 4. Moderate-severe chronic small vessel ischemic changes. 5. Small amount of recent hemorrhage at the level septum pellucidum and in the occipital horns of the lateral ventricles. **This report has been created using voice recognition software. It may contain minor errors which are inherent in voice recognition technology. ** Final report electronically signed by Dr. Leigha Montgomery on 11/29/2020 1:32 PM    A/P: S/P patient is seen and evaluated on the floor with evaluation and exam findings discussed and reviewed with Dr. Clayton Stanley.   He was sitting up at the edge of the bed today enjoying physical therapy with the therapist present during the exam process this morning without significant complaints. He remained stable and grossly intact with no significant changes noted to the patient's chart overnight. Anticipated transfer to TCU for additional inpatient rehab is scheduled for today. Neurosurgery follow.     Electronically signed by Mark Conley PA-C on 12/1/2020 at 10:08 AM

## 2020-12-01 NOTE — PROGRESS NOTES
1.03 11/26/2020       Assessment:     Active Problems:    Intraventricular hemorrhage (HCC)    Elevated troponin    Fracture of left orbital floor (HCC)    Nasal bone fracture    Closed fracture of six ribs of left side    Closed fracture of one rib of left side    Intracranial bleed (HCC)  Resolved Problems:    * No resolved hospital problems.  *      Plan:     **patient denied chest pain    stable neurological status    will need antiplatlets bp is under control    will see at the office

## 2020-12-01 NOTE — PROGRESS NOTES
[address]  Occupational Therapy  Daily Note  Time:   Time In: 827  Time Out:   Timed Code Treatment Minutes: 45 Minutes  Minutes: 38          Date: 2020  Patient Name: Jose Maria Beaver,   Gender: male      Room: [facility]007-A  MRN: [id number]  : 1935  (81 y.o.)  Referring Practitioner: ASMITA Ivy  Diagnosis: Intraventricular Hemorrhage  Additional Pertinent Hx: Pt presenting at [facility] by activation of level 2 trauma, brought by EMS following a unwitnessed fall with unknown LOC; past medical history includes recent cardiac sten placement x2, hypertension, chronic kidney disease, CAD. Per wife report, she was standing in the bathroom when she heard a sound in the kitchen she open the bathroom door to see her  laying face down on the ground. Patient states he normally ambulates with walker, did have a walker at the time of fall, unclear if fall was precipitated by syncopal episode. Wife states that  does appear to be confused post fall, has been repetitive in speech since incident. Patient reports some mild discomfort over left face otherwise has no complaints on exam. Patient  had no shortness of breath, numbness/paresthesias, nausea, vomiting, or weakness upon admission. Restrictions/Precautions:  Restrictions/Precautions: Fall Risk  Position Activity Restriction  Other position/activity restrictions: Keep systolic blood pressure between 100-160 while moving. SUBJECTIVE: Pt seated upright in bed upon arrival, agreeable to OT session. PAIN: No c/o. COGNITION: Slow Processing, Decreased Recall, Decreased Insight, Decreased Problem Solving, Decreased Safety Awareness and Impaired Attention    ADL:   Feeding: with set-up. For breakfast tray. Grooming: Stand By Assistance. Washign face and applying deodorant seated EOB. Bathing: Moderate Assistance. MaxA for jacinto care and washing bottom in standing.  Pt able to complete UB care and washing BLE above/below knees with SBA seated EOB. Upper Extremity Dressing: Moderate Assistance. ModA bringing hospital shirt ovwerhead. Lower Extremity Dressing: Maximum Assistance. Threading BLE into undergarment/shorts and managaing over hips. Dina Money BALANCE:  Sitting Balance:  Stand By Assistance. EOB  Standing Balance: Minimal Assistance, Moderate Assistance, X 1.    x1 minute static  prep for mobility, within ADL task    BED MOBILITY:  Supine to Sit: Minimal Assistance    Scooting: Moderate Assistance EOB    TRANSFERS:  Sit to Stand: Moderate Assistance, X 1. Form elevated EOB  Stand to Sit: Contact Guard Assistance. To chair. FUNCTIONAL MOBILITY:  Assistive Device: Walker  Assist Level:  Minimal Assistance, Moderate Assistance and X 1. Distance:   Completed functional mobility short distance within pt room from EOB to chair at VERY slow pace, no LOB noted- small shuffling steps with mod assist for weight shifting required to build momentum. Pt requires mod cues for walker safety- lenghty seated rest break after trial of mobility, mod fatigue noted. ASSESSMENT:     Activity Tolerance:  Patient tolerance of  treatment: fair. Pt limited by decreased endurance. Discharge Recommendations: Patient would benefit from continued therapy after discharge, Continue to assess pending progress, IP Rehab   Equipment Recommendations: Equipment Needed: Yes  Other: May benfit from [facility] and an elevated toilet seat with armrests or commode. Plan: Times per week: 6x  Specific instructions for Next Treatment: Functional mobility; ADLs and adaptations; upper body exercises and steady breathing  Current Treatment Recommendations: Functional Mobility Training, Endurance Training, Self-Care / ADL, Safety Education & Training  Plan Comment: Pt would benefit from continued skilled OT services when medically stable and discharged from Acute. IP Rehab would benefit.     Patient Education  Patient Education: ADL's, Importance of Increasing Activity, Assistive Device Safety and Safety with transfers and mobility. Goals  Short term goals  Time Frame for Short term goals: By discharge  Short term goal 1: Pt will demonstrate functional mobility walking to/from the bathroom or bedside chair with SBA while using his rolling walker with min cues for aligning himself as neeed to prepare for doing self care. Short term goal 2: Pt will complete lower body ADLs while using any AE needed with CGA to increase his independence with self care. Short term goal 3: Pt will complete transfers to/from various surfaces including a bedside commode with armrests with SBA and min cues for alignment prior to sitting to increase his independence with toileting routine. Short term goal 4: Pt will complete BUE ROM and light resistance exercises while following verbal cues to increase his pain free movement for ease of doing self care. Following session, patient left in safe position with all fall risk precautions in place.

## 2020-12-01 NOTE — PROGRESS NOTES
Hospitalist Progress Note      Patient:  Bowen Sepulveda    Unit/Bed:4A-07/007-A  YOB: 1935  MRN: [id number]   Acct: [de-identified]   PCP: Saul Welsh MD  Date of Admission: 11/26/2020    Assessment/Plan:    1. Intraventricular Hemorrhage: Trauma primary. Neurosurgery/neurology following and stable. Goal is to maintain -160, anti-HTN meds as below. 1. 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 2. Lovenox for DVT prophylaxis post-operatively. 3. ASA resumed 11/30/20 per neurology recs. Recommend resuming Brilinta on OP basis after f/u with neurology/cardiology in 2 weeks. 2. Syncope and Collapse: Reportedly bradycardic at times although VS appear HR in 70's-80's.  Known history of CAD and follows with Dr. Isai Cifuentes.    1. Echocardiogram with EF 86%, grade 1 diastolic dysfunction. No AS noted. 2. Holter monitor on discharge. 3. Left orbital fracture: Ophtalmology/Plastic surgery consulted. Dr. Doris Rand, plastics reviewed imaging and notes no emergent need for surgical intervention without inferior rectus impingement, conservative managemetn. 4. Mildly elevated Troponin, Hx of CAD: Follows with Dr. Isai Cifuentes OP, with recent PCI to circumflex on 10/22/20 with recommendations for DAPT, and consideration of rotablator of LAD in future. 1. Trop on admission trended 0.012-0.017 with no acute chest pain. 2. Neurology/Cardiology assisted. Resumed ASA, needs close f/u with neurology/cardiology in 2 weeks to review Brilinta. 5. Essential HTN: Home meds Losartan/Lopressor. On Cardene drip in ICU with goal SBP <160. Weaned off Cardene 11/28/20  1. BB/Losartan uptitrated during admission. Norvasc added with improvement. Continue current BP meds. 6. Left 6th rib fracture: Trauma managing, IS, pain control. 7. CKD Stage III: Stable. 8. HLD: Statin.     Disposition: Stable from hospitalist, accepted to IPR. BP improved with addition of Norvasc. Holter monitor on d/c, f/u with Dr. Jhonny Harp neurology and Dr. Ryan Castaneda cardiology in 2 weeks to reevaluate Brilinta. Chief Complaint: LOC    Initial H and P:-    \"\"Stone Umana is an 52-year-old black male who presented to [facility] on 11/26/2020 to be evaluated after suffering a fall with head trauma. Sumeet Roberts has a past medical history of reformed smoker, CAD, CKD stage III, hypertension, hyperlipidemia, DVT, prostate cancer, back surgery, osteoarthritis.  Per report patient is on chronic aspirin and was prescribed Brilinta but was not taking it.  Per report wife states that she was in the restroom when she returned she found the patient laying on the kitchen floor.  Patient normally at baseline ambulates with a walker. Sumeet Roberts did have a noted abrasion to his left forehead and a lower lip laceration.  Patient does not remember the event. Sumeet Roberts was unable to provide any information on symptoms prior to the event.  In the emergency department he denied any shortness of breath, chest pain.  The wife did state that post fall he was acting different and asking questions repetitively.  CT head did reveal a small intraventricular hemorrhage and a left anterior sixth rib fracture.  He also had noted large displaced left orbital floor fracture with herniation of the anterior rectus.  There was no cervical spine fracture.  He was admitted to ICU via trauma services with a consult to the ICU team.\"    Subjective (past 24 hours):   Patient resting comfortably in chair. States that he has had improvement in his left eye pain. Denies any chest pain, palpitations, lightheadedness, dizziness, near syncope. Tolerating diet without difficulty. Past medical history, family history, social history and allergies reviewed again and is unchanged since admission. ROS (12 point review of systems completed. Pertinent positives noted.  Otherwise ROS is negative)     Medications: Reviewed    Infusion Medications    niCARdipine Stopped (11/28/20 1226)     Scheduled Medications    amLODIPine  5 mg Oral Daily    budesonide-formoterol  2 puff Inhalation BID    aspirin  81 mg Oral Daily    losartan  100 mg Oral Daily    enoxaparin  40 mg Subcutaneous Q24H    metoprolol tartrate  50 mg Oral BID    sodium chloride flush  10 mL Intravenous 2 times per day    lidocaine  1 patch Transdermal Daily    bumetanide  1 mg Oral Daily    pravastatin  40 mg Oral Daily    tamsulosin  0.4 mg Oral Nightly     PRN Meds: enalaprilat, albuterol, sodium chloride flush, acetaminophen **OR** acetaminophen, polyethylene glycol, promethazine **OR** ondansetron, morphine **OR** morphine, HYDROcodone 5 mg - acetaminophen **OR** HYDROcodone 5 mg - acetaminophen      Intake/Output Summary (Last 24 hours) at 12/1/2020 1019  Last data filed at 12/1/2020 0407  Gross per 24 hour   Intake 800 ml   Output 375 ml   Net 425 ml       Diet:  DIET GENERAL; No Added Salt (3-4 GM); Cardiac    Exam:  /75   Pulse 79   Temp 98.2 °F (36.8 °C) (Oral)   Resp 18   Ht 5' 9\" (1.753 m)   Wt 221 lb 14.4 oz (100.7 kg)   SpO2 96%   BMI 32.77 kg/m²   General appearance: No apparent distress, appears stated age and cooperative. HEENT: Pupils equal, round, and reactive to light. Conjunctivae/corneas clear. Neck: Supple, with full range of motion. No jugular venous distention. Trachea midline. Respiratory:  Normal respiratory effort. Clear to auscultation, bilaterally without Rales/Wheezes/Rhonchi. Cardiovascular: Regular rate and rhythm with normal S1/S2 without murmurs, rubs or gallops. Abdomen: Soft, non-tender, non-distended with normal bowel sounds. Musculoskeletal: passive and active ROM x 4 extremities. Skin: Skin color, texture, turgor normal.  No rashes or lesions. Neurologic:  Neurovascularly intact without any focal sensory/motor deficits.  Cranial nerves: II-XII intact, grossly non-focal.  Psychiatric: Alert and oriented, thought content appropriate, normal insight  Capillary Refill: Brisk,< 3 seconds   Peripheral Pulses: +2 palpable, equal bilaterally     Labs:   Recent Labs     11/29/20 0355 11/30/20 0331 12/01/20  0331   WBC 6.2 6.0 5.5   HGB 15.5 15.0 15.0   HCT 49.8 48.8 49.8    141 140     Recent Labs     11/29/20  0355 11/30/20 0331 12/01/20  0331    141 140   K 4.2 4.1 4.7    105 104   CO2 23 23 23   BUN 20 23* 26*   CREATININE 0.9 1.0 1.2   CALCIUM 9.3 9.0 9.5     No results for input(s): AST, ALT, BILIDIR, BILITOT, ALKPHOS in the last 72 hours. No results for input(s): INR in the last 72 hours. No results for input(s): Jadene Moh in the last 72 hours. Microbiology:    Blood culture #1:   Lab Results   Component Value Date    BC No growth-preliminary  No growth   12/30/2014       Blood culture #2:No results found for: Alida Llanes    Organism:  Lab Results   Component Value Date    ORG Escherichia coli 07/07/2020         Lab Results   Component Value Date    LABGRAM  04/24/2014     No segmented neutrophils observed. No epithelial cells observed. No bacteria seen. MRSA culture only:No results found for: [address] [place] [address]    Urine culture:   Lab Results   Component Value Date    LABURIN  03/30/2016     Current antibiotic therapy ineffective in vitro for  isolate.       LABURIN [place] count: >100,000 CFU/mL 03/30/2016       Respiratory culture: No results found for: CULTRESP    Aerobic and Anaerobic :  Lab Results   Component Value Date    LABAERO  04/24/2014     right hip fluid-syringe  No growth-preliminary  No growth     Lab Results   Component Value Date    LABANAE No growth-preliminary  No growth 04/24/2014       Urinalysis:      Lab Results   Component Value Date    NITRU NEGATIVE 11/26/2020    WBCUA 2-4 11/26/2020    BACTERIA NONE SEEN 11/26/2020    RBCUA 0-2 11/26/2020    BLOODU NEGATIVE 11/26/2020    SPECGRAV 1.022 03/30/2016    GLUCOSEU NEGATIVE 11/26/2020 Radiology:  MRI BRAIN W WO CONTRAST   Final Result       1. No acute findings. 2. 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 3. 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 4. Moderate-severe chronic small vessel ischemic changes. 5. Small amount of recent hemorrhage at the level septum pellucidum and in the occipital horns of the lateral ventricles. **This report has been created using voice recognition software. It may contain minor errors which are inherent in voice recognition technology. **         Final report electronically signed by Dr. Matheus Keller on 11/29/2020 1:32 PM      CT HEAD WO CONTRAST   Final Result   1. Small focus of intraventricular hemorrhage associated with the    midportion of the septum pellucidum, without significant change. 2. Left orbital floor fracture with herniation of periorbital fat and the    inferior rectus muscle into the maxillary sinus. This document has been electronically signed by: Jazmín Britton MD on    11/28/2020 12:27 AM      All CT scans at this facility use dose modulation, iterative    reconstruction, and/or weight-based   dosing when appropriate to reduce radiation dose to as low as reasonably    achievable. CT HEAD WO CONTRAST   Final Result       1. Small amount of intraventricular hemorrhage layering in the occipital horns of lateral ventricle. There is also small amount of hemorrhage within the septum pellucidum. 2. Moderate-severe chronic small vessel ischemic changes. **This report has been created using voice recognition software. It may contain minor errors which are inherent in voice recognition technology. **      Final report electronically signed by Dr. Matheus Keller on 11/27/2020 1:18 PM      CT Head WO Contrast   Final Result   1.   Small focal area of intraventricular hemorrhage in the midportion of    the septum pellucidum measuring 0.7 x 0.4 cm.   2.  Hemorrhagic fluid in the left maxillary sinus. This document has been electronically signed by: Henok Ayala MD on    11/26/2020 11:26 PM      All CT scans at this facility use dose modulation, iterative    reconstruction, and/or weight-based   dosing when appropriate to reduce radiation dose to as low as reasonably    achievable. CT Cervical Spine WO Contrast   Final Result   1. No evidence of cervical spine fracture. 2. Prior anterior metallic and interbody fusion extending from C4-C6. Appropriate alignment. No evidence of hardware failure. This document has been electronically signed by: Daniel Weir MD on    11/26/2020 11:37 PM      All CT scans at this facility use dose modulation, iterative    reconstruction, and/or weight-based   dosing when appropriate to reduce radiation dose to as low as reasonably    achievable. CTA CHEST W WO CONTRAST   Final Result   1. Nondisplaced left anterior 6th rib fracture. No pneumothorax or other    acute visceral or vascular injury. 2.  Small hiatal hernia. This document has been electronically signed by: Henok Ayala MD on    11/26/2020 11:34 PM      All CT scans at this facility use dose modulation, iterative    reconstruction, and/or weight-based   dosing when appropriate to reduce radiation dose to as low as reasonably    achievable. CT FACIAL BONES WO CONTRAST   Final Result   1. Large displaced left orbital floor fracture. Herniation of the inferior    rectus muscle through the resulting orbital floor defect. 2. Fracture of the posterior aspect of the left lamina papyracea. 3. Deformity of the nasal bone compatible with fracture, of uncertain age. This document has been electronically signed by: Daniel Weir MD on    11/27/2020 01:28 AM      All CT scans at this facility use dose modulation, iterative    reconstruction, and/or weight-based   dosing when appropriate to reduce radiation dose to as low as reasonably    achievable.          XR CHEST PORTABLE   Final Result   No acute findings. This document has been electronically signed by: Lyubov Soto MD on    11/26/2020 10:57 PM           Ct Head Wo Contrast    Result Date: 11/28/2020  CT head without contrast Comparison:  CT,SR  - CT HEAD WO CONTRAST  - 11/26/2020 10:44 PM EST Findings: No intracranial mass, midline shift or hydrocephalus. Involutional change of brain parenchyma, compatible with advanced age. Severe white matter disease. Hematoma within the left maxillary sinus. Left orbital floor fracture without change. 1. Small focus of intraventricular hemorrhage associated with the midportion of the septum pellucidum, without significant change. 2. Left orbital floor fracture with herniation of periorbital fat and the inferior rectus muscle into the maxillary sinus. This document has been electronically signed by: Laureano Jaramillo MD on 11/28/2020 12:27 AM All CT scans at this facility use dose modulation, iterative reconstruction, and/or weight-based dosing when appropriate to reduce radiation dose to as low as reasonably achievable. Ct Head Wo Contrast    Result Date: 11/27/2020  PROCEDURE: CT HEAD WO CONTRAST CLINICAL INFORMATION: follow-up intraventricular hemorrhage. Headache. COMPARISON: Head CT 11/26/2020. TECHNIQUE: Noncontrast 5 mm axial images were obtained through the brain. Sagittal and coronal reconstructions were obtained. All CT scans at this facility use dose modulation, iterative reconstruction, and/or weight-based dosing when appropriate to reduce radiation dose to as low as reasonably achievable. FINDINGS: There are blood products layering in the occipital horns of the lateral ventricles bilaterally consistent with intraventricular hemorrhage. There is a stable area of hemorrhage within the septum pellucidum. No parenchymal hemorrhage is noted.  There is a moderate-severe amount of abnormal low attenuation in the white matter the brain suggesting chronic small vessel ischemic changes. This is stable. 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 There is no midline shift. There is no hydrocephalus. The paranasal sinuses are normally aerated. There is some fluid attenuation in the inferior mastoid air cells on the right. There is no suspicious calvarial abnormality. 1. Small amount of intraventricular hemorrhage layering in the occipital horns of lateral ventricle. There is also small amount of hemorrhage within the septum pellucidum. 2. Moderate-severe chronic small vessel ischemic changes. **This report has been created using voice recognition software. It may contain minor errors which are inherent in voice recognition technology. ** Final report electronically signed by Dr. Bassam Lucio on 11/27/2020 1:18 PM    Ct Head Wo Contrast    Result Date: 11/26/2020  ** ADDENDUM #1 ** This report was discussed with Devan Rasheed RN on Nov 26, 2020 23:28:00 EST. This document has been electronically signed by: Anmol Lewis on 11/26/2020 11:28 PM ** ORIGINAL REPORT** CT head without contrast Comparison:  CT,SR  - CT HEAD WO CONTRAST  - 04/28/2019 02:18 PM EDT Findings: New 0.7 x 0.4 cm hyperdense area in the midportion of the septum pellucidum. No midline shift or hydrocephalus. Stable mild generalized atrophy. Stable periventricular and centrum semiovale hypoattenuation may relate to chronic small vessel ischemic changes. Stable old lacunar infarct in the left basal ganglia. Hemorrhagic secretions in the left maxillary sinus. Partial opacification of the right mastoid air cells inferiorly. The orbits are unremarkable. No skull fracture. 1.  Small focal area of intraventricular hemorrhage in the midportion of the septum pellucidum measuring 0.7 x 0.4 cm. 2.  Hemorrhagic fluid in the left maxillary sinus.  This document has been electronically signed by: Ivey Collet, MD on 11/26/2020 11:26 PM All CT scans at this facility use dose modulation, iterative reconstruction, and/or weight-based dosing when appropriate to reduce radiation dose to as low as reasonably achievable. Ct Facial Bones Wo Contrast    Result Date: 11/27/2020  CT maxillofacial without contrast Comparison:  CT,SR  - CT HEAD WO CONTRAST  - 04/28/2019 02:18 PM EDT Findings: Temporomandibular joints are intact. Small hematoma within the left maxillary sinus. Visualized intracranial contents are within normal limits. Postsurgical changes of the cervical spine. No foreign bodies. 1. Large displaced left orbital floor fracture. Herniation of the inferior rectus muscle through the resulting orbital floor defect. 2. Fracture of the posterior aspect of the left lamina papyracea. 3. Deformity of the nasal bone compatible with fracture, of uncertain age. This document has been electronically signed by: Katie Handy MD on 11/27/2020 01:28 AM All CT scans at this facility use dose modulation, iterative reconstruction, and/or weight-based dosing when appropriate to reduce radiation dose to as low as reasonably achievable. Cta Chest W Wo Contrast    Result Date: 11/26/2020  CT angiography chest with contrast. 3D Postprocessing. Comparison:  CR,SR  - XR CHEST PORTABLE  - 11/26/2020 10:02 PM EST Findings: The pulmonary arteries are well opacified. Scattered plaque is seen in the thoracic aorta without aneurysm or dissection. The heart is not enlarged. There is no pericardial effusion. Coronary artery calcifications are seen. There are no enlarged mediastinal or hilar lymph nodes. The thyroid is unremarkable. No focal airspace consolidation, pleural effusion, or pneumothorax is seen. There are no worrisome pulmonary masses or nodules. Hypodense cysts in the liver. Small hiatal hernia. Nondisplaced left anterior 6th rib fracture. Degenerative changes in the spine with intact anterior cervical fusion. 1.  Nondisplaced left anterior 6th rib fracture.   No pneumothorax or other acute visceral or vascular injury. 2.  Small hiatal hernia. This document has been electronically signed by: Juancho Zaidi MD on 11/26/2020 11:34 PM All CT scans at this facility use dose modulation, iterative reconstruction, and/or weight-based dosing when appropriate to reduce radiation dose to as low as reasonably achievable. Ct Cervical Spine Wo Contrast    Result Date: 11/26/2020  CT cervical spine without contrast Comparison: None Findings: Visualized intracranial contents are unremarkable. No cervical fluid collections or masses. Lung apices are clear. Normal vertebral body alignment. No acute fractures or dislocations. 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 1. No evidence of cervical spine fracture. 2. Prior anterior metallic and interbody fusion extending from C4-C6. Appropriate alignment. No evidence of hardware failure. This document has been electronically signed by: Luz Hubbard MD on 11/26/2020 11:37 PM All CT scans at this facility use dose modulation, iterative reconstruction, and/or weight-based dosing when appropriate to reduce radiation dose to as low as reasonably achievable. Xr Chest Portable    Result Date: 11/26/2020  1 view chest x-ray Comparison:  CR,SR  - XR CHEST STANDARD (2 VW)  - 04/29/2019 09:36 AM EDT Findings: The lungs are clear. Heart size is normal. No pulmonary vascular congestion. Stable cervical fusion hardware. Stable degenerative changes in the right shoulder. No acute findings.  This document has been electronically signed by: Juancho Zaidi MD on 11/26/2020 10:57 PM       Electronically signed by Shama Wills PA-C on 12/1/2020 at 10:19 AM

## 2020-12-01 NOTE — PROGRESS NOTES
Patient incontinent of bowel and bladder upon admission, walked to bathroom with 2 assist and a walker. Patient needed 2 assist for toileting, and cleanup, 2 assist to walk back to chair.

## 2020-12-01 NOTE — PROGRESS NOTES
Jess Cavazos  Daily Progress Note    Pt Name: [facility] Record Number: [id number]  Date of Birth 1935   Today's Date: 12/1/2020    HD: # 4    CC: \"Not bad\"    [address] Problems    Diagnosis Date Noted    Intracranial bleed ([address] [I62.9]     Intraventricular hemorrhage ([address] [I61.5] 11/27/2020    Elevated troponin [R77.8] 11/27/2020    Fracture of left orbital floor ([address] [S02.32XA] 11/27/2020    Nasal bone fracture [S02. 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 Okay for discharge from neurology standpoint. Recommends follow-up in neurology clinic in 2 to 4 weeks   -81 mg aspirin order discussed with neurosurgical services, neurosurgery okay with order.     Left 6th rib fracture              - Rib fracture protocol - tolerating well   - IS/C&DB   - Monitor respiratory status              - Flexeril   - Lidoderm patches              - Pain control                Displaced left orbital floor fracture with herniation of inferior rectus, fracture posterior left lamina papyracea, deformity of nasal bone suspicious for fracture              - Conservative management of orbital floor fracture   - Visual checks              - Ice PRN   - Pain control     Elevated troponin              - Hospitalist managing              - Serial troponins 0.012, 0.017   - Cardiology consulted requests to restart of an antiplatelet medication due to drug-eluting stent. States otherwise stable from a cardiac standpoint. Syncope and collapse   - Hospitalist managing   - Echo noted   - Holter at discharge    - Continue telemetry monitor    HTN   - Weaned off Cardene gtt 11/28   - [facility] meds resumed     Pain Management              - Morphine, Norco     Prophylaxis: SCDs, Incentive Spirometry, GlycoLax, Pepcid, Zofran   - Lovenox added today per Neurology   -Start aspirin 81 mg following plastic surgery    General diet   Regular Neurovascular Checks  Repeat Labs   PT/OT/SLP continue to treat  Up with assistance     Planned Discharge discharge pending clinical course   - IPR today    Yady Bass continues on 4A. Plans for discharge to IPR today. He denies any overt pain except for tenderness where his rib fractures are located. Denies any cardiac originated chest pain. He is tolerating a general diet and is participating with therapies. He is passing flatus and has had a bowel movement. Wt Readings from Last 3 Encounters:   12/01/20 221 lb 14.4 oz (100.7 kg)   10/22/20 225 lb (102.1 kg)   10/15/20 225 lb (102.1 kg)     Temp Readings from Last 3 Encounters:   12/01/20 97.8 °F (36.6 °C) (Oral)   10/23/20 98.5 °F (36.9 °C) (Oral)   07/28/20 98 °F (36.7 °C) (Temporal)     BP Readings from Last 3 Encounters:   12/01/20 (!) 149/86   10/23/20 135/86   07/28/20 138/68     Pulse Readings from Last 3 Encounters:   12/01/20 77   10/23/20 75   07/28/20 70       24 HR INTAKE/OUTPUT :     Intake/Output Summary (Last 24 hours) at 12/1/2020 0817  Last data filed at 12/1/2020 0407  Gross per 24 hour   Intake 1000 ml   Output 500 ml   Net 500 ml     DIET GENERAL; No Added Salt (3-4 GM);  Cardiac    OBJECTIVE  CURRENT VITALS BP (!) 149/86   Pulse 77   Temp 97.8 °F (36.6 °C) (Oral)   Resp 16   Ht 5' 9\" (1.753 m)   Wt 221 lb 14.4 oz (100.7 kg)   SpO2 95%   BMI 32.77 kg/m²     GENERAL: Awake, alert, NAD. Very pleasant and cooperative with exam  EYES: Left periorbital swelling and ecchymosis. EOM appears intact. NEURO: Alert and oriented x3, conversing appropriately, PMS intact in all 4 extremities, strength 5/5 and equal bilaterally, no signs of focal neurological deficits  LUNGS: Clear to auscultation bilaterally- no wheezes, rales or rhonchi, normal air movement, no respiratory distress. HEART: Normal rate, normal S1 and S2, no gallops and intact distal pulses. ABDOMEN: Abdomen is soft, non-distended, non-tender, with normoactive bowel sounds. WOUNDS: Abrasion to left side of forehead without any bleeding or drainage. EXTREMITY: No cyanosis and no clubbing. PMS intact. Strength equal and strong bilaterally. Incision scar noted to left knee. LABS  CBC :   Recent Labs     11/29/20  0355 11/30/20 0331 12/01/20 0331   WBC 6.2 6.0 5.5   HGB 15.5 15.0 15.0   HCT 49.8 48.8 49.8   MCV 99.6* 100.2* 101.8*    141 140     BMP:   Recent Labs     11/29/20  0355 11/30/20 0331 12/01/20 0331    141 140   K 4.2 4.1 4.7    105 104   CO2 23 23 23   BUN 20 23* 26*   CREATININE 0.9 1.0 1.2     COAGS:   No results for input(s): APTT, PROT, INR in the last 72 hours. Pancreas/HFP:  No results for input(s): LIPASE, AMYLASE in the last 72 hours. No results for input(s): AST, ALT, BILIDIR, BILITOT, ALKPHOS in the last 72 hours.       RADIOLOGY:  No new results      Electronically signed by ALESSANDRA Shah CNP on 12/1/2020 at 8:17 AM

## 2020-12-01 NOTE — CONSULTS
Patient seen and examined for fall at home resulting in an intraventricular hemorrhage, left sixth rib fracture, displaced left orbital floor fracture with deficits of gait instability and severe cognitive deficits with a MOCA score of 9/30 on 11/27.     Patient is a good candidate for rehab admission and is agreeable to do so. He will not require a prior authorization under his Medicare insurance benefits.   The expected length of stay was discussed with the patient to be 10 to 14 days.     Thank you for the consult.     Gala Huitron MD

## 2020-12-02 LAB
ALBUMIN SERPL-MCNC: 3.4 G/DL (ref 3.5–5.1)
ALP BLD-CCNC: 64 U/L (ref 38–126)
ALT SERPL-CCNC: 22 U/L (ref 11–66)
ANION GAP SERPL CALCULATED.3IONS-SCNC: 9 MEQ/L (ref 8–16)
AST SERPL-CCNC: 21 U/L (ref 5–40)
BILIRUB SERPL-MCNC: 0.7 MG/DL (ref 0.3–1.2)
BUN BLDV-MCNC: 24 MG/DL (ref 7–22)
CALCIUM SERPL-MCNC: 9.4 MG/DL (ref 8.5–10.5)
CHLORIDE BLD-SCNC: 104 MEQ/L (ref 98–111)
CO2: 27 MEQ/L (ref 23–33)
CREAT SERPL-MCNC: 1.2 MG/DL (ref 0.4–1.2)
ERYTHROCYTE [DISTWIDTH] IN BLOOD BY AUTOMATED COUNT: 12.7 % (ref 11.5–14.5)
ERYTHROCYTE [DISTWIDTH] IN BLOOD BY AUTOMATED COUNT: 46.5 FL (ref 35–45)
GFR SERPL CREATININE-BSD FRML MDRD: 70 ML/MIN/1.73M2
GLUCOSE BLD-MCNC: 103 MG/DL (ref 70–108)
HCT VFR BLD CALC: 47.7 % (ref 42–52)
HEMOGLOBIN: 15 GM/DL (ref 14–18)
MCH RBC QN AUTO: 31.3 PG (ref 26–33)
MCHC RBC AUTO-ENTMCNC: 31.4 GM/DL (ref 32.2–35.5)
MCV RBC AUTO: 99.6 FL (ref 80–94)
PLATELET # BLD: 145 THOU/MM3 (ref 130–400)
PMV BLD AUTO: 10.6 FL (ref 9.4–12.4)
POTASSIUM SERPL-SCNC: 4.4 MEQ/L (ref 3.5–5.2)
RBC # BLD: 4.79 MILL/MM3 (ref 4.7–6.1)
SODIUM BLD-SCNC: 140 MEQ/L (ref 135–145)
TOTAL PROTEIN: 6.3 G/DL (ref 6.1–8)
VITAMIN D 25-HYDROXY: 68 NG/ML (ref 30–100)
WBC # BLD: 5 THOU/MM3 (ref 4.8–10.8)

## 2020-12-02 PROCEDURE — 97542 WHEELCHAIR MNGMENT TRAINING: CPT

## 2020-12-02 PROCEDURE — 6370000000 HC RX 637 (ALT 250 FOR IP): Performed by: SURGERY

## 2020-12-02 PROCEDURE — 97161 PT EVAL LOW COMPLEX 20 MIN: CPT

## 2020-12-02 PROCEDURE — 6360000002 HC RX W HCPCS: Performed by: SURGERY

## 2020-12-02 PROCEDURE — 92610 EVALUATE SWALLOWING FUNCTION: CPT

## 2020-12-02 PROCEDURE — 2580000003 HC RX 258: Performed by: SURGERY

## 2020-12-02 PROCEDURE — 99233 SBSQ HOSP IP/OBS HIGH 50: CPT | Performed by: SURGERY

## 2020-12-02 PROCEDURE — 80053 COMPREHEN METABOLIC PANEL: CPT

## 2020-12-02 PROCEDURE — 94760 N-INVAS EAR/PLS OXIMETRY 1: CPT

## 2020-12-02 PROCEDURE — 97110 THERAPEUTIC EXERCISES: CPT

## 2020-12-02 PROCEDURE — 82306 VITAMIN D 25 HYDROXY: CPT

## 2020-12-02 PROCEDURE — 97116 GAIT TRAINING THERAPY: CPT

## 2020-12-02 PROCEDURE — 1180000000 HC REHAB R&B

## 2020-12-02 PROCEDURE — 85027 COMPLETE CBC AUTOMATED: CPT

## 2020-12-02 PROCEDURE — 97535 SELF CARE MNGMENT TRAINING: CPT

## 2020-12-02 PROCEDURE — 97530 THERAPEUTIC ACTIVITIES: CPT

## 2020-12-02 PROCEDURE — 92523 SPEECH SOUND LANG COMPREHEN: CPT

## 2020-12-02 PROCEDURE — 36415 COLL VENOUS BLD VENIPUNCTURE: CPT

## 2020-12-02 PROCEDURE — 94640 AIRWAY INHALATION TREATMENT: CPT

## 2020-12-02 PROCEDURE — 97166 OT EVAL MOD COMPLEX 45 MIN: CPT

## 2020-12-02 RX ADMIN — LOSARTAN POTASSIUM 100 MG: 100 TABLET, FILM COATED ORAL at 07:54

## 2020-12-02 RX ADMIN — SODIUM CHLORIDE, PRESERVATIVE FREE 10 ML: 5 INJECTION INTRAVENOUS at 08:02

## 2020-12-02 RX ADMIN — BUDESONIDE AND FORMOTEROL FUMARATE DIHYDRATE 2 PUFF: 80; 4.5 AEROSOL RESPIRATORY (INHALATION) at 18:05

## 2020-12-02 RX ADMIN — METOPROLOL TARTRATE 50 MG: 50 TABLET, FILM COATED ORAL at 19:29

## 2020-12-02 RX ADMIN — BUDESONIDE AND FORMOTEROL FUMARATE DIHYDRATE 2 PUFF: 80; 4.5 AEROSOL RESPIRATORY (INHALATION) at 07:30

## 2020-12-02 RX ADMIN — PRAVASTATIN SODIUM 40 MG: 40 TABLET ORAL at 07:55

## 2020-12-02 RX ADMIN — BUMETANIDE 1 MG: 1 TABLET ORAL at 07:54

## 2020-12-02 RX ADMIN — ENOXAPARIN SODIUM 40 MG: 40 INJECTION SUBCUTANEOUS at 14:48

## 2020-12-02 RX ADMIN — METOPROLOL TARTRATE 50 MG: 50 TABLET, FILM COATED ORAL at 07:54

## 2020-12-02 RX ADMIN — AMLODIPINE BESYLATE 5 MG: 5 TABLET ORAL at 07:54

## 2020-12-02 RX ADMIN — ACETAMINOPHEN 650 MG: 325 TABLET ORAL at 07:55

## 2020-12-02 RX ADMIN — SODIUM CHLORIDE, PRESERVATIVE FREE 10 ML: 5 INJECTION INTRAVENOUS at 19:29

## 2020-12-02 RX ADMIN — TAMSULOSIN HYDROCHLORIDE 0.4 MG: 0.4 CAPSULE ORAL at 19:29

## 2020-12-02 RX ADMIN — ASPIRIN 81 MG: 81 TABLET, CHEWABLE ORAL at 07:54

## 2020-12-02 ASSESSMENT — PAIN SCALES - GENERAL
PAINLEVEL_OUTOF10: 0
PAINLEVEL_OUTOF10: 4

## 2020-12-02 NOTE — PLAN OF CARE
Individualized Plan of [facility] Inpatient Rehabilitation Unit    Rehabilitation physician: Kathie Mi MD     Admit Date: 12/1/2020     Rehabilitation Diagnosis: Intraventricular hemorrhage ([address] [I61.5]     Rehabilitation impairments: self care, mobility, bowel/bladder management, safety, cognitive function, communication and dysphagia    Factors facilitating achievement of predicted outcomes: Family support, Motivated, Cooperative and Pleasant  Barriers to the achievement of predicted outcomes: Cognitive deficit, Limited insight into deficits and dysphagia    Patient Goals: Improve independence with mobility, Increase overall strength and endurance, Increase balance, Increase independence with activities of daily living, Improve cognition, Increase self-awareness, Increase safety awareness, Increase community integration, Increase socialization, Functional communication with caregivers, Assure adequate nutritional option for discharge, Continence of bowel and bladder and Provide appropriate patient and family education    NURSING:  Nursing goals for Fouzia Sinks while on the rehabilitation unit will include:  Continence of bowel and bladder, Adequate number of bowel movements, Urinate with no urinary retention >300ml in bladder, Maintain O2 SATs at an acceptable level during stay, Effective pain management while on the rehabilitation unit, Establish adequate pain control plan for discharge, Absence of skin breakdown while on the rehabilitation unit, Improved skin integrity via assessments including wound measurements, Avoidance of any hospital acquired infections, No signs/symptoms of infection at the wound site, Freedom from injury during hospitalization and Complete education with patient/family with understanding demonstrated regarding disease process and resultant impairment     In order to achieve these goals, nursing interventions may include bowel/bladder training, education for medical assistive devices, medication education, O2 saturation management, energy conservation, stress management techniques, fall prevention, alarms protocol, seating and positioning, skin/wound care, pressure relief instruction, dressing changes, infection protection, DVT prophylaxis, assistance with safe transfers , and/or assistance with bathroom activities and hygiene. PHYSICAL THERAPY:  Goals:        Short term goals  Time Frame for Short term goals: 2 weeks  Short term goal 1: Pt to transfer supine <--> sit CGA to enable pt to get in/out of bed. Short term goal 2: Pt to transfer sit <--> stand CGA for increased functional mobility. Short term goal 3: Pt to ambulate 50 feet with SW/RW CGA for household ambulation. Long term goals  Time Frame for Long term goals : 4 weeks  Long term goal 1: Pt to transfer supine <--> sit SBA to enable pt to get in/out of bed. Long term goal 2: Pt to transfer sit <--> stand SBA for increased functional mobility. Long term goal 3: Pt to ambulate >100 feet with SW/RW SBA for household ambulation. Long term goal 4: Pt to ascend/descend 1 step with SW/RW CGA for home entry. Long term goal 5: Pt to perform car transfer CGA to enable pt to get in/out of the car. Long term goal 6: Pt to improve TUG test score to <3 minutes to indicate improvement in overall mobility. Plan of Care:  Patient to be seen by physical therapy services 90 minutes per day Monday through Friday and 30 minutes on Saturday or Sunday    Anticipated interventions may include therapeutic exercises, gait training, neuromuscular re-ed, transfer training, community reintegration, bed mobility, w/c mobility and training.     OCCUPATIONAL THERAPY:  Goals:             Short term goals  Time Frame for Short term goals: 1 week  Short term goal 1: Pt will demonstrate functional mobility walking to/from the bathroom or bedside chair with SBA and std walker with min cues for aligning himself as neeed to prepare for doing self care. Short term goal 2: Pt will complete ADL routine with no > min A and min cues for problem solving and task completion to increase independence in self care tasks  Short term goal 3: Pt will complete toileting routine and transfer with no > CGA and min cues for safe tech to increase independence in self toileting tasks  Short term goal 4: Pt will complete BUE ROM and light resistance exercises while following verbal cues to increase his pain free movement for ease of doing self care. Short term goal 5: Pt will complete 1 step homemaking tasks with CGA and no > min cues for problem solving to increase ability to retrieve a snack  Long term goals  Time Frame for Long term goals : 2-3 weeks  Long term goal 1: Pt will complete ADL routine with S and no  cues for problem solving to increase independence in self care tasks  Long term goal 2: Pt will complete 1 step homemaking tasks with SBA and no cues for safe tech to increase ability to retrieve a glass of water. Plan of Care:  Patient to be seen by occupational therapy services 90 minutes per day Monday through Friday and 30 minutes on Saturday or Sunday    Anticipated interventions may include ADL and IADL retraining, strengthening, safety education and training, patient/caregiver education and training, equipment evaluation/ training/procurement, neuromuscular reeducation, wheelchair mobility training. SPEECH THERAPY:   Goals:  Short-term Goals  Timeframe for Short-term Goals: 2 weeks  Goal 1: Pt will complete functional carryover tasks (i.e. orientation, immediate/delayed, working) with 60% accuracy, MOD cues and focus on compensatory memory strategies to enhance retention of newly learned medical information. Goal 2: Pt will complete functional problem solving/safety awareness (i.e. time management, safety scenarios, verbal reasoning) with 60% accuracy, mod cues for enhanced safety and ADL contribution.   Goal 3: Pt will complete potential to achieve the above medical and rehabilitative goals is very good. This plan of care has been developed with the assistance and input of the multidisciplinary rehabilitation team.  The plan was reviewed with the patient. The patient has had the opportunity to provide input to the therapy team.    I have reviewed this Individualized Plan of Care and agree with its contents. Above documentation has been expanded, modified, adjusted to reflect the findings of my evaluations and goals for the patient.     Physician:  Leah Rice MD

## 2020-12-02 NOTE — PROGRESS NOTES
Physical Medicine & Rehabilitation  Progress Note    Chief Complaint:   Intracranial and intraventricular hemorrhage/right thalamic CVA    Subjective: Speech today still noted to be mildly dysarthric and mildly slowed processing persists. He is participating well with therapies and has ambulated within the parallel bars today without issues. His labs from today were discussed. He notes that he is tolerating the therapies well and denies any headaches or lightheadedness at this time. He did not have any other concerns or questions at this time. Rehabilitation:   Physical Therapy:  OBJECTIVE:  Bed Mobility:  Rolling to Right: Stand By Assistance, with verbal cues , with increased time for completion   Supine to Sit: Minimal Assistance, with verbal cues , with increased time for completion, reported dizziness following supine>sit which quickly subsided with seated rest break  Sit to Supine: Moderate Assistance, with verbal cues , with increased time for completion      Transfers:  Sit to Stand: Moderate Assistance, with increased time for completion, to/from chair with arms  Stand to [facility] 68, with increased time for completion, cues for hand placement     Ambulation:  [address], with verbal cues , with increased time for completion, max cues throughout ambulation for increased step length with little carryover. Cues for erect posture throughout and to ensure pt remains within RW. Distance: 30ft and 40ft gait trial following gait training  Surface: Level Tile  Device:Rolling Walker  Gait Deviations: Forward Flexed Posture, Slow Anne Marie, Significant decrease in Step Length Bilaterally and Decreased Gait Speed     Gait Training:  Pt amb within //bars with B UE and markers on ground for foot placement x2 lengths of //bars x2 trials. Performed to promote increased step length B. Demonstrated improved step length however decreased fluidity demonstrated during gait.   Pt ambulated length of //bars and this PT counted number of steps pt took to amb ~10 ft. Pt required 13 steps. Discussed with pt the number of steps it would take to ambulate 1 length of //bars. Pt determined it would take 5 steps. Instructed pt to ambulate 1 length of //bars and steps were counted x2 trials. Counted outloud for emphasis on number of steps. First trial x11 steps, second trial x8 steps. Then performed 40 ft ambulation trial, with improved carryover of step length B initially (~15ft) however still decreased compared to \"normal\". Also counted number of steps required to turn within //bars. Pt determined it would take 3 steps to turn 180 deg within //bars. Pt required ~11 steps to complete each turn within //bars. Pt unable to reduce number of steps to complete a turn.     Balance:  Static Standing Balance: Contact Guard Assistance  Dynamic Standing Balance: Contact Guard Assistance, with RW     Exercise:  Patient was guided in 1 set(s) 10 reps of exercise to both lower extremities. Ankle pumps, Glut sets, Quad sets, Heelslides, Hip abduction/adduction and Straight leg raises. Exercises were completed for increased independence with functional mobility.     Wheelchair Mobility:  Stand By Assistance, with verbal cues , with increased time for completion, cues for path to avoid obstacles. x3 rest breaks secondary to B UE fatigue  Extremities Used: Bilateral Upper Extremities  Type of Chair:Manual  Surface: Level Tile  Distance: 150ft  Quality: Slow Velocity, Short Strokes and Veers Right    Functional Outcome Measures: Not completed     ASSESSMENT:  Assessment: Patient progressing toward established goals. Activity Tolerance:  Patient tolerance of  treatment: good. Pt demonstrates significant decrease in step length B. Improvements in B step length demonstrated following gait training, however limited carryover. Pt reported fatigue at end of session. Equipment Recommendations: Other: Has SW, will monitor for needs, may need w/c or RW  Discharge Recommendations:  24 hour supervision or assist, Continue to assess pending progress    Occupational Therapy:  Cognition/Orientation:   Slow processign speed, cues for problem solving with ADls.    ADL's:      EATING:Supervision or touching assistance. Darlynn Magic CARE Score: 4. ORAL HYGIENE:Supervision or touching assistance. seated to brush teeth , wash face and hands,. CARE Score: 4. TOILETING HYGIENE:Substantial/maximal assistance. for cleaning and pulling clothing up. Darlynn Magic CARE Score: 2. SHOWERING/BATHING:Partial/moderate assistance. assist for washing LEs and bottom. Sponge bath completed this session. Increased time needed for problem solving and task completion. Darlynn Magic CARE Score: 3.     UPPER BODY DRESSING:Substantial/maximal assistance. mod A for t shirt, max A for button up shirt due to rib pain with attempted tasks. Darlynn Magic CARE Score: 2. LOWER BODY DRESSING:Substantial/maximal assistance. for pants over feet and to knees. Min A for pulling up in the back. Darlynn Magic CARE Score: 2. FOOTWEAR:Dependent  for socks. Darlynn Magic CARE Score: 1.     TOILET TRANSFER: Substantial/maximal assistance. mod A sit to stand from [facility], min A stand to sit with slow control. Darlynn Magic CARE Score: 2. Functional Mobility:  Bed mobility  Rolling to Right: Minimal assistance  Supine to Sit: Minimal assistance  Scooting: Contact guard assistance     Functional Mobility  Functional - Mobility Device: Standard Walker  Activity: (x 3 feet to BSC, x 4 feet to recliner chair. Increased time for completion as well as cues for sustaining upright posture and keepign the std walker close to him.)  Assist Level: Minimal assistance      Balance:  Balance  Sitting Balance: Supervision  Standing Balance: Contact guard assistance     Transfers:  Sit to stand: Minimal assistance  Stand to sit: Minimal assistance  Transfer Comments: increased time to complete as well as mod cues for safe tech.   Toilet Transfers  Toilet - impaired sentence repetition. Mild dysarthria characterized by blended word boundaries. Pt would highly benefit from ongoing ST intervention in order to progress cognitive-linguistic functioning to a supervision level of assist for safe, functional return to home setting with wife upon discharge. Rehabilitation Potential: good    Review of Systems:  Review of Systems   Constitutional: Positive for activity change. HENT: Negative for trouble swallowing and voice change. Eyes: Positive for visual disturbance. Respiratory: Negative for shortness of breath. Cardiovascular: Positive for leg swelling. Gastrointestinal: Negative for constipation, diarrhea and nausea. Endocrine: Negative for cold intolerance. Genitourinary: Positive for frequency. Negative for urgency. Musculoskeletal: Positive for gait problem. Skin: Negative for pallor. Allergic/Immunologic: Negative. Neurological: Positive for speech difficulty and weakness. Negative for dizziness and light-headedness. Hematological: Negative. Psychiatric/Behavioral: Positive for decreased concentration. Negative for confusion and dysphoric mood. The patient is not nervous/anxious. All other systems reviewed and are negative. Objective:  BP (!) 157/80   Pulse 76   Temp 98.1 °F (36.7 °C) (Oral)   Resp 16   Ht 5' 9\" (1.753 m)   Wt 218 lb 14.4 oz (99.3 kg)   SpO2 95%   BMI 32.33 kg/m²   CURRENT VITALS:  height is 5' 9\" (1.753 m) and weight is 218 lb 14.4 oz (99.3 kg). His oral temperature is 98.1 °F (36.7 °C). His blood pressure is 157/80 (abnormal) and his pulse is 76. His respiration is 16 and oxygen saturation is 95%. Body mass index is 32.33 kg/m².   Temperature Range (24h):Temp: 98.1 °F (36.7 °C) Temp  Av.3 °F (36.8 °C)  Min: 97.8 °F (36.6 °C)  Max: 99.5 °F (37.5 °C)  BP Range (85O): Systolic (00GVB), ELMER:785 , Min:133 , PZB:392     Diastolic (05UZI), MSE:74, Min:75, Max:87    Pulse Range (24h): Pulse  Av.5  Min: - 52.0 %    .8 (H) 80.0 - 94.0 fL    MCH 30.7 26.0 - 33.0 pg    MCHC 30.1 (L) 32.2 - 35.5 gm/dl    RDW-CV 12.8 11.5 - 14.5 %    RDW-SD 47.8 (H) 35.0 - 45.0 fL    Platelets 169 675 - 687 thou/mm3    MPV 10.4 9.4 - 12.4 fL   Anion Gap    Collection Time: 12/01/20  3:31 AM   Result Value Ref Range    Anion Gap 13.0 8.0 - 16.0 meq/L   Glomerular Filtration Rate, Estimated    Collection Time: 12/01/20  3:31 AM   Result Value Ref Range    Est, Glom Filt Rate 70 (A) ml/min/1.73m2     Labs Renal Latest Ref Rng & Units 12/1/2020 11/30/2020 11/29/2020 11/28/2020 11/27/2020   BUN 7 - 22 mg/dL 26(H) 23(H) 20 17 23(H)   Cr 0.4 - 1.2 mg/dL 1.2 1.0 0.9 1.2 1.3(H)   K 3.5 - 5.2 meq/L 4.7 4.1 4.2 4.0 4.1   Na 135 - 145 meq/L 140 141 140 138 140      Recent Labs     12/01/20  0331 11/30/20  0331 11/29/20  0355   WBC 5.5 6.0 6.2   HGB 15.0 15.0 15.5   HCT 49.8 48.8 49.8   .8* 100.2* 99.6*    141 150      Impression:  1. Ground-level fall at home. 2. 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 3. 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 4. Left sixth rib fracture. 5. Abrasion to left forehead. 6. Laceration of left lower lip. 7. Subacute infarct of the right thalamus. 8. Severe cognitive impairments. (MOCA) version 7.2 completed. Patient scored 9/30.   9. Mild oral dysphagia. 10. Mild dysarthria. 11. Mild-moderate expressive language deficits. 12. Left orbital floor fracture with posterior blowout component. 13. Nasal bone fracture. 14. Hematuria. 15. Coronary artery disease with history of 2 stents and now with 90% severe stenosis of the apical portion of the LAD and mild diastolic dysfunction of the left ventricle noted on heart catheterization completed on 10/22/2020 by Dr. Harley Coughlin with deployment of PCI to circumflex artery.   16. Medication noncompliance; did not start Brilinta and aspirin for his post stent medical management. 17. Hypertension. 18. Hyperlipidemia. 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 20. CKD 3.  21. Lumbar stenosis with neurogenic claudication status post lumbar laminectomy from L2-L3 bilaterally by Dr. Jet Luu on 5/29/2015. 22. 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 23. Prostate cancer status post brachytherapy. Plan:     Medical management: Per primary team and Dr. Elvira Marie. Consultants:  Trauma Surgery, Neurosurgery, Critical Care, Neurology, Cardiology, Plastic Surgery, Family Medicine, Physical Medicine    Narcotic usage:  Not applicable     Last BM:  Stool Amount: Medium (12/01/20 [id number])    FUNCTIONAL OUTCOMES TOOLS:    HOUSE -      Tinetti -      TUG -      Acute/Rehabilitation Problems:  1. Ground-level fall at home. 2. 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 1. Initiate TBI guidelines as needed for low stimulation environment. 3. 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 1. Neurosurgery following with no interventions planned. 4. Left sixth rib fracture. 1. Pain control. 1. Lidoderm patches. 2. Tylenol. 2. Symbicort twice daily. 3. Albuterol nebulizer every 6 hours as needed. 5. Abrasion to left forehead. 1. Wound care. 6. Laceration of left lower lip. 1. Healing appropriately. 7. Subacute infarct of the right thalamus. 1. Stable. 2. Continue with therapies. 8. Severe cognitive impairments/Mild oral dysphagia/Mild dysarthria/Mild-moderate expressive language deficits. 1. (MOCA) version 7.2 completed. Patient scored 9/30. Repeat on 12/2 - [facility]) version 8.1 completed. Patient scored 10/30. 2. SLP. 3. Diet downgraded on 12/2 to soft and bite-sized within liquids. 9. Left orbital floor fracture with posterior blowout component/Nasal bone fracture.   1. Conservative treatment of this fracture as the components involve the posterior aspect of the left orbital floor without clinical evidence of inferior rectus impingement. No need to surgical intervention for the nasal bone fractures. 10. Hematuria. 1. Resolved. 11. Coronary artery disease with history of 2 stents and now with 90% severe stenosis of the apical portion of the LAD and mild diastolic dysfunction of the left ventricle noted on heart catheterization completed on 10/22/2020 by Dr. Eliu Valera with deployment of PCI to circumflex artery/Medication nonadherence to recommended dual antiplatelet therapy and cardiac rehabilitation following recent cardiac stenting. 1. Aspirin has been started. 2. Holding Brilinta for now. 12. Nutrition:  Consultation to dietician for nutritional counseling and recommendations. 1. Total protein 6.3 and albumin slightly low at 3.4 on 12/2/2020.  2. Vitamin D 25 hydroxy normal at 68 on 12/2/2020. 13. Electrolytes. 1. Normal on 12/2. 14. Bladder: Noted on admission. Follow for now. 15. Bowel: Senna, colace, MOM  16. Rehabilitation nursing will be involved for bowel, bladder, skin, and pain management. Nursing will also provide education and training to patient and family. 17. Prophylaxis:  DVT: Lovenox. GI: None. 18.  and case management consultations for coordination of care and discharge planning. Chronic Problems:  1. Hypertension. 1. Amlodipine. 2. Bumex. 3. Cozaar. 2. Hyperlipidemia/Statin induced myositis. 1. Pravachol. 3. CKD 3.  1. Cr/BUN/GFR on 12/2 was 1.2/24/70 which is stable over prior labs. 4. Lumbar stenosis with neurogenic claudication status post lumbar laminectomy from L2-L3 bilaterally by Dr. Bjorn Osborne on 5/29/2015.  5. 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 6. Prostate cancer status post brachytherapy. 1. Tamsulosin. Labs reviewed on:  1. 12/1.  2. 12/2. Infectious Disease:  1.  N/A    Missed Therapy Time:  None     DME:    Discharge Plan:      Greater than 50% of 30 minutes was spent with the patient reviewing his labs, progress with therapies, and current level of pain control. .  An additional 10 minutes of patient related documentation for completion of the IPOC was in addition to the time previously noted.       Leah Rice MD

## 2020-12-02 NOTE — PROGRESS NOTES
RECREATIONAL THERAPY  MISSED TREATMENT    []Transitional Care Unit  [x]Inpatient Rehabilitation    Date:  12/2/2020            Patient Name: Ovi Howell           MRN: [id number]  Ruperto: [de-identified]          YOB: 1935 (81 y.o.)       Gender: male   Diagnosis: Intraventricular Hemorrhage  Physician: Referring Practitioner: Dr. Ever Lomas:    []Hold treatment per nursing request  []Patient refusal  []Family declined treatment  []Patient at testing and/or off the floor  []Patient unavailable, with PT/OT/nursing, etc.  [x]Other pt asleep this am and was unable to evaluate for RT-will attempt again tomorrow   Valentin Oconnell, [address] 12/2/2020

## 2020-12-02 NOTE — PROGRESS NOTES
Not Tested    Cough Not Tested      PATIENT WAS EVALUATED USING:  Thin via ice/tsp/cup/straw, puree, minced, and soft and advanced solids. ORAL PHASE:  Impaired:  Impaired Mastication and Reduced Bolus Formation    PHARYNGEAL PHASE:  WFL:  Pharyngeal phase appears WFL but cannot rule out pharyngeal phase deficits from a bedside swallowing evaluation alone. SIGNS AND SYMPTOMS OF LARYNGEAL PENETRATION / ASPIRATION:  No signs/symptoms of aspiration evident in this evaluation, but cannot rule out silent aspiration. Modified Barium Swallow: Not indicated    DIET RECOMMENDATIONS:  Soft & bite size solids with thin liquids     STRATEGIES: Full Upright Position, Small Bite/Sip, Alternate Solids and Liquids, Limit Distractions and Monitor for Fatigue     Comprehension: 4 - Patient understands basic needs 75-90%+ of the time  Expression: 4 - Expresses basic ideas/needs 75-90%+ of the time  Social Interaction: 5 - Patient is appropriate with supervision/cues  Problem Solvin - Patient solves simple/routine tasks < 25%  Memory: 1 - Patient remembers < 25% of the time    RECOMMENDATIONS/ASSESSMENT:  DIAGNOSTIC IMPRESSIONS:  Patient seen with RN permission on this date. The pt presented with mild oral dysphagia characterized by prolonged and uncoordinated mastication with a rotary munching pattern, along with decreased bolus formation. Pt required min verbal cues to alternate liquids/solids to clear dry solids from oral cavity. Pharyngeal phase of the swallow is essentially within functional limites, while is CANNOT rule out pharyngeal dysfunction without formal imaging. Recommended diet downgrade to soft & bite size solids with thin liquids and continued skilled dysphagia treatment. Pt presents with severe cognitive impairments evidenced by derived MOCA score of 10/30.  Deficits within the areas of functional carryover (I.e. orientation, immediate/delayed recall, working memory), impaired functional problem solving, insight and basic safety awareness, poor verbal reasoning, thought organization, mathematical computation and executive functioning. Pt with low level cognitive demands within the home setting; however, fully independent with ability to complete basic ADL's (I.e. toileting/bathing) per pt reports. Pt with at least mild receptive language deficits evidenced by decreased auditory comprehension with poor ability to answer complex yes/no questions and mild difficulty following complex commands with reliance on extra time d/t slow processing and impaired organization/mental manipulation. Pt with mild-moderate expressive language deficits evidenced by impairments within higher level naming tasks (confrontational, responsive), basic organizational naming, presence of anomia and impaired sentence repetition. Mild dysarthria characterized by blended word boundaries. Pt would highly benefit from ongoing ST intervention in order to progress cognitive-linguistic functioning to a supervision level of assist for safe, functional return to home setting with wife upon discharge. Rehabilitation Potential: good    EDUCATION:  Learner: Patient  Education:  Reviewed results and recommendations of this evaluation, Reviewed diet and strategies, Reviewed signs, symptoms and risks of aspiration, Reviewed ST goals and Plan of Care and Reviewed recommendations for follow-up  Evaluation of Education: Needs further instruction, No evidence of learning and Family not present    PLAN:  Skilled SLP intervention on IP Rehab or TCU 30 minutes per day 5 days per week. Specific interventions for next session may include: basic problem solving, organization, dietary ananlysis, and/or advanced PO trials    PATIENT GOAL:    Return to prior level of function.     SHORT TERM GOALS:  Short-term Goals  Timeframe for Short-term Goals: 2 weeks  Goal 1: Pt will complete functional carryover tasks (i.e. orientation, immediate/delayed, working) with 60% accuracy, MOD cues and focus on compensatory memory strategies to enhance retention of newly learned medical information. Goal 2: Pt will complete functional problem solving/safety awareness (i.e. time management, safety scenarios, verbal reasoning) with 60% accuracy, mod cues for enhanced safety and ADL contribution. Goal 3: Pt will complete expressive language tasks (i.e. higher level confrontational/responsive naming, verbal sequencing, divergent naming) with 80% accuracy, mod cues for improved expression of complex thoughts. Goal 4: Pt will complete high level auditory/reading comprehension tasks (i.e. complex y/n questions, multi step and complex commands, prescription labels/directories, etc) with 80% accuracy, mod cues for improved understanding of complex medical information. Goal 5: Pt will consume a soft & bite size diet and thin liquids with adherence to skilled swallowing strategies (i.e. small bites/sips, slow rate) without oert s/s of aspiration to meet nutrition/hydration measures safely. LONG TERM GOALS:  Long-term Goals  Timeframe for Long-term Goals: 4 weeks  Goal 1: Patient will improve cognitive status to a min A level to safely return to [facility] home with wife, nearly 24 hour SUP      Awilda Chiu MA., CCC-SLP

## 2020-12-02 NOTE — DISCHARGE SUMMARY
Physical Medicine & Rehabilitation  Discharge Summary     Patient Identification:  Graham Cardona  : 1935  Admit date: 2020  Discharge date:  2020   Attending provider: David Lopez MD        Primary care provider: Gary Azevedo MD     Discharge Diagnoses:   Primary impairment requiring rehabilitation: 2.22 Traumatic, Closed brain injury     Etiologic Diagnosis that led to the condition: Intracranial and intraventricular hemorrhage     Comorbid conditions affecting rehabilitation:  1. Ground-level fall at home. 2. 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 3. Intracranial and intraventricular hemorrhage at the level septum pellucidum and in the occipital horns of the lateral ventricles. 4. Gait instability. 5. Left sixth rib fracture. 6. Abrasion to left forehead. 7. Laceration of left lower lip. 8. Subacute infarct of the right thalamus. 9. Severe cognitive impairments.     10. Independent level of oropharyngeal swallow function. 11. Mild dysarthria. 12. Mild-moderate expressive language deficits. 13. Left orbital floor fracture with posterior blowout component. 14. Nasal bone fracture. 15. Hematuria. 16. Coronary artery disease with history of 2 stents and now with 90% severe stenosis of the apical portion of the LAD and mild diastolic dysfunction of the left ventricle noted on heart catheterization completed on 10/22/2020 by Dr. Kathy Gomez with deployment of PCI to circumflex artery. 17. Medication nonadherence to recommended dual antiplatelet therapy and cardiac rehabilitation following recent cardiac stenting. 18. Hypertension. 19. Hyperlipidemia. 20. CKD 3.  21. Lumbar stenosis with neurogenic claudication status post lumbar laminectomy from L2-L3 bilaterally by Dr. Harpreet Beaver on 2015. 22. 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 23. Prostate cancer status post brachytherapy. 24. Postural hypotension.     Discharge Functional Status:    Physical therapy:  OBJECTIVE:  Bed Mobility:  Supine to Sit: Modified Independent, with head of bed raised, with increased time for completion     Transfers:  Sit to Stand: Stand By Assistance  Stand to Sit:Stand By Assistance     Ambulation:  Stand By Assistance  Distance: 15ft x2  Surface: Level Tile  Device:Rolling Walker  Gait Deviations:  Slow Anne Marie, Decreased Step Length Bilaterally, Decreased Gait Speed, Decreased Heel Strike Bilaterally and Mild Path Deviations     Stairs:  Platform:  6\" platform X 1 using Rolling Walker and Contact Guard Assistance, with increased time for completion.     Balance:  Dynamic Standing Balance: Stand By Assistance, in restroom for lower body clothing donning and doffing and hand washing and brushing teeth at sink. no LOB noted, total ~15 minutes in restroom     Exercise:  none     Functional Outcome Measures: Not completed     ASSESSMENT:  Assessment: Patient progressing toward established goals.      Stone has met 3/3 STGs and 2 LTGs during his stay. He continues to show improved gait mechanics and safety awareness throughout his stay. He is limited by decreased tolerance to activity and intermittent instability as well as generalized weakness. He will be discharged to home this date with continued HHPT services to ensure safe return to home and further improve level of independence. The patient has agreed to this plan during his stay.      Activity Tolerance:  Patient tolerance of  treatment: good.       Equipment Recommendations:Equipment Needed: No, reports having RW on 12/14  Discharge Recommendations:  24 hour supervision or assist, Continue to assess pending progress, Home with Home Health PT     Plan: d/c to home with assist as needed and Home Health PT     Patient Education  Patient Education: Plan of Care, Precautions/Restrictions, Transfers, Gait, Home Safety Education     Goals:  **Goal assessment from recent therapy sessions  Patient goals : does not state  Short term goals  Time Frame for Short term goals: 2 weeks  Short term goal 1: Pt to transfer supine <--> sit SBA to enable pt to get in/out of bed. - GOAL MET  Short term goal 2: Pt to transfer sit <--> stand SBA for increased functional mobility. - GOAL MET  Short term goal 3: Pt to ambulate 100 feet with SW/RW CGA for household and community ambulation. - GOAL MET  Long term goals  Time Frame for Long term goals : 4 weeks  Long term goal 1: Pt to transfer supine <--> sit Mod I to enable pt to get in/out of bed. - GOAL MET  Long term goal 2: Pt to transfer sit <--> stand supervision for increased functional mobility. - NOT MET  Long term goal 3: Pt to ambulate >150 feet with SW/RW SBA for household and community ambulation. - NOT MET  Long term goal 4: Pt to ascend/descend 1 step with SW/RW SBA for home entry. - NOT MET  Long term goal 5: Pt to perform car transfer CGA to enable pt to get in/out of the car. - GOAL MET  Long term goal 6: Pt to improve TUG test score to <45 sec to indicate improvement in overall mobility. - NOT MET    PT Equipment Recommendations  Equipment Needed: Yes  Mobility Devices: Luberta Steve: Rolling  Other: Has SW, will monitor for needs,  RW, Assessment: Pt tolerates session fair, limited by weakness, slow moving, high fall risk, cognition. Pt requires consistent verbal cue reminders for safe transfer and gait technique, limited carryover. PT to continue to progress strength and functional mobility. Occupational therapy  COGNITION: Slow Processing     ADL:   No ADL's completed this session. .     BALANCE:  Sitting Balance:  Modified Independent.       ADDITIONAL ACTIVITIES:  Patient identified a personal goal to increase UB strength and improve overall endurance so they can complete their toilet & shower transfers; skilled edu on UE strengthening and patient completed BUE strengthening exercises x15 reps x1 set this date with a medium resistive band in all joints and all planes. Patient tolerated fair, requiring mod rest breaks. Pt required mod cues for technique.     ASSESSMENT:  Activity Tolerance:  Patient tolerance of  treatment: good. Assessment: Derek Christian has made steady progress on IP rehab. He has progressed to a MI level with his rote BADL routines. He does demo some slow processing which affects his IADLs however at baseline his wife did primarily the IADLs at home. He does have an UB HEP for home use. He greatly would benefit from [facility] to progress with strengthening, endurance, and to decrease fall risk and safety within his ADL / IADL routines. Discharge Recommendations: Home with nursing aide, Home with Home health OT  Equipment Recommendations: Other: Per pt's wife 12/14 has a toilet raiser and shower seat- denies need for BSC. Plan: Discharge home with supportive wife to assist PRN and [facility] OT. Patient Education  Patient Education: Home Exercise Program     Goals   Short term goals  Time Frame for Short term goals: 1 week  Short term goal 1: Pt will demonstrate functional mobility walking to/from the bathroom or bedside chair with SBA and RW  with min cues for aligning himself as neeed to prepare for doing self care. GOAL MET   Short term goal 2: Pt will complete ADL routine with no > min A and min cues for problem solving and task completion to increase independence in self care tasks GOAL MET   Short term goal 3: Pt will complete toileting routine and transfer with no SBA and min cues for safe tech to increase independence in self toileting tasks GOAL MET   Short term goal 4: Pt will complete BUE ROM and light resistance exercises while following verbal cues to increase his pain free movement for ease of doing self care.  GOAL MET   Short term goal 5: Pt will complete 1 step homemaking tasks with CGA and no > min cues for problem solving to increase ability to retrieve a snack GOAL MET   Long term goals  Time Frame for Long term goals : 2 week  Long term goal 1: Pt will complete ADL routine with S and no  cues for problem solving to increase independence in self care tasks GOAL MET   Long term goal 2: Pt will complete 1 step homemaking tasks with SBA and no cues for safe tech to increase ability to retrieve a glass of water. GOAL MET     Speech therapy:  Short-Term Goals:  SHORT TERM GOAL #1:  Goal 1: Pt will complete functional carryover tasks (i.e. orientation, immediate/delayed, working) with 80% accuracy, MOD cues and focus on compensatory memory strategies to enhance retention of newly learned medical information. - GOAL NOT MET   INTERVENTIONS: Not addressed this date. PREVIOUS SESSION:   Orientation - 100% indep with use of calendar     Immediate retention of visual information (picture scene) - 3/10 indep, 2/10 min cues, 1/10 mod cues, 4/10 max cues  *decreased attention to detail   *decreased immediate recall     SHORT TERM GOAL #2:  Goal 2: Pt will complete functional problem solving/safety awareness (i.e. time management, safety scenarios, verbal reasoning) with 80% accuracy, mod cues for enhanced safety and ADL contribution. - GOAL MET   INTERVENTIONS: Not addressed this date. PREVIOUS SESSION:   Organization of functional words into proper category - 21/30 indep, 3/30 min cues, 3/30 mod cues, 3/30 max cues  *decreased mental flexibility  *slow processing speed      SHORT TERM GOAL #3:  Goal 3: Pt will complete expressive language tasks (i.e. higher level confrontational/responsive naming, verbal sequencing, divergent naming) with 80% accuracy, min cues for improved expression of complex thoughts  INTERVENTIONS: Not addressed this date.  - GOAL MET   PREVIOUS SESSION:   Verbal sequencing of ADL's (in 4-6 steps)  Brushing teeth - 3/4 indep, 1/4 min cues for detail  Morning routine - 5/6 indep, 1/6 max cues to \"get dressed\"  Preparing cereal - 3/5 indep, 2/5 min cues to identify need for bowl and spoon  Taking medications - 3/4 indep, 1/4 min cues for detail  *decreased verbal sequencing, however, majority of errors r/t lack of detail      SHORT TERM GOAL #4:  Goal 4: Pt will complete high level auditory/reading comprehension tasks (i.e. complex y/n questions, multi step and complex commands, prescription labels/directories, etc) with 90% accuracy, mod cues for improved understanding of complex medical information. - GOAL NOT MET   INTERVENTIONS: Not addressed this date. PREVIOUS SESSION:   Executing 1-step commands -- 2/5 indep, 3/5 no attempt despite max cues    Answering yes/no questions -- 2/5 indep, 3/5 no attempt despite max cues   *suspect overall receptive and expressive language skills fully impacted overall success this date      SHORT TERM GOAL #5:  Goal 5: Pt will consume a soft & bite size diet and thin liquids with adherence to skilled swallowing strategies (i.e. small bites/sips, slow rate) without oert s/s of aspiration to meet nutrition/hydration measures safely. - GOAL MET   INTERVENTIONS: Not addressed this date. PREVIOUS SESSION:   Completed a skilled dietary analysis on this date to determine recommendations to resume current diet level vs recommendations for initiation of advanced (regular) dietary analysis. Meal tray included soft & bite size sausage and pancakess with thin liquids via cup. The pt demonstrated great success with his meal demonstrated by timely mastication, good bolus control/formation, and timely AP movement with essentially functional pharyngeal phase of the swallow -- no overt s/s of aspiration/penetration across all PO trials. *Patient did present with slight impulsive eating patterns on this date, requiring min A to \"'slow down\" between bites. *Pt verbalized satisfaction with soft & bite size solids.    *recommended remain on soft & bite size solids with reinforcement to \"slow down\" with PO intake         Long-Term Goals:  Timeframe for Long-term Goals: 4 weeks     LONG TERM GOAL #1:  Goal 1: Patient will improve Comprehension: 4 - Patient understands basic needs 75-90%+ of the time  Expression: 4 - Expresses basic ideas/needs 75-90%+ of the time  Social Interaction: 6 - Patient requires medication for mood and/or effect  Problem Solvin - Patient solves simple/routine tasks 75-90%+   Memory: 3 - Patient remembers 50%-74% of the time    Wt Readings from Last 3 Encounters:   12/15/20 214 lb 3.2 oz (97.2 kg)   20 221 lb 14.4 oz (100.7 kg)   10/22/20 225 lb (102.1 kg)     Vitals:    12/15/20 1725 12/15/20 2004 20 0350 20 0838   BP:  131/76  136/64   Pulse:  94  77   Resp: 16   16   Temp:  96.3 °F (35.7 °C)  97.7 °F (36.5 °C)   TempSrc:  Oral  Oral   SpO2:  94% 94% 92%   Weight:       Height:          Inpatient Rehabilitation Course:   Gladis Santana is a 81 y.o. male admitted to inpatient rehabilitation on 2020. Original admission was on 2020 after coming into the emergency department for evaluation of a syncopal episode resulting in head trauma. The patient was found at home by his spouse; she had gone to the to the bathroom and returned to the kitchen to find him lying on the floor with a an abrasion to his left forehead and a left lower lip laceration resulting from a tooth piercing his lip. The patient was unaware of what had happened and unable to state if he was feeling lightheaded or dizzy prior to his fall. He was noted to be confused after this fall with repetition of the same questions over and over per the spouse. Sustained injuries from this fall included a a left orbital floor fracture with posterior blowout component, nasal bone fracture, and left sixth rib fracture. On MRI of the brain he was noted to have intracranial and intraventricular hemorrhages at the level of the septum pellucidum and in the occipital horns of the lateral ventricles as well as a subacute infarct of the right thalamus with noted chronic infarcts of the bilateral basal ganglia and thalami.   He does have a history of prostate cancer status post brachytherapy and did have issues with hematuria for which resolved without intervention. Of note the patient did have stenting of his circumflex artery via a heart catheterization on 10/22/2020 by Dr. Kathy Gomez at which time he was prescribed to be on aspirin and Brilinta for maintenance of patency of the stent. It is documented that the patient never picked up these medications as directed. On initial consultation exam the patient was noted to be resting comfortably in his hospital bed with noted swelling, bruising and conjunctival hemorrhages over the left eye consistent with his known orbital wall fractures. He endorses that his left rib fracture is not painful at this time. The laceration of his left lower lip and abrasion of his left forehead are healing appropriately. Cognitively the patient does show slowed cognitive processing; but appropriate one-step command following. Strength is grossly equal in all 4 limbs as a sensation. The potential for coming to the acute inpatient rehabilitation unit under his Medicare benefits was discussed and he stated he feels that he would benefit from a stay on the inpatient rehabilitation unit. The estimated length of stay was expected to be 14 days for his given diagnosis. In the interim he was deemed medically stable and remained in agreement to come to the acute inpatient rehabilitation unit. His neurological exam is unchanged; i.e. dysarthria and cognitive deficits persist.  He is alert and oriented x3 at this time. He denies any pain currently. It is noted that when the patient was gotten up to the bathroom after his arrival in the acute inpatient rehabilitation unit that there was some noted hematuria. The expectations for the admission to the acute inpatient rehabilitation unit were reviewed and all of the patient's questions were answered in detail.     The patient was discharged with stable vital signs; pain was well controlled at time of discharge. The patient was tolerating a diet at discharge; last bowel movement was on 12/15. Medical course on the inpatient rehabilitation unit was traumatic brain injury management, post stroke management, dysphagia management, cardiac arrhythmias with symptomatic bradycardia and orthostatic hypotension, and placement of a Loop recorder. For his noted traumatic brain injury he did not require low stimulation guidelines during his stay on the acute inpatient rehabilitation unit. For his noted stroke he was started on bright light phototherapy without any issues and good tolerance noted with improvement in his speech intelligibility and cognition noted. He was also followed for Speech Therapy for dysphagia and remained on a soft and bite-size diet throughout his stay as well as with recommendation to continue this after discharge. He did have noted cardiac arrhythmias with bradycardia for which Cardiology was reconsulted and he initially had a 30-day cardiac monitor placed; due to persistence of his symptoms he underwent tilt table testing which was noted to be positive and his beta-blockers were stopped. Likewise for this issue he was discharged to have a Loop Recorder placed prior to leaving the hospital.  Details are provided below. The patient progressed well with the offered therapies and was discharged to home with Home Health  Physical Therapy, Occupational Therapy, Speech Therapy (HEP), Nursing and aide. Issues addressed during rehabilitation stay and medication changes:   Acute/Rehabilitation Problems:  1. Ground-level fall at home. 2. Gait instability. 1. PT/OT. 2. TUG 4 minutes 11 seconds. Improved to 1 minute and 25 seconds on 12/7.  60-69 years:  8.1 seconds; 70-79 years: 9.2 seconds; 80-99 years: 11.3 seconds. 3. 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   1. Initiate TBI guidelines as needed for low stimulation patient. 3. EKG on 12/9 with normal sinus rhythm, pulmonary disease pattern, left axis deviation with absence of prior supraventricular complexes. 4. TSH normal at 2.9 on 12/10.  5. Tilt table testing was positive on 12/11/2020.  1. Celexa 20 mg was started by Cardiology. 6. Loop recorder to be placed on 12/16. 14. Nutrition:  Consultation to dietician for nutritional counseling and recommendations. 1. Total protein 6.3 and albumin slightly low at 3.4 on 12/2/2020.  2. Vitamin D 25 hydroxy normal at 68 on 12/2/2020. 15. Electrolytes. 1. Normal on 12/140 with exception of:  2. Mild hyponatremia of 134 on 12/7. Normal at 135 on 12/10 and stable at 137 on 12/14. 16. Acute kidney injury. 1. Cr/BUN/GFR on 12/10 is worsened to 1.7/34/47 from prior 1.2/26/70 on 12/2. Improved on 12/14 to 1.4/26/58. 2. Encourage fluids. 17. Bladder: Noted on admission. Follow for now. 18. Bowel: Senna, colace, MOM  19. Rehabilitation nursing will be involved for bowel, bladder, skin, and pain management. Nursing will also provide education and training to patient and family. 20. Prophylaxis:  DVT: Lovenox. GI: None. 21.  and case management consultations for coordination of care and discharge planning. Chronic Problems:  1. Hypertension. 1. Amlodipine. 2. Bumex. 3. Cozaar. 4. Lopressor. Discontinued on 12/9 due to bradycardia. 2. Hyperlipidemia/Statin induced myositis. 1. Pravachol. 3. CKD 3.  1. Cr/BUN/GFR on 12/2 was 1.2/24/70 which is stable over prior labs. 4. Lumbar stenosis with neurogenic claudication status post lumbar laminectomy from L2-L3 bilaterally by Dr. Hoa Fletcher on 5/29/2015.  5. 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 6. Prostate cancer status post brachytherapy. 1. Tamsulosin. Labs reviewed on:  1. 12/1.  2. 12/2.  3. 12/7.  4. 12/10.  5. 12/14. Infectious Disease:  1.  N/A    Missed Therapy Time:  12/9  60 minutes of Occupational Therapy missed secondary to the patient having symptomatic bradycardia. The patient participated in an aggressive multidisciplinary inpatient rehabilitation program involving 3 hours per day, 5 days per week of rehabilitation. Estimated Discharge Date: 12/16   Destination: home health  Services at Discharge: [address], Occupational Therapy, Speech Therapy (HEP), Nursing and aide 3x week  Is patient appropriate for an outpatient driving evaluation? no  Equipment at Discharge: None    Hypertension management was undertaken with medication management on any patient with systolic blood pressure greater than 491 or diastolic blood pressure greater than 90. Hyperlipidemia was considered and the patient was started or continued on a statin medication for any LDL>100. Appropriate stroke prophylaxis was maintained throughout rehabilitation stay. Appropriate DVT prophylaxis options were considered throughout rehabilitation stay.     DME:  None    Consults:   Trauma Surgery, Neurosurgery, Critical Care, Neurology, Cardiology, Plastic Surgery, Family Medicine, Physical Medicine    Significant Diagnostics:     Lab Results   Component Value Date     12/14/2020    K 4.2 12/14/2020     12/14/2020    CO2 25 12/14/2020    BUN 26 (H) 12/14/2020    CREATININE 1.4 (H) 12/14/2020    GLUCOSE 108 12/14/2020    CALCIUM 9.1 12/14/2020    PROT 6.3 12/02/2020    LABALBU 3.4 (L) 12/02/2020    BILITOT 0.7 12/02/2020    ALKPHOS 64 12/02/2020    AST 21 12/02/2020    ALT 22 12/02/2020    LABGLOM 58 (A) 12/14/2020     Recent Labs     12/14/20  0521 12/07/20  0534 12/02/20  0618   WBC 5.6 6.1 5.0   HGB 13.0* 14.2 15.0   HCT 42.6 45.9 47.7   .7* 100.0* 99.6*    165 145     Labs Renal Latest Ref Rng & Units 12/14/2020 12/10/2020 12/7/2020 12/2/2020 12/1/2020   BUN 7 - 22 mg/dL 26(H) 34(H) 26(H) 24(H) 26(H)   Cr 0.4 - 1.2 mg/dL 1.4(H) 1.7(H) 1.2 1.2 1.2   K 3.5 - 5.2 meq/L 4.2 4.2 4.1 4.4 4.7   Na 135 - 145 meq/L 137 135 134(L) 140 140      No results for input(s): POCGLU in the last 72 hours. Cholesterol Panel:   Results in Past 30 Days  Result Component Current Result Ref Range Previous Result Ref Range   Cholesterol, Total 166 (11/28/2020) 100 - 199 mg/dL Not in Time Range    HDL 49 (11/28/2020) mg/dL Not in Time Range    LDL Calculated 98 (11/28/2020) mg/dL Not in Time Range    Triglycerides 96 (11/28/2020) 0 - 199 mg/dL Not in Time Range      Results for Khurram Mukherjee (MRN [id number]) as of 12/20/2020 13:28   Ref. Range 12/2/2020 06:18   Vit D, 25-Hydroxy Latest Ref Range: 30 - 100 ng/ml 68       Patient Instructions:    See AVS  Follow-up visits: See after visit summary from hospitalization    Discharge Medications:   Mohini Mcclain   [place] Medication Instructions WCR:[id number]    Printed on:12/17/20 0932   Medication Information                      acetaminophen (TYLENOL) 500 MG tablet  Take 500 mg by mouth every 4 hours as needed for Pain             albuterol sulfate  (90 Base) MCG/ACT inhaler  Inhale 1 puff into the lungs every 4 hours as needed for Wheezing             amLODIPine (NORVASC) 5 MG tablet  Take 1 tablet by mouth daily             aspirin 81 MG chewable tablet  Take 1 tablet by mouth daily             bumetanide (BUMEX) 1 MG tablet  Take 1 tablet by mouth daily             Cholecalciferol (VITAMIN D3) 5000 UNITS TABS  Take 2,000 Units by mouth daily              citalopram (CELEXA) 20 MG tablet  Take 1 tablet by mouth nightly             diclofenac sodium (VOLTAREN) 1 % GEL  Apply 2 g topically 4 times daily Apply to Right knee.              finasteride (PROSCAR) 5 MG tablet  TAKE 1 TABLET DAILY             losartan (COZAAR) 50 MG tablet  Take 1 tablet by mouth daily             mometasone-formoterol (DULERA) 100-5 MCG/ACT inhaler  Inhale 2 puffs into the lungs 2 times daily             nitroGLYCERIN (NITROSTAT) 0.4 MG SL tablet  Place 1 tablet under the tongue every 5 minutes as needed for Chest pain up to max of 3 total doses. If no relief after 1 dose, call 911. oxybutynin (DITROPAN) 5 MG tablet  TAKE 1 TABLET TWICE A DAY             pravastatin (PRAVACHOL) 40 MG tablet  Take 1 tablet by mouth daily             ticagrelor (BRILINTA) 90 MG TABS tablet  Take 1 tablet by mouth 2 times daily               Controlled substances monitoring: not applicable.      40 minutes spent preparing the patient for discharge    Luis Chapin MD

## 2020-12-02 NOTE — PROGRESS NOTES
Plastic Surgery Progress Note    Patient:  Bowen Sepulveda      Unit/Bed:7E-64/064-A    YOB: 1935    MRN: [id number]       Acct: [de-identified]     PCP: Saul Welsh MD    Date of Admission: 12/1/2020    Assessment/Plan:    1. Left orbital floor fracture with posterior blowout component    No current complaints of progressive diplopia        Expected discharge date:  tbd    Hospital Course: stable course     Subjective (past 24 hours): tolerating po, pain controlled       Medications:  Reviewed    Infusion Medications   Scheduled Medications    sodium chloride flush  10 mL Intravenous 2 times per day    lidocaine  1 patch Transdermal Daily    amLODIPine  5 mg Oral Daily    aspirin  81 mg Oral Daily    budesonide-formoterol  2 puff Inhalation BID    bumetanide  1 mg Oral Daily    enoxaparin  40 mg Subcutaneous Q24H    losartan  100 mg Oral Daily    metoprolol tartrate  50 mg Oral BID    pravastatin  40 mg Oral Daily    tamsulosin  0.4 mg Oral Nightly     PRN Meds: acetaminophen **OR** acetaminophen, polyethylene glycol, promethazine **OR** ondansetron, sodium chloride flush, albuterol, enalaprilat, senna, docusate sodium      Intake/Output Summary (Last 24 hours) at 12/2/2020 1244  Last data filed at 12/2/2020 0916  Gross per 24 hour   Intake 480 ml   Output 500 ml   Net -20 ml       Diet:  DIET GENERAL; No Added Salt (3-4 GM); Cardiac    Exam:  BP (!) 145/81   Pulse 78   Temp 98.2 °F (36.8 °C) (Oral)   Resp 16   Ht 5' 9\" (1.753 m)   Wt 218 lb 14.4 oz (99.3 kg)   SpO2 95%   BMI 32.33 kg/m²   Physical Exam  Vitals signs and nursing note reviewed. Constitutional:       General: He is awake. He is not in acute distress. Appearance: Normal appearance. He is not ill-appearing. HENT:      Head: Normocephalic. Abrasion and contusion present. No raccoon eyes, Sorensen's sign, right periorbital erythema, left periorbital erythema or laceration. Jaw: There is normal jaw occlusion. Right Ear: External ear normal.      Left Ear: External ear normal.      Nose:        Mouth/Throat:      Mouth: Mucous membranes are moist.      Pharynx: Oropharynx is clear. Eyes:      General:         Right eye: No discharge. Left eye: No discharge. Extraocular Movements: Extraocular movements intact. Conjunctiva/sclera: Conjunctivae normal.      Pupils: Pupils are equal, round, and reactive to light. Neck:      Musculoskeletal: Normal range of motion and neck supple. Thyroid: No thyromegaly. Trachea: No tracheal deviation. Cardiovascular:      Rate and Rhythm: Normal rate. Pulses: Normal pulses. No decreased pulses. Pulmonary:      Effort: Pulmonary effort is normal. No respiratory distress. Abdominal:      General: Abdomen is flat. There is no distension. Tenderness: There is no abdominal tenderness. Musculoskeletal: Normal range of motion. General: No deformity. Skin:     General: Skin is warm and dry. Capillary Refill: Capillary refill takes 2 to 3 seconds. Findings: No erythema or rash. Neurological:      General: No focal deficit present. Mental Status: He is alert. Mental status is at baseline. Sensory: No sensory deficit. Psychiatric:         Mood and Affect: Mood normal.         Behavior: Behavior normal. Behavior is cooperative. Thought Content: Thought content normal.         Judgment: Judgment normal.            Labs:   Recent Labs     11/30/20  0331 12/01/20  0331 12/02/20  0618   WBC 6.0 5.5 5.0   HGB 15.0 15.0 15.0   HCT 48.8 49.8 47.7    140 145     Recent Labs     11/30/20  0331 12/01/20  0331 12/02/20  0618    140 140   K 4.1 4.7 4.4    104 104   CO2 23 23 27   BUN 23* 26* 24*   CREATININE 1.0 1.2 1.2   CALCIUM 9.0 9.5 9.4     Recent Labs     12/02/20  0618   AST 21   ALT 22   BILITOT 0.7   ALKPHOS 64     No results for input(s): INR in the last 72 hours.   No results for input(s): Elaine Nelson in the last 72 hours. Microbiology:      Urinalysis:      Lab Results   Component Value Date    NITRU NEGATIVE 11/26/2020    WBCUA 2-4 11/26/2020    BACTERIA NONE SEEN 11/26/2020    RBCUA 0-2 11/26/2020    BLOODU NEGATIVE 11/26/2020    SPECGRAV 1.022 03/30/2016    GLUCOSEU NEGATIVE 11/26/2020       Radiology:  No orders to display         Code Status: Full Code    PT/OT Eval Status: current    Active Hospital Problems    Diagnosis Date Noted    Intracranial bleed ([facility] [address] [I62.9]     Intraventricular hemorrhage ([facility] [address] [I61.5] 11/27/2020    Fracture of left orbital floor ([facility] [address] [facility] 11/27/2020    Nasal bone fracture [S02. 2XXA] 11/27/2020    Closed fracture of one rib of left side [S22.32XA]     H/O prostate cancer [Z85.46] 08/15/2016    History of DVT (deep vein thrombosis) [Z86.718]     Syncope [R55] 06/25/2015    Benign essential HTN [I10] 06/05/2015    CAD (coronary artery disease) [I25.10]     Hypercholesteremia [E78.00]        Electronically signed by Maria E Ponce MD on 12/2/2020 at 12:44 PM

## 2020-12-02 NOTE — PROGRESS NOTES
[address] [address]  INPATIENT PHYSICAL THERAPY  EVALUATION  [facility] - 7E-64/064-A    Time In: 1541  Time Out: 1235  Timed Code Treatment Minutes: 39 Minutes  Minutes: 60        Date: 2020  Patient Name: Erin Monzon,  Gender:  male        MRN: [id number]  : 1935  (81 y.o.)      Referring Practitioner: Doreen Will MD  Diagnosis: Intraventricular hemorrhage  Additional Pertinent Hx: Pt presenting at [address] [place] [address] by activation of level 2 trauma, brought by EMS following a unwitnessed fall with unknown LOC; past medical history includes recent cardiac sten placement x2, hypertension, chronic kidney disease, CAD. Per wife report, she was standing in the bathroom when she heard a sound in the kitchen she open the bathroom door to see her  laying face down on the ground. Patient states he normally ambulates with walker, did have a walker at the time of fall, unclear if fall was precipitated by syncopal episode. Wife states that  does appear to be confused post fall, has been repetitive in speech since incident. Patient reports some mild discomfort over left face otherwise has no complaints on exam.  Found to have an intraventricular hemorrhage, a left orbital fx, nasal bone fx, and left 6th rib fx. To  rehab . Restrictions/Precautions:  Restrictions/Precautions: Fall Risk, General Precautions  Position Activity Restriction  Other position/activity restrictions: Keep systolic blood pressure between 100-160 while moving. Subjective:  Chart Reviewed: Yes  Patient assessed for rehabilitation services?: Yes  Family / Caregiver Present: No  Subjective: Pt is seated in recliner and agreeable to PT. Pt slow to respond and moves slowly.     General:  Overall Orientation Status: Within Functional Limits    Vision Exceptions: Wears glasses for reading    Hearing: Within functional limits         Pain: 7/10: L eye, L lip, headache    Social/Functional History:    Lives With: Spouse  Type of Home: House  Home Layout: One level  Home Access: Stairs to enter with rails  Entrance Stairs - Number of Steps: 1 step to enter and 1 step once inside the house  Entrance Stairs - Rails: Left  Home Equipment: Standard walker     Bathroom Shower/Tub: Walk-in shower  Bathroom Toilet: Handicap height  Bathroom Equipment: Built-in shower seat, Grab bars around toilet, Grab bars in shower  Bathroom Accessibility: Accessible    Receives Help From: Family  ADL Assistance: [address]: Independent  Homemaking Responsibilities: No  Ambulation Assistance: Independent  Transfer Assistance: Independent    Active : No  Occupation: Retired  Type of occupation: [employer]  Additional Comments: Pt ambualated short distances with a standard walker. He did his own self care. He had help with all of the cooking and cleaning prior to admission. OBJECTIVE:  Range of Motion:  Bilateral Lower Extremity: Impaired-- WFL's except R ankle DF ~15 degrees from neutral    Strength:  Bilateral Lower Extremity: Impaired - B hips 2+/5, B quads 3+/5, R DF 2/5, L DF 3+/5    Balance:  Static Sitting Balance:  Stand By Assistance  Static Standing Balance: Contact Guard Assistance  Dynamic Standing Balance: Minimal Assistance    Bed Mobility:  Rolling to Right: Minimal Assistance, no bed rail   Supine to Sit: Minimal Assistance, HOB flat, no bed rail  Sit to Supine: Moderate Assistance, HOB flat, no bed rail   Scooting:  Moderate Assistance, to EOB and edge of chair and back in w/c    Transfers:  Sit to Stand: Minimal Assistance (chair with arm rests), Moderate Assistance (chair without arm rests)  Stand to Sit:Minimal Assistance, [address] [facility] with SW  **Wide LEXII, slow to ascend, verbal cues to scoot to the edge of the chair, verbal cues for proper alignment to chair prior to sitting down, poor carryover noted from task to task    Ambulation:  Contact Guard Assistance  Distance: 25 feet, 20 feet  Surface: Level Tile  Device:Standard Walker  Gait Deviations: Forward Flexed Posture, Slow [place], Decreased Step Length Bilaterally, Decreased Trunk Rotation, Decreased Gait Speed, Decreased Heel Strike Bilaterally and Wide Base of Support  **Absent heel strike, very slow pace, decreased step length, verbal cues for increased stride length, poor carryover noted    Wheelchair Mobility:  Stand By Assistance, Minimal Assistance (to maintain straight path, veers R to wall on 2 occasions)  Extremities Used: Bilateral Upper Extremities  Type of Chair:Manual  Surface: Level Tile  Distance: 200 feet  Quality: Slow Velocity, Short Strokes, Veers Right and Decreased Fluidity  **Several quick rest breaks due to UE fatigue    Exercise:  Patient was guided in 1 set(s) 10 reps of exercise to both lower extremities. R heel cord stretches 3 x 30 seconds. Ankle pumps, Quad sets, Heelslides and Hip abduction/adduction. Exercises were completed for increased independence with functional mobility. Occasional manual assist to complete through increased ROM. Attempted bridges on bed, pt unable to perform due to weakness. Functional Outcome Measures: Completed     Timed Up and Go: 4 minutes and 11 seconds with sW    ASSESSMENT:  Activity Tolerance:  Patient tolerance of  treatment: good. Treatment Initiated: Treatment and education initiated within context of evaluation. Evaluation time included review of current medical information, gathering information related to past medical, social and functional history, completion of standardized testing, formal and informal observation of tasks, assessment of data and development of plan of care and goals. Treatment time included skilled education and facilitation of tasks to increase safety and independence with functional mobility for improved independence and quality of life.   See above exercises, multiple transfers, additional ambulation, w/c mobility. Assessment: Body structures, Functions, Activity limitations: Decreased functional mobility , Decreased balance, Decreased posture, Decreased strength, Decreased endurance, Decreased safe awareness  Assessment: Pt tolerates session fair, limited by weakness, slow moving, high fall risk, cognition. Pt requires consistent verbal cue reminders for safe transfer and gait technique, limited carryover. PT to continue to progress strength and functional mobility. Prognosis: Good       Discharge Recommendations:  Discharge Recommendations: 24 hour supervision or assist, Continue to assess pending progress    Patient Education:  PT Education: Goals, PT Role, Plan of Care, General Safety, Gait Training, Functional Mobility Training, Transfer Training    Equipment Recommendations: Other: Has SW, will monitor for needs, may need w/c or RW    Plan:  Times per week: 5x/week 90 min, 1x/week 30 min  Current Treatment Recommendations: Strengthening, Safety Education & Training, Balance Training, Endurance Training, Functional Mobility Training, Patient/Caregiver Education & Training, Transfer Training, Gait Training, Wheelchair Mobility Training, Neuromuscular Re-education, Home Exercise Program, Stair training    Goals:  Patient goals : does not state  Short term goals  Time Frame for Short term goals: 2 weeks  Short term goal 1: Pt to transfer supine <--> sit CGA to enable pt to get in/out of bed. Short term goal 2: Pt to transfer sit <--> stand CGA for increased functional mobility. Short term goal 3: Pt to ambulate 50 feet with SW CGA for household ambulation. Long term goals  Time Frame for Long term goals : 4 weeks  Long term goal 1: Pt to transfer supine <--> sit SBA to enable pt to get in/out of bed. Long term goal 2: Pt to transfer sit <--> stand SBA for increased functional mobility.   Long term goal 3: Pt to ambulate >100 feet with SW SBA for household ambulation. Long term goal 4: Pt to ascend/descend 1 step with SW CGA for home entry. Long term goal 5: Pt to perform car transfer CGA to enable pt to get in/out of the car. Long term goal 6: Pt to improve TUG test score to <3 minutes to indicate improvement in overall mobility. Following session, patient left in safe position with all fall risk precautions in place.

## 2020-12-02 NOTE — PROGRESS NOTES
Info: Pt's spouse and family provides transportation. Occupation: Retired  Type of occupation: [employer]  Additional Comments: Pt ambualated short distances with a standard walker. He did his own self care. He had help with all of the cooking and cleaning prior to admission. Cognition/Orientation:    slow processign speed, cues for problem solving with ADls. ADL's:      EATING:Supervision or touching assistance. Suni Salen CARE Score: 4. ORAL HYGIENE:Supervision or touching assistance. seated to brush teeth , wash face and hands,. CARE Score: 4. TOILETING HYGIENE:Substantial/maximal assistance. for cleaning and pulling clothing up. Suni Salen CARE Score: 2. SHOWERING/BATHING:Partial/moderate assistance. assist for washing LEs and bottom. Sponge bath completed this session. Increased time needed for problem solving and task completion. Suni Salen CARE Score: 3.     UPPER BODY DRESSING:Substantial/maximal assistance. mod A for t shirt, max A for button up shirt due to rib pain with attempted tasks. Suni Salen CARE Score: 2. LOWER BODY DRESSING:Substantial/maximal assistance. for pants over feet and to knees. Min A for pulling up in the back. Suni Salen CARE Score: 2. FOOTWEAR:Dependent  for socks. Suni Salen CARE Score: 1.     TOILET TRANSFER: Substantial/maximal assistance. mod A sit to stand from [facility], min A stand to sit with slow control. Suni Salen CARE Score: 2. Functional Mobility:  Bed mobility  Rolling to Right: Minimal assistance  Supine to Sit: Minimal assistance  Scooting: Contact guard assistance    Functional Mobility  Functional - Mobility Device: Standard Walker  Activity: (x 3 feet to BSC, x 4 feet to recliner chair.  Increased time for completion as well as cues for sustaining upright posture and keepign the std walker close to him.)  Assist Level: Minimal assistance     Balance:  Balance  Sitting Balance: Supervision  Standing Balance: Contact guard assistance    Transfers:  Sit to stand: Minimal assistance  Stand to sit: Minimal assistance  Transfer Comments: increased time to complete as well as mod cues for safe tech. Toilet Transfers  Toilet - Technique: Ambulating  Equipment Used: Standard bedside commode  Toilet Transfer: Moderate assistance  Toilet Transfers Comments: from [facility] due to urgency and partial incontinence upon standing . Upper Extremity Assessment:   LUE AROM : WFL  RUE AROM : WFL    LUE Strength  LUE Strength Comment: deconditioning throughout. 4-/5            Activity Tolerance: Patient limited by fatigue       Assessment:  Assessment: Pt presents with a decrease in independent functioning with ADLs and basic mobility due to recent fall and hospitalization. He currently requires moderate assist for bathing, Max A for dressing tasks and toileting tasks, increased time needed for problem solving and task completion throughout. This is a significant decline from being independent with self care and basic mobility PTA. Pt would benefit from additional skilled OT services to address increasing independence in self care and basic mobility to return home as indepedently as possible to reduce the incidence of falls and care giver burdon. Performance deficits / Impairments: Decreased functional mobility , Decreased ADL status, Decreased ROM, Decreased endurance, Decreased safe awareness, Decreased cognition, Decreased high-level IADLs, Decreased balance, Decreased strength  Safety Devices in place: Yes  Type of devices: All fall risk precautions in place, Gait belt, Patient at risk for falls, Call light within reach, Left in chair, Chair alarm in place, Nurse notified    Treatment Initiated: Treatment and education initiated within context of evaluation.   Evaluation time included review of current medical information, gathering information related to past medical, social and functional history, completion of standardized testing, formal and informal observation of tasks, assessment of data and development of plan of care and goals. Treatment time included skilled education and facilitation of tasks to increase safety and independence with ADL's for improved functional independence and quality of life. Discharge Recommendations:   continue to assess. Patient Education:  OT Education: OT Role, Plan of Care, Precautions, ADL Adaptive Strategies, Transfer Training    Equipment Recommendations:   continue to assess. Plan:  Times per week: 5xs weeks x 90 min, 1 x week x 30 min  Current Treatment Recommendations: Functional Mobility Training, Endurance Training, Self-Care / ADL, Safety Education & Training, Strengthening, Patient/Caregiver Education & Training, Equipment Evaluation, Education, & procurement, Home Management Training    Goals:  Patient goals : I want to take care of myself  Short term goals  Time Frame for Short term goals: 1 week  Short term goal 1: Pt will demonstrate functional mobility walking to/from the bathroom or bedside chair with SBA and std walker with min cues for aligning himself as neeed to prepare for doing self care. Short term goal 2: Pt will complete ADL routine with no > min A and min cues for problem solving and task completion to increase independence in self care tasks  Short term goal 3: Pt will complete toileting routine and transfer with no > CGA and min cues for safe tech to increase independence in self toileting tasks  Short term goal 4: Pt will complete BUE ROM and light resistance exercises while following verbal cues to increase his pain free movement for ease of doing self care.   Short term goal 5: Pt will complete 1 step homemaking tasks with CGA and no > min cues for problem solving to increase ability to retrieve a snack  Long term goals  Time Frame for Long term goals : 2-3 weeks  Long term goal 1: Pt will complete ADL routine with S and no  cues for problem solving to increase independence in self care tasks  Long term goal 2: Pt will complete 1 step homemaking tasks with SBA and no cues for safe tech to increase ability to retrieve a glass of water. See long-term goal time frame for expected duration of plan of care. If no long-term goals established, a short length of stay is anticipated. Following session, patient left in safe position with all fall risk precautions in place.

## 2020-12-02 NOTE — PROGRESS NOTES
[facility] NOTE    Conference Date: 12/3/2020  Admit Date:  2020  1:15 PM  Patient Name: Zoë Anderson    MRN: [id number]    : 1935  (81 y.o.)  Rehabilitation Admitting Diagnosis:  Intraventricular hemorrhage ([facility] [address] [I61.5]  Referring Practitioner: Crescencio Renteria MD    CASE MANAGEMENT  Current issues/needs regarding patient and family discharge status: SW met with patient to introduce self and explain role, complete SW assessment, and initiate discharge planning. Prior to hospitalization, patient indicates independence with ADL's and personal care. Patient lives with spouse, Soledad Joshi, of 58 years. Patient indicates independence with bathing, dressing, and toileting. Patient does not drive. Patient's spouse, Soledad Joshi, manages finances, driving, errands, housekeeping, laundry, and meal preparation. Patient ambulates with standard walker. Patient indicates spouse, Soledad Joshi, is independent and does not require assistance for ambulation or personal care. Patient's support also includes son and daughter, Walker Larsen. Patient indicates daughter, Walker Larsen, visits weekly. Anticipate patient would benefit from continued therapy upon discharge home. SW reviewed with patient team conference on Thursday, 2020, to further discuss progress and discharge recommendations. SW to follow and maintain involvement in discharge planning. PHYSICAL THERAPY  Pt tolerates session fair, limited by weakness, slow moving, high fall risk, cognition. Pt requires consistent verbal cue reminders for safe transfer and gait technique, limited carryover. PT to continue to progress strength and functional mobility. Other: Has SW, will monitor for needs, may need w/c or RW    SPEECH THERAPY    Patient seen with RN permission on this date.  The pt presented with mild oral dysphagia characterized by prolonged and uncoordinated mastication with a rotary munching pattern, along with decreased bolus formation. Pt required min verbal cues to alternate liquids/solids to clear dry solids from oral cavity. Pharyngeal phase of the swallow is essentially within functional limites, while is CANNOT rule out pharyngeal dysfunction without formal imaging. Recommended diet downgrade to soft & bite size solids with thin liquids and continued skilled dysphagia treatment.      Pt presents with severe cognitive impairments evidenced by derived MOCA score of 10/30. Deficits within the areas of functional carryover (I.e. orientation, immediate/delayed recall, working memory), impaired functional problem solving, insight and basic safety awareness, poor verbal reasoning, thought organization, mathematical computation and executive functioning. Pt with low level cognitive demands within the home setting; however, fully independent with ability to complete basic ADL's (I.e. toileting/bathing) per pt reports. Pt with at least mild receptive language deficits evidenced by decreased auditory comprehension with poor ability to answer complex yes/no questions and mild difficulty following complex commands with reliance on extra time d/t slow processing and impaired organization/mental manipulation. Pt with mild-moderate expressive language deficits evidenced by impairments within higher level naming tasks (confrontational, responsive), basic organizational naming, presence of anomia and impaired sentence repetition. Mild dysarthria characterized by blended word boundaries. Pt would highly benefit from ongoing ST intervention in order to progress cognitive-linguistic functioning to a supervision level of assist for safe, functional return to home setting with wife upon discharge    OCCUPATIONAL THERAPY  Pt presents with a decrease in independent functioning with ADLs and basic mobility due to recent fall and hospitalization.  He currently requires moderate assist for bathing, Max A for dressing tasks and toileting tasks, increased time needed for problem solving and task completion throughout. This is a significant decline from being independent with self care and basic mobility PTA. Pt would benefit from additional skilled OT services to address increasing independence in self care and basic mobility to return home as indepedently as possible to reduce the incidence of falls and care giver burdon. Other: Pt has a handicapped height toilet and shower seat. Need to confirm with spouse. RECREATIONAL THERAPY  Patient has been offered participation in recreational therapy activities and participates as able. NUTRITION  Weight: 215 lb 6.4 oz (97.7 kg) / Body mass index is 31.81 kg/m². Current diet: DIET DYSPHAGIA SOFT AND BITE-SIZED; No Added Salt (3-4 GM); Dysphagia Soft and Bite-Sized; Cardiac  Please see nutrition note for details. NURSING  Continent of Bowel and Bladder: No  Pain is Managed:  Yes  Signs and Symptoms of Infection:  No  Signs and Symptoms of Skin Breakdown:  No  Injury and Fall Free during Inpatient Rehabilitation Admission: Yes    Consultations/Labs/X-rays: Trauma Surgery, Neurosurgery, Critical Care, Neurology, Cardiology, Plastic Surgery, Family Medicine, Physical Medicine    No results for input(s): POCGLU in the last 72 hours.     Lab Results   Component Value Date    LDLCALC 98 11/28/2020       Vitals:    12/02/20 0754 12/02/20 1915 12/03/20 0553 12/03/20 0729   BP: (!) 145/81 (!) 158/82     Pulse: 78 74     Resp: 16 16     Temp: 98.2 °F (36.8 °C) 98 °F (36.7 °C)     TempSrc: Oral Oral     SpO2: 95% 92%  95%   Weight:   215 lb 6.4 oz (97.7 kg)    Height:            Family Education: Need to make contact with family to initiate education  Fall Risk:  Falling star program initiated  Is the patient appropriate for a stay in the functional apartment? no    Discharge Plan   Estimated Discharge Date: 12/16   Destination: home health  Services at Discharge: [address] [place] [address] Rd, Occupational Therapy, Speech Therapy, Nursing and aide 3x week  Is patient appropriate for an outpatient driving evaluation? no  Equipment at Discharge: RW  Factors facilitating achievement of predicted outcomes: Family support, Motivated, Cooperative and Pleasant  Barriers to the achievement of predicted outcomes: Cognitive deficit, Limited insight into deficits and dysphagia    Team Members Present at Conference:  :Aretha Ta, 46 Aleida Shetty OTR/L [address], [facility]  [address], [address], [address]  Nurse:Myrtle Hager, BERNICE  Psychologist: Diallo Tellez, PhD.    I approve the established interdisciplinary plan of care as documented within the medical record of Clint Mancia

## 2020-12-02 NOTE — PLAN OF CARE
Problem: DISCHARGE BARRIERS  Goal: Patient's continuum of care needs are met  Note:   [facility]  Physical Medicine Case Management Assessment    [x] Inpatient Rehabilitation Unit  [] Transitional Care Unit    Patient Name: Myrtle Acosta        MRN: [id number]    : 1935  (81 y.o.)  Gender: male     Date of Admission: 2020      Family/Social/Home Environment: Prior to hospitalization, patient indicates independence with ADL's and personal care. Patient lives with spouse, Yasmin Reece, of 58 years. Patient indicates independence with bathing, dressing, and toileting. Patient does not drive. Patient's spouse, Yasmin Reece, manages finances, driving, errands, housekeeping, laundry, and meal preparation. Patient ambulates with standard walker. Patient indicates spouse, Yasmin Reece, is independent and does not require assistance for ambulation or personal care. Patient's support also includes son and daughter, Kamila Caban. Patient indicates daughter, Kamila Caban, visits weekly. Social/Functional History  Lives With: Spouse  Type of Home: House  Home Layout: One level  Home Access: Stairs to enter with rails  Entrance Stairs - Number of Steps: 1 step to enter and 1 step once inside the house  Bathroom Shower/Tub: Walk-in shower  Bathroom Toilet: Handicap height  Bathroom Equipment: Built-in shower seat, Grab bars around toilet, Grab bars in shower  Bathroom Accessibility: Accessible  Home Equipment: Standard walker  Receives Help From: Family  ADL Assistance: [address]: Independent  Homemaking Responsibilities: No  Ambulation Assistance: Independent  Transfer Assistance: Independent  Active : No  Patient's  Info: Pt's spouse and family provides transportation. Occupation: Retired  Type of occupation: [employer]  Additional Comments: Pt ambualated short distances with a standard walker. He did his own self care.   He had help with all of the cooking and cleaning prior to admission. Contact/Guardian Information: Guy Reyes, [phone]. Daughter, Leonor Mac, [phone]. Community Resources Utilized: No community resources utilized prior to admission. Sexuality/Intimacy: No issues or concerns identified at time of SW assessment. Complementary Health Approaches: Patient with no current interest in complementary health approaches at time of SW assessment. Anticipated Needs/Discharge Plans: Anticipate patient would benefit from continued therapy upon discharge home. SW met with patient to introduce self and explain role, complete SW assessment, and initiate discharge planning. Prior to hospitalization, patient indicates independence with ADL's and personal care. Patient lives with spouse, Sheldon High, of 58 years. Patient indicates independence with bathing, dressing, and toileting. Patient does not drive. Patient's spouse, Sheldon High, manages finances, driving, errands, housekeeping, laundry, and meal preparation. Patient ambulates with standard walker. Patient indicates spouse, Sheldon High, is independent and does not require assistance for ambulation or personal care. Patient's support also includes son and daughter, Susan Muñiz. Patient indicates daughter, Susan Muñiz, visits weekly. Anticipate patient would benefit from continued therapy upon discharge home. SW reviewed with patient team conference on Thursday, 12/03/2020, to further discuss progress and discharge recommendations. SW to follow and maintain involvement in discharge planning.            Discharge Planning  Living Arrangements: Spouse/Significant Other  Support Systems: Spouse/Significant Other, Children  Potential Assistance Needed: N/A  Potential Assistance Purchasing Medications: No  Meds-to-Beds: Does the patient want to have any new prescriptions delivered to bedside prior to discharge?: No(Patient prefers to use [facility] for discharge medications.)  Type of Home Care Services: OT, PT, Nursing Services  Patient expects to be discharged to[de-identified] Home  Expected Discharge Date: (Undetermined)  Follow Up Appointment: Best Day/Time : Tuesday PM      Electronically signed by TALI Sawyer on 12/2/2020 at 11:55 AM

## 2020-12-02 NOTE — PROGRESS NOTES
[address] [address]  INPATIENT PHYSICAL THERAPY  DAILY NOTE  [facility] - [id number]    Time In: 1330  Time Out: 1400  Timed Code Treatment Minutes: 30 Minutes  Minutes: 30          Date: 2020  Patient Name: Myrna Enamorado,  Gender:  male        MRN: [id number]  : 1935  (81 y.o.)     Referring Practitioner: Pavan Gomez MD  Diagnosis: Intraventricular hemorrhage  Additional Pertinent Hx: Pt presenting at [facility] by activation of level 2 trauma, brought by EMS following a unwitnessed fall with unknown LOC; past medical history includes recent cardiac sten placement x2, hypertension, chronic kidney disease, CAD. Per wife report, she was standing in the bathroom when she heard a sound in the kitchen she open the bathroom door to see her  laying face down on the ground. Patient states he normally ambulates with walker, did have a walker at the time of fall, unclear if fall was precipitated by syncopal episode. Wife states that  does appear to be confused post fall, has been repetitive in speech since incident. Patient reports some mild discomfort over left face otherwise has no complaints on exam.  Found to have an intraventricular hemorrhage, a left orbital fx, nasal bone fx, and left 6th rib fx. To IP rehab .      Prior Level of Function:  Lives With: Spouse  Type of Home: House  Home Layout: One level  Home Access: Stairs to enter with rails  Entrance Stairs - Number of Steps: 1 step to enter and 1 step once inside the house  Entrance Stairs - Rails: Left  Home Equipment: Standard walker   Bathroom Shower/Tub: Walk-in shower  Bathroom Toilet: Handicap height  Bathroom Equipment: Built-in shower seat, Grab bars around toilet, Grab bars in shower  Bathroom Accessibility: Accessible    Receives Help From: Family  ADL Assistance: [address]: Independent  Homemaking Responsibilities: No  Ambulation Assistance: Independent  Transfer Assistance: Independent  Active : No  Additional Comments: Pt ambualated short distances with a standard walker. He did his own self care. He had help with all of the cooking and cleaning prior to admission. Restrictions/Precautions:  Restrictions/Precautions: Fall Risk, General Precautions  Position Activity Restriction  Other position/activity restrictions: Keep systolic blood pressure between 100-160 while moving. SUBJECTIVE: Pt is seated in recliner, agreeable to afternoon session. PAIN: denies    OBJECTIVE:  Bed Mobility:  Sit to Supine: Moderate Assistance     Transfers:  Sit to Stand: Minimal Assistance, from recliner and w/c, slow to ascend, wide LEXII  Stand to Sit:Minimal Assistance    Ambulation:  Contact Guard Assistance  Distance: 40 feet  Surface: Level Tile  Device:Rolling Walker  Gait Deviations: Forward Flexed Posture, Slow Anne Marie, Decreased Step Length Bilaterally and Decreased Gait Speed  **Improved speed and fluency with RW vs SW, verbal cues for increased step length    Pre-gait tasks performed in parallel bars with visual aide to facilitate increased step length B'ly. Pt amb in parallel bars with constant verbal cues for increased step length. Functional Outcome Measures: Not completed    ASSESSMENT:  Assessment: Patient progressing toward established goals. Activity Tolerance:  Patient tolerance of  treatment: good. Equipment Recommendations: Other: Has SW, will monitor for needs, may need w/c or RW  Discharge Recommendations:  24 hour supervision or assist, Continue to assess pending progress    Plan: Times per week: 5x/week 90 min, 1x/week 30 min  Current Treatment Recommendations: Strengthening, Safety Education & Training, Balance Training, Endurance Training, Functional Mobility Training, Patient/Caregiver Education & Training, Transfer Training, Gait Training, Wheelchair Mobility Training, Neuromuscular Re-education, Home Exercise Program, Stair training    Patient Education  Patient Education: Gait    Goals:  Patient goals : does not state  Short term goals  Time Frame for Short term goals: 2 weeks  Short term goal 1: Pt to transfer supine <--> sit CGA to enable pt to get in/out of bed. Short term goal 2: Pt to transfer sit <--> stand CGA for increased functional mobility. Short term goal 3: Pt to ambulate 50 feet with SW/RW CGA for household ambulation. Long term goals  Time Frame for Long term goals : 4 weeks  Long term goal 1: Pt to transfer supine <--> sit SBA to enable pt to get in/out of bed. Long term goal 2: Pt to transfer sit <--> stand SBA for increased functional mobility. Long term goal 3: Pt to ambulate >100 feet with SW/RW SBA for household ambulation. Long term goal 4: Pt to ascend/descend 1 step with SW/RW CGA for home entry. Long term goal 5: Pt to perform car transfer CGA to enable pt to get in/out of the car. Long term goal 6: Pt to improve TUG test score to <3 minutes to indicate improvement in overall mobility. Following session, patient left in safe position with all fall risk precautions in place.

## 2020-12-02 NOTE — H&P
Physical Medicine & Rehabilitation  History and Physical    Chief Complaint and Reason for Rehabilitation Admission: Intracranial and intraventricular hemorrhage/right thalamic CVA    History of Present Illness:  Ana Matamoros is a 81 y.o. male who  has a past medical history of CAD (coronary artery disease), Chronic kidney disease, stage III (moderate), Closed fracture of six ribs of left side, DVT, lower extremity ([address] Gastritis, Hematuria, Hypercholesteremia, Hypertension, Intracranial bleed ([address] Nasal bone fracture, Osteoarthritis, Presence of IVC filter, Prostate cancer ([address] Spinal stenosis, lumbar, s/p laminectomy, and Statin-induced myositis. He was admitted 12/1/2020 to the inpatient rehabilitation unit. Original admission was on 11/27/2020 after coming into the emergency department for evaluation of a syncopal episode resulting in head trauma. The patient was found at home by his spouse; she had gone to the to the bathroom and returned to the kitchen to find him lying on the floor with a an abrasion to his left forehead and a left lower lip laceration resulting from a tooth piercing his lip. The patient was unaware of what had happened and unable to state if he was feeling lightheaded or dizzy prior to his fall. He was noted to be confused after this fall with repetition of the same questions over and over per the spouse. Sustained injuries from this fall included a a left orbital floor fracture with posterior blowout component, nasal bone fracture, and left sixth rib fracture. On MRI of the brain he was noted to have intracranial and intraventricular hemorrhages at the level of the septum pellucidum and in the occipital horns of the lateral ventricles as well as a subacute infarct of the right thalamus with noted chronic infarcts of the bilateral basal ganglia and thalami.   He does have a history of prostate cancer status post brachytherapy and did have issues with hematuria for which resolved without intervention. Of note the patient did have stenting of his circumflex artery via a heart catheterization on 10/22/2020 by Dr. Marlo Greene at which time he was prescribed to be on aspirin and Brilinta for maintenance of patency of the stent. It is documented that the patient never picked up these medications as directed. On initial consultation exam the patient was noted to be resting comfortably in his hospital bed with noted swelling, bruising and conjunctival hemorrhages over the left eye consistent with his known orbital wall fractures. He endorses that his left rib fracture is not painful at this time. The laceration of his left lower lip and abrasion of his left forehead are healing appropriately. Cognitively the patient does show slowed cognitive processing; but appropriate one-step command following. Strength is grossly equal in all 4 limbs as a sensation. The potential for coming to the acute inpatient rehabilitation unit under his Medicare benefits was discussed and he stated he feels that he would benefit from a stay on the inpatient rehabilitation unit. The estimated length of stay was expected to be 14 days for his given diagnosis. In the interim he was deemed medically stable and remained in agreement to come to the acute inpatient rehabilitation unit. His neurological exam is unchanged; i.e. dysarthria and cognitive deficits persist.  He is alert and oriented x3 at this time. He denies any pain currently. It is noted that when the patient was gotten up to the bathroom after his arrival in the acute inpatient rehabilitation unit that there was some noted hematuria. The expectations for the admission to the acute inpatient rehabilitation unit were reviewed and all of the patient's questions were answered in detail. Previously was independent of ADLs and used Walker AD for ambulation prior to recent admission.  Initially has ambulated 13' with SW at Minimal contact assistance with PT. With therapy is Minimal contact assistance and Moderate assistance for bed mobility, contact-guard assistance for transfers, and Minimal contact assistance with balance. ROM restrictions, WB restrictions, precautions:      Fall Risk    Current Rehabilitation Assessments:  Physical Therapy:  OBJECTIVE:  Bed Mobility:  Not Tested     Transfers:  Sit to Stand: Contact Guard Assistance, Minimal Assistance, with increased time for completion, cues for hand placement, to/from chair with arms  Stand to [facility], with increased time for completion, cues for hand placement, to/from chair with arms  Vamsi initially - pt progressing to [facility] with significant amount of time to complete transfers. Increased cueing provided for improved sequencing and safety, especially for anterior weight shifting and hand placement. Pt completed a total 5 stands this date. 1 major LOB with modAx1 for safety.     Ambulation:  Contact Guard Assistance, Minimal Assistance, with cues for safety, with verbal cues , with increased time for completion  Distance: 15, 3+3  Surface: Level Tile  Device:SW and RW  Gait Deviations: Forward Flexed Posture, Slow Anne [place], Decreased Step Length Bilaterally, Decreased Weight Shift Bilaterally and limited foot clearance, short and shuffling gait, pt froze when getting to threshold, VERY short step lengths  Cueing for improved sequencing, safety, heel strike, foot clearance and step lengths. Visual aide assisted in step lengths.    Pt requires physical assist at times for 2WW mgmt.     Balance:  Static Standing Balance: Contact Guard Assistance, Minimal Assistance  Dynamic Standing Balance: Contact Guard Assistance, Minimal Assistance     Functional Outcome Measures: Completed  AM-PAC Inpatient Mobility without Stair Climbing Raw Score : 15  AM-PAC Inpatient without Stair Climbing T-Scale Score : 43.03     ASSESSMENT:  Assessment: Patient progressing toward established goals. Activity Tolerance:  Patient tolerance of  treatment: good. Pt tolerated repeated transfers this date with gait training. However, pt was notably fatigued post session. Equipment Recommendations:Equipment Needed: No  Discharge Recommendations:  IP Rehab     Occupational Therapy:  COGNITION: Slow Processing, Decreased Recall, Decreased Insight, Decreased Problem Solving, Decreased Safety Awareness and Impaired Attention     ADL:   Feeding: with set-up. For breakfast tray. Grooming: Stand By Assistance. Washign face and applying deodorant seated EOB. Bathing: Moderate Assistance. MaxA for jacinto care and washing bottom in standing. Pt able to complete UB care and washing BLE above/below knees with SBA seated EOB. Upper Extremity Dressing: Moderate Assistance. ModA bringing hospital shirt ovwerhead. Lower Extremity Dressing: Maximum Assistance. Threading BLE into undergarment/shorts and managaing over hips. .     BALANCE:  Sitting Balance:  Stand By Assistance. EOB  Standing Balance: Minimal Assistance, Moderate Assistance, X 1.    x1 minute static  prep for mobility, within ADL task     BED MOBILITY:  Supine to Sit: Minimal Assistance    Scooting: Moderate Assistance EOB     TRANSFERS:  Sit to Stand: Moderate Assistance, X 1. Form elevated EOB  Stand to Sit: Contact Guard Assistance. To chair.     FUNCTIONAL MOBILITY:  Assistive Device: Walker  Assist Level:  Minimal Assistance, Moderate Assistance and X 1. Distance:   Completed functional mobility short distance within pt room from EOB to chair at VERY slow pace, no LOB noted- small shuffling steps with mod assist for weight shifting required to build momentum. Pt requires mod cues for walker safety- lenghty seated rest break after trial of mobility, mod fatigue noted.      ASSESSMENT:  Activity Tolerance:  Patient tolerance of  treatment: fair. Pt limited by decreased endurance.       Discharge Recommendations: Patient would benefit from continued therapy after discharge, Continue to assess pending progress, IP Rehab   Equipment Recommendations: Equipment Needed: Yes  Other: May pirncess from [facility] and an elevated toilet seat with armrests or commode. Plan: Times per week: 6x  Specific instructions for Next Treatment: Functional mobility; ADLs and adaptations; upper body exercises and steady breathing  Current Treatment Recommendations: Functional Mobility Training, Endurance Training, Self-Care / ADL, Safety Education & Training  Plan Comment: Pt would benefit from continued skilled OT services when medically stable and discharged from Acute. IP Rehab would benefit. Speech Therapy: None  RECOMMENDATIONS/ASSESSMENT:  DIAGNOSTIC IMPRESSIONS:  Pt presents with severe cognitive impairments evidenced by derived MOCA score of 9/30. Deficits within the areas of functional carryover (I.e. orientation, immediate/delayed recall, working memory), impaired functional problem solving, insight and basic safety awareness, poor verbal reasoning, thought organization, mathematical computation and executive functioning. Pt with low level cognitive demands within the home setting; however, fully independent with ability to complete basic ADL's (I.e. toileting/bathing) per pt reports. Pt with at least mild receptive language deficits evidenced by decreased auditory comprehension with poor ability to answer complex yes/no questions and mild difficulty following complex commands with reliance on extra time d/t slow processing and impaired organization/mental manipulation. Pt with mild-moderate expressive language deficits evidenced by impairments within higher level naming tasks (confrontational, responsive), basic organizational naming, presence of anomia and impaired sentence repetition. Mild dysarthria characterized by blended word boundaries.  Pt would highly benefit from ongoing ST intervention in order to progress cognitive-linguistic functioning to a supervision level of assist for safe, functional return to home setting with wife upon discharge.      In regards to swallowing performance, pt presenting with Mod I level of oropharyngeal swallow function. Demonstrations of slow; however, effective mastication and AP transit when provided with extra time and liquid wash. Appropriate oral clearance to follow. Pt with what appeared to be decreased laryngeal elevation upon manual palpation; however, effective tolerance with absence of s/s of aspiration noted s/p consumption of PO intake. Certainly cannot r/o silent aspiration and/or pharyngeal dysfunction without completion of formal imaging. Pt with impulsivity evidenced by large PO consumption of thin liquids by cup/straw as well as large bites of hard solids. Recommendations to initiate a regular diet and thin liquids with supervision intermittently provided to ensure successful recall/carryover of skilled swallowing strategies.    Rehabilitation Potential: excellent    Past Medical History:      Diagnosis Date    CAD (coronary artery disease)     Chronic kidney disease, stage III (moderate)     Closed fracture of six ribs of left side 11/27/2020    DVT, lower extremity (HCC)     Gastritis     Hematuria     Hypercholesteremia     Hypertension     Intracranial bleed (HCC)     Nasal bone fracture 11/27/2020    Osteoarthritis     Presence of IVC filter     Prostate cancer (HCC)     Seed implants    Spinal stenosis, lumbar, s/p laminectomy 6/1/2015    Statin-induced myositis      Primary care provider: Kolby Anand MD     Past Surgical History:      Procedure Laterality Date    BACK SURGERY  2006    fusion    CARDIAC SURGERY      2 stents    COSMETIC SURGERY      ENDOSCOPY, COLON, DIAGNOSTIC      KNEE SURGERY      NECK SURGERY      OTHER SURGICAL HISTORY  5/29/2015    DECOMPRESSIVE LUMBAR LAMINECTOMY L2-3    ROTATOR CUFF REPAIR      TOTAL HIP ARTHROPLASTY Bilateral      Allergies:    Patient has no known Gastrointestinal: Negative for constipation, diarrhea and nausea. Endocrine: Negative for cold intolerance. Genitourinary: Positive for hematuria. Negative for frequency and urgency. Musculoskeletal: Negative for back pain and joint swelling. Skin: Negative for pallor. Allergic/Immunologic: Negative. Neurological: Positive for weakness. Negative for dizziness, tremors, numbness and headaches. Hematological: Negative. Psychiatric/Behavioral: Positive for decreased concentration. Negative for dysphoric mood and hallucinations. The patient is not nervous/anxious. All other systems reviewed and are negative. Physical Exam:  BP (!) 157/80   Pulse 76   Temp 98.1 °F (36.7 °C) (Oral)   Resp 16   Ht 5' 9\" (1.753 m)   Wt 218 lb 14.4 oz (99.3 kg)   SpO2 95%   BMI 32.33 kg/m²     awake  Orientation:   person, place, time, not situation  Mood: within normal limits  Affect: calm  General appearance: Patient is well nourished, well developed, well groomed and in no acute distress, overweight, appearing stated age, up in bed, left eye conjunctival hemorrhage medial to the iris    Memory:   abnormal -decreased recall,   Attention/Concentration: abnormal -mildly slowed processing  Language:  abnormal -mild dysarthria    Cranial Nerves:  cranial nerves II-XII are grossly intact  ROM:  normal  Motor Exam: As in table    Manual Muscle Testing:     Sh Abd  Sh IR  Sh ER  Elbow Flex  Elbow Ext    Left  3+   4 4   Right 3+   4 4      Wrist Ext  Wrist Flexion  Finger Flex  Finger Abd  Finger Add    Left    4     Right   4        Hip Flexion  Hip Extension  Hip Abduction  Hip Adduction    Left  2+      Right 2+         Knee Flex  Knee Ext  Ankle DF  Ankle PF  Ankle Inv  Ankle Ev  EHL    Left   3+ 3+ 4      Right  3+ 2 4        Tone:  normal  Muscle bulk: within normal limits  Sensory:  Sensory intact  Coordination:   abnormal - mild decrease for fine motor control of the bilateral hands  Deep Tendon Reflexes: Right Knee:  3+  Left Knee:  3+    Skin: warm and dry, no rash or erythema and abrasion over left forehead above eye and mild swelling of the left orbit  Peripheral vascular: Pulses: Normal upper and lower extremity pulses; Edema: 1+    Diagnostics:  Recent Results (from the past 24 hour(s))   Basic Metabolic Panel    Collection Time: 12/01/20  3:31 AM   Result Value Ref Range    Sodium 140 135 - 145 meq/L    Potassium 4.7 3.5 - 5.2 meq/L    Chloride 104 98 - 111 meq/L    CO2 23 23 - 33 meq/L    Glucose 97 70 - 108 mg/dL    BUN 26 (H) 7 - 22 mg/dL    CREATININE 1.2 0.4 - 1.2 mg/dL    Calcium 9.5 8.5 - 10.5 mg/dL   CBC    Collection Time: 12/01/20  3:31 AM   Result Value Ref Range    WBC 5.5 4.8 - 10.8 thou/mm3    RBC 4.89 4.70 - 6.10 mill/mm3    Hemoglobin 15.0 14.0 - 18.0 gm/dl    Hematocrit 49.8 42.0 - 52.0 %    .8 (H) 80.0 - 94.0 fL    MCH 30.7 26.0 - 33.0 pg    MCHC 30.1 (L) 32.2 - 35.5 gm/dl    RDW-CV 12.8 11.5 - 14.5 %    RDW-SD 47.8 (H) 35.0 - 45.0 fL    Platelets 600 736 - 766 thou/mm3    MPV 10.4 9.4 - 12.4 fL   Anion Gap    Collection Time: 12/01/20  3:31 AM   Result Value Ref Range    Anion Gap 13.0 8.0 - 16.0 meq/L   Glomerular Filtration Rate, Estimated    Collection Time: 12/01/20  3:31 AM   Result Value Ref Range    Est, Glom Filt Rate 70 (A) ml/min/1.73m2      Recent Labs     12/01/20  0331 11/30/20  0331 11/29/20  0355   WBC 5.5 6.0 6.2   HGB 15.0 15.0 15.5   HCT 49.8 48.8 49.8   .8* 100.2* 99.6*    141 150     Lab Results   Component Value Date    VITD25 39 04/11/2018      Lab Results   Component Value Date    LABA1C 5.2 11/28/2020     Impression:  1. Ground-level fall at home. 2. 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 3. 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 4. Left sixth rib fracture. 5. Abrasion to left forehead.   6. Laceration of left lower lip.  7. Subacute infarct of the right thalamus. 8. Severe cognitive impairments. (MOCA) version 7.2 completed. Patient scored 9/30.   9. Independent level of oropharyngeal swallow function. 10. Mild dysarthria. 11. Mild-moderate expressive language deficits. 12. Left orbital floor fracture with posterior blowout component. 13. Nasal bone fracture. 14. Hematuria. 15. Coronary artery disease with history of 2 stents and now with 90% severe stenosis of the apical portion of the LAD and mild diastolic dysfunction of the left ventricle noted on heart catheterization completed on 10/22/2020 by Dr. Tristin Shook with deployment of PCI to circumflex artery. 16. Medication noncompliance; did not start Brilinta and aspirin for his post stent medical management. 17. Hypertension. 18. Hyperlipidemia. 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 20. CKD 3.  21. Lumbar stenosis with neurogenic claudication status post lumbar laminectomy from L2-L3 bilaterally by Dr. Kia Anderson on 5/29/2015. 22. 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 23. Prostate cancer status post brachytherapy. Plan:   The patient demonstrates good potential to participate in an inpatient rehabilitation program involving at least 3 hours per day, 5 days per week of intensive rehabilitation. Rehabilitation services will include PT, OT and SLP/RT in order to improve functional status prior to discharge. Family education and training will be completed. Equipment evaluations and recommendations will be completed as appropriate. Medical management: Per primary team and Dr. Kevin Prasad. Consultants:  Trauma Surgery, Neurosurgery, Critical Care, Neurology, Cardiology, Plastic Surgery, Family Medicine, Physical Medicine    Narcotic usage:  Not applicable     Last BM:  Stool Amount: Medium (12/01/20 5240)    Acute/Rehabilitation Problems:  1. Ground-level fall at home.   2. Traumatic brain injury with loss of consciousness less than 15 and pain management. Nursing will also provide education and training to patient and family. 17. Prophylaxis:  DVT: Lovenox. GI: None. 18.  and case management consultations for coordination of care and discharge planning. Chronic Problems:  1. Hypertension. 1. Amlodipine. 2. Bumex. 3. Cozaar. 2. Hyperlipidemia/Statin induced myositis. 1. Pravachol. 3. CKD 3.  4. Lumbar stenosis with neurogenic claudication status post lumbar laminectomy from L2-L3 bilaterally by Dr. Dwayne Joya on 5/29/2015.  5. 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 6. Prostate cancer status post brachytherapy. 1. Tamsulosin. Labs reviewed on:  1. 12/1. Infectious Disease:  1. N/A    The main medical problem(s) and comorbidities being actively managed by the physicians and requiring 24 hour rehabilitation nursing care during this stay include cognitive deficits, traumatic brain injury management, antiplatelet therapy management, dysarthria, hematuria, and pain management. The domains of functional impairment present in this patient which will require an intensive and interdisciplinary rehabilitation environment include self care, mobility, motor dysfunction, bowel/bladder management, safety, cognitive function and communication. Estimated length of stay for this admission 14 days. Anticipated disposition: Home. The potential to achieve that is very good. The post admission physician evaluation (TODD) is consistent with the pre-admission assessment. See above findings to reflect the elements required in the TODD. Patient's admitting condition is consistent with the findings of the preadmission assessment by the rehabilitation admissions coordinator. Greater than 50% of 70 minutes was spent on formulating an acute inpatient rehabilitation unit admission plan, reviewing images, labs, the electronic medical record and answering the patient's questions in detail.     Jovani Reece

## 2020-12-02 NOTE — PROGRESS NOTES
[facility]  Individualized Disclosure Statement      Patient: Elijah Eden      Scope of Service  [facility] provides 24 hour individualized service to patients with functional limitations due to, but not limited to: stroke, brain injury, spinal cord injury, major multiple trauma, fractures, amputation, and neurological disorders. The [address] [place] [address] provides rehabilitative nursing and medical services as well as physical, occupational, speech, and recreation therapies. [id number] is fully accredited by the Commission on Accreditation of Rehabilitation Facilities (CARF) as a comprehensive provider of rehabilitation services. Patients admitted to the [address] [place] [address] receive a minimum of three hours of therapy per day, at least six days per week, with a revised therapy schedule on weekends and holidays. Physical therapy, occupational therapy, and speech therapy are provided seven days per week including holidays. Other therapeutic services are available on weekends and evenings as needed or scheduled. Intensity of Treatment  Your treatment program will consist of Nursing Care and:  1.5 hours of Physical Therapy, per day  1.5 hours of Occupational Therapy, per day  1 hour of Recreational Therapy, per week    [facility] maintains contracts with most insurance plans. Depending on the type of coverage, the insurance may impose limits on the coverage for rehabilitation care. Coverage is based on the premise that you are able to fully participate in the rehabilitation program and show continued progress. Please verify your own insurance information A copy of this was given to the patient/ family on this date.   Insurance Coverage  Your insurance company has made the following determination relative to the length of your stay:   Your estimated length of stay is  14 days   Your insurance Coverage has been verified as follows:    Primary Insurance:medicare   Deductible: [phone]  Coverage: active  Secondary Insurance: ;secondary insurance policies often cover co-pay amounts, but to ensure payment please contact your insurance company.     Alternative Resources: Please ask the  for more information [phone]

## 2020-12-03 PROCEDURE — 2580000003 HC RX 258: Performed by: SURGERY

## 2020-12-03 PROCEDURE — 6370000000 HC RX 637 (ALT 250 FOR IP): Performed by: SURGERY

## 2020-12-03 PROCEDURE — 94760 N-INVAS EAR/PLS OXIMETRY 1: CPT

## 2020-12-03 PROCEDURE — 92507 TX SP LANG VOICE COMM INDIV: CPT

## 2020-12-03 PROCEDURE — 97110 THERAPEUTIC EXERCISES: CPT

## 2020-12-03 PROCEDURE — 92526 ORAL FUNCTION THERAPY: CPT

## 2020-12-03 PROCEDURE — 6360000002 HC RX W HCPCS: Performed by: SURGERY

## 2020-12-03 PROCEDURE — 97535 SELF CARE MNGMENT TRAINING: CPT

## 2020-12-03 PROCEDURE — 1180000000 HC REHAB R&B

## 2020-12-03 PROCEDURE — 97530 THERAPEUTIC ACTIVITIES: CPT

## 2020-12-03 PROCEDURE — 97116 GAIT TRAINING THERAPY: CPT

## 2020-12-03 PROCEDURE — 94640 AIRWAY INHALATION TREATMENT: CPT

## 2020-12-03 PROCEDURE — 6370000000 HC RX 637 (ALT 250 FOR IP): Performed by: PHYSICAL MEDICINE & REHABILITATION

## 2020-12-03 RX ADMIN — ASPIRIN 81 MG: 81 TABLET, CHEWABLE ORAL at 09:23

## 2020-12-03 RX ADMIN — METOPROLOL TARTRATE 50 MG: 50 TABLET, FILM COATED ORAL at 21:26

## 2020-12-03 RX ADMIN — METOPROLOL TARTRATE 50 MG: 50 TABLET, FILM COATED ORAL at 09:23

## 2020-12-03 RX ADMIN — AMLODIPINE BESYLATE 5 MG: 5 TABLET ORAL at 09:23

## 2020-12-03 RX ADMIN — STANDARDIZED SENNA CONCENTRATE 17.2 MG: 8.6 TABLET ORAL at 21:26

## 2020-12-03 RX ADMIN — SODIUM CHLORIDE, PRESERVATIVE FREE 10 ML: 5 INJECTION INTRAVENOUS at 21:27

## 2020-12-03 RX ADMIN — BUDESONIDE AND FORMOTEROL FUMARATE DIHYDRATE 2 PUFF: 80; 4.5 AEROSOL RESPIRATORY (INHALATION) at 18:04

## 2020-12-03 RX ADMIN — TAMSULOSIN HYDROCHLORIDE 0.4 MG: 0.4 CAPSULE ORAL at 21:26

## 2020-12-03 RX ADMIN — BUMETANIDE 1 MG: 1 TABLET ORAL at 09:23

## 2020-12-03 RX ADMIN — ENOXAPARIN SODIUM 40 MG: 40 INJECTION SUBCUTANEOUS at 18:20

## 2020-12-03 RX ADMIN — SODIUM CHLORIDE, PRESERVATIVE FREE 10 ML: 5 INJECTION INTRAVENOUS at 09:24

## 2020-12-03 RX ADMIN — ACETAMINOPHEN 650 MG: 325 TABLET ORAL at 09:33

## 2020-12-03 RX ADMIN — BUDESONIDE AND FORMOTEROL FUMARATE DIHYDRATE 2 PUFF: 80; 4.5 AEROSOL RESPIRATORY (INHALATION) at 07:29

## 2020-12-03 RX ADMIN — LOSARTAN POTASSIUM 100 MG: 100 TABLET, FILM COATED ORAL at 09:23

## 2020-12-03 RX ADMIN — PRAVASTATIN SODIUM 40 MG: 40 TABLET ORAL at 09:24

## 2020-12-03 ASSESSMENT — PAIN SCALES - GENERAL
PAINLEVEL_OUTOF10: 2
PAINLEVEL_OUTOF10: 0
PAINLEVEL_OUTOF10: 0
PAINLEVEL_OUTOF10: 6
PAINLEVEL_OUTOF10: 0
PAINLEVEL_OUTOF10: 0

## 2020-12-03 ASSESSMENT — PAIN - FUNCTIONAL ASSESSMENT: PAIN_FUNCTIONAL_ASSESSMENT: ACTIVITIES ARE NOT PREVENTED

## 2020-12-03 NOTE — PROGRESS NOTES
[address]  Occupational Therapy  Daily Note  Time:   Time In: 730  Time Out: 830  Timed Code Treatment Minutes: 60 Minutes  Minutes: 60    Date: 12/3/2020  Patient Name: Abbe Benavides,   Gender: male      Room: [facility]/064-A  MRN: [id number]  : 1935  (81 y.o.)  Referring Practitioner: Dr. Rolanda Castañeda  Diagnosis: Intraventricular Hemorrhage  Additional Pertinent Hx: Pt admitted to in rehab for rehab needs after admission to the hospital s/p  fall at home resulting in an intraventricular hemorrhage, left sixth rib fracture, displaced left orbital floor fracture with deficits of gait instability and severe cognitive deficits with a MOCA score of 9/30 on . Restrictions/Precautions:  Restrictions/Precautions: Fall Risk, General Precautions  Position Activity Restriction  Other position/activity restrictions: Keep systolic blood pressure between 100-160 while moving. SUBJECTIVE: Patient in bed upon arrival agreeable to OT treatment     PAIN: no     COGNITION: Slow Processing, Decreased Insight, Decreased Problem Solving and Decreased Safety Awareness    ADL:   Grooming: Contact Guard Assistance. standing at sink to complete oral hygiene, slow moving increased time to complete   Bathing: Contact Guard Assistance and Minimal Assistance. CGA for balance min A for below knee  Upper Extremity Dressing: Modified Independent. Donning tshirt and buttoned up shirt   Lower Extremity Dressing: Minimal Assistance. To thread underwear and pants CGA when standing to pull up     BALANCE:  Sitting Balance:  Modified Independent. Standing Balance: Contact Guard Assistance. BED MOBILITY:  Supine to Sit: Stand By Assistance    Scooting: Stand By Assistance      TRANSFERS:  Sit to Stand:  [employer]. Stand to Sit: Contact Guard Assistance. FUNCTIONAL MOBILITY:  Assistive Device: Walker  Assist Level:  Contact Guard Assistance. Distance: To and from bathroom     ASSESSMENT:     Activity Tolerance:  Patient tolerance of  treatment: fair. Discharge Recommendations: Continue to assess pending progress   Equipment Recommendations: Other: Pt has a handicapped height toilet and shower seat. Need to confirm with spouse. Plan: Times per week: 5xs weeks x 90 min, 1 x week x 30 min  Current Treatment Recommendations: Functional Mobility Training, Endurance Training, Self-Care / ADL, Safety Education & Training, Strengthening, Patient/Caregiver Education & Training, Equipment Evaluation, Education, & procurement, Home Management Training    Patient Education  Patient Education: ADL's    Goals  Short term goals  Time Frame for Short term goals: 1 week  Short term goal 1: Pt will demonstrate functional mobility walking to/from the bathroom or bedside chair with SBA and std walker with min cues for aligning himself as neeed to prepare for doing self care. Short term goal 2: Pt will complete ADL routine with no > min A and min cues for problem solving and task completion to increase independence in self care tasks  Short term goal 3: Pt will complete toileting routine and transfer with no > CGA and min cues for safe tech to increase independence in self toileting tasks  Short term goal 4: Pt will complete BUE ROM and light resistance exercises while following verbal cues to increase his pain free movement for ease of doing self care. Short term goal 5: Pt will complete 1 step homemaking tasks with CGA and no > min cues for problem solving to increase ability to retrieve a snack  Long term goals  Time Frame for Long term goals : 2-3 weeks  Long term goal 1: Pt will complete ADL routine with S and no  cues for problem solving to increase independence in self care tasks  Long term goal 2: Pt will complete 1 step homemaking tasks with SBA and no cues for safe tech to increase ability to retrieve a glass of water.     Following session, patient left in safe position with all fall risk precautions in place.

## 2020-12-03 NOTE — PROGRESS NOTES
[facility]  INPATIENT PHYSICAL THERAPY  DAILY NOTE  [address] - 7E-64/064-A    Time In: 0930  Time Out: 1030  Timed Code Treatment Minutes: 60 Minutes  Minutes: 60          Date: 12/3/2020  Patient Name: Bowen Sepulveda,  Gender:  male        MRN: [id number]  : 1935  (81 y.o.)     Referring Practitioner: Bev Gibbs MD  Diagnosis: Intraventricular hemorrhage  Additional Pertinent Hx: Pt presenting at [facility] by activation of level 2 trauma, brought by EMS following a unwitnessed fall with unknown LOC; past medical history includes recent cardiac sten placement x2, hypertension, chronic kidney disease, CAD. Per wife report, she was standing in the bathroom when she heard a sound in the kitchen she open the bathroom door to see her  laying face down on the ground. Patient states he normally ambulates with walker, did have a walker at the time of fall, unclear if fall was precipitated by syncopal episode. Wife states that  does appear to be confused post fall, has been repetitive in speech since incident. Patient reports some mild discomfort over left face otherwise has no complaints on exam.  Found to have an intraventricular hemorrhage, a left orbital fx, nasal bone fx, and left 6th rib fx. To IP rehab .      Prior Level of Function:  Lives With: Spouse  Type of Home: House  Home Layout: One level  Home Access: Stairs to enter with rails  Entrance Stairs - Number of Steps: 1 step to enter and 1 step once inside the house  Entrance Stairs - Rails: Left  Home Equipment: Standard walker   Bathroom Shower/Tub: Walk-in shower  Bathroom Toilet: Handicap height  Bathroom Equipment: Built-in shower seat, Grab bars around toilet, Grab bars in shower  Bathroom Accessibility: Accessible    Receives Help From: Family  ADL Assistance: [facility]: Independent  Homemaking Responsibilities: No  Ambulation Assistance: Independent  Transfer Assistance: Independent  Active : No  Additional Comments: Pt ambualated short distances with a standard walker. He did his own self care. He had help with all of the cooking and cleaning prior to admission. Restrictions/Precautions:  Restrictions/Precautions: Fall Risk, General Precautions  Position Activity Restriction  Other position/activity restrictions: Keep systolic blood pressure between 100-160 while moving. SUBJECTIVE: Pt presented seated in bedside chair, pleasant and agreeable to participate in therapy. Post session, pt supine in bed with bed alarm on, respiratory present for breathing treatment. PAIN: 7/10: headache and dizziness    OBJECTIVE:  Bed Mobility:  Rolling to Right: Stand By Assistance, with verbal cues , with increased time for completion   Supine to Sit: Minimal Assistance, with verbal cues , with increased time for completion, reported dizziness following supine>sit which quickly subsided with seated rest break  Sit to Supine: Moderate Assistance, with verbal cues , with increased time for completion     Transfers:  Sit to Stand: Moderate Assistance, with increased time for completion, to/from chair with arms  Stand to [place] [address], with increased time for completion, cues for hand placement    Ambulation:  [employer], with verbal cues , with increased time for completion, max cues throughout ambulation for increased step length with little carryover. Cues for erect posture throughout and to ensure pt remains within RW. Distance: 30ft and 40ft gait trial following gait training  Surface: Level Tile  Device:Rolling Walker  Gait Deviations: Forward Flexed Posture, Slow [place], Significant decrease in Step Length Bilaterally and Decreased Gait Speed    Gait Training:  Pt amb within //bars with B UE and markers on ground for foot placement x2 lengths of //bars x2 trials.  Performed to promote increased step length B. Demonstrated improved step length however decreased fluidity demonstrated during gait. Pt ambulated length of //bars and this PT counted number of steps pt took to amb ~10 ft. Pt required 13 steps. Discussed with pt the number of steps it would take to ambulate 1 length of //bars. Pt determined it would take 5 steps. Instructed pt to ambulate 1 length of //bars and steps were counted x2 trials. Counted outloud for emphasis on number of steps. First trial x11 steps, second trial x8 steps. Then performed 40 ft ambulation trial, with improved carryover of step length B initially (~15ft) however still decreased compared to \"normal\". Also counted number of steps required to turn within //bars. Pt determined it would take 3 steps to turn 180 deg within //bars. Pt required ~11 steps to complete each turn within //bars. Pt unable to reduce number of steps to complete a turn. Balance:  Static Standing Balance: Contact Guard Assistance  Dynamic Standing Balance: Contact Guard Assistance, with RW    Exercise:  Patient was guided in 1 set(s) 10 reps of exercise to both lower extremities. Ankle pumps, Glut sets, Quad sets, Heelslides, Hip abduction/adduction and Straight leg raises. Exercises were completed for increased independence with functional mobility. Wheelchair Mobility:  Stand By Assistance, with verbal cues , with increased time for completion, cues for path to avoid obstacles. x3 rest breaks secondary to B UE fatigue  Extremities Used: Bilateral Upper Extremities  Type of Chair:Manual  Surface: Level Tile  Distance: 150ft  Quality: Slow Velocity, Short Strokes and Veers Right      Functional Outcome Measures: Not completed       ASSESSMENT:  Assessment: Patient progressing toward established goals. Activity Tolerance:  Patient tolerance of  treatment: good. Pt demonstrates significant decrease in step length B. Improvements in B step length demonstrated following gait training, however limited carryover. Pt reported fatigue at end of session. Equipment Recommendations: Other: Has SW, will monitor for needs, may need w/c or RW  Discharge Recommendations:  24 hour supervision or assist, Continue to assess pending progress    Plan: Times per week: 5x/week 90 min, 1x/week 30 min  Current Treatment Recommendations: Strengthening, Safety Education & Training, Balance Training, Endurance Training, Functional Mobility Training, Patient/Caregiver Education & Training, Transfer Training, Gait Training, Wheelchair Mobility Training, Neuromuscular Re-education, Home Exercise Program, Stair training    Patient Education  Patient Education: Plan of Care, [employer] Mobility, Transfers, Gait, Wheelchair Mobility, Verbal Exercise Instruction    Goals:  Patient goals : does not state  Short term goals  Time Frame for Short term goals: 2 weeks  Short term goal 1: Pt to transfer supine <--> sit CGA to enable pt to get in/out of bed. Short term goal 2: Pt to transfer sit <--> stand CGA for increased functional mobility. Short term goal 3: Pt to ambulate 50 feet with SW/RW CGA for household ambulation. Long term goals  Time Frame for Long term goals : 4 weeks  Long term goal 1: Pt to transfer supine <--> sit SBA to enable pt to get in/out of bed. Long term goal 2: Pt to transfer sit <--> stand SBA for increased functional mobility. Long term goal 3: Pt to ambulate >100 feet with SW/RW SBA for household ambulation. Long term goal 4: Pt to ascend/descend 1 step with SW/RW CGA for home entry. Long term goal 5: Pt to perform car transfer CGA to enable pt to get in/out of the car. Long term goal 6: Pt to improve TUG test score to <3 minutes to indicate improvement in overall mobility. Following session, patient left in safe position with all fall risk precautions in place.

## 2020-12-03 NOTE — PROGRESS NOTES
[address] [address]  INPATIENT PHYSICAL THERAPY  DAILY NOTE  [facility] - 7E-64/064-A    Time In: 1200  Time Out: 1230  Timed Code Treatment Minutes: 30 Minutes  Minutes: 30          Date: 12/3/2020  Patient Name: Dylan Stallings,  Gender:  male        MRN: [id number]  : 1935  (81 y.o.)     Referring Practitioner: Teri Tovar MD  Diagnosis: Intraventricular hemorrhage  Additional Pertinent Hx: Pt presenting at [facility] by activation of level 2 trauma, brought by EMS following a unwitnessed fall with unknown LOC; past medical history includes recent cardiac sten placement x2, hypertension, chronic kidney disease, CAD. Per wife report, she was standing in the bathroom when she heard a sound in the kitchen she open the bathroom door to see her  laying face down on the ground. Patient states he normally ambulates with walker, did have a walker at the time of fall, unclear if fall was precipitated by syncopal episode. Wife states that  does appear to be confused post fall, has been repetitive in speech since incident. Patient reports some mild discomfort over left face otherwise has no complaints on exam.  Found to have an intraventricular hemorrhage, a left orbital fx, nasal bone fx, and left 6th rib fx. To IP rehab .      Prior Level of Function:  Lives With: Spouse  Type of Home: House  Home Layout: One level  Home Access: Stairs to enter with rails  Entrance Stairs - Number of Steps: 1 step to enter and 1 step once inside the house  Entrance Stairs - Rails: Left  Home Equipment: Standard walker   Bathroom Shower/Tub: Walk-in shower  Bathroom Toilet: Handicap height  Bathroom Equipment: Built-in shower seat, Grab bars around toilet, Grab bars in shower  Bathroom Accessibility: Accessible    Receives Help From: Family  ADL Assistance: [address]: Independent  Homemaking Responsibilities: No  Ambulation Assistance: extremities. Seated marches, Seated hamstring curls with orange TB, Seated heel/toe raises, Long arc quads, Seated isometric hip adduction and seated hip ABD with orange TB. Exercises were completed for increased independence with functional mobility. Cues for technique throughout    Functional Outcome Measures: Not completed       ASSESSMENT:  Assessment: Patient progressing toward established goals. Activity Tolerance:  Patient tolerance of  treatment: good. Demonstrated improved B step length with verbal review prior to gait training. Only able to maintain increased step length for short distance. Equipment Recommendations: Other: Has SW, will monitor for needs, may need w/c or RW  Discharge Recommendations:  24 hour supervision or assist, Continue to assess pending progress    Plan: Times per week: 5x/week 90 min, 1x/week 30 min  Current Treatment Recommendations: Strengthening, Safety Education & Training, Balance Training, Endurance Training, Functional Mobility Training, Patient/Caregiver Education & Training, Transfer Training, Gait Training, Wheelchair Mobility Training, Neuromuscular Re-education, Home Exercise Program, Stair training    Patient Education  Patient Education: Plan of Care, [employer] Mobility, Transfers, Gait, Verbal Exercise Instruction    Goals:  Patient goals : does not state  Short term goals  Time Frame for Short term goals: 2 weeks  Short term goal 1: Pt to transfer supine <--> sit CGA to enable pt to get in/out of bed. Short term goal 2: Pt to transfer sit <--> stand CGA for increased functional mobility. Short term goal 3: Pt to ambulate 50 feet with SW/RW CGA for household ambulation. Long term goals  Time Frame for Long term goals : 4 weeks  Long term goal 1: Pt to transfer supine <--> sit SBA to enable pt to get in/out of bed. Long term goal 2: Pt to transfer sit <--> stand SBA for increased functional mobility.   Long term goal 3: Pt to ambulate >100 feet with SW/RW SBA for household ambulation. Long term goal 4: Pt to ascend/descend 1 step with SW/RW CGA for home entry. Long term goal 5: Pt to perform car transfer CGA to enable pt to get in/out of the car. Long term goal 6: Pt to improve TUG test score to <3 minutes to indicate improvement in overall mobility. Following session, patient left in safe position with all fall risk precautions in place.

## 2020-12-03 NOTE — PLAN OF CARE
Problem: DISCHARGE BARRIERS  Goal: Patient's continuum of care needs are met  12/3/2020 1449 by TALI Godoy  Note: Team conference held Thursday, 12/03/2020. Recommendations of the team were explained to the patient by TALI Ferris, and Dr. Stephanie Calderon. Team is recommending that patient continue on acute inpatient rehab for two more weeks, with expected discharge date of Wednesday, 12/16/2020. Prior to discharge, team is recommending family education with spouse, [employer]. Following discharge, team is recommending home health care services for RN/PT/OT/ST/HHA. Care plan reviewed with patient. Patient verbalized understanding of the plan of care and contributed to goal setting. SW to follow and maintain involvement in discharge planning.

## 2020-12-03 NOTE — PLAN OF CARE
Problem: Impaired respiratory status  Goal: Clear lung sounds  12/3/2020 1826 by Chioma Bernal  Outcome: Ongoing

## 2020-12-03 NOTE — PROGRESS NOTES
Patient alert and oriented to person, place and time. Calm mood and appropriate interaction with staff. Eye sclera white, mucous membranes pink and moist. Hair dry skin warm and dry to touch. Speech clear. Denies swallowing difficulty no cough noted. Apical pulse 70 heart sounds distant and regular. Respirations 16 easy, labored. Lung sounds clear anterior, posterior and lateral. B/P left arm sitting position 128/70. Pulse ox 95% room air. Denies any pain at this time. Bowel sounds hypoactive in all 4 quadrants. Abdomen round soft and non-tender upon palpation. Denies pain urinating. States passing gas fine. Last bowel movement yesterday 12/2/20. Radial pulses strong and equal bilaterally. Hand grasp strong in right hand, weak in the left hand. Arm drift negative bilaterally. Capillary refill less than 3 seconds. Skin turgor brisk. No edema noted in the legs. Pedal pulses strong and equal bilaterally. Pedal push and pull strong bilaterally. Leg lift strong in right leg, weak in left leg. Ya's sign negative bilaterally. Denies numbness and tingling. Lidocaine patch left abdomen in tact and in place. Bed in low position, wheels locked. Side rails up x2. Call light in reach as well as over bed table. Denies needs at this time.

## 2020-12-03 NOTE — PROGRESS NOTES
[address]  Occupational Therapy  Daily Note  Time:   Time In: 1400  Time Out: 1430  Timed Code Treatment Minutes: 30 Minutes  Minutes: 30          Date: 12/3/2020  Patient Name: Flynn Calvo,   Gender: male      Room: [facility]/064-A  MRN: [id number]  : 1935  (81 y.o.)  Referring Practitioner: Dr. Demetrio Mello  Diagnosis: Intraventricular Hemorrhage  Additional Pertinent Hx: Pt admitted to in rehab for rehab needs after admission to the hospital s/p  fall at home resulting in an intraventricular hemorrhage, left sixth rib fracture, displaced left orbital floor fracture with deficits of gait instability and severe cognitive deficits with a MOCA score of 9/30 on . Restrictions/Precautions:  Restrictions/Precautions: Fall Risk, General Precautions  Position Activity Restriction  Other position/activity restrictions: Keep systolic blood pressure between 100-160 while moving. SUBJECTIVE: Patient in bed upon arrival agreeable to OT treatment      PAIN: no      COGNITION: Slow Processing, Decreased Insight, Decreased Problem Solving and Decreased Safety Awareness     ADL:   Toileting: CGA  Toilet transfer CGA      BALANCE:  Sitting Balance:  Modified Independent. Standing Balance: Contact Guard Assistance.       BED MOBILITY:  Sit to supine: Stand By Assistance    Scooting: Stand By Assistance       TRANSFERS:  Sit to Stand:  Contact Guard Assistance. Stand to Sit: Contact Guard Assistance.       FUNCTIONAL MOBILITY:  Assistive Device: Walker  Assist Level:  Contact Guard Assistance. Distance:  To and from bathroom     ADDITIONAL ACTIVITIES:  Patient identified a personal goal to increase UB strength and improve overall endurance so they can complete their toilet & shower transfers; skilled edu on UE strengthening and patient completed BUE strengthening exercises x 10 reps x 1 set this date with a orange theraband  in all joints and all planes. Patient tolerated fair, requiring multiple rest breaks. Pt required  cues for technique. ASSESSMENT:     Activity Tolerance:  Patient tolerance of  treatment: good. Discharge Recommendations: Home with nursing aide, Home with Home health OT   Equipment Recommendations: Other: Pt has a handicapped height toilet and shower seat. Need to confirm with spouse. Plan: Times per week: 5xs weeks x 90 min, 1 x week x 30 min  Current Treatment Recommendations: Functional Mobility Training, Endurance Training, Self-Care / ADL, Safety Education & Training, Strengthening, Patient/Caregiver Education & Training, Equipment Evaluation, Education, & procurement, Home Management Training    Patient Education  Patient Education: [employer]    Goals  Short term goals  Time Frame for Short term goals: 1 week  Short term goal 1: Pt will demonstrate functional mobility walking to/from the bathroom or bedside chair with SBA and std walker with min cues for aligning himself as neeed to prepare for doing self care. Short term goal 2: Pt will complete ADL routine with no > min A and min cues for problem solving and task completion to increase independence in self care tasks  Short term goal 3: Pt will complete toileting routine and transfer with no > CGA and min cues for safe tech to increase independence in self toileting tasks  Short term goal 4: Pt will complete BUE ROM and light resistance exercises while following verbal cues to increase his pain free movement for ease of doing self care.   Short term goal 5: Pt will complete 1 step homemaking tasks with CGA and no > min cues for problem solving to increase ability to retrieve a snack  Long term goals  Time Frame for Long term goals : 2-3 weeks  Long term goal 1: Pt will complete ADL routine with S and no  cues for problem solving to increase independence in self care tasks  Long term goal 2: Pt will complete 1 step homemaking tasks with SBA and no cues for safe tech to increase ability to retrieve a glass of water. Following session, patient left in safe position with all fall risk precautions in place.

## 2020-12-03 NOTE — PROGRESS NOTES
Respiratory Care is following the rib fracture order set. Patient's position when testing was done was fowlers. A Negative Inspiratory Force (NIF) was performed with patient achieving a NIF of -30 cm H2O. The NIF was greater than 25 cm H2O. A Forced Vital Capacity (FVC) was obtained with patient achieving an FVC of 2.16 liters. The patient's calculated ideal body weight, (IBW) is  71 kg. 0.020 liters/kg of the patient's IBW is 1.42 liters. The patient's FVC was greater than 0.020 liters/kg of IBW. Based on the spirometry measurement alone, patient does not meet ICU admission criteria. Previous FVC was 2.16 liters and previous NIF was -40 cm H2O. Patient's NIF is slightly lowerand FVC is improving from previous screening(s). Last pain medication was given on  12-3 @ 0933. Physician was not called regarding spirometry measurement. [facility] RESPIRATORY ASSESSMENT PROGRAM (RAP)  30 kg and over      Patient Name: [facility] Room#: [id number] : 1935     Admitting diagnosis:      ASSESSMENT     Vitals  Pulse: 65   Resp: 16  BP: (!) 160/70  SpO2: 95 %  Temp: 98.7 °F (37.1 °C)  Breath Sounds: clear/dim. t/o      PEF   Predicted: na  Personal Best: na     Inspiratory Capacity:   Preoperative/predictive value: 2700 ml   33% of value: 890 ml      75% of value: 2025 ml   10 ml/kg of IBW: 710 ml   Patient's Actual Inspiratory Capacity: 5638-5755 ml    CARE PLAN  All Care Plan selections must be based on the approved algorithms located on line in the Policy and Procedures under Respiratory Assessment Adult Protocol Handbook    INDICATIONS FOR THERAPY BASED ON HISTORY AND ASSESSMENT    Bronchial Hygiene Goal: Improvement in sputum mobilization in patients with ineffective airway clearance. Reverse the atelectasis.    No indications  Volume Expansion Goal: Prevent atelectasis, Absence or improvement in signs of atelectasis, improve respiratory muscle performance   Presence of pulmonary atelectasis or conditions predisposing to the development of pulmonary atelectasis. Example: Upper/lower abdominal surgery, thoracic surgery, surgery in COPD patient, prolonged bed rest, lack of pain control, presence of thoracic or abdominal binders. Inhaled Medications Goal: Improve respiratory functions in patients with airway disease and decrease WOB  Albuterol Indications   No indications. Has PRN Albuterol MDI/ Symbicort  Ipratropium Bromide Indications   No indications  Oxygenation Goal: Reverse hypoxemia and improve tissue oxygenation. (See oxygen protocol order)   No indications    THERAPIES SELECTED BASED ON ALGORITHMS    Bronchial Hygiene   No therapy recommended  Volume expansion   Incentive spirometry  Inhaled Medication   No therapy recommended   Oxygenation   No therapy recommended        ASSESSMENT OUTCOMES     Bronchial Hygiene                Other    Volume Expansion    Other     Inhaled Medication     Other    Oxygenation      Other    Action Plan Based on Assessment:    Continue plan as ordered. Comments: Encouraged pt to use IS frequently t/o day.

## 2020-12-03 NOTE — PROGRESS NOTES
Long arc quads, Seated isometric hip adduction and seated hip ABD with orange TB. Exercises were completed for increased independence with functional mobility. Cues for technique throughout     Functional Outcome Measures: Not completed     ASSESSMENT:  Assessment: Patient progressing toward established goals. Activity Tolerance:  Patient tolerance of  treatment: good. Demonstrated improved B step length with verbal review prior to gait training. Only able to maintain increased step length for short distance. Equipment Recommendations: Other: Has SW, will monitor for needs, may need w/c or RW  Discharge Recommendations:  24 hour supervision or assist, Continue to assess pending progress    Occupational Therapy:  COGNITION: Slow Processing, Decreased Insight, Decreased Problem Solving and Decreased Safety Awareness     ADL:   Toileting: CGA  Toilet transfer CGA      BALANCE:  Sitting Balance:  Modified Independent.    Standing Balance: Contact Guard Assistance.       BED MOBILITY:  Sit to supine: Stand By Assistance    Scooting: Stand By Assistance       TRANSFERS:  Sit to Stand:  Contact [address].    Stand to Sit: Contact Guard Assistance.       FUNCTIONAL MOBILITY:  [address] Distance: To and from bathroom      ADDITIONAL ACTIVITIES:  Patient identified a personal goal to increase UB strength and improve overall endurance so they can complete their toilet & shower transfers; skilled edu on UE strengthening and patient completed BUE strengthening exercises x 10 reps x 1 set this date with a orange theraband  in all joints and all planes. Patient tolerated fair, requiring multiple rest breaks. Pt required  cues for technique.     ASSESSMENT:  Activity Tolerance:  Patient tolerance of  treatment: good. Discharge Recommendations: Home with nursing aide, Home with Home health OT   Equipment Recommendations:  Other: Pt has a handicapped height toilet and shower seat. Need to confirm with spouse. Speech Therapy:  Short-Term Goals:  SHORT TERM GOAL #1:  Goal 1: Pt will complete functional carryover tasks (i.e. orientation, immediate/delayed, working) with 60% accuracy, MOD cues and focus on compensatory memory strategies to enhance retention of newly learned medical information. INTERVENTIONS:   Orientation--  Month: min cues (pt stated January)  Year: mod cues (pt stated 2002)  DOM: indep  ERUM: indep  Place: indep  City: indep     SHORT TERM GOAL #2:  Goal 2: Pt will complete functional problem solving/safety awareness (i.e. time management, safety scenarios, verbal reasoning) with 60% accuracy, mod cues for enhanced safety and ADL contribution. INTERVENTIONS: Call light use - indep, stating \"I hit the red button on the remote\"     SHORT TERM GOAL #3:  Goal 3: Pt will complete expressive language tasks (i.e. higher level confrontational/responsive naming, verbal sequencing, divergent naming) with 80% accuracy, mod cues for improved expression of complex thoughts. INTERVENTIONS:   Naming  -5 vegetables: 1/5 indep, 4/5 max cues; required significant clarification of task to begin as pt continued to state random items that are not vegetables  -5 fruits: 5/5 indep; improved comprehension   -5 sports: 4/5 indep, 1/5 min cues; improved processing speed  -5 colors: 5/5 indep  -5 kitchen items: 4/5 indep, 1/5 mod cues  *success increased as task progressed with less anomia     SHORT TERM GOAL #4:  Goal 4: Pt will complete high level auditory/reading comprehension tasks (i.e. complex y/n questions, multi step and complex commands, prescription labels/directories, etc) with 80% accuracy, mod cues for improved understanding of complex medical information.   INTERVENTIONS: Not addressed d/t focus on other goals.      SHORT TERM GOAL #5:  Goal 5: Pt will consume a soft & bite size diet and thin liquids with adherence to skilled swallowing strategies (i.e. small bites/sips, slow rate) without oert s/s of aspiration to meet nutrition/hydration measures safely. INTERVENTIONS: Skilled dietary analysis completed during consumption of breakfast consisting of applesauce, chopped sausage, and chopped pancakes with thin liquids via cup/straw. Pt with excellent full upright positioning and good attention to task despite TV on in background. No distractions evident. Demonstrated adequate rotary mastication of solids with cohesive bolus control/formation. Utilized the following compensatory strategies appropriately: small bites/sips, alternation of liquids/solids. Min verbal cueing required x2 to slow rate of consumption. Immediate throat clear observed x1 following small sip of thin liquids as liquid wash, NO other overt s/s of aspiration. Recommend resumption of soft and bite sized diet with thin liquids at this time.  Plan to complete x1-2 additional dietary analyses prior to initiation of advanced texture trials of regular.     Long-Term Goals:  Timeframe for Long-term Goals: 4 weeks     LONG TERM GOAL #1:  Goal 1: Patient will improve cognitive status to a min A level to safely return to [facility] home with wife, nearly 24 hour SUP     Comprehension: 4 - Patient understands basic needs 75-90%+ of the time  Expression: 4 - Expresses basic ideas/needs 75-90%+ of the time  Social Interaction: 5 - Patient is appropriate with supervision/cues  Problem Solvin - Patient solves simple/routine tasks 25%-49%  Memory: 2 - Patient remembers 25%-49% of the time     EDUCATION:  Learner: Patient  Education:  Reviewed diet and strategies, Reviewed signs, symptoms and risks of aspiration, Reviewed ST goals and Plan of Care, Reviewed recommendations for follow-up, Education Related to Prevention of Recurrence of Impairment/Illness/Injury and Education Related to [employer] and Wellness  Evaluation of Education: Verbalizes understanding, Demonstrates with assistance and Family not present     ASSESSMENT/PLAN:  Activity Tolerance:  Patient tolerance of treatment: good. Assessment/Plan: Patient progressing toward established goals. Continues to require skilled care of licensed speech pathologist to progress toward achievement of established goals and plan of care. Plan for Next Session: problem solving, recall, reading comprehension    Review of Systems:  Review of Systems   Constitutional: Positive for activity change. Negative for appetite change. HENT: Positive for mouth sores (Lip bite wound) and trouble swallowing. Negative for voice change. Eyes: Negative for visual disturbance. Respiratory: Negative for cough and shortness of breath. Cardiovascular: Positive for leg swelling. Gastrointestinal: Negative for constipation, diarrhea and nausea. Endocrine: Negative for cold intolerance. Genitourinary: Negative for frequency and urgency. Musculoskeletal: Positive for gait problem. Skin: Negative for pallor. Allergic/Immunologic: Negative. Neurological: Positive for speech difficulty and weakness. Negative for dizziness, facial asymmetry and headaches. Hematological: Negative. Psychiatric/Behavioral: Positive for confusion and decreased concentration. Negative for dysphoric mood and hallucinations. The patient is not nervous/anxious. All other systems reviewed and are negative. Objective:  BP (!) 158/82   Pulse 74   Temp 98 °F (36.7 °C) (Oral)   Resp 16   Ht 5' 9\" (1.753 m)   Wt 218 lb 14.4 oz (99.3 kg)   SpO2 92%   BMI 32.33 kg/m²   CURRENT VITALS:  height is 5' 9\" (1.753 m) and weight is 218 lb 14.4 oz (99.3 kg). His oral temperature is 98 °F (36.7 °C). His blood pressure is 158/82 (abnormal) and his pulse is 74. His respiration is 16 and oxygen saturation is 92%. Body mass index is 32.33 kg/m².   Temperature Range (24h):Temp: 98 °F (36.7 °C) Temp  Av.1 °F (36.7 °C)  Min: 98 °F (36.7 °C)  Max: 98.2 °F (36.8 °C)  BP Range (24h): Systolic (50VXS), SES:330 , Min:145 , QQB:872     Diastolic (64EKD), OL, Min:81, Max:82    Pulse Range (24h): Pulse  Av  Min: 74  Max: 78  Respiration Range (24h): Resp  Av  Min: 16  Max: 16  Current Pulse Ox (24h):  SpO2: 92 %  Pulse Ox Range (24h):  SpO2  Av %  Min: 92 %  Max: 95 %  Oxygen Amount and Delivery:      awake  Orientation:   person, place, time, not situation  Mood: within normal limits  Affect: calm  General appearance:  in no acute distress, left eye conjunctival hemorrhage medial to the iris, up in therapy gym with Physical Therapy. Memory:   abnormal -decreased recall  Attention/Concentration: abnormal -mildly slowed processing  Language:  abnormal -mild dysarthria    ROM:  normal  Motor Exam: As in table    Manual Muscle Testing:     Sh Abd  Sh IR  Sh ER  Elbow Flex  Elbow Ext    Left  3+   4 4   Right 3+   4 4      Wrist Ext  Wrist Flexion  Finger Flex  Finger Abd  Finger Add    Left    4     Right   4        Hip Flexion  Hip Extension  Hip Abduction  Hip Adduction    Left  2+      Right 2+         Knee Flex  Knee Ext  Ankle DF  Ankle PF  Ankle Inv  Ankle Ev  EHL    Left   3+ 3+ 4      Right  3+ 2 4        Tone:  normal  Muscle bulk: within normal limits  Sensory:  Sensory intact  Coordination:   abnormal - mild decrease for fine motor control of the bilateral hands    Skin: warm and dry, no rash or erythema and abrasion over left forehead above eye and mild swelling of the left orbit  Peripheral vascular: Pulses: Normal upper and lower extremity pulses; Edema: 1+    Diagnostics:   Recent Results (from the past 24 hour(s))   CBC    Collection Time: 20  6:18 AM   Result Value Ref Range    WBC 5.0 4.8 - 10.8 thou/mm3    RBC 4.79 4.70 - 6.10 mill/mm3    Hemoglobin 15.0 14.0 - 18.0 gm/dl    Hematocrit 47.7 42.0 - 52.0 %    MCV 99.6 (H) 80.0 - 94.0 fL    MCH 31.3 26.0 - 33.0 pg    MCHC 31.4 (L) 32.2 - 35.5 gm/dl    RDW-CV 12.7 11.5 - 14.5 %    RDW-SD 46.5 (H) 35.0 - 45.0 fL dysarthria. 12. Mild-moderate expressive language deficits. 13. Left orbital floor fracture with posterior blowout component. 14. Nasal bone fracture. 15. Hematuria. 16. Coronary artery disease with history of 2 stents and now with 90% severe stenosis of the apical portion of the LAD and mild diastolic dysfunction of the left ventricle noted on heart catheterization completed on 10/22/2020 by Dr. Tristin Shook with deployment of PCI to circumflex artery. 17. Medication noncompliance; did not start Brilinta and aspirin for his post stent medical management. 18. Hypertension. 19. Hyperlipidemia. 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 21. CKD 3.  22. Lumbar stenosis with neurogenic claudication status post lumbar laminectomy from L2-L3 bilaterally by Dr. Kia Anderson on 5/29/2015. 23. 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 24. Prostate cancer status post brachytherapy. Plan:     Medical management: Per primary team and Dr. Kevin Prasad. Consultants:  Trauma Surgery, Neurosurgery, Critical Care, Neurology, Cardiology, Plastic Surgery, Family Medicine, Physical Medicine    Narcotic usage:  Not applicable     Last BM:  Stool Amount: Medium (12/02/20 2104)    FUNCTIONAL OUTCOMES TOOLS:    HOUSE -      T[place]etti -      TUG - Timed Up and Go: 4.11    Acute/Rehabilitation Problems:  1. Ground-level fall at home. 2. Gait instability. 1. PT/OT. 2. TUG 4 minutes 11 seconds. 60-69 years:  8.1 seconds; 70-79 years: 9.2 seconds; 80-99 years: 11.3 seconds. 3. 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 1. Initiate TBI guidelines as needed for low stimulation environment. 4. 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 1. Neurosurgery following with no interventions planned. 5. Left sixth rib fracture. 1. Pain control. 1. Lidoderm patches. 2. Tylenol. 2. Symbicort twice daily.   3. Albuterol nebulizer every 6 hours as needed. 6. Abrasion to left forehead. 1. Wound care. 7. Laceration of left lower lip. 1. Healing appropriately. 8. Subacute infarct of the right thalamus. 1. Stable. 2. Continue with therapies. 9. Severe cognitive impairments/Mild oral dysphagia/Mild dysarthria/Mild-moderate expressive language deficits. 1. (MOCA) version 7.2 completed. Patient scored 9/30. Repeat on 12/2 - [facility]) version 8.1 completed. Patient scored 10/30. 2. SLP. 3. Diet downgraded on 12/2 to soft and bite-sized within liquids. 10. Left orbital floor fracture with posterior blowout component/Nasal bone fracture. 1. Conservative treatment of this fracture as the components involve the posterior aspect of the left orbital floor without clinical evidence of inferior rectus impingement. No need to surgical intervention for the nasal bone fractures. 11. Hematuria. 1. Resolved. 12. Coronary artery disease with history of 2 stents and now with 90% severe stenosis of the apical portion of the LAD and mild diastolic dysfunction of the left ventricle noted on heart catheterization completed on 10/22/2020 by Dr. Smooth Del Toro with deployment of PCI to circumflex artery/Medication nonadherence to recommended dual antiplatelet therapy and cardiac rehabilitation following recent cardiac stenting. 1. Aspirin has been started. 2. Holding Brilinta for now. 13. Nutrition:  Consultation to dietician for nutritional counseling and recommendations. 1. Total protein 6.3 and albumin slightly low at 3.4 on 12/2/2020.  2. Vitamin D 25 hydroxy normal at 68 on 12/2/2020. 14. Electrolytes. 1. Normal on 12/2. 15. Bladder: Noted on admission. Follow for now. 16. Bowel: Senna, colace, MOM  17. Rehabilitation nursing will be involved for bowel, bladder, skin, and pain management. Nursing will also provide education and training to patient and family. 18. Prophylaxis:  DVT: Lovenox. GI: None.   19.  and case management consultations for coordination of care and discharge planning. Chronic Problems:  1. Hypertension. 1. Amlodipine. 2. Bumex. 3. Cozaar. 2. Hyperlipidemia/Statin induced myositis. 1. Pravachol. 3. CKD 3.  1. Cr/BUN/GFR on 12/2 was 1.2/24/70 which is stable over prior labs. 4. Lumbar stenosis with neurogenic claudication status post lumbar laminectomy from L2-L3 bilaterally by Dr. Ronald Duvall on 5/29/2015.  5. 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 6. Prostate cancer status post brachytherapy. 1. Tamsulosin. Labs reviewed on:  1. 12/1.  2. 12/2. Infectious Disease:  1. N/A    Missed Therapy Time:  None     DME:    Discharge Plan:  Estimated Discharge Date: 12/16   Destination: home health  Services at Discharge: [address], Occupational Therapy, Speech Therapy, Nursing and aide 3x week  Is patient appropriate for an outpatient driving evaluation? no  Equipment at Discharge: RW    Greater than 50% of 30 minutes was spent with the patient reviewing the plan and recommendations from today's care conference, his progress with therapy, and his current symptoms relating to his traumatic brain injury and strep.     Sherin Gary MD

## 2020-12-03 NOTE — PROGRESS NOTES
[address] [facility] THERAPY  [address] [facility]  DAILY NOTE    TIME   SLP Individual Minutes  Time In: 6090  Time Out: 0900  Minutes: 26  Timed Code Treatment Minutes: 0 Minutes  Dysphagia Treatment - 15 minutes  Speech Language Treatment - 11 minutes    Date: 12/3/2020  Patient Name: Teresa Washburn      CSN: [id number]   : 1935  (81 y.o.)  Gender: male   Referring Physician: Brien Navarro MD  Diagnosis: Intraventricular hemorrhage   Secondary Diagnosis: Cognitive deficits, Dysphagia  Precautions: Fall risk, Aspiration risk   Current Diet: Soft and Bite Sized, Thin liquids   Swallowing Strategies: Full Upright Position, Small Bite/Sip, Alternate Solids and Liquids, Limit Distractions and Monitor for Fatigue  Date of Last MBS: Not Applicable    Pain:  No pain reported. Subjective:  Pt seated upright in recliner beginning consumption of breakfast upon ST arrival. Alert and pleasant throughout session. Short-Term Goals:  SHORT TERM GOAL #1:  Goal 1: Pt will complete functional carryover tasks (i.e. orientation, immediate/delayed, working) with 60% accuracy, MOD cues and focus on compensatory memory strategies to enhance retention of newly learned medical information. INTERVENTIONS:   Orientation--  Month: min cues (pt stated January)  Year: mod cues (pt stated )  DOM: indep  ERUM: indep  Place: indep  City: [facility]    SHORT TERM GOAL #2:  Goal 2: Pt will complete functional problem solving/safety awareness (i.e. time management, safety scenarios, verbal reasoning) with 60% accuracy, mod cues for enhanced safety and ADL contribution.   INTERVENTIONS: Call light use - indep, stating \"I hit the red button on the remote\"    SHORT TERM GOAL #3:  Goal 3: Pt will complete expressive language tasks (i.e. higher level confrontational/responsive naming, verbal sequencing, divergent naming) with 80% accuracy, mod cues for improved expression of complex thoughts. INTERVENTIONS:   Naming  -5 vegetables: 1/5 indep, 4/5 max cues; required significant clarification of task to begin as pt continued to state random items that are not vegetables  -5 fruits: 5/5 indep; improved comprehension   -5 sports: 4/5 indep, 1/5 min cues; improved processing speed  -5 colors: 5/5 indep  -5 kitchen items: 4/5 indep, 1/5 mod cues  *success increased as task progressed with less anomia    SHORT TERM GOAL #4:  Goal 4: Pt will complete high level auditory/reading comprehension tasks (i.e. complex y/n questions, multi step and complex commands, prescription labels/directories, etc) with 80% accuracy, mod cues for improved understanding of complex medical information. INTERVENTIONS: Not addressed d/t focus on other goals. SHORT TERM GOAL #5:  Goal 5: Pt will consume a soft & bite size diet and thin liquids with adherence to skilled swallowing strategies (i.e. small bites/sips, slow rate) without oert s/s of aspiration to meet nutrition/hydration measures safely. INTERVENTIONS: Skilled dietary analysis completed during consumption of breakfast consisting of applesauce, chopped sausage, and chopped pancakes with thin liquids via cup/straw. Pt with excellent full upright positioning and good attention to task despite TV on in background. No distractions evident. Demonstrated adequate rotary mastication of solids with cohesive bolus control/formation. Utilized the following compensatory strategies appropriately: small bites/sips, alternation of liquids/solids. Min verbal cueing required x2 to slow rate of consumption. Immediate throat clear observed x1 following small sip of thin liquids as liquid wash, NO other overt s/s of aspiration. Recommend resumption of soft and bite sized diet with thin liquids at this time. Plan to complete x1-2 additional dietary analyses prior to initiation of advanced texture trials of regular.     Long-Term Goals:  Timeframe for Long-term Goals: 4 weeks    LONG TERM GOAL #1:  Goal 1: Patient will improve cognitive status to a min A level to safely return to PLOF home with wife, nearly 24 hour SUP    Comprehension: 4 - Patient understands basic needs 75-90%+ of the time  Expression: 4 - Expresses basic ideas/needs 75-90%+ of the time  Social Interaction: 5 - Patient is appropriate with supervision/cues  Problem Solvin - Patient solves simple/routine tasks 25%-49%  Memory: 2 - Patient remembers 25%-49% of the time    EDUCATION:  Learner: Patient  Education:  Reviewed diet and strategies, Reviewed signs, symptoms and risks of aspiration, Reviewed ST goals and Plan of Care, Reviewed recommendations for follow-up, Education Related to Prevention of Recurrence of Impairment/Illness/Injury and Education Related to [employer] and Wellness  Evaluation of Education: Verbalizes understanding, Demonstrates with assistance and Family not present    ASSESSMENT/PLAN:  Activity Tolerance:  Patient tolerance of treatment: good. Assessment/Plan: Patient progressing toward established goals. Continues to require skilled care of licensed speech pathologist to progress toward achievement of established goals and plan of care.      Plan for Next Session: problem solving, recall, reading comprehension     Jh Mcdonald M.S. [address]

## 2020-12-03 NOTE — CONSULTS
Department of Family Practice  Consult Note        Reason for Consult:  Medical management while on the Inpatient Rehab unit. Requesting Physician:  Dr. Lombardo Life:  The need to continue the time with therapies following the acute hospital stay    History Obtained From:  patient, EMR    HISTORY OF PRESENT ILLNESS:              The patient is a 81 y.o. male with significant past medical history of       Diagnosis Date    ALDA (acute kidney injury) ([facility] [address] 7/17/2015    Blood circulation, collateral     CAD (coronary artery disease)     Cancer ([facility] [address]     prostate; seed implants    Chronic kidney disease, stage III (moderate)     Hypercholesteremia     Hypertension     Osteoarthritis       who presents with a syncopal episode at home associated with intracerebral bleeding, fractured left orbit and fractured left rib. When he was felt to be stable from the acute injuries he was too weakened to immediately return home. He comes to Inpatient Rehab to become stronger and more independent.     Past Medical History:        Diagnosis Date    ALDA (acute kidney injury) ([facility] [address] 7/17/2015    Blood circulation, collateral     CAD (coronary artery disease)     Cancer (HCC)     prostate; seed implants    Chronic kidney disease, stage III (moderate)     Hypercholesteremia     Hypertension     Osteoarthritis      Past Surgical History:        Procedure Laterality Date    BACK SURGERY  2006    fusion    CARDIAC SURGERY      2 stents    COSMETIC SURGERY      ENDOSCOPY, COLON, DIAGNOSTIC      JOINT REPLACEMENT      Bilateral hips    KNEE SURGERY      NECK SURGERY      OTHER SURGICAL HISTORY  5/29/2015    DECOMPRESSIVE LUMBAR LAMINECTOMY L2-3    ROTATOR CUFF REPAIR      TOTAL HIP ARTHROPLASTY Bilateral      Current Medications:   Current Facility-Administered Medications: acetaminophen (TYLENOL) tablet 650 mg, 650 mg, Oral, Q6H PRN **OR** acetaminophen (TYLENOL) suppository 650 mg, 650 mg, Rectal, Q6H PRN  polyethylene glycol (GLYCOLAX) packet 17 g, 17 g, Oral, Daily PRN  promethazine (PHENERGAN) tablet 12.5 mg, 12.5 mg, Oral, Q6H PRN **OR** ondansetron (ZOFRAN) injection 4 mg, 4 mg, Intravenous, Q6H PRN  sodium chloride flush 0.9 % injection 10 mL, 10 mL, Intravenous, 2 times per day  sodium chloride flush 0.9 % injection 10 mL, 10 mL, Intravenous, PRN  lidocaine 4 % external patch 1 patch, 1 patch, Transdermal, Daily  albuterol (PROVENTIL) nebulizer solution 2.5 mg, 2.5 mg, Nebulization, Q6H PRN  amLODIPine (NORVASC) tablet 5 mg, 5 mg, Oral, Daily  aspirin chewable tablet 81 mg, 81 mg, Oral, Daily  budesonide-formoterol (SYMBICORT) 80-4.5 MCG/ACT inhaler 2 puff, 2 puff, Inhalation, BID  bumetanide (BUMEX) tablet 1 mg, 1 mg, Oral, Daily  enalaprilat (VASOTEC) injection 1.25 mg, 1.25 mg, Intravenous, Q6H PRN  enoxaparin (LOVENOX) injection 40 mg, 40 mg, Subcutaneous, Q24H  losartan (COZAAR) tablet 100 mg, 100 mg, Oral, Daily  metoprolol tartrate (LOPRESSOR) tablet 50 mg, 50 mg, Oral, BID  pravastatin (PRAVACHOL) tablet 40 mg, 40 mg, Oral, Daily  tamsulosin (FLOMAX) capsule 0.4 mg, 0.4 mg, Oral, Nightly  senna (SENOKOT) tablet 17.2 mg, 2 tablet, Oral, Nightly PRN  docusate sodium (COLACE) capsule 100 mg, 100 mg, Oral, BID PRN  Allergies:  Patient has no known allergies.     Social History:   MARITAL STATUS:      Family History:       Problem Relation Age of Onset    Heart Disease Mother     High Blood Pressure Mother     Prostate Cancer Neg Hx     Cancer Neg Hx     Diabetes Neg Hx     Kidney Disease Neg Hx     Stroke Neg Hx      REVIEW OF SYSTEMS:    CONSTITUTIONAL:  positive for  fatigue  EYES:  positive for  glasses  HEENT:  negative for  epistaxis  RESPIRATORY:  negative for  dyspnea  CARDIOVASCULAR:  negative for  chest pain  GASTROINTESTINAL:  negative for abdominal pain  GENITOURINARY:  negative for dysuria  INTEGUMENT/BREAST:  negative for rash  HEMATOLOGIC/LYMPHATIC:  negative for Hemoglobin Quant Latest Ref Range: 14.0 - 18.0 gm/dl 15.0   Hematocrit Latest Ref Range: 42.0 - 52.0 % 47.7   MCV Latest Ref Range: 80.0 - 94.0 fL 99.6 (H)   MCH Latest Ref Range: 26.0 - 33.0 pg 31.3   MCHC Latest Ref Range: 32.2 - 35.5 gm/dl 31.4 (L)   MPV Latest Ref Range: 9.4 - 12.4 fL 10.6   RDW-CV Latest Ref Range: 11.5 - 14.5 % 12.7   RDW-SD Latest Ref Range: 35.0 - 45.0 fL 46.5 (H)   Platelet Count Latest Ref Range: 130 - 400 thou/mm3 145       IMPRESSION/RECOMMENDATIONS:      Active Hospital Problems    Diagnosis Date Noted    Intracranial bleed ([address] [I62.9]     Intraventricular hemorrhage ([facility] [address] [I61.5] 11/27/2020    Fracture of left orbital floor ([facility] [address] [S02.32XA] 11/27/2020    Nasal bone fracture [S02. 2XXA] 11/27/2020    Closed fracture of one rib of left side [S22.32XA]     H/O prostate cancer [Z85.46] 08/15/2016    History of DVT (deep vein thrombosis) [Z86.718]     Syncope [R55] 06/25/2015    Benign essential HTN [I10] 06/05/2015    CAD (coronary artery disease) [I25.10]     Hypercholesteremia [E78.00]

## 2020-12-03 NOTE — CONSULTS
Comprehensive Nutrition Assessment    Type and Reason for Visit:  Initial, Consult(New Rehab Admit)    Nutrition Recommendations/Plan:   *Recommend a Multivitamin w/minerals daily. *Continue current diet. *Pt declines need for ONS during LOS. Nutrition Assessment: Pt. nutritionally compromised AEB pt report of good appetite and intake since admit; however some po intake less than 75%. At risk for further nutrition compromise r/t admit d/t stroke with multiple fx's 2/2 fall, underlying medical condition (hx CAD, CKD, HTN, Prostate Cancer). Nutrition recommendations/interventions as per above. Malnutrition Assessment:  Malnutrition Status: At risk for malnutrition (Comment)    Context:  Acute Illness     Findings of the 6 clinical characteristics of malnutrition:  Energy Intake:  Mild decrease in energy intake (Comment)(some po intake less than 75%)  Weight Loss:  No significant weight loss     Body Fat Loss:  No significant body fat loss     Muscle Mass Loss:  No significant muscle mass loss    Fluid Accumulation:  No significant fluid accumulation Extremities   Strength:  Not Performed    Nutrition Related Findings:  admit d/t stroke with left rib fx, nasal bone fx and left orbital fx 2/2 fall; pt seen; pt reports good appetite and intake of meals since admit; pt denies N/V or difficulty chewing/swallowing food; last BM x1 on 12/2. Rx includes: Bumex. Wounds:  None       Current Nutrition Therapies:    DIET DYSPHAGIA SOFT AND BITE-SIZED; No Added Salt (3-4 GM); Dysphagia Soft and Bite-Sized; Cardiac    Anthropometric Measures:  · Height: 5' 9\" (175.3 cm)  · Current Body Weight: 215 lb 6.4 oz (97.7 kg)(12/3; +1 non-pitting edema BLE)   · Admission Body Weight: 218 lb 14.4 oz (99.3 kg)(12/1; +1 non-pitting edema BLE)    · Usual Body Weight: (Per EMR: 221 lb 14.4 oz on 11/26/20;)     · Ideal Body Weight: 160 lbs  · BMI: 31.8  · BMI Categories: Obese Class 1 (BMI 30.0-34. 9)       Nutrition Diagnosis:   · Inadequate oral intake related to inadequate protein-energy intake as evidenced by (some po intake less than 75%)    Nutrition Interventions:   Food and/or Nutrient Delivery:  Continue Current Diet, Vitamin Supplement(Pt declines need for ONS during LOS)  Nutrition Education/Counseling:  Education initiated   Coordination of Nutrition Care:  Continue to monitor while inpatient    Goals:  Pt will consume 75% or more of meals during LOS       Nutrition Monitoring and Evaluation:   Behavioral-Environmental Outcomes:  None Identified   Food/Nutrient Intake Outcomes:  Food and Nutrient Intake, Vitamin/Mineral Intake  Physical Signs/Symptoms Outcomes:  Biochemical Data, Weight, Skin, Fluid Status or Edema     Discharge Planning:    Continue current diet     Electronically signed by Abhishek Inman RD, LD on 12/3/20 at 1:49 PM EST    Contact: [phone]

## 2020-12-03 NOTE — PLAN OF CARE
I have reviewed the patient's plan of care and based on this review, the patient treatment plan has been updated. Problem: Falls - Risk of:  Goal: Will remain free from falls  Description: Will remain free from falls  Outcome: Ongoing  Goal: Absence of physical injury  Description: Absence of physical injury  Outcome: Ongoing  Note: Patient verbalizes understanding of fall precautions, uses call light appropriately. Problem: Discharge Planning:  Goal: Discharged to appropriate level of care  Description: Discharged to appropriate level of care  Outcome: Ongoing  Note: Working toward goal of discharge to home. Problem: IP BOWEL ELIMINATION  Goal: LTG - patient will utilize adaptive techniques/equipment to complete bowel elimination  Outcome: Ongoing  Goal: LTG - patient will have regular and routine bowel evacuation  Outcome: Ongoing  Goal: STG - patient will be accident free  Outcome: Ongoing  Goal: STG - Patient will verbalize signs and symptoms of constipation and how to prevent/alleviate  Outcome: Ongoing  Goal: STG - patient will be continent of stool  Outcome: Ongoing  Note: Working toward goal of regular, continent bowel movements. Goal: STG - Patient verbalizes knowledge about relationship between diet, fluid intake, activity and medication on constipation  Outcome: Ongoing     Problem: IP BLADDER/VOIDING  Goal: LTG - patient will complete bladder elimination  Outcome: Ongoing  Goal: LTG - Patient will utilize adaptive techniques/equipment to complete bladder elimination  Outcome: Ongoing  Note: Working toward goal of urinary continence. Walking to the bathroom to void this shift.   Goal: LTG - patient will achieve acceptable level of continence  Outcome: Ongoing     Problem: Skin Integrity:  Goal: Will show no infection signs and symptoms  Description: Will show no infection signs and symptoms  Outcome: Ongoing  Goal: Absence of new skin breakdown  Description: Absence of new skin

## 2020-12-04 PROCEDURE — 97110 THERAPEUTIC EXERCISES: CPT

## 2020-12-04 PROCEDURE — 94640 AIRWAY INHALATION TREATMENT: CPT

## 2020-12-04 PROCEDURE — 1180000000 HC REHAB R&B

## 2020-12-04 PROCEDURE — 97116 GAIT TRAINING THERAPY: CPT

## 2020-12-04 PROCEDURE — 97130 THER IVNTJ EA ADDL 15 MIN: CPT

## 2020-12-04 PROCEDURE — 6370000000 HC RX 637 (ALT 250 FOR IP): Performed by: SURGERY

## 2020-12-04 PROCEDURE — 97129 THER IVNTJ 1ST 15 MIN: CPT

## 2020-12-04 PROCEDURE — 94760 N-INVAS EAR/PLS OXIMETRY 1: CPT

## 2020-12-04 PROCEDURE — 97530 THERAPEUTIC ACTIVITIES: CPT

## 2020-12-04 PROCEDURE — 97535 SELF CARE MNGMENT TRAINING: CPT

## 2020-12-04 PROCEDURE — 2580000003 HC RX 258: Performed by: SURGERY

## 2020-12-04 PROCEDURE — 6360000002 HC RX W HCPCS: Performed by: SURGERY

## 2020-12-04 PROCEDURE — 97112 NEUROMUSCULAR REEDUCATION: CPT

## 2020-12-04 RX ADMIN — BUDESONIDE AND FORMOTEROL FUMARATE DIHYDRATE 2 PUFF: 80; 4.5 AEROSOL RESPIRATORY (INHALATION) at 20:04

## 2020-12-04 RX ADMIN — AMLODIPINE BESYLATE 5 MG: 5 TABLET ORAL at 08:31

## 2020-12-04 RX ADMIN — BUMETANIDE 1 MG: 1 TABLET ORAL at 08:31

## 2020-12-04 RX ADMIN — TAMSULOSIN HYDROCHLORIDE 0.4 MG: 0.4 CAPSULE ORAL at 20:56

## 2020-12-04 RX ADMIN — PRAVASTATIN SODIUM 40 MG: 40 TABLET ORAL at 08:31

## 2020-12-04 RX ADMIN — SODIUM CHLORIDE, PRESERVATIVE FREE 10 ML: 5 INJECTION INTRAVENOUS at 20:56

## 2020-12-04 RX ADMIN — SODIUM CHLORIDE, PRESERVATIVE FREE 10 ML: 5 INJECTION INTRAVENOUS at 08:32

## 2020-12-04 RX ADMIN — ASPIRIN 81 MG: 81 TABLET, CHEWABLE ORAL at 08:31

## 2020-12-04 RX ADMIN — METOPROLOL TARTRATE 50 MG: 50 TABLET, FILM COATED ORAL at 20:55

## 2020-12-04 RX ADMIN — METOPROLOL TARTRATE 50 MG: 50 TABLET, FILM COATED ORAL at 08:31

## 2020-12-04 RX ADMIN — ENOXAPARIN SODIUM 40 MG: 40 INJECTION SUBCUTANEOUS at 14:31

## 2020-12-04 RX ADMIN — LOSARTAN POTASSIUM 100 MG: 100 TABLET, FILM COATED ORAL at 08:31

## 2020-12-04 ASSESSMENT — ENCOUNTER SYMPTOMS
CONSTIPATION: 0
ALLERGIC/IMMUNOLOGIC NEGATIVE: 1
NAUSEA: 0
TROUBLE SWALLOWING: 1
COUGH: 0
SHORTNESS OF BREATH: 0
DIARRHEA: 0
VOICE CHANGE: 0

## 2020-12-04 ASSESSMENT — PAIN SCALES - GENERAL
PAINLEVEL_OUTOF10: 0
PAINLEVEL_OUTOF10: 6
PAINLEVEL_OUTOF10: 5
PAINLEVEL_OUTOF10: 0

## 2020-12-04 ASSESSMENT — PAIN DESCRIPTION - PAIN TYPE
TYPE: ACUTE PAIN
TYPE: ACUTE PAIN

## 2020-12-04 ASSESSMENT — PAIN DESCRIPTION - ORIENTATION
ORIENTATION: LEFT
ORIENTATION: LEFT;RIGHT

## 2020-12-04 ASSESSMENT — PAIN DESCRIPTION - DESCRIPTORS
DESCRIPTORS: ACHING;DISCOMFORT
DESCRIPTORS: DISCOMFORT

## 2020-12-04 ASSESSMENT — PAIN DESCRIPTION - LOCATION
LOCATION: SHOULDER
LOCATION: SHOULDER

## 2020-12-04 NOTE — PROGRESS NOTES
Regi Peterson  [address] [facility]  Occupational Therapy  Daily Note  Time:    Time In: 1400  Time Out: 1430  Timed Code Treatment Minutes: 30 Minutes  Minutes: 30     Date: 2020  Patient Name: Ed Schwab,   Gender: male      Room: [facility]/064-A  MRN: [id number]  : 1935  (81 y.o.)  Referring Practitioner: Dr. Case Del Valle  Diagnosis: Intraventricular Hemorrhage  Additional Pertinent Hx: Pt admitted to inpt rehab for rehab needs after admission to the hospital s/p  fall at home resulting in an intraventricular hemorrhage, left sixth rib fracture, displaced left orbital floor fracture with deficits of gait instability and severe cognitive deficits with a MOCA score of 9/30 on . Restrictions/Precautions:  Restrictions/Precautions: Fall Risk, General Precautions  Position Activity Restriction  Other position/activity restrictions: Keep systolic blood pressure between 100-160 while moving. SUBJECTIVE:  Pt cooperative, agreeable to OT. PAIN: Denies /10:      COGNITION: Slow Processing, Decreased Recall, Decreased Insight and Decreased Problem Solving    BALANCE:  Standing Balance: Contact Guard Assistance. Patient identified one of their personal goals is to be able to sustain functional standing positions in order to complete various ADL and IADL skills in standing position, such as sinkside grooming or washing dishes. Dynamic standing task was then facilitated to challenge unilateral release. Patient required CGA, and demo'ed an endurance of 3  minutes. BED MOBILITY:  Sit to Supine: Stand By Assistance      TRANSFERS:  Sit to Stand:  Contact Guard Assistance. Stand to Sit: Contact Guard Assistance. FUNCTIONAL MOBILITY:  Assistive Device: Rolling Walker  Assist Level:  Contact Guard Assistance.    Distance: within room     Vision:  Vision - Basic Assessment  Prior Vision: Wears glasses only for reading  Patient Visual Report: (Pt reports diplopia following the fall, but reports it has resolved ~1 week ago. Denies diplopia or blurred vision currenlty)  Oculo Motor Control: WNL, delayed tracking, limited by decreased sustained attention, but WFL to all 9 quadrants   Vision Comments: Pt did suffer L orbital fx during his fall. Cover/uncover test: WNL      ASSESSMENT:  Activity Tolerance:  Patient tolerance of  treatment: good. Discharge Recommendations: Home with nursing aide, Home with Home health OT    Equipment Recommendations: Other: Pt has a handicapped height toilet and shower seat. Need to confirm with spouse. Plan: Times per week: 5xs weeks x 90 min, 1 x week x 30 min  Current Treatment Recommendations: Functional Mobility Training, Endurance Training, Self-Care / ADL, Safety Education & Training, Strengthening, Patient/Caregiver Education & Training, Equipment Evaluation, Education, & procurement, Home Management Training    Patient Education  Patient Education: Low Vision, Home Safety and Importance of Increasing Activity    Goals  Short term goals  Time Frame for Short term goals: 1 week  Short term goal 1: Pt will demonstrate functional mobility walking to/from the bathroom or bedside chair with SBA and std walker with min cues for aligning himself as neeed to prepare for doing self care. Short term goal 2: Pt will complete ADL routine with no > min A and min cues for problem solving and task completion to increase independence in self care tasks  Short term goal 3: Pt will complete toileting routine and transfer with no > CGA and min cues for safe tech to increase independence in self toileting tasks  Short term goal 4: Pt will complete BUE ROM and light resistance exercises while following verbal cues to increase his pain free movement for ease of doing self care.   Short term goal 5: Pt will complete 1 step homemaking tasks with CGA and no > min cues for problem solving to increase ability to retrieve a snack  Long term goals  Time Frame for Long term goals : 2-3 weeks  Long term goal 1: Pt will complete ADL routine with S and no  cues for problem solving to increase independence in self care tasks  Long term goal 2: Pt will complete 1 step homemaking tasks with SBA and no cues for safe tech to increase ability to retrieve a glass of water. Following session, patient left in safe position with all fall risk precautions in place.

## 2020-12-04 NOTE — PROGRESS NOTES
Patient alert and oriented to person place and time. Calm cheerful mood. Appropriate interaction. Eye sclera white mucous pink and moist. Skin dry and warm. Hair dry. Speech clear. Denies any difficulty swallowing. INT right forearm clean dry and intact, no warmth or redness noted. Apical pulse 88 heart sounds regular and strong. Respirations 16 easy at rest. B/P 136/74 left upper arm sitting position. Denies any pain at this time. Pulse ox 97 room air. No cough noted. Lung sounds clear anterior, posterior and lateral. Bowel sounds active throughout. Abdomen round soft and non tender upon palpation. States passing gas fine and stated last bowel movement being this morning 12/4/20. Denies any problems urinating. Radial pulses strong and equal bilaterally. Hand grasp strong in right hand weak in the left hand. Arm drift negative bilaterally. Capillary refill less than 3 seconds skin turgor brisk. No edema noted. Pedal pulses strong bilaterally. Pedal push and pull strong in left and right leg. Leg lift strong in right leg weak in the left leg. Denies any numbness or tingling. Chair locked, over bed table in reach as well as call light. Patient denies any needs at this time.

## 2020-12-04 NOTE — PROGRESS NOTES
[facility] THERAPY  [address] [facility]  DAILY NOTE    TIME   SLP Individual Minutes  Time In: 902  Time Out: 7  Minutes: 29  Timed Code Treatment Minutes: 29 Minutes  Dysphagia Treatment - 0 minutes  Speech Language Treatment - 29 minutes    Date: 2020  Patient Name: Teresa Washburn      CSN: [id number]   : 1935  (81 y.o.)  Gender: male   Referring Physician: Brien Navarro MD  Diagnosis: Intraventricular hemorrhage   Secondary Diagnosis: Cognitive deficits, Dysphagia  Precautions: Fall risk, Aspiration risk   Current Diet: Soft and Bite Sized, Thin liquids   Swallowing Strategies: Full Upright Position, Small Bite/Sip, Alternate Solids and Liquids, Limit Distractions and Monitor for Fatigue  Date of Last MBS: Not Applicable    Pain:  No pain reported. Subjective:  Pt seated upright in recliner beginning consumption of breakfast upon ST arrival. Alert and pleasant throughout session. Patient requesting to return to bed immediately following session. No family present on this date. Short-Term Goals:  SHORT TERM GOAL #1:  Goal 1: Pt will complete functional carryover tasks (i.e. orientation, immediate/delayed, working) with 60% accuracy, MOD cues and focus on compensatory memory strategies to enhance retention of newly learned medical information. INTERVENTIONS:  Orientation--  Name: juan  /Age: juan x2  Address: [facility]  Phone Number: [facility]   Month: [facility]   Year: min A for self correction -- able to correct  to  without additional cues.    Date: indep   ERUM: [facility]  Place: [facility]  City: [facility]    Short Term memory:  (Task 1) Given 3 facts about ST (Uzma Murillo, 28)  -Pt able to write it down prior to immediate recall  (immediate recall) 1/3 indep, 1/3 min A, 1/3 MAX A  (delayaed recall) 5 min: 1/3 min A, 1/3 MAX A, 1/3 total A  Completed review of STM strategies using the acronym WRAP--Write it down, Repeat it, Associate it, and Pictures it. Provided increased emphasis on writing it down and repeating it. (Task 2) Pt was read a prescription with the dose, when to take it (bed time), directions for medication (on empty stomach), and precautions (avoid direct sunlight). Pt required 2/3 mod A and 1/3 MAX A via continued repetitions, breakdown for comprehension, and eventual FO2 to recall aforementioned directions. SHORT TERM GOAL #2:  Goal 2: Pt will complete functional problem solving/safety awareness (i.e. time management, safety scenarios, verbal reasoning) with 60% accuracy, mod cues for enhanced safety and ADL contribution. INTERVENTIONS:   Problem solving:  (Call light) - indep demonstration   -3 reasoning why; 2/3 indep, 1/3 mod I   (fall risk) - indep ID of fall risk and calling for help. Min A to ID PT using a walker for balance  (smell smoke) - indep to recall fire dept. & 911. Min A to proceed to \"get out of the house. \"   (Lost credit card) - Min A to call [employer] card company, cancel it, and get a new card/number. SHORT TERM GOAL #3:  Goal 3: Pt will complete expressive language tasks (i.e. higher level confrontational/responsive naming, verbal sequencing, divergent naming) with 80% accuracy, mod cues for improved expression of complex thoughts. INTERVENTIONS: Did not address on this date d/t focus on other goals.      PRIOR SESSION:  Naming  -5 vegetables: 1/5 indep, 4/5 max cues; required significant clarification of task to begin as pt continued to state random items that are not vegetables  -5 fruits: 5/5 indep; improved comprehension   -5 sports: 4/5 indep, 1/5 min cues; improved processing speed  -5 colors: 5/5 indep  -5 kitchen items: 4/5 indep, 1/5 mod cues  *success increased as task progressed with less anomia    SHORT TERM GOAL #4:  Goal 4: Pt will complete high level auditory/reading comprehension tasks (i.e. complex y/n questions, multi step and complex commands, prescription labels/directories, etc) and strategies, Reviewed signs, symptoms and risks of aspiration, Reviewed ST goals and Plan of Care, Reviewed recommendations for follow-up, Education Related to Prevention of Recurrence of Impairment/Illness/Injury and Education Related to [employer] [facility]  Evaluation of Education: Verbalizes understanding, Demonstrates with assistance and Family not present    ASSESSMENT/PLAN:  Activity Tolerance:  Patient tolerance of treatment: good. Assessment/Plan: Patient progressing toward established goals. Continues to require skilled care of licensed speech pathologist to progress toward achievement of established goals and plan of care. Plan for Next Session: problem solving, recall, reading comprehension     Macy L. Marybeth Callas) Dorrie Schlatter MA., CCC-SLP

## 2020-12-04 NOTE — PLAN OF CARE
BLADDER/VOIDING  Goal: LTG - patient will achieve acceptable level of continence  12/4/2020 0321 by Demetrio Lozano RN  Outcome: Ongoing     Problem: Skin Integrity:  Goal: Will show no infection signs and symptoms  Description: Will show no infection signs and symptoms  12/4/2020 0321 by Demetrio Lozano RN  Outcome: Ongoing     Problem: Skin Integrity:  Goal: Absence of new skin breakdown  Description: Absence of new skin breakdown  12/4/2020 0321 by Demetrio Lozano RN  Outcome: Ongoing     Problem: IP BALANCE  Goal: LTG - Patient will maintain balance to allow for safe/functional mobility  12/4/2020 0321 by Demetrio Lozano RN  Outcome: Ongoing     Problem: IP DRESSINGS LOWER EXTREMITIES  Goal: LTG - Patient will safely utilize adaptive techniques/equipment to dress lower body  12/4/2020 0321 by Demetrio Lozano RN  Outcome: Ongoing     Problem: DISCHARGE BARRIERS  Goal: Patient's continuum of care needs are met  12/4/2020 0321 by Demetrio Lozano RN  Outcome: Ongoing     Problem: IP MOBILITY  Goal: LTG - patient will demonstrate safe mobility requirements  12/4/2020 0321 by Demetrio Lozano RN  Outcome: Ongoing     Problem: IP COMMUNICATION/DYSARTHRIA  Goal: LTG - Patient will effectively communicate in all situations with the use of compensatory strategies  12/4/2020 0321 by Demetrio Lozano RN  Outcome: Ongoing     Problem: IP SWALLOWING  Goal: LTG - patient will tolerate the least restrictive diet consistency to allow for safe consumption of daily meals  12/4/2020 0321 by Demetrio Lozano RN  Outcome: Ongoing     Problem: Impaired respiratory status  Goal: Clear lung sounds  12/4/2020 0321 by Demetrio Lozano RN  Outcome: Ongoing   Care plan reviewed with patient. Patient verbalizes understanding of care plan and treatment goals.

## 2020-12-04 NOTE — PROGRESS NOTES
Independent  Transfer Assistance: Independent  Active : No  Additional Comments: Pt ambualated short distances with a standard walker. He did his own self care. He had help with all of the cooking and cleaning prior to admission. Restrictions/Precautions:  Restrictions/Precautions: Fall Risk, General Precautions  Position Activity Restriction  Other position/activity restrictions: Keep systolic blood pressure between 100-160 while moving. SUBJECTIVE: Pt. In restroom with student nurses present upon arrival. Pt. Pleasant and agreeable to therapy session, requesting to get dressed before leaving room. PAIN: None indicated    OBJECTIVE:  Bed Mobility:  Not Tested    Transfers:  Sit to Stand: [employer], Minimal Assistance  Stand to [address], with verbal cues    Ambulation:  Contact Guard Assistance, with verbal cues , with increased time for completion  Distance: 45' x 1, 75' x 1, 10' x 1  Surface: Level Tile  Device:Rolling Walker  Gait Deviations: Forward Flexed Posture, Slow [place], Decreased Step Length Bilaterally, Decreased Gait Speed, Decreased Heel Strike Bilaterally, Increased Reliance on Walker, Decreased Terminal Knee Extension and VCs for increased step length and step height with good carryover. Pt. Limited by decreased endurance. Balance:  Dynamic Standing Balance: Contact Guard Assistance. Pt. Requiring assistance for clothing management and pericare after toileting. Pt. Steady with no LOB. Neuromuscular Education:   Pt. Completed Stepping activities emphasizing large step length and step height using Rolling Walker to improve gait mechanics for improved functional mobility. Exercise:  Patient was guided in 1 set(s) 10 reps of exercise to both lower extremities.   Glut sets, Hip abduction/adduction, Seated marches, Seated heel/toe raises, Long arc quads, Seated isometric hip adduction and abdominal isometrics. .  Exercises were completed for increased independence with functional mobility. Functional Outcome Measures: Not completed       ASSESSMENT:  Assessment: Patient progressing toward established goals. Activity Tolerance:  Patient tolerance of  treatment: good. Equipment Recommendations: Other: Has SW, will monitor for needs, may need w/c or RW  Discharge Recommendations:  24 hour supervision or assist, Continue to assess pending progress    Plan: Times per week: 5x/week 90 min, 1x/week 30 min  Current Treatment Recommendations: Strengthening, Safety Education & Training, Balance Training, Endurance Training, Functional Mobility Training, Patient/Caregiver Education & Training, Transfer Training, Gait Training, Wheelchair Mobility Training, Neuromuscular Re-education, Home Exercise Program, Stair training    Patient Education  Patient Education: Plan of Care, Transfers, Reviewed Prior Education, Gait, Verbal Exercise Instruction    Goals:  Patient goals : does not state  Short term goals  Time Frame for Short term goals: 2 weeks  Short term goal 1: Pt to transfer supine <--> sit CGA to enable pt to get in/out of bed. Short term goal 2: Pt to transfer sit <--> stand CGA for increased functional mobility. Short term goal 3: Pt to ambulate 50 feet with SW/RW CGA for household ambulation. Long term goals  Time Frame for Long term goals : 4 weeks  Long term goal 1: Pt to transfer supine <--> sit SBA to enable pt to get in/out of bed. Long term goal 2: Pt to transfer sit <--> stand SBA for increased functional mobility. Long term goal 3: Pt to ambulate >100 feet with SW/RW SBA for household ambulation. Long term goal 4: Pt to ascend/descend 1 step with SW/RW CGA for home entry. Long term goal 5: Pt to perform car transfer CGA to enable pt to get in/out of the car. Long term goal 6: Pt to improve TUG test score to <3 minutes to indicate improvement in overall mobility.     Following session, patient left in safe position with all fall risk precautions in place.

## 2020-12-04 NOTE — PROGRESS NOTES
Patient alert calm, oriented to person, place and time. Appropriate interaction with staff. Eye sclera white, mucous membranes pink and moist. Hair dry and skin warm and dry to touch. Speech clear denies any swallowing difficulty at this time. Temperature 97.6 orally. Apical pulse 100 heart sounds distant and irregular. Respiration count 18 deep and easy at rest regular. Non productive cough noted. B/P left arm sitting up in chair 118/82. Pulse ox 95 room air. Denies any pain at this time. Bowel sounds active in all 4 quadrants. Abdomen round firm and non tender upon palpation. Passing gas fine stated by patient. Denies any problems urinating. \"States last bowel movement 12/3/2020\". Radial pulses strong and equal. Hand grasp strong in right hand, weak in the left hand. Negative arm drift bilaterally. INT site clean dry and intact without redness. INT dressing clean dry and intact. Capillary refill less than 3 seconds. Skin turgor brisk. Pedal push and pull strong bilaterally. Pedal pulses strong and equal bilaterally. Homans sign negative bilaterally. Leg lift strong in right leg weak in the left leg. Denies any numbness or tingling. Patient in chair call light in reach as well as over bed table. Chair alarm on. Patient denies any needs at this time.

## 2020-12-04 NOTE — PROGRESS NOTES
Device: Rolling Walker  Assist Level:  Contact Guard Assistance. Distance: To and from bathroom  Pt had no LOB and demo fair posture throughout. Pt moves slow and increased time needed to get to where need to go      ADDITIONAL ACTIVITIES:  AAROM exercises to BUE for shoulder flexion and tabletop exercises x10 reps x1 set for increase ROM and strength needed for ADLS and transfers. Pt part in shoulder shrugs x10 reps. Pt has limited ROM in BUE due to falls he stated. .Patient identified one of their personal goals is to be able to sustain functional standing positions in order to complete various ADL and IADL skills in standing position, such as sinkside grooming or washing dishes. Dynamic standing task was then facilitated to challenge balance and 1 hand release from RW . Patient required CGA , and demo'ed an endurance of 2  minutes. x3 trials     ASSESSMENT:     Activity Tolerance:  Patient tolerance of  treatment: good. Discharge Recommendations: Home with nursing aide, Home with Home health OT   Equipment Recommendations: Other: Pt has a handicapped height toilet and shower seat. Need to confirm with spouse. Plan: Times per week: 5xs weeks x 90 min, 1 x week x 30 min  Current Treatment Recommendations: Functional Mobility Training, Endurance Training, Self-Care / ADL, Safety Education & Training, Strengthening, Patient/Caregiver Education & Training, Equipment Evaluation, Education, & procurement, Home Management Training    Patient Education  Patient Education: ADL's, Home Exercise Program and Importance of Increasing Activity    Goals  Short term goals  Time Frame for Short term goals: 1 week  Short term goal 1: Pt will demonstrate functional mobility walking to/from the bathroom or bedside chair with SBA and std walker with min cues for aligning himself as neeed to prepare for doing self care.   Short term goal 2: Pt will complete ADL routine with no > min A and min cues for problem solving and task completion to increase independence in self care tasks  Short term goal 3: Pt will complete toileting routine and transfer with no > CGA and min cues for safe tech to increase independence in self toileting tasks  Short term goal 4: Pt will complete BUE ROM and light resistance exercises while following verbal cues to increase his pain free movement for ease of doing self care. Short term goal 5: Pt will complete 1 step homemaking tasks with CGA and no > min cues for problem solving to increase ability to retrieve a snack  Long term goals  Time Frame for Long term goals : 2-3 weeks  Long term goal 1: Pt will complete ADL routine with S and no  cues for problem solving to increase independence in self care tasks  Long term goal 2: Pt will complete 1 step homemaking tasks with SBA and no cues for safe tech to increase ability to retrieve a glass of water. Following session, patient left in safe position with all fall risk precautions in place.

## 2020-12-04 NOTE — PROGRESS NOTES
[facility]  INPATIENT PHYSICAL THERAPY  DAILY NOTE  [address] [facility] - [id number]    Time In: 1200  Time Out: 1230  Timed Code Treatment Minutes: 30 Minutes  Minutes: 30          Date: 2020  Patient Name: Hernando Arevalo,  Gender:  male        MRN: [id number]  : 1935  (81 y.o.)     Referring Practitioner: Brisa Hutchinson MD  Diagnosis: Intraventricular hemorrhage  Additional Pertinent Hx: Pt presenting at [facility] by activation of level 2 trauma, brought by EMS following a unwitnessed fall with unknown LOC; past medical history includes recent cardiac sten placement x2, hypertension, chronic kidney disease, CAD. Per wife report, she was standing in the bathroom when she heard a sound in the kitchen she open the bathroom door to see her  laying face down on the ground. Patient states he normally ambulates with walker, did have a walker at the time of fall, unclear if fall was precipitated by syncopal episode. Wife states that  does appear to be confused post fall, has been repetitive in speech since incident. Patient reports some mild discomfort over left face otherwise has no complaints on exam.  Found to have an intraventricular hemorrhage, a left orbital fx, nasal bone fx, and left 6th rib fx. To IP rehab .      Prior Level of Function:  Lives With: Spouse  Type of Home: House  Home Layout: One level  Home Access: Stairs to enter with rails  Entrance Stairs - Number of Steps: 1 step to enter and 1 step once inside the house  Entrance Stairs - Rails: Left  Home Equipment: Standard walker   Bathroom Shower/Tub: Walk-in shower  Bathroom Toilet: Handicap height  Bathroom Equipment: Built-in shower seat, Grab bars around toilet, Grab bars in shower  Bathroom Accessibility: Accessible    Receives Help From: Family  ADL Assistance: [address]: Independent  Homemaking Responsibilities: No  Ambulation Assistance: Equipment Recommendations: Other: Has SW, will monitor for needs, may need w/c or RW  Discharge Recommendations:  24 hour supervision or assist, Continue to assess pending progress    Plan: Times per week: 5x/week 90 min, 1x/week 30 min  Current Treatment Recommendations: Strengthening, Safety Education & Training, Balance Training, Endurance Training, Functional Mobility Training, Patient/Caregiver Education & Training, Transfer Training, Gait Training, Wheelchair Mobility Training, Neuromuscular Re-education, Home Exercise Program, Stair training    Patient Education  Patient Education: Plan of Care, Transfers, Reviewed Prior Education, Gait, Stairs    Goals:  Patient goals : does not state  Short term goals  Time Frame for Short term goals: 2 weeks  Short term goal 1: Pt to transfer supine <--> sit CGA to enable pt to get in/out of bed. Short term goal 2: Pt to transfer sit <--> stand CGA for increased functional mobility. Short term goal 3: Pt to ambulate 50 feet with SW/RW CGA for household ambulation. Long term goals  Time Frame for Long term goals : 4 weeks  Long term goal 1: Pt to transfer supine <--> sit SBA to enable pt to get in/out of bed. Long term goal 2: Pt to transfer sit <--> stand SBA for increased functional mobility. Long term goal 3: Pt to ambulate >100 feet with SW/RW SBA for household ambulation. Long term goal 4: Pt to ascend/descend 1 step with SW/RW CGA for home entry. Long term goal 5: Pt to perform car transfer CGA to enable pt to get in/out of the car. Long term goal 6: Pt to improve TUG test score to <3 minutes to indicate improvement in overall mobility. Following session, patient left in safe position with all fall risk precautions in place.

## 2020-12-04 NOTE — FLOWSHEET NOTE
Pt is an 84y. o. male, lying in bed watching television in 7E-64. He is calm, approachable and present. The time together was brief, as Stone while pleasant and calm, was not very talkative at all. He did share that his wife and children are supportive of him and in the immediate area. Sensing he wasn't in a talking mood,  offered to pray for/with him, and he stated 'that won't be necessary'. So,  thanked him for his time and wished him a restful evening to end our time together. 12/03/20 2989   Encounter Summary   Services provided to: Patient   Referral/Consult From: [facility]   Support System Spouse; Children   Continue Visiting Yes  (12/3)   Complexity of Encounter Low   Length of Encounter 15 minutes   Routine   Type Follow up   Assessment Calm; Approachable;Coping;Peaceful   Intervention Active listening;Prayer   Outcome Expressed gratitude;Receptive

## 2020-12-04 NOTE — PROGRESS NOTES
Therapy:  COGNITION: Decreased Insight, Decreased Problem Solving and Decreased Safety Awareness     ADL:   Lower Extremity Dressing: Maximum Assistance, with verbal cues  and with increased time for completion. to don and doff depends and pants sitting on RTS   Toileting: Maximum Assistance. to wipe bottom   Toilet Transfer: [employer]. to RTS .     BALANCE:  Standing Balance: Contact Guard Assistance. at [facility]      BED MOBILITY:  Supine to Sit: Contact Guard Assistance with use of bedrail and increased time to complete      TRANSFERS:  Sit to Stand:  Contact Guard Assistance. from recliner and EOB and RTS   Stand to Sit: [employer], with increased time for completion, cues for hand placement. to RTS, recliner   Long extended time to complete   FUNCTIONAL MOBILITY:  Assistive Device: Rolling Walker  Assist Level:  Contact Guard Assistance. Distance: To and from bathroom  Pt had no LOB and demo fair posture throughout. Pt moves slow and increased time needed to get to where need to go       ADDITIONAL ACTIVITIES:  AAROM exercises to BUE for shoulder flexion and tabletop exercises x10 reps x1 set for increase ROM and strength needed for ADLS and transfers. Pt part in shoulder shrugs x10 reps. Pt has limited ROM in BUE due to falls he stated.      Patient identified one of their personal goals is to be able to sustain functional standing positions in order to complete various ADL and IADL skills in standing position, such as sinkside grooming or washing dishes. Dynamic standing task was then facilitated to challenge balance and 1 hand release from RW . Patient required CGA , and demo'ed an endurance of 2  minutes. x3 trials      ASSESSMENT:  Activity Tolerance:  Patient tolerance of  treatment: good. Discharge Recommendations: Home with nursing aide, Home with Home health OT   Equipment Recommendations: Other: Pt has a handicapped height toilet and shower seat.  Need to confirm with spouse. Speech Therapy:  Short-Term Goals:  SHORT TERM GOAL #1:  Goal 1: Pt will complete functional carryover tasks (i.e. orientation, immediate/delayed, working) with 60% accuracy, MOD cues and focus on compensatory memory strategies to enhance retention of newly learned medical information. INTERVENTIONS:  Orientation--  Name: juan  /Age: juan x2  Address: [facility]  Phone Number: [facility]   Month: [facility]   Year: min A for self correction -- able to correct  to  without additional cues. Date: [facility]   ERUM: [facility]  Place: [facility]  City: [facility]     Short Term memory:  (Task 1) Given 3 facts about ST (Shimon Dey, 29)  -Pt able to write it down prior to immediate recall  (immediate recall) 1/3 indep, 1/3 min A, 1/3 MAX A  (delayaed recall) 5 min: 1/3 min A, 1/3 MAX A, 1/3 total A  Completed review of STM strategies using the acronym WRAP--Write it down, Repeat it, Associate it, and Pictures it. Provided increased emphasis on writing it down and repeating it. (Task 2) Pt was read a prescription with the dose, when to take it (bed time), directions for medication (on empty stomach), and precautions (avoid direct sunlight). Pt required 2/3 mod A and 1/3 MAX A via continued repetitions, breakdown for comprehension, and eventual FO2 to recall aforementioned directions.      SHORT TERM GOAL #2:  Goal 2: Pt will complete functional problem solving/safety awareness (i.e. time management, safety scenarios, verbal reasoning) with 60% accuracy, mod cues for enhanced safety and ADL contribution. INTERVENTIONS:   Problem solving:  (Call light) - indep demonstration              -3 reasoning why; 2/3 indep, 1/3 mod I   (fall risk) - indep ID of fall risk and calling for help. Min A to ID PT using a walker for balance  (smell smoke) - indep to recall fire dept. & 911. Min A to proceed to \"get out of the house. \"   (Lost credit card) - Min A to call [employer] card [employer], cancel it, and get a new card/number.       SHORT TERM GOAL #3:  Goal 3: Pt will complete expressive language tasks (i.e. higher level confrontational/responsive naming, verbal sequencing, divergent naming) with 80% accuracy, mod cues for improved expression of complex thoughts. INTERVENTIONS: Did not address on this date d/t focus on other goals.      PRIOR SESSION:  Naming  -5 vegetables: 1/5 indep, 4/5 max cues; required significant clarification of task to begin as pt continued to state random items that are not vegetables  -5 fruits: 5/5 indep; improved comprehension   -5 sports: 4/5 indep, 1/5 min cues; improved processing speed  -5 colors: 5/5 indep  -5 kitchen items: 4/5 indep, 1/5 mod cues  *success increased as task progressed with less anomia     SHORT TERM GOAL #4:  Goal 4: Pt will complete high level auditory/reading comprehension tasks (i.e. complex y/n questions, multi step and complex commands, prescription labels/directories, etc) with 80% accuracy, mod cues for improved understanding of complex medical information. INTERVENTIONS: Did not address on this date d/t focus on other goals.      SHORT TERM GOAL #5:  Goal 5: Pt will consume a soft & bite size diet and thin liquids with adherence to skilled swallowing strategies (i.e. small bites/sips, slow rate) without oert s/s of aspiration to meet nutrition/hydration measures safely. INTERVENTIONS: Did not address on this date d/t focus on other goals.      PRIOR SESSION:  Skilled dietary analysis completed during consumption of breakfast consisting of applesauce, chopped sausage, and chopped pancakes with thin liquids via cup/straw. Pt with excellent full upright positioning and good attention to task despite TV on in background. No distractions evident. Demonstrated adequate rotary mastication of solids with cohesive bolus control/formation. Utilized the following compensatory strategies appropriately: small bites/sips, alternation of liquids/solids.  Min verbal cueing required x2 to slow rate of consumption. Immediate throat clear observed x1 following small sip of thin liquids as liquid wash, NO other overt s/s of aspiration. Recommend resumption of soft and bite sized diet with thin liquids at this time. Plan to complete x1-2 additional dietary analyses prior to initiation of advanced texture trials of regular.     Long-Term Goals:  Timeframe for Long-term Goals: 4 weeks     LONG TERM GOAL #1:  Goal 1: Patient will improve cognitive status to a min A level to safely return to PL home with wife, nearly 24 hour SUP     Comprehension: 4 - Patient understands basic needs 75-90%+ of the time  Expression: 4 - Expresses basic ideas/needs 75-90%+ of the time  Social Interaction: 5 - Patient is appropriate with supervision/cues  Problem Solvin - Patient solves simple/routine tasks 25%-49%  Memory: 2 - Patient remembers 25%-49% of the time     EDUCATION:  Learner: Patient  Education:  Reviewed diet and strategies, Reviewed signs, symptoms and risks of aspiration, Reviewed ST goals and Plan of Care, Reviewed recommendations for follow-up, Education Related to Prevention of Recurrence of Impairment/Illness/Injury and Education Related to [employer] [facility]  Evaluation of Education: Verbalizes understanding, Demonstrates with assistance and Family not present     ASSESSMENT/PLAN:  Activity Tolerance:  Patient tolerance of treatment: good. Assessment/Plan: Patient progressing toward established goals. Continues to require skilled care of licensed speech pathologist to progress toward achievement of established goals and plan of care. Plan for Next Session: problem solving, recall, reading comprehension    Review of Systems:  Review of Systems   Constitutional: Positive for activity change. Negative for appetite change. HENT: Positive for mouth sores (Lip bite wound) and trouble swallowing. Negative for voice change. Eyes: Negative for visual disturbance.    Respiratory: Negative for cough and shortness of breath. Cardiovascular: Positive for leg swelling. Gastrointestinal: Negative for constipation, diarrhea and nausea. Endocrine: Negative for cold intolerance. Genitourinary: Negative for frequency and urgency. Musculoskeletal: Positive for gait problem. Skin: Negative for pallor. Allergic/Immunologic: Negative. Neurological: Positive for speech difficulty and weakness. Negative for dizziness, facial asymmetry and headaches. Hematological: Negative. Psychiatric/Behavioral: Positive for confusion and decreased concentration. Negative for dysphoric mood and hallucinations. The patient is not nervous/anxious. All other systems reviewed and are negative. Objective:  BP (!) 152/92   Pulse 103   Temp 98.3 °F (36.8 °C) (Oral)   Resp 16   Ht 5' 9\" (1.753 m)   Wt 215 lb 6.4 oz (97.7 kg)   SpO2 96%   BMI 31.81 kg/m²   CURRENT VITALS:  height is 5' 9\" (1.753 m) and weight is 215 lb 6.4 oz (97.7 kg). His oral temperature is 98.3 °F (36.8 °C). His blood pressure is 152/92 (abnormal) and his pulse is 103. His respiration is 16 and oxygen saturation is 96%. Body mass index is 31.81 kg/m².   Temperature Range (24h):Temp: 98.3 °F (36.8 °C) Temp  Av.3 °F (36.8 °C)  Min: 98 °F (36.7 °C)  Max: 98.7 °F (37.1 °C)  BP Range (56D): Systolic (37ZTM), JJR:849 , Min:128 , CIE:186     Diastolic (20YBZ), LSJ:90, Min:70, Max:92    Pulse Range (24h): Pulse  Av.3  Min: 65  Max: 103  Respiration Range (24h): Resp  Av  Min: 16  Max: 16  Current Pulse Ox (24h):  SpO2: 96 %  Pulse Ox Range (24h):  SpO2  Av.8 %  Min: 93 %  Max: 96 %  Oxygen Amount and Delivery:      awake  Orientation:   person, place, time, not situation  Mood: within normal limits  Affect: calm  General appearance:  in no acute distress, left eye conjunctival hemorrhage medial to the iris, up in bed    Memory:   abnormal -decreased recall  Attention/Concentration: abnormal -mildly slowed processing  Language:  abnormal -mild dysarthria    ROM:  normal  Motor Exam: As in table    Manual Muscle Testing: Sh Abd  Sh IR  Sh ER  Elbow Flex  Elbow Ext    Left  3+   4 4   Right 3+   4 4      Wrist Ext  Wrist Flexion  Finger Flex  Finger Abd  Finger Add    Left    4     Right   4        Hip Flexion  Hip Extension  Hip Abduction  Hip Adduction    Left  2+      Right 2+         Knee Flex  Knee Ext  Ankle DF  Ankle PF  Ankle Inv  Ankle Ev  EHL    Left   3+ 3+ 4      Right  3+ 2 4        Tone:  normal  Muscle bulk: within normal limits  Sensory:  Sensory intact  Coordination:   abnormal - mild decrease for fine motor control of the bilateral hands    Skin: warm and dry, no rash or erythema and abrasion over left forehead above eye and mild swelling of the left orbit  Peripheral vascular: Pulses: Normal upper and lower extremity pulses; Edema: 1+    Diagnostics:   No results found for this or any previous visit (from the past 24 hour(s)). Labs Renal Latest Ref Rng & Units 12/2/2020 12/1/2020 11/30/2020 11/29/2020 11/28/2020   BUN 7 - 22 mg/dL 24(H) 26(H) 23(H) 20 17   Cr 0.4 - 1.2 mg/dL 1.2 1.2 1.0 0.9 1.2   K 3.5 - 5.2 meq/L 4.4 4.7 4.1 4.2 4.0   Na 135 - 145 meq/L 140 140 141 140 138      Recent Labs     12/02/20  0618 12/01/20  0331 11/30/20  0331   WBC 5.0 5.5 6.0   HGB 15.0 15.0 15.0   HCT 47.7 49.8 48.8   MCV 99.6* 101.8* 100.2*    140 141      Impression:  1. Ground-level fall at home. 2. Gait instability. 3. 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 4. 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 5. Left sixth rib fracture. 6. Abrasion to left forehead. 7. Laceration of left lower lip. 8. Subacute infarct of the right thalamus. 9. Severe cognitive impairments. (MOCA) version 7.2 completed. Patient scored 9/30. 10. Mild oral dysphagia.   11. Mild dysarthria. 12. Mild-moderate expressive language deficits. 13. Left orbital floor fracture with posterior blowout component. 14. Nasal bone fracture. 15. Hematuria. 16. Coronary artery disease with history of 2 stents and now with 90% severe stenosis of the apical portion of the LAD and mild diastolic dysfunction of the left ventricle noted on heart catheterization completed on 10/22/2020 by Dr. Kelin Saleem with deployment of PCI to circumflex artery. 17. Medication noncompliance; did not start Brilinta and aspirin for his post stent medical management. 18. Hypertension. 19. Hyperlipidemia. 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 21. CKD 3.  22. Lumbar stenosis with neurogenic claudication status post lumbar laminectomy from L2-L3 bilaterally by Dr. Anushka See on 5/29/2015. 23. 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 24. Prostate cancer status post brachytherapy. Plan:     Medical management: Per primary team and Dr. Lonnie Breaux. Consultants:  Trauma Surgery, Neurosurgery, Critical Care, Neurology, Cardiology, Plastic Surgery, Family Medicine, Physical Medicine    Narcotic usage:  Not applicable     Last BM:  Stool Amount: Medium (12/02/20 2104)    FUNCTIONAL OUTCOMES TOOLS:    HOUSE -      Tinetti -      TUG - Timed Up and Go: 4.11    Acute/Rehabilitation Problems:  1. Ground-level fall at home. 2. Gait instability. 1. PT/OT. 2. TUG 4 minutes 11 seconds. 60-69 years:  8.1 seconds; 70-79 years: 9.2 seconds; 80-99 years: 11.3 seconds. 3. 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 1. Initiate TBI guidelines as needed for low stimulation environment. 4. 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 1. Neurosurgery following with no interventions planned. 5. Left sixth rib fracture. 1. Pain control. 1. Lidoderm patches. 2. Tylenol. 2. Symbicort twice daily.   3. Albuterol nebulizer every 6 hours as needed. 6. Abrasion to left forehead. 1. Wound care. 7. Laceration of left lower lip. 1. Healing appropriately. 8. Subacute infarct of the right thalamus. 1. Stable. 2. Continue with therapies. 9. Severe cognitive impairments/Mild oral dysphagia/Mild dysarthria/Mild-moderate expressive language deficits. 1. (MOCA) version 7.2 completed. Patient scored 9/30. Repeat on 12/2 - [facility]) version 8.1 completed. Patient scored 10/30. 2. SLP. 3. Diet downgraded on 12/2 to soft and bite-sized within liquids. 10. Left orbital floor fracture with posterior blowout component/Nasal bone fracture. 1. Conservative treatment of this fracture as the components involve the posterior aspect of the left orbital floor without clinical evidence of inferior rectus impingement. No need to surgical intervention for the nasal bone fractures. 11. Hematuria. 1. Resolved. 12. Coronary artery disease with history of 2 stents and now with 90% severe stenosis of the apical portion of the LAD and mild diastolic dysfunction of the left ventricle noted on heart catheterization completed on 10/22/2020 by Dr. Ankit Harris with deployment of PCI to circumflex artery/Medication nonadherence to recommended dual antiplatelet therapy and cardiac rehabilitation following recent cardiac stenting. 1. Aspirin has been started. 2. Holding Brilinta for now. 13. Nutrition:  Consultation to dietician for nutritional counseling and recommendations. 1. Total protein 6.3 and albumin slightly low at 3.4 on 12/2/2020.  2. Vitamin D 25 hydroxy normal at 68 on 12/2/2020. 14. Electrolytes. 1. Normal on 12/2. 15. Bladder: Noted on admission. Follow for now. 16. Bowel: Senna, colace, MOM  17. Rehabilitation nursing will be involved for bowel, bladder, skin, and pain management. Nursing will also provide education and training to patient and family. 18. Prophylaxis:  DVT: Lovenox. GI: None.   19.  and case

## 2020-12-05 PROCEDURE — 6360000002 HC RX W HCPCS: Performed by: SURGERY

## 2020-12-05 PROCEDURE — 97110 THERAPEUTIC EXERCISES: CPT

## 2020-12-05 PROCEDURE — 94760 N-INVAS EAR/PLS OXIMETRY 1: CPT

## 2020-12-05 PROCEDURE — 1180000000 HC REHAB R&B

## 2020-12-05 PROCEDURE — 97129 THER IVNTJ 1ST 15 MIN: CPT

## 2020-12-05 PROCEDURE — 97130 THER IVNTJ EA ADDL 15 MIN: CPT

## 2020-12-05 PROCEDURE — 97116 GAIT TRAINING THERAPY: CPT

## 2020-12-05 PROCEDURE — 97530 THERAPEUTIC ACTIVITIES: CPT

## 2020-12-05 PROCEDURE — 97535 SELF CARE MNGMENT TRAINING: CPT

## 2020-12-05 PROCEDURE — 6370000000 HC RX 637 (ALT 250 FOR IP): Performed by: FAMILY MEDICINE

## 2020-12-05 PROCEDURE — 94640 AIRWAY INHALATION TREATMENT: CPT

## 2020-12-05 PROCEDURE — 6370000000 HC RX 637 (ALT 250 FOR IP): Performed by: SURGERY

## 2020-12-05 PROCEDURE — 2580000003 HC RX 258: Performed by: SURGERY

## 2020-12-05 RX ORDER — FAMOTIDINE 20 MG/1
20 TABLET, FILM COATED ORAL DAILY
Status: DISCONTINUED | OUTPATIENT
Start: 2020-12-05 | End: 2020-12-16 | Stop reason: HOSPADM

## 2020-12-05 RX ORDER — M-VIT,TX,IRON,MINS/CALC/FOLIC 27MG-0.4MG
1 TABLET ORAL DAILY
Status: DISCONTINUED | OUTPATIENT
Start: 2020-12-05 | End: 2020-12-16 | Stop reason: HOSPADM

## 2020-12-05 RX ADMIN — METOPROLOL TARTRATE 50 MG: 50 TABLET, FILM COATED ORAL at 19:57

## 2020-12-05 RX ADMIN — AMLODIPINE BESYLATE 5 MG: 5 TABLET ORAL at 10:01

## 2020-12-05 RX ADMIN — ASPIRIN 81 MG: 81 TABLET, CHEWABLE ORAL at 10:03

## 2020-12-05 RX ADMIN — BUDESONIDE AND FORMOTEROL FUMARATE DIHYDRATE 2 PUFF: 80; 4.5 AEROSOL RESPIRATORY (INHALATION) at 10:20

## 2020-12-05 RX ADMIN — PRAVASTATIN SODIUM 40 MG: 40 TABLET ORAL at 10:02

## 2020-12-05 RX ADMIN — ENOXAPARIN SODIUM 40 MG: 40 INJECTION SUBCUTANEOUS at 14:29

## 2020-12-05 RX ADMIN — ACETAMINOPHEN 650 MG: 325 TABLET ORAL at 10:03

## 2020-12-05 RX ADMIN — BUDESONIDE AND FORMOTEROL FUMARATE DIHYDRATE 2 PUFF: 80; 4.5 AEROSOL RESPIRATORY (INHALATION) at 19:41

## 2020-12-05 RX ADMIN — TAMSULOSIN HYDROCHLORIDE 0.4 MG: 0.4 CAPSULE ORAL at 19:57

## 2020-12-05 RX ADMIN — FAMOTIDINE 20 MG: 20 TABLET, FILM COATED ORAL at 18:34

## 2020-12-05 RX ADMIN — LOSARTAN POTASSIUM 100 MG: 100 TABLET, FILM COATED ORAL at 10:01

## 2020-12-05 RX ADMIN — BUMETANIDE 1 MG: 1 TABLET ORAL at 10:01

## 2020-12-05 RX ADMIN — METOPROLOL TARTRATE 50 MG: 50 TABLET, FILM COATED ORAL at 10:02

## 2020-12-05 RX ADMIN — MULTIPLE VITAMINS W/ MINERALS TAB 1 TABLET: TAB at 18:34

## 2020-12-05 RX ADMIN — SODIUM CHLORIDE, PRESERVATIVE FREE 10 ML: 5 INJECTION INTRAVENOUS at 10:02

## 2020-12-05 ASSESSMENT — PAIN DESCRIPTION - ORIENTATION: ORIENTATION: RIGHT;LEFT

## 2020-12-05 ASSESSMENT — PAIN DESCRIPTION - LOCATION: LOCATION: SHOULDER

## 2020-12-05 ASSESSMENT — PAIN DESCRIPTION - PAIN TYPE: TYPE: ACUTE PAIN

## 2020-12-05 ASSESSMENT — PAIN SCALES - GENERAL
PAINLEVEL_OUTOF10: 0
PAINLEVEL_OUTOF10: 0
PAINLEVEL_OUTOF10: 4

## 2020-12-05 ASSESSMENT — PAIN DESCRIPTION - DESCRIPTORS: DESCRIPTORS: ACHING;DISCOMFORT

## 2020-12-05 NOTE — PROGRESS NOTES
[address]  Occupational Therapy  Daily Note  Time:   Time In: 0710  Time Out: 0830  Timed Code Treatment Minutes: 80 Minutes  Minutes: 80          Date: 2020  Patient Name: Ed Schwab,   Gender: male      Room: [facility]64/064-A  MRN: [id number]  : 1935  (81 y.o.)  Referring Practitioner: Dr. Case Del Valle  Diagnosis: Intraventricular Hemorrhage  Additional Pertinent Hx: Pt admitted to inpt rehab for rehab needs after admission to the hospital s/p  fall at home resulting in an intraventricular hemorrhage, left sixth rib fracture, displaced left orbital floor fracture with deficits of gait instability and severe cognitive deficits with a MOCA score of 9/30 on . Restrictions/Precautions:  Restrictions/Precautions: Fall Risk, General Precautions  Position Activity Restriction  Other position/activity restrictions: Keep systolic blood pressure between 100-160 while moving. SUBJECTIVE: Patient in bed upon arrival agreeable to OT treatment     PAIN: denies     COGNITION: Slow Processing    ADL:   Grooming: Stand By Assistance. standing at sink to complete oral hygiene, patient slow moving increased time to complete tasks   Bathing: Min A and Stand By Assistance, with verbal cues  and with increased time for completion. Patient utilized grab bars appropriately SBA when standing for jacinto and bottom Min A for feet   Upper Extremity Dressing: Minimal Assistance. Donning shirt and buttoned up shirt, required min A with bottons this date as patient reports hes shaky   Lower Extremity Dressing: Minimal Assistance. Donning socks and threading underwear and pants, patient would benefit from [facility], reports having [facility] at home   Shower Transfer: Stand By Assistance and with verbal cues . For safe technique . BALANCE:  Sitting Balance:  Supervision. Standing Balance: Stand By Assistance.       BED MOBILITY:  Supine to Sit: Supervision Scooting: Supervision      TRANSFERS:  Sit to Stand:  Stand By Assistance. Stand to Sit: Stand By Assistance. FUNCTIONAL MOBILITY:  Assistive Device: Rolling Walker  Assist Level:  Stand By Assistance. Distance: To and from bathroom and To and from shower room     ASSESSMENT:     Activity Tolerance:  Patient tolerance of  treatment: good. Discharge Recommendations: Home with nursing aide, Home with Home health OT   Equipment Recommendations: Other: Pt has a handicapped height toilet and shower seat. Need to confirm with spouse. Plan: Times per week: 5xs weeks x 90 min, 1 x week x 30 min  Current Treatment Recommendations: Functional Mobility Training, Endurance Training, Self-Care / ADL, Safety Education & Training, Strengthening, Patient/Caregiver Education & Training, Equipment Evaluation, Education, & procurement, Home Management Training    Patient Education  Patient Education: ADL's    Goals  Short term goals  Time Frame for Short term goals: 1 week  Short term goal 1: Pt will demonstrate functional mobility walking to/from the bathroom or bedside chair with SBA and std walker with min cues for aligning himself as neeed to prepare for doing self care. Short term goal 2: Pt will complete ADL routine with no > min A and min cues for problem solving and task completion to increase independence in self care tasks  Short term goal 3: Pt will complete toileting routine and transfer with no > CGA and min cues for safe tech to increase independence in self toileting tasks  Short term goal 4: Pt will complete BUE ROM and light resistance exercises while following verbal cues to increase his pain free movement for ease of doing self care.   Short term goal 5: Pt will complete 1 step homemaking tasks with CGA and no > min cues for problem solving to increase ability to retrieve a snack  Long term goals  Time Frame for Long term goals : 2-3 weeks  Long term goal 1: Pt will complete ADL routine with S and no  cues for problem solving to increase independence in self care tasks  Long term goal 2: Pt will complete 1 step homemaking tasks with SBA and no cues for safe tech to increase ability to retrieve a glass of water. Following session, patient left in safe position with all fall risk precautions in place.

## 2020-12-05 NOTE — PLAN OF CARE
Problem: Falls - Risk of:  Goal: Will remain free from falls  Description: Will remain free from falls  Outcome: Met This Shift  Goal: Absence of physical injury  Description: Absence of physical injury  Outcome: Met This Shift     Problem: Discharge Planning:  Goal: Discharged to appropriate level of care  Description: Discharged to appropriate level of care  Outcome: Ongoing     Problem: IP BOWEL ELIMINATION  Goal: LTG - patient will utilize adaptive techniques/equipment to complete bowel elimination  Outcome: Ongoing  Goal: LTG - patient will have regular and routine bowel evacuation  Outcome: Ongoing  Goal: STG - patient will be accident free  Outcome: Met This Shift  Goal: STG - Patient will verbalize signs and symptoms of constipation and how to prevent/alleviate  Outcome: Ongoing  Goal: STG - patient will be continent of stool  Outcome: Met This Shift  Goal: STG - Patient verbalizes knowledge about relationship between diet, fluid intake, activity and medication on constipation  Outcome: Ongoing     Problem: IP BLADDER/VOIDING  Goal: LTG - patient will complete bladder elimination  Outcome: Ongoing  Goal: LTG - Patient will utilize adaptive techniques/equipment to complete bladder elimination  Outcome: Ongoing  Goal: LTG - patient will achieve acceptable level of continence  Outcome: Ongoing     Problem: Skin Integrity:  Goal: Will show no infection signs and symptoms  Description: Will show no infection signs and symptoms  Outcome: Met This Shift  Goal: Absence of new skin breakdown  Description: Absence of new skin breakdown  Outcome: Met This Shift     Problem: Impaired respiratory status  Goal: Clear lung sounds  12/5/2020 0159 by Tristan Wright LPN  Outcome: Met This Shift  12/4/2020 2010 by Cesilia Hartman  Outcome: Ongoing

## 2020-12-05 NOTE — PROGRESS NOTES
6051 [facility] 49  [address] [place] [address]  Occupational Therapy  Daily Note  Time:   Time In: 1440  Time Out: 1450  Timed Code Treatment Minutes: 10 Minutes  Minutes: 10          Date: 2020  Patient Name: Graham Cardona,   Gender: male      Room: [facility]64/064-A  MRN: [id number]  : 1935  (81 y.o.)  Referring Practitioner: Dr. Ana Garcia  Diagnosis: Intraventricular Hemorrhage  Additional Pertinent Hx: Pt admitted to in rehab for rehab needs after admission to the hospital s/p  fall at home resulting in an intraventricular hemorrhage, left sixth rib fracture, displaced left orbital floor fracture with deficits of gait instability and severe cognitive deficits with a MOCA score of 9/30 on . Restrictions/Precautions:  Restrictions/Precautions: Fall Risk, General Precautions  Position Activity Restriction  Other position/activity restrictions: Keep systolic blood pressure between 100-160 while moving. SUBJECTIVE: In bed upon arrival agreeable     PAIN: no     COGNITION: WFL    ADL:   No ADL's completed this session. Agnes Da BALANCE:  Sitting Balance:  Supervision. EOB     BED MOBILITY:  Supine to Sit: Supervision      ADDITIONAL ACTIVITIES:  Patient identified a personal goal to increase UB strength and improve overall endurance so they can complete their toilet & shower transfers; skilled edu on UE strengthening and patient completed BUE strengthening exercises x 10 reps x 1 set this date with a orange theraband  in all joints and all planes. Patient tolerated fair, requiring multiple rest breaks. Pt required  cues for technique. ASSESSMENT:     Activity Tolerance:  Patient tolerance of  treatment: good. Discharge Recommendations: Home with nursing aide, Home with Home health OT   Equipment Recommendations: Other: Pt has a handicapped height toilet and shower seat. Need to confirm with spouse.   Plan: Times per week: 5xs weeks x 90 min, 1 x week x 30 min  Current Treatment Recommendations: Functional Mobility Training, Endurance Training, Self-Care / ADL, Safety Education & Training, Strengthening, Patient/Caregiver Education & Training, Equipment Evaluation, Education, & procurement, Home Management Training    Patient Education  Patient Education: Home Exercise Program    Goals  Short term goals  Time Frame for Short term goals: 1 week  Short term goal 1: Pt will demonstrate functional mobility walking to/from the bathroom or bedside chair with SBA and std walker with min cues for aligning himself as neeed to prepare for doing self care. Short term goal 2: Pt will complete ADL routine with no > min A and min cues for problem solving and task completion to increase independence in self care tasks  Short term goal 3: Pt will complete toileting routine and transfer with no > CGA and min cues for safe tech to increase independence in self toileting tasks  Short term goal 4: Pt will complete BUE ROM and light resistance exercises while following verbal cues to increase his pain free movement for ease of doing self care. Short term goal 5: Pt will complete 1 step homemaking tasks with CGA and no > min cues for problem solving to increase ability to retrieve a snack  Long term goals  Time Frame for Long term goals : 2-3 weeks  Long term goal 1: Pt will complete ADL routine with S and no  cues for problem solving to increase independence in self care tasks  Long term goal 2: Pt will complete 1 step homemaking tasks with SBA and no cues for safe tech to increase ability to retrieve a glass of water. Following session, patient left in safe position with all fall risk precautions in place.

## 2020-12-05 NOTE — PROGRESS NOTES
27[address] [facility] THERAPY  254 [facility]  DAILY NOTE    TIME   SLP Individual Minutes  Time In: 0830  Time Out: 0900  Minutes: 30  Timed Code Treatment Minutes: 30 Minutes  Dysphagia Treatment - 0 minutes  Cognitive Treatment - 30 minutes    Date: 2020  Patient Name: Gisella Sandy      CSN: [id number]   : 1935  (81 y.o.)  Gender: male   Referring Physician: Sara Goldstein MD  Diagnosis: Intraventricular hemorrhage   Secondary Diagnosis: Cognitive deficits, Dysphagia  Precautions: Fall risk, Aspiration risk   Current Diet: Soft and Bite Sized, Thin liquids   Swallowing Strategies: Full Upright Position, Small Bite/Sip, Alternate Solids and Liquids, Limit Distractions and Monitor for Fatigue  Date of Last MBS: Not Applicable    Pain:  No pain reported. Subjective:  Pt seen sitting upright in chair. Patient pleasant and cooperative. Short-Term Goals:  SHORT TERM GOAL #1:  Goal 1: Pt will complete functional carryover tasks (i.e. orientation, immediate/delayed, working) with 60% accuracy, MOD cues and focus on compensatory memory strategies to enhance retention of newly learned medical information. INTERVENTIONS:  Orientation--  Month: indep   Year: self-correction   Date: indep   ERUM: indep  Approximate time: 1.5 hours off from current time     SLP provided review of compensatory memory strategies with focus on self-repetition, visualization, and association strategies. Patient then completed short term memory task characterized by recall of 3 details about this novel treating SLP (name, 2 kids, favorite color) when provided mod assist for use of compensatory strategies.   Patient completed with the following results:  Immediate recall:  3/3 details recalled independently  4 minute time delay: 3/3 details recalled independently  Additional 10 minute time delay: 3/3 details recalled independently     Recall of previous treating discipline (OT): max assist     SHORT TERM GOAL #2:  Goal 2: Pt will complete functional problem solving/safety awareness (i.e. time management, safety scenarios, verbal reasoning) with 60% accuracy, mod cues for enhanced safety and ADL contribution. INTERVENTIONS:   Telling difference between 2 stimulus times (written presentation in 'digital' format) - basic level:  4/8 independent; 1/8 with min cues; 3/8 with mod cues   **Patient noted to present with increased difficulty telling difference in times that were within different hours (example: 6:10 pm - 7:05 pm). Patient benefited from visual cues utilizing analog clock in room as needed. SHORT TERM GOAL #3:  Goal 3: Pt will complete expressive language tasks (i.e. higher level confrontational/responsive naming, verbal sequencing, divergent naming) with 80% accuracy, mod cues for improved expression of complex thoughts. INTERVENTIONS: Did not address on this date d/t focus on other goals. SHORT TERM GOAL #4:  Goal 4: Pt will complete high level auditory/reading comprehension tasks (i.e. complex y/n questions, multi step and complex commands, prescription labels/directories, etc) with 80% accuracy, mod cues for improved understanding of complex medical information. INTERVENTIONS: Complex yes/no questions:  7/10 independent; 2/10 with min cues; 1/10 with mod cues     SHORT TERM GOAL #5:  Goal 5: Pt will consume a soft & bite size diet and thin liquids with adherence to skilled swallowing strategies (i.e. small bites/sips, slow rate) without oert s/s of aspiration to meet nutrition/hydration measures safely. INTERVENTIONS: Did not address on this date d/t focus on other goals.      Long-Term Goals:  Timeframe for Long-term Goals: 4 weeks    LONG TERM GOAL #1:  Goal 1: Patient will improve cognitive status to a min A level to safely return to [facility] home with wife, nearly 24 hour SUP    Comprehension: 4 - Patient understands basic needs 75-90%+ of the time  Expression: 4 - Expresses basic ideas/needs 75-90%+ of the time  Social Interaction: 5 - Patient is appropriate with supervision/cues  Problem Solvin - Patient solves simple/routine tasks 25%-49%  Memory: 2 - Patient remembers 25%-49% of the time    EDUCATION:  Learner: Patient  Education:  Reviewed diet and strategies, Reviewed signs, symptoms and risks of aspiration, Reviewed ST goals and Plan of Care, Reviewed recommendations for follow-up, Education Related to Prevention of Recurrence of Impairment/Illness/Injury and Education Related to [employer] and Wellness  Evaluation of Education: Verbalizes understanding, Demonstrates with assistance and Family not present    ASSESSMENT/PLAN:  Activity Tolerance:  Patient tolerance of treatment: good. Assessment/Plan: Patient progressing toward established goals. Continues to require skilled care of licensed speech pathologist to progress toward achievement of established goals and plan of care.      Plan for Next Session: problem solving, recall, reading comprehension, diet analysis      Lynda Bajwa Justin [phone]

## 2020-12-05 NOTE — PROGRESS NOTES
Physical Therapy   [address] [place] [address]  INPATIENT PHYSICAL THERAPY  DAILY NOTE  254 [facility] - 7E-64/064-A    Time In: 1030  Time Out: 1130  Timed Code Treatment Minutes: 60 Minutes  Minutes: 60          Date: 2020  Patient Name: Myrna Enamorado,  Gender:  male        MRN: [id number]  : 1935  (81 y.o.)     Referring Practitioner: Pavan Gomez MD  Diagnosis: Intraventricular hemorrhage  Additional Pertinent Hx: Pt presenting at [facility] by activation of level 2 trauma, brought by EMS following a unwitnessed fall with unknown LOC; past medical history includes recent cardiac sten placement x2, hypertension, chronic kidney disease, CAD. Per wife report, she was standing in the bathroom when she heard a sound in the kitchen she open the bathroom door to see her  laying face down on the ground. Patient states he normally ambulates with walker, did have a walker at the time of fall, unclear if fall was precipitated by syncopal episode. Wife states that  does appear to be confused post fall, has been repetitive in speech since incident. Patient reports some mild discomfort over left face otherwise has no complaints on exam.  Found to have an intraventricular hemorrhage, a left orbital fx, nasal bone fx, and left 6th rib fx. To IP rehab .      Prior Level of Function:  Lives With: Spouse  Type of Home: House  Home Layout: One level  Home Access: Stairs to enter with rails  Entrance Stairs - Number of Steps: 1 step to enter and 1 step once inside the house  Entrance Stairs - Rails: Left  Home Equipment: Standard walker   Bathroom Shower/Tub: Walk-in shower  Bathroom Toilet: Handicap height  Bathroom Equipment: Built-in shower seat, Grab bars around toilet, Grab bars in shower  Bathroom Accessibility: Accessible    Receives Help From: Family  ADL Assistance: [address]: Independent  Homemaking Responsibilities: No  Ambulation Assistance: Independent  Transfer Assistance: Independent  Active : No  Additional Comments: Pt ambualated short distances with a standard walker. He did his own self care. He had help with all of the cooking and cleaning prior to admission. Restrictions/Precautions:  Restrictions/Precautions: Fall Risk, General Precautions  Position Activity Restriction  Other position/activity restrictions: Keep systolic blood pressure between 100-160 while moving. SUBJECTIVE: Patient in recliner upon arrival and agreeable to therapy. Patient requested use of bathroom at start of treatment session. Patient requested to lay in bed at conclusion of session. PAIN: No number given. OBJECTIVE:  Bed Mobility:  Sit to Supine: Moderate Assistance, at [facility]     Transfers:  Sit to Stand: Contact Guard Assistance  Stand to [place] [address]    Ambulation:  Contact Guard Assistance  Distance: 12'x2  Surface: Level Tile  Device:Rolling Walker  Gait Deviations: Forward Flexed Posture, Slow Anne Marie, Decreased Step Length Bilaterally, Decreased Weight Shift Bilaterally, Decreased Gait Speed and Decreased Heel Strike Bilaterally    Balance:  Dynamic Standing Balance: Contact Guard Assistance  Patient completed standing balance training with the performance of single hand release activities challenging patient with forward/lateral reaching and crossing midline to facilitate increased strength, balance, endurance, proprioception, B hip/ankle strategy, and functional mobility. Patient completed standing balance training activities with 1 UE at support. Patient also completed standing balance training with bathroom use. Stairs:  Contact Guard Assistance  Number of Steps: 5 reps of step ups   Height: 6\" step with Bilateral Handrails     Exercise:  Patient was guided in 1 set(s) 12 reps of exercise to both lower extremities.   Ankle pumps, Quad sets, Hip abduction/adduction, Seated marches, Seated hamstring curls, Seated heel/toe raises, Long arc quads, Seated isometric hip adduction and Seated hip abduction. .  Exercises were completed for increased independence with functional mobility. ASSESSMENT:  Assessment: Patient progressing toward established goals. Patient's blood pressure monitored during therapy. Patient's blood pressure was 134/72 at the start of therapy and 136/64 at the end of therapy. Patient required multiple rest breaks during treatment session due to increased fatigue. Activity Tolerance:  Patient tolerance of  treatment: fair. Equipment Recommendations: Other: Has SW, will monitor for needs, may need w/c or RW  Discharge Recommendations:  24 hour supervision or assist, Continue to assess pending progress    Plan: Times per week: 5x/week 90 min, 1x/week 30 min  Current Treatment Recommendations: Strengthening, Safety Education & Training, Balance Training, Endurance Training, Functional Mobility Training, Patient/Caregiver Education & Training, Transfer Training, Gait Training, Wheelchair Mobility Training, Neuromuscular Re-education, Home Exercise Program, Stair training    Patient Education  Patient Education: [employer], Transfers, Gait, Stairs, Use of [employer], Verbal Exercise Instruction,  - Patient Verbalized Understanding    Goals:  Patient goals : does not state  Short term goals  Time Frame for Short term goals: 2 weeks  Short term goal 1: Pt to transfer supine <--> sit CGA to enable pt to get in/out of bed. Short term goal 2: Pt to transfer sit <--> stand CGA for increased functional mobility. Short term goal 3: Pt to ambulate 50 feet with SW/RW CGA for household ambulation. Long term goals  Time Frame for Long term goals : 4 weeks  Long term goal 1: Pt to transfer supine <--> sit SBA to enable pt to get in/out of bed. Long term goal 2: Pt to transfer sit <--> stand SBA for increased functional mobility.   Long term goal 3: Pt to ambulate >100 feet with SW/RW SBA for household

## 2020-12-06 PROCEDURE — 6360000002 HC RX W HCPCS: Performed by: SURGERY

## 2020-12-06 PROCEDURE — 6370000000 HC RX 637 (ALT 250 FOR IP): Performed by: PHYSICAL MEDICINE & REHABILITATION

## 2020-12-06 PROCEDURE — 1180000000 HC REHAB R&B

## 2020-12-06 PROCEDURE — 6370000000 HC RX 637 (ALT 250 FOR IP): Performed by: SURGERY

## 2020-12-06 PROCEDURE — 94640 AIRWAY INHALATION TREATMENT: CPT

## 2020-12-06 PROCEDURE — 6370000000 HC RX 637 (ALT 250 FOR IP): Performed by: FAMILY MEDICINE

## 2020-12-06 RX ADMIN — ASPIRIN 81 MG: 81 TABLET, CHEWABLE ORAL at 09:30

## 2020-12-06 RX ADMIN — BUMETANIDE 1 MG: 1 TABLET ORAL at 09:30

## 2020-12-06 RX ADMIN — DOCUSATE SODIUM 100 MG: 100 CAPSULE, LIQUID FILLED ORAL at 15:01

## 2020-12-06 RX ADMIN — AMLODIPINE BESYLATE 5 MG: 5 TABLET ORAL at 09:30

## 2020-12-06 RX ADMIN — ACETAMINOPHEN 650 MG: 325 TABLET ORAL at 09:30

## 2020-12-06 RX ADMIN — ENOXAPARIN SODIUM 40 MG: 40 INJECTION SUBCUTANEOUS at 14:59

## 2020-12-06 RX ADMIN — TAMSULOSIN HYDROCHLORIDE 0.4 MG: 0.4 CAPSULE ORAL at 19:50

## 2020-12-06 RX ADMIN — FAMOTIDINE 20 MG: 20 TABLET, FILM COATED ORAL at 09:29

## 2020-12-06 RX ADMIN — LOSARTAN POTASSIUM 100 MG: 100 TABLET, FILM COATED ORAL at 09:30

## 2020-12-06 RX ADMIN — BUDESONIDE AND FORMOTEROL FUMARATE DIHYDRATE 2 PUFF: 80; 4.5 AEROSOL RESPIRATORY (INHALATION) at 16:48

## 2020-12-06 RX ADMIN — POLYETHYLENE GLYCOL 3350 17 G: 17 POWDER, FOR SOLUTION ORAL at 15:01

## 2020-12-06 RX ADMIN — MULTIPLE VITAMINS W/ MINERALS TAB 1 TABLET: TAB at 09:30

## 2020-12-06 RX ADMIN — PRAVASTATIN SODIUM 40 MG: 40 TABLET ORAL at 09:30

## 2020-12-06 RX ADMIN — METOPROLOL TARTRATE 50 MG: 50 TABLET, FILM COATED ORAL at 19:50

## 2020-12-06 RX ADMIN — METOPROLOL TARTRATE 50 MG: 50 TABLET, FILM COATED ORAL at 09:30

## 2020-12-06 ASSESSMENT — PAIN SCALES - GENERAL
PAINLEVEL_OUTOF10: 0
PAINLEVEL_OUTOF10: 5

## 2020-12-06 NOTE — PROGRESS NOTES
Patient: Shanti [facility]  Unit/Bed: 3V-13/693-K  YOB: 1935  MRN: [id number] Acct: [de-identified]   Admitting Diagnosis: Intraventricular hemorrhage ([address] [I61.5]  Admit Date:  12/1/2020  Hospital Day: 4    Assessment:     Active Problems:    Hypercholesteremia    CAD (coronary artery disease)    Benign essential HTN    Syncope    History of DVT (deep vein thrombosis)    H/O prostate cancer    Intraventricular hemorrhage (HCC)    Fracture of left orbital floor (HCC)    Nasal bone fracture    Closed fracture of one rib of left side    Intracranial bleed (HCC)  Resolved Problems:    * No resolved hospital problems. *      Plan:     Add the vitamin with minerals        Subjective:     Patient has no complaint of CP, SOB, GI upset or voiding troubles  Medication side effects: none    Scheduled Meds:   famotidine  20 mg Oral Daily    therapeutic multivitamin-minerals  1 tablet Oral Daily    lidocaine  1 patch Transdermal Daily    amLODIPine  5 mg Oral Daily    aspirin  81 mg Oral Daily    budesonide-formoterol  2 puff Inhalation BID    bumetanide  1 mg Oral Daily    enoxaparin  40 mg Subcutaneous Q24H    losartan  100 mg Oral Daily    metoprolol tartrate  50 mg Oral BID    pravastatin  40 mg Oral Daily    tamsulosin  0.4 mg Oral Nightly     Continuous Infusions:  PRN Meds:acetaminophen **OR** acetaminophen, polyethylene glycol, promethazine **OR** [DISCONTINUED] ondansetron, albuterol, senna, docusate sodium    Review of Systems  Pertinent items are noted in HPI. Objective:     Patient Vitals for the past 8 hrs:   BP Temp Temp src Pulse Resp SpO2   12/05/20 2010 (!) 152/71 98.1 °F (36.7 °C) Oral 73 20 96 %     I/O last 3 completed shifts:   In: 450 [P.O.:440; I.V.:10]  Out: 800 [Urine:800]  I/O this shift:  In: 250 [P.O.:250]  Out: 375 [Urine:375]    BP (!) 152/71   Pulse 73   Temp 98.1 °F (36.7 °C) (Oral)   Resp 20   Ht 5' 9\" (1.753 m)   Wt 216 lb 12.8 oz (98.3 kg)   SpO2 96%   BMI 32.02 kg/m²     BP (!) 152/71   Pulse 73   Temp 98.1 °F (36.7 °C) (Oral)   Resp 20   Ht 5' 9\" (1.753 m)   Wt 216 lb 12.8 oz (98.3 kg)   SpO2 96%   BMI 32.02 kg/m²   General appearance: alert, appears stated age and cooperative  Head: Normocephalic, without obvious abnormality, the abrasion over the left orbit is healing well  Lungs: clear to auscultation bilaterally  Chest wall: no tenderness  Heart: regular rate and rhythm, S1, S2 normal, no murmur, click, rub or gallop  Abdomen: soft, non-tender; bowel sounds normal; no masses,  no organomegaly  Extremities: extremities normal, atraumatic, no cyanosis or edema  Skin: Skin color, texture, turgor normal. No rashes or lesions  Neurologic: weak      Electronically signed by Elias Cage MD on 12/5/2020 at 10:49 PM

## 2020-12-07 LAB
ANION GAP SERPL CALCULATED.3IONS-SCNC: 9 MEQ/L (ref 8–16)
BUN BLDV-MCNC: 26 MG/DL (ref 7–22)
CALCIUM SERPL-MCNC: 9.2 MG/DL (ref 8.5–10.5)
CHLORIDE BLD-SCNC: 99 MEQ/L (ref 98–111)
CO2: 26 MEQ/L (ref 23–33)
CREAT SERPL-MCNC: 1.2 MG/DL (ref 0.4–1.2)
ERYTHROCYTE [DISTWIDTH] IN BLOOD BY AUTOMATED COUNT: 12.6 % (ref 11.5–14.5)
ERYTHROCYTE [DISTWIDTH] IN BLOOD BY AUTOMATED COUNT: 46.1 FL (ref 35–45)
GFR SERPL CREATININE-BSD FRML MDRD: 70 ML/MIN/1.73M2
GLUCOSE BLD-MCNC: 108 MG/DL (ref 70–108)
HCT VFR BLD CALC: 45.9 % (ref 42–52)
HEMOGLOBIN: 14.2 GM/DL (ref 14–18)
MCH RBC QN AUTO: 30.9 PG (ref 26–33)
MCHC RBC AUTO-ENTMCNC: 30.9 GM/DL (ref 32.2–35.5)
MCV RBC AUTO: 100 FL (ref 80–94)
PLATELET # BLD: 165 THOU/MM3 (ref 130–400)
PMV BLD AUTO: 10.2 FL (ref 9.4–12.4)
POTASSIUM SERPL-SCNC: 4.1 MEQ/L (ref 3.5–5.2)
RBC # BLD: 4.59 MILL/MM3 (ref 4.7–6.1)
SODIUM BLD-SCNC: 134 MEQ/L (ref 135–145)
WBC # BLD: 6.1 THOU/MM3 (ref 4.8–10.8)

## 2020-12-07 PROCEDURE — 97530 THERAPEUTIC ACTIVITIES: CPT

## 2020-12-07 PROCEDURE — 97110 THERAPEUTIC EXERCISES: CPT

## 2020-12-07 PROCEDURE — 97129 THER IVNTJ 1ST 15 MIN: CPT

## 2020-12-07 PROCEDURE — 36415 COLL VENOUS BLD VENIPUNCTURE: CPT

## 2020-12-07 PROCEDURE — 80048 BASIC METABOLIC PNL TOTAL CA: CPT

## 2020-12-07 PROCEDURE — 1180000000 HC REHAB R&B

## 2020-12-07 PROCEDURE — 94760 N-INVAS EAR/PLS OXIMETRY 1: CPT

## 2020-12-07 PROCEDURE — 6370000000 HC RX 637 (ALT 250 FOR IP): Performed by: SURGERY

## 2020-12-07 PROCEDURE — 97535 SELF CARE MNGMENT TRAINING: CPT

## 2020-12-07 PROCEDURE — 94640 AIRWAY INHALATION TREATMENT: CPT

## 2020-12-07 PROCEDURE — 85027 COMPLETE CBC AUTOMATED: CPT

## 2020-12-07 PROCEDURE — 97130 THER IVNTJ EA ADDL 15 MIN: CPT

## 2020-12-07 PROCEDURE — 6370000000 HC RX 637 (ALT 250 FOR IP): Performed by: FAMILY MEDICINE

## 2020-12-07 PROCEDURE — 6360000002 HC RX W HCPCS: Performed by: PHYSICAL MEDICINE & REHABILITATION

## 2020-12-07 PROCEDURE — 97116 GAIT TRAINING THERAPY: CPT

## 2020-12-07 RX ADMIN — ACETAMINOPHEN 650 MG: 325 TABLET ORAL at 08:39

## 2020-12-07 RX ADMIN — ENOXAPARIN SODIUM 40 MG: 40 INJECTION SUBCUTANEOUS at 08:38

## 2020-12-07 RX ADMIN — METOPROLOL TARTRATE 50 MG: 50 TABLET, FILM COATED ORAL at 20:31

## 2020-12-07 RX ADMIN — METOPROLOL TARTRATE 50 MG: 50 TABLET, FILM COATED ORAL at 08:38

## 2020-12-07 RX ADMIN — LOSARTAN POTASSIUM 100 MG: 100 TABLET, FILM COATED ORAL at 08:38

## 2020-12-07 RX ADMIN — AMLODIPINE BESYLATE 5 MG: 5 TABLET ORAL at 08:38

## 2020-12-07 RX ADMIN — PRAVASTATIN SODIUM 40 MG: 40 TABLET ORAL at 08:38

## 2020-12-07 RX ADMIN — BUMETANIDE 1 MG: 1 TABLET ORAL at 08:38

## 2020-12-07 RX ADMIN — BUDESONIDE AND FORMOTEROL FUMARATE DIHYDRATE 2 PUFF: 80; 4.5 AEROSOL RESPIRATORY (INHALATION) at 18:19

## 2020-12-07 RX ADMIN — ASPIRIN 81 MG: 81 TABLET, CHEWABLE ORAL at 08:38

## 2020-12-07 RX ADMIN — FAMOTIDINE 20 MG: 20 TABLET, FILM COATED ORAL at 08:38

## 2020-12-07 RX ADMIN — MULTIPLE VITAMINS W/ MINERALS TAB 1 TABLET: TAB at 08:38

## 2020-12-07 RX ADMIN — TAMSULOSIN HYDROCHLORIDE 0.4 MG: 0.4 CAPSULE ORAL at 20:31

## 2020-12-07 ASSESSMENT — PAIN SCALES - GENERAL
PAINLEVEL_OUTOF10: 0
PAINLEVEL_OUTOF10: 5
PAINLEVEL_OUTOF10: 5
PAINLEVEL_OUTOF10: 0

## 2020-12-07 ASSESSMENT — PAIN DESCRIPTION - LOCATION: LOCATION: KNEE

## 2020-12-07 ASSESSMENT — PAIN DESCRIPTION - FREQUENCY: FREQUENCY: CONTINUOUS

## 2020-12-07 ASSESSMENT — ENCOUNTER SYMPTOMS
ALLERGIC/IMMUNOLOGIC NEGATIVE: 1
COUGH: 0
TROUBLE SWALLOWING: 1
SHORTNESS OF BREATH: 0
VOICE CHANGE: 0
NAUSEA: 0
CONSTIPATION: 0
DIARRHEA: 0

## 2020-12-07 ASSESSMENT — PAIN DESCRIPTION - PAIN TYPE: TYPE: ACUTE PAIN

## 2020-12-07 ASSESSMENT — PAIN DESCRIPTION - PROGRESSION: CLINICAL_PROGRESSION: NOT CHANGED

## 2020-12-07 ASSESSMENT — PAIN DESCRIPTION - DESCRIPTORS: DESCRIPTORS: ACHING;DISCOMFORT

## 2020-12-07 ASSESSMENT — PAIN DESCRIPTION - ORIENTATION: ORIENTATION: RIGHT;LEFT

## 2020-12-07 ASSESSMENT — PAIN - FUNCTIONAL ASSESSMENT: PAIN_FUNCTIONAL_ASSESSMENT: ACTIVITIES ARE NOT PREVENTED

## 2020-12-07 ASSESSMENT — PAIN DESCRIPTION - ONSET: ONSET: ON-GOING

## 2020-12-07 NOTE — PROGRESS NOTES
[address] [place] [address]  INPATIENT PHYSICAL THERAPY  DAILY NOTE  254 [facility] - 7E-64/064-A    Time In: 1400  Time Out: 1430  Timed Code Treatment Minutes: 30 Minutes  Minutes: 30          Date: 2020  Patient Name: Bowen Sepulveda,  Gender:  male        MRN: [id number]  : 1935  (81 y.o.)     Referring Practitioner: Bev Gibbs MD  Diagnosis: Intraventricular hemorrhage  Additional Pertinent Hx: Pt presenting at [facility] by activation of level 2 trauma, brought by EMS following a unwitnessed fall with unknown LOC; past medical history includes recent cardiac sten placement x2, hypertension, chronic kidney disease, CAD. Per wife report, she was standing in the bathroom when she heard a sound in the kitchen she open the bathroom door to see her  laying face down on the ground. Patient states he normally ambulates with walker, did have a walker at the time of fall, unclear if fall was precipitated by syncopal episode. Wife states that  does appear to be confused post fall, has been repetitive in speech since incident. Patient reports some mild discomfort over left face otherwise has no complaints on exam.  Found to have an intraventricular hemorrhage, a left orbital fx, nasal bone fx, and left 6th rib fx. To IP rehab .      Prior Level of Function:  Lives With: Spouse  Type of Home: House  Home Layout: One level  Home Access: Stairs to enter with rails  Entrance Stairs - Number of Steps: 1 step to enter and 1 step once inside the house  Entrance Stairs - Rails: Left  Home Equipment: Standard walker   Bathroom Shower/Tub: Walk-in shower  Bathroom Toilet: Handicap height  Bathroom Equipment: Built-in shower seat, Grab bars around toilet, Grab bars in shower  Bathroom Accessibility: Accessible    Receives Help From: Family  ADL Assistance: [address]: Independent  Homemaking Responsibilities: No  Ambulation Assistance: Independent  Transfer Assistance: Independent  Active : No  Additional Comments: Pt ambualated short distances with a standard walker. He did his own self care. He had help with all of the cooking and cleaning prior to admission. Restrictions/Precautions:  Restrictions/Precautions: Fall Risk, General Precautions  Position Activity Restriction  Other position/activity restrictions: Keep systolic blood pressure between 100-160 while moving. SUBJECTIVE: Pt resting in bed, pleasant and agreeable to therapy. PAIN: 0/10: Denies pain    OBJECTIVE:  Bed Mobility:  Rolling to Right: Stand By Assistance   Supine to Sit: Stand By Assistance  Sit to Supine: Stand By Assistance     Transfers:  Sit to Stand: Contact Guard Assistance  Stand to [employer] Assistance    Ambulation:  Contact Guard Assistance  Distance: 90 feet x1   Surface: Level Tile  Device:Rolling Walker  Gait Deviations: Forward Flexed Posture, Slow Anne Marie, Decreased Step Length Bilaterally, Decreased Weight Shift Bilaterally, Decreased Gait Speed and Decreased Heel Strike Bilaterally    Balance:  Dynamic Sitting Balance: Supervision    Exercise:  Patient was guided in 1 set(s) 10 reps of exercise to both lower extremities. Seated marches, Seated hamstring curls, Seated heel/toe raises, Long arc quads and Seated isometric hip adduction. Exercises were completed for increased independence with functional mobility. Functional Outcome Measures: Not completed  Timed Up and Go: 1.25    ASSESSMENT:  Assessment: Patient progressing toward established goals. and Pt tolerated session well. Activity Tolerance:  Patient tolerance of  treatment: good. Equipment Recommendations: Other: Has SW, will monitor for needs, may need w/c or RW  Discharge Recommendations:  24 hour supervision or assist, Continue to assess pending progress    Plan: Times per week: 5x/week 90 min, 1x/week 30 min  Current Treatment Recommendations: Strengthening, Safety Education & Training, Balance Training, Endurance Training, Functional Mobility Training, Patient/Caregiver Education & Training, Transfer Training, Gait Training, Wheelchair Mobility Training, Neuromuscular Re-education, Home Exercise Program, Stair training    Patient Education  Patient Education: Plan of Care, Transfers, Gait    Goals:  Patient goals : does not state  Short term goals  Time Frame for Short term goals: 2 weeks  Short term goal 1: Pt to transfer supine <--> sit CGA to enable pt to get in/out of bed. Short term goal 2: Pt to transfer sit <--> stand SBA for increased functional mobility. Short term goal 3: Pt to ambulate 100 feet with SW/RW CGA for household and community ambulation. Long term goals  Time Frame for Long term goals : 4 weeks  Long term goal 1: Pt to transfer supine <--> sit SBA to enable pt to get in/out of bed. Long term goal 2: Pt to transfer sit <--> stand supervision for increased functional mobility. Long term goal 3: Pt to ambulate >150 feet with SW/RW SBA for household and community ambulation. Long term goal 4: Pt to ascend/descend 1 step with SW/RW SBA for home entry. Long term goal 5: Pt to perform car transfer CGA to enable pt to get in/out of the car. Long term goal 6: Pt to improve TUG test score to <45 sec to indicate improvement in overall mobility. Following session, patient left in safe position with all fall risk precautions in place.

## 2020-12-07 NOTE — PLAN OF CARE
Problem: Falls - Risk of:  Goal: Will remain free from falls  Description: Will remain free from falls  Outcome: Ongoing  Note: Patient remains free from falls this shift. Fall precautions in place. Non skid footwear used with transferring. Educated patient to use call light when needed assistance with ambulation. Patient used call light appropriate this shift. Goal: Absence of physical injury  Description: Absence of physical injury  Outcome: Ongoing     Problem: Discharge Planning:  Goal: Discharged to appropriate level of care  Description: Discharged to appropriate level of care  Outcome: Ongoing  Note: Patients needs met this shift. Patients discharge to be determined. Assess at discharge. Problem: IP BOWEL ELIMINATION  Goal: LTG - patient will utilize adaptive techniques/equipment to complete bowel elimination  Outcome: Ongoing  Goal: LTG - patient will have regular and routine bowel evacuation  Outcome: Ongoing  Goal: STG - patient will be accident free  Outcome: Ongoing  Goal: STG - Patient will verbalize signs and symptoms of constipation and how to prevent/alleviate  Outcome: Ongoing  Goal: STG - patient will be continent of stool  Outcome: Ongoing  Goal: STG - Patient verbalizes knowledge about relationship between diet, fluid intake, activity and medication on constipation  Outcome: Ongoing     Problem: IP BLADDER/VOIDING  Goal: LTG - patient will complete bladder elimination  Outcome: Ongoing  Goal: LTG - Patient will utilize adaptive techniques/equipment to complete bladder elimination  Outcome: Ongoing  Goal: LTG - patient will achieve acceptable level of continence  Outcome: Ongoing     Problem: Impaired respiratory status  Goal: Clear lung sounds  Outcome: Ongoing  Note: Patient encouraged to use incentive spirometer as often as possible.      Problem: Skin Integrity:  Goal: Will show no infection signs and symptoms  Description: Will show no infection signs and symptoms  Outcome:

## 2020-12-07 NOTE — PROGRESS NOTES
#1:  Goal 1: Patient will improve cognitive status to a min A level to safely return to [facility] home with wife, nearly 24 hour SUP - GOAL NOT MET, CONTINUE     Comprehension: 4 - Patient understands basic needs 75-90%+ of the time  Expression: 4 - Expresses basic ideas/needs 75-90%+ of the time  Social Interaction: 5 - Patient is appropriate with supervision/cues  Problem Solving: 3 - Patient solves simple/routine tasks 50%-74%  Memory: 3 - Patient remembers 50%-74% of the time     ST FIM ASSESSMENT:     Admission score Current score Goal Status   COMMUNICATION 4 - Patient understands basic needs 75-90%+ of the time   4 - Patient understands basic needs 75-90%+ of the time   Stable   EXPRESSION 4 - Expresses basic ideas/needs 75-90%+ of the time     4 - Expresses basic ideas/needs 75-90%+ of the time   Stable   SOCIAL INTERACTION 5 - Patient is appropriate with supervision/cues   5 - Patient is appropriate with supervision/cues   Stable   PROBLEM SOLVING 1 - Patient solves simple/routine tasks < 25%   3 - Patient solves simple/routine tasks 50%-74%   Progressing   MEMORY 1 - Patient remembers < 25% of the time   3 - Patient remembers 50%-74% of the time   Progressing         EDUCATION:  Learner: Patient  Education:  Reviewed ST goals and Plan of Care, Reviewed recommendations for follow-up, Education Related to Prevention of Recurrence of Impairment/Illness/Injury and Home Safety Education  Evaluation of Education: Verbalizes understanding, Demonstrates with assistance and Family not present    ASSESSMENT/PLAN:  SUMMARY:  Pt has met 5/5 STG's and 0/1 LTG's this therapy period.  Continues to present with a moderate cognitive impairment and a mild expressive/receptive language impairment characterized by recent score of 10/30 on the [facility] [place] [facility] as well identified deficits in auditory comprehension, functional problem solving, thought organization, verbal reasoning/higher level expression, mathematical computation, executive functioning, and sequencing. Improvements made in basic orientation with use of calendar, immediate recall, and telling time. Pt also presents with mild oral dysphagia characterized by prolonged and uncoordinated mastication resulting in decreased bolus formation with consumption of coarse solids. Has been safely consuming soft and bite sized diet with thin liquids; will continue to complete dietary analyses and determine appropriateness for initiation of advanced texture trials as indicated. Pt with overall low cognitive demand in home setting given assistance from wife, however, was fully independent with basic ADL completion. Recommend continud ST services at this time to continue improving cognition and assisting in dysphagia management, however, given baseline cognitive functioning, will not require f/u ST services in home setting (plan to complete all dysphagia education prior to discharge). Will also require 24/7 supervision. Activity Tolerance:  Patient tolerance of treatment: good. Assessment/Plan: Patient progressing toward established goals. Continues to require skilled care of licensed speech pathologist to progress toward achievement of established goals and plan of care.      Plan for Next Session: sequencing, recall, reading comprehension, diet analysis     Drea Canela M.S. Erika Damon

## 2020-12-07 NOTE — PROGRESS NOTES
[facility]  Inpatient Rehabilitation  Occupational Therapy  Progress Note  Time:  Time In: 1000  Time Out: 1130  Timed Code Treatment Minutes: 90 Minutes  Minutes: 90          Date: 2020  Patient Name: Graham Cardona,   Gender: male      Room: [facility]64/064-A  MRN: [id number]  : 1935  (81 y.o.)  Referring Practitioner: Dr. Ana Garcia  Diagnosis: Intraventricular Hemorrhage  Additional Pertinent Hx: Pt admitted to inpt rehab for rehab needs after admission to the hospital s/p  fall at home resulting in an intraventricular hemorrhage, left sixth rib fracture, displaced left orbital floor fracture with deficits of gait instability and severe cognitive deficits with a MOCA score of 9/30 on . Restrictions/Precautions:  Restrictions/Precautions: Fall Risk, General Precautions  Position Activity Restriction  Other position/activity restrictions: Keep systolic blood pressure between 100-160 while moving. SUBJECTIVE: Pt sleeping on arrival, awakens easily and agreeable to a shower. PAIN: None stated /10:      COGNITION: Slow Processing, Decreased Recall and Decreased Safety Awareness    ADL:   Grooming: Contact Guard Assistance. standing at sink, set up in shower to wash face   Bathing: Minimal Assistance. A to wash feet. discussed use of LH brush for home. Upper Extremity Dressing: with set-up. doff and jose carlos shirt,  mod A to doff button up shirt   Lower Extremity Dressing: Minimal Assistance. Pt used reacher with increased time to doff socks and pants,   used reacher to don depends and pants,   A with socks. Toileting: Contact Guard Assistance. for balance as pt completed his hygiene and clothing mgt   Toilet Transfer: [employer]. from [facility] over toilet    Shower Transfer: [employer]. from shower chair using grab bars . BALANCE:  Sitting Balance:  Supervision. Standing Balance: Contact Guard Assistance.       BED MOBILITY:  Sit to Supine: Stand By Assistance      TRANSFERS:  Sit to Stand:  [employer]. Stand to Sit: Contact Guard Assistance. FUNCTIONAL MOBILITY:  Assistive Device: Rolling Walker  Assist Level:  Contact Guard Assistance and with verbal cues . To bring RW closer to him, cues for larger step length,   Distance: To and from bathroom and To and from shower room     ASSESSMENT:  Activity Tolerance:  Patient tolerance of  treatment: good. Assessment: Molina Abdi is progressing towards his goals, meeting or part met 4/5 STGs this week. Pt has progressed to completing transfers and standing ADL tasks with CGA, requiring assist with LB dressing and bathing with min assistance. Increased time needed for problem solving and task completion throughout. This is a significant decline from being independent with self care and basic mobility PTA. Pt would benefit from additional skilled OT services to address increasing independence in self care and basic mobility to return home as indepedently as possible to reduce the incidence of falls and care giver burdon. Discharge Recommendations: Home with nursing aide, Home with Home health OT  Equipment Recommendations: Other: Pt has a toilet raiser  and shower seat. Need to confirm with spouse. Plan: Times per week: 5xs weeks x 90 min, 1 x week x 30 min  Current Treatment Recommendations: Functional Mobility Training, Endurance Training, Self-Care / ADL, Safety Education & Training, Strengthening, Patient/Caregiver Education & Training, Equipment Evaluation, Education, & procurement, Home Management Training    Patient Education  Patient Education: Role of OT, Plan of Care and ADL's    Goals  Short term goals  Time Frame for Short term goals: 1 week  Short term goal 1: Pt will demonstrate functional mobility walking to/from the bathroom or bedside chair with SBA and std walker with min cues for aligning himself as neeed to prepare for doing self care.   PART MET, REVISE   Short term goal 2: Pt will complete ADL routine with no > min A and min cues for problem solving and task completion to increase independence in self care tasks PART MET, CONTINUE   Short term goal 3: Pt will complete toileting routine and transfer with no > CGA and min cues for safe tech to increase independence in self toileting tasks MET, REVISE   Short term goal 4: Pt will complete BUE ROM and light resistance exercises while following verbal cues to increase his pain free movement for ease of doing self care. PART MET CONTINUE   Short term goal 5: Pt will complete 1 step homemaking tasks with CGA and no > min cues for problem solving to increase ability to retrieve a snack NOT MET CONTINUE    Long term goals  Time Frame for Long term goals : 2-3 weeks  Long term goal 1: Pt will complete ADL routine with S and no  cues for problem solving to increase independence in self care tasks NOT MET CONTINUE   Long term goal 2: Pt will complete 1 step homemaking tasks with SBA and no cues for safe tech to increase ability to retrieve a glass of water. NOT MET CONTINUE      Revised Short-Term Goals  Short term goals  Time Frame for Short term goals: 1 week  Short term goal 1: Pt will demonstrate functional mobility walking to/from the bathroom or bedside chair with SBA and RW  with min cues for aligning himself as neeed to prepare for doing self care. Short term goal 2: Pt will complete ADL routine with no > min A and min cues for problem solving and task completion to increase independence in self care tasks  Short term goal 3: Pt will complete toileting routine and transfer with no SBA and min cues for safe tech to increase independence in self toileting tasks  Short term goal 4: Pt will complete BUE ROM and light resistance exercises while following verbal cues to increase his pain free movement for ease of doing self care.   Short term goal 5: Pt will complete 1 step homemaking tasks with CGA and no > min cues for problem solving to increase ability to retrieve a snack  Long term goals  Time Frame for Long term goals : 2 week  Long term goal 1: Pt will complete ADL routine with S and no  cues for problem solving to increase independence in self care tasks  Long term goal 2: Pt will complete 1 step homemaking tasks with SBA and no cues for safe tech to increase ability to retrieve a glass of water. Following session, patient left in safe position with all fall risk precautions in place.

## 2020-12-07 NOTE — PROGRESS NOTES
Contact Guard Assistance    Exercise:  Patient was guided in 1 set(s) 10 reps of exercise to both lower extremities. Ankle pumps, Glut sets, Quad sets, Heelslides, Hip abduction/adduction and Bridges. Exercises were completed for increased independence with functional mobility. Stairs:  Platform:  4\" platform X 4 trials. 2 trials in //bars, 2 trials with RW using Rolling Walker and [employer]. Functional Outcome Measures: Completed  Timed Up and Go: 1.25    ASSESSMENT:  Assessment: Patient progressing toward established goals. Activity Tolerance:  Patient tolerance of  treatment: good. Pt admitted to [facility] s/p  Intraventricular hemorrhage. Is making good progress toward established PT goals, meeting 2/3 STG and 2/6 LTG. Initially requires min A for sit>stand, but with increased reps only requires CGA and is CGA-SBA for stand>sit. Requires min A at B LE for sit>supine and is CGA for supine>sit. Ambulates 75ft with CGA, however demonstrates decreased step length B and ant trunk lean. Step length improves with cues, however continues to be decreased compared to \"normal\". Is CGA to negotiate 1 4\" step with RW. He has made significant increase in TUG time. At [facility] he demonstrated a TUG time of 4:11, compared to 1:25 this date. He will benefit from cont skilled PT interventions to promtoe increased indep and safety with functional mobility activities for return to PLOF. Equipment Recommendations: Other: Has SW, will monitor for needs, may need w/c or RW  Discharge Recommendations:  24 hour supervision or assist, Continue to assess pending progress    Plan: Times per week: 5x/week 90 min, 1x/week 30 min  Current Treatment Recommendations: Strengthening, Safety Education & Training, Balance Training, Endurance Training, Functional Mobility Training, Patient/Caregiver Education & Training, Transfer Training, Gait Training, Wheelchair Mobility Training, Neuromuscular Re-education, Home Exercise Program, Stair training    Patient Education  Patient Education: Plan of Care, Bed Mobility, Transfers, Gait, Stairs, Verbal Exercise Instruction    Goals:  Patient goals : does not state  Short term goals  Time Frame for Short term goals: 2 weeks  Short term goal 1: Pt to transfer supine <--> sit CGA to enable pt to get in/out of bed. NOT MET  Short term goal 2: Pt to transfer sit <--> stand CGA for increased functional mobility. MET  Short term goal 3: Pt to ambulate 50 feet with SW/RW CGA for household ambulation. MET  Long term goals  Time Frame for Long term goals : 4 weeks  Long term goal 1: Pt to transfer supine <--> sit SBA to enable pt to get in/out of bed. NOT MET  Long term goal 2: Pt to transfer sit <--> stand SBA for increased functional mobility. NOT MET  Long term goal 3: Pt to ambulate >100 feet with SW/RW SBA for household ambulation. NOT MET  Long term goal 4: Pt to ascend/descend 1 step with SW/RW CGA for home entry. MET  Long term goal 5: Pt to perform car transfer CGA to enable pt to get in/out of the car. NOT MET  Long term goal 6: Pt to improve TUG test score to <3 minutes to indicate improvement in overall mobility. MET    Revised Short-Term Goals:    Short term goals  Time Frame for Short term goals: 2 weeks  Short term goal 1: Pt to transfer supine <--> sit CGA to enable pt to get in/out of bed. Short term goal 2: Pt to transfer sit <--> stand SBA for increased functional mobility. Short term goal 3: Pt to ambulate 100 feet with SW/RW CGA for household and community ambulation. Revised Long-Term Goals  Long term goals  Time Frame for Long term goals : 4 weeks  Long term goal 1: Pt to transfer supine <--> sit SBA to enable pt to get in/out of bed. Long term goal 2: Pt to transfer sit <--> stand supervision for increased functional mobility. Long term goal 3: Pt to ambulate >150 feet with SW/RW SBA for household and community ambulation.   Long term goal 4: Pt to ascend/descend 1 step with SW/RW SBA for home entry. Long term goal 5: Pt to perform car transfer CGA to enable pt to get in/out of the car. Long term goal 6: Pt to improve TUG test score to <45 sec to indicate improvement in overall mobility. Following session, patient left in safe position with all fall risk precautions in place.

## 2020-12-08 PROCEDURE — 94640 AIRWAY INHALATION TREATMENT: CPT

## 2020-12-08 PROCEDURE — 97110 THERAPEUTIC EXERCISES: CPT

## 2020-12-08 PROCEDURE — 94760 N-INVAS EAR/PLS OXIMETRY 1: CPT

## 2020-12-08 PROCEDURE — 92526 ORAL FUNCTION THERAPY: CPT

## 2020-12-08 PROCEDURE — 6370000000 HC RX 637 (ALT 250 FOR IP): Performed by: SURGERY

## 2020-12-08 PROCEDURE — 97116 GAIT TRAINING THERAPY: CPT

## 2020-12-08 PROCEDURE — 1180000000 HC REHAB R&B

## 2020-12-08 PROCEDURE — 97530 THERAPEUTIC ACTIVITIES: CPT

## 2020-12-08 PROCEDURE — 97535 SELF CARE MNGMENT TRAINING: CPT

## 2020-12-08 PROCEDURE — 6370000000 HC RX 637 (ALT 250 FOR IP): Performed by: FAMILY MEDICINE

## 2020-12-08 PROCEDURE — 6360000002 HC RX W HCPCS: Performed by: PHYSICAL MEDICINE & REHABILITATION

## 2020-12-08 PROCEDURE — 92507 TX SP LANG VOICE COMM INDIV: CPT

## 2020-12-08 RX ADMIN — METOPROLOL TARTRATE 50 MG: 50 TABLET, FILM COATED ORAL at 08:13

## 2020-12-08 RX ADMIN — BUDESONIDE AND FORMOTEROL FUMARATE DIHYDRATE 2 PUFF: 80; 4.5 AEROSOL RESPIRATORY (INHALATION) at 17:04

## 2020-12-08 RX ADMIN — PRAVASTATIN SODIUM 40 MG: 40 TABLET ORAL at 08:13

## 2020-12-08 RX ADMIN — ASPIRIN 81 MG: 81 TABLET, CHEWABLE ORAL at 08:13

## 2020-12-08 RX ADMIN — BUDESONIDE AND FORMOTEROL FUMARATE DIHYDRATE 2 PUFF: 80; 4.5 AEROSOL RESPIRATORY (INHALATION) at 07:22

## 2020-12-08 RX ADMIN — AMLODIPINE BESYLATE 5 MG: 5 TABLET ORAL at 08:13

## 2020-12-08 RX ADMIN — BUMETANIDE 1 MG: 1 TABLET ORAL at 08:13

## 2020-12-08 RX ADMIN — ENOXAPARIN SODIUM 40 MG: 40 INJECTION SUBCUTANEOUS at 08:12

## 2020-12-08 RX ADMIN — LOSARTAN POTASSIUM 100 MG: 100 TABLET, FILM COATED ORAL at 08:13

## 2020-12-08 RX ADMIN — METOPROLOL TARTRATE 50 MG: 50 TABLET, FILM COATED ORAL at 21:56

## 2020-12-08 RX ADMIN — FAMOTIDINE 20 MG: 20 TABLET, FILM COATED ORAL at 08:13

## 2020-12-08 RX ADMIN — MULTIPLE VITAMINS W/ MINERALS TAB 1 TABLET: TAB at 08:13

## 2020-12-08 RX ADMIN — TAMSULOSIN HYDROCHLORIDE 0.4 MG: 0.4 CAPSULE ORAL at 21:56

## 2020-12-08 ASSESSMENT — PAIN SCALES - GENERAL
PAINLEVEL_OUTOF10: 0
PAINLEVEL_OUTOF10: 0

## 2020-12-08 ASSESSMENT — PAIN DESCRIPTION - PROGRESSION: CLINICAL_PROGRESSION: NOT CHANGED

## 2020-12-08 NOTE — PROGRESS NOTES
[address]  Occupational Therapy  Daily Note  Time:   Time In: 1030  Time Out: 1100  Timed Code Treatment Minutes: 30 Minutes  Minutes: 30    Date: 2020  Patient Name: Jarad Montejo,   Gender: male      Room: [facility]64/064-A  MRN: [id number]  : 1935  (81 y.o.)  Referring Practitioner: Dr. Dorian Dodson  Diagnosis: Intraventricular Hemorrhage  Additional Pertinent Hx: Pt admitted to in rehab for rehab needs after admission to the hospital s/p  fall at home resulting in an intraventricular hemorrhage, left sixth rib fracture, displaced left orbital floor fracture with deficits of gait instability and severe cognitive deficits with a MOCA score of 9/30 on . Restrictions/Precautions:  Restrictions/Precautions: Fall Risk, General Precautions  Position Activity Restriction  Other position/activity restrictions: Keep systolic blood pressure between 100-160 while moving. SUBJECTIVE: Patient in recliner upon arrival, agreeable to OT with mod encouragement. Pt reports he doesn't want to do therapy as he is tired. When mod encouragement, pt agreeable. When asked what goals patiented to work on, he jokingly said \"laying down in bed!\", but was agreeable to ambulate to bathroom to work on standing endurance with sinkside ADL. PAIN: Denies pain, but reports \"just not feeling good\" and generalized weakness / fatigue      COGNITION: Decreased Insight and Decreased Safety Awareness    ADL:   Grooming: Stand By Assistance. Standing x 12 minutes to shave face with intermittent 1 UE to B UE release, mod fatigue following prolonged stand requiring seated rest break in recliner post-task. BALANCE:  Sitting Balance:  Modified Independent. Standing Balance: Stand By Assistance - during sinkside grooming. BED MOBILITY:  Not Tested    TRANSFERS:  Sit to Stand:  Contact Guard Assistance.  recliner, good hand placement   Stand to Sit: Contact Guard Assistance. to recliner with x 1 cue for hand placement with fatigue     FUNCTIONAL MOBILITY:  Assistive Device: Rolling Walker  Assist Level:  Contact Guard Assistance. Distance: To and from bathroom  No LOB but min unsteadiness and min fatigue exhibited      ADDITIONAL ACTIVITIES:  Patient identified a personal goal to increase UB strength and improve overall endurance so they can complete their toilet & shower transfers; skilled edu on UE strengthening and patient completed BUE strengthening exercises x15 reps x1 set this date with a min resistive band in all joints and all planes. Patient tolerated well, requiring occasional rest breaks. Pt required min cues for technique. ASSESSMENT:     Activity Tolerance:  Patient tolerance of  treatment: fair. Limited by fatigue. Discharge Recommendations: Home with nursing aide, Home with Home health OT   Equipment Recommendations: Other: Pt has a toilet raiser  and shower seat. Need to confirm with spouse. Plan: Times per week: 5xs weeks x 90 min, 1 x week x 30 min  Current Treatment Recommendations: Functional Mobility Training, Endurance Training, Self-Care / ADL, Safety Education & Training, Strengthening, Patient/Caregiver Education & Training, Equipment Evaluation, Education, & procurement, Home Management Training    Patient Education  Patient Education: Role of OT, ADL's, Home Exercise Program, Reviewed Prior Education, Importance of Increasing Activity and Assistive Device Safety    Goals  Short term goals  Time Frame for Short term goals: 1 week  Short term goal 1: Pt will demonstrate functional mobility walking to/from the bathroom or bedside chair with SBA and RW  with min cues for aligning himself as neeed to prepare for doing self care.   Short term goal 2: Pt will complete ADL routine with no > min A and min cues for problem solving and task completion to increase independence in self care tasks  Short term goal 3: Pt will complete toileting routine and transfer with no SBA and min cues for safe tech to increase independence in self toileting tasks  Short term goal 4: Pt will complete BUE ROM and light resistance exercises while following verbal cues to increase his pain free movement for ease of doing self care. Short term goal 5: Pt will complete 1 step homemaking tasks with CGA and no > min cues for problem solving to increase ability to retrieve a snack  Long term goals  Time Frame for Long term goals : 2 week  Long term goal 1: Pt will complete ADL routine with S and no  cues for problem solving to increase independence in self care tasks  Long term goal 2: Pt will complete 1 step homemaking tasks with SBA and no cues for safe tech to increase ability to retrieve a glass of water. Following session, patient left in safe position with all fall risk precautions in place.

## 2020-12-08 NOTE — PROGRESS NOTES
[address]  Occupational Therapy  Daily Note  Time:   Time In: 1330  Time Out: 1430  Timed Code Treatment Minutes: 60 Minutes  Minutes: 60    Date: 2020  Patient Name: Gladis Santana,   Gender: male      Room: [facility]64/064-A  MRN: [id number]  : 1935  (81 y.o.)  Referring Practitioner: Dr. Edilson Ward  Diagnosis: Intraventricular Hemorrhage  Additional Pertinent Hx: Pt admitted to in rehab for rehab needs after admission to the hospital s/p  fall at home resulting in an intraventricular hemorrhage, left sixth rib fracture, displaced left orbital floor fracture with deficits of gait instability and severe cognitive deficits with a MOCA score of 9/30 on . Restrictions/Precautions:  Restrictions/Precautions: Fall Risk, General Precautions  Position Activity Restriction  Other position/activity restrictions: Keep systolic blood pressure between 100-160 while moving. SUBJECTIVE: Patient up in room with nursing preparing to lay down for a nap. Reviewed therapy schedule and patient slowly agreeable to OT. PAIN: R knee pain during ambulation, pt reports pain in R knee for past 4-5 months. COGNITION: Decreased Insight and Decreased Safety Awareness    IADL:   Patient completed IADL homemaking task this date of gathering snack from fridge - task was graded to challenge walker safety and balance. Patient was able to retrieve all items from fridge and transport to table with CGA and min vcs to bring RW closer to him. Patient required CGA to close SBA  throughout task with a standing endurance of 4 minutes. Patient required repetitive VCs for decreased recall of multiple step instructions. Pt completed dynamic standing task of transferring laundry from washing machine to dryer to facilitate 1 UE release during dynamic task, completed task with CGA to close SBA with a standing endurance of 3 minutes to complete task. Tolerated well. week  Short term goal 1: Pt will demonstrate functional mobility walking to/from the bathroom or bedside chair with SBA and RW  with min cues for aligning himself as neeed to prepare for doing self care. Short term goal 2: Pt will complete ADL routine with no > min A and min cues for problem solving and task completion to increase independence in self care tasks  Short term goal 3: Pt will complete toileting routine and transfer with no SBA and min cues for safe tech to increase independence in self toileting tasks  Short term goal 4: Pt will complete BUE ROM and light resistance exercises while following verbal cues to increase his pain free movement for ease of doing self care. Short term goal 5: Pt will complete 1 step homemaking tasks with CGA and no > min cues for problem solving to increase ability to retrieve a snack  Long term goals  Time Frame for Long term goals : 2 week  Long term goal 1: Pt will complete ADL routine with S and no  cues for problem solving to increase independence in self care tasks  Long term goal 2: Pt will complete 1 step homemaking tasks with SBA and no cues for safe tech to increase ability to retrieve a glass of water. Following session, patient left in safe position with all fall risk precautions in place.

## 2020-12-08 NOTE — PLAN OF CARE
Problem: Falls - Risk of:  Goal: Will remain free from falls  Description: Will remain free from falls  12/8/2020 0059 by Treva Hensley RN  Outcome: Met This Shift  Note: Remained free from falls this shift. One assist with walker, non skid footwear on when ambulating and bed alarm on.   12/7/2020 1425 by Scott Rangel LPN  Outcome: Ongoing  Note: Patient remains free from falls this shift. Fall precautions in place. Non skid footwear used with transferring. Educated patient to use call light when needed assistance with ambulation. Patient used call light appropriate this shift. Goal: Absence of physical injury  Description: Absence of physical injury  12/8/2020 0059 by Treva Hensley RN  Outcome: Met This Shift  12/7/2020 1425 by Scott Rangel LPN  Outcome: Ongoing     Problem: Discharge Planning:  Goal: Discharged to appropriate level of care  Description: Discharged to appropriate level of care  12/8/2020 0059 by Treva Hensley RN  Outcome: Ongoing  Note: Discharge planning in progress. 12/7/2020 1425 by Scott Rangel LPN  Outcome: Ongoing  Note: Patients needs met this shift. Patients discharge to be determined. Assess at discharge. Problem: IP BOWEL ELIMINATION  Goal: LTG - patient will utilize adaptive techniques/equipment to complete bowel elimination  12/8/2020 0059 by Treva Hensley RN  Outcome: Met This Shift  12/7/2020 1425 by Scott Rangel LPN  Outcome: Ongoing  Goal: LTG - patient will have regular and routine bowel evacuation  12/8/2020 0059 by Treva Hensley RN  Outcome: Met This Shift  Note: Pt has been continent with bowels this shift.    12/7/2020 1425 by Scott Rangel LPN  Outcome: Ongoing  Goal: STG - patient will be accident free  12/8/2020 0059 by Treva Hensley RN  Outcome: Met This Shift  12/7/2020 1425 by Scott Rangel LPN  Outcome: Ongoing  Goal: STG - Patient will verbalize signs and symptoms of constipation and how to Dominga Morley RN  Outcome: Met This Shift  Note: Ambulates x 1 assist with walker. 12/7/2020 1425 by Jean Claude Mcgraw LPN  Outcome: Ongoing     Problem: IP DRESSINGS LOWER EXTREMITIES  Goal: LTG - Patient will safely utilize adaptive techniques/equipment to dress lower body  12/7/2020 1425 by Jean Claude Mcgraw LPN  Outcome: Ongoing     Problem: DISCHARGE BARRIERS  Goal: Patient's continuum of care needs are met  12/8/2020 0059 by Maynor Gibson RN  Outcome: Ongoing  Note: Discharge planning in progress. 12/7/2020 1425 by Jean Claude Mcgraw LPN  Outcome: Ongoing     Problem: IP MOBILITY  Goal: LTG - patient will demonstrate safe mobility requirements  12/8/2020 0059 by Maynor Gibson RN  Outcome: Met This Shift  12/7/2020 1425 by Jean Claude Mcgraw LPN  Outcome: Ongoing     Problem: IP COMMUNICATION/DYSARTHRIA  Goal: LTG - Patient will effectively communicate in all situations with the use of compensatory strategies  12/7/2020 1425 by Jean Claued Mcgraw LPN  Outcome: Ongoing     Problem: IP SWALLOWING  Goal: LTG - patient will tolerate the least restrictive diet consistency to allow for safe consumption of daily meals  12/7/2020 1425 by Jean Claude Mcgraw LPN  Outcome: Ongoing     Problem: Pain:  Goal: Pain level will decrease  Description: Pain level will decrease  Outcome: Met This Shift  Note: Denies pain this shift. Goal: Control of acute pain  Description: Control of acute pain  Outcome: Met This Shift  Note: Denies pain this shift. Goal: Control of chronic pain  Description: Control of chronic pain  Outcome: Met This Shift  Note: Denies pain this shift. Problem: Impaired respiratory status  Goal: Clear lung sounds  12/8/2020 0059 by Maynor Gibson RN  Outcome: Met This Shift  12/7/2020 1425 by Jean Claude Mcgraw LPN  Outcome: Ongoing  Note: Patient encouraged to use incentive spirometer as often as possible.

## 2020-12-08 NOTE — PROGRESS NOTES
[address] [place] [address]  Diagnosis List for Inpatient Rehab facility (IRF) - Patient Assessment Instrument (GREG)    Patient Name: Lonnie Chapman        MRN: [id number]    : 1935  (81 y.o.)  Gender: male     Primary impairment requiring rehabilitation: 2.22 Traumatic, Closed brain injury     Etiologic Diagnosis that led to the condition: Intracranial and intraventricular hemorrhage    Comorbid conditions affecting rehabilitation:  1. Ground-level fall at home. 2. 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 3. 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 4. Left sixth rib fracture. 5. Abrasion to left forehead. 6. Laceration of left lower lip. 7. Subacute infarct of the right thalamus. 8. Severe cognitive impairments. 9. Independent level of oropharyngeal swallow function. 10. Mild dysarthria. 11. Mild-moderate expressive language deficits. 12. Left orbital floor fracture with posterior blowout component. 13. Nasal bone fracture. 14. Hematuria. 15. Coronary artery disease with history of 2 stents and now with 90% severe stenosis of the apical portion of the LAD and mild diastolic dysfunction of the left ventricle noted on heart catheterization completed on 10/22/2020 by Dr. Ezequiel Cisse with deployment of PCI to circumflex artery. 16. Medication nonadherence to recommended dual antiplatelet therapy and cardiac rehabilitation following recent cardiac stenting. 17. Hypertension. 18. Hyperlipidemia. 19. CKD 3.  20. Lumbar stenosis with neurogenic claudication status post lumbar laminectomy from L2-L3 bilaterally by Dr. Crystal Reyes on 2015. 21. 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 22. Prostate cancer status post brachytherapy.   23. Postural hypotension

## 2020-12-08 NOTE — PROGRESS NOTES
[address] [facility] THERAPY  [address] [facility]  DAILY NOTE    TIME   SLP Individual Minutes  Time In: 1430  Time Out: 1450  Minutes: 20  Timed Code Treatment Minutes: 0 Minutes  Dysphagia Treatment - 10 minutes  Speech Language Treatment - 10 minutes     Date: 2020  Patient Name: Bala Duque      CSN: [id number]   : 1935  (81 y.o.)  Gender: male   Referring Physician: Micaela Carranza MD  Diagnosis: Intraventricular hemorrhage   Secondary Diagnosis: Cognitive deficits, Dysphagia  Precautions: Fall risk, Aspiration risk   Current Diet: Soft and Bite Sized, Thin liquids   Swallowing Strategies: Full Upright Position, Small Bite/Sip, Alternate Solids and Liquids, Limit Distractions and Monitor for Fatigue  Date of Last MBS: Not Applicable    Pain:   - Pain location: knee; RN Luanne Padilla notified and provided medication at end of session    Subjective:  Pt seated upright in recliner, asleep upon ST arrival. Pt HIGHLY fatigued and minimally responding to yes/no questions or following commands. Required MAX cues to participate in shortened session. Short-Term Goals:  SHORT TERM GOAL #1:  Goal 1: Pt will complete functional carryover tasks (i.e. orientation, immediate/delayed, working) with 80% accuracy, MOD cues and focus on compensatory memory strategies to enhance retention of newly learned medical information. INTERVENTIONS: Orientation with use of calendar - NO attempt despite max cues  *ST re-oriented pt to current environment; pt with no acknowledgement of understanding     SHORT TERM GOAL #2:  Goal 2: Pt will complete functional problem solving/safety awareness (i.e. time management, safety scenarios, verbal reasoning) with 80% accuracy, mod cues for enhanced safety and ADL contribution.   INTERVENTIONS: Call light button - indep   *reviewed needs for continued use of button for improved safety during IPR stay     SHORT TERM GOAL #3:  Goal 3: Pt will complete expressive language tasks (i.e. higher level confrontational/responsive naming, verbal sequencing, divergent naming) with 80% accuracy, min cues for improved expression of complex thoughts  INTERVENTIONS: Not addressed d/t focus on other goals. SHORT TERM GOAL #4:  Goal 4: Pt will complete high level auditory/reading comprehension tasks (i.e. complex y/n questions, multi step and complex commands, prescription labels/directories, etc) with 90% accuracy, mod cues for improved understanding of complex medical information. INTERVENTIONS: Executing 1-step commands -- 2/5 indep, 3/5 no attempt despite max cues    Answering yes/no questions -- 2/5 indep, 3/5 no attempt despite max cues   *suspect overall receptive and expressive language skills fully impacted overall success this date     SHORT TERM GOAL #5:  Goal 5: Pt will consume a soft & bite size diet and thin liquids with adherence to skilled swallowing strategies (i.e. small bites/sips, slow rate) without oert s/s of aspiration to meet nutrition/hydration measures safely. INTERVENTIONS: BERNICE Jackson with provision of medication at end of session. ST observed pt consuming x2 capsules with use of thin liquids via straw as liquid wash. Despite decreased oral motor control of straw (likely d/t fatigue), pt with what appeared to be timely swallow trigger followed by NO s/s of overt aspiration.      Long-Term Goals:  Timeframe for Long-term Goals: 4 weeks    LONG TERM GOAL #1:  Goal 1: Patient will improve cognitive status to a min A level to safely return to [facility] home with wife, nearly 24 hour SUP       Comprehension: 3 - Patient understands basic needs 50-74% of the time  Expression: 3 - Expresses basic ideas/needs 50-74% of the time  Social Interaction: 5 - Patient is appropriate with supervision/cues  Problem Solving: 3 - Patient solves simple/routine tasks 50%-74%  Memory: 3 - Patient remembers 50%-74% of the time     EDUCATION:  Learner: Patient  Education:  Reviewed ST goals and Plan of Care, Reviewed recommendations for follow-up, Education Related to Prevention of Recurrence of Impairment/Illness/Injury and Home Safety Education  Evaluation of Education: Demonstrates with assistance and Family not present    ASSESSMENT/PLAN:  Activity Tolerance:  Patient tolerance of treatment: good. Assessment/Plan: Patient progressing toward established goals. Continues to require skilled care of licensed speech pathologist to progress toward achievement of established goals and plan of care.      Plan for Next Session: sequencing, recall, reading comprehension, diet analysis     Charo Michael M.S. Kristopher Lynn

## 2020-12-08 NOTE — PROGRESS NOTES
Occupational Therapy:  COGNITION: Slow Processing, Decreased Recall and Decreased Safety Awareness     ADL:   Grooming: Contact Guard Assistance. standing at sink, set up in shower to wash face   Bathing: Minimal Assistance. A to wash feet. discussed use of LH brush for home. Upper Extremity Dressing: with set-up. doff and jose carlos shirt,  mod A to doff button up shirt   Lower Extremity Dressing: Minimal Assistance. Pt used reacher with increased time to doff socks and pants,   used reacher to don depends and pants,   A with socks. Toileting: Contact Guard Assistance. for balance as pt completed his hygiene and clothing mgt   Toilet Transfer: [employer]. from [facility] over toilet    Shower Transfer: [employer]. from shower chair using grab bars .     BALANCE:  Sitting Balance:  Supervision. Standing Balance: Contact Guard Assistance.       BED MOBILITY:  Sit to Supine: Stand By Assistance       TRANSFERS:  Sit to Stand:  Contact Guard Assistance. Stand to Sit: Contact Guard Assistance.       FUNCTIONAL MOBILITY:  Assistive Device: Rolling Walker  Assist Level:  Contact Guard Assistance and with verbal cues . To bring RW closer to him, cues for larger step length,   Distance: To and from bathroom and To and from shower room      ASSESSMENT:  Activity Tolerance:  Patient tolerance of  treatment: good. Assessment: [place] Setting is progressing towards his goals, meeting or part met 4/5 STGs this week. Pt has progressed to completing transfers and standing ADL tasks with CGA, requiring assist with LB dressing and bathing with min assistance. Increased time needed for problem solving and task completion throughout. This is a significant decline from being independent with self care and basic mobility PTA.  Pt would benefit from additional skilled OT services to address increasing independence in self care and basic mobility to return home as indepedently as possible to reduce the incidence of falls and care giver burdon. Discharge Recommendations: Home with nursing aide, Home with Home health OT  Equipment Recommendations: Other: Pt has a toilet raiser  and shower seat. Need to confirm with spouse. Speech Therapy:  Short-Term Goals:  SHORT TERM GOAL #1:  Goal 1: Pt will complete functional carryover tasks (i.e. orientation, immediate/delayed, working) with 60% accuracy, MOD cues and focus on compensatory memory strategies to enhance retention of newly learned medical information. - GOAL MET  REVISED GOAL: Pt will complete functional carryover tasks (i.e. orientation, immediate/delayed, working) with 80% accuracy, MOD cues and focus on compensatory memory strategies to enhance retention of newly learned medical information. INTERVENTIONS: Orientation with use of calendar - 100%   *improved success compared to prior sessions     Auditory memory (repetitionof 10-word sentences) - 7/10 indep, 1/10 min cues, 2/10 mod cues  *good concentration and sustained attention      SHORT TERM GOAL #2:  Goal 2: Pt will complete functional problem solving/safety awareness (i.e. time management, safety scenarios, verbal reasoning) with 60% accuracy, mod cues for enhanced safety and ADL contribution. - GOAL MET  REVISED GOAL: Pt will complete functional problem solving/safety awareness (i.e. time management, safety scenarios, verbal reasoning) with 80% accuracy, mod cues for enhanced safety and ADL contribution.   INTERVENTIONS: Telling time on clocks - 6/12 indep, 2/12 min cues, 4/12 mod cues  *decreased success with attention to minute vs hour hand     Problem solving in household situations - 2/4 indep, 2/4 mod cues  *good-fair safety awareness overall; independently reciting '911'     PREVIOUS SESSION:   Telling difference between 2 stimulus times (written presentation in 'digital' format) - basic level:  4/8 independent; 1/8 with min cues; 3/8 with mod cues   **Patient noted to present with increased difficulty telling difference in times that were within different hours (example: 6:10 pm - 7:05 pm). Patient benefited from visual cues utilizing analog clock in room as needed.       SHORT TERM GOAL #3:  Goal 3: Pt will complete expressive language tasks (i.e. higher level confrontational/responsive naming, verbal sequencing, divergent naming) with 80% accuracy, mod cues for improved expression of complex thoughts. - GOAL MET  REVISED GOAL: Pt will complete expressive language tasks (i.e. higher level confrontational/responsive naming, verbal sequencing, divergent naming) with 80% accuracy, min cues for improved expression of complex thoughts  INTERVENTIONS: Did not address on this date d/t focus on other goals. PREVIOUS SESSION:  Naming  -5 vegetables: 1/5 indep, 4/5 max cues; required significant clarification of task to begin as pt continued to state random items that are not vegetables  -5 fruits: 5/5 indep; improved comprehension   -5 sports: 4/5 indep, 1/5 min cues; improved processing speed  -5 colors: 5/5 indep  -5 kitchen items: 4/5 indep, 1/5 mod cues  *success increased as task progressed with less anomia     SHORT TERM GOAL #4:  Goal 4: Pt will complete high level auditory/reading comprehension tasks (i.e. complex y/n questions, multi step and complex commands, prescription labels/directories, etc) with 80% accuracy, mod cues for improved understanding of complex medical information. - GOAL MET  REVISED GOAL: Pt will complete high level auditory/reading comprehension tasks (i.e. complex y/n questions, multi step and complex commands, prescription labels/directories, etc) with 90% accuracy, mod cues for improved understanding of complex medical information.   INTERVENTIONS: Interpretation of BASIC hospital directory - 2/5 indep, 3/5 max cues  *por visual scanning   PREVIOUS SESSION:   Complex yes/no questions:  7/10 independent; 2/10 with min cues; 1/10 with mod cues      SHORT TERM GOAL #5:  Goal 5: Pt will consume a soft & bite size diet and thin liquids with adherence to skilled swallowing strategies (i.e. small bites/sips, slow rate) without oert s/s of aspiration to meet nutrition/hydration measures safely. - GOAL MET, CONTINUE FOR CONSISTENCY   INTERVENTIONS: Did not address on this date d/t focus on other goals. PREVIOUS SESSION:  Skilled dietary analysis completed during consumption of breakfast consisting of applesauce, chopped sausage, and chopped pancakes with thin liquids via cup/straw. Pt with excellent full upright positioning and good attention to task despite TV on in background. No distractions evident. Demonstrated adequate rotary mastication of solids with cohesive bolus control/formation. Utilized the following compensatory strategies appropriately: small bites/sips, alternation of liquids/solids. Min verbal cueing required x2 to slow rate of consumption. Immediate throat clear observed x1 following small sip of thin liquids as liquid wash, NO other overt s/s of aspiration. Recommend resumption of soft and bite sized diet with thin liquids at this time.  Plan to complete x1-2 additional dietary analyses prior to initiation of advanced texture trials of regular.     Long-Term Goals:  Timeframe for Long-term Goals: 4 weeks     LONG TERM GOAL #1:  Goal 1: Patient will improve cognitive status to a min A level to safely return to PLOF home with wife, nearly 24 hour SUP - GOAL NOT MET, CONTINUE      Comprehension: 4 - Patient understands basic needs 75-90%+ of the time  Expression: 4 - Expresses basic ideas/needs 75-90%+ of the time  Social Interaction: 5 - Patient is appropriate with supervision/cues  Problem Solving: 3 - Patient solves simple/routine tasks 50%-74%  Memory: 3 - Patient remembers 50%-74% of the time      ST FIM ASSESSMENT:       Admission score Current score Goal Status   COMMUNICATION 4 - Patient understands basic needs 75-90%+ of the time    4 - Patient understands basic needs 75-90%+ of the time    Stable   EXPRESSION 4 - Expresses basic ideas/needs 75-90%+ of the time       4 - Expresses basic ideas/needs 75-90%+ of the time    Stable   SOCIAL INTERACTION 5 - Patient is appropriate with supervision/cues    5 - Patient is appropriate with supervision/cues    Stable   PROBLEM SOLVING 1 - Patient solves simple/routine tasks < 25%    3 - Patient solves simple/routine tasks 50%-74%    Progressing   MEMORY 1 - Patient remembers < 25% of the time    3 - Patient remembers 50%-74% of the time    Progressing      EDUCATION:  Learner: Patient  Education:  Reviewed ST goals and Plan of Care, Reviewed recommendations for follow-up, Education Related to Prevention of Recurrence of Impairment/Illness/Injury and Home Safety Education  Evaluation of Education: Verbalizes understanding, Demonstrates with assistance and Family not present     ASSESSMENT/PLAN:  SUMMARY:  Pt has met 5/5 STG's and 0/1 LTG's this therapy period. Continues to present with a moderate cognitive impairment and a mild expressive/receptive language impairment characterized by recent score of 10/30 on the [facility] [place] as well identified deficits in auditory comprehension, functional problem solving, thought organization, verbal reasoning/higher level expression, mathematical computation, executive functioning, and sequencing. Improvements made in basic orientation with use of calendar, immediate recall, and telling time. Pt also presents with mild oral dysphagia characterized by prolonged and uncoordinated mastication resulting in decreased bolus formation with consumption of coarse solids. Has been safely consuming soft and bite sized diet with thin liquids; will continue to complete dietary analyses and determine appropriateness for initiation of advanced texture trials as indicated. Pt with overall low cognitive demand in home setting given assistance from wife, however, was fully independent with basic ADL completion.  Recommend continud temperature is 98.5 °F (36.9 °C). His blood pressure is 133/63 and his pulse is 72. His respiration is 18 and oxygen saturation is 97%. Body mass index is 31.26 kg/m². Temperature Range (24h):Temp: 98.5 °F (36.9 °C) Temp  Av.5 °F (36.9 °C)  Min: 98.5 °F (36.9 °C)  Max: 98.5 °F (36.9 °C)  BP Range (57Y): Systolic (11MSS), FZJ:339 , Min:108 , SFK:933     Diastolic (05ZVJ), PNS:94, Min:59, Max:63    Pulse Range (24h): Pulse  Av  Min: 72  Max: 78  Respiration Range (24h): Resp  Av  Min: 18  Max: 18  Current Pulse Ox (24h):  SpO2: 97 %(o2 not needed at this time per o2 protocol)  Pulse Ox Range (24h):  SpO2  Av.5 %  Min: 96 %  Max: 97 %  Oxygen Amount and Delivery:      awake  Orientation:   person, place, Monday  Mood: within normal limits  Affect: calm  General appearance:  in no acute distress, left eye conjunctival hemorrhage medial to the iris, up in bed    Memory:   abnormal -decreased recall  Attention/Concentration: abnormal -mildly slowed processing  Language:  abnormal -mild dysarthria    ROM:  normal  Motor Exam: As in table    Manual Muscle Testing:     Sh Abd  Sh IR  Sh ER  Elbow Flex  Elbow Ext    Left  3+   4 4   Right 3+   4 4      Wrist Ext  Wrist Flexion  Finger Flex  Finger Abd  Finger Add    Left    4     Right   4        Hip Flexion  Hip Extension  Hip Abduction  Hip Adduction    Left  2+      Right 2+         Knee Flex  Knee Ext  Ankle DF  Ankle PF  Ankle Inv  Ankle Ev  EHL    Left   3+ 3+ 4      Right  3+ 2 4        Tone:  normal  Muscle bulk: within normal limits  Sensory:  Sensory intact  Coordination:   abnormal - mild decrease for fine motor control of the bilateral hands    Skin: warm and dry, no rash or erythema and abrasion over left forehead above eye and mild swelling of the left orbit  Peripheral vascular: Pulses: Normal upper and lower extremity pulses; Edema: 1+    Diagnostics:   Recent Results (from the past 24 hour(s))   CBC    Collection Time: 20  5:34 AM Result Value Ref Range    WBC 6.1 4.8 - 10.8 thou/mm3    RBC 4.59 (L) 4.70 - 6.10 mill/mm3    Hemoglobin 14.2 14.0 - 18.0 gm/dl    Hematocrit 45.9 42.0 - 52.0 %    .0 (H) 80.0 - 94.0 fL    MCH 30.9 26.0 - 33.0 pg    MCHC 30.9 (L) 32.2 - 35.5 gm/dl    RDW-CV 12.6 11.5 - 14.5 %    RDW-SD 46.1 (H) 35.0 - 45.0 fL    Platelets 231 695 - 659 thou/mm3    MPV 10.2 9.4 - 12.4 fL   Basic Metabolic Panel    Collection Time: 12/07/20  5:34 AM   Result Value Ref Range    Sodium 134 (L) 135 - 145 meq/L    Potassium 4.1 3.5 - 5.2 meq/L    Chloride 99 98 - 111 meq/L    CO2 26 23 - 33 meq/L    Glucose 108 70 - 108 mg/dL    BUN 26 (H) 7 - 22 mg/dL    CREATININE 1.2 0.4 - 1.2 mg/dL    Calcium 9.2 8.5 - 10.5 mg/dL   Anion Gap    Collection Time: 12/07/20  5:34 AM   Result Value Ref Range    Anion Gap 9.0 8.0 - 16.0 meq/L   Glomerular Filtration Rate, Estimated    Collection Time: 12/07/20  5:34 AM   Result Value Ref Range    Est, Glom Filt Rate 70 (A) ml/min/1.73m2     Labs Renal Latest Ref Rng & Units 12/7/2020 12/2/2020 12/1/2020 11/30/2020 11/29/2020   BUN 7 - 22 mg/dL 26(H) 24(H) 26(H) 23(H) 20   Cr 0.4 - 1.2 mg/dL 1.2 1.2 1.2 1.0 0.9   K 3.5 - 5.2 meq/L 4.1 4.4 4.7 4.1 4.2   Na 135 - 145 meq/L 134(L) 140 140 141 140      Recent Labs     12/07/20  0534 12/02/20  0618 12/01/20  0331   WBC 6.1 5.0 5.5   HGB 14.2 15.0 15.0   HCT 45.9 47.7 49.8   .0* 99.6* 101.8*    145 140      Impression:  1. Ground-level fall at home. 2. Gait instability. 3. 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 4. 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 5. Left sixth rib fracture. 6. Abrasion to left forehead. 7. Laceration of left lower lip. 8. Subacute infarct of the right thalamus. 9. Severe cognitive impairments. (MOCA) version 7.2 completed. Patient scored 9/30. 10. Mild oral dysphagia.   11. Mild Problems:  1. Ground-level fall at home. 2. Gait instability. 1. PT/OT. 2. TUG 4 minutes 11 seconds. Improved to 1 minute and 25 seconds on 12/7.  60-69 years:  8.1 seconds; 70-79 years: 9.2 seconds; 80-99 years: 11.3 seconds. 3. 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 1. Initiate TBI guidelines as needed for low stimulation environment. 4. 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 1. Neurosurgery following with no interventions planned. 5. Left sixth rib fracture. 1. Pain control. 1. Lidoderm patches. 2. Tylenol. 2. Symbicort twice daily. 3. Albuterol nebulizer every 6 hours as needed. 6. Abrasion to left forehead. 1. Wound care. 7. Laceration of left lower lip. 1. Healing appropriately. 8. Subacute infarct of the right thalamus. 1. Stable. 2. Continue with therapies. 9. Severe cognitive impairments/Mild oral dysphagia/Mild dysarthria/Mild-moderate expressive language deficits. 1. (MOCA) version 7.2 completed. Patient scored 9/30. Repeat on 12/2 - [facility]) version 8.1 completed. Patient scored 10/30. 2. SLP. 3. Diet downgraded on 12/2 to soft and bite-sized within liquids. 10. Left orbital floor fracture with posterior blowout component/Nasal bone fracture. 1. Conservative treatment of this fracture as the components involve the posterior aspect of the left orbital floor without clinical evidence of inferior rectus impingement. No need to surgical intervention for the nasal bone fractures. 11. Hematuria. 1. Resolved.   12. Coronary artery disease with history of 2 stents and now with 90% severe stenosis of the apical portion of the LAD and mild diastolic dysfunction of the left ventricle noted on heart catheterization completed on 10/22/2020 by Dr. Padmini Edwadrs with deployment of PCI to circumflex artery/Medication nonadherence to recommended dual antiplatelet therapy and his current level of pain control.     Brien Navarro MD

## 2020-12-08 NOTE — PLAN OF CARE
Problem: Falls - Risk of:  Goal: Will remain free from falls  Description: Will remain free from falls  12/8/2020 1051 by Nate Sue LPN  Outcome: Ongoing  Note: Patient remains free from falls this shift. Fall precautions in place. Non skid footwear used with transferring. Educated patient to use call light when needed assistance with ambulation. Patient used call light appropriate this shift. 12/8/2020 0059 by Parker Cabot, RN  Outcome: Met This Shift  Note: Remained free from falls this shift. One assist with walker, non skid footwear on when ambulating and bed alarm on. Goal: Absence of physical injury  Description: Absence of physical injury  12/8/2020 1051 by Nate Sue LPN  Outcome: Ongoing  12/8/2020 0059 by Parker Cabot, RN  Outcome: Met This Shift     Problem: Discharge Planning:  Goal: Discharged to appropriate level of care  Description: Discharged to appropriate level of care  12/8/2020 1051 by Nate Sue LPN  Outcome: Ongoing  Note: Patients needs met this shift. Patients discharge to be determined. Assess at discharge. 12/8/2020 0059 by Parker Cabot, RN  Outcome: Ongoing  Note: Discharge planning in progress. Problem: IP BOWEL ELIMINATION  Goal: LTG - patient will utilize adaptive techniques/equipment to complete bowel elimination  12/8/2020 1051 by Nate Seu LPN  Outcome: Ongoing  12/8/2020 0059 by Parker Cabot, RN  Outcome: Met This Shift  Goal: LTG - patient will have regular and routine bowel evacuation  12/8/2020 1051 by Nate Sue LPN  Outcome: Ongoing  12/8/2020 0059 by Parker Cabot, RN  Outcome: Met This Shift  Note: Pt has been continent with bowels this shift.    Goal: STG - patient will be accident free  12/8/2020 1051 by Nate Sue LPN  Outcome: Ongoing  12/8/2020 0059 by Parker Cabot, RN  Outcome: Met This Shift  Goal: STG - Patient will verbalize signs and symptoms of constipation and how to prevent/alleviate  12/8/2020 1051 by Beka Chacko LPN  Outcome: Ongoing  12/8/2020 0059 by Marquise Reyes RN  Outcome: Met This Shift  Note: No complaints of constipation. Goal: STG - patient will be continent of stool  12/8/2020 1051 by Beka Chacko LPN  Outcome: Ongoing  12/8/2020 0059 by Marquise Reyes RN  Outcome: Met This Shift  Goal: STG - Patient verbalizes knowledge about relationship between diet, fluid intake, activity and medication on constipation  Outcome: Ongoing     Problem: IP BLADDER/VOIDING  Goal: LTG - patient will complete bladder elimination  12/8/2020 1051 by Beka Chacko LPN  Outcome: Ongoing  12/8/2020 0059 by Marquise Reyes RN  Outcome: Met This Shift  Note: Pt has been using urinal when needed. Goal: LTG - Patient will utilize adaptive techniques/equipment to complete bladder elimination  12/8/2020 1051 by Beka Chacko LPN  Outcome: Ongoing  12/8/2020 0059 by Marquise Reyes RN  Outcome: Met This Shift  Note: Patient uses urinal when needed. Goal: LTG - patient will achieve acceptable level of continence  12/8/2020 1051 by Beka Chacko LPN  Outcome: Ongoing  12/8/2020 0059 by Marquise Reyes RN  Outcome: Met This Shift  Note: No incontinence as of yet this shift. Problem: Impaired respiratory status  Goal: Clear lung sounds  12/8/2020 1051 by Beka Chacko LPN  Outcome: Ongoing  12/8/2020 0059 by Marquise Reyes RN  Outcome: Met This Shift     Problem: Skin Integrity:  Goal: Will show no infection signs and symptoms  Description: Will show no infection signs and symptoms  12/8/2020 1051 by Beka Chacko LPN  Outcome: Ongoing  Note: No skin breakdown during hospitalization.     12/8/2020 0059 by Marquise Reyes RN  Outcome: Met This Shift  Goal: Absence of new skin breakdown  Description: Absence of new skin breakdown  12/8/2020 1051 by Beka Chacko LPN  Outcome: Ongoing  12/8/2020 0059 by Marquise Reyes RN  Outcome: Met This Shift     Problem: IP BALANCE  Goal: LTG - Patient will maintain balance to allow for safe/functional mobility  12/8/2020 1051 by Scott Rangel LPN  Outcome: Ongoing  12/8/2020 0059 by Treva Hensley RN  Outcome: Met This Shift  Note: Ambulates x 1 assist with walker. Problem: IP DRESSINGS LOWER EXTREMITIES  Goal: LTG - Patient will safely utilize adaptive techniques/equipment to dress lower body  Outcome: Ongoing     Problem: DISCHARGE BARRIERS  Goal: Patient's continuum of care needs are met  12/8/2020 1051 by Scott Rangel LPN  Outcome: Ongoing  12/8/2020 0059 by Treva Hensley RN  Outcome: Ongoing  Note: Discharge planning in progress. Problem: IP MOBILITY  Goal: LTG - patient will demonstrate safe mobility requirements  12/8/2020 1051 by Scott Rangel LPN  Outcome: Ongoing  12/8/2020 0059 by Treva Hensley RN  Outcome: Met This Shift     Problem: IP COMMUNICATION/DYSARTHRIA  Goal: LTG - Patient will effectively communicate in all situations with the use of compensatory strategies  Outcome: Ongoing     Problem: IP SWALLOWING  Goal: LTG - patient will tolerate the least restrictive diet consistency to allow for safe consumption of daily meals  Outcome: Ongoing     Problem: Pain:  Goal: Pain level will decrease  Description: Pain level will decrease  12/8/2020 1051 by Scott Rangel LPN  Outcome: Ongoing  Note: Patient denies pain this shift. 12/8/2020 0059 by Treva Hensley RN  Outcome: Met This Shift  Note: Denies pain this shift. Goal: Control of acute pain  Description: Control of acute pain  12/8/2020 1051 by Scott Rangel LPN  Outcome: Ongoing  12/8/2020 0059 by Treva Hensley RN  Outcome: Met This Shift  Note: Denies pain this shift.    Goal: Control of chronic pain  Description: Control of chronic pain  12/8/2020 1051 by Scott Rangel LPN  Outcome: Ongoing  12/8/2020 0059 by Treva Hensley RN  Outcome: Met This Shift  Note: Denies pain this shift.

## 2020-12-08 NOTE — PROGRESS NOTES
[address] [address]  INPATIENT PHYSICAL THERAPY  DAILY NOTE  [facility] - 7E-64/064-A    Time In: 1140  Time Out: 1240  Timed Code Treatment Minutes: 60 Minutes  Minutes: 60    Date: 2020  Patient Name: Graham Cardona,  Gender:  male        MRN: [id number]  : 1935  (81 y.o.)     Referring Practitioner: Enriqueta Victor MD  Diagnosis: Intraventricular hemorrhage  Additional Pertinent Hx: Pt presenting at [facility] by activation of level 2 trauma, brought by EMS following a unwitnessed fall with unknown LOC; past medical history includes recent cardiac sten placement x2, hypertension, chronic kidney disease, CAD. Per wife report, she was standing in the bathroom when she heard a sound in the kitchen she open the bathroom door to see her  laying face down on the ground. Patient states he normally ambulates with walker, did have a walker at the time of fall, unclear if fall was precipitated by syncopal episode. Wife states that  does appear to be confused post fall, has been repetitive in speech since incident. Patient reports some mild discomfort over left face otherwise has no complaints on exam.  Found to have an intraventricular hemorrhage, a left orbital fx, nasal bone fx, and left 6th rib fx. To IP rehab .      Prior Level of Function:  Lives With: Spouse  Type of Home: House  Home Layout: One level  Home Access: Stairs to enter with rails  Entrance Stairs - Number of Steps: 1 step to enter and 1 step once inside the house  Entrance Stairs - Rails: Left  Home Equipment: Standard walker   Bathroom Shower/Tub: Walk-in shower  Bathroom Toilet: Handicap height  Bathroom Equipment: Built-in shower seat, Grab bars around toilet, Grab bars in shower  Bathroom Accessibility: Accessible    Receives Help From: Family  ADL Assistance: [facility]: Independent  Homemaking Responsibilities: No  Ambulation Assistance: Independent  Transfer Assistance: Independent  Active : No  Additional Comments: Pt ambualated short distances with a standard walker. He did his own self care. He had help with all of the cooking and cleaning prior to admission. Restrictions/Precautions:  Restrictions/Precautions: Fall Risk, General Precautions  Position Activity Restriction  Other position/activity restrictions: Keep systolic blood pressure between 100-160 while moving. SUBJECTIVE: Pt in bedside chair upon entry and agreeable to PT intervention. Post session pt in bedside chair with all needs in reach. Chair alarm on. PAIN: denies pain    OBJECTIVE:  Bed Mobility:  Supine to Sit: Stand By Assistance, with increased time for completion, on flat surface (mat)  Sit to Supine: Stand By Assistance, with verbal cues , with increased time for completion, on flat surface (mat)     Transfers:  Sit to Stand: Stand By Assistance, [employer], with increased time for completion, cues for hand placement, to/from chair with arms, cues to scoot to edge of seat prior to stand   Stand to Sit:Stand By Assistance, [employer], with increased time for completion, cues for hand placement, to/from chair with arms    Ambulation:  Contact Guard Assistance, with verbal cues , with increased time for completion, cues to increase step length and improve posture  Distance: ~50ft, then 20 ft of fatigued gait with decreased L toe clearance   Surface: Level Tile  Device:Rolling Walker  Gait Deviations: Forward Flexed Posture, Slow [place], Decreased Gait Speed and Decreased Heel Strike Bilaterally    Stairs:  Contact Guard Assistance, with verbal cues , with increased time for completion  Number of Steps: 4 inch paltform x2, then 6 inch platform x2  Pt cued for walker and step sequencing. Pt instructed to take larger steps when walking on platform.       Balance:  Dynamic Sitting Balance: Supervision  Static Standing Balance: Contact Guard Assistance  Dynamic Standing Balance: Contact Guard Assistance    Exercise:  Patient was guided in 1 set(s) 10 reps of exercise to both lower extremities. Heelslides, Short arc quads, Hip abduction/adduction, Bridges, Seated marches, Seated hamstring curls, Seated heel/toe raises and Long arc quads. Exercises were completed for increased independence with functional mobility. Functional Outcome Measures: Not completed     ASSESSMENT:  Assessment: Patient progressing toward established goals. Activity Tolerance:  Patient tolerance of  treatment: good. Pt displayed carryover with ambulation education from this morning's session. Pt aware of need to increase step length with ambulation but requires frequent verbal cues to make gait corrections. Equipment Recommendations: Other: Has SW, will monitor for needs, may need w/c or RW  Discharge Recommendations: 24 hour supervision or assist, Continue to assess pending progress    Plan: Times per week: 5x/week 90 min, 1x/week 30 min  Current Treatment Recommendations: Strengthening, Safety Education & Training, Balance Training, Endurance Training, Functional Mobility Training, Patient/Caregiver Education & Training, Transfer Training, Gait Training, Wheelchair Mobility Training, Neuromuscular Re-education, Home Exercise Program, Stair training    Patient Education  Patient Education: Plan of Care, Home Exercise Program, [employer] Mobility, Transfers, Reviewed Prior Education, Gait, Stairs, Verbal Exercise Instruction. Pt educated on car entry and exit with use of 2WW and SBA. Pt cued for correct walker and foot sequencing as well as hand placement. Goals:  Patient goals : does not state  Short term goals  Time Frame for Short term goals: 2 weeks  Short term goal 1: Pt to transfer supine <--> sit CGA to enable pt to get in/out of bed. Short term goal 2: Pt to transfer sit <--> stand SBA for increased functional mobility.   Short term goal 3: Pt to ambulate 100 feet with SW/RW CGA for household and community ambulation. Long term goals  Time Frame for Long term goals : 4 weeks  Long term goal 1: Pt to transfer supine <--> sit SBA to enable pt to get in/out of bed. Long term goal 2: Pt to transfer sit <--> stand supervision for increased functional mobility. Long term goal 3: Pt to ambulate >150 feet with SW/RW SBA for household and community ambulation. Long term goal 4: Pt to ascend/descend 1 step with SW/RW SBA for home entry. Long term goal 5: Pt to perform car transfer CGA to enable pt to get in/out of the car. Long term goal 6: Pt to improve TUG test score to <45 sec to indicate improvement in overall mobility. Following session, patient left in safe position with all fall risk precautions in place.

## 2020-12-08 NOTE — PROGRESS NOTES
heel strike during. Pt amb 45 ft following gait training, with improved carryover of step length, foot clearance, and heel strike. Balance:  Dynamic Sitting Balance: Supervision, pt able to assist with UE and LE dressing  Static Standing Balance: Contact Guard Assistance  Dynamic Standing Balance: Contact Guard Assistance with RW      Functional Outcome Measures: Not completed       ASSESSMENT:  Assessment: Patient progressing toward established goals. Activity Tolerance:  Patient tolerance of  treatment: good. Pt demonstrated improved gait mechanics following gait training, however benefits discussing gait mechanics prior to gait and  no cues during ambulation, as pt tends to lose attention to task and return to shuffling gait pattern when cues are provided. Pt fatigues at ~40-45ft ambulation and shuffling gait mechanics return. Equipment Recommendations: Other: Has SW, will monitor for needs, may need w/c or RW  Discharge Recommendations:  24 hour supervision or assist, Continue to assess pending progress    Plan: Times per week: 5x/week 90 min, 1x/week 30 min  Current Treatment Recommendations: Strengthening, Safety Education & Training, Balance Training, Endurance Training, Functional Mobility Training, Patient/Caregiver Education & Training, Transfer Training, Gait Training, Wheelchair Mobility Training, Neuromuscular Re-education, Home Exercise Program, Stair training    Patient Education  Patient Education: Plan of Care, [employer] Mobility, Transfers, Gait    Goals:  Patient goals : does not state  Short term goals  Time Frame for Short term goals: 2 weeks  Short term goal 1: Pt to transfer supine <--> sit CGA to enable pt to get in/out of bed. Short term goal 2: Pt to transfer sit <--> stand SBA for increased functional mobility. Short term goal 3: Pt to ambulate 100 feet with SW/RW CGA for household and community ambulation.   Long term goals  Time Frame for Long term goals : 4 weeks  Long term goal 1: Pt to transfer supine <--> sit SBA to enable pt to get in/out of bed. Long term goal 2: Pt to transfer sit <--> stand supervision for increased functional mobility. Long term goal 3: Pt to ambulate >150 feet with SW/RW SBA for household and community ambulation. Long term goal 4: Pt to ascend/descend 1 step with SW/RW SBA for home entry. Long term goal 5: Pt to perform car transfer CGA to enable pt to get in/out of the car. Long term goal 6: Pt to improve TUG test score to <45 sec to indicate improvement in overall mobility. Following session, patient left in safe position with all fall risk precautions in place.

## 2020-12-09 LAB
EKG ATRIAL RATE: 78 BPM
EKG P AXIS: 74 DEGREES
EKG P-R INTERVAL: 198 MS
EKG Q-T INTERVAL: 360 MS
EKG QRS DURATION: 82 MS
EKG QTC CALCULATION (BAZETT): 410 MS
EKG R AXIS: -41 DEGREES
EKG T AXIS: 71 DEGREES
EKG VENTRICULAR RATE: 78 BPM

## 2020-12-09 PROCEDURE — 6370000000 HC RX 637 (ALT 250 FOR IP): Performed by: SURGERY

## 2020-12-09 PROCEDURE — 97130 THER IVNTJ EA ADDL 15 MIN: CPT

## 2020-12-09 PROCEDURE — 94640 AIRWAY INHALATION TREATMENT: CPT

## 2020-12-09 PROCEDURE — 93010 ELECTROCARDIOGRAM REPORT: CPT | Performed by: NUCLEAR MEDICINE

## 2020-12-09 PROCEDURE — 97129 THER IVNTJ 1ST 15 MIN: CPT

## 2020-12-09 PROCEDURE — 97535 SELF CARE MNGMENT TRAINING: CPT

## 2020-12-09 PROCEDURE — 97116 GAIT TRAINING THERAPY: CPT

## 2020-12-09 PROCEDURE — 93005 ELECTROCARDIOGRAM TRACING: CPT | Performed by: PHYSICAL MEDICINE & REHABILITATION

## 2020-12-09 PROCEDURE — 1180000000 HC REHAB R&B

## 2020-12-09 PROCEDURE — 97530 THERAPEUTIC ACTIVITIES: CPT

## 2020-12-09 PROCEDURE — 6370000000 HC RX 637 (ALT 250 FOR IP): Performed by: FAMILY MEDICINE

## 2020-12-09 PROCEDURE — 94760 N-INVAS EAR/PLS OXIMETRY 1: CPT

## 2020-12-09 PROCEDURE — 6360000002 HC RX W HCPCS: Performed by: PHYSICAL MEDICINE & REHABILITATION

## 2020-12-09 RX ADMIN — ACETAMINOPHEN 650 MG: 325 TABLET ORAL at 08:40

## 2020-12-09 RX ADMIN — METOPROLOL TARTRATE 50 MG: 50 TABLET, FILM COATED ORAL at 09:22

## 2020-12-09 RX ADMIN — BUMETANIDE 1 MG: 1 TABLET ORAL at 09:23

## 2020-12-09 RX ADMIN — BUDESONIDE AND FORMOTEROL FUMARATE DIHYDRATE 2 PUFF: 80; 4.5 AEROSOL RESPIRATORY (INHALATION) at 07:32

## 2020-12-09 RX ADMIN — TAMSULOSIN HYDROCHLORIDE 0.4 MG: 0.4 CAPSULE ORAL at 20:36

## 2020-12-09 RX ADMIN — MULTIPLE VITAMINS W/ MINERALS TAB 1 TABLET: TAB at 09:22

## 2020-12-09 RX ADMIN — ENOXAPARIN SODIUM 40 MG: 40 INJECTION SUBCUTANEOUS at 09:21

## 2020-12-09 RX ADMIN — FAMOTIDINE 20 MG: 20 TABLET, FILM COATED ORAL at 09:22

## 2020-12-09 RX ADMIN — PRAVASTATIN SODIUM 40 MG: 40 TABLET ORAL at 20:36

## 2020-12-09 RX ADMIN — ASPIRIN 81 MG: 81 TABLET, CHEWABLE ORAL at 09:22

## 2020-12-09 RX ADMIN — AMLODIPINE BESYLATE 5 MG: 5 TABLET ORAL at 09:23

## 2020-12-09 RX ADMIN — LOSARTAN POTASSIUM 100 MG: 100 TABLET, FILM COATED ORAL at 09:23

## 2020-12-09 RX ADMIN — BUDESONIDE AND FORMOTEROL FUMARATE DIHYDRATE 2 PUFF: 80; 4.5 AEROSOL RESPIRATORY (INHALATION) at 18:01

## 2020-12-09 ASSESSMENT — PAIN SCALES - GENERAL
PAINLEVEL_OUTOF10: 5
PAINLEVEL_OUTOF10: 0
PAINLEVEL_OUTOF10: 5
PAINLEVEL_OUTOF10: 5
PAINLEVEL_OUTOF10: 0

## 2020-12-09 ASSESSMENT — ENCOUNTER SYMPTOMS
TROUBLE SWALLOWING: 1
VOICE CHANGE: 0
SHORTNESS OF BREATH: 0
NAUSEA: 0
CONSTIPATION: 0
ALLERGIC/IMMUNOLOGIC NEGATIVE: 1
COUGH: 0
DIARRHEA: 0

## 2020-12-09 NOTE — PROGRESS NOTES
[address] [facility] THERAPY  254 [facility]  PROGRESS NOTE    TIME   SLP Individual Minutes  Time In: 1106  Time Out: 1130  Minutes: 24  Timed Code Treatment Minutes: 24 Minutes       Date: 2020  Patient Name: Teresa Washburn      CSN: [id number]   : 1935  (81 y.o.)  Gender: male   Referring Physician: Brien Navarro MD  Diagnosis: Intraventricular hemorrhage   Secondary Diagnosis: Cognitive deficits, Dysphagia  Precautions: Fall risk, Aspiration risk   Current Diet: Soft and Bite Sized, Thin liquids   Swallowing Strategies: Full Upright Position, Small Bite/Sip, Alternate Solids and Liquids, Limit Distractions and Monitor for Fatigue  Date of Last MBS: Not Applicable    Pain:   - Pain location: knees; RN aware    Subjective:  Pt resting in bed upon ST arrival. Pt initially refusing treatment d/t \"not feeling well\", however, following ST with provision of encouragement and assurance pt can remain supine in bed, pt agreeable. Progress note summary completed below. Long- and short-term goals already previously adjusted on 2020 to reflect improvements described within summary. Short-Term Goals:  SHORT TERM GOAL #1:  Goal 1: Pt will complete functional carryover tasks (i.e. orientation, immediate/delayed, working) with 80% accuracy, MOD cues and focus on compensatory memory strategies to enhance retention of newly learned medical information. INTERVENTIONS: Orientation - 100% indep withOUT use of calendadr     SHORT TERM GOAL #2:  Goal 2: Pt will complete functional problem solving/safety awareness (i.e. time management, safety scenarios, verbal reasoning) with 80% accuracy, mod cues for enhanced safety and ADL contribution. INTERVENTIONS: Interpretation of rehab schedule - 1/5 indep, 2/5 min cues, 2/5 max cues  *decreased mental flexibility  *majority of errors r/t time management     Written IADL/ADL 4-step sequencin.  Visiting a friend in the hospital - 4/4 indep  2. Doing laundry - 2/4 indep, 2/4 mod cues  3. Making deposit at the bank - 4/4 indep  4. Volunteering at a park - 4/4 indep  5. Having oil changed in car - 2/4 indep, 1/4 min cues, 1/4 mod cues  *decreased sequential thought processing  *POOR sustained and selective attention throughout task     Nathaniel [address] #3:  Goal 3: Pt will complete expressive language tasks (i.e. higher level confrontational/responsive naming, verbal sequencing, divergent naming) with 80% accuracy, min cues for improved expression of complex thoughts  INTERVENTIONS: Not addressed d/t focus on other goals. SHORT TERM GOAL #4:  Goal 4: Pt will complete high level auditory/reading comprehension tasks (i.e. complex y/n questions, multi step and complex commands, prescription labels/directories, etc) with 90% accuracy, mod cues for improved understanding of complex medical information. INTERVENTIONS: Not addressed d/t focus on other goals. SHORT TERM GOAL #5:  Goal 5: Pt will consume a soft & bite size diet and thin liquids with adherence to skilled swallowing strategies (i.e. small bites/sips, slow rate) without oert s/s of aspiration to meet nutrition/hydration measures safely. INTERVENTIONS: Not addressed d/t focus on other goals.     Long-Term Goals:  Timeframe for Long-term Goals: 4 weeks    LONG TERM GOAL #1:  Goal 1: Patient will improve cognitive status to a min A level to safely return to PLOF home with wife, nearly 24 hour SUP       Comprehension: 3 - Patient understands basic needs 50-74% of the time  Expression: 3 - Expresses basic ideas/needs 50-74% of the time  Social Interaction: 5 - Patient is appropriate with supervision/cues  Problem Solving: 3 - Patient solves simple/routine tasks 50%-74%  Memory: 3 - Patient remembers 50%-74% of the time     ST FIM ASSESSMENT:     Admission score Current score Goal Status   COMMUNICATION 4 - Patient understands basic needs 75-90%+ of the time 3 - Patient understands basic needs 50-74% of the time   Not Progressing   EXPRESSION 4 - Expresses basic ideas/needs 75-90%+ of the time     3 - Expresses basic ideas/needs 50-74% of the time   Not Progressing   SOCIAL INTERACTION 5 - Patient is appropriate with supervision/cues   5 - Patient is appropriate with supervision/cues   Stable   PROBLEM SOLVING 1 - Patient solves simple/routine tasks < 25%   3 - Patient solves simple/routine tasks 50%-74%   Progressing   MEMORY 1 - Patient remembers < 25% of the time   3 - Patient remembers 50%-74% of the time   Progressing         EDUCATION:  Learner: Patient  Education:  Reviewed ST goals and Plan of Care, Reviewed recommendations for follow-up, Education Related to Prevention of Recurrence of Impairment/Illness/Injury and Home Safety Education  Evaluation of Education: Demonstrates with assistance and Family not present    ASSESSMENT/PLAN:  SUMMARY:  Pt has met 5/5 STG's and 0/1 LTG's this therapy period (goals addressed and adjusted 12/07/2020). Continues to present with a moderate cognitive impairment and a mild expressive/receptive language impairment characterized by recent score of 10/30 on the [facility] as well identified deficits in auditory comprehension, functional problem solving, thought organization, verbal reasoning/higher level expression, mathematical computation, executive functioning, and sequencing. Improvements made in basic orientation with use of calendar, immediate recall, and telling time. Pt also presents with mild oral dysphagia characterized by prolonged and uncoordinated mastication resulting in decreased bolus formation with consumption of coarse solids. Has been safely consuming soft and bite sized diet with thin liquids; will continue to complete dietary analyses and determine appropriateness for initiation of advanced texture trials as indicated.  Pt with overall low cognitive demand in home setting given assistance from wife, however, was fully independent with basic ADL completion. Recommend continud ST services at this time to continue improving cognition and assisting in dysphagia management, however, given baseline cognitive functioning, will not require f/u ST services in home setting (plan to complete dysphagia education prior to discharge). Will require 24/7 supervision. Activity Tolerance:  Patient tolerance of treatment: good. Assessment/Plan: Patient progressing toward established goals. Continues to require skilled care of licensed speech pathologist to progress toward achievement of established goals and plan of care.      Plan for Next Session: recall, reading comprehension, diet analysis     Christina Fairchild M.S. Brian Lazcano

## 2020-12-09 NOTE — PROGRESS NOTES
[address]  PHYSICAL THERAPY MISSED TREATMENT NOTE  [address] [facility] [facility]    Date: 2020  Patient Name: Jarad Montejo        MRN: [id number]   : 1935  (81 y.o.)  Gender: male   Referring Practitioner: Rajni Fair MD  Diagnosis: Intraventricular hemorrhage         REASON FOR MISSED TREATMENT:  30 minutes missed treat during am session due to bradycardia. Pt with complaints of feeling dizzy and HR dropping to low 30s during session. Pt returned to bed and RN notified. Pt to have cardiac work up done. WIll try back on next scheduled visit/as pt appropriate.

## 2020-12-09 NOTE — PROGRESS NOTES
Comprehensive Nutrition Assessment    Type and Reason for Visit:  Reassess(PO Monitor)    Nutrition Recommendations/Plan:   *Continue current diet and Multivitamin w/minerals daily. *Pt declines need for ONS during LOS. Nutrition Assessment: Pt improving from a nutritional standpoint AEB pt report of good appetite and intake of meals over the past week. Remains at risk for further nutritional compromise r/t admit d/t admit d/t stroke with multiple fx's 2/2 fall, underlying medical condition (hx CAD, CKD, HTN, Prostate Cancer). Nutrition recommendations/interventions as per above. Malnutrition Assessment:  Malnutrition Status: At risk for malnutrition (Comment)    Context:  Acute Illness     Findings of the 6 clinical characteristics of malnutrition:  Energy Intake:  Mild decrease in energy intake (Comment)(some po intake less than 75%)  Weight Loss:  No significant weight loss     Body Fat Loss:  No significant body fat loss     Muscle Mass Loss:  No significant muscle mass loss    Fluid Accumulation:  No significant fluid accumulation Extremities   Strength:  Not Performed    Nutrition Related Findings:  admit d/t stroke with left rib fx and orbital fx 2/2 fall; pt seen; pt reports ongoing good appetite and intake of meals over the past week; pt denies N/V or difficulty chewing/swallowing food; last BM x1 on 12/8. Rx includes: Bumex, Multivitamin, Colace PRN, Glycolax PRN      Wounds:  None       Current Nutrition Therapies:    DIET DYSPHAGIA SOFT AND BITE-SIZED; No Added Salt (3-4 GM); Dysphagia Soft and Bite-Sized; Cardiac    Anthropometric Measures:  · Height: 5' 9\" (175.3 cm)  · Current Body Weight: 224 lb 9.6 oz (101.9 kg)(12/9; +1 non-pitting BLE)   · Admission Body Weight: 218 lb 14.4 oz (99.3 kg)(12/1; +1 non-pitting edema BLE)    · Usual Body Weight: (Per EMR: 221 lb 14.4 oz on 11/26/20;)     · Ideal Body Weight: 160 lbs  · BMI: 33.2  · BMI Categories: Obese Class 1 (BMI 30.0-34. 9) Nutrition Diagnosis:   · Inadequate oral intake related to inadequate protein-energy intake as evidenced by (some po intake less than 75%)    Nutrition Interventions:   Food and/or Nutrient Delivery:  Continue Current Diet, Vitamin Supplement  Nutrition Education/Counseling:  Education initiated(Encouraged po intake of meals at best effort)   Coordination of Nutrition Care:  Continue to monitor while inpatient    Goals:  Pt will consume 75% or more of meals during LOS       Nutrition Monitoring and Evaluation:   Behavioral-Environmental Outcomes:  None Identified   Food/Nutrient Intake Outcomes:  Food and Nutrient Intake, Supplement Intake, Vitamin/Mineral Intake  Physical Signs/Symptoms Outcomes:  Biochemical Data, Weight, Skin, Nutrition Focused Physical Findings, Fluid Status or Edema     Discharge Planning:    Continue current diet     Electronically signed by Kianna Orr RD, BENNY on 12/9/20 at 11:52 AM EST    Contact: [phone]

## 2020-12-09 NOTE — PROGRESS NOTES
Patient: Myrna Enamorado  Unit/Bed: 7C-93/366-B  YOB: 1935  MRN: [id number] Acct: [de-identified]   Admitting Diagnosis: Intraventricular hemorrhage ([address] [I61.5]  Admit Date:  12/1/2020  Hospital Day: 7    Assessment:     Active Problems:    Hypercholesteremia    CAD (coronary artery disease)    Benign essential HTN    Syncope    History of DVT (deep vein thrombosis)    H/O prostate cancer    Intraventricular hemorrhage (HCC)    Fracture of left orbital floor (HCC)    Nasal bone fracture    Closed fracture of one rib of left side    Intracranial bleed (HCC)  Resolved Problems:    * No resolved hospital problems. *      Plan:     Continue present meds        Subjective:     Patient has no complaint of CP, SOB, GI upset or voiding troubles. .   Medication side effects: none    Scheduled Meds:   enoxaparin  40 mg Subcutaneous Daily    famotidine  20 mg Oral Daily    therapeutic multivitamin-minerals  1 tablet Oral Daily    lidocaine  1 patch Transdermal Daily    amLODIPine  5 mg Oral Daily    aspirin  81 mg Oral Daily    budesonide-formoterol  2 puff Inhalation BID    bumetanide  1 mg Oral Daily    losartan  100 mg Oral Daily    metoprolol tartrate  50 mg Oral BID    pravastatin  40 mg Oral Daily    tamsulosin  0.4 mg Oral Nightly     Continuous Infusions:  PRN Meds:acetaminophen **OR** acetaminophen, polyethylene glycol, promethazine **OR** [DISCONTINUED] ondansetron, albuterol, senna, docusate sodium    Review of Systems  Pertinent items are noted in HPI. Objective:     Patient Vitals for the past 8 hrs:   BP Temp Temp src Pulse Resp SpO2   12/08/20 2049 (!) 143/77 97.3 °F (36.3 °C) Oral 56 18 97 %   12/08/20 1730 123/66 -- -- 81 -- 95 %     I/O last 3 completed shifts:  In: -   Out: 1100 [Urine:1100]  No intake/output data recorded.     BP (!) 143/77   Pulse 56   Temp 97.3 °F (36.3 °C) (Oral)   Resp 18   Ht 5' 9\" (1.753 m)   Wt 212 lb 14.4 oz (96.6 kg)   SpO2 97%   BMI 31.44 kg/m² BP (!) 143/77   Pulse 56   Temp 97.3 °F (36.3 °C) (Oral)   Resp 18   Ht 5' 9\" (1.753 m)   Wt 212 lb 14.4 oz (96.6 kg)   SpO2 97%   BMI 31.44 kg/m²   General appearance: alert, appears stated age and cooperative  Head: Normocephalic, without obvious abnormality, atraumatic  Lungs: clear to auscultation bilaterally  Chest wall: no tenderness  Heart: regular rate and rhythm, S1, S2 normal, no murmur, click, rub or gallop  Abdomen: soft, non-tender; bowel sounds normal; no masses,  no organomegaly  Extremities: extremities normal, atraumatic, no cyanosis or edema  Skin: Skin color, texture, turgor normal. No rashes or lesions  Neurologic: weak    Electronically signed by Judge Shari MD on 12/8/2020 at 10:27 PM

## 2020-12-09 NOTE — PROGRESS NOTES
Denies pain at current time. Calm, appropriate interaction with staff, family. Alert, oriented to person, place, time. Mucous membranes pink, moist. Hair dry, shiny. Skin dry, warm. Speech sluggish, able to understand, related to CVA. States no difficulty swallowing. Lung sounds clear anterior, posterior, lateral, diminished lower posterior. Respirations easy, regular. No cough noted. Heart sounds distant, irregular. Bowel sounds active, all 4 quadrants. Abdomen soft, round, non-tender to palpation. States passing gas. Denies problems urinating. Urine yellow, clear, no odor. Last bowel movement 12-. Radial pulse weak, regular, bilateral. Hand grasp strong, bilateral. Arm drift negative, bilateral. Capillary refills less than 3 seconds. Skin turgor brisk. No edema noted. Pedal pulse weak, regular, bilateral. Pedal push pull weak, bilateral. Ya's sign negative, bilateral. Leg lift strong right leg, weak left leg. Denies any numbness, tingling. Resting in bed, talking to wife. Bed in low position. Wheels locked. 2 side rails up. Call light within reach. Over bed table within reach. Denies further needs at current time.

## 2020-12-09 NOTE — PROGRESS NOTES
Resting in bed with eyes closed. 2 side rails up. Call light within reach. Over bed table within reach. No needs at current time.

## 2020-12-09 NOTE — PROGRESS NOTES
Received report from [address] [place] [address],Ifeanyi [address] [place] [address].

## 2020-12-09 NOTE — PROGRESS NOTES
progress toward established PT goals, meeting 2/3 STG and 2/6 LTG on 12/7 so goals were updated then. This date pt met 1/3 STG and no LTG due to recent revision. Requires SBA for bed mobility, CGA for transfers and ambulation. Demonstrates decreased step length B and ant trunk lean. Step length improves with cues, however continues to be decreased compared to \"normal\". Is CGA to negotiate 1 4\" step with RW. He has made significant increase in TUG time. At al he demonstrated a TUG time of 4:11, compared to 1:25 on 12/7. Limited by cardiac status, with bouts of bradycardia down to the 30s and symptomatic. He will benefit from cont skilled PT interventions to promtoe increased indep and safety with functional mobility activities for return to PLOF. Activity Tolerance:  Patient tolerance of  treatment: good. Equipment Recommendations: Other: Has SW, will monitor for needs, may need w/c or RW  Discharge Recommendations:  24 hour supervision or assist, Continue to assess pending progress    Plan: Times per week: 5x/week 90 min, 1x/week 30 min  Current Treatment Recommendations: Strengthening, Safety Education & Training, Balance Training, Endurance Training, Functional Mobility Training, Patient/Caregiver Education & Training, Transfer Training, Gait Training, Wheelchair Mobility Training, Neuromuscular Re-education, Home Exercise Program, Stair training    Patient Education  Patient Education: Plan of Care, [employer] Mobility, Transfers    Goals:   Patient goals : does not state  Short term goals  Time Frame for Short term goals: 2 weeks  Short term goal 1: Pt to transfer supine <--> sit CGA to enable pt to get in/out of bed. MET  Short term goal 2: Pt to transfer sit <--> stand SBA for increased functional mobility. NOT MET  Short term goal 3: Pt to ambulate 100 feet with SW/RW CGA for household and community ambulation.  NOT MET  Long term goals  Time Frame for Long term goals : 4 weeks ALL NOT MET  Long term goal 1: Pt to transfer supine <--> sit SBA to enable pt to get in/out of bed. Long term goal 2: Pt to transfer sit <--> stand supervision for increased functional mobility. Long term goal 3: Pt to ambulate >150 feet with SW/RW SBA for household and community ambulation. Long term goal 4: Pt to ascend/descend 1 step with SW/RW SBA for home entry. Long term goal 5: Pt to perform car transfer CGA to enable pt to get in/out of the car. Long term goal 6: Pt to improve TUG test score to <45 sec to indicate improvement in overall mobility. Revised Short-Term Goals:    Short term goals  Time Frame for Short term goals: 2 weeks  Short term goal 1: Pt to transfer supine <--> sit SBA to enable pt to get in/out of bed. Short term goal 2: Pt to transfer sit <--> stand SBA for increased functional mobility. Short term goal 3: Pt to ambulate 100 feet with SW/RW CGA for household and community ambulation. Revised Long-Term Goals  Long term goals  Time Frame for Long term goals : 4 weeks  Long term goal 1: Pt to transfer supine <--> sit SBA to enable pt to get in/out of bed. Long term goal 2: Pt to transfer sit <--> stand supervision for increased functional mobility. Long term goal 3: Pt to ambulate >150 feet with SW/RW SBA for household and community ambulation. Long term goal 4: Pt to ascend/descend 1 step with SW/RW SBA for home entry. Long term goal 5: Pt to perform car transfer CGA to enable pt to get in/out of the car. Long term goal 6: Pt to improve TUG test score to <45 sec to indicate improvement in overall mobility. Following session, patient left in safe position with all fall risk precautions in place. Treatment session and note completed by Lana Rivera PTA.   Assessment and goal revision of Progress Note completed by Rashard Mayo PT.

## 2020-12-09 NOTE — PROGRESS NOTES
Patient's event monitor showed a critical cardiac event. Instructed by Dr. Di Bran to consult Cardiology. Contacted Dr. Jluis Nazario via [employer] and put in as routine as instructed. Waiting for return call at this time.

## 2020-12-09 NOTE — PROGRESS NOTES
States pain 5/10, on 0-10 scale, pain in right knee, constant. Calm, appropriate interaction with staff. Alert, oriented to person, place, time. GONZALO 3mm-2mm brisk. Mucous membranes pink, moist. Hair dry, shiny. Skin warm, dry. Speech sluggish, able to understand related to CVA. States no difficulty swallowing. Lung sounds clear anterior, posterior, lateral, diminished lower posterior. Productive cough noted, no sputum, no difficult breathing. Respirations easy, regular. Heart sounds distant, irregular. Bowel sounds active, all 4 quadrants. Abdomen soft, round, non-tender to palpation. States passing gas. Denies problems urinating. Last bowel movement 12-. Radial pulse weak, regular, bilateral. Hand grasp strong, bilateral. Arm drift negative, bilateral. Capillary refill less than 3 seconds. Skin turgor brisk. No edema noted. Pedal pulse weak, regular, bilateral. Pedal push pull weak, bilateral. Ya's sign negative, bilateral. Leg lift right leg strong, left leg weak. States no numbness, tingling. Relaxing in chair. Wheels locked. Call light within reach. Over bed table within reach. Denies further needs at current time.

## 2020-12-09 NOTE — PROGRESS NOTES
[address]  OCCUPATIONAL THERAPY MISSED TREATMENT NOTE  [facility]- [address]  [id number]      Date: 2020  Patient Name: Fouzia Ochoa        CSN: [id number]   : 1935  (81 y.o.)  Gender: male   Referring Practitioner: Dr. Lolis Braga  Diagnosis: Intraventricular Hemorrhage      REASON FOR MISSED TREATMENT: Hold Treatment per Nursing. Pt bradycardiac this AM during physical therapy and currently undergoing cardiac work-up. Missed time of 60 minutes. Will attempt to see this PM if medically appropriate to participate. Vahid Varela 0667 MOT  OTR/L  [id number]

## 2020-12-09 NOTE — PROGRESS NOTES
[facility]  INPATIENT PHYSICAL THERAPY  DAILY NOTE  [address] [facility],4[address] Floor - [id number]    Time In: 0700  Time Out: 0730  Timed Code Treatment Minutes: 30 Minutes  Minutes: 30          Date: 2020  Patient Name: Lonnie Chapman,  Gender:  male        MRN: [id number]  : 1935  (81 y.o.)     Referring Practitioner: Juliana Magaña MD  Diagnosis: Intraventricular hemorrhage  Additional Pertinent Hx: Pt presenting at [facility] by activation of level 2 trauma, brought by EMS following a unwitnessed fall with unknown LOC; past medical history includes recent cardiac sten placement x2, hypertension, chronic kidney disease, CAD. Per wife report, she was standing in the bathroom when she heard a sound in the kitchen she open the bathroom door to see her  laying face down on the ground. Patient states he normally ambulates with walker, did have a walker at the time of fall, unclear if fall was precipitated by syncopal episode. Wife states that  does appear to be confused post fall, has been repetitive in speech since incident. Patient reports some mild discomfort over left face otherwise has no complaints on exam.  Found to have an intraventricular hemorrhage, a left orbital fx, nasal bone fx, and left 6th rib fx. To IP rehab .      Prior Level of Function:  Lives With: Spouse  Type of Home: House  Home Layout: One level  Home Access: Stairs to enter with rails  Entrance Stairs - Number of Steps: 1 step to enter and 1 step once inside the house  Entrance Stairs - Rails: Left  Home Equipment: Standard walker   Bathroom Shower/Tub: Walk-in shower  Bathroom Toilet: Handicap height  Bathroom Equipment: Built-in shower seat, Grab bars around toilet, Grab bars in shower  Bathroom Accessibility: Accessible    Receives Help From: Family  ADL Assistance: [address] [facility] [address]: Independent  Homemaking Responsibilities: No  Ambulation Assistance: Independent  Transfer Assistance: Independent  Active : No  Additional Comments: Pt ambualated short distances with a standard walker. He did his own self care. He had help with all of the cooking and cleaning prior to admission. Restrictions/Precautions:  Restrictions/Precautions: Fall Risk, General Precautions  Position Activity Restriction  Other position/activity restrictions: Keep systolic blood pressure between 100-160 while moving. SUBJECTIVE: Pt resting in bed upon arrival, requests to get dressed before going down to therapy gym    PAIN: 5/10: B knees     OBJECTIVE:  Bed Mobility:  Rolling to Right: Stand By Assistance   Supine to Sit: Stand By Assistance, Increased time to complete. Transfers:  Sit to Stand: [employer], From EOB and from [address]. Occasional cue for anterior weight shift. Stand to Sit:Contact Guard Assistance    Ambulation:  Contact Guard Assistance  Distance: 40 feet x1   Surface: Level Tile  Device:Rolling Walker  Gait Deviations: Forward Flexed Posture, Slow [place], Decreased Step Length Bilaterally, Decreased Weight Shift Bilaterally, Decreased Gait Speed, Decreased Heel Strike Bilaterally and Cues for upright posture and to stay closer to AD    Balance:  Dynamic Sitting Balance: Stand By Assistance  Dynamic Standing Balance: Contact Guard Assistance, Pt standing to manage clothing and standing in restroom to brush teeth/wash face. All at [facility]. Min instability, occasionally holding onto AD/sink for support. Exercise:  None. Functional Outcome Measures: Not completed       ASSESSMENT:  Assessment: Patient progressing toward established goals. Activity Tolerance:  Patient tolerance of  treatment: good. Equipment Recommendations: Other: Has SW, will monitor for needs, may need w/c or RW  Discharge Recommendations:  24 hour supervision or assist, Continue to assess pending progress    Plan: Times per week: 5x/week 90 min, 1x/week 30 min  Current Treatment Recommendations: Strengthening, Safety Education & Training, Balance Training, Endurance Training, Functional Mobility Training, Patient/Caregiver Education & Training, Transfer Training, Gait Training, Wheelchair Mobility Training, Neuromuscular Re-education, Home Exercise Program, Stair training    Patient Education  Patient Education: Plan of Care, Transfers, Gait    Goals:  Patient goals : does not state  Short term goals  Time Frame for Short term goals: 2 weeks  Short term goal 1: Pt to transfer supine <--> sit CGA to enable pt to get in/out of bed. Short term goal 2: Pt to transfer sit <--> stand SBA for increased functional mobility. Short term goal 3: Pt to ambulate 100 feet with SW/RW CGA for household and community ambulation. Long term goals  Time Frame for Long term goals : 4 weeks  Long term goal 1: Pt to transfer supine <--> sit SBA to enable pt to get in/out of bed. Long term goal 2: Pt to transfer sit <--> stand supervision for increased functional mobility. Long term goal 3: Pt to ambulate >150 feet with SW/RW SBA for household and community ambulation. Long term goal 4: Pt to ascend/descend 1 step with SW/RW SBA for home entry. Long term goal 5: Pt to perform car transfer CGA to enable pt to get in/out of the car. Long term goal 6: Pt to improve TUG test score to <45 sec to indicate improvement in overall mobility. Following session, patient left in safe position with all fall risk precautions in place.

## 2020-12-09 NOTE — PROGRESS NOTES
Assistance. FUNCTIONAL MOBILITY:  Assistive Device: Rolling Walker  Assist Level:  Contact Guard Assistance. Distance: To and from bathroom  Pt completed functional mobility to/from bathroom with mod encouragement. Pt slightly unbalanced. Pt required min VC for safety d/t R foot being outside of RW during initial sit to stand prior to mobility. No LOB occurred. ADDITIONAL ACTIVITIES:  Pt stated that he always makes his bed at home. Pt was agreeable to grading activity down by raising bed to waist and making bed with sheet only. Pt completed simple homemaking task with CGA and min VC. Pt demoed problem solving by utilizing reacher for the far side of the bed, while using hands for near side. Pt able to complete task with side stepping at bed. Pt demoed good safety throughout task. No LOB occurred. ASSESSMENT:  Assessment: Bryn August is progressing towards his goals, meeting or part met 4/5 STGs this week. Pt has progressed to completing transfers and standing ADL tasks with CGA, requiring assist with LB dressing and bathing with min assistance. Increased time needed for problem solving and task completion throughout. This is a significant decline from being independent with self care and basic mobility PTA. Pt would benefit from additional skilled OT services to address increasing independence in self care and basic mobility to return home as indepedently as possible to reduce the incidence of falls and care giver burdon. Activity Tolerance:  Patient tolerance of  treatment: fair. Pt required increased encouragement. Pt's HR was 73 upon arrival, 77 after ambulating to/from bathroom, and 74 during simple homemaking task. Left session with HR at 68      Discharge Recommendations: Home with nursing aide, Home with Home health OT    Equipment Recommendations: Other: Pt has a toilet raiser  and shower seat. Need to confirm with spouse.   Plan: Times per week: 5xs weeks x 90 min, 1 x week x 30 min  Current Treatment Recommendations: Functional Mobility Training, Endurance Training, Self-Care / ADL, Safety Education & Training, Strengthening, Patient/Caregiver Education & Training, Equipment Evaluation, Education, & procurement, Home Management Training    Patient Education  Patient Education: Plan of Care and Importance of Increasing Activity    Goals  Short term goals  Time Frame for Short term goals: 1 week  Short term goal 1: Pt will demonstrate functional mobility walking to/from the bathroom or bedside chair with SBA and RW  with min cues for aligning himself as neeed to prepare for doing self care. Short term goal 2: Pt will complete ADL routine with no > min A and min cues for problem solving and task completion to increase independence in self care tasks  Short term goal 3: Pt will complete toileting routine and transfer with no SBA and min cues for safe tech to increase independence in self toileting tasks  Short term goal 4: Pt will complete BUE ROM and light resistance exercises while following verbal cues to increase his pain free movement for ease of doing self care. Short term goal 5: Pt will complete 1 step homemaking tasks with CGA and no > min cues for problem solving to increase ability to retrieve a snack  Long term goals  Time Frame for Long term goals : 2 week  Long term goal 1: Pt will complete ADL routine with S and no  cues for problem solving to increase independence in self care tasks  Long term goal 2: Pt will complete 1 step homemaking tasks with SBA and no cues for safe tech to increase ability to retrieve a glass of water. Following session, patient left in safe position with all fall risk precautions in place.

## 2020-12-09 NOTE — PROGRESS NOTES
[address] NOTE    Conference Date: 12/10/2020  Admit Date:  2020  1:15 PM  Patient Name: Hernando Arevalo    MRN: [id number]    : 1935  (81 y.o.)  Rehabilitation Admitting Diagnosis:  Intraventricular hemorrhage ([facility] [address] [I61.5]  Referring Practitioner: Brisa Hutchinson MD    CASE MANAGEMENT  Current issues/needs regarding patient and family discharge status: Patient continues to participate with PT/OT/ST. Plan for discharge home on Wednesday, 2020, with home health care services for RN/PT/OT/HHA. SW to coordinate with spouse, Lukas Roberto, family education. SW to follow and maintain involvement in discharge planning. PHYSICAL THERAPY  Is making good progress toward established PT goals, meeting 2/3 STG and 2/6 LTG on  so goals were updated then. This date pt met 1/3 STG and no LTG due to recent revision. Requires SBA for bed mobility, CGA for transfers and ambulation. Demonstrates decreased step length B and ant trunk lean. Step length improves with cues, however continues to be decreased compared to \"normal\". Is CGA to negotiate 1 4\" step with RW. He has made significant increase in TUG time. At al he demonstrated a TUG time of 4:11, compared to 1:25 on . Limited by cardiac status, with bouts of bradycardia down to the 30s and symptomatic. He will benefit from cont skilled PT interventions to promtoe increased indep and safety with functional mobility activities for return to PLOF. Equipment Needed: Yes  Mobility Devices: Nailain Gonzalo: Rolling  Other: Has SW, will monitor for needs,  RW    SPEECH THERAPY  Pt has met 5/5 STG's and 0/1 LTG's this therapy period (goals addressed and adjusted 2020).  Continues to present with a moderate cognitive impairment and a mild expressive/receptive language impairment characterized by recent score of 10/30 on the [facility] REHABILITATION as well identified deficits in auditory comprehension, functional problem solving, thought organization, verbal reasoning/higher level expression, mathematical computation, executive functioning, and sequencing. Improvements made in basic orientation with use of calendar, immediate recall, and telling time. Pt also presents with mild oral dysphagia characterized by prolonged and uncoordinated mastication resulting in decreased bolus formation with consumption of coarse solids. Has been safely consuming soft and bite sized diet with thin liquids; will continue to complete dietary analyses and determine appropriateness for initiation of advanced texture trials as indicated. Pt with overall low cognitive demand in home setting given assistance from wife, however, was fully independent with basic ADL completion. Recommend continud ST services at this time to continue improving cognition and assisting in dysphagia management, however, given baseline cognitive functioning, will not require f/u ST services in home setting (plan to complete dysphagia education prior to discharge). Will require 24/7 supervision. OCCUPATIONAL THERAPY  Derek Christian is progressing towards his goals, meeting or part met 4/5 STGs this week. Pt has progressed to completing transfers and standing ADL tasks with CGA, requiring assist with LB dressing and bathing with min assistance. Increased time needed for problem solving and task completion throughout. This is a significant decline from being independent with self care and basic mobility PTA. Pt would benefit from additional skilled OT services to address increasing independence in self care and basic mobility to return home as indepedently as possible to reduce the incidence of falls and care giver burdon. Other: Pt has a toilet raiser  and shower seat. Need to confirm with spouse. RECREATIONAL THERAPY  Patient has been offered participation in recreational therapy activities and participates as able.     NUTRITION  Weight: 207 lb 11.2 oz (94.2 kg) / Body mass index is 30.67 kg/m². Current diet: DIET DYSPHAGIA SOFT AND BITE-SIZED; No Added Salt (3-4 GM); Dysphagia Soft and Bite-Sized; Cardiac  Please see nutrition note for details. NURSING  Continent of Bowel and Bladder: No, bowel  Pain is Managed:  Yes  Signs and Symptoms of Infection:  No  Signs and Symptoms of Skin Breakdown:  No  Injury and Fall Free during Inpatient Rehabilitation Admission: Yes    Consultations/Labs/X-rays: Trauma Surgery, Neurosurgery, Critical Care, Neurology, Cardiology, Plastic Surgery, Family Medicine, Physical Medicine    No results for input(s): POCGLU in the last 72 hours.     Lab Results   Component Value Date    LDLCALC 98 11/28/2020       Vitals:    12/09/20 1215 12/09/20 2032 12/10/20 0520 12/10/20 0738   BP: 116/68 137/66  116/62   Pulse:  70  71   Resp:  15  16   Temp: 97.8 °F (36.6 °C) 98 °F (36.7 °C)  97.9 °F (36.6 °C)   TempSrc: Oral Oral  Oral   SpO2:  96%  96%   Weight:   207 lb 11.2 oz (94.2 kg)    Height:            Family Education: Need to make contact with family to initiate education  Fall Risk:  Falling star program initiated  Is the patient appropriate for a stay in the functional apartment? no    Discharge Plan   Estimated Discharge Date: 12/16   Destination: home health  Services at Discharge: [address] [place] [address], Occupational Therapy, Speech Therapy (HEP), Nursing and aide 3x week  Is patient appropriate for an outpatient driving evaluation? no  Equipment at Discharge: RW  Factors facilitating achievement of predicted outcomes: Family support, Motivated, Cooperative and Pleasant  Barriers to the achievement of predicted outcomes: Cognitive deficit, Limited insight into deficits and dysphagia    Team Members Present at Conference:  [facility]  Occupational Therapist:Vahid Ferrara OTR/L [id number]  Physical Therapist:Vahid Mendoza, PT [id number]  [address] [place] [address], [facility], [address]  Nurse:Ivelisse Diaz RN  Psychologist: Abimael Shepherd Steve Alston, PhD.    I approve the established interdisciplinary plan of care as documented within the medical record of [address] [place] [address].     Sherin Gary

## 2020-12-09 NOTE — PLAN OF CARE
Problem: Falls - Risk of:  Goal: Will remain free from falls  Description: Will remain free from falls  Outcome: Ongoing  Pt will remain free of falls. Pt is 1 assist with walker and gait belt, pt is slow but steady. Problem: Impaired respiratory status  Goal: Clear lung sounds  12/9/2020 0736 by Thuy Lopez RCP  Outcome: Ongoing  Note: Mdi to maintain open airways  Pt has clear but diminished lung sounds bilaterally     Problem: Skin Integrity:  Goal: Will show no infection signs and symptoms  Description: Will show no infection signs and symptoms  Outcome: Ongoing  Skin assessment every shift. Pt is showing no additional skin breakdown. Problem: Pain:  Goal: Pain level will decrease  Description: Pain level will decrease  Outcome: Ongoing  Reposition frequently to alleviate pain. Pt denies pain at this shift.

## 2020-12-09 NOTE — FLOWSHEET NOTE
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        Dr Zina Haywood in unit and reviews results orders Stat EKG, results given to Dr Zina Haywood and forwarded to Dr Nikki Evans, Dr Nikki Evans called unit and spoke with me, no additional orders given at this time    Dr Nikki Evans discontinued Lopressor 50mg daily

## 2020-12-10 LAB
ANION GAP SERPL CALCULATED.3IONS-SCNC: 14 MEQ/L (ref 8–16)
BUN BLDV-MCNC: 34 MG/DL (ref 7–22)
CALCIUM SERPL-MCNC: 9.5 MG/DL (ref 8.5–10.5)
CHLORIDE BLD-SCNC: 100 MEQ/L (ref 98–111)
CO2: 21 MEQ/L (ref 23–33)
CREAT SERPL-MCNC: 1.7 MG/DL (ref 0.4–1.2)
GLUCOSE BLD-MCNC: 131 MG/DL (ref 70–108)
MAGNESIUM: 2.3 MG/DL (ref 1.6–2.4)
POTASSIUM SERPL-SCNC: 4.2 MEQ/L (ref 3.5–5.2)
SODIUM BLD-SCNC: 135 MEQ/L (ref 135–145)
TSH SERPL DL<=0.05 MIU/L-ACNC: 2.9 UIU/ML (ref 0.4–4.2)

## 2020-12-10 PROCEDURE — 1180000000 HC REHAB R&B

## 2020-12-10 PROCEDURE — 97110 THERAPEUTIC EXERCISES: CPT

## 2020-12-10 PROCEDURE — 36415 COLL VENOUS BLD VENIPUNCTURE: CPT

## 2020-12-10 PROCEDURE — 97116 GAIT TRAINING THERAPY: CPT

## 2020-12-10 PROCEDURE — 97129 THER IVNTJ 1ST 15 MIN: CPT

## 2020-12-10 PROCEDURE — 6370000000 HC RX 637 (ALT 250 FOR IP): Performed by: FAMILY MEDICINE

## 2020-12-10 PROCEDURE — 80048 BASIC METABOLIC PNL TOTAL CA: CPT

## 2020-12-10 PROCEDURE — 6360000002 HC RX W HCPCS: Performed by: PHYSICAL MEDICINE & REHABILITATION

## 2020-12-10 PROCEDURE — 97535 SELF CARE MNGMENT TRAINING: CPT

## 2020-12-10 PROCEDURE — 97530 THERAPEUTIC ACTIVITIES: CPT

## 2020-12-10 PROCEDURE — 94760 N-INVAS EAR/PLS OXIMETRY 1: CPT

## 2020-12-10 PROCEDURE — 6370000000 HC RX 637 (ALT 250 FOR IP): Performed by: SURGERY

## 2020-12-10 PROCEDURE — 94640 AIRWAY INHALATION TREATMENT: CPT

## 2020-12-10 PROCEDURE — 92526 ORAL FUNCTION THERAPY: CPT

## 2020-12-10 PROCEDURE — 83735 ASSAY OF MAGNESIUM: CPT

## 2020-12-10 PROCEDURE — 84443 ASSAY THYROID STIM HORMONE: CPT

## 2020-12-10 RX ADMIN — AMLODIPINE BESYLATE 5 MG: 5 TABLET ORAL at 09:12

## 2020-12-10 RX ADMIN — BUDESONIDE AND FORMOTEROL FUMARATE DIHYDRATE 2 PUFF: 80; 4.5 AEROSOL RESPIRATORY (INHALATION) at 07:38

## 2020-12-10 RX ADMIN — ENOXAPARIN SODIUM 40 MG: 40 INJECTION SUBCUTANEOUS at 09:12

## 2020-12-10 RX ADMIN — BUMETANIDE 1 MG: 1 TABLET ORAL at 09:11

## 2020-12-10 RX ADMIN — TAMSULOSIN HYDROCHLORIDE 0.4 MG: 0.4 CAPSULE ORAL at 20:30

## 2020-12-10 RX ADMIN — BUDESONIDE AND FORMOTEROL FUMARATE DIHYDRATE 2 PUFF: 80; 4.5 AEROSOL RESPIRATORY (INHALATION) at 20:38

## 2020-12-10 RX ADMIN — PRAVASTATIN SODIUM 40 MG: 40 TABLET ORAL at 20:30

## 2020-12-10 RX ADMIN — LOSARTAN POTASSIUM 100 MG: 100 TABLET, FILM COATED ORAL at 09:11

## 2020-12-10 RX ADMIN — FAMOTIDINE 20 MG: 20 TABLET, FILM COATED ORAL at 09:11

## 2020-12-10 RX ADMIN — MULTIPLE VITAMINS W/ MINERALS TAB 1 TABLET: TAB at 09:11

## 2020-12-10 RX ADMIN — ACETAMINOPHEN 650 MG: 325 TABLET ORAL at 08:22

## 2020-12-10 RX ADMIN — ASPIRIN 81 MG: 81 TABLET, CHEWABLE ORAL at 09:11

## 2020-12-10 ASSESSMENT — ENCOUNTER SYMPTOMS
VOICE CHANGE: 0
SHORTNESS OF BREATH: 0
COUGH: 0
TROUBLE SWALLOWING: 1
CONSTIPATION: 0
NAUSEA: 0
DIARRHEA: 0
ALLERGIC/IMMUNOLOGIC NEGATIVE: 1

## 2020-12-10 ASSESSMENT — PAIN SCALES - GENERAL
PAINLEVEL_OUTOF10: 0
PAINLEVEL_OUTOF10: 5
PAINLEVEL_OUTOF10: 0

## 2020-12-10 NOTE — PLAN OF CARE
Problem: DISCHARGE BARRIERS  Goal: Patient's continuum of care needs are met  Note: Team conference held Thursday, 12/10/2020. Recommendations of the team were explained to the patient by TALI Pablo, and Dr. Mayo Sanabria. Team is recommending that patient continue on acute inpatient rehab for six more days, with expected discharge date of Wednesday, 12/16/2020. Prior to discharge, team is recommending family education with spouse, Siddharth Avelar. Following discharge, team is recommending home health care services for RN/PT/OT/HHA. Care plan reviewed with patient. Patient verbalized understanding of the plan of care and contributed to goal setting. SW to follow and maintain involvement in discharge planning.

## 2020-12-10 NOTE — PROGRESS NOTES
[facility] [facility]  [facility]  DAILY NOTE    TIME   SLP Individual Minutes  Time In: 0830  Time Out: 0900  Minutes: 30  Timed Code Treatment Minutes: 30 Minutes       Date: 12/10/2020  Patient Name: Jose Maria Beaver      CSN: [id number]   : 1935  (81 y.o.)  Gender: male   Referring Physician: Luis Miguel Rich MD  Diagnosis: Intraventricular hemorrhage   Secondary Diagnosis: Cognitive deficits, Dysphagia  Precautions: Fall risk, Aspiration risk   Current Diet: Soft and Bite Sized, Thin liquids   Swallowing Strategies: Full Upright Position, Small Bite/Sip, Alternate Solids and Liquids, Limit Distractions and Monitor for Fatigue  Date of Last MBS: Not Applicable    Pain:   - Pain location: Back and knee  Improved from a 8 to 6 from this AM    Subjective:  Patient sitting upright in recliner chair for ST dietary analysis on this date. No family present at this time. Short-Term Goals:  SHORT TERM GOAL #1:  Goal 1: Pt will complete functional carryover tasks (i.e. orientation, immediate/delayed, working) with 80% accuracy, MOD cues and focus on compensatory memory strategies to enhance retention of newly learned medical information. INTERVENTIONS:   Orientation:  Pt is indep with month, year, place, event, while requiring min A to orient to the date (1 day off). Completed review of STM strategies using--Write it down, Repeat it, Associate it, and Pictures it.    (Task 1) Pt required Max A to recall looks of ST from 2020  (Task 2) Pt was challenged to recall [address] Name, hair color, Age, and ST    -Repeated 2-3x prior to immediate recall   -Immediate Recall: 2/4 min A, 2/4 mod A    -Delayed Recall (5 min): 3/4 indep, 1/4 mod I additional time   (Task 3) Paragraph recall (3-4 sentences with 6 targets to recall)   -Immediate Recall: 3/6 indep, 1/6 min A, 2/6 Max A   -Delayed Recall (5 min): 4/6 indep, 1/6 min A, 1/6 total A despite FO2        SHORT TERM GOAL #2:  Goal 2: Pt will complete functional problem solving/safety awareness (i.e. time management, safety scenarios, verbal reasoning) with 80% accuracy, mod cues for enhanced safety and ADL contribution. INTERVENTIONS:   (Cookie Theft): Pt was able to describe the picture, requiring min A to continue providing more details regarding what the kids are reaching for, and the over flowing sink. Pt required mod A to improve organization or story. (Call Light) indep verbalization, location, and demonstration.    -3 reasons to push the button: 3/3 mod I additional time   (Safe to drive?): indep - \"I cant drive without feeling in my feet. \"  (Assistance when you get home?): Mod A to verbalize understanding of requiring 1:1 assist at home with increased number of ADLs/IADLs. *decreased sequential thought processing  *POOR sustained and selective attention throughout task     SHORT TERM GOAL #3:  Goal 3: Pt will complete expressive language tasks (i.e. higher level confrontational/responsive naming, verbal sequencing, divergent naming) with 80% accuracy, min cues for improved expression of complex thoughts  INTERVENTIONS: Did not address d/t focus on other goals     SHORT TERM GOAL #4:  Goal 4: Pt will complete high level auditory/reading comprehension tasks (i.e. complex y/n questions, multi step and complex commands, prescription labels/directories, etc) with 90% accuracy, mod cues for improved understanding of complex medical information. INTERVENTIONS: Did not address d/t focus on other goals. SHORT TERM GOAL #5:  Goal 5: Pt will consume a soft & bite size diet and thin liquids with adherence to skilled swallowing strategies (i.e. small bites/sips, slow rate) without oert s/s of aspiration to meet nutrition/hydration measures safely.   INTERVENTIONS: Completed a skilled dietary analysis on this date to determine recommendations to resume current diet level vs recommendations for initiation of advanced (regular) dietary analysis. Meal tray included soft & bite size sausage and pancakess with thin liquids via cup. The pt demonstrated great success with his meal demonstrated by timely mastication, good bolus control/formation, and timely AP movement with essentially functional pharyngeal phase of the swallow -- no overt s/s of aspiration/penetration across all PO trials. *Patient did present with slight impulsive eating patterns on this date, requiring min A to \"'slow down\" between bites. *Pt verbalized satisfaction with soft & bite size solids. *recommended remain on soft & bite size solids with reinforcement to \"slow down\" with PO intake        Long-Term Goals:  Timeframe for Long-term Goals: 4 weeks    LONG TERM GOAL #1:  Goal 1: Patient will improve cognitive status to a min A level to safely return to PLOF home with wife, nearly 24 hour SUP       Comprehension: 3 - Patient understands basic needs 50-74% of the time  Expression: 3 - Expresses basic ideas/needs 50-74% of the time  Social Interaction: 5 - Patient is appropriate with supervision/cues  Problem Solving: 3 - Patient solves simple/routine tasks 50%-74%  Memory: 3 - Patient remembers 50%-74% of the time       EDUCATION:  Learner: Patient  Education:  Reviewed ST goals and Plan of Care, Reviewed recommendations for follow-up, Education Related to Prevention of Recurrence of Impairment/Illness/Injury and Home Safety Education  Evaluation of Education: Demonstrates with assistance and Family not present      Activity Tolerance:  Patient tolerance of treatment: good. Assessment/Plan: Patient progressing toward established goals. Continues to require skilled care of licensed speech pathologist to progress toward achievement of established goals and plan of care. Plan for Next Session: recall, reading comprehension, diet analysis     Awilda Burnette MA., CCC-SLP

## 2020-12-10 NOTE — PROGRESS NOTES
Patient: Graham Cardona  Unit/Bed: 7L-48/328-O  YOB: 1935  MRN: [id number] Acct: [de-identified]   Admitting Diagnosis: Intraventricular hemorrhage ([address] [I61.5]  Admit Date:  12/1/2020  Hospital Day: 9    Assessment:     Active Problems:    Hypercholesteremia    CAD (coronary artery disease)    Benign essential HTN    Syncope    History of DVT (deep vein thrombosis)    H/O prostate cancer    Intraventricular hemorrhage (HCC)    Fracture of left orbital floor (HCC)    Nasal bone fracture    Closed fracture of one rib of left side    Intracranial bleed (HCC)  Resolved Problems:    * No resolved hospital problems. *      Plan:     Check a TSH and magnesium  Dr Kathy Gomez has now seen him        Subjective:     Patient has no complaint of CP, SOB, GI upset or voiding troubles. He denies any face pain. Medication side effects: none    Scheduled Meds:   enoxaparin  40 mg Subcutaneous Daily    famotidine  20 mg Oral Daily    therapeutic multivitamin-minerals  1 tablet Oral Daily    lidocaine  1 patch Transdermal Daily    amLODIPine  5 mg Oral Daily    aspirin  81 mg Oral Daily    budesonide-formoterol  2 puff Inhalation BID    bumetanide  1 mg Oral Daily    losartan  100 mg Oral Daily    pravastatin  40 mg Oral Daily    tamsulosin  0.4 mg Oral Nightly     Continuous Infusions:  PRN Meds:acetaminophen **OR** acetaminophen, polyethylene glycol, promethazine **OR** [DISCONTINUED] ondansetron, albuterol, senna, docusate sodium    Review of Systems  Pertinent items are noted in HPI. Objective:     Patient Vitals for the past 8 hrs:   BP Temp Temp src Pulse Resp SpO2 Weight   12/10/20 0738 116/62 97.9 °F (36.6 °C) Oral 71 16 96 % --   12/10/20 0520 -- -- -- -- -- -- 207 lb 11.2 oz (94.2 kg)     I/O last 3 completed shifts:   In: 1240 [P.O.:1240]  Out: 800 [Urine:800]  I/O this shift:  In: 250 [P.O.:250]  Out: 200 [Urine:200]    /62   Pulse 71   Temp 97.9 °F (36.6 °C) (Oral)   Resp 16   Ht 5' 9\" (1.753 m)   Wt 207 lb 11.2 oz (94.2 kg)   SpO2 96%   BMI 30.67 kg/m²     /62   Pulse 71   Temp 97.9 °F (36.6 °C) (Oral)   Resp 16   Ht 5' 9\" (1.753 m)   Wt 207 lb 11.2 oz (94.2 kg)   SpO2 96%   BMI 30.67 kg/m²   General appearance: alert, appears stated age and cooperative  Head: Normocephalic, without obvious abnormality, atraumatic  Lungs: clear to auscultation bilaterally  Chest wall: no tenderness  Heart: regular rate and rhythm, S1, S2 normal, no murmur, click, rub or gallop  Abdomen: soft, non-tender; bowel sounds normal; no masses,  no organomegaly  Extremities: extremities normal, atraumatic, no cyanosis or edema  Skin: Skin color, texture, turgor normal. No rashes or lesions  Neurologic: weak    Electronically signed by Radha Agarwal MD on 12/10/2020 at 12:44 PM

## 2020-12-10 NOTE — PROGRESS NOTES
Denies pain at current time. Calm, appropriate interaction with staff. Alert, oriented to person, place, time. GONZALO 3mm to 2mm brisk. Mucous membranes pink, moist. Hair dry, shiny. Skin warm, dry. Speech sluggish, able to understand. Denies difficulty swallowing. Lung sounds clear anterior, posterior, lateral, diminished lower posterior bilateral. Respirations easy, regular. No cough noted. Heart sounds distant, irregular. Bowel sounds active, all 4 quadrants. Abdomen round, firm, non-tender to palpation. Bruising lower left abdomen due to injection. States passing gas. Denies problems urinating. Last bowel movement 12-. Radial pulse weak, irregular, bilateral. Hand grasp strong, bilateral. Arm drift negative, bilateral. Capillary refill less than 3 seconds. Skin turgor brisk. No edema noted. Pedal pulse weak, irregular, bilateral. Pedal push pull strong, bilateral. Ya's sign negative, bilateral. Leg lift strong, bilateral. Denies numbness, tingling. Bed in low position. Wheels locked. 2 side rails up. Call light within reach. Over bed table within reach. Denies further needs at current time.

## 2020-12-10 NOTE — PROGRESS NOTES
[address]  Occupational Therapy  Daily Note  Time:    Time In: 0900  Time Out: 1000  Timed Code Treatment Minutes: 60 Minutes  Minutes: 60    Date: 12/10/2020  Patient Name: Ana Matamoros,   Gender: male      Room: [facility]64/064-A  MRN: [id number]  : 1935  (81 y.o.)  Referring Practitioner: Dr. Surinder Serra  Diagnosis: Intraventricular Hemorrhage  Additional Pertinent Hx: Pt admitted to inpt rehab for rehab needs after admission to the hospital s/p  fall at home resulting in an intraventricular hemorrhage, left sixth rib fracture, displaced left orbital floor fracture with deficits of gait instability and severe cognitive deficits with a MOCA score of 9/30 on . Restrictions/Precautions:  Restrictions/Precautions: Fall Risk, General Precautions  Position Activity Restriction  Other position/activity restrictions: Keep systolic blood pressure between 100-160 while moving. SUBJECTIVE:  RN approved session, Pt pleasant and cooperative. Discussed any vision changes, patient reports \"its gotten better\"  But did not elaborate. Denies diplopia or blurred vision. HR 70s at rest,   119 during BADL. Pt incontinent of stool in shower. Nursing informed. PAIN:   /10:      COGNITION: Slow Processing, Decreased Recall, Decreased Problem Solving and Decreased Safety Awareness    ADL:   Grooming: with set-up. to wash face in shower   Bathing: Minimal Assistance. A  to wash feet   Upper Extremity Dressing: with set-up. in shower   Lower Extremity Dressing: Minimal Assistance. using reacher to doff and don clothing with increased time and min vcs for problem solving use of familar reacher. Pt needed A to don socks due to sock aid unavailable   Toileting: Dependent. after incontient of stool in shower   Shower Transfer: [address]   completed simulated home set up shower transfers with difficulty lifting LEs over simlulated shower lip .    BALANCE:  Sitting Balance:  Supervision. Standing Balance: Stand By Assistance, [address] TRANSFERS:  Sit to Stand:  Stand By Assistance, with increased time for completion. Stand to Sit: Stand By Assistance. FUNCTIONAL MOBILITY:  Assistive Device: Rolling Walker  Assist Level:  Stand By Assistance. And mod vcs to bring RW closer during ambulation   Distance: To and from bathroom     ASSESSMENT:  Activity Tolerance:  Patient tolerance of  treatment: good. Discharge Recommendations: Home with nursing aide, Home with Home health OT    Equipment Recommendations: Other: Pt has a toilet raiser  and shower seat. Need to confirm with spouse. Plan: Times per week: 5xs weeks x 90 min, 1 x week x 30 min  Current Treatment Recommendations: Functional Mobility Training, Endurance Training, Self-Care / ADL, Safety Education & Training, Strengthening, Patient/Caregiver Education & Training, Equipment Evaluation, Education, & procurement, Home Management Training    Patient Education  Patient Education: ADL's, Equipment Education and Home Safety     Goals  Short term goals  Time Frame for Short term goals: 1 week  Short term goal 1: Pt will demonstrate functional mobility walking to/from the bathroom or bedside chair with SBA and RW  with min cues for aligning himself as neeed to prepare for doing self care. Short term goal 2: Pt will complete ADL routine with no > min A and min cues for problem solving and task completion to increase independence in self care tasks  Short term goal 3: Pt will complete toileting routine and transfer with no SBA and min cues for safe tech to increase independence in self toileting tasks  Short term goal 4: Pt will complete BUE ROM and light resistance exercises while following verbal cues to increase his pain free movement for ease of doing self care.   Short term goal 5: Pt will complete 1 step homemaking tasks with CGA and no > min cues for problem

## 2020-12-10 NOTE — PROGRESS NOTES
Physical Medicine & Rehabilitation  Progress Note    Chief Complaint:   Intracranial and intraventricular hemorrhage/right thalamic CVA    Subjective: Today he endorses some mild right knee pain when he is up and ambulating. He appears more fatigued today; but therapy does note improved carryover. Otherwise he did not have any other specific concerns or questions. Rehabilitation:   Physical Therapy:  OBJECTIVE:  Bed Mobility:  Rolling to Right: Stand By Assistance, with increased time for completion   Supine to Sit: Stand By Assistance, with increased time for completion     Transfers:  Sit to Stand: mod A when transferrign from bed, CGA when transferring from w/c with increased time for completion. occasional cues for hand placement  Stand to Sit:Stand By Assistance, Contact Guard Assistance, with increased time for completion, cues for hand placement, to/from chair with arms     Ambulation:  Contact Guard Assistance, discussed increased step length prior to initiating gait with improved carryover, however of note, when cues provided to pt during ambulation (ie. erect posture and keep RW close) pt loses attention to increased step length and returns to shuffling gait  Distance: 40ft and 45ft following gait training  Surface: Level Tile  Device:Rolling Walker  Gait Deviations: Forward Flexed Posture, Slow [place], Decreased Step Length Bilaterally and Decreased Heel Strike Bilaterally     Gait Training:  Pt performed toe tapping and heel tapping on 4\" step with B UE support on RW x~10 reps each LE. Performed to promote increased foot clearance, step length, and heel strike during.  Pt amb 45 ft following gait training, with improved carryover of step length, foot clearance, and heel strike.     Balance:  Dynamic Sitting Balance: Supervision, pt able to assist with UE and LE dressing  Static Standing Balance: Contact Guard Assistance  Dynamic Standing Balance: Contact Guard Assistance with RW    Functional Outcome Measures: Not completed     ASSESSMENT:  Assessment: Patient progressing toward established goals. Activity Tolerance:  Patient tolerance of  treatment: good. Pt demonstrated improved gait mechanics following gait training, however benefits discussing gait mechanics prior to gait and  no cues during ambulation, as pt tends to lose attention to task and return to shuffling gait pattern when cues are provided. Pt fatigues at ~40-45ft ambulation and shuffling gait mechanics return. Equipment Recommendations: Other: Has SW, will monitor for needs, may need w/c or RW  Discharge Recommendations:  24 hour supervision or assist, Continue to assess pending progress     Occupational Therapy:  COGNITION: Decreased Insight and Decreased Safety Awareness     IADL:   Patient completed IADL homemaking task this date of gathering snack from fridge - task was graded to challenge walker safety and balance. Patient was able to retrieve all items from fridge and transport to table with CGA and min vcs to bring RW closer to him. Patient required CGA to close SBA  throughout task with a standing endurance of 4 minutes. Patient required repetitive VCs for decreased recall of multiple step instructions.      Pt completed dynamic standing task of transferring laundry from washing machine to dryer to facilitate 1 UE release during dynamic task, completed task with CGA to close SBA with a standing endurance of 3 minutes to complete task. Tolerated well.      BALANCE:  Sitting Balance:  Modified Independent. Standing Balance: Stand By Assistance      BED MOBILITY:  Not Tested     TRANSFERS:  Sit to Stand:  Contact Guard Assistance. Recliner,wheelchair, kitchen chair,  good hand placement   Stand to Sit: [employer]. to recliner with x 1 cue for hand placement with fatigue      FUNCTIONAL MOBILITY:  Assistive Device: Rolling Walker  Assist Level:  Contact Guard Assistance.    Distance: within kitchenette, over hardwood, carpet, R knee pain with extended walkng >20 feet,   No LOB but min unsteadiness and min fatigue exhibited    Pt used BUEs to propell wheelchair at slow pace from room to rehab apartment.      ADDITIONAL ACTIVITIES:  Patient identified a personal goal to increase UB strength and improve overall endurance so they can complete their toilet & shower transfers; skilled edu on UE strengthening and patient completed BUE strengthening exercises x15 reps x1 set this date with a min resistive band in all joints and all planes. Patient tolerated well, requiring occasional rest breaks. Pt required min cues for technique.     ASSESSMENT:  Activity Tolerance:  Patient tolerance of  treatment: fair. Limited by fatigue. Discharge Recommendations: Home with nursing aide, Home with Home health OT   Equipment Recommendations: Other: Pt has a toilet raiser  and shower seat. Need to confirm with spouse. Speech Therapy:  Short-Term Goals:  SHORT TERM GOAL #1:  Goal 1: Pt will complete functional carryover tasks (i.e. orientation, immediate/delayed, working) with 80% accuracy, MOD cues and focus on compensatory memory strategies to enhance retention of newly learned medical information. INTERVENTIONS: Orientation with use of calendar - NO attempt despite max cues  *ST re-oriented pt to current environment; pt with no acknowledgement of understanding      SHORT TERM GOAL #2:  Goal 2: Pt will complete functional problem solving/safety awareness (i.e. time management, safety scenarios, verbal reasoning) with 80% accuracy, mod cues for enhanced safety and ADL contribution.   INTERVENTIONS: Call light button - indep   *reviewed needs for continued use of button for improved safety during IPR stay      SHORT TERM GOAL #3:  Goal 3: Pt will complete expressive language tasks (i.e. higher level confrontational/responsive naming, verbal sequencing, divergent naming) with 80% accuracy, min cues for improved expression of complex thoughts  INTERVENTIONS: Not addressed d/t focus on other goals.      SHORT TERM GOAL #4:  Goal 4: Pt will complete high level auditory/reading comprehension tasks (i.e. complex y/n questions, multi step and complex commands, prescription labels/directories, etc) with 90% accuracy, mod cues for improved understanding of complex medical information. INTERVENTIONS: Executing 1-step commands -- 2/5 indep, 3/5 no attempt despite max cues    Answering yes/no questions -- 2/5 indep, 3/5 no attempt despite max cues   *suspect overall receptive and expressive language skills fully impacted overall success this date      SHORT TERM GOAL #5:  Goal 5: Pt will consume a soft & bite size diet and thin liquids with adherence to skilled swallowing strategies (i.e. small bites/sips, slow rate) without oert s/s of aspiration to meet nutrition/hydration measures safely. INTERVENTIONS: RN Brit Delgadillo with provision of medication at end of session. ST observed pt consuming x2 capsules with use of thin liquids via straw as liquid wash.  Despite decreased oral motor control of straw (likely d/t fatigue), pt with what appeared to be timely swallow trigger followed by NO s/s of overt aspiration.      Long-Term Goals:  Timeframe for Long-term Goals: 4 weeks     LONG TERM GOAL #1:  Goal 1: Patient will improve cognitive status to a min A level to safely return to PLOF home with wife, nearly 24 hour SUP     Comprehension: 3 - Patient understands basic needs 50-74% of the time  Expression: 3 - Expresses basic ideas/needs 50-74% of the time  Social Interaction: 5 - Patient is appropriate with supervision/cues  Problem Solving: 3 - Patient solves simple/routine tasks 50%-74%  Memory: 3 - Patient remembers 50%-74% of the time      EDUCATION:  Learner: Patient  Education:  Reviewed ST goals and Plan of Care, Reviewed recommendations for follow-up, Education Related to Prevention of Recurrence of Impairment/Illness/Injury and Home Safety Education  Evaluation of Education: Demonstrates with assistance and Family not present     ASSESSMENT/PLAN:  Activity Tolerance:  Patient tolerance of treatment: good. Assessment/Plan: Patient progressing toward established goals. Continues to require skilled care of licensed speech pathologist to progress toward achievement of established goals and plan of care. Plan for Next Session: sequencing, recall, reading comprehension, diet analysis     Review of Systems:  Review of Systems   Constitutional: Positive for activity change. Negative for appetite change. HENT: Positive for mouth sores (Lip bite wound) and trouble swallowing. Negative for voice change. Eyes: Negative for visual disturbance. Respiratory: Negative for cough and shortness of breath. Cardiovascular: Positive for leg swelling. Gastrointestinal: Negative for constipation, diarrhea and nausea. Endocrine: Negative for cold intolerance. Genitourinary: Negative for frequency and urgency. Musculoskeletal: Positive for gait problem. Skin: Negative for pallor. Allergic/Immunologic: Negative. Neurological: Positive for dizziness, speech difficulty and weakness. Negative for facial asymmetry and headaches. Hematological: Negative. Psychiatric/Behavioral: Positive for decreased concentration. Negative for confusion, dysphoric mood and hallucinations. The patient is not nervous/anxious. All other systems reviewed and are negative. Objective:  /66   Pulse 70   Temp 98 °F (36.7 °C) (Oral)   Resp 15   Ht 5' 9\" (1.753 m)   Wt 224 lb 9.6 oz (101.9 kg)   SpO2 96%   BMI 33.17 kg/m²   CURRENT VITALS:  height is 5' 9\" (1.753 m) and weight is 224 lb 9.6 oz (101.9 kg). His oral temperature is 98 °F (36.7 °C). His blood pressure is 137/66 and his pulse is 70. His respiration is 15 and oxygen saturation is 96%. Body mass index is 33.17 kg/m².   Temperature Range (24h):Temp: 98 °F (36.7 °C) Temp  Av.8 °F (36.6 °C)  Min: 97.2 °F (36.2 °C)  Max: 98.1 °F (36.7 °C)  BP Range (15Q): Systolic (77RJM), GEN:930 , Min:102 , ZEU:898     Diastolic (44VDL), EPZ:34, Min:55, Max:72    Pulse Range (24h): Pulse  Av.8  Min: 43  Max: 70  Respiration Range (24h): Resp  Avg: 15.5  Min: 15  Max: 16  Current Pulse Ox (24h):  SpO2: 96 %  Pulse Ox Range (24h):  SpO2  Av.3 %  Min: 91 %  Max: 98 %  Oxygen Amount and Delivery:      Awake; but appears sleepy  Orientation:   person, place, time, not situation  Mood: within normal limits  Affect: calm  General appearance:  in no acute distress, left eye conjunctival hemorrhage medial to the iris, up in bed    Memory:   abnormal -decreased recall  Attention/Concentration: abnormal -mildly slowed processing  Language:  abnormal -mild dysarthria    ROM:  normal  Motor Exam: As in table    Manual Muscle Testing:     Sh Abd  Sh IR  Sh ER  Elbow Flex  Elbow Ext    Left  3+   4 4   Right 3+   4 4      Wrist Ext  Wrist Flexion  Finger Flex  Finger Abd  Finger Add    Left    4     Right   4        Hip Flexion  Hip Extension  Hip Abduction  Hip Adduction    Left  2+      Right 2+         Knee Flex  Knee Ext  Ankle DF  Ankle PF  Ankle Inv  Ankle Ev  EHL    Left   3+ 3+ 4      Right  3+ 2 4        Tone:  normal  Muscle bulk: within normal limits  Sensory:  Sensory intact  Coordination:   abnormal - mild decrease for fine motor control of the bilateral hands    Skin: warm and dry, no rash or erythema and abrasion over left forehead above eye and mild swelling of the left orbit  Peripheral vascular: Pulses: Normal upper and lower extremity pulses; Edema: 1+    Diagnostics:   Recent Results (from the past 24 hour(s))   EKG 12 Lead    Collection Time: 20 10:42 AM   Result Value Ref Range    Ventricular Rate 78 BPM    Atrial Rate 78 BPM    P-R Interval 198 ms    QRS Duration 82 ms    Q-T Interval 360 ms    QTc Calculation (Bazett) 410 ms    P Axis 74 degrees    R Axis -41 degrees    T Axis 71 degrees Labs Renal Latest Ref Rng & Units 12/7/2020 12/2/2020 12/1/2020 11/30/2020 11/29/2020   BUN 7 - 22 mg/dL 26(H) 24(H) 26(H) 23(H) 20   Cr 0.4 - 1.2 mg/dL 1.2 1.2 1.2 1.0 0.9   K 3.5 - 5.2 meq/L 4.1 4.4 4.7 4.1 4.2   Na 135 - 145 meq/L 134(L) 140 140 141 140      Recent Labs     12/07/20  0534 12/02/20  0618 12/01/20  0331   WBC 6.1 5.0 5.5   HGB 14.2 15.0 15.0   HCT 45.9 47.7 49.8   .0* 99.6* 101.8*    145 140      Impression:  1. Ground-level fall at home. 2. Gait instability. 3. 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 4. 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 5. Left sixth rib fracture. 6. Abrasion to left forehead. 7. Laceration of left lower lip. 8. Subacute infarct of the right thalamus. 9. Severe cognitive impairments. (MOCA) version 7.2 completed. Patient scored 9/30. 10. Mild oral dysphagia. 11. Mild dysarthria. 12. Mild-moderate expressive language deficits. 13. Left orbital floor fracture with posterior blowout component. 14. Nasal bone fracture. 15. Hematuria. 16. Coronary artery disease with history of 2 stents and now with 90% severe stenosis of the apical portion of the LAD and mild diastolic dysfunction of the left ventricle noted on heart catheterization completed on 10/22/2020 by Dr. Yazmin Le with deployment of PCI to circumflex artery. 17. Medication noncompliance; did not start Brilinta and aspirin for his post stent medical management. 18. Hypertension. 19. Hyperlipidemia. 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 21. CKD 3.  22. Lumbar stenosis with neurogenic claudication status post lumbar laminectomy from L2-L3 bilaterally by Dr. Dayna Titus on 5/29/2015. 23. 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 24. Prostate cancer status post brachytherapy. 25. Macrocytosis. 26. Hyponatremia. 27. Cardiac arrhythmias.     Plan:     Medical management: Per primary team and Dr. Rubens Henriquez. Consultants:  Trauma Surgery, Neurosurgery, Critical Care, Neurology, Cardiology, Plastic Surgery, Family Medicine, Physical Medicine    Narcotic usage:  Not applicable     Last BM:  Stool Amount: Large (12/08/20 2130)    FUNCTIONAL OUTCOMES TOOLS:    HOUSE -      T[place]etti -      TUG - Timed Up and Go: 1.25    Acute/Rehabilitation Problems:  1. Ground-level fall at home. 2. Gait instability. 1. PT/OT. 2. TUG 4 minutes 11 seconds. Improved to 1 minute and 25 seconds on 12/7.  60-69 years:  8.1 seconds; 70-79 years: 9.2 seconds; 80-99 years: 11.3 seconds. 3. 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 1. Initiate TBI guidelines as needed for low stimulation environment. 4. 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 1. Neurosurgery following with no interventions planned. 5. Left sixth rib fracture. 1. Pain control. 1. Lidoderm patches. 2. Tylenol. 2. Symbicort twice daily. 3. Albuterol nebulizer every 6 hours as needed. 6. Abrasion to left forehead. 1. Wound care. 7. Laceration of left lower lip. 1. Healing appropriately. 8. Subacute infarct of the right thalamus. 1. Stable. 2. Continue with therapies. 9. Severe cognitive impairments/Mild oral dysphagia/Mild dysarthria/Mild-moderate expressive language deficits. 1. (MOCA) version 7.2 completed. Patient scored 9/30. Repeat on 12/2 - [facility]) version 8.1 completed. Patient scored 10/30. 2. SLP. 3. Diet downgraded on 12/2 to soft and bite-sized within liquids. 10. Left orbital floor fracture with posterior blowout component/Nasal bone fracture. 1. Conservative treatment of this fracture as the components involve the posterior aspect of the left orbital floor without clinical evidence of inferior rectus impingement. No need to surgical intervention for the nasal bone fractures. 11. Hematuria. 1. Resolved. 12. Coronary artery disease with history of 2 stents and now with 90% severe stenosis of the apical portion of the LAD and mild diastolic dysfunction of the left ventricle noted on heart catheterization completed on 10/22/2020 by Dr. Smooth Del Toro with deployment of PCI to circumflex artery/Medication nonadherence to recommended dual antiplatelet therapy and cardiac rehabilitation following recent cardiac stenting. 1. Aspirin has been started. 2. Holding Brilinta for now. 13. Cardiac arrhythmias. 1. Cardiology consult. 14. Nutrition:  Consultation to dietician for nutritional counseling and recommendations. 1. Total protein 6.3 and albumin slightly low at 3.4 on 12/2/2020.  2. Vitamin D 25 hydroxy normal at 68 on 12/2/2020. 15. Electrolytes. 1. Normal on 12/7. 16. Bladder: Noted on admission. Follow for now. 17. Bowel: Senna, colace, MOM  18. Rehabilitation nursing will be involved for bowel, bladder, skin, and pain management. Nursing will also provide education and training to patient and family. 19. Prophylaxis:  DVT: Lovenox. GI: None. 20.  and case management consultations for coordination of care and discharge planning. Chronic Problems:  1. Hypertension. 1. Amlodipine. 2. Bumex. 3. Cozaar. 4. Lopressor. 2. Hyperlipidemia/Statin induced myositis. 1. Pravachol. 3. CKD 3.  1. Cr/BUN/GFR on 12/2 was 1.2/24/70 which is stable over prior labs. 4. Lumbar stenosis with neurogenic claudication status post lumbar laminectomy from L2-L3 bilaterally by Dr. Neo Martin on 5/29/2015.  5. 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 6. Prostate cancer status post brachytherapy. 1. Tamsulosin. Labs reviewed on:  1. 12/1.  2. 12/2.  3. 12/7. Infectious Disease:  1.  N/A    Missed Therapy Time:  None     DME:    Discharge Plan:  Estimated Discharge Date: 12/16   Destination: home health  Services at Discharge: [address] [place] [address] Occupational Therapy, Speech Therapy, Nursing and aide 3x week  Is patient appropriate for an outpatient driving evaluation? no  Equipment at Discharge: RW    Greater than 50% of 30 minutes was spent with the patient reviewing his progress with therapy and potential recommendations to address his right knee pain with Voltaren gel.      Uriah Silva MD

## 2020-12-10 NOTE — FLOWSHEET NOTE
Prayer and encouragement     12/10/20 1513   Encounter Summary   Services provided to: Patient   Referral/Consult From: Rounding   Continue Visiting Yes  (12/10)   Complexity of Encounter Low   Length of Encounter 15 minutes   Routine   Type Follow up   Assessment Approachable;Calm   Intervention Nurtured hope;Prayer;[place]   Outcome Acceptance;Expressed gratitude;Encouraged; Hopeful;Receptive

## 2020-12-10 NOTE — PROGRESS NOTES
Per physical therapy Penelope Mason stating patient heart rate 50's to 90's sitting and when standing was 112. Assessment of patient and he denies left arm, jaw, chest discomfort, skin warm and dry. Will continue to monitor.

## 2020-12-10 NOTE — PLAN OF CARE
Problem: Falls - Risk of:  Goal: Will remain free from falls  Description: Will remain free from falls  Outcome: Met This Shift  Note: Patient is free from falling and uses his call light when needed assistance. Problem: IP BOWEL ELIMINATION  Goal: LTG - patient will have regular and routine bowel evacuation  Outcome: Met This Shift  Note: Patient has had regular bowel movements without any medication at this time.      Problem: IP BLADDER/VOIDING  Goal: LTG - patient will complete bladder elimination  Outcome: Met This Shift  Note: Patient has been continent of urine and uses his urinal.     Problem: Skin Integrity:  Goal: Absence of new skin breakdown  Description: Absence of new skin breakdown  Outcome: Met This Shift  Note: Patient has not had any new skin breakdown

## 2020-12-10 NOTE — PROGRESS NOTES
Karoline Luong was evaluated today and a DME order was entered for a wheeled walker because he requires this to successfully complete daily living tasks of toileting, personal cares, ambulating, grooming and hygiene. A wheeled walker is necessary due to the patient's unsteady gait, upper body weakness, and inability to  an ambulation device; and he can ambulate only by pushing a walker instead of a lesser assistive device such as a cane, crutch, or standard walker. The need for this equipment was discussed with the patient and he understands and is in agreement.

## 2020-12-10 NOTE — PROGRESS NOTES
Denies pain at current time. Calm, appropriate interaction with staff. Alert, oriented to person, place, time. GONZALO 3mm to 2mm brisk. Mucous membranes pink, moist. Hair dry, shiny. Skin warm, dry. Speech sluggish, able to understand. Denies difficulty swallowing. Lung sounds clear anterior, posterior, lateral, diminished lower posterior bilateral. Respirations easy, regular. No cough noted. Heart sounds distant, irregular. Bowel sounds active, all 4 quadrants. Abdomen round, firm, non-tender to palpation. Bruising lower left abdomen due to injection. States passing gas. Denies problems urinating. Last bowel movement 12-. Radial pulse weak, irregular, bilateral. Hand grasp strong, bilateral. Arm drift negative, bilateral. Capillary refill less than 3 seconds. Skin turgor brisk. No edema noted. Pedal pulse weak, irregular, bilateral. Pedal push pull strong, bilateral. Ya's sign negative, bilateral. Leg lift strong, bilateral. Denies numbness, tingling. Resting in chair watching TV. Wheels locked. Call light within reach. Over bed table within reach. Denies further needs at current time.

## 2020-12-10 NOTE — PROGRESS NOTES
[address] [place] [address]  INPATIENT PHYSICAL THERAPY  DAILY NOTE  [address] [facility],4[address] - [address]    Time In: 0800  Time Out: 0830  Timed Code Treatment Minutes: 30 Minutes  Minutes: 30          Date: 12/10/2020  Patient Name: Abbe Benavides,  Gender:  male        MRN: [id number]  : 1935  (81 y.o.)     Referring Practitioner: Doria Mohs, MD  Diagnosis: Intraventricular hemorrhage  Additional Pertinent Hx: Pt presenting at [facility] by activation of level 2 trauma, brought by EMS following a unwitnessed fall with unknown LOC; past medical history includes recent cardiac sten placement x2, hypertension, chronic kidney disease, CAD. Per wife report, she was standing in the bathroom when she heard a sound in the kitchen she open the bathroom door to see her  laying face down on the ground. Patient states he normally ambulates with walker, did have a walker at the time of fall, unclear if fall was precipitated by syncopal episode. Wife states that  does appear to be confused post fall, has been repetitive in speech since incident. Patient reports some mild discomfort over left face otherwise has no complaints on exam.  Found to have an intraventricular hemorrhage, a left orbital fx, nasal bone fx, and left 6th rib fx. To IP rehab .      Prior Level of Function:  Lives With: Spouse  Type of Home: House  Home Layout: One level  Home Access: Stairs to enter with rails  Entrance Stairs - Number of Steps: 1 step to enter and 1 step once inside the house  Entrance Stairs - Rails: Left  Home Equipment: Standard walker   Bathroom Shower/Tub: Walk-in shower  Bathroom Toilet: Handicap height  Bathroom Equipment: Built-in shower seat, Grab bars around toilet, Grab bars in shower  Bathroom Accessibility: Accessible    Receives Help From: Family  ADL Assistance: [address] [place] [address] Avenue: Independent  Homemaking Responsibilities: No  Ambulation Assistance: Independent  Transfer Assistance: Independent  Active : No  Additional Comments: Pt ambualated short distances with a standard walker. He did his own self care. He had help with all of the cooking and cleaning prior to admission. Restrictions/Precautions:  Restrictions/Precautions: Fall Risk, General Precautions  Position Activity Restriction  Other position/activity restrictions: Keep systolic blood pressure between 100-160 while moving. SUBJECTIVE: Pt up in restroom with student RN present upon PTA arrival, pleasant and agreeable to therapy. PAIN: 5/10: R knee. Student RN in to give tylenol    OBJECTIVE:  Bed Mobility:  Not Tested    Transfers:  Sit to Stand: Contact Guard Assistance  Stand to [employer] Assistance    Ambulation:  Contact Guard Assistance  Distance: 55 feet x1   Surface: Level Tile  Device:Rolling Walker  Gait Deviations: Forward Flexed Posture, Slow [place], Decreased Step Length Bilaterally, Decreased Weight Shift Right, Decreased Gait Speed, Decreased Heel Strike Bilaterally and Cuing for improved upright posture. Balance:  Dynamic Standing Balance: Contact Guard Assistance, Pt up in restroom managing depends and standing to wash hands at sink. Pt required a wide LEXII, CGA and support from AD to safely maintain balance. Exercise:  Patient was guided in 1 set(s) 15 reps of exercise to both lower extremities. Seated marches, Seated hamstring curls, Seated heel/toe raises, Long arc quads and Seated isometric hip adduction. Exercises were completed for increased independence with functional mobility. Functional Outcome Measures: Not completed       ASSESSMENT:  Assessment: Patient progressing toward established goals. and Pt tolerated session fairly well. Limited by chronic R knee pain, decreased balance, decreased endurance. Activity Tolerance:  Patient tolerance of  treatment: good. Equipment Recommendations: Other: Has SW, will monitor for needs, may need w/c or RW  Discharge Recommendations:  24 hour supervision or assist, Continue to assess pending progress    Plan: Times per week: 5x/week 90 min, 1x/week 30 min  Current Treatment Recommendations: Strengthening, Safety Education & Training, Balance Training, Endurance Training, Functional Mobility Training, Patient/Caregiver Education & Training, Transfer Training, Gait Training, Wheelchair Mobility Training, Neuromuscular Re-education, Home Exercise Program, Stair training    Patient Education  Patient Education: Plan of Care, Transfers, Gait    Goals:  Patient goals : does not state  Short term goals  Time Frame for Short term goals: 2 weeks  Short term goal 1: Pt to transfer supine <--> sit SBA to enable pt to get in/out of bed. Short term goal 2: Pt to transfer sit <--> stand SBA for increased functional mobility. Short term goal 3: Pt to ambulate 100 feet with SW/RW CGA for household and community ambulation. Long term goals  Time Frame for Long term goals : 4 weeks  Long term goal 1: Pt to transfer supine <--> sit SBA to enable pt to get in/out of bed. Long term goal 2: Pt to transfer sit <--> stand supervision for increased functional mobility. Long term goal 3: Pt to ambulate >150 feet with SW/RW SBA for household and community ambulation. Long term goal 4: Pt to ascend/descend 1 step with SW/RW SBA for home entry. Long term goal 5: Pt to perform car transfer CGA to enable pt to get in/out of the car. Long term goal 6: Pt to improve TUG test score to <45 sec to indicate improvement in overall mobility. Following session, patient left in safe position with all fall risk precautions in place.

## 2020-12-10 NOTE — PROGRESS NOTES
Resting in chair with feet elevated. Wheels locked. Call light within reach. Over bed table within reach. Denies further needs at current time.

## 2020-12-10 NOTE — PROGRESS NOTES
[address] [place] [address]  INPATIENT PHYSICAL THERAPY  DAILY NOTE  [address] [place],[address] - [address]    Time In: 1130  Time Out: 1230  Timed Code Treatment Minutes: 60 Minutes  Minutes: 60          Date: 12/10/2020  Patient Name: Bowen Sepulveda,  Gender:  male        MRN: [id number]  : 1935  (81 y.o.)     Referring Practitioner: Bev Gibbs MD  Diagnosis: Intraventricular hemorrhage  Additional Pertinent Hx: Pt presenting at [facility] by activation of level 2 trauma, brought by EMS following a unwitnessed fall with unknown LOC; past medical history includes recent cardiac sten placement x2, hypertension, chronic kidney disease, CAD. Per wife report, she was standing in the bathroom when she heard a sound in the kitchen she open the bathroom door to see her  laying face down on the ground. Patient states he normally ambulates with walker, did have a walker at the time of fall, unclear if fall was precipitated by syncopal episode. Wife states that  does appear to be confused post fall, has been repetitive in speech since incident. Patient reports some mild discomfort over left face otherwise has no complaints on exam.  Found to have an intraventricular hemorrhage, a left orbital fx, nasal bone fx, and left 6th rib fx. To IP rehab .      Prior Level of Function:  Lives With: Spouse  Type of Home: House  Home Layout: One level  Home Access: Stairs to enter with rails  Entrance Stairs - Number of Steps: 1 step to enter and 1 step once inside the house  Entrance Stairs - Rails: Left  Home Equipment: Standard walker   Bathroom Shower/Tub: Walk-in shower  Bathroom Toilet: Handicap height  Bathroom Equipment: Built-in shower seat, Grab bars around toilet, Grab bars in shower  Bathroom Accessibility: Accessible    Receives Help From: Family  ADL Assistance: [facility]: Independent  Homemaking Responsibilities: No  Ambulation Assistance: Independent  Transfer Assistance: Independent  Active : No  Additional Comments: Pt ambualated short distances with a standard walker. He did his own self care. He had help with all of the cooking and cleaning prior to admission. Restrictions/Precautions:  Restrictions/Precautions: Fall Risk, General Precautions  Position Activity Restriction  Other position/activity restrictions: Keep systolic blood pressure between 100-160 while moving. SUBJECTIVE: Pt presented reclined in bedside chair, pleasant and agreeable to participate in therapy. Requested to use urinal prior to beginning session. Pt HR monitored throughout session, see assessment for details. Post session, pt reclined in bedside chair with chair alarm on and all needs within reach. PAIN: 5/10: R knee    OBJECTIVE:  Bed Mobility:  Not Tested    Transfers:  Sit to Stand: [address], with increased time for completion, to/from chair with arms  Stand to [facility], to/from chair with arms    Ambulation:  Contact Guard Assistance  Distance: 70ft and 90ft   Surface: Level Tile  Device:Rolling Walker, second trial also with DF assist  Gait Deviations: Forward Flexed Posture, Decreased Step Length on Left, Decreased Gait Speed, Decreased Heel Strike on Left and pt  Demonstrates decreased foot clearance on L at ~50ft due to fatigue. Second gait trial performed with L DF assist with improved carryover of L foot clearance and L heel strike for entire gait trial.    Balance:  Static Standing Balance: Contact Guard Assistance  Dynamic Standing Balance: Contact Guard Assistance     *Pt performed ring toss, requiring pt to reach outside LEXII with each UE in all directions. Performed with CGA and no UE support. Cues throughout for erect posture. Performed to challenge pt dynamic stability and promote increase proprioception. *Perform clothes pin activity requiring pt to reach overhead with each UE.  Performed with CGA and single UE support on RW. Performed to promote erect posture and improve pt stability. Functional Outcome Measures: Not completed       ASSESSMENT:  Assessment: Patient progressing toward established goals. Activity Tolerance:  Patient tolerance of  treatment: fair. Pt HR monitored throughout session due to episode of bradycardia on 12/9. Pt resting HR varied from 50-90, although pt denied symptoms. With standing activity pt HR increased to ~115. Post standing activity HR would remain ~100 for 2-3 min, then decline to 50s-90s. Post session, pt reported dizziness when returning to bedside chair, HR at 62bpm. Feet elevated and symptoms subsided quickly. RN and MD aware of cardiac changes. Demonstrated improved gait mechanics with DF assist.  Equipment Recommendations:Equipment Needed: Yes  Mobility Devices: Cindra Setswana: Rolling  Other: Has SW, will monitor for needs,  RW  Discharge Recommendations:  24 hour supervision or assist, Continue to assess pending progress    Plan: Times per week: 5x/week 90 min, 1x/week 30 min  Current Treatment Recommendations: Strengthening, Safety Education & Training, Balance Training, Endurance Training, Functional Mobility Training, Patient/Caregiver Education & Training, Transfer Training, Gait Training, Wheelchair Mobility Training, Neuromuscular Re-education, Home Exercise Program, Stair training    Patient Education  Patient Education: Plan of Care, Transfers, Gait, Verbal Exercise Instruction    Goals:  Patient goals : does not state  Short term goals  Time Frame for Short term goals: 2 weeks  Short term goal 1: Pt to transfer supine <--> sit SBA to enable pt to get in/out of bed. Short term goal 2: Pt to transfer sit <--> stand SBA for increased functional mobility. Short term goal 3: Pt to ambulate 100 feet with SW/RW CGA for household and community ambulation.   Long term goals  Time Frame for Long term goals : 4 weeks  Long term goal 1: Pt to transfer supine <-->

## 2020-12-10 NOTE — PROGRESS NOTES
washing face while standing at sink  Lower Extremity Dressing: Dependent. for donning shoes while seated on EOB.     BALANCE:  Sitting Balance:  Supervision. Pt sat on EOB x4 min while HR was taken  Standing Balance: Contact Guard Assistance.       BED MOBILITY:  Supine to Sit: Stand By Assistance    Sit to Supine: Stand By Assistance    Scooting: Stand By Assistance     Additional time was required for completion of all bed mobility.      TRANSFERS:  Sit to Stand:  Minimal Assistance. From EOB and recliner, pt required 1 VC for safe hand placement during initial t/f of session. Stand to Sit: Contact Guard Assistance.       FUNCTIONAL MOBILITY:  Assistive Device: Rolling Walker  Assist Level:  Contact Guard Assistance. Distance: To and from bathroom  Pt completed functional mobility to/from bathroom with mod encouragement. Pt slightly unbalanced. Pt required min VC for safety d/t R foot being outside of RW during initial sit to stand prior to mobility. No LOB occurred.       ADDITIONAL ACTIVITIES:  Pt stated that he always makes his bed at home. Pt was agreeable to grading activity down by raising bed to waist and making bed with sheet only. Pt completed simple homemaking task with CGA and min VC. Pt demoed problem solving by utilizing reacher for the far side of the bed, while using hands for near side. Pt able to complete task with side stepping at bed. Pt demoed good safety throughout task. No LOB occurred.      ASSESSMENT:  Assessment: Macho Sidhu is progressing towards his goals, meeting or part met 4/5 STGs this week. Pt has progressed to completing transfers and standing ADL tasks with CGA, requiring assist with LB dressing and bathing with min assistance. Increased time needed for problem solving and task completion throughout. This is a significant decline from being independent with self care and basic mobility PTA.  Pt would benefit from additional skilled OT services to address increasing independence in self care and basic mobility to return home as indepedently as possible to reduce the incidence of falls and care giver burdon. Activity Tolerance:  Patient tolerance of  treatment: fair. Pt required increased encouragement. Pt's HR was 73 upon arrival, 77 after ambulating to/from bathroom, and 74 during simple homemaking task. Left session with HR at 68      Discharge Recommendations: Home with nursing aide, Home with Home health OT    Equipment Recommendations: Other: Pt has a toilet raiser  and shower seat. Need to confirm with spouse. Speech Therapy:  Short-Term Goals:  SHORT TERM GOAL #1:  Goal 1: Pt will complete functional carryover tasks (i.e. orientation, immediate/delayed, working) with 80% accuracy, MOD cues and focus on compensatory memory strategies to enhance retention of newly learned medical information. INTERVENTIONS: Orientation - 100% indep withOUT use of calendadr      SHORT TERM GOAL #2:  Goal 2: Pt will complete functional problem solving/safety awareness (i.e. time management, safety scenarios, verbal reasoning) with 80% accuracy, mod cues for enhanced safety and ADL contribution. INTERVENTIONS: Interpretation of rehab schedule - 1/5 indep, 2/5 min cues, 2/5 max cues  *decreased mental flexibility  *majority of errors r/t time management      Written IADL/ADL 4-step sequencin. Visiting a friend in the hospital -  indep  2. Doing laundry - /4 indep, 2/4 mod cues  3. Making deposit at the bank -  indep  4. Volunteering at a park -  indep  5.  Having oil changed in car - 2/4 indep, 1/4 min cues, 1/4 mod cues  *decreased sequential thought processing  *POOR sustained and selective attention throughout task      SHORT TERM GOAL #3:  Goal 3: Pt will complete expressive language tasks (i.e. higher level confrontational/responsive naming, verbal sequencing, divergent naming) with 80% accuracy, min cues for improved expression of complex thoughts  INTERVENTIONS: Not addressed d/t focus on other goals.      SHORT TERM GOAL #4:  Goal 4: Pt will complete high level auditory/reading comprehension tasks (i.e. complex y/n questions, multi step and complex commands, prescription labels/directories, etc) with 90% accuracy, mod cues for improved understanding of complex medical information. INTERVENTIONS: Not addressed d/t focus on other goals.      SHORT TERM GOAL #5:  Goal 5: Pt will consume a soft & bite size diet and thin liquids with adherence to skilled swallowing strategies (i.e. small bites/sips, slow rate) without oert s/s of aspiration to meet nutrition/hydration measures safely.   INTERVENTIONS: Not addressed d/t focus on other goals.     Long-Term Goals:  Timeframe for Long-term Goals: 4 weeks     LONG TERM GOAL #1:  Goal 1: Patient will improve cognitive status to a min A level to safely return to [facility] home with wife, nearly 24 hour SUP     Comprehension: 3 - Patient understands basic needs 50-74% of the time  Expression: 3 - Expresses basic ideas/needs 50-74% of the time  Social Interaction: 5 - Patient is appropriate with supervision/cues  Problem Solving: 3 - Patient solves simple/routine tasks 50%-74%  Memory: 3 - Patient remembers 50%-74% of the time      ST FIM ASSESSMENT:       Admission score Current score Goal Status   COMMUNICATION 4 - Patient understands basic needs 75-90%+ of the time    3 - Patient understands basic needs 50-74% of the time    Not Progressing   EXPRESSION 4 - Expresses basic ideas/needs 75-90%+ of the time       3 - Expresses basic ideas/needs 50-74% of the time    Not Progressing   SOCIAL INTERACTION 5 - Patient is appropriate with supervision/cues    5 - Patient is appropriate with supervision/cues    Stable   PROBLEM SOLVING 1 - Patient solves simple/routine tasks < 25%    3 - Patient solves simple/routine tasks 50%-74%    Progressing   MEMORY 1 - Patient remembers < 25% of the time    3 - Patient remembers 50%-74% of the time    Progressing    EDUCATION:  Learner: Patient  Education:  Reviewed ST goals and Plan of Care, Reviewed recommendations for follow-up, Education Related to Prevention of Recurrence of Impairment/Illness/Injury and Home Safety Education  Evaluation of Education: Demonstrates with assistance and Family not present     ASSESSMENT/PLAN:  SUMMARY:  Pt has met 5/5 STG's and 0/1 LTG's this therapy period (goals addressed and adjusted 12/07/2020). Continues to present with a moderate cognitive impairment and a mild expressive/receptive language impairment characterized by recent score of 10/30 on the [facility] as well identified deficits in auditory comprehension, functional problem solving, thought organization, verbal reasoning/higher level expression, mathematical computation, executive functioning, and sequencing. Improvements made in basic orientation with use of calendar, immediate recall, and telling time. Pt also presents with mild oral dysphagia characterized by prolonged and uncoordinated mastication resulting in decreased bolus formation with consumption of coarse solids. Has been safely consuming soft and bite sized diet with thin liquids; will continue to complete dietary analyses and determine appropriateness for initiation of advanced texture trials as indicated. Pt with overall low cognitive demand in home setting given assistance from wife, however, was fully independent with basic ADL completion. Recommend continud ST services at this time to continue improving cognition and assisting in dysphagia management, however, given baseline cognitive functioning, will not require f/u ST services in home setting (plan to complete dysphagia education prior to discharge). Will require 24/7 supervision.      Activity Tolerance:  Patient tolerance of treatment: good. Assessment/Plan: Patient progressing toward established goals.   Continues to require skilled care of licensed speech pathologist to progress toward achievement of established goals and plan of care. Plan for Next Session: recall, reading comprehension, diet analysis     Review of Systems:  Review of Systems   Constitutional: Positive for activity change. Negative for appetite change. HENT: Positive for mouth sores (Lip bite wound) and trouble swallowing. Negative for voice change. Eyes: Negative for visual disturbance. Respiratory: Negative for cough and shortness of breath. Cardiovascular: Positive for leg swelling. Gastrointestinal: Negative for constipation, diarrhea and nausea. Endocrine: Negative for cold intolerance. Genitourinary: Negative for frequency and urgency. Musculoskeletal: Positive for gait problem. Skin: Negative for pallor. Allergic/Immunologic: Negative. Neurological: Positive for speech difficulty and weakness. Negative for dizziness, facial asymmetry and headaches. Hematological: Negative. Psychiatric/Behavioral: Positive for decreased concentration. Negative for confusion, dysphoric mood and hallucinations. The patient is not nervous/anxious. All other systems reviewed and are negative. Objective:  /66   Pulse 70   Temp 98 °F (36.7 °C) (Oral)   Resp 15   Ht 5' 9\" (1.753 m)   Wt 224 lb 9.6 oz (101.9 kg)   SpO2 96%   BMI 33.17 kg/m²   CURRENT VITALS:  height is 5' 9\" (1.753 m) and weight is 224 lb 9.6 oz (101.9 kg). His oral temperature is 98 °F (36.7 °C). His blood pressure is 137/66 and his pulse is 70. His respiration is 15 and oxygen saturation is 96%. Body mass index is 33.17 kg/m².   Temperature Range (24h):Temp: 98 °F (36.7 °C) Temp  Av.8 °F (36.6 °C)  Min: 97.2 °F (36.2 °C)  Max: 98.1 °F (36.7 °C)  BP Range (76L): Systolic (86IRL), BYO:591 , Min:102 , SMF:302     Diastolic (86NWV), FJD:59, Min:55, Max:72    Pulse Range (24h): Pulse  Av.8  Min: 43  Max: 70  Respiration Range (24h): Resp  Avg: 15.5  Min: 15  Max: 16  Current Pulse Ox (24h):  SpO2: 96 %  Pulse Ox Range (24h):  SpO2  Avg: 95.3 %  Min: 91 %  Max: 98 %  Oxygen Amount and Delivery:      awake  Orientation:   person, place, time, not situation  Mood: within normal limits  Affect: calm  General appearance:  in no acute distress, left eye conjunctival hemorrhage medial to the iris, up in therapy gym with Physical Therapy. Memory:   abnormal -decreased recall  Attention/Concentration: abnormal -mildly slowed processing  Language:  abnormal -mild dysarthria    ROM:  normal  Motor Exam: As in table    Manual Muscle Testing:     Sh Abd  Sh IR  Sh ER  Elbow Flex  Elbow Ext    Left  3+   4 4   Right 3+   4 4      Wrist Ext  Wrist Flexion  Finger Flex  Finger Abd  Finger Add    Left    4     Right   4        Hip Flexion  Hip Extension  Hip Abduction  Hip Adduction    Left  2+      Right 2+         Knee Flex  Knee Ext  Ankle DF  Ankle PF  Ankle Inv  Ankle Ev  EHL    Left   3+ 3+ 4      Right  3+ 2 4        Tone:  normal  Muscle bulk: within normal limits  Sensory:  Sensory intact  Coordination:   abnormal - mild decrease for fine motor control of the bilateral hands    Skin: warm and dry, no rash or erythema and abrasion over left forehead above eye and mild swelling of the left orbit  Peripheral vascular: Pulses: Normal upper and lower extremity pulses; Edema: 1+    Diagnostics:   Recent Results (from the past 24 hour(s))   EKG 12 Lead    Collection Time: 12/09/20 10:42 AM   Result Value Ref Range    Ventricular Rate 78 BPM    Atrial Rate 78 BPM    P-R Interval 198 ms    QRS Duration 82 ms    Q-T Interval 360 ms    QTc Calculation (Bazett) 410 ms    P Axis 74 degrees    R Axis -41 degrees    T Axis 71 degrees     Labs Renal Latest Ref Rng & Units 12/7/2020 12/2/2020 12/1/2020 11/30/2020 11/29/2020   BUN 7 - 22 mg/dL 26(H) 24(H) 26(H) 23(H) 20   Cr 0.4 - 1.2 mg/dL 1.2 1.2 1.2 1.0 0.9   K 3.5 - 5.2 meq/L 4.1 4.4 4.7 4.1 4.2   Na 135 - 145 meq/L 134(L) 140 140 141 140      Recent Labs     12/07/20  0534 12/02/20  0618 12/01/20  0331   WBC 6.1 5.0 5. 5   HGB 14.2 15.0 15.0   HCT 45.9 47.7 49.8   .0* 99.6* 101.8*    145 140      Impression:  1. Ground-level fall at home. 2. Gait instability. 3. 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 4. 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 5. Left sixth rib fracture. 6. Abrasion to left forehead. 7. Laceration of left lower lip. 8. Subacute infarct of the right thalamus. 9. Severe cognitive impairments. (MOCA) version 7.2 completed. Patient scored 9/30. 10. Mild oral dysphagia. 11. Mild dysarthria. 12. Mild-moderate expressive language deficits. 13. Left orbital floor fracture with posterior blowout component. 14. Nasal bone fracture. 15. Hematuria. 16. Coronary artery disease with history of 2 stents and now with 90% severe stenosis of the apical portion of the LAD and mild diastolic dysfunction of the left ventricle noted on heart catheterization completed on 10/22/2020 by Dr. Yazmin Le with deployment of PCI to circumflex artery. 17. Medication noncompliance; did not start Brilinta and aspirin for his post stent medical management. 18. Hypertension. 19. Hyperlipidemia. 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 21. CKD 3.  22. Lumbar stenosis with neurogenic claudication status post lumbar laminectomy from L2-L3 bilaterally by Dr. Dayna Titus on 5/29/2015. 23. 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 24. Prostate cancer status post brachytherapy. 25. Macrocytosis. 26. Hyponatremia. 27. Cardiac arrhythmia with symptomatic bradycardia. Plan:     Medical management: Per primary team and Dr. Sissy Mcguire.     Consultants:  Trauma Surgery, Neurosurgery, Critical Care, Neurology, Cardiology, Plastic Surgery, Family Medicine, Physical Medicine    Narcotic usage:  Not applicable     Last BM:  Stool Amount: Large (12/08/20 2130)    FUNCTIONAL OUTCOMES TOOLS:    HOUSE - Tinetti -      TUG - Timed Up and Go: 1.25    Acute/Rehabilitation Problems:  1. Ground-level fall at home. 2. Gait instability. 1. PT/OT. 2. TUG 4 minutes 11 seconds. Improved to 1 minute and 25 seconds on 12/7.  60-69 years:  8.1 seconds; 70-79 years: 9.2 seconds; 80-99 years: 11.3 seconds. 3. 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 1. Initiate TBI guidelines as needed for low stimulation environment. 4. 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 1. Neurosurgery following with no interventions planned. 5. Left sixth rib fracture. 1. Pain control. 1. Lidoderm patches. 2. Tylenol. 2. Symbicort twice daily. 3. Albuterol nebulizer every 6 hours as needed. 6. Abrasion to left forehead. 1. Wound care. 7. Laceration of left lower lip. 1. Healing appropriately. 8. Subacute infarct of the right thalamus. 1. Stable. 2. Continue with therapies. 9. Severe cognitive impairments/Mild oral dysphagia/Mild dysarthria/Mild-moderate expressive language deficits. 1. (MOCA) version 7.2 completed. Patient scored 9/30. Repeat on 12/2 - [facility]) version 8.1 completed. Patient scored 10/30. 2. SLP. 3. Diet downgraded on 12/2 to soft and bite-sized within liquids. 10. Left orbital floor fracture with posterior blowout component/Nasal bone fracture. 1. Conservative treatment of this fracture as the components involve the posterior aspect of the left orbital floor without clinical evidence of inferior rectus impingement. No need to surgical intervention for the nasal bone fractures. 11. Hematuria. 1. Resolved.   12. Coronary artery disease with history of 2 stents and now with 90% severe stenosis of the apical portion of the LAD and mild diastolic dysfunction of the left ventricle noted on heart catheterization completed on 10/22/2020 by Dr. Lyly Gibbs with deployment of PCI to circumflex artery/Medication nonadherence to recommended dual antiplatelet therapy and cardiac rehabilitation following recent cardiac stenting. 1. Aspirin has been started. 2. Holding Brilinta for now. 13. Cardiac arrhythmia with symptomatic bradycardia. 1. Cardiology consult. 2. Metoprolol discontinued on 12/9. Plan is to monitor patient. 3. EKG on 12/9 with normal sinus rhythm, pulmonary disease pattern, left axis deviation with absence of prior supraventricular complexes. 14. Nutrition:  Consultation to dietician for nutritional counseling and recommendations. 1. Total protein 6.3 and albumin slightly low at 3.4 on 12/2/2020.  2. Vitamin D 25 hydroxy normal at 68 on 12/2/2020. 15. Electrolytes. 1. Normal on 12/7. 16. Bladder: Noted on admission. Follow for now. 17. Bowel: Senna, colace, MOM  18. Rehabilitation nursing will be involved for bowel, bladder, skin, and pain management. Nursing will also provide education and training to patient and family. 19. Prophylaxis:  DVT: Lovenox. GI: None. 20.  and case management consultations for coordination of care and discharge planning. Chronic Problems:  1. Hypertension. 1. Amlodipine. 2. Bumex. 3. Cozaar. 4. Lopressor. Discontinued on 12/9 due to bradycardia. 2. Hyperlipidemia/Statin induced myositis. 1. Pravachol. 3. CKD 3.  1. Cr/BUN/GFR on 12/2 was 1.2/24/70 which is stable over prior labs. 4. Lumbar stenosis with neurogenic claudication status post lumbar laminectomy from L2-L3 bilaterally by Dr. Cachorro Sun on 5/29/2015.  5. 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 6. Prostate cancer status post brachytherapy. 1. Tamsulosin. Labs reviewed on:  1. 12/1.  2. 12/2.  3. 12/7. Infectious Disease:  1. N/A    Missed Therapy Time:  12/9  60 minutes of Occupational Therapy missed secondary to the patient having symptomatic bradycardia.     DME:    Discharge Plan:  Estimated Discharge Date: 12/16   Destination: home health  Services at Discharge: [address] [place] [address] Rd, Occupational Therapy, Speech Therapy, Nursing and aide 3x week  Is patient appropriate for an outpatient driving evaluation? no  Equipment at Discharge: RW    Greater than 50% of 30 minutes was spent with the patient reviewing the plan per cardiology including stopping his metoprolol and monitoring him, his EKG, and his progress with therapy.     Garry Hyde MD

## 2020-12-11 PROCEDURE — 6370000000 HC RX 637 (ALT 250 FOR IP): Performed by: SURGERY

## 2020-12-11 PROCEDURE — 6370000000 HC RX 637 (ALT 250 FOR IP): Performed by: FAMILY MEDICINE

## 2020-12-11 PROCEDURE — 97129 THER IVNTJ 1ST 15 MIN: CPT

## 2020-12-11 PROCEDURE — 1180000000 HC REHAB R&B

## 2020-12-11 PROCEDURE — 6360000002 HC RX W HCPCS: Performed by: PHYSICAL MEDICINE & REHABILITATION

## 2020-12-11 PROCEDURE — 94640 AIRWAY INHALATION TREATMENT: CPT

## 2020-12-11 PROCEDURE — 6370000000 HC RX 637 (ALT 250 FOR IP): Performed by: INTERNAL MEDICINE

## 2020-12-11 PROCEDURE — 2709999900 HC NON-CHARGEABLE SUPPLY

## 2020-12-11 PROCEDURE — 93660 TILT TABLE EVALUATION: CPT | Performed by: INTERNAL MEDICINE

## 2020-12-11 PROCEDURE — 97535 SELF CARE MNGMENT TRAINING: CPT

## 2020-12-11 PROCEDURE — 97116 GAIT TRAINING THERAPY: CPT

## 2020-12-11 PROCEDURE — 97530 THERAPEUTIC ACTIVITIES: CPT

## 2020-12-11 PROCEDURE — 97110 THERAPEUTIC EXERCISES: CPT

## 2020-12-11 PROCEDURE — 94760 N-INVAS EAR/PLS OXIMETRY 1: CPT

## 2020-12-11 PROCEDURE — C1764 EVENT RECORDER, CARDIAC: HCPCS

## 2020-12-11 PROCEDURE — 97130 THER IVNTJ EA ADDL 15 MIN: CPT

## 2020-12-11 RX ORDER — CITALOPRAM 20 MG/1
20 TABLET ORAL NIGHTLY
Status: DISCONTINUED | OUTPATIENT
Start: 2020-12-11 | End: 2020-12-16 | Stop reason: HOSPADM

## 2020-12-11 RX ADMIN — ENOXAPARIN SODIUM 40 MG: 40 INJECTION SUBCUTANEOUS at 08:34

## 2020-12-11 RX ADMIN — FAMOTIDINE 20 MG: 20 TABLET, FILM COATED ORAL at 08:34

## 2020-12-11 RX ADMIN — BUMETANIDE 1 MG: 1 TABLET ORAL at 08:34

## 2020-12-11 RX ADMIN — AMLODIPINE BESYLATE 5 MG: 5 TABLET ORAL at 08:34

## 2020-12-11 RX ADMIN — LOSARTAN POTASSIUM 100 MG: 100 TABLET, FILM COATED ORAL at 08:34

## 2020-12-11 RX ADMIN — BUDESONIDE AND FORMOTEROL FUMARATE DIHYDRATE 2 PUFF: 80; 4.5 AEROSOL RESPIRATORY (INHALATION) at 07:33

## 2020-12-11 RX ADMIN — CITALOPRAM 20 MG: 20 TABLET, FILM COATED ORAL at 20:22

## 2020-12-11 RX ADMIN — BUDESONIDE AND FORMOTEROL FUMARATE DIHYDRATE 2 PUFF: 80; 4.5 AEROSOL RESPIRATORY (INHALATION) at 18:38

## 2020-12-11 RX ADMIN — TAMSULOSIN HYDROCHLORIDE 0.4 MG: 0.4 CAPSULE ORAL at 20:22

## 2020-12-11 RX ADMIN — ASPIRIN 81 MG: 81 TABLET, CHEWABLE ORAL at 08:34

## 2020-12-11 RX ADMIN — MULTIPLE VITAMINS W/ MINERALS TAB 1 TABLET: TAB at 08:34

## 2020-12-11 RX ADMIN — PRAVASTATIN SODIUM 40 MG: 40 TABLET ORAL at 20:22

## 2020-12-11 ASSESSMENT — PAIN SCALES - GENERAL
PAINLEVEL_OUTOF10: 5
PAINLEVEL_OUTOF10: 0

## 2020-12-11 ASSESSMENT — ENCOUNTER SYMPTOMS
ALLERGIC/IMMUNOLOGIC NEGATIVE: 1
DIARRHEA: 0
NAUSEA: 0
COUGH: 0
TROUBLE SWALLOWING: 1
CONSTIPATION: 0
VOICE CHANGE: 0
SHORTNESS OF BREATH: 0

## 2020-12-11 ASSESSMENT — PAIN - FUNCTIONAL ASSESSMENT: PAIN_FUNCTIONAL_ASSESSMENT: ACTIVITIES ARE NOT PREVENTED

## 2020-12-11 NOTE — PROGRESS NOTES
States pain 5/10, right knee, constant, refuses tylenol for pain. Calm, appropriate interaction with staff. Alert, oriented to person, place, time. GONZALO 3mm to 2 mm, brisk. Mucous membranes pink, moist. Hair dry, shiny. Skin warm, dry. Speech slurred, able to understand. Denies difficulty swallowing. Lung sounds clear anterior, posterior, lateral. Respirations easy, deep, regular. No cough noted. Heart sounds distant, regular. Bowel sounds active, all 4 quadrants. Abdomen round, soft, non-tender to palpation. Bruising lower left abdomen. States passing gas. Denies problems urinating. Last bowel movement 12-. Radial pulse weak, regular, bilateral. Hand grasp strong, bilateral. Arm drift negative, bilateral. Capillary refill less than 3 seconds. Skin turgor brisk. No edema noted. Pedal pulse weak, regular, bilateral. Pedal push pull moderate, bilateral. Ya's sign negative, bilateral. Leg lift moderate, bilateral. Denies numbness, tingling. Sitting in wheelchair. Wheels locked. Call lights within reach. Over bed table within reach. Denies any needs at current time.

## 2020-12-11 NOTE — PLAN OF CARE
Care plan reviewed with patient. Patient verbalizes understanding of the plan of care and contribute to goal setting. Problem: Falls - Risk of:  Goal: Will remain free from falls  Description: Will remain free from falls  Outcome: Ongoing  Note: Up with assist of one with walker and gait belt. Unsteady at times d/t dizzyness when standing or sitting up. Denies pain . Problem: IP BOWEL ELIMINATION  Goal: LTG - patient will utilize adaptive techniques/equipment to complete bowel elimination  Outcome: Ongoing     Problem: IP BOWEL ELIMINATION  Goal: LTG - patient will have regular and routine bowel evacuation  Outcome: Ongoing  Note: No bm noted yet today. Last bm noted 12/10/2020       Problem: IP BLADDER/VOIDING  Goal: LTG - patient will complete bladder elimination  Outcome: Ongoing  Note: Patient can be incontinent at times. Problem: Skin Integrity:  Goal: Will show no infection signs and symptoms  Description: Will show no infection signs and symptoms  Outcome: Ongoing  Note: No s/sx infection noted. Afebrile.       Problem: Pain:  Goal: Pain level will decrease  Description: Pain level will decrease  Outcome: Ongoing  Note: Denies pain

## 2020-12-11 NOTE — PROGRESS NOTES
Physical Medicine & Rehabilitation  Progress Note    Chief Complaint:   Intracranial and intraventricular hemorrhage/right thalamic CVA    Subjective:  Care conference held today with proposed discharge date on 12/16 to home with Home Health Physical Therapy, Occupational Therapy, Speech Therapy, Nursing and aide. Family not present. He remains in agreement with plan and timing. Patient noted to have highly variable heart rates today ranging from 50 to approximately 119 with the patient being asymptomatic. Physical Therapy will again  trial him with a dorsiflexion assist strap as it is noted that he has significant foot drop with fatigue after he ambulates approximately 50 feet; with the strap he has increased his distances to 80 feet. No complaint of diplopia at this time. Will trial Bright light therapy to improve the patient's cognition. The patient does not have any additional concerns or questions at this time. Rehabilitation:   Physical Therapy:  OBJECTIVE:  Bed Mobility:  Not Tested     Transfers:  Sit to Stand: [employer], with increased time for completion, to/from chair with arms  Stand to [place] [id number], to/from chair with arms     Ambulation:  Contact Guard Assistance  Distance: 70ft and 90ft   Surface: Level Tile  Device:Rolling Walker, second trial also with DF assist  Gait Deviations: Forward Flexed Posture, Decreased Step Length on Left, Decreased Gait Speed, Decreased Heel Strike on Left and pt  Demonstrates decreased foot clearance on L at ~50ft due to fatigue. Second gait trial performed with L DF assist with improved carryover of L foot clearance and L heel strike for entire gait trial.     Balance:  Static Standing Balance: Contact Guard Assistance  Dynamic Standing Balance: Contact Guard Assistance      *Pt performed ring toss, requiring pt to reach outside LEXII with each UE in all directions. Performed with CGA and no UE support.  Cues throughout for erect posture. Performed to challenge pt dynamic stability and promote increase proprioception.     *Perform clothes pin activity requiring pt to reach overhead with each UE. Performed with CGA and single UE support on RW. Performed to promote erect posture and improve pt stability. Functional Outcome Measures: Not completed     ASSESSMENT:  Assessment: Patient progressing toward established goals. Activity Tolerance:  Patient tolerance of  treatment: fair. Pt HR monitored throughout session due to episode of bradycardia on 12/9. Pt resting HR varied from 50-90, although pt denied symptoms. With standing activity pt HR increased to ~115. Post standing activity HR would remain ~100 for 2-3 min, then decline to 50s-90s. Post session, pt reported dizziness when returning to bedside chair, HR at 62bpm. Feet elevated and symptoms subsided quickly. RN and MD aware of cardiac changes. Demonstrated improved gait mechanics with DF assist.  Equipment Recommendations:Equipment Needed: Yes  Mobility Devices: [place] Hair: Rolling  Other: Has SW, will monitor for needs,  RW  Discharge Recommendations:  24 hour supervision or assist, Continue to assess pending progress     Occupational Therapy:  COGNITION: Slow Processing and Decreased Recall     ADL:   No ADL's completed this session.     BALANCE:  Sitting Balance:  Independent. Standing Balance: Stand By Assistance.       BED MOBILITY:  Sit to Supine: Minimal Assistance A to bring RLE into bed      TRANSFERS:  Sit to Stand:  Stand By Assistance, with increased time for completion. Stand to Sit: Stand By Assistance.       FUNCTIONAL MOBILITY:  Assistive Device: Rolling Walker  Assist Level:  Stand By Assistance.    Distance:  within room       ADDITIONAL ACTIVITIES:  Patient identified a personal goal to increase UB strength and improve overall endurance so they can complete their toilet & shower transfers; skilled edu on UE strengthening and patient completed BUE strengthening exercises x10 reps x1  set this date with a medium resistance band in all joints and all planes. Patient tolerated well, requiring occasional rest breaks. Pt required min cues for technique.     ASSESSMENT:  Activity Tolerance:  Patient tolerance of  treatment: good. Discharge Recommendations: Home with nursing aide, Home with Home health OT    Equipment Recommendations: Other: Pt has a toilet raiser  and shower seat. Need to confirm with spouse. Speech Therapy:  Short-Term Goals:  SHORT TERM GOAL #1:  Goal 1: Pt will complete functional carryover tasks (i.e. orientation, immediate/delayed, working) with 80% accuracy, MOD cues and focus on compensatory memory strategies to enhance retention of newly learned medical information. INTERVENTIONS:   Orientation:  Pt is indep with month, year, place, event, while requiring min A to orient to the date (1 day off).    Completed review of STM strategies using--Write it down, Repeat it, Associate it, and Pictures it. (Task 1) Pt required Max A to recall looks of ST from 12/9/2020  (Task 2) Pt was challenged to recall [address] Name, hair color, Age, and ST               -Repeated 2-3x prior to immediate recall              -Immediate Recall: 2/4 min A, 2/4 mod A               -Delayed Recall (5 min): 3/4 indep, 1/4 mod I additional time   (Task 3) Paragraph recall (3-4 sentences with 6 targets to recall)              -Immediate Recall: 3/6 indep, 1/6 min A, 2/6 Max A              -Delayed Recall (5 min): 4/6 indep, 1/6 min A, 1/6 total A despite FO2     SHORT TERM GOAL #2:  Goal 2: Pt will complete functional problem solving/safety awareness (i.e. time management, safety scenarios, verbal reasoning) with 80% accuracy, mod cues for enhanced safety and ADL contribution.   INTERVENTIONS:   (Cookie Theft): Pt was able to describe the picture, requiring min A to continue providing more details regarding what the kids are reaching for, and the over flowing sink. Pt required mod A to improve organization or story. (Call Light) indep verbalization, location, and demonstration.               -3 reasons to push the button: 3/3 mod I additional time   (Safe to drive?): indep - \"I cant drive without feeling in my feet. \"  (Assistance when you get home?): Mod A to verbalize understanding of requiring 1:1 assist at home with increased number of ADLs/IADLs. *decreased sequential thought processing  *POOR sustained and selective attention throughout task      SHORT TERM GOAL #3:  Goal 3: Pt will complete expressive language tasks (i.e. higher level confrontational/responsive naming, verbal sequencing, divergent naming) with 80% accuracy, min cues for improved expression of complex thoughts  INTERVENTIONS: Did not address d/t focus on other goals      SHORT TERM GOAL #4:  Goal 4: Pt will complete high level auditory/reading comprehension tasks (i.e. complex y/n questions, multi step and complex commands, prescription labels/directories, etc) with 90% accuracy, mod cues for improved understanding of complex medical information. INTERVENTIONS: Did not address d/t focus on other goals.      SHORT TERM GOAL #5:  Goal 5: Pt will consume a soft & bite size diet and thin liquids with adherence to skilled swallowing strategies (i.e. small bites/sips, slow rate) without oert s/s of aspiration to meet nutrition/hydration measures safely. INTERVENTIONS: Completed a skilled dietary analysis on this date to determine recommendations to resume current diet level vs recommendations for initiation of advanced (regular) dietary analysis. Meal tray included soft & bite size sausage and pancakess with thin liquids via cup. The pt demonstrated great success with his meal demonstrated by timely mastication, good bolus control/formation, and timely AP movement with essentially functional pharyngeal phase of the swallow -- no overt s/s of aspiration/penetration across all PO trials.   *Patient did present with slight impulsive eating patterns on this date, requiring min A to \"'slow down\" between bites. *Pt verbalized satisfaction with soft & bite size solids. *recommended remain on soft & bite size solids with reinforcement to \"slow down\" with PO intake         Long-Term Goals:  Timeframe for Long-term Goals: 4 weeks     LONG TERM GOAL #1:  Goal 1: Patient will improve cognitive status to a min A level to safely return to PLOF home with wife, nearly 24 hour SUP     Comprehension: 3 - Patient understands basic needs 50-74% of the time  Expression: 3 - Expresses basic ideas/needs 50-74% of the time  Social Interaction: 5 - Patient is appropriate with supervision/cues  Problem Solving: 3 - Patient solves simple/routine tasks 50%-74%  Memory: 3 - Patient remembers 50%-74% of the time     EDUCATION:  Learner: Patient  Education:  Reviewed ST goals and Plan of Care, Reviewed recommendations for follow-up, Education Related to Prevention of Recurrence of Impairment/Illness/Injury and Home Safety Education  Evaluation of Education: Demonstrates with assistance and Family not present    Activity Tolerance:  Patient tolerance of treatment: good. Assessment/Plan: Patient progressing toward established goals. Continues to require skilled care of licensed speech pathologist to progress toward achievement of established goals and plan of care. Plan for Next Session: recall, reading comprehension, diet analysis     Review of Systems:  Review of Systems   Constitutional: Positive for activity change. Negative for appetite change, fatigue and fever. HENT: Positive for trouble swallowing. Negative for mouth sores (Lip bite wound) and voice change. Eyes: Negative for visual disturbance. Respiratory: Negative for cough and shortness of breath. Cardiovascular: Positive for leg swelling. Gastrointestinal: Negative for constipation, diarrhea and nausea. Endocrine: Negative for cold intolerance. Genitourinary: Negative for frequency and urgency. Musculoskeletal: Positive for gait problem. Skin: Negative for pallor. Allergic/Immunologic: Negative. Neurological: Positive for speech difficulty and weakness. Negative for dizziness, facial asymmetry and headaches. Hematological: Negative. Psychiatric/Behavioral: Positive for decreased concentration. Negative for confusion, dysphoric mood and hallucinations. The patient is not nervous/anxious. All other systems reviewed and are negative. Objective:  /60   Pulse 94   Temp 98.7 °F (37.1 °C) (Oral)   Resp 16   Ht 5' 9\" (1.753 m)   Wt 207 lb 11.2 oz (94.2 kg)   SpO2 97%   BMI 30.67 kg/m²   CURRENT VITALS:  height is 5' 9\" (1.753 m) and weight is 207 lb 11.2 oz (94.2 kg). His oral temperature is 98.7 °F (37.1 °C). His blood pressure is 111/60 and his pulse is 94. His respiration is 16 and oxygen saturation is 97%. Body mass index is 30.67 kg/m². Temperature Range (24h):Temp: 98.7 °F (37.1 °C) Temp  Av.3 °F (36.8 °C)  Min: 97.9 °F (36.6 °C)  Max: 98.7 °F (37.1 °C)  BP Range (91J): Systolic (24ZKH), FOH:250 , Min:110 , OOM:552     Diastolic (76LYU), SSL:86, Min:60, Max:62    Pulse Range (24h): Pulse  Av  Min: 71  Max: 102  Respiration Range (24h): Resp  Av  Min: 16  Max: 20  Current Pulse Ox (24h):  SpO2: 97 %  Pulse Ox Range (24h):  SpO2  Av.3 %  Min: 96 %  Max: 99 %  Oxygen Amount and Delivery:      awake  Orientation:   person, place, time, not situation  Mood: within normal limits  Affect: calm  General appearance:  in no acute distress, up in bed    Memory:   abnormal -decreased recall  Attention/Concentration: abnormal -mildly slowed processing  Language:  abnormal -mild dysarthria    ROM:  normal  Motor Exam: As in table    Manual Muscle Testing:     Sh Abd  Sh IR  Sh ER  Elbow Flex  Elbow Ext    Left  3+   4 4   Right 3+   4 4      Wrist Ext  Wrist Flexion  Finger Flex  Finger Abd  Finger Add    Left    4 home.  2. Gait instability. 1. PT/OT. 2. TUG 4 minutes 11 seconds. Improved to 1 minute and 25 seconds on 12/7.  60-69 years:  8.1 seconds; 70-79 years: 9.2 seconds; 80-99 years: 11.3 seconds. 3. 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 1. Initiate TBI guidelines as needed for low stimulation environment. 2. Bright light phototherapy ordered on 12/10.  4. 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 1. Neurosurgery following with no interventions planned. 5. Left sixth rib fracture. 1. Pain control. 1. Lidoderm patches. 2. Tylenol. 2. Symbicort twice daily. 3. Albuterol nebulizer every 6 hours as needed. 6. Abrasion to left forehead. 1. Wound care. 7. Laceration of left lower lip. 1. Healing appropriately. 8. Subacute infarct of the right thalamus. 1. Stable. 2. Continue with therapies. 9. Severe cognitive impairments/Mild oral dysphagia/Mild dysarthria/Mild-moderate expressive language deficits. 1. (MOCA) version 7.2 completed. Patient scored 9/30. Repeat on 12/2 - [facility]) version 8.1 completed. Patient scored 10/30. 2. SLP. 3. Diet downgraded on 12/2 to soft and bite-sized within liquids. 10. Left orbital floor fracture with posterior blowout component/Nasal bone fracture. 1. Conservative treatment of this fracture as the components involve the posterior aspect of the left orbital floor without clinical evidence of inferior rectus impingement. No need to surgical intervention for the nasal bone fractures. 11. Hematuria. 1. Resolved.   12. Coronary artery disease with history of 2 stents and now with 90% severe stenosis of the apical portion of the LAD and mild diastolic dysfunction of the left ventricle noted on heart catheterization completed on 10/22/2020 by Dr. Jeri Bush with deployment of PCI to circumflex artery/Medication nonadherence to recommended dual antiplatelet therapy and cardiac rehabilitation following recent cardiac stenting. 1. Aspirin has been started. 2. Holding Brilinta for now. 13. Cardiac arrhythmia with symptomatic bradycardia. 1. Cardiology consult. 2. Metoprolol discontinued on 12/9. Plan is to monitor patient. 3. EKG on 12/9 with normal sinus rhythm, pulmonary disease pattern, left axis deviation with absence of prior supraventricular complexes. 4. TSH normal at 2.9 on 12/10.  14. Nutrition:  Consultation to dietician for nutritional counseling and recommendations. 1. Total protein 6.3 and albumin slightly low at 3.4 on 12/2/2020.  2. Vitamin D 25 hydroxy normal at 68 on 12/2/2020. 15. Electrolytes. 1. Normal on 12/7 of:  2. Mild hyponatremia of 134 on 12/7. Normal at 135 on 12/10. 16. Acute kidney injury. 1. Cr/BUN/GFR on 12/10 is worsened to 1.7/34/47 on 12/7 from prior 1.2/26/70 on 12/2.  2. Encourage fluids. 17. Bladder: Noted on admission. Follow for now. 18. Bowel: Senna, colace, MOM  19. Rehabilitation nursing will be involved for bowel, bladder, skin, and pain management. Nursing will also provide education and training to patient and family. 20. Prophylaxis:  DVT: Lovenox. GI: None. 21.  and case management consultations for coordination of care and discharge planning. Chronic Problems:  1. Hypertension. 1. Amlodipine. 2. Bumex. 3. Cozaar. 4. Lopressor. Discontinued on 12/9 due to bradycardia. 2. Hyperlipidemia/Statin induced myositis. 1. Pravachol. 3. CKD 3.  1. Cr/BUN/GFR on 12/2 was 1.2/24/70 which is stable over prior labs. 4. Lumbar stenosis with neurogenic claudication status post lumbar laminectomy from L2-L3 bilaterally by Dr. Cachorro Sun on 5/29/2015.  5. 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 6. Prostate cancer status post brachytherapy. 1. Tamsulosin. Labs reviewed on:  2. 12/1.  3. 12/2.  4. 12/7.  5. 12/10. Infectious Disease:  1.  N/A    Missed Therapy Time:  12/9  60 minutes of Occupational Therapy missed secondary to the patient having symptomatic bradycardia. DME:    Discharge Plan:  Estimated Discharge Date: 12/16   Destination: home health  Services at Discharge: [address] [place] [address] Drive, Occupational Therapy, Speech Therapy, Nursing and aide 3x week  Is patient appropriate for an outpatient driving evaluation? no  Equipment at Discharge: RW    Greater than 50% of 30 minutes was spent with the patient reviewing the plan and recommendations from today's care conference, his progress with therapies, and the plan to initiate bright light phototherapy.      Soledad Li MD

## 2020-12-11 NOTE — PROGRESS NOTES
Independent  Transfer Assistance: Independent  Active : No  Additional Comments: Pt ambualated short distances with a standard walker. He did his own self care. He had help with all of the cooking and cleaning prior to admission. Restrictions/Precautions:  Restrictions/Precautions: Fall Risk, General Precautions  Position Activity Restriction  Other position/activity restrictions: Keep systolic blood pressure between 100-160 while moving. SUBJECTIVE: Patient walking back to chair with student nurse upon arrival, agreed and cooperative for therapy. Student nurse reports that patient is NPO to have a procedure lateral, RN reports that patient is having a tilt table test today. PAIN: No pain noted. OBJECTIVE:    Transfers:  Sit to Stand: [employer], with increased time for completion, cues for hand placement  Stand to [place] [id number], cues for hand placement    Ambulation:  [employer], with verbal cues , with increased time for completion  Distance: 90 ft. X1   Surface: Level Tile  Device:Rolling Walker  Gait Deviations: Forward Flexed Posture, Slow [place], Decreased Step Length Bilaterally with decreased foot clearance on LLE, Decreased Gait Speed, Decreased Heel Strike Bilaterally, Mild Path Deviations and increased UE reliance on walker, verbal cues for staying closer to walker to improve posture. Exercise:  Patient was guided in 1 set(s) 10-15 reps of exercise to both lower extremities. Glut sets, Seated marches, Seated hamstring curls, Seated heel/toe raises, Long arc quads and Seated isometric hip adduction. Exercises were completed for increased independence with functional mobility. Functional Outcome Measures: Not completed       ASSESSMENT:  Assessment: Patient progressing toward established goals. Activity Tolerance:  Patient tolerance of  treatment: good.       Equipment Recommendations:Equipment Needed: Yes  Mobility Devices: Irene Lomeliter: Rolling  Other: Has SW, will monitor for needs,  RW  Discharge Recommendations:  24 hour supervision or assist, Continue to assess pending progress    Plan: Times per week: 5x/week 90 min, 1x/week 30 min  Current Treatment Recommendations: Strengthening, Safety Education & Training, Balance Training, Endurance Training, Functional Mobility Training, Patient/Caregiver Education & Training, Transfer Training, Gait Training, Wheelchair Mobility Training, Neuromuscular Re-education, Home Exercise Program, Stair training    Patient Education  Patient Education: Plan of Care, Precautions/Restrictions, Transfers, Reviewed Prior Education, Gait, Health Promotion and Wellness Education, Home Safety Education, - Patient Requires Continued Education    Goals:  Patient goals : does not state  Short term goals  Time Frame for Short term goals: 2 weeks  Short term goal 1: Pt to transfer supine <--> sit SBA to enable pt to get in/out of bed. Short term goal 2: Pt to transfer sit <--> stand SBA for increased functional mobility. Short term goal 3: Pt to ambulate 100 feet with SW/RW CGA for household and community ambulation. Long term goals  Time Frame for Long term goals : 4 weeks  Long term goal 1: Pt to transfer supine <--> sit SBA to enable pt to get in/out of bed. Long term goal 2: Pt to transfer sit <--> stand supervision for increased functional mobility. Long term goal 3: Pt to ambulate >150 feet with SW/RW SBA for household and community ambulation. Long term goal 4: Pt to ascend/descend 1 step with SW/RW SBA for home entry. Long term goal 5: Pt to perform car transfer CGA to enable pt to get in/out of the car. Long term goal 6: Pt to improve TUG test score to <45 sec to indicate improvement in overall mobility. Following session, patient left in safe position with all fall risk precautions in place.

## 2020-12-11 NOTE — PROGRESS NOTES
[address] [facility] THERAPY  [facility]  DAILY NOTE    TIME   SLP Individual Minutes  Time In: 8919  Time Out: 1100  Minutes: 25  Timed Code Treatment Minutes: 25 Minutes       Date: 2020  Patient Name: Ed Schwab      CSN: [id number]   : 1935  (81 y.o.)  Gender: male   Referring Physician: Fiordaliza Daniel MD  Diagnosis: Intraventricular hemorrhage   Secondary Diagnosis: Cognitive deficits, Dysphagia  Precautions: Fall risk, Aspiration risk   Current Diet: Soft and Bite Sized, Thin liquids   Swallowing Strategies: Full Upright Position, Small Bite/Sip, Alternate Solids and Liquids, Limit Distractions and Monitor for Fatigue  Date of Last MBS: Not Applicable    Pain:   - Pain location: knee; RN aware     Subjective:  Pt sitting upright in recliner for duration of session. Improved alert levels this date. Pleasantly engaged in given tasks. Short-Term Goals:  SHORT TERM GOAL #1:  Goal 1: Pt will complete functional carryover tasks (i.e. orientation, immediate/delayed, working) with 80% accuracy, MOD cues and focus on compensatory memory strategies to enhance retention of newly learned medical information. INTERVENTIONS: Did not address d/t focus on other goals. PREVIOUS SESSION:   Orientation:  Pt is indep with month, year, place, event, while requiring min A to orient to the date (1 day off). Completed review of STM strategies using--Write it down, Repeat it, Associate it, and Pictures it.    (Task 1) Pt required Max A to recall looks of ST from 2020  (Task 2) Pt was challenged to recall [address] Name, hair color, Age, and ST    -Repeated 2-3x prior to immediate recall   -Immediate Recall: 2/4 min A, 2/4 mod A    -Delayed Recall (5 min): 3/4 indep, 1/4 mod I additional time   (Task 3) Paragraph recall (3-4 sentences with 6 targets to recall)   -Immediate Recall: 3/6 indep, 1/6 min A, 2/6 Max A   -Delayed Recall (5 min): 4/6 indep, 1/6 min A, 1/6 total A despite FO2    SHORT TERM GOAL #2:  Goal 2: Pt will complete functional problem solving/safety awareness (i.e. time management, safety scenarios, verbal reasoning) with 80% accuracy, mod cues for enhanced safety and ADL contribution. INTERVENTIONS: Provided list of x10 hypothetical problematic scenarios in household, instructed pt to prioritize by order of importance and determine solutions to each. Prioritizing - 5/10 indep, 2/10 min cues, 2/10 mod cues, 1/10 max cues  Finding solutions - 5/10 indep, 3/10 min cues, 2/10 mod cues   *decreased safety awareness and mental flexibility when identifying who to call in specific situations (e.g., if smoke detector was broken what would you do - pt stated \"call the smoke detector man\")    Interpretation of weather section in newspaper - 5/10 indep, 2/10 min cues, 2/10 mod cues, 1/10 max cues  *good visual scanning of information  *decreased analytical thinking and comparing/contrasting when basic numerical data involved    SHORT TERM GOAL #3:  Goal 3: Pt will complete expressive language tasks (i.e. higher level confrontational/responsive naming, verbal sequencing, divergent naming) with 80% accuracy, min cues for improved expression of complex thoughts  INTERVENTIONS: Did not address d/t focus on other goals. SHORT TERM GOAL #4:  Goal 4: Pt will complete high level auditory/reading comprehension tasks (i.e. complex y/n questions, multi step and complex commands, prescription labels/directories, etc) with 90% accuracy, mod cues for improved understanding of complex medical information. INTERVENTIONS: Did not address d/t focus on other goals. SHORT TERM GOAL #5:  Goal 5: Pt will consume a soft & bite size diet and thin liquids with adherence to skilled swallowing strategies (i.e. small bites/sips, slow rate) without oert s/s of aspiration to meet nutrition/hydration measures safely. INTERVENTIONS: Did not address d/t focus on other goals. PREVIOUS SESSION:   Completed a skilled dietary analysis on this date to determine recommendations to resume current diet level vs recommendations for initiation of advanced (regular) dietary analysis. Meal tray included soft & bite size sausage and pancakess with thin liquids via cup. The pt demonstrated great success with his meal demonstrated by timely mastication, good bolus control/formation, and timely AP movement with essentially functional pharyngeal phase of the swallow -- no overt s/s of aspiration/penetration across all PO trials. *Patient did present with slight impulsive eating patterns on this date, requiring min A to \"'slow down\" between bites. *Pt verbalized satisfaction with soft & bite size solids. *recommended remain on soft & bite size solids with reinforcement to \"slow down\" with PO intake        Long-Term Goals:  Timeframe for Long-term Goals: 4 weeks    LONG TERM GOAL #1:  Goal 1: Patient will improve cognitive status to a min A level to safely return to PLOF home with wife, nearly 24 hour SUP       Comprehension: 3 - Patient understands basic needs 50-74% of the time  Expression: 4 - Expresses basic ideas/needs 75-90%+ of the time  Social Interaction: 5 - Patient is appropriate with supervision/cues  Problem Solving: 3 - Patient solves simple/routine tasks 50%-74%  Memory: 3 - Patient remembers 50%-74% of the time       EDUCATION:  Learner: Patient  Education:  Reviewed ST goals and Plan of Care, Reviewed recommendations for follow-up, Education Related to Prevention of Recurrence of Impairment/Illness/Injury and Home Safety Education  Evaluation of Education: Demonstrates with assistance and Family not present      Activity Tolerance:  Patient tolerance of treatment: good. Assessment/Plan: Patient progressing toward established goals. Continues to require skilled care of licensed speech pathologist to progress toward achievement of established goals and plan of care.      Plan for Next Session: recall, reading comprehension, diet analysis     Chuy Smith M.S. Tucker Villatoro

## 2020-12-11 NOTE — PLAN OF CARE
Problem: DISCHARGE BARRIERS  Goal: Patient's continuum of care needs are met  Note: LEXUS contacted patient's spouse, Lukas Roberto, to provide team conference update and further discuss discharge planning. LEXUS reviewed with spouse, Lukas Roberto, anticipated discharge date for Wednesday, 12/16/2020, with twenty four hour supervision. SW reviewed recommendation for spouse, Lukas Roberto, to complete family education prior to discharge. Family education scheduled for Monday, 12/14/2020, from 9:00am to 12:30pm. Therapy board updated accordingly. SW reviewed recommendation for home health care services involving RN/PT/OT/HHA. Per. Spouse, Diogo, patient has received services int he past through [address] [place] [address] and would prefer to use the home health care agency again. LEXUS contacted [address] [place] [address] with referral for RN/PT/OT/HHA. Referral information and discharge date of Wednesday, 12/16/2020, provided to [facility]. SW to follow and maintain involvement in discharge planning.

## 2020-12-11 NOTE — PROGRESS NOTES
Resting in chair with eyes closed. Wheels locked. Call light within reach. Over bed table within reach. Denies further needs at current time.

## 2020-12-11 NOTE — PROGRESS NOTES
Spoke with Dr. Jluis Nazario via telephone about patient needing to be discharged from rehab before loop recorder procedure. Dr. Jluis Nazario stated that is fine he can do the loop recorder placement on wed. 12/16 when he is discharged from inpatient rehab. Will notify spouse when she arrives today.

## 2020-12-11 NOTE — DISCHARGE INSTR - COC
Continuity of Care Form    Patient Name: Bowen Sepulveda   :  1935  MRN:  [id number]    Admit date:  2020  Discharge date:  2020    Code Status Order: Full Code   Advance Directives:   [facility] Documentation     Date/Time Healthcare Directive Type of Healthcare Directive Copy in [facility] [address] Agent's Name Healthcare Agent's Phone Number    20 1354  No, patient does not have an advance directive for healthcare treatment -- -- -- -- --          Admitting Physician:  Matt Ch MD  PCP: Saul Welsh MD    Discharging Nurse: [facility] Unit/Room#: 7E-64/064-A  Discharging Unit Phone Number: [phone]    Emergency Contact:   Extended Emergency Contact Information  Primary Emergency Contact: Jose Ngo [address] Phone: [phone]  Relation: Child  Secondary Emergency Contact: [facility]  Address: [address] [place] [address] Debbie Lilly [address] [place] [address] Phone: [phone]  Relation: Spouse    Past Surgical History:  Past Surgical History:   Procedure Laterality Date    BACK SURGERY  2006    fusion    CARDIAC SURGERY      2 stents    COSMETIC SURGERY      ENDOSCOPY, COLON, DIAGNOSTIC      KNEE SURGERY      NECK SURGERY      OTHER SURGICAL HISTORY  2015    DECOMPRESSIVE LUMBAR LAMINECTOMY L2-3    ROTATOR CUFF REPAIR      TOTAL HIP ARTHROPLASTY Bilateral        Immunization History: There is no immunization history on file for this patient.     Active Problems:  Patient Active Problem List   Diagnosis Code    CA prostate, adenoca ([facility] [address] C61    Chronic kidney disease, stage III (moderate) N18.30    Hypercholesteremia E78.00    Osteoarthritis M19.90    CAD (coronary artery disease) I25.10    H/O class III angina pectoris Z86.79    Gastritis K29.70    Hypertension, essential, benign I10    HLD (hyperlipidemia) E78.5    Spinal stenosis, lumbar, s/p laminectomy M48.061    S/P laminectomy Z98.890    Benign essential HTN I10    Syncope R55    ALDA (acute kidney injury) ([facility] [address] N17.9    DVT, lower extremity (HCC) I82.409    Fluid overload E87.70    Benign prostatic hyperplasia with urinary obstruction N40.1, N13.8    PVD (peripheral vascular disease) ([facility]) I73.9    DVT (deep venous thrombosis) ([facility]) I82.409    Presence of IVC filter Z95.828    Gross hematuria R31.0    Hematuria R31.9    History of DVT (deep vein thrombosis) Z86.718    Benign non-nodular prostatic hyperplasia with lower urinary tract symptoms N40.1    History of prostate cancer Z85.46    H/O prostate cancer Z85.46    Proteinuria R80.9    Encephalopathy G93.40    Altered mental status R41.82    Intraventricular hemorrhage (HCC) I61.5    Elevated troponin R77.8    Fracture of left orbital floor ([facility]) S02. 32XA    Nasal bone fracture S02. 2XXA    Closed fracture of six ribs of left side S22.42XA    Closed fracture of one rib of left side S22.32XA    Intracranial bleed ([facility]) I62.9       Isolation/Infection:   Isolation          No Isolation        Patient Infection Status     Infection Onset Added Last Indicated Last Indicated By Review Planned Expiration Resolved Resolved By    None active    Resolved    COVID-19 Rule Out 11/29/20 11/29/20 11/29/20 COVID-19 (Ordered)   11/29/20 Rule-Out Test Resulted    COVID-19 Rule Out 10/19/20 10/19/20 10/19/20 COVID-19 (Ordered)   10/20/20 Rule-Out Test Resulted          Nurse Assessment:  Last Vital Signs: /68   Pulse 82   Temp 97.8 °F (36.6 °C) (Oral)   Resp 18   Ht 5' 9\" (1.753 m)   Wt 208 lb 6.4 oz (94.5 kg)   SpO2 97%   BMI 30.78 kg/m²     Last documented pain score (0-10 scale): Pain Level: 0  Last Weight:   Wt Readings from Last 1 Encounters:   12/11/20 208 lb 6.4 oz (94.5 kg)     Mental Status:  oriented and alert    IV Access:  - None    Nursing Mobility/ADLs:  Walking   Assisted  Transfer  Assisted  Bathing Assisted  Dressing  Assisted  Toileting  Assisted  Feeding  Independent  Med Admin  Assisted  Med Delivery   whole    Wound Care Documentation and Therapy:        Elimination:  Continence:   · Bowel: incontinent at times  · Bladder: incontinent at times  Urinary Catheter: None   Colostomy/Ileostomy/Ileal Conduit: No       Date of Last BM: ***    Intake/Output Summary (Last 24 hours) at 12/11/2020 1127  Last data filed at 12/11/2020 0845  Gross per 24 hour   Intake 1040 ml   Output 1700 ml   Net -660 ml     I/O last 3 completed shifts: In: 7894 [P.O.:1290]  Out: [address] [Urine:1700]    Safety Concerns: At Risk for Falls    Impairments/Disabilities:      None    Nutrition Therapy:  Current Nutrition Therapy:   - Oral Diet:  General    Routes of Feeding: Oral  Liquids: Thin Liquids  Daily Fluid Restriction: no  Last Modified Barium Swallow with Video (Video Swallowing Test): not done    Treatments at the Time of Hospital Discharge:   Respiratory Treatments: na  Oxygen Therapy:  is not on home oxygen therapy.   Ventilator:    - No ventilator support    Rehab Therapies: Physical Therapy and Occupational Therapy  Weight Bearing Status/Restrictions: No weight bearing restirctions  Other Medical Equipment (for information only, NOT a DME order):  wheelchair and walker  Other Treatments: na    Patient's personal belongings (please select all that are sent with patient):  {JOCELYN DME Belongings:[id number]}    RN SIGNATURE:  {Esignature:[id number]}

## 2020-12-11 NOTE — PROGRESS NOTES
[address]  OCCUPATIONAL THERAPY MISSED TREATMENT NOTE  [address] [facility],4[address] Floor  7E-64/064-A      Date: 2020  Patient Name: Gena Stock        CSN: [id number]   : 1935  (81 y.o.)  Gender: male   Referring Practitioner: Dr. Adama Johnson  Diagnosis: Intraventricular Hemorrhage         REASON FOR MISSED TREATMENT: Patient Unavailable   Patient off floor for tilt table test. 2nd attempt patient eating late lunch. Plan to continue plan of care tomorrow. Vahid Varela [address]  OTR/L  1830

## 2020-12-11 NOTE — PROGRESS NOTES
Denies pain at current time. Calm, appropriate interaction with staff. Alert, oriented to person, place, time. GONZALO 3mm to 2 mm, brisk. Mucous membranes pink, moist. Hair dry, shiny. Skin warm, dry. Speech slurred, able to understand. Denies difficulty swallowing. Lung sounds clear anterior, posterior, lateral. Respirations easy, deep, regular. No cough noted. Heart sounds distant, regular. Bowel sounds active, all 4 quadrants. Abdomen round, soft, non-tender to palpation. Bruising lower left abdomen. States passing gas. Denies problems urinating. Last bowel movement 12-. Radial pulse weak, regular, bilateral. Hand grasp strong, bilateral. Arm drift negative, bilateral. Capillary refill less than 3 seconds. Skin turgor brisk. No edema noted. Pedal pulse weak, regular, bilateral. Pedal push pull moderate, bilateral. Ya's sign negative, bilateral. Leg lift moderate, bilateral. Denies numbness, tingling. Bed in low position. Wheels locked. 2 side rails up. Call lights within reach. Over bed table within reach. Denies any needs at current time.

## 2020-12-11 NOTE — PROGRESS NOTES
[address]  Occupational Therapy  Daily Note  Time:    Time In: 0900  Time Out: 1000  Timed Code Treatment Minutes: 60 Minutes  Minutes: 60    Date: 2020  Patient Name: Odilia Austin,   Gender: male      Room: [facility]/064-A  MRN: [id number]  : 1935  (81 y.o.)  Referring Practitioner: Dr. Sg Padilla  Diagnosis: Intraventricular Hemorrhage  Additional Pertinent Hx: Pt admitted to in rehab for rehab needs after admission to the hospital s/p  fall at home resulting in an intraventricular hemorrhage, left sixth rib fracture, displaced left orbital floor fracture with deficits of gait instability and severe cognitive deficits with a MOCA score of 9/30 on . During IP rehab stay, patient with bradycardic event with plans for loop recorder to be placed at discharge. Restrictions/Precautions:  Restrictions/Precautions: Fall Risk, General Precautions  Position Activity Restriction  Other position/activity restrictions: Keep systolic blood pressure between 100-160 while moving. SUBJECTIVE:  RN approved session, Plan for patient to have tilt table test this afternoon. Vitals:   BP: 106/66  HR: varying between 72-84 while seated   95% on room air     PAIN:  5 /10:      COGNITION: Slow Processing, Decreased Recall, Decreased Problem Solving and Decreased Safety Awareness    IADL:   Patient states he makes their afua bed at home every morning, previously not using the SW at home. Discussed recommendations of use of RW at home and pt agreeable. Pt completed IADL homemaking task this date of making a queen sized bed - task was graded to challenge walker use and safety and awareness/ self- correcting posture. Patient required SBA throughout task with a standing endurance of 10 minutes. Patient required min VCs for bringing RW closer as he side stepped around bed. Toelrated fair with seated rest break needed following due to fatigue. BALANCE:  Sitting Balance:  Supervision. Standing Balance: Stand By Assistance    TRANSFERS:  Sit to Stand:  Stand By Assistance, with increased time for completion. Stand to Sit: Stand By Assistance. FUNCTIONAL MOBILITY:  Assistive Device: Rolling Walker  Assist Level:  Stand By Assistance. And mod vcs to bring RW closer during ambulation, dorsi assist strap to L foot. Distance: To rehab apartment, within apartment over carpet     Patient identified a personal goal to increase UB strength and improve overall endurance so they can complete their toilet & shower transfers; skilled edu on UE strengthening and patient completed BUE strengthening exercises x10 reps x1 set this date with a resistance band in all joints and all planes. Patient tolerated well, requiring minimal rest breaks. Pt required moderate cues for technique following written HEP    ASSESSMENT:  Activity Tolerance:  Patient tolerance of  treatment: good. Discharge Recommendations: Home with nursing aide, Home with Home health OT    Equipment Recommendations: Other: Pt has a toilet raiser  and shower seat. Need to confirm with spouse. Plan: Times per week: 5xs weeks x 90 min, 1 x week x 30 min  Current Treatment Recommendations: Functional Mobility Training, Endurance Training, Self-Care / ADL, Safety Education & Training, Strengthening, Patient/Caregiver Education & Training, Equipment Evaluation, Education, & procurement, Home Management Training    Patient Education  Patient Education: ADL's, Equipment Education and Home Safety     Goals  Short term goals  Time Frame for Short term goals: 1 week  Short term goal 1: Pt will demonstrate functional mobility walking to/from the bathroom or bedside chair with SBA and RW  with min cues for aligning himself as neeed to prepare for doing self care.   Short term goal 2: Pt will complete ADL routine with no > min A and min cues for problem solving and task completion to increase independence in self care tasks  Short term goal 3: Pt will complete toileting routine and transfer with no SBA and min cues for safe tech to increase independence in self toileting tasks  Short term goal 4: Pt will complete BUE ROM and light resistance exercises while following verbal cues to increase his pain free movement for ease of doing self care. Short term goal 5: Pt will complete 1 step homemaking tasks with CGA and no > min cues for problem solving to increase ability to retrieve a snack  Long term goals  Time Frame for Long term goals : 2 week  Long term goal 1: Pt will complete ADL routine with S and no  cues for problem solving to increase independence in self care tasks  Long term goal 2: Pt will complete 1 step homemaking tasks with SBA and no cues for safe tech to increase ability to retrieve a glass of water. Following session, patient left in safe position with all fall risk precautions in place.

## 2020-12-11 NOTE — PROGRESS NOTES
Independent  Transfer Assistance: Independent  Active : No  Additional Comments: Pt ambualated short distances with a standard walker. He did his own self care. He had help with all of the cooking and cleaning prior to admission. Restrictions/Precautions:  Restrictions/Precautions: Fall Risk, General Precautions  Position Activity Restriction  Other position/activity restrictions: Keep systolic blood pressure between 100-160 while moving. SUBJECTIVE: Pt presented reclined in bedside chair, pleasant and agreeable to participate in therapy. Pt HR monitored throughout session, see assessment for details. Post session, pt in w/c for tilt test with wife present. Student RN present to assess vitals. PAIN: R knee pain during ambulation, no rating given    OBJECTIVE:  Bed Mobility:  Supine to Sit: Contact Guard Assistance, with increased time for completion  Sit to Supine: Stand By Assistance, with increased time for completion     Transfers:  Sit to Stand: [employer], with increased time for completion, to/from chair with arms  Stand to [place] [id number], to/from chair with arms  *transfers performed from multiple surfaces of varying heights  Car:Contact Guard Assistance, with increased time for completion, to/from chair with arms    Ambulation:  Contact Guard Assistance, with verbal cues , with increased time for completion, cues to increased step length and to remain within [employer]: 75ft, 80ft, 10ftx2 carpet  Surface: Level Tile and Carpet  Device:Rolling Walker and L DF assist  Gait Deviations:   Forward Flexed Posture, Slow Anne Marie, Decreased Step Length Bilaterally, Decreased Gait Speed and improved foot clearance and heel strike on L with DF assist    Balance:  Static Standing Balance: Contact Guard Assistance  Dynamic Standing Balance: Contact Guard Assistance     Stairs:  Platform:  6\" platform X 1 using Rolling Walker and Contact Guard Assistance. Exercise:  Patient was guided in 1 set(s) 10 reps of exercise to both lower extremities. Ankle pumps, Glut sets, Heelslides, Short arc quads and Hip abduction/adduction. Exercises were completed for increased independence with functional mobility. Functional Outcome Measures: Not completed       ASSESSMENT:  Assessment: Patient progressing toward established goals. Activity Tolerance:  Patient tolerance of  treatment: fair. Pt tolerated all interventions well, however at end of session following supine therex pt returned to sitting and became dizzy.  and SpO2 78%. Pt became unresponsive but eyes remained open. Returned pt to supine with 2 assist and pt began to gurgle and became diaphoretic. Pt propped on wedge and became alert again. SpO2 increased to 90s. RN notified and assessed BP:  Supine: 147/89  Returned to sittin/72 with dizziness  Seated re-check: 109/66  Seated re-check 2: 117/63 with reduced symptoms  Transferred pt to w/c: 96/58  Pt returned to room, as he was scheduled for tilt test. Student Rn present to assess vitals: 118/72  *During session HR varied from .   Equipment Recommendations:Equipment Needed: Yes  Mobility Devices: Belen Balderas: Rolling  Other: Has SW, will monitor for needs,  RW  Discharge Recommendations:  24 hour supervision or assist, Continue to assess pending progress    Plan: Times per week: 5x/week 90 min, 1x/week 30 min  Current Treatment Recommendations: Strengthening, Safety Education & Training, Balance Training, Endurance Training, Functional Mobility Training, Patient/Caregiver Education & Training, Transfer Training, Gait Training, Wheelchair Mobility Training, Neuromuscular Re-education, Home Exercise Program, Stair training    Patient Education  Patient Education: Plan of Care, [employer] Mobility, Transfers, Gait, Stairs, Car Transfers, Verbal Exercise Instruction    Goals:  Patient goals : does not state  Short term goals  Time Frame for Short term goals: 2 weeks  Short term goal 1: Pt to transfer supine <--> sit SBA to enable pt to get in/out of bed. Short term goal 2: Pt to transfer sit <--> stand SBA for increased functional mobility. Short term goal 3: Pt to ambulate 100 feet with SW/RW CGA for household and community ambulation. Long term goals  Time Frame for Long term goals : 4 weeks  Long term goal 1: Pt to transfer supine <--> sit SBA to enable pt to get in/out of bed. Long term goal 2: Pt to transfer sit <--> stand supervision for increased functional mobility. Long term goal 3: Pt to ambulate >150 feet with SW/RW SBA for household and community ambulation. Long term goal 4: Pt to ascend/descend 1 step with SW/RW SBA for home entry. Long term goal 5: Pt to perform car transfer CGA to enable pt to get in/out of the car. Long term goal 6: Pt to improve TUG test score to <45 sec to indicate improvement in overall mobility. Following session, patient left in safe position with all fall risk precautions in place.

## 2020-12-12 PROBLEM — J44.9 COPD (CHRONIC OBSTRUCTIVE PULMONARY DISEASE) (HCC): Status: ACTIVE | Noted: 2020-12-12

## 2020-12-12 PROBLEM — I95.1 POSTURAL HYPOTENSION: Status: ACTIVE | Noted: 2020-12-12

## 2020-12-12 PROCEDURE — 94640 AIRWAY INHALATION TREATMENT: CPT

## 2020-12-12 PROCEDURE — 97535 SELF CARE MNGMENT TRAINING: CPT

## 2020-12-12 PROCEDURE — 97530 THERAPEUTIC ACTIVITIES: CPT

## 2020-12-12 PROCEDURE — 97116 GAIT TRAINING THERAPY: CPT

## 2020-12-12 PROCEDURE — 97110 THERAPEUTIC EXERCISES: CPT

## 2020-12-12 PROCEDURE — 6370000000 HC RX 637 (ALT 250 FOR IP): Performed by: SURGERY

## 2020-12-12 PROCEDURE — 6370000000 HC RX 637 (ALT 250 FOR IP): Performed by: INTERNAL MEDICINE

## 2020-12-12 PROCEDURE — 6370000000 HC RX 637 (ALT 250 FOR IP): Performed by: FAMILY MEDICINE

## 2020-12-12 PROCEDURE — 1180000000 HC REHAB R&B

## 2020-12-12 PROCEDURE — 6360000002 HC RX W HCPCS: Performed by: PHYSICAL MEDICINE & REHABILITATION

## 2020-12-12 PROCEDURE — 94760 N-INVAS EAR/PLS OXIMETRY 1: CPT

## 2020-12-12 RX ADMIN — MULTIPLE VITAMINS W/ MINERALS TAB 1 TABLET: TAB at 09:17

## 2020-12-12 RX ADMIN — ENOXAPARIN SODIUM 40 MG: 40 INJECTION SUBCUTANEOUS at 09:20

## 2020-12-12 RX ADMIN — TAMSULOSIN HYDROCHLORIDE 0.4 MG: 0.4 CAPSULE ORAL at 21:07

## 2020-12-12 RX ADMIN — PRAVASTATIN SODIUM 40 MG: 40 TABLET ORAL at 21:07

## 2020-12-12 RX ADMIN — BUMETANIDE 1 MG: 1 TABLET ORAL at 09:18

## 2020-12-12 RX ADMIN — ASPIRIN 81 MG: 81 TABLET, CHEWABLE ORAL at 09:18

## 2020-12-12 RX ADMIN — LOSARTAN POTASSIUM 100 MG: 100 TABLET, FILM COATED ORAL at 09:17

## 2020-12-12 RX ADMIN — BUDESONIDE AND FORMOTEROL FUMARATE DIHYDRATE 2 PUFF: 80; 4.5 AEROSOL RESPIRATORY (INHALATION) at 07:03

## 2020-12-12 RX ADMIN — FAMOTIDINE 20 MG: 20 TABLET, FILM COATED ORAL at 09:18

## 2020-12-12 RX ADMIN — CITALOPRAM 20 MG: 20 TABLET, FILM COATED ORAL at 21:07

## 2020-12-12 ASSESSMENT — PAIN SCALES - GENERAL
PAINLEVEL_OUTOF10: 0

## 2020-12-12 NOTE — PROCEDURES
[address] [place] [address]                                 TILT TABLE TEST    PATIENT NAME: Malou Cooper                    :        1935  MED REC NO:   [id number]                           ROOM:       2047  ACCOUNT NO:   [de-identified]                           ADMIT DATE: 2020  PROVIDER:     Sada Evans M.D.    [facility] Re:  This is a patient who is an 46-year-old gentleman with  history of coronary artery disease. The patient has history of syncopal  episode and trauma to the head. The patient was referred for tilt table  test to rule out postural hypotension as a cause. The patient's heart  rate at rest was 104, blood pressure was 125/71. He was tilted for half  an hour. The heart rate at the highest was 120 beats per minute, sinus  rhythm. Has occasional PVCs and occasional couplets. He continued to  be symptom free. The lowest blood pressure at one time was 97/62 but  that was for one minute. IMPRESSION:  Negative tilt table test for postural hypotension. The  patient continued to be symptom free. The patient was in sinus tachycardia. Most of the study with mild  increase in his heart rate from 104 to 120 beats per minute. Has occasional PVCs and couplets. No conduction abnormalities. No pauses. The tilt table test is  essentially negative for significant change in the blood pressure. Tilt  table test could not explain the symptoms of the patient.         Simeon Caraballo M.D.    D: 2020 13:35:39       T: 2020 16:53:44     [email or web address]  Job#: [id number]     Doc#: [id number]    CC:

## 2020-12-12 NOTE — PROGRESS NOTES
Physical Medicine & Rehabilitation  Progress Note    Chief Complaint:   Intracranial and intraventricular hemorrhage/right thalamic CVA    Subjective: Today he had tilt table testing which was noted to be positive and he was started on Celexa by Cardiology. Conjunctival hemorrhage of the left eye is now completely resolved as there is the swelling around his left orbit. He denies any headaches or vision changes today. He is tolerating the bright light phototherapy without any issues. He did not have any other concerns or questions at this time. Rehabilitation:   Physical Therapy:  OBJECTIVE:  Bed Mobility:  Supine to Sit: Contact Guard Assistance, with increased time for completion  Sit to Supine: Stand By Assistance, with increased time for completion      Transfers:  Sit to Stand: [address], with increased time for completion, to/from chair with arms  Stand to  [place], to/from chair with arms  *transfers performed from multiple surfaces of varying heights  Car:Contact Guard Assistance, with increased time for completion, to/from chair with arms     Ambulation:  Contact Guard Assistance, with verbal cues , with increased time for completion, cues to increased step length and to remain within RW  Distance: 75ft, 80ft, 10ftx2 carpet  Surface: Level Tile and Carpet  Device:Rolling Walker and L DF assist  Gait Deviations: Forward Flexed Posture, Slow Anne Marie, Decreased Step Length Bilaterally, Decreased Gait Speed and improved foot clearance and heel strike on L with DF assist     Balance:  Static Standing Balance: Contact Guard Assistance  Dynamic Standing Balance: Contact Guard Assistance      Stairs:  Platform:  6\" platform X 1 using Rolling Walker and Contact Guard Assistance.     Exercise:  Patient was guided in 1 set(s) 10 reps of exercise to both lower extremities. Ankle pumps, Glut sets, Heelslides, Short arc quads and Hip abduction/adduction.   Exercises were completed for increased independence with functional mobility.     Functional Outcome Measures: Not completed     ASSESSMENT:  Assessment: Patient progressing toward established goals. Activity Tolerance:  Patient tolerance of  treatment: fair. Pt tolerated all interventions well, however at end of session following supine therex pt returned to sitting and became dizzy.  and SpO2 78%. Pt became unresponsive but eyes remained open. Returned pt to supine with 2 assist and pt began to gurgle and became diaphoretic. Pt propped on wedge and became alert again. SpO2 increased to 90s. RN notified and assessed BP:  Supine: 147/89  Returned to sittin/72 with dizziness  Seated re-check: 109/66  Seated re-check 2: 117/63 with reduced symptoms  Transferred pt to w/c: 96/58  Pt returned to room, as he was scheduled for tilt test. Student Rn present to assess vitals: 118/72  *During session HR varied from . Equipment Recommendations:Equipment Needed: Yes  Mobility Devices: Barrett Will: Rolling  Other: Has SW, will monitor for needs,  RW  Discharge Recommendations:  24 hour supervision or assist, Continue to assess pending progress     Occupational Therapy:  COGNITION: Slow Processing, Decreased Recall, Decreased Problem Solving and Decreased Safety Awareness     IADL:   Patient states he makes their afua bed at home every morning, previously not using the SW at home. Discussed recommendations of use of RW at home and pt agreeable. Pt completed IADL homemaking task this date of making a queen sized bed - task was graded to challenge walker use and safety and awareness/ self- correcting posture. Patient required SBA throughout task with a standing endurance of 10 minutes. Patient required min VCs for bringing RW closer as he side stepped around bed. Toelrated fair with seated rest break needed following due to fatigue.      BALANCE:  Sitting Balance:  Supervision.     Standing Balance: Stand By indep, 1/4 mod I additional time   (Task 3) Paragraph recall (3-4 sentences with 6 targets to recall)              -Immediate Recall: 3/6 indep, 1/6 min A, 2/6 Max A              -Delayed Recall (5 min): 4/6 indep, 1/6 min A, 1/6 total A despite FO2     SHORT TERM GOAL #2:  Goal 2: Pt will complete functional problem solving/safety awareness (i.e. time management, safety scenarios, verbal reasoning) with 80% accuracy, mod cues for enhanced safety and ADL contribution. INTERVENTIONS: Provided list of x10 hypothetical problematic scenarios in household, instructed pt to prioritize by order of importance and determine solutions to each. Prioritizing - 5/10 indep, 2/10 min cues, 2/10 mod cues, 1/10 max cues  Finding solutions - 5/10 indep, 3/10 min cues, 2/10 mod cues   *decreased safety awareness and mental flexibility when identifying who to call in specific situations (e.g., if smoke detector was broken what would you do - pt stated \"call the smoke detector man\")     Interpretation of weather section in newspaper - 5/10 indep, 2/10 min cues, 2/10 mod cues, 1/10 max cues  *good visual scanning of information  *decreased analytical thinking and comparing/contrasting when basic numerical data involved     SHORT TERM GOAL #3:  Goal 3: Pt will complete expressive language tasks (i.e. higher level confrontational/responsive naming, verbal sequencing, divergent naming) with 80% accuracy, min cues for improved expression of complex thoughts  INTERVENTIONS: Did not address d/t focus on other goals.      SHORT TERM GOAL #4:  Goal 4: Pt will complete high level auditory/reading comprehension tasks (i.e. complex y/n questions, multi step and complex commands, prescription labels/directories, etc) with 90% accuracy, mod cues for improved understanding of complex medical information.   INTERVENTIONS: Did not address d/t focus on other goals.      SHORT TERM GOAL #5:  Goal 5: Pt will consume a soft & bite size diet and thin liquids with adherence to skilled swallowing strategies (i.e. small bites/sips, slow rate) without oert s/s of aspiration to meet nutrition/hydration measures safely. INTERVENTIONS: Did not address d/t focus on other goals. PREVIOUS SESSION:   Completed a skilled dietary analysis on this date to determine recommendations to resume current diet level vs recommendations for initiation of advanced (regular) dietary analysis. Meal tray included soft & bite size sausage and pancakess with thin liquids via cup. The pt demonstrated great success with his meal demonstrated by timely mastication, good bolus control/formation, and timely AP movement with essentially functional pharyngeal phase of the swallow -- no overt s/s of aspiration/penetration across all PO trials. *Patient did present with slight impulsive eating patterns on this date, requiring min A to \"'slow down\" between bites. *Pt verbalized satisfaction with soft & bite size solids.    *recommended remain on soft & bite size solids with reinforcement to \"slow down\" with PO intake         Long-Term Goals:  Timeframe for Long-term Goals: 4 weeks     LONG TERM GOAL #1:  Goal 1: Patient will improve cognitive status to a min A level to safely return to PLOF home with wife, nearly 24 hour SUP     Comprehension: 3 - Patient understands basic needs 50-74% of the time  Expression: 4 - Expresses basic ideas/needs 75-90%+ of the time  Social Interaction: 5 - Patient is appropriate with supervision/cues  Problem Solving: 3 - Patient solves simple/routine tasks 50%-74%  Memory: 3 - Patient remembers 50%-74% of the time     EDUCATION:  Learner: Patient  Education:  Reviewed ST goals and Plan of Care, Reviewed recommendations for follow-up, Education Related to Prevention of Recurrence of Impairment/Illness/Injury and Home Safety Education  Evaluation of Education: Demonstrates with assistance and Family not present    Activity Tolerance:  Patient tolerance of treatment: 1.25    Acute/Rehabilitation Problems:  1. Ground-level fall at home. 2. Gait instability. 1. PT/OT. 2. TUG 4 minutes 11 seconds. Improved to 1 minute and 25 seconds on 12/7.  60-69 years:  8.1 seconds; 70-79 years: 9.2 seconds; 80-99 years: 11.3 seconds. 3. 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 1. Initiate TBI guidelines as needed for low stimulation environment. 2. Bright light phototherapy ordered on 12/10. Patient is tolerating therapy well. 4. 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 1. Neurosurgery following with no interventions planned. 5. Left sixth rib fracture. 1. Pain control. 1. Lidoderm patches. 2. Tylenol. 2. Symbicort twice daily. 3. Albuterol nebulizer every 6 hours as needed. 6. Abrasion to left forehead. 1. Wound care. 7. Laceration of left lower lip. 1. Healing appropriately. 8. Subacute infarct of the right thalamus. 1. Stable. 2. Continue with therapies. 9. Severe cognitive impairments/Mild oral dysphagia/Mild dysarthria/Mild-moderate expressive language deficits. 1. (MOCA) version 7.2 completed. Patient scored 9/30. Repeat on 12/2 - [facility]) version 8.1 completed. Patient scored 10/30. 2. SLP. 3. Diet downgraded on 12/2 to soft and bite-sized within liquids. 10. Left orbital floor fracture with posterior blowout component/Nasal bone fracture. 1. Conservative treatment of this fracture as the components involve the posterior aspect of the left orbital floor without clinical evidence of inferior rectus impingement. No need to surgical intervention for the nasal bone fractures. 11. Hematuria. 1. Resolved.   12. Coronary artery disease with history of 2 stents and now with 90% severe stenosis of the apical portion of the LAD and mild diastolic dysfunction of the left ventricle noted on heart catheterization completed on 10/22/2020 by  Bang with deployment of PCI to circumflex artery/Medication nonadherence to recommended dual antiplatelet therapy and cardiac rehabilitation following recent cardiac stenting. 1. Aspirin has been started. 2. Holding Brilinta for now. 13. Cardiac arrhythmia with symptomatic bradycardia/Orthostatic Hypotension. 1. Cardiology consult. 2. Metoprolol discontinued on 12/9. Plan is to monitor patient. 3. EKG on 12/9 with normal sinus rhythm, pulmonary disease pattern, left axis deviation with absence of prior supraventricular complexes. 4. TSH normal at 2.9 on 12/10.  5. Tilt table testing was positive on 12/11/2020.  1. Celexa 20 mg was started by Cardiology. 14. Nutrition:  Consultation to dietician for nutritional counseling and recommendations. 1. Total protein 6.3 and albumin slightly low at 3.4 on 12/2/2020.  2. Vitamin D 25 hydroxy normal at 68 on 12/2/2020. 15. Electrolytes. 1. Normal on 12/10 of:  2. Mild hyponatremia of 134 on 12/7. Normal at 135 on 12/10. 16. Acute kidney injury. 1. Cr/BUN/GFR on 12/10 is worsened to 1.7/34/47 from prior 1.2/26/70 on 12/2.  2. Encourage fluids. 17. Bladder: Noted on admission. Follow for now. 18. Bowel: Senna, colace, MOM  19. Rehabilitation nursing will be involved for bowel, bladder, skin, and pain management. Nursing will also provide education and training to patient and family. 20. Prophylaxis:  DVT: Lovenox. GI: None. 21.  and case management consultations for coordination of care and discharge planning. Chronic Problems:  1. Hypertension. 1. Amlodipine. 2. Bumex. 3. Cozaar. 4. Lopressor. Discontinued on 12/9 due to bradycardia. 2. Hyperlipidemia/Statin induced myositis. 1. Pravachol. 3. CKD 3.  1. Cr/BUN/GFR on 12/2 was 1.2/24/70 which is stable over prior labs.   4. Lumbar stenosis with neurogenic claudication status post lumbar laminectomy from L2-L3 bilaterally by Dr. Crystal Reyes on 5/29/2015.  5. 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 6. Prostate cancer status post brachytherapy. 1. Tamsulosin. Labs reviewed on:  1. 12/1.  2. 12/2.  3. 12/7.  4. 12/10. Infectious Disease:  1. N/A    Missed Therapy Time:  12/9  60 minutes of Occupational Therapy missed secondary to the patient having symptomatic bradycardia.     DME:    Discharge Plan:  Estimated Discharge Date: 12/16   Destination: home health  Services at Discharge: [address], Occupational Therapy, Speech Therapy, Nursing and aide 3x week  Is patient appropriate for an outpatient driving evaluation? no  Equipment at Discharge: RW    Greater than 50% of 30 minutes was spent with the patient      Joseph Beltran MD

## 2020-12-12 NOTE — PLAN OF CARE
Problem: Falls - Risk of:  Goal: Will remain free from falls  Description: Will remain free from falls  Outcome: Ongoing     Problem: Falls - Risk of:  Goal: Absence of physical injury  Description: Absence of physical injury  Outcome: Ongoing     Problem: Discharge Planning:  Goal: Discharged to appropriate level of care  Description: Discharged to appropriate level of care  Outcome: Ongoing     Problem: IP BOWEL ELIMINATION  Goal: LTG - patient will utilize adaptive techniques/equipment to complete bowel elimination  Outcome: Ongoing     Problem: IP BOWEL ELIMINATION  Goal: LTG - patient will have regular and routine bowel evacuation  Outcome: Ongoing     Problem: IP BOWEL ELIMINATION  Goal: STG - patient will be accident free  Outcome: Ongoing     Problem: IP BOWEL ELIMINATION  Goal: STG - Patient will verbalize signs and symptoms of constipation and how to prevent/alleviate  Outcome: Ongoing     Problem: IP BOWEL ELIMINATION  Goal: STG - patient will be continent of stool  Outcome: Ongoing     Problem: IP BOWEL ELIMINATION  Goal: STG - Patient verbalizes knowledge about relationship between diet, fluid intake, activity and medication on constipation  Outcome: Ongoing     Problem: IP BLADDER/VOIDING  Goal: LTG - patient will complete bladder elimination  Outcome: Ongoing     Problem: IP BLADDER/VOIDING  Goal: LTG - Patient will utilize adaptive techniques/equipment to complete bladder elimination  Outcome: Ongoing     Problem: IP BLADDER/VOIDING  Goal: LTG - patient will achieve acceptable level of continence  Outcome: Ongoing     Problem: Skin Integrity:  Goal: Will show no infection signs and symptoms  Description: Will show no infection signs and symptoms  Outcome: Ongoing     Problem: Skin Integrity:  Goal: Absence of new skin breakdown  Description: Absence of new skin breakdown  Outcome: Ongoing     Problem: IP BALANCE  Goal: LTG - Patient will maintain balance to allow for safe/functional mobility  Outcome: Ongoing     Problem: IP DRESSINGS LOWER EXTREMITIES  Goal: LTG - Patient will safely utilize adaptive techniques/equipment to dress lower body  Outcome: Ongoing     Problem: DISCHARGE BARRIERS  Goal: Patient's continuum of care needs are met  Outcome: Ongoing     Problem: IP MOBILITY  Goal: LTG - patient will demonstrate safe mobility requirements  Outcome: Ongoing     Problem: IP COMMUNICATION/DYSARTHRIA  Goal: LTG - Patient will effectively communicate in all situations with the use of compensatory strategies  Outcome: Ongoing     Problem: IP SWALLOWING  Goal: LTG - patient will tolerate the least restrictive diet consistency to allow for safe consumption of daily meals  Outcome: Ongoing     Problem: Pain:  Goal: Pain level will decrease  Description: Pain level will decrease  Outcome: Ongoing     Problem: Pain:  Goal: Control of acute pain  Description: Control of acute pain  Outcome: Ongoing     Problem: Pain:  Goal: Control of chronic pain  Description: Control of chronic pain  Outcome: Ongoing     Problem: Impaired respiratory status  Goal: Clear lung sounds  Outcome: Ongoing   Care plan reviewed with patient. Patient verbalizes understanding of care plan and treatment goals.

## 2020-12-12 NOTE — PROGRESS NOTES
Patient: Vadim Smith  Unit/Bed: 0Y-19/686-S  YOB: 1935  MRN: [id number] Acct: [de-identified]   Admitting Diagnosis: Intraventricular hemorrhage ([address] [I61.5]  Admit Date:  12/1/2020  Hospital Day: 11    Assessment:     Active Problems:    Hypercholesteremia    CAD (coronary artery disease)    Benign essential HTN    Syncope    History of DVT (deep vein thrombosis)    H/O prostate cancer    Intraventricular hemorrhage (HCC)    Fracture of left orbital floor (HCC)    Nasal bone fracture    Closed fracture of one rib of left side    Intracranial bleed (HCC)    Postural hypotension    COPD (chronic obstructive pulmonary disease) (HCC)  Resolved Problems:    * No resolved hospital problems. *      Plan:     Follow now that he is on the celexa for postural hypotension        Subjective:     Patient has no complaint of CP, SOB, some diarrhea though and no voiding trouble. .   Medication side effects: none    Scheduled Meds:   citalopram  20 mg Oral Nightly    enoxaparin  40 mg Subcutaneous Daily    famotidine  20 mg Oral Daily    therapeutic multivitamin-minerals  1 tablet Oral Daily    lidocaine  1 patch Transdermal Daily    amLODIPine  5 mg Oral Daily    aspirin  81 mg Oral Daily    budesonide-formoterol  2 puff Inhalation BID    bumetanide  1 mg Oral Daily    losartan  100 mg Oral Daily    pravastatin  40 mg Oral Daily    tamsulosin  0.4 mg Oral Nightly     Continuous Infusions:  PRN Meds:acetaminophen **OR** acetaminophen, polyethylene glycol, promethazine **OR** [DISCONTINUED] ondansetron, albuterol, senna, docusate sodium    Review of Systems  Pertinent items are noted in HPI. Objective:     No data found. I/O last 3 completed shifts:   In: 12 [P.O.:960]  Out: 375 [Urine:375]  I/O this shift:  In: -   Out: 350 [Urine:350]    /65   Pulse 106   Temp 98.6 °F (37 °C) (Oral)   Resp 16   Ht 5' 9\" (1.753 m)   Wt 216 lb (98 kg)   SpO2 96%   BMI 31.90 kg/m²     /65   Pulse 106   Temp 98.6 °F (37 °C) (Oral)   Resp 16   Ht 5' 9\" (1.753 m)   Wt 216 lb (98 kg)   SpO2 96%   BMI 31.90 kg/m²   General appearance: alert, appears stated age and cooperative  Head: Normocephalic, without obvious abnormality, atraumatic  Lungs: clear to auscultation bilaterally  Chest wall: no tenderness  Heart: regular rate and rhythm, S1, S2 normal, no murmur, click, rub or gallop  Abdomen: soft, non-tender; bowel sounds normal; no masses,  no organomegaly  Extremities: extremities normal, atraumatic, no cyanosis or edema  Skin: Skin color, texture, turgor normal. No rashes or lesions  Neurologic: weak      Electronically signed by Storm Councilman, MD on 12/12/2020 at 5:48 PM

## 2020-12-12 NOTE — PROCEDURES
[address] [place] [address]                                TILT TABLE TEST      PATIENT NAME: Leonardo Mane                    :        1935  MED REC NO:   [id number]                           ROOM:       9043  ACCOUNT NO:   [de-identified]                           ADMIT DATE: 2020  PROVIDER:     Zachary Bamberger. CARIN Narayanan Olp: 2020    TILT TABLE TEST    INDICATION:  The patient is an 52-year-old gentleman who has a history  of syncopal episode referred for a tilt table test to rule out  possibility of postural hypotension as a cause for his symptoms. The patient tilted for half an hour and he continued to be symptom free. However, after the half an hour, the patient felt weak and he dropped  his blood pressure to 86 with near syncopal episode. The patient was placed back in the supine position in 33 minutes. His  blood pressure came to the normal and he regained consciousness. With  this, the patient did develop symptoms of dizziness, near syncope with a  significant drop of the blood pressure but there was no bradycardia or  pauses. The patient will be placed on Celexa and he will be monitored closely.         Leon Naranjo M.D.    D: 2020 13:40:01       T: 2020 17:04:11     [email or web address]  Job#: [id number]     Doc#: [id number]    CC:

## 2020-12-12 NOTE — PLAN OF CARE
Care plan reviewed with patient. Patient verbalizes understanding of the plan of care and contribute to goal setting. Problem: Falls - Risk of:  Goal: Will remain free from falls  Description: Will remain free from falls  12/12/2020 1116 by Steven Muller LPN  Outcome: Ongoing  Note: Up with assist of one with walker and gait belt. Gait slow and steady needs reminded to take bigger steps. Tolerates fairly well. Problem: IP BOWEL ELIMINATION  Goal: LTG - patient will have regular and routine bowel evacuation  12/12/2020 1116 by Steven Muller LPN  Outcome: Ongoing  Note: Last bm 12/11/2020     Problem: IP BLADDER/VOIDING  Goal: LTG - patient will complete bladder elimination  12/12/2020 1116 by Steven Muller LPN  Note: Continent of bladder so far this shift. Uses urinal mostly at night, up to bathroom this am.      Problem: Skin Integrity:  Goal: Will show no infection signs and symptoms  Description: Will show no infection signs and symptoms  12/12/2020 1116 by Steven Muller LPN  Outcome: Ongoing  Note: Afebrile. No s/sx infection noted. Problem: Pain:  Goal: Pain level will decrease  Description: Pain level will decrease  12/12/2020 1116 by Steven Muller LPN  Outcome: Ongoing  Note: Denies pain. Problem: Impaired respiratory status  Goal: Clear lung sounds  12/12/2020 1116 by Steven Muller LPN  Outcome: Ongoing  Note: Lung sounds clear throughout diminished in bases.

## 2020-12-12 NOTE — PROGRESS NOTES
[address]  Occupational Therapy  Daily Note  Time:    Time In: 1000  Time Out: 1030  Timed Code Treatment Minutes: 30 Minutes  Minutes: 30          Date: 2020  Patient Name: Jose Maria Beaver,   Gender: male      Room: [facility]64/064-A  MRN: [id number]  : 1935  (81 y.o.)  Referring Practitioner: Dr. Val Rodriguez  Diagnosis: Intraventricular Hemorrhage  Additional Pertinent Hx: Pt admitted to in rehab for rehab needs after admission to the hospital s/p  fall at home resulting in an intraventricular hemorrhage, left sixth rib fracture, displaced left orbital floor fracture with deficits of gait instability and severe cognitive deficits with a MOCA score of 9/30 on . During IP rehab stay, patient with bradycardic event with plans for loop recorder to be placed at discharge. Restrictions/Precautions:  Restrictions/Precautions: Fall Risk, General Precautions  Position Activity Restriction  Other position/activity restrictions: Keep systolic blood pressure between 100-160 while moving. SUBJECTIVE: Pt sitting up n bedside chair stating he didn't feel the greatest today but unable to provide specifics to therapist. Pt requiring mod encouragement to participate in tasks including chair tasks    PAIN: 0 /10:      COGNITION: Decreased Recall and Decreased Insight    ADL:   Grooming: Stand By Assistance. seated in chair for oral care. BALANCE:  Sitting Balance:  Stand By Assistance. at edge of chair    BED MOBILITY:  Not Tested    TRANSFERS:  Pt declining to attempt this date d/t not feeling well    FUNCTIONAL MOBILITY:  Pt declining to attempt this date d/t not feeling well     ADDITIONAL ACTIVITIES:  Completed bilateral UE light strengthening HEp with orange theraband to inrease his UB strength and endurance for ADL tasks. Complete all movements of left UE below 90 degrees of shoulder flexion d/t decreased active movement.  Pt completed 15 reps of each ex with rest breaks. Pts heart rate ranged between  during with no complaints of dizziness     ASSESSMENT:     Activity Tolerance:  Patient tolerance of  treatment: fair. Pt limiting self this date       Discharge Recommendations: Home with nursing aide, Home with Home health OT    Equipment Recommendations: Other: Pt has a toilet raiser  and shower seat. Need to confirm with spouse. Plan: Times per week: 5xs weeks x 90 min, 1 x week x 30 min  Current Treatment Recommendations: Functional Mobility Training, Endurance Training, Self-Care / ADL, Safety Education & Training, Strengthening, Patient/Caregiver Education & Training, Equipment Evaluation, Education, & procurement, Home Management Training    Patient Education  Patient Education: importance f partcipating and increasing activity    Goals  Short term goals  Time Frame for Short term goals: 1 week  Short term goal 1: Pt will demonstrate functional mobility walking to/from the bathroom or bedside chair with SBA and RW  with min cues for aligning himself as neeed to prepare for doing self care. Short term goal 2: Pt will complete ADL routine with no > min A and min cues for problem solving and task completion to increase independence in self care tasks  Short term goal 3: Pt will complete toileting routine and transfer with no SBA and min cues for safe tech to increase independence in self toileting tasks  Short term goal 4: Pt will complete BUE ROM and light resistance exercises while following verbal cues to increase his pain free movement for ease of doing self care.   Short term goal 5: Pt will complete 1 step homemaking tasks with CGA and no > min cues for problem solving to increase ability to retrieve a snack  Long term goals  Time Frame for Long term goals : 2 week  Long term goal 1: Pt will complete ADL routine with S and no  cues for problem solving to increase independence in self care tasks  Long term goal 2: Pt will complete 1 step homemaking tasks with SBA and no cues for safe tech to increase ability to retrieve a glass of water. Following session, patient left in safe position with all fall risk precautions in place.

## 2020-12-13 PROCEDURE — 6370000000 HC RX 637 (ALT 250 FOR IP): Performed by: SURGERY

## 2020-12-13 PROCEDURE — 6360000002 HC RX W HCPCS: Performed by: PHYSICAL MEDICINE & REHABILITATION

## 2020-12-13 PROCEDURE — 1180000000 HC REHAB R&B

## 2020-12-13 PROCEDURE — 94760 N-INVAS EAR/PLS OXIMETRY 1: CPT

## 2020-12-13 PROCEDURE — 94640 AIRWAY INHALATION TREATMENT: CPT

## 2020-12-13 PROCEDURE — 6370000000 HC RX 637 (ALT 250 FOR IP): Performed by: FAMILY MEDICINE

## 2020-12-13 PROCEDURE — 6370000000 HC RX 637 (ALT 250 FOR IP): Performed by: INTERNAL MEDICINE

## 2020-12-13 RX ADMIN — BUDESONIDE AND FORMOTEROL FUMARATE DIHYDRATE 2 PUFF: 80; 4.5 AEROSOL RESPIRATORY (INHALATION) at 21:12

## 2020-12-13 RX ADMIN — ENOXAPARIN SODIUM 40 MG: 40 INJECTION SUBCUTANEOUS at 09:38

## 2020-12-13 RX ADMIN — PRAVASTATIN SODIUM 40 MG: 40 TABLET ORAL at 20:23

## 2020-12-13 RX ADMIN — TAMSULOSIN HYDROCHLORIDE 0.4 MG: 0.4 CAPSULE ORAL at 20:23

## 2020-12-13 RX ADMIN — ASPIRIN 81 MG: 81 TABLET, CHEWABLE ORAL at 09:33

## 2020-12-13 RX ADMIN — BUMETANIDE 1 MG: 1 TABLET ORAL at 09:33

## 2020-12-13 RX ADMIN — AMLODIPINE BESYLATE 5 MG: 5 TABLET ORAL at 09:33

## 2020-12-13 RX ADMIN — LOSARTAN POTASSIUM 100 MG: 100 TABLET, FILM COATED ORAL at 09:33

## 2020-12-13 RX ADMIN — FAMOTIDINE 20 MG: 20 TABLET, FILM COATED ORAL at 09:33

## 2020-12-13 RX ADMIN — CITALOPRAM 20 MG: 20 TABLET, FILM COATED ORAL at 20:23

## 2020-12-13 RX ADMIN — BUDESONIDE AND FORMOTEROL FUMARATE DIHYDRATE 2 PUFF: 80; 4.5 AEROSOL RESPIRATORY (INHALATION) at 09:57

## 2020-12-13 RX ADMIN — MULTIPLE VITAMINS W/ MINERALS TAB 1 TABLET: TAB at 09:33

## 2020-12-13 ASSESSMENT — PAIN SCALES - GENERAL
PAINLEVEL_OUTOF10: 0

## 2020-12-13 NOTE — PLAN OF CARE
Problem: Pain:  Goal: Pain level will decrease  Description: Pain level will decrease  Outcome: Ongoing     The patient has denied pain all this shift. The patient is asked his pain level with hourly checks.

## 2020-12-13 NOTE — PLAN OF CARE
Problem: Falls - Risk of:  Goal: Will remain free from falls  Description: Will remain free from falls  Outcome: Ongoing     The patient has remained free from falls this shift. He continues to have alarms to his bed and chair and his call light in reach.   Hourly rounds continued

## 2020-12-14 LAB
ANION GAP SERPL CALCULATED.3IONS-SCNC: 11 MEQ/L (ref 8–16)
BUN BLDV-MCNC: 26 MG/DL (ref 7–22)
CALCIUM SERPL-MCNC: 9.1 MG/DL (ref 8.5–10.5)
CHLORIDE BLD-SCNC: 101 MEQ/L (ref 98–111)
CO2: 25 MEQ/L (ref 23–33)
CREAT SERPL-MCNC: 1.4 MG/DL (ref 0.4–1.2)
ERYTHROCYTE [DISTWIDTH] IN BLOOD BY AUTOMATED COUNT: 12.7 % (ref 11.5–14.5)
ERYTHROCYTE [DISTWIDTH] IN BLOOD BY AUTOMATED COUNT: 47.6 FL (ref 35–45)
GLUCOSE BLD-MCNC: 108 MG/DL (ref 70–108)
HCT VFR BLD CALC: 42.6 % (ref 42–52)
HEMOGLOBIN: 13 GM/DL (ref 14–18)
MCH RBC QN AUTO: 30.7 PG (ref 26–33)
MCHC RBC AUTO-ENTMCNC: 30.5 GM/DL (ref 32.2–35.5)
MCV RBC AUTO: 100.7 FL (ref 80–94)
PLATELET # BLD: 176 THOU/MM3 (ref 130–400)
PMV BLD AUTO: 10.6 FL (ref 9.4–12.4)
POTASSIUM SERPL-SCNC: 4.2 MEQ/L (ref 3.5–5.2)
RBC # BLD: 4.23 MILL/MM3 (ref 4.7–6.1)
SODIUM BLD-SCNC: 137 MEQ/L (ref 135–145)
WBC # BLD: 5.6 THOU/MM3 (ref 4.8–10.8)

## 2020-12-14 PROCEDURE — 6370000000 HC RX 637 (ALT 250 FOR IP): Performed by: FAMILY MEDICINE

## 2020-12-14 PROCEDURE — 92526 ORAL FUNCTION THERAPY: CPT

## 2020-12-14 PROCEDURE — 94640 AIRWAY INHALATION TREATMENT: CPT

## 2020-12-14 PROCEDURE — 97116 GAIT TRAINING THERAPY: CPT

## 2020-12-14 PROCEDURE — 97110 THERAPEUTIC EXERCISES: CPT

## 2020-12-14 PROCEDURE — 80048 BASIC METABOLIC PNL TOTAL CA: CPT

## 2020-12-14 PROCEDURE — 36415 COLL VENOUS BLD VENIPUNCTURE: CPT

## 2020-12-14 PROCEDURE — 6370000000 HC RX 637 (ALT 250 FOR IP): Performed by: SURGERY

## 2020-12-14 PROCEDURE — 97530 THERAPEUTIC ACTIVITIES: CPT

## 2020-12-14 PROCEDURE — 97542 WHEELCHAIR MNGMENT TRAINING: CPT

## 2020-12-14 PROCEDURE — 6370000000 HC RX 637 (ALT 250 FOR IP): Performed by: INTERNAL MEDICINE

## 2020-12-14 PROCEDURE — 97129 THER IVNTJ 1ST 15 MIN: CPT

## 2020-12-14 PROCEDURE — 97535 SELF CARE MNGMENT TRAINING: CPT

## 2020-12-14 PROCEDURE — 94760 N-INVAS EAR/PLS OXIMETRY 1: CPT

## 2020-12-14 PROCEDURE — 6360000002 HC RX W HCPCS: Performed by: PHYSICAL MEDICINE & REHABILITATION

## 2020-12-14 PROCEDURE — 1180000000 HC REHAB R&B

## 2020-12-14 PROCEDURE — 85027 COMPLETE CBC AUTOMATED: CPT

## 2020-12-14 RX ADMIN — FAMOTIDINE 20 MG: 20 TABLET, FILM COATED ORAL at 08:41

## 2020-12-14 RX ADMIN — PRAVASTATIN SODIUM 40 MG: 40 TABLET ORAL at 21:23

## 2020-12-14 RX ADMIN — CITALOPRAM 20 MG: 20 TABLET, FILM COATED ORAL at 21:23

## 2020-12-14 RX ADMIN — BUMETANIDE 1 MG: 1 TABLET ORAL at 08:41

## 2020-12-14 RX ADMIN — AMLODIPINE BESYLATE 5 MG: 5 TABLET ORAL at 08:41

## 2020-12-14 RX ADMIN — ENOXAPARIN SODIUM 40 MG: 40 INJECTION SUBCUTANEOUS at 08:42

## 2020-12-14 RX ADMIN — TAMSULOSIN HYDROCHLORIDE 0.4 MG: 0.4 CAPSULE ORAL at 21:23

## 2020-12-14 RX ADMIN — BUDESONIDE AND FORMOTEROL FUMARATE DIHYDRATE 2 PUFF: 80; 4.5 AEROSOL RESPIRATORY (INHALATION) at 07:45

## 2020-12-14 RX ADMIN — BUDESONIDE AND FORMOTEROL FUMARATE DIHYDRATE 2 PUFF: 80; 4.5 AEROSOL RESPIRATORY (INHALATION) at 18:06

## 2020-12-14 RX ADMIN — MULTIPLE VITAMINS W/ MINERALS TAB 1 TABLET: TAB at 08:41

## 2020-12-14 RX ADMIN — LOSARTAN POTASSIUM 100 MG: 100 TABLET, FILM COATED ORAL at 08:41

## 2020-12-14 RX ADMIN — ASPIRIN 81 MG: 81 TABLET, CHEWABLE ORAL at 08:41

## 2020-12-14 ASSESSMENT — ENCOUNTER SYMPTOMS
TROUBLE SWALLOWING: 1
DIARRHEA: 0
CONSTIPATION: 0
SHORTNESS OF BREATH: 0
VOICE CHANGE: 0
NAUSEA: 0
COUGH: 0
ALLERGIC/IMMUNOLOGIC NEGATIVE: 1

## 2020-12-14 ASSESSMENT — PAIN SCALES - GENERAL
PAINLEVEL_OUTOF10: 0

## 2020-12-14 NOTE — PROGRESS NOTES
[address] [place] [address]  INPATIENT PHYSICAL THERAPY  DAILY NOTE  [address] - 7E-64/064-A    Time In: 0700  Time Out: 0730  Timed Code Treatment Minutes: 30 Minutes  Minutes: 30          Date: 2020  Patient Name: Gladis Davila,  Gender:  male        MRN: [id number]  : 1935  (81 y.o.)     Referring Practitioner: Jesu Burns MD  Diagnosis: Intraventricular hemorrhage  Additional Pertinent Hx: Pt presenting at [facility] by activation of level 2 trauma, brought by EMS following a unwitnessed fall with unknown LOC; past medical history includes recent cardiac sten placement x2, hypertension, chronic kidney disease, CAD. Per wife report, she was standing in the bathroom when she heard a sound in the kitchen she open the bathroom door to see her  laying face down on the ground. Patient states he normally ambulates with walker, did have a walker at the time of fall, unclear if fall was precipitated by syncopal episode. Wife states that  does appear to be confused post fall, has been repetitive in speech since incident. Patient reports some mild discomfort over left face otherwise has no complaints on exam.  Found to have an intraventricular hemorrhage, a left orbital fx, nasal bone fx, and left 6th rib fx. To IP rehab .      Prior Level of Function:  Lives With: Spouse  Type of Home: House  Home Layout: One level  Home Access: Stairs to enter with rails  Entrance Stairs - Number of Steps: 1 step to enter and 1 step once inside the house  Entrance Stairs - Rails: Left  Home Equipment: Standard walker   Bathroom Shower/Tub: Walk-in shower  Bathroom Toilet: Handicap height  Bathroom Equipment: Built-in shower seat, Grab bars around toilet, Grab bars in shower  Bathroom Accessibility: Accessible    Receives Help From: Family  ADL Assistance: Tali: Independent  Homemaking Responsibilities: No  Ambulation Assistance: Independent  Transfer Assistance: Independent  Active : No  Additional Comments: Pt ambualated short distances with a standard walker. He did his own self care. He had help with all of the cooking and cleaning prior to admission. Restrictions/Precautions:  Restrictions/Precautions: Fall Risk, General Precautions  Position Activity Restriction  Other position/activity restrictions: Keep systolic blood pressure between 100-160 while moving. SUBJECTIVE: Pt. Laying in bed and pleasantly agrees to therapy session. Pt. Requests to use restroom and get dressed during session. PAIN: Denies    OBJECTIVE:  Bed Mobility:  Rolling to Right: Stand By Assistance   Supine to Sit: Stand By Assistance    Transfers:  Sit to Stand: Stand By Assistance  Stand to Sit:Stand By Assistance    Ambulation:  Contact Guard Assistance  Distance: 15' x 1, 12' x 1  Surface: Level Tile  Device:Rolling Walker  Gait Deviations: Forward Flexed Posture, Slow Anne [place], Decreased Step Length Bilaterally, Decreased Gait Speed, Decreased Heel Strike Bilaterally, Decreased Terminal Knee Extension and Decreased foot clearance Bilaterally. Balance:  Pt. stood at [facility] while completing pericare, clothing management, and oral hygiene. Pt. with forward flexed posture throughout. Pt. Requiring cues for equal LEXII and posture. CGA-SBA throughout for safety. Exercise:  None    Functional Outcome Measures: Not completed       ASSESSMENT:  Assessment: Patient progressing toward established goals. Activity Tolerance:  Patient tolerance of  treatment: good.       Equipment Recommendations:Equipment Needed: Yes  Mobility Devices: Erica Mix: Rolling  Other: Has SW, will monitor for needs,  RW  Discharge Recommendations:  24 hour supervision or assist, Continue to assess pending progress    Plan: Times per week: 5x/week 90 min, 1x/week 30 min  Current Treatment Recommendations: Strengthening, Safety Education & Training, Balance Training, Endurance Training, Functional Mobility Training, Patient/Caregiver Education & Training, Transfer Training, Gait Training, Wheelchair Mobility Training, Neuromuscular Re-education, Home Exercise Program, Stair training    Patient Education  Patient Education: Plan of Care, Transfers, Reviewed Prior Education, Gait    Goals:  Patient goals : does not state  Short term goals  Time Frame for Short term goals: 2 weeks  Short term goal 1: Pt to transfer supine <--> sit SBA to enable pt to get in/out of bed. Short term goal 2: Pt to transfer sit <--> stand SBA for increased functional mobility. Short term goal 3: Pt to ambulate 100 feet with SW/RW CGA for household and community ambulation. Long term goals  Time Frame for Long term goals : 4 weeks  Long term goal 1: Pt to transfer supine <--> sit SBA to enable pt to get in/out of bed. Long term goal 2: Pt to transfer sit <--> stand supervision for increased functional mobility. Long term goal 3: Pt to ambulate >150 feet with SW/RW SBA for household and community ambulation. Long term goal 4: Pt to ascend/descend 1 step with SW/RW SBA for home entry. Long term goal 5: Pt to perform car transfer CGA to enable pt to get in/out of the car. Long term goal 6: Pt to improve TUG test score to <45 sec to indicate improvement in overall mobility. Following session, patient left in safe position with all fall risk precautions in place.

## 2020-12-14 NOTE — PROGRESS NOTES
[facility]  Recreational Therapy  Evaluation  Inpatient Rehabilitation Unit      Time Spent with Patient: 10 minutes    Date:  12/14/2020       Patient Name: Gladis Santana      MRN: [id number]       YOB: 1935 (81 y.o.)       Gender: male  Diagnosis: Intraventricular Hemorrhage  Referring Practitioner: Dr. Edilson Ward    RESTRICTIONS/PRECAUTIONS:  Restrictions/Precautions: Fall Risk, General Precautions     Hearing: Within functional limits    PAIN: 0-laying in bed-pain goes up in knees when he is walking    SUBJECTIVE:  Pt lives with wife-they have 4 children with 3 close by    VISION:  Glasses to read but does not have them here    HEARING: Within Normal Limit    LEISURE INTERESTS:   Pt 's wife does the cooking and the cleaning-he likes to read the paper but his glasses are at home and cannot see to read-he likes to travel to visit family and friends and he likes to watch tv-no leisure needs at this time-looking forward to going home on Wednesday      BARRIERS TO LEISURE INTERESTS:    Decreased endurance, Impaired vision and Pain      Patient Education  New Education Provided: Importance of Leisure, RT Plan of Care    Plan:  Continue to follow patient through this admission  See patient individually    Electronically signed by: BALJINDER NathanS  Date: 12/14/2020

## 2020-12-14 NOTE — PROGRESS NOTES
[address]  Occupational Therapy  Daily Note  Time:   Time In: 1130  Time Out: 1230  Timed Code Treatment Minutes: 60 Minutes  Minutes: 60          Date: 2020  Patient Name: Celio Ceron,   Gender: male      Room: -64/064-A  MRN: [id number]  : 1935  (81 y.o.)  Referring Practitioner: Dr. Jasmyn Mann  Diagnosis: Intraventricular Hemorrhage  Additional Pertinent Hx: Pt admitted to in rehab for rehab needs after admission to the hospital s/p  fall at home resulting in an intraventricular hemorrhage, left sixth rib fracture, displaced left orbital floor fracture with deficits of gait instability and severe cognitive deficits with a MOCA score of 9/30 on . During IP rehab stay, patient with bradycardic event with plans for loop recorder to be placed at discharge. Restrictions/Precautions:  Restrictions/Precautions: Fall Risk, General Precautions  Position Activity Restriction  Other position/activity restrictions: Keep systolic blood pressure between 100-160 while moving. SUBJECTIVE: Pt seated in bedside chair upon arrival, agreeable to OT session. VITALS:  HR- 125-135   BP- 132/75 earlier this AM per RN    PAIN: Did not rate: B knees    COGNITION: Decreased Recall, Decreased Insight and Decreased Problem Solving    ADL:   Toileting: Contact Guard Assistance. For clothing managment stanidng to void. Toilet Transfer: Not tested. Pt stood at STS to void. Gay Devine BALANCE:  Sitting Balance:  Modified Independent. Standing Balance: Stand By Assistance, [address] BED MOBILITY:  Not Tested    TRANSFERS:  Sit to Stand:  Stand By Assistance. Stand to Sit: Stand By Assistance. FUNCTIONAL MOBILITY:  Assistive Device: Rolling Walker  Assist Level:  Stand By Assistance and [address] Distance: Completed functional mobility within unit hallway and therapy apartment at slow pace, no LOB noted.  Pt requires mod cues for walker safety to keep close of LEXII with mobility- lenghty seated rest break after trial of mobility, mod fatigue noted. ADDITIONAL ACTIVITIES:  Pt participated in IADL task to ambulate within therapy kitchen with use of RW to reach into various planes at high/low levels to retrieve items with place setting at dining table with use of RW. Pt required SBA for balance and min cues for safe technique or EC while transporting items. Completed to increase kitchen safety and facilitate functional reaching required for simple meal prep tasks. Patient and wife participated in family education regarding current ADL performance including shower transfers and bathroom safety with safe toilet transfers- wife confirms pt has built-in shower seat and toilet riser at home, denies needs for [facility]. Pt's able to participate to demo familar functional task making bed as completed at home PTA. Therapist provided skilled education to incorporate reacher for increased safety, improved problem solving, and energy conservation strategies. Patient and wife demo'ed good understanding of education, appreciation and asked appropriate questions. ASSESSMENT:     Activity Tolerance:  Patient tolerance of  treatment: fair. Pt limited by fatigue. Discharge Recommendations: Home with nursing aide, Home with Home health OT   Equipment Recommendations: Other: Pt has a toilet raiser  and shower seat. Need to confirm with spouse. Plan: Times per week: 5xs weeks x 90 min, 1 x week x 30 min  Current Treatment Recommendations: Functional Mobility Training, Endurance Training, Self-Care / ADL, Safety Education & Training, Strengthening, Patient/Caregiver Education & Training, Equipment Evaluation, Education, & procurement, Home Management Training    Patient Education  Patient Education: ADL's, IADL's, Family Education, Equipment Education, Assistive Device Safety and Safety with transfers and mobility.     Goals  Short term goals  Time Frame for Short term goals: 1 week  Short term goal 1: Pt will demonstrate functional mobility walking to/from the bathroom or bedside chair with SBA and RW  with min cues for aligning himself as neeed to prepare for doing self care. Short term goal 2: Pt will complete ADL routine with no > min A and min cues for problem solving and task completion to increase independence in self care tasks  Short term goal 3: Pt will complete toileting routine and transfer with no SBA and min cues for safe tech to increase independence in self toileting tasks  Short term goal 4: Pt will complete BUE ROM and light resistance exercises while following verbal cues to increase his pain free movement for ease of doing self care. Short term goal 5: Pt will complete 1 step homemaking tasks with CGA and no > min cues for problem solving to increase ability to retrieve a snack  Long term goals  Time Frame for Long term goals : 2 week  Long term goal 1: Pt will complete ADL routine with S and no  cues for problem solving to increase independence in self care tasks  Long term goal 2: Pt will complete 1 step homemaking tasks with SBA and no cues for safe tech to increase ability to retrieve a glass of water. Following session, patient left in safe position with all fall risk precautions in place.

## 2020-12-14 NOTE — PROGRESS NOTES
[address] [place] [address]  INPATIENT PHYSICAL THERAPY  DAILY NOTE  [address] - [id number]    Time In: 0900  Time Out: 1010  Timed Code Treatment Minutes: 70 Minutes  Minutes: 70          Date: 2020  Patient Name: Dalia Beckett,  Gender:  male        MRN: [id number]  : 1935  (81 y.o.)     Referring Practitioner: Jonnie Coffey MD  Diagnosis: Intraventricular hemorrhage  Additional Pertinent Hx: Pt presenting at [facility] by activation of level 2 trauma, brought by EMS following a unwitnessed fall with unknown LOC; past medical history includes recent cardiac sten placement x2, hypertension, chronic kidney disease, CAD. Per wife report, she was standing in the bathroom when she heard a sound in the kitchen she open the bathroom door to see her  laying face down on the ground. Patient states he normally ambulates with walker, did have a walker at the time of fall, unclear if fall was precipitated by syncopal episode. Wife states that  does appear to be confused post fall, has been repetitive in speech since incident. Patient reports some mild discomfort over left face otherwise has no complaints on exam.  Found to have an intraventricular hemorrhage, a left orbital fx, nasal bone fx, and left 6th rib fx. To IP rehab .      Prior Level of Function:  Lives With: Spouse  Type of Home: House  Home Layout: One level  Home Access: Stairs to enter with rails  Entrance Stairs - Number of Steps: 1 step to enter and 1 step once inside the house  Entrance Stairs - Rails: Left  Home Equipment: Standard walker   Bathroom Shower/Tub: Walk-in shower  Bathroom Toilet: Handicap height  Bathroom Equipment: Built-in shower seat, Grab bars around toilet, Grab bars in shower  Bathroom Accessibility: Accessible    Receives Help From: Family  ADL Assistance: [address] [place] [address] Avenue: Independent  Homemaking Responsibilities: No  Ambulation Assistance: Independent  Transfer Assistance: Independent  Active : No  Additional Comments: Pt ambualated short distances with a standard walker. He did his own self care. He had help with all of the cooking and cleaning prior to admission. Restrictions/Precautions:  Restrictions/Precautions: Fall Risk, General Precautions  Position Activity Restriction  Other position/activity restrictions: Keep systolic blood pressure between 100-160 while moving. SUBJECTIVE: Pt. Seated on BS chair and pleasantly agrees to therapy session. Spouse present for family education. Pt. Becoming incontinent during session. Requiring extra time for cleanup. PAIN: Not rated: R knee    OBJECTIVE:  Bed Mobility:  Rolling to Left: Modified Independent   Supine to Sit: Stand By Assistance  Sit to Supine: Minimal Assistance     Transfers:  Sit to Stand: Stand By Assistance  Stand to Sit:Stand By Assistance  Stand Pivot:Stand By Assistance  Car:Stand By Assistance, with verbal cues    Ambulation:  Stand By Assistance, [employer], with verbal cues   Distance: 120' x 1, 20' x 1, multiple shorter distances. Surface: Level Tile  Device:Rolling Walker  Gait Deviations: Forward Flexed Posture, Decreased Step Length Bilaterally, Decreased Terminal Knee Extension and Decreased foot clearance with VCs to correct. Stairs:  Stairs:  6\" steps. X 2 using Bilateral Handrails and Contact Guard Assistance. Unable to complete with single rail this session. Wheelchair Mobility:  Modified Independent  Extremities Used: Bilateral Upper Extremities  Type of Chair:Manual  Surface: Level Tile  Distance: 125' x 1  Quality: Slow Velocity    Balance:  Pt. stood while PTA completed pericare and assisted with clothing. CGA-SBA provided for safety. Exercise:  Patient was guided in 1 set(s) 10 reps of exercise to both lower extremities. Ankle pumps, Glut sets, Quad sets, Heelslides, Hip abduction/adduction and Straight leg raises.  HEP provided for continued strengthing. Exercises were completed for increased independence with functional mobility. Functional Outcome Measures: Not completed       ASSESSMENT:  Assessment: Patient progressing toward established goals. Activity Tolerance:  Patient tolerance of  treatment: good. Equipment Recommendations:Equipment Needed: Yes  Mobility Devices: Krystina : Rolling  Other: Has SW, will monitor for needs,  RW  Discharge Recommendations:  24 hour supervision or assist, Continue to assess pending progress    Plan: Times per week: 5x/week 90 min, 1x/week 30 min  Current Treatment Recommendations: Strengthening, Safety Education & Training, Balance Training, Endurance Training, Functional Mobility Training, Patient/Caregiver Education & Training, Transfer Training, Gait Training, Wheelchair Mobility Training, Neuromuscular Re-education, Home Exercise Program, Stair training    Patient Education  Patient Education: Plan of Care, Home Exercise Program, Family Education, [employer] Mobility, Transfers, Gait, Car Transfers, Wheelchair Mobility, Verbal Exercise Instruction    Goals:  Patient goals : does not state  Short term goals  Time Frame for Short term goals: 2 weeks  Short term goal 1: Pt to transfer supine <--> sit SBA to enable pt to get in/out of bed. Short term goal 2: Pt to transfer sit <--> stand SBA for increased functional mobility. Short term goal 3: Pt to ambulate 100 feet with SW/RW CGA for household and community ambulation. Long term goals  Time Frame for Long term goals : 4 weeks  Long term goal 1: Pt to transfer supine <--> sit SBA to enable pt to get in/out of bed. Long term goal 2: Pt to transfer sit <--> stand supervision for increased functional mobility. Long term goal 3: Pt to ambulate >150 feet with SW/RW SBA for household and community ambulation. Long term goal 4: Pt to ascend/descend 1 step with SW/RW SBA for home entry.   Long term goal 5: Pt to perform car transfer CGA to enable pt to get in/out of the car. Long term goal 6: Pt to improve TUG test score to <45 sec to indicate improvement in overall mobility. Following session, patient left in safe position with all fall risk precautions in place.

## 2020-12-14 NOTE — PLAN OF CARE
Problem: Falls - Risk of:  Goal: Will remain free from falls  Description: Will remain free from falls  12/14/2020 0232 by Haley Marte LPN  Outcome: Ongoing  Bed and chair alarm on and working. Call light with in reach no skid socks in place. Patient verbalized and demonstrated knowledge of using call light and waiting for staff before getting up. Care plan reviewed with patient. Patient verbalize understanding of the plan of care and contribute to goal setting.

## 2020-12-14 NOTE — PLAN OF CARE
Problem: Falls - Risk of:  Goal: Will remain free from falls  Description: Will remain free from falls  12/14/2020 1129 by Michelle Soto LPN  Outcome: Ongoing  Pt will remain free of falls. Pt is 1 assist with walker and gait belt, pts gait is slow. Problem: Discharge Planning:  Goal: Discharged to appropriate level of care  Description: Discharged to appropriate level of care  Outcome: Ongoing  Planning discharge home with [facility] on 12/16/20, followup appts made, 1 office will call back to schedule, contacted cardiology about the event monitor return, it should be returned by patient on 12/31/20, this has been added to discharge instructions. Problem: Skin Integrity:  Goal: Will show no infection signs and symptoms  Description: Will show no infection signs and symptoms  Outcome: Ongoing  Skin assessment every shift. No new areas of skin breakdown, scattered abrasions on face have healed. Problem: Pain:  Goal: Pain level will decrease  Description: Pain level will decrease  Outcome: Ongoing  Reposition frequently to alleviate pain. Pt denies pain this shift.

## 2020-12-14 NOTE — PROGRESS NOTES
Physical Medicine & Rehabilitation  Progress Note    Chief Complaint:   Intracranial and intraventricular hemorrhage/right thalamic CVA    Subjective:  Patient states he is doing well today and is looking forward to his discharge to home on Wednesday. Cardiology is planning on placing a loop recorder prior to him discharging to home. He continues progressing well with his therapies. His labs were discussed and he did not have any additional concerns or questions at this time. Rehabilitation:   Physical Therapy:  OBJECTIVE:  Bed Mobility:  Rolling to Right: Stand By Assistance   Supine to Sit: Stand By Assistance     Transfers:  Sit to Stand: Stand By Assistance  Stand to Sit:Stand By Assistance     Ambulation:  Contact Guard Assistance  Distance: 15' x 1, 12' x 1  Surface: Level Tile  Device:Rolling Walker  Gait Deviations: Forward Flexed Posture, Slow [place], Decreased Step Length Bilaterally, Decreased Gait Speed, Decreased Heel Strike Bilaterally, Decreased Terminal Knee Extension and Decreased foot clearance Bilaterally.      Balance:  Pt. stood at [employer] while completing pericare, clothing management, and oral hygiene. Pt. with forward flexed posture throughout. Pt. Requiring cues for equal LEXII and posture. CGA-SBA throughout for safety.      Exercise:  None     Functional Outcome Measures: Not completed     ASSESSMENT:  Assessment: Patient progressing toward established goals. Activity Tolerance:  Patient tolerance of  treatment: good. Equipment Recommendations:Equipment Needed: Yes  Mobility Devices: Fredy Champagne: Rolling  Other: Has , will monitor for needs,  RW  Discharge Recommendations:  24 hour supervision or assist, Continue to assess pending progress     Occupational Therapy:  COGNITION: Decreased Recall, Decreased Insight and Decreased Problem Solving     ADL:   Toileting: Contact Guard Assistance. For clothing managment stanidng to void. Toilet Transfer: Not tested.  Pt stood at [facility] to void..     BALANCE:  Sitting Balance:  Modified Independent. Standing Balance: Stand By Assistance, [employer]. BED MOBILITY:  Not Tested     TRANSFERS:  Sit to Stand:  Stand By Assistance. Stand to Sit: Stand By Assistance.       FUNCTIONAL MOBILITY:  Assistive Device: Rolling Walker  Assist Level:  Stand By Assistance and [employer]. Distance: Completed functional mobility within unit hallway and therapy apartment at slow pace, no LOB noted. Pt requires mod cues for walker safety to keep close of LEXII with mobility- lenghty seated rest break after trial of mobility, mod fatigue noted.     ADDITIONAL ACTIVITIES:  Pt participated in IADL task to ambulate within therapy kitchen with use of RW to reach into various planes at high/low levels to retrieve items with place setting at dining table with use of RW. Pt required SBA for balance and min cues for safe technique or EC while transporting items. Completed to increase kitchen safety and facilitate functional reaching required for simple meal prep tasks.     Patient and wife participated in family education regarding current ADL performance including shower transfers and bathroom safety with safe toilet transfers- wife confirms pt has built-in shower seat and toilet riser at home, denies needs for [facility]. Pt's able to participate to demo familar functional task making bed as completed at home PTA. Therapist provided skilled education to incorporate reacher for increased safety, improved problem solving, and energy conservation strategies. Patient and wife demo'ed good understanding of education, appreciation and asked appropriate questions.     ASSESSMENT:  Activity Tolerance:  Patient tolerance of  treatment: fair. Pt limited by fatigue. Discharge Recommendations: Home with nursing aide, Home with Home health OT   Equipment Recommendations: Other: Pt has a toilet raiser  and shower seat.  Need to confirm with spouse. Speech Therapy:  Short-Term Goals:  SHORT TERM GOAL #1:  Goal 1: Pt will complete functional carryover tasks (i.e. orientation, immediate/delayed, working) with 80% accuracy, MOD cues and focus on compensatory memory strategies to enhance retention of newly learned medical information. INTERVENTIONS: Did not address d/t focus on other goals. PREVIOUS SESSION:   Orientation:  Pt is indep with month, year, place, event, while requiring min A to orient to the date (1 day off).    Completed review of STM strategies using--Write it down, Repeat it, Associate it, and Pictures it. (Task 1) Pt required Max A to recall looks of ST from 12/9/2020  (Task 2) Pt was challenged to recall [address] Name, hair color, Age, and ST               -Repeated 2-3x prior to immediate recall              -Immediate Recall: 2/4 min A, 2/4 mod A               -Delayed Recall (5 min): 3/4 indep, 1/4 mod I additional time   (Task 3) Paragraph recall (3-4 sentences with 6 targets to recall)              -Immediate Recall: 3/6 indep, 1/6 min A, 2/6 Max A              -Delayed Recall (5 min): 4/6 indep, 1/6 min A, 1/6 total A despite FO2     SHORT TERM GOAL #2:  Goal 2: Pt will complete functional problem solving/safety awareness (i.e. time management, safety scenarios, verbal reasoning) with 80% accuracy, mod cues for enhanced safety and ADL contribution. INTERVENTIONS: Did not address d/t focus on other goals. PREVIOUS SESSION:   Provided list of x10 hypothetical problematic scenarios in household, instructed pt to prioritize by order of importance and determine solutions to each.   Prioritizing - 5/10 indep, 2/10 min cues, 2/10 mod cues, 1/10 max cues  Finding solutions - 5/10 indep, 3/10 min cues, 2/10 mod cues   *decreased safety awareness and mental flexibility when identifying who to call in specific situations (e.g., if smoke detector was broken what would you do - pt stated \"call the smoke detector man\")     Interpretation of weather section in newspaper - 5/10 indep, 2/10 min cues, 2/10 mod cues, 1/10 max cues  *good visual scanning of information  *decreased analytical thinking and comparing/contrasting when basic numerical data involved     SHORT TERM GOAL #3:  Goal 3: Pt will complete expressive language tasks (i.e. higher level confrontational/responsive naming, verbal sequencing, divergent naming) with 80% accuracy, min cues for improved expression of complex thoughts  INTERVENTIONS: Did not address d/t focus on other goals.      SHORT TERM GOAL #4:  Goal 4: Pt will complete high level auditory/reading comprehension tasks (i.e. complex y/n questions, multi step and complex commands, prescription labels/directories, etc) with 90% accuracy, mod cues for improved understanding of complex medical information. INTERVENTIONS: Did not address d/t focus on other goals.      SHORT TERM GOAL #5:  Goal 5: Pt will consume a soft & bite size diet and thin liquids with adherence to skilled swallowing strategies (i.e. small bites/sips, slow rate) without oert s/s of aspiration to meet nutrition/hydration measures safely. INTERVENTIONS: Did not address d/t focus on other goals. PREVIOUS SESSION:   Completed a skilled dietary analysis on this date to determine recommendations to resume current diet level vs recommendations for initiation of advanced (regular) dietary analysis. Meal tray included soft & bite size sausage and pancakess with thin liquids via cup. The pt demonstrated great success with his meal demonstrated by timely mastication, good bolus control/formation, and timely AP movement with essentially functional pharyngeal phase of the swallow -- no overt s/s of aspiration/penetration across all PO trials. *Patient did present with slight impulsive eating patterns on this date, requiring min A to \"'slow down\" between bites. *Pt verbalized satisfaction with soft & bite size solids.    *recommended remain on soft & bite size solids with reinforcement to \"slow down\" with PO intake         Long-Term Goals:  Timeframe for Long-term Goals: 4 weeks     LONG TERM GOAL #1:  Goal 1: Patient will improve cognitive status to a min A level to safely return to PLOF home with wife, nearly 24 hour SUP     Comprehension: 4 - Patient understands basic needs 75-90%+ of the time  Expression: 4 - Expresses basic ideas/needs 75-90%+ of the time  Social Interaction: 6 - Patient requires medication for mood and/or effect  Problem Solving: 3 - Patient solves simple/routine tasks 50%-74%  Memory: 3 - Patient remembers 50%-74% of the time     EDUCATION:  Learner: Patient and Significant Other  Education:  Reviewed diet and strategies, Reviewed ST goals and Plan of Care, Reviewed recommendations for follow-up, Education Related to Potential Risks and Complications Due to Impairment/Illness/Injury, Education Related to Prevention of Recurrence of Impairment/Illness/Injury, Education Related to [employer] and Wellness, Home Safety Education and ST HEP (ways to increase brain engagement in home setting)  Evaluation of Education: Verbalizes understanding and Demonstrates with assistance    Activity Tolerance:  Patient tolerance of treatment: good. Assessment/Plan: Patient progressing toward established goals. Continues to require skilled care of licensed speech pathologist to progress toward achievement of established goals and plan of care. Plan for Next Session: recall, reading comprehension, diet analysis      Review of Systems:  Review of Systems   Constitutional: Positive for activity change. Negative for appetite change, fatigue and fever. HENT: Positive for trouble swallowing. Negative for mouth sores (Lip bite wound) and voice change. Eyes: Negative for visual disturbance. Respiratory: Negative for cough and shortness of breath. Cardiovascular: Positive for leg swelling. Gastrointestinal: Negative for constipation, diarrhea and nausea. Endocrine: Negative for cold intolerance. Genitourinary: Negative for frequency and urgency. Musculoskeletal: Positive for gait problem. Skin: Negative for pallor. Allergic/Immunologic: Negative. Neurological: Positive for speech difficulty and weakness. Negative for dizziness, facial asymmetry and headaches. Hematological: Negative. Psychiatric/Behavioral: Positive for confusion and decreased concentration. Negative for dysphoric mood and hallucinations. The patient is not nervous/anxious. All other systems reviewed and are negative. Objective:  /62   Pulse 96   Temp 98 °F (36.7 °C) (Oral)   Resp 18   Ht 5' 9\" (1.753 m)   Wt 216 lb 6.4 oz (98.2 kg)   SpO2 96%   BMI 31.96 kg/m²   CURRENT VITALS:  height is 5' 9\" (1.753 m) and weight is 216 lb 6.4 oz (98.2 kg). His oral temperature is 98 °F (36.7 °C). His blood pressure is 111/62 and his pulse is 96. His respiration is 18 and oxygen saturation is 96%. Body mass index is 31.96 kg/m². Temperature Range (24h):Temp: 98 °F (36.7 °C) Temp  Av.1 °F (36.7 °C)  Min: 98 °F (36.7 °C)  Max: 98.1 °F (36.7 °C)  BP Range (09I): Systolic (01LHT), HHH:068 , Min:111 , KME:815     Diastolic (45GPK), TJP:38, Min:62, Max:63    Pulse Range (24h): Pulse  Av.5  Min: 93  Max: 96  Respiration Range (24h): Resp  Av  Min: 12  Max: 18  Current Pulse Ox (24h):  SpO2: 96 %  Pulse Ox Range (24h):  SpO2  Av.5 %  Min: 96 %  Max: 97 %  Oxygen Amount and Delivery:      awake  Orientation:   person, place, time, not situation  Mood: within normal limits  Affect: calm  General appearance:  in no acute distress, left eye conjunctival hemorrhage medial to the iris, up in therapy gym with Physical Therapy. Memory:   abnormal -decreased recall  Attention/Concentration: abnormal -mildly slowed processing  Language:  abnormal - mild dysarthria    ROM:  normal  Motor Exam: As in table    Manual Muscle Testing:     Sh Abd  Sh IR  Sh ER  Elbow Flex Elbow Ext    Left  3+   4 4   Right 3+   4 4      Wrist Ext  Wrist Flexion  Finger Flex  Finger Abd  Finger Add    Left    4     Right   4        Hip Flexion  Hip Extension  Hip Abduction  Hip Adduction    Left  2+      Right 2+         Knee Flex  Knee Ext  Ankle DF  Ankle PF  Ankle Inv  Ankle Ev  EHL    Left   3+ 3+ 4      Right  3+ 2 4        Tone:  normal  Muscle bulk: within normal limits  Sensory:  Sensory intact  Coordination:   abnormal - mild decrease for fine motor control of the bilateral hands    Skin: warm and dry, no rash or erythema and abrasion over left forehead above eye and mild swelling of the left orbit  Peripheral vascular: Pulses: Normal upper and lower extremity pulses; Edema: 1+    Diagnostics:   No results found for this or any previous visit (from the past 24 hour(s)). Ref.  Range 12/14/2020 05:21   Sodium Latest Ref Range: 135 - 145 meq/L 137   Potassium Latest Ref Range: 3.5 - 5.2 meq/L 4.2   Chloride Latest Ref Range: 98 - 111 meq/L 101   CO2 Latest Ref Range: 23 - 33 meq/L 25   BUN Latest Ref Range: 7 - 22 mg/dL 26 (H)   Creatinine Latest Ref Range: 0.4 - 1.2 mg/dL 1.4 (H)   Anion Gap Latest Ref Range: 8.0 - 16.0 meq/L 11.0   Est, Glom Filt Rate Latest Units: ml/min/1.73m2 58 (A)   Glucose Latest Ref Range: 70 - 108 mg/dL 108   Calcium Latest Ref Range: 8.5 - 10.5 mg/dL 9.1   WBC Latest Ref Range: 4.8 - 10.8 thou/mm3 5.6   RBC Latest Ref Range: 4.70 - 6.10 mill/mm3 4.23 (L)   Hemoglobin Quant Latest Ref Range: 14.0 - 18.0 gm/dl 13.0 (L)   Hematocrit Latest Ref Range: 42.0 - 52.0 % 42.6   MCV Latest Ref Range: 80.0 - 94.0 fL 100.7 (H)   MCH Latest Ref Range: 26.0 - 33.0 pg 30.7   MCHC Latest Ref Range: 32.2 - 35.5 gm/dl 30.5 (L)   MPV Latest Ref Range: 9.4 - 12.4 fL 10.6   RDW-CV Latest Ref Range: 11.5 - 14.5 % 12.7   RDW-SD Latest Ref Range: 35.0 - 45.0 fL 47.6 (H)   Platelet Count Latest Ref Range: 130 - 400 thou/mm3 176     Labs Renal Latest Ref Rng & Units 12/14/2020 12/10/2020 12/7/2020 12/2/2020 12/1/2020   BUN 7 - 22 mg/dL 26(H) 34(H) 26(H) 24(H) 26(H)   Cr 0.4 - 1.2 mg/dL 1.4(H) 1.7(H) 1.2 1.2 1.2   K 3.5 - 5.2 meq/L 4.2 4.2 4.1 4.4 4.7   Na 135 - 145 meq/L 137 135 134(L) 140 140      Recent Labs     12/14/20  0521 12/07/20  0534 12/02/20  0618   WBC 5.6 6.1 5.0   HGB 13.0* 14.2 15.0   HCT 42.6 45.9 47.7   .7* 100.0* 99.6*    165 145      Impression:  1. Ground-level fall at home. 2. Gait instability. 3. 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 4. 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 5. Left sixth rib fracture. 6. Abrasion to left forehead. 7. Laceration of left lower lip. 8. Subacute infarct of the right thalamus. 9. Severe cognitive impairments. (MOCA) version 7.2 completed. Patient scored 9/30. 10. Mild oral dysphagia. 11. Mild dysarthria. 12. Mild-moderate expressive language deficits. 13. Left orbital floor fracture with posterior blowout component. 14. Nasal bone fracture. 15. Hematuria. 16. Coronary artery disease with history of 2 stents and now with 90% severe stenosis of the apical portion of the LAD and mild diastolic dysfunction of the left ventricle noted on heart catheterization completed on 10/22/2020 by Dr. Ankit Harris with deployment of PCI to circumflex artery. 17. Medication noncompliance; did not start Brilinta and aspirin for his post stent medical management. 18. Hypertension. 19. Hyperlipidemia. 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 21. CKD 3.  22. Lumbar stenosis with neurogenic claudication status post lumbar laminectomy from L2-L3 bilaterally by Dr. Rogers Moraes on 5/29/2015. 23. 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 24. Prostate cancer status post brachytherapy. 25. Macrocytosis. 26. Hyponatremia. 27. Acute kidney injury. 28. Cardiac arrhythmia with symptomatic bradycardia.   29. Positive tilt orbital floor without clinical evidence of inferior rectus impingement. No need to surgical intervention for the nasal bone fractures. 11. Hematuria. 1. Resolved. 12. Coronary artery disease with history of 2 stents and now with 90% severe stenosis of the apical portion of the LAD and mild diastolic dysfunction of the left ventricle noted on heart catheterization completed on 10/22/2020 by Dr. Sellers Nicely with deployment of PCI to circumflex artery/Medication nonadherence to recommended dual antiplatelet therapy and cardiac rehabilitation following recent cardiac stenting. 1. Aspirin has been started. 2. Holding Brilinta for now. 13. Cardiac arrhythmia with symptomatic bradycardia/Orthostatic Hypotension. 1. Cardiology consult. 2. Metoprolol discontinued on 12/9. Plan is to monitor patient. 3. EKG on 12/9 with normal sinus rhythm, pulmonary disease pattern, left axis deviation with absence of prior supraventricular complexes. 4. TSH normal at 2.9 on 12/10.  5. Tilt table testing was positive on 12/11/2020.  1. Celexa 20 mg was started by Cardiology. 6. Loop recorder to be placed on 12/16. 14. Nutrition:  Consultation to dietician for nutritional counseling and recommendations. 1. Total protein 6.3 and albumin slightly low at 3.4 on 12/2/2020.  2. Vitamin D 25 hydroxy normal at 68 on 12/2/2020. 15. Electrolytes. 1. Normal on 12/140 with exception of:  2. Mild hyponatremia of 134 on 12/7. Normal at 135 on 12/10 and stable at 137 on 12/14. 16. Acute kidney injury. 1. Cr/BUN/GFR on 12/10 is worsened to 1.7/34/47 from prior 1.2/26/70 on 12/2. Improved on 12/14 to 1.4/26/58. 2. Encourage fluids. 17. Bladder: Noted on admission. Follow for now. 18. Bowel: Senna, colace, MOM  19. Rehabilitation nursing will be involved for bowel, bladder, skin, and pain management. Nursing will also provide education and training to patient and family. 20. Prophylaxis:  DVT: Lovenox. GI: None.   21.  and case management consultations for coordination of care and discharge planning. Chronic Problems:  1. Hypertension. 1. Amlodipine. 2. Bumex. 3. Cozaar. 4. Lopressor. Discontinued on 12/9 due to bradycardia. 2. Hyperlipidemia/Statin induced myositis. 1. Pravachol. 3. CKD 3.  1. Cr/BUN/GFR on 12/2 was 1.2/24/70 which is stable over prior labs. 4. Lumbar stenosis with neurogenic claudication status post lumbar laminectomy from L2-L3 bilaterally by Dr. Ronald Duvall on 5/29/2015.  5. 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 6. Prostate cancer status post brachytherapy. 1. Tamsulosin. Labs reviewed on:  1. 12/1.  2. 12/2.  3. 12/7.  4. 12/10.  5. 12/14. Infectious Disease:  1. N/A    Missed Therapy Time:  12/9  60 minutes of Occupational Therapy missed secondary to the patient having symptomatic bradycardia. DME:    Discharge Plan:  Estimated Discharge Date: 12/16   Destination: home health  Services at Discharge: [address], Occupational Therapy, Speech Therapy, Nursing and aide 3x week  Is patient appropriate for an outpatient driving evaluation? no  Equipment at Discharge: RW    Greater than 50% of 30 minutes was spent with the patient reviewing his labs from today with note of improved renal function, plan for placement of a loop recorder before he discharges, and reviewing his progress with therapy.      Sherin Gary MD

## 2020-12-14 NOTE — PROGRESS NOTES
[address] THERAPY  [address]  DAILY NOTE    TIME   SLP Individual Minutes  Time In: 0340  Time Out: [address]  Minutes: 25  Timed Code Treatment Minutes: 0 Minutes  Dysphagia Treatment - 15 minutes  Cognitive Treatment - 10 minutes     Date: 2020  Patient Name: Ed Schwab      CSN: [id number]   : 1935  (81 y.o.)  Gender: male   Referring Physician: Fiordaliza Daniel MD  Diagnosis: Intraventricular hemorrhage   Secondary Diagnosis: Cognitive deficits, Dysphagia  Precautions: Fall risk, Aspiration risk   Current Diet: Soft and Bite Sized, Thin liquids   Swallowing Strategies: Full Upright Position, Small Bite/Sip, Alternate Solids and Liquids, Limit Distractions and Monitor for Fatigue  Date of Last MBS: Not Applicable    Pain:  No pain reported. Subjective:  Pt seated upright in recliner for duration of session. Alert and pleasant throughout. Wife present as planned for completion of family education in prep for discharge home. Began by reviewing rationale behind ST services; outlined pt's high fall risk and needs for both cognitive and dysphagia interventions. Discussed current diet level of soft and bite sized with thin liquids; provided handout re: foods allowed/not allowed, and ways to ensure solids align properly with recommended diet level. Concretely explained that if foods are cut into small bites and moistened, majority of solids in home setting (with exception for popcorn, chips, nuts, and raw fruits/vegetables), most foods will be compliant. Also reviewed compensatory strategies including small bites/sips and full upright positioning. Discussed recommendations for implementation of home exercise program as way to increase brain engagement daily, as pt will not require f/u [facility] services given his achieved cognitive baseline. Provided handout listing ideas to keep brain active.  Pt and pt's wife both verbalized understanding to all aforementioned information; further inquiring about home health needs and expressed concern re: current pandemic and individuals coming into their house. ST suggested potential outpatient services, however, pt's wife very hesitant, stating \"he doesn't need more therapy\". ST suggested speaking directly with LEXUS Carias and/or OT/PT. Contacted Aretha re: new concerns. Short-Term Goals:  SHORT TERM GOAL #1:  Goal 1: Pt will complete functional carryover tasks (i.e. orientation, immediate/delayed, working) with 80% accuracy, MOD cues and focus on compensatory memory strategies to enhance retention of newly learned medical information. INTERVENTIONS: Did not address d/t focus on other goals. PREVIOUS SESSION:   Orientation:  Pt is indep with month, year, place, event, while requiring min A to orient to the date (1 day off). Completed review of STM strategies using--Write it down, Repeat it, Associate it, and Pictures it. (Task 1) Pt required Max A to recall looks of ST from 12/9/2020  (Task 2) Pt was challenged to recall [address] Name, hair color, Age, and ST    -Repeated 2-3x prior to immediate recall   -Immediate Recall: 2/4 min A, 2/4 mod A    -Delayed Recall (5 min): 3/4 indep, 1/4 mod I additional time   (Task 3) Paragraph recall (3-4 sentences with 6 targets to recall)   -Immediate Recall: 3/6 indep, 1/6 min A, 2/6 Max A   -Delayed Recall (5 min): 4/6 indep, 1/6 min A, 1/6 total A despite FO2    SHORT TERM GOAL #2:  Goal 2: Pt will complete functional problem solving/safety awareness (i.e. time management, safety scenarios, verbal reasoning) with 80% accuracy, mod cues for enhanced safety and ADL contribution. INTERVENTIONS: Did not address d/t focus on other goals. PREVIOUS SESSION:   Provided list of x10 hypothetical problematic scenarios in household, instructed pt to prioritize by order of importance and determine solutions to each.   Prioritizing - 5/10 indep, 2/10 min cues, 2/10 mod cues, 1/10 max cues  Finding solutions - 5/10 indep, 3/10 min cues, 2/10 mod cues   *decreased safety awareness and mental flexibility when identifying who to call in specific situations (e.g., if smoke detector was broken what would you do - pt stated \"call the smoke detector man\")    Interpretation of weather section in newspaper - 5/10 indep, 2/10 min cues, 2/10 mod cues, 1/10 max cues  *good visual scanning of information  *decreased analytical thinking and comparing/contrasting when basic numerical data involved    SHORT TERM GOAL #3:  Goal 3: Pt will complete expressive language tasks (i.e. higher level confrontational/responsive naming, verbal sequencing, divergent naming) with 80% accuracy, min cues for improved expression of complex thoughts  INTERVENTIONS: Did not address d/t focus on other goals. SHORT TERM GOAL #4:  Goal 4: Pt will complete high level auditory/reading comprehension tasks (i.e. complex y/n questions, multi step and complex commands, prescription labels/directories, etc) with 90% accuracy, mod cues for improved understanding of complex medical information. INTERVENTIONS: Did not address d/t focus on other goals. SHORT TERM GOAL #5:  Goal 5: Pt will consume a soft & bite size diet and thin liquids with adherence to skilled swallowing strategies (i.e. small bites/sips, slow rate) without oert s/s of aspiration to meet nutrition/hydration measures safely. INTERVENTIONS: Did not address d/t focus on other goals. PREVIOUS SESSION:   Completed a skilled dietary analysis on this date to determine recommendations to resume current diet level vs recommendations for initiation of advanced (regular) dietary analysis. Meal tray included soft & bite size sausage and pancakess with thin liquids via cup.  The pt demonstrated great success with his meal demonstrated by timely mastication, good bolus control/formation, and timely AP movement with essentially functional pharyngeal phase of the swallow -- no overt s/s of aspiration/penetration across all PO trials. *Patient did present with slight impulsive eating patterns on this date, requiring min A to \"'slow down\" between bites. *Pt verbalized satisfaction with soft & bite size solids. *recommended remain on soft & bite size solids with reinforcement to \"slow down\" with PO intake        Long-Term Goals:  Timeframe for Long-term Goals: 4 weeks    LONG TERM GOAL #1:  Goal 1: Patient will improve cognitive status to a min A level to safely return to [facility] home with wife, nearly 24 hour SUP       Comprehension: 4 - Patient understands basic needs 75-90%+ of the time  Expression: 4 - Expresses basic ideas/needs 75-90%+ of the time  Social Interaction: 6 - Patient requires medication for mood and/or effect  Problem Solving: 3 - Patient solves simple/routine tasks 50%-74%  Memory: 3 - Patient remembers 50%-74% of the time       EDUCATION:  Learner: Patient and Significant Other  Education:  Reviewed diet and strategies, Reviewed ST goals and Plan of Care, Reviewed recommendations for follow-up, Education Related to Potential Risks and Complications Due to Impairment/Illness/Injury, Education Related to Prevention of Recurrence of Impairment/Illness/Injury, Education Related to [employer] [facility], Home Safety Education and ST HEP (ways to increase brain engagement in home setting)  Evaluation of Education: Verbalizes understanding and Demonstrates with assistance      Activity Tolerance:  Patient tolerance of treatment: good. Assessment/Plan: Patient progressing toward established goals. Continues to require skilled care of licensed speech pathologist to progress toward achievement of established goals and plan of care.      Plan for Next Session: recall, reading comprehension, diet analysis     Sammy Payton M.S. Ja Boss

## 2020-12-14 NOTE — PROGRESS NOTES
[address]  Occupational Therapy  Daily Note  Time:   Time In: 1330  Time Out: 1400  Timed Code Treatment Minutes: 30 Minutes  Minutes: 30          Date: 2020  Patient Name: Vadim Smith,   Gender: male      Room: [facility]64/064-A  MRN: [id number]  : 1935  (81 y.o.)  Referring Practitioner: Dr. Jahaira Hinds  Diagnosis: Intraventricular Hemorrhage  Additional Pertinent Hx: Pt admitted to in rehab for rehab needs after admission to the hospital s/p  fall at home resulting in an intraventricular hemorrhage, left sixth rib fracture, displaced left orbital floor fracture with deficits of gait instability and severe cognitive deficits with a MOCA score of 9/30 on . During IP rehab stay, patient with bradycardic event with plans for loop recorder to be placed at discharge. Restrictions/Precautions:  Restrictions/Precautions: Fall Risk, General Precautions  Position Activity Restriction  Other position/activity restrictions: Keep systolic blood pressure between 100-160 while moving. SUBJECTIVE: Pt seated in bedside chair upon arrival, agreeable to OT session. PAIN: No c/o. COGNITION: Slow Processing and Decreased Recall    ADL:   No ADL's completed this session. BALANCE:  Sitting Balance:  Modified Independent. Standing Balance: Contact Guard Assistance. x1 minute in prep for mobility. BED MOBILITY:  Sit to Supine: Minimal Assistance    TRANSFERS:  Sit to Stand:  Contact Guard Assistance. Stand to Sit: Stand By Assistance. FUNCTIONAL MOBILITY:  Assistive Device: Rolling Walker  Assist Level:  Stand By Assistance and [address]. Distance: Completed functional mobility short distance within pt room at slow pace, no LOB noted. Pt requires min cues for walker safety to keep close to LEXII- seated rest break after trial of mobility, min fatigue noted.     ADDITIONAL ACTIVITIES:  Patient identified a personal goal to increase UB strength and improve overall endurance so they can complete their toilet & shower transfers; skilled edu on UE strengthening. Completed BUE exercises x10 reps x2 sets using min resistance band in all joints/planes to increase strength and endurance required for ADLs. Pt required mod rest break between each exercise and min v/c for proper technique. ASSESSMENT:     Activity Tolerance:  Patient tolerance of  treatment: fair. Pt limited by fatigue. Discharge Recommendations: Home with nursing aide, Home with Home health OT   Equipment Recommendations: Other: Per pt's wife 12/14 has a toilet raiser and shower seat- denies need for BSC. Plan: Times per week: 5xs weeks x 90 min, 1 x week x 30 min  Current Treatment Recommendations: Functional Mobility Training, Endurance Training, Self-Care / ADL, Safety Education & Training, Strengthening, Patient/Caregiver Education & Training, Equipment Evaluation, Education, & procurement, Home Management Training    Patient Education  Patient Education: Home Exercise Program, Assistive Device Safety and Safety with transfers and mobility. Goals  Short term goals  Time Frame for Short term goals: 1 week  Short term goal 1: Pt will demonstrate functional mobility walking to/from the bathroom or bedside chair with SBA and RW  with min cues for aligning himself as neeed to prepare for doing self care. Short term goal 2: Pt will complete ADL routine with no > min A and min cues for problem solving and task completion to increase independence in self care tasks  Short term goal 3: Pt will complete toileting routine and transfer with no SBA and min cues for safe tech to increase independence in self toileting tasks  Short term goal 4: Pt will complete BUE ROM and light resistance exercises while following verbal cues to increase his pain free movement for ease of doing self care.   Short term goal 5: Pt will complete 1 step homemaking tasks with CGA and no > min cues for problem solving to increase ability to retrieve a snack  Long term goals  Time Frame for Long term goals : 2 week  Long term goal 1: Pt will complete ADL routine with S and no  cues for problem solving to increase independence in self care tasks  Long term goal 2: Pt will complete 1 step homemaking tasks with SBA and no cues for safe tech to increase ability to retrieve a glass of water. Following session, patient left in safe position with all fall risk precautions in place.

## 2020-12-15 PROCEDURE — 1180000000 HC REHAB R&B

## 2020-12-15 PROCEDURE — 97129 THER IVNTJ 1ST 15 MIN: CPT

## 2020-12-15 PROCEDURE — 97530 THERAPEUTIC ACTIVITIES: CPT

## 2020-12-15 PROCEDURE — 6370000000 HC RX 637 (ALT 250 FOR IP): Performed by: PHYSICAL MEDICINE & REHABILITATION

## 2020-12-15 PROCEDURE — 97110 THERAPEUTIC EXERCISES: CPT

## 2020-12-15 PROCEDURE — 94640 AIRWAY INHALATION TREATMENT: CPT

## 2020-12-15 PROCEDURE — 97130 THER IVNTJ EA ADDL 15 MIN: CPT

## 2020-12-15 PROCEDURE — 6360000002 HC RX W HCPCS: Performed by: PHYSICAL MEDICINE & REHABILITATION

## 2020-12-15 PROCEDURE — 6370000000 HC RX 637 (ALT 250 FOR IP): Performed by: INTERNAL MEDICINE

## 2020-12-15 PROCEDURE — 6370000000 HC RX 637 (ALT 250 FOR IP): Performed by: SURGERY

## 2020-12-15 PROCEDURE — 6370000000 HC RX 637 (ALT 250 FOR IP): Performed by: FAMILY MEDICINE

## 2020-12-15 PROCEDURE — 97535 SELF CARE MNGMENT TRAINING: CPT

## 2020-12-15 RX ORDER — CITALOPRAM 20 MG/1
20 TABLET ORAL NIGHTLY
Qty: 30 TABLET | Refills: 3 | Status: SHIPPED | OUTPATIENT
Start: 2020-12-15 | End: 2022-07-06

## 2020-12-15 RX ORDER — AMLODIPINE BESYLATE 5 MG/1
5 TABLET ORAL DAILY
Qty: 30 TABLET | Refills: 3 | Status: SHIPPED | OUTPATIENT
Start: 2020-12-16 | End: 2021-01-08 | Stop reason: ALTCHOICE

## 2020-12-15 RX ADMIN — BUDESONIDE AND FORMOTEROL FUMARATE DIHYDRATE 2 PUFF: 80; 4.5 AEROSOL RESPIRATORY (INHALATION) at 17:24

## 2020-12-15 RX ADMIN — ENOXAPARIN SODIUM 40 MG: 40 INJECTION SUBCUTANEOUS at 09:06

## 2020-12-15 RX ADMIN — LOSARTAN POTASSIUM 100 MG: 100 TABLET, FILM COATED ORAL at 09:05

## 2020-12-15 RX ADMIN — BUMETANIDE 1 MG: 1 TABLET ORAL at 09:06

## 2020-12-15 RX ADMIN — ASPIRIN 81 MG: 81 TABLET, CHEWABLE ORAL at 09:06

## 2020-12-15 RX ADMIN — PRAVASTATIN SODIUM 40 MG: 40 TABLET ORAL at 21:06

## 2020-12-15 RX ADMIN — DICLOFENAC 2 G: 10 GEL TOPICAL at 21:06

## 2020-12-15 RX ADMIN — AMLODIPINE BESYLATE 5 MG: 5 TABLET ORAL at 09:06

## 2020-12-15 RX ADMIN — DICLOFENAC 2 G: 10 GEL TOPICAL at 17:04

## 2020-12-15 RX ADMIN — TAMSULOSIN HYDROCHLORIDE 0.4 MG: 0.4 CAPSULE ORAL at 21:06

## 2020-12-15 RX ADMIN — MULTIPLE VITAMINS W/ MINERALS TAB 1 TABLET: TAB at 09:05

## 2020-12-15 RX ADMIN — DICLOFENAC 2 G: 10 GEL TOPICAL at 13:38

## 2020-12-15 RX ADMIN — BUDESONIDE AND FORMOTEROL FUMARATE DIHYDRATE 2 PUFF: 80; 4.5 AEROSOL RESPIRATORY (INHALATION) at 07:35

## 2020-12-15 RX ADMIN — FAMOTIDINE 20 MG: 20 TABLET, FILM COATED ORAL at 09:05

## 2020-12-15 RX ADMIN — CITALOPRAM 20 MG: 20 TABLET, FILM COATED ORAL at 21:06

## 2020-12-15 ASSESSMENT — ENCOUNTER SYMPTOMS
DIARRHEA: 0
COUGH: 0
CONSTIPATION: 0
VOICE CHANGE: 0
NAUSEA: 0
SHORTNESS OF BREATH: 0
ALLERGIC/IMMUNOLOGIC NEGATIVE: 1
TROUBLE SWALLOWING: 1

## 2020-12-15 ASSESSMENT — PAIN SCALES - GENERAL
PAINLEVEL_OUTOF10: 0
PAINLEVEL_OUTOF10: 0

## 2020-12-15 NOTE — PROGRESS NOTES
Assistance: Independent  Active : No  Additional Comments: Pt ambualated short distances with a standard walker. He did his own self care. He had help with all of the cooking and cleaning prior to admission. Restrictions/Precautions:  Restrictions/Precautions: Fall Risk, General Precautions  Position Activity Restriction  Other position/activity restrictions: Keep systolic blood pressure between 100-160 while moving. SUBJECTIVE: pt in chair on arrival, agrees to therapy session. Incontinent of urine at end of session    PAIN: 6/10: R knee    OBJECTIVE:  Bed Mobility:  Rolling to Left: Modified Independent   Rolling to Right: Modified Independent   Supine to Sit: Modified Independent  Sit to Supine: Modified Independent     Transfers:  Sit to Stand: Stand By Assistance, with increased time for completion  Stand to Sit:Stand By Assistance, with increased time for completion  Stand Pivot:Stand By Assistance, with increased time for completion  Car:Stand By Assistance, with increased time for completion    Ambulation:  Stand By Assistance, [employer], with increased time for completion  Distance: 60ft, 10ft gravel  Surface: Level Tile and Uneven Surface  Device:Rolling Walker  Gait Deviations: Forward Flexed Posture, Slow Anne Marie, Decreased Step Length Bilaterally, Decreased Gait Speed, Decreased Heel Strike Bilaterally and Mild Path Deviations    Stairs:  Stairs:  4\" steps. X 6 using Bilateral Handrails and Contact Guard Assistance, with increased time for completion. Balance:  Dynamic Standing Balance: Stand By Assistance, able to retrieve item from floor while standing at RW with use of reacher with increased time    Functional Outcome Measures: Not completed       ASSESSMENT:  Assessment: Patient progressing toward established goals. Activity Tolerance:  Patient tolerance of  treatment: fair.       Equipment Recommendations:Equipment Needed: Yes  Mobility Devices: Agata Marcelo: Rolling  Other: Has SW, will monitor for needs,  RW  Discharge Recommendations:  24 hour supervision or assist, Continue to assess pending progress    Plan: Times per week: 5x/week 90 min, 1x/week 30 min  Current Treatment Recommendations: Strengthening, Safety Education & Training, Balance Training, Endurance Training, Functional Mobility Training, Patient/Caregiver Education & Training, Transfer Training, Gait Training, Wheelchair Mobility Training, Neuromuscular Re-education, Home Exercise Program, Stair training    Patient Education  Patient Education: Plan of Care, Precautions/Restrictions, Transfers, Gait, Stairs, Car Transfers    Goals:  Patient goals : does not state  Short term goals  Time Frame for Short term goals: 2 weeks  Short term goal 1: Pt to transfer supine <--> sit SBA to enable pt to get in/out of bed. Short term goal 2: Pt to transfer sit <--> stand SBA for increased functional mobility. Short term goal 3: Pt to ambulate 100 feet with SW/RW CGA for household and community ambulation. Long term goals  Time Frame for Long term goals : 4 weeks  Long term goal 1: Pt to transfer supine <--> sit SBA to enable pt to get in/out of bed. Long term goal 2: Pt to transfer sit <--> stand supervision for increased functional mobility. Long term goal 3: Pt to ambulate >150 feet with SW/RW SBA for household and community ambulation. Long term goal 4: Pt to ascend/descend 1 step with SW/RW SBA for home entry. Long term goal 5: Pt to perform car transfer CGA to enable pt to get in/out of the car. Long term goal 6: Pt to improve TUG test score to <45 sec to indicate improvement in overall mobility. Following session, patient left in safe position with all fall risk precautions in place.

## 2020-12-15 NOTE — PROGRESS NOTES
[address] [address]  INPATIENT PHYSICAL THERAPY  DAILY NOTE  [address] - 7E-64/064-A    Time In: 0700  Time Out: 0730  Timed Code Treatment Minutes: 30 Minutes  Minutes: 30          Date: 12/15/2020  Patient Name: Bowen Sepulveda,  Gender:  male        MRN: [id number]  : 1935  (81 y.o.)     Referring Practitioner: Bev Gibbs MD  Diagnosis: Intraventricular hemorrhage  Additional Pertinent Hx: Pt presenting at [facility] by activation of level 2 trauma, brought by EMS following a unwitnessed fall with unknown LOC; past medical history includes recent cardiac sten placement x2, hypertension, chronic kidney disease, CAD. Per wife report, she was standing in the bathroom when she heard a sound in the kitchen she open the bathroom door to see her  laying face down on the ground. Patient states he normally ambulates with walker, did have a walker at the time of fall, unclear if fall was precipitated by syncopal episode. Wife states that  does appear to be confused post fall, has been repetitive in speech since incident. Patient reports some mild discomfort over left face otherwise has no complaints on exam.  Found to have an intraventricular hemorrhage, a left orbital fx, nasal bone fx, and left 6th rib fx. To IP rehab .      Prior Level of Function:  Lives With: Spouse  Type of Home: House  Home Layout: One level  Home Access: Stairs to enter with rails  Entrance Stairs - Number of Steps: 1 step to enter and 1 step once inside the house  Entrance Stairs - Rails: Left  Home Equipment: Standard walker   Bathroom Shower/Tub: Walk-in shower  Bathroom Toilet: Handicap height  Bathroom Equipment: Built-in shower seat, Grab bars around toilet, Grab bars in shower  Bathroom Accessibility: Accessible    Receives Help From: Family  ADL Assistance: [facility]: Independent  Homemaking Responsibilities: No  Ambulation Assistance: Independent  Transfer Assistance: Independent  Active : No  Additional Comments: Pt ambualated short distances with a standard walker. He did his own self care. He had help with all of the cooking and cleaning prior to admission. Restrictions/Precautions:  Restrictions/Precautions: Fall Risk, General Precautions  Position Activity Restriction  Other position/activity restrictions: Keep systolic blood pressure between 100-160 while moving. SUBJECTIVE: pt in bed on arrival, agrees to therapy session and requests to use restroom - not able to void or move bowels    PAIN: 0/10:     OBJECTIVE:  Bed Mobility:  Supine to Sit: Stand By Assistance, with increased time for completion    Transfers:  Sit to Stand: Stand By Assistance  Stand to Sit:Stand By Assistance, with increased time for completion    Ambulation:  Stand By Assistance, [employer], cues for upright trunk and increased step height and length  Distance: 15ft x2  Surface: Level Tile  Device:Rolling Walker  Gait Deviations: Forward Flexed Posture, Slow Anne Marie, Decreased Step Length Bilaterally, Decreased Gait Speed, Decreased Heel Strike Bilaterally and Narrow Base of Support    Balance:  Dynamic Standing Balance: Stand By Assistance, in restroom able to complete donning and doffing of shorts and perform face washing and brushing of teeth. incresaed time to complete but able to tolerate standing ~10 minutes straight. total ~15 minutes in restroom    Exercise:  Patient was guided in 1 set(s) 10 reps of exercise to both lower extremities. Seated marches and Long arc quads. Exercises were completed for increased independence with functional mobility. Functional Outcome Measures: Not completed       ASSESSMENT:  Assessment: Patient progressing toward established goals. Activity Tolerance:  Patient tolerance of  treatment: good.       Equipment Recommendations:Equipment Needed: Yes  Mobility Devices: Erica Mix: Rolling  Other: Has SW, will monitor for needs,  RW  Discharge Recommendations:  24 hour supervision or assist, Continue to assess pending progress    Plan: Times per week: 5x/week 90 min, 1x/week 30 min  Current Treatment Recommendations: Strengthening, Safety Education & Training, Balance Training, Endurance Training, Functional Mobility Training, Patient/Caregiver Education & Training, Transfer Training, Gait Training, Wheelchair Mobility Training, Neuromuscular Re-education, Home Exercise Program, Stair training    Patient Education  Patient Education: Plan of Care, Transfers, Gait, Verbal Exercise Instruction    Goals:  Patient goals : does not state  Short term goals  Time Frame for Short term goals: 2 weeks  Short term goal 1: Pt to transfer supine <--> sit SBA to enable pt to get in/out of bed. Short term goal 2: Pt to transfer sit <--> stand SBA for increased functional mobility. Short term goal 3: Pt to ambulate 100 feet with SW/RW CGA for household and community ambulation. Long term goals  Time Frame for Long term goals : 4 weeks  Long term goal 1: Pt to transfer supine <--> sit SBA to enable pt to get in/out of bed. Long term goal 2: Pt to transfer sit <--> stand supervision for increased functional mobility. Long term goal 3: Pt to ambulate >150 feet with SW/RW SBA for household and community ambulation. Long term goal 4: Pt to ascend/descend 1 step with SW/RW SBA for home entry. Long term goal 5: Pt to perform car transfer CGA to enable pt to get in/out of the car. Long term goal 6: Pt to improve TUG test score to <45 sec to indicate improvement in overall mobility. Following session, patient left in safe position with all fall risk precautions in place.

## 2020-12-15 NOTE — PROGRESS NOTES
[address]  Occupational Therapy  Daily Note  Time:   Time In: 0900  Time Out: 1000  Timed Code Treatment Minutes: 60 Minutes  Minutes: 60    Date: 12/15/2020  Patient Name: Gladis Santana,   Gender: male      Room: [facility]/064-A  MRN: [id number]  : 1935  (81 y.o.)  Referring Practitioner: Dr. Edilson Ward  Diagnosis: Intraventricular Hemorrhage  Additional Pertinent Hx: Pt admitted to inpt rehab for rehab needs after admission to the hospital s/p  fall at home resulting in an intraventricular hemorrhage, left sixth rib fracture, displaced left orbital floor fracture with deficits of gait instability and severe cognitive deficits with a MOCA score of 9/30 on . During IP rehab stay, patient with bradycardic event with plans for loop recorder to be placed at discharge. Restrictions/Precautions:  Restrictions/Precautions: Fall Risk, General Precautions  Position Activity Restriction  Other position/activity restrictions: Keep systolic blood pressure between 100-160 while moving. SUBJECTIVE: Patient in recliner upon arrival, agreeable to OT. Voices some fatigue. Nursing reported that patient's HR and BP were both WNL. PAIN: Denies pain     COGNITION: Slow Processing, but was appropriate for rote BADL     ADL:   EATING:Independent. Cherl Spinner CARE Score: 6. ORAL HYGIENE:Independent. Cherl Spinner CARE Score: 6. TOILETING HYGIENE:Independent. MI with RW during clothing mgmt and hyigene. CARE Score: 6. SHOWERING/BATHING:Setup or clean-up assistance. set-up in walk-in shower. CARE Score: 5.     UPPER BODY DRESSING:Independent.  t-shirt and button up shirt. CARE Score: -. LOWER BODY DRESSING:Independent. MI during clothing mgmt, used reacher to thread for both underwear & pants. CARE Score: 6. FOOTWEAR:Independent  used reacher to doff socks. Cherl Spinner CARE Score: 6. TOILET TRANSFER: Supervision or touching assistance.   SBA during toilet tf with ETS. CARE Score: 4. BALANCE:  Sitting Balance:  Modified Independent. Standing Balance: Stand By Assistance. BED MOBILITY:  Not Tested    TRANSFERS:  Sit to Stand:  Stand By Assistance. recliner, ETS, sh chair   Stand to Sit: Stand By Assistance. FUNCTIONAL MOBILITY:  Assistive Device: Rolling Walker  Assist Level:  Stand By Assistance. Distance: To and from bathroom and To and from shower room  No LOB but with slow and steady pace      ASSESSMENT:     Activity Tolerance:  Patient tolerance of  treatment: good. Discharge Recommendations: Home with nursing aide, Home with Home health OT   Equipment Recommendations: Other: Per pt's wife 12/14 has a toilet raiser and shower seat- denies need for BSC. Plan: Times per week: 5xs weeks x 90 min, 1 x week x 30 min  Current Treatment Recommendations: Functional Mobility Training, Endurance Training, Self-Care / ADL, Safety Education & Training, Strengthening, Patient/Caregiver Education & Training, Equipment Evaluation, Education, & procurement, Home Management Training    Patient Education  Patient Education: ADL's, Equipment Education, Reviewed Prior Education and Assistive Device Safety    Goals  Short term goals  Time Frame for Short term goals: 1 week  Short term goal 1: Pt will demonstrate functional mobility walking to/from the bathroom or bedside chair with SBA and RW  with min cues for aligning himself as neeed to prepare for doing self care. Short term goal 2: Pt will complete ADL routine with no > min A and min cues for problem solving and task completion to increase independence in self care tasks  Short term goal 3: Pt will complete toileting routine and transfer with no SBA and min cues for safe tech to increase independence in self toileting tasks  Short term goal 4: Pt will complete BUE ROM and light resistance exercises while following verbal cues to increase his pain free movement for ease of doing self care.   Short term goal 5: Pt will complete 1 step homemaking tasks with CGA and no > min cues for problem solving to increase ability to retrieve a snack  Long term goals  Time Frame for Long term goals : 2 week  Long term goal 1: Pt will complete ADL routine with S and no  cues for problem solving to increase independence in self care tasks  Long term goal 2: Pt will complete 1 step homemaking tasks with SBA and no cues for safe tech to increase ability to retrieve a glass of water. Following session, patient left in safe position with all fall risk precautions in place.

## 2020-12-15 NOTE — PLAN OF CARE
Problem: Falls - Risk of:  Goal: Will remain free from falls  Description: Will remain free from falls  Outcome: Ongoing  Note: Fall sign posted. Call light with in reach. Bed and chair alarm on and working. Patient voiced and demonstrated understanding of using call light and waiting for staff before getting up. Care plan reviewed with patient. Patient verbalize understanding of the plan of care and contribute to goal setting.

## 2020-12-15 NOTE — PROGRESS NOTES
[address]  Occupational Therapy  Daily Note  Time:   Time In: 1330  Time Out: 1400  Timed Code Treatment Minutes: 30 Minutes  Minutes: 30    Date: 12/15/2020  Patient Name: Gladis Santana,   Gender: male      Room: [facility]/064-A  MRN: [id number]  : 1935  (81 y.o.)  Referring Practitioner: Dr. Edilson Ward  Diagnosis: Intraventricular Hemorrhage  Additional Pertinent Hx: Pt admitted to in rehab for rehab needs after admission to the hospital s/p  fall at home resulting in an intraventricular hemorrhage, left sixth rib fracture, displaced left orbital floor fracture with deficits of gait instability and severe cognitive deficits with a MOCA score of 9/30 on . During IP rehab stay, patient with bradycardic event with plans for loop recorder to be placed at discharge. Restrictions/Precautions:  Restrictions/Precautions: Fall Risk, General Precautions  Position Activity Restriction  Other position/activity restrictions: Keep systolic blood pressure between 100-160 while moving. SUBJECTIVE: Patient in recliner upon arrival, agreeable to OT. Voices fatigue and B knee pain, but was agreeable to OT. PAIN: C/o pain in B knees and soreness, nursing in to provide topical gel for pain relief     COGNITION: Slow Processing,    ADL:   None this PM     BALANCE:  Sitting Balance:  Modified Independent. Standing Balance: Stand By Assistance. BED MOBILITY:  Not Tested    TRANSFERS:  Sit to Stand:  Stand By Assistance. recliner, w/c. Good hand placement   Stand to Sit: Stand By Assistance. FUNCTIONAL MOBILITY:  Assistive Device: Rolling Walker  Assist Level:  Stand By Assistance. Distance: Within room and within apt during IADL   No LOB but with slow and steady pace .  Used w/c to/from apt due to fatigue     ADDITIONAL ACTIVITIES:   Pt completed IADL task of washing his laundry - graded to challenge standing endurance & balance - pt stood x 4 min with SBA and no LOB. Did require min cues for problem solving washer settings, but pt reports his wife primarily does this at home. Patient identified a personal goal to increase UB strength and improve overall endurance so they can complete their toilet & shower transfers; skilled edu on UE strengthening and patient completed BUE strengthening exercises x15 reps x1 set this date with a med resistive band in all joints and all planes. Patient tolerated well, requiring occasional rest breaks. Pt required min cues for technique. ASSESSMENT:     Activity Tolerance:  Patient tolerance of  treatment: good. Discharge Recommendations: Home with nursing aide, Home with Home health OT   Equipment Recommendations: Other: Per pt's wife 12/14 has a toilet raiser and shower seat- denies need for BSC. Plan: Times per week: 5xs weeks x 90 min, 1 x week x 30 min  Current Treatment Recommendations: Functional Mobility Training, Endurance Training, Self-Care / ADL, Safety Education & Training, Strengthening, Patient/Caregiver Education & Training, Equipment Evaluation, Education, & procurement, Home Management Training    Patient Education  Patient Education: IADLs, endurance building, HEP     Goals  Short term goals  Time Frame for Short term goals: 1 week  Short term goal 1: Pt will demonstrate functional mobility walking to/from the bathroom or bedside chair with SBA and RW  with min cues for aligning himself as neeed to prepare for doing self care.   Short term goal 2: Pt will complete ADL routine with no > min A and min cues for problem solving and task completion to increase independence in self care tasks  Short term goal 3: Pt will complete toileting routine and transfer with no SBA and min cues for safe tech to increase independence in self toileting tasks  Short term goal 4: Pt will complete BUE ROM and light resistance exercises while following verbal cues to increase his pain free movement for ease of doing self care. Short term goal 5: Pt will complete 1 step homemaking tasks with CGA and no > min cues for problem solving to increase ability to retrieve a snack  Long term goals  Time Frame for Long term goals : 2 week  Long term goal 1: Pt will complete ADL routine with S and no  cues for problem solving to increase independence in self care tasks  Long term goal 2: Pt will complete 1 step homemaking tasks with SBA and no cues for safe tech to increase ability to retrieve a glass of water. Following session, patient left in safe position with all fall risk precautions in place.

## 2020-12-15 NOTE — PROGRESS NOTES
Physical Medicine & Rehabilitation  Progress Note    Chief Complaint:   Intracranial and intraventricular hemorrhage/right thalamic CVA    Subjective: He states he is doing well today and is happy that he will be going home tomorrow. The plan for placement of a cardiac loop recorder tomorrow before he is officially discharged from the hospital was discussed and the reasoning for needing this. Today he has done well with being challenged with ambulation on multiple uneven surfaces and did well. He did not have any additional concerns or questions at this time. Rehabilitation:   Physical Therapy:  OBJECTIVE:  Bed Mobility:  Rolling to Left: Modified Independent   Rolling to Right: Modified Independent   Supine to Sit: Modified Independent  Sit to Supine: Modified Independent      Transfers:  Sit to Stand: Stand By Assistance, with increased time for completion  Stand to Sit:Stand By Assistance, with increased time for completion  Stand Pivot:Stand By Assistance, with increased time for completion  Car:Stand By Assistance, with increased time for completion     Ambulation:  Stand By Assistance, [address], with increased time for completion  Distance: 60ft, 10ft gravel  Surface: Level Tile and Uneven Surface  Device:Rolling Walker  Gait Deviations: Forward Flexed Posture, Slow Anne Marie, Decreased Step Length Bilaterally, Decreased Gait Speed, Decreased Heel Strike Bilaterally and Mild Path Deviations     Stairs:  Stairs:  4\" steps. X 6 using Bilateral Handrails and Contact Guard Assistance, with increased time for completion.     Balance:  Dynamic Standing Balance: Stand By Assistance, able to retrieve item from floor while standing at RW with use of reacher with increased time     Functional Outcome Measures: Not completed     ASSESSMENT:  Assessment: Patient progressing toward established goals. Activity Tolerance:  Patient tolerance of  treatment: fair.       Equipment Recommendations:Equipment Needed: Yes  Mobility Devices: Curvin Gonzalo: Rolling  Other: Has SW, will monitor for needs,  RW  Discharge Recommendations:  24 hour supervision or assist, Continue to assess pending progress     Occupational Therapy:  COGNITION: Slow Processing, but was appropriate for rote BADL      ADL:   EATING:Independent. Marnell Trevon CARE Score: 6. ORAL HYGIENE:Independent. [facility] CARE Score: 6. TOILETING HYGIENE:Independent. MI with RW during clothing mgmt and hyigene. CARE Score: 6. SHOWERING/BATHING:Setup or clean-up assistance. set-up in walk-in shower. CARE Score: 5.     UPPER BODY DRESSING:Independent.  t-shirt and button up shirt. CARE Score: -. LOWER BODY DRESSING:Independent. MI during clothing mgmt, used reacher to thread for both underwear & pants. CARE Score: 6. FOOTWEAR:Independent  used reacher to doff socks. Marjake Trevon CARE Score: 6. TOILET TRANSFER: Supervision or touching assistance. SBA during toilet tf with ETS. CARE Score: 4. BALANCE:  Sitting Balance:  Modified Independent. Standing Balance: Stand By Assistance.       BED MOBILITY:  Not Tested     TRANSFERS:  Sit to Stand:  Stand By Assistance. recliner, ETS, sh chair   Stand to Sit: Stand By Assistance.       FUNCTIONAL MOBILITY:  Assistive Device: Rolling Walker  Assist Level:  Stand By Assistance. Distance: To and from bathroom and To and from shower room  No LOB but with slow and steady pace       ASSESSMENT:  Activity Tolerance:  Patient tolerance of  treatment: good. Discharge Recommendations: Home with nursing aide, Home with Home health OT   Equipment Recommendations: Other: Per pt's wife 12/14 has a toilet raiser and shower seat- denies need for BSC.     Speech Therapy:  Short-Term Goals:  SHORT TERM GOAL #1:  Goal 1: Pt will complete functional carryover tasks (i.e. orientation, immediate/delayed, working) with 80% accuracy, MOD cues and focus on compensatory memory strategies to enhance retention of newly learned medical information. INTERVENTIONS: Orientation - 100% indep with use of calendar     Immediate retention of visual information (picture scene) - 3/10 indep, 2/10 min cues, 1/10 mod cues, 4/10 max cues  *decreased attention to detail   *decreased immediate recall     SHORT TERM GOAL #2:  Goal 2: Pt will complete functional problem solving/safety awareness (i.e. time management, safety scenarios, verbal reasoning) with 80% accuracy, mod cues for enhanced safety and ADL contribution. INTERVENTIONS: Organization of functional words into proper category - 21/30 indep, 3/30 min cues, 3/30 mod cues, 3/30 max cues  *decreased mental flexibility  *slow processing speed      SHORT TERM GOAL #3:  Goal 3: Pt will complete expressive language tasks (i.e. higher level confrontational/responsive naming, verbal sequencing, divergent naming) with 80% accuracy, min cues for improved expression of complex thoughts  INTERVENTIONS: Verbal sequencing of ADL's (in 4-6 steps)  Brushing teeth - 3/4 indep, 1/4 min cues for detail  Morning routine - 5/6 indep, 1/6 max cues to \"get dressed\"  Preparing cereal - 3/5 indep, 2/5 min cues to identify need for bowl and spoon  Taking medications - 3/4 indep, 1/4 min cues for detail  *decreased verbal sequencing, however, majority of errors r/t lack of detail      SHORT TERM GOAL #4:  Goal 4: Pt will complete high level auditory/reading comprehension tasks (i.e. complex y/n questions, multi step and complex commands, prescription labels/directories, etc) with 90% accuracy, mod cues for improved understanding of complex medical information. INTERVENTIONS: Did not address d/t focus on other goals.      SHORT TERM GOAL #5:  Goal 5: Pt will consume a soft & bite size diet and thin liquids with adherence to skilled swallowing strategies (i.e. small bites/sips, slow rate) without oert s/s of aspiration to meet nutrition/hydration measures safely. INTERVENTIONS: Did not address d/t focus on other goals.    Long-Term Goals:  Timeframe for Long-term Goals: 4 weeks     LONG TERM GOAL #1:  Goal 1: Patient will improve cognitive status to a min A level to safely return to PLOF home with wife, nearly 24 hour SUP     Comprehension: 4 - Patient understands basic needs 75-90%+ of the time  Expression: 4 - Expresses basic ideas/needs 75-90%+ of the time  Social Interaction: 6 - Patient requires medication for mood and/or effect  Problem Solvin - Patient solves simple/routine tasks 75-90%+   Memory: 3 - Patient remembers 50%-74% of the time     EDUCATION:  Learner: Patient  Education:  Reviewed ST goals and Plan of Care and Reviewed recommendations for follow-up  Evaluation of Education: Verbalizes understanding and Demonstrates with assistance     Activity Tolerance:  Patient tolerance of treatment: good. Assessment/Plan: Patient progressing toward established goals. Continues to require skilled care of licensed speech pathologist to progress toward achievement of established goals and plan of care. Plan for Next Session: recall, reading comprehension, diet analysis     Review of Systems:  Review of Systems   Constitutional: Positive for activity change. Negative for appetite change, fatigue and fever. HENT: Positive for trouble swallowing. Negative for mouth sores (Lip bite wound) and voice change. Eyes: Negative for visual disturbance. Respiratory: Negative for cough and shortness of breath. Cardiovascular: Positive for leg swelling. Gastrointestinal: Negative for constipation, diarrhea and nausea. Endocrine: Negative for cold intolerance. Genitourinary: Negative for frequency and urgency. Musculoskeletal: Positive for gait problem. Skin: Negative for pallor. Allergic/Immunologic: Negative. Neurological: Positive for speech difficulty and weakness. Negative for dizziness, facial asymmetry and headaches. Hematological: Negative.     Psychiatric/Behavioral: Positive for confusion and decreased concentration. Negative for dysphoric mood and hallucinations. The patient is not nervous/anxious. All other systems reviewed and are negative. Objective:  BP (!) 153/51   Pulse 64   Temp 97.6 °F (36.4 °C) (Axillary)   Resp 16   Ht 5' 9\" (1.753 m)   Wt 214 lb 3.2 oz (97.2 kg)   SpO2 98%   BMI 31.63 kg/m²   CURRENT VITALS:  height is 5' 9\" (1.753 m) and weight is 214 lb 3.2 oz (97.2 kg). His axillary temperature is 97.6 °F (36.4 °C). His blood pressure is 153/51 (abnormal) and his pulse is 64. His respiration is 16 and oxygen saturation is 98%. Body mass index is 31.63 kg/m². Temperature Range (24h):Temp: 97.6 °F (36.4 °C) Temp  Av.9 °F (36.6 °C)  Min: 97.6 °F (36.4 °C)  Max: 98.1 °F (36.7 °C)  BP Range (98E): Systolic (91TBB), SYU:769 , Min:132 , OSB:378     Diastolic (34YRG), WUZ:66, Min:51, Max:75    Pulse Range (24h): Pulse  Av.5  Min: 64  Max: 101  Respiration Range (24h): Resp  Av.7  Min: 16  Max: 18  Current Pulse Ox (24h):  SpO2: 98 %  Pulse Ox Range (24h):  SpO2  Av.5 %  Min: 94 %  Max: 98 %  Oxygen Amount and Delivery:      awake  Orientation:   person, place, time, not situation  Mood: within normal limits  Affect: calm  General appearance:  in no acute distress, up in bed    Memory:   abnormal -decreased recall  Attention/Concentration: abnormal -mildly slowed processing  Language:  abnormal -mild dysarthria    ROM:  normal  Motor Exam: As in table    Manual Muscle Testing:     Sh Abd  Sh IR  Sh ER  Elbow Flex  Elbow Ext    Left  4   4 4   Right 4   4 4      Wrist Ext  Wrist Flexion  Finger Flex  Finger Abd  Finger Add    Left    4     Right   4        Hip Flexion  Hip Extension  Hip Abduction  Hip Adduction    Left  3+      Right 3+         Knee Flex  Knee Ext  Ankle DF  Ankle PF  Ankle Inv  Ankle Ev  EHL    Left   3+ 3+ 4      Right  3+ 2 4        Tone:  normal  Muscle bulk: within normal limits  Sensory:  Sensory intact  Coordination:   abnormal - mild decrease for fine motor control of the bilateral hands    Skin: warm and dry, no rash or erythema and abrasion over left forehead above eye and mild swelling of the left orbit  Peripheral vascular: Pulses: Normal upper and lower extremity pulses; Edema: 1+    Diagnostics:   No results found for this or any previous visit (from the past 24 hour(s)). Labs Renal Latest Ref Rng & Units 12/14/2020 12/10/2020 12/7/2020 12/2/2020 12/1/2020   BUN 7 - 22 mg/dL 26(H) 34(H) 26(H) 24(H) 26(H)   Cr 0.4 - 1.2 mg/dL 1.4(H) 1.7(H) 1.2 1.2 1.2   K 3.5 - 5.2 meq/L 4.2 4.2 4.1 4.4 4.7   Na 135 - 145 meq/L 137 135 134(L) 140 140      Recent Labs     12/14/20  0521 12/07/20  0534 12/02/20  0618   WBC 5.6 6.1 5.0   HGB 13.0* 14.2 15.0   HCT 42.6 45.9 47.7   .7* 100.0* 99.6*    165 145      Impression:  1. Ground-level fall at home. 2. Gait instability. 3. 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 4. 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 5. Left sixth rib fracture. 6. Abrasion to left forehead. 7. Laceration of left lower lip. 8. Subacute infarct of the right thalamus. 9. Severe cognitive impairments. (MOCA) version 7.2 completed. Patient scored 9/30. 10. Mild oral dysphagia. 11. Mild dysarthria. 12. Mild-moderate expressive language deficits. 13. Left orbital floor fracture with posterior blowout component. 14. Nasal bone fracture. 15. Hematuria. 16. Coronary artery disease with history of 2 stents and now with 90% severe stenosis of the apical portion of the LAD and mild diastolic dysfunction of the left ventricle noted on heart catheterization completed on 10/22/2020 by Dr. Ebony Do with deployment of PCI to circumflex artery. 17. Medication noncompliance; did not start Brilinta and aspirin for his post stent medical management. 18. Hypertension. 19. Hyperlipidemia.   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 21. CKD 3.  22. Lumbar stenosis with neurogenic claudication status post lumbar laminectomy from L2-L3 bilaterally by Dr. Jona Lopez on 5/29/2015. 23. 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 24. Prostate cancer status post brachytherapy. 25. Macrocytosis. 26. Hyponatremia. 27. Acute kidney injury. 28. Cardiac arrhythmia with symptomatic bradycardia. 29. Positive tilt table testing on 12/11/2020. Plan:     Medical management: Per primary team and Dr. Teja Eckert. Consultants:  Trauma Surgery, Neurosurgery, Critical Care, Neurology, Cardiology, Plastic Surgery, Family Medicine, Physical Medicine    Narcotic usage:  Not applicable     Last BM:  Stool Amount: Small (12/14/20 2133)    FUNCTIONAL OUTCOMES TOOLS:    HOUSE -      Tinetti -      TUG - Timed Up and Go: 1.25    Acute/Rehabilitation Problems:  1. Ground-level fall at home. 2. Gait instability. 1. PT/OT. 2. TUG 4 minutes 11 seconds. Improved to 1 minute and 25 seconds on 12/7.  60-69 years:  8.1 seconds; 70-79 years: 9.2 seconds; 80-99 years: 11.3 seconds. 3. 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 1. Initiate TBI guidelines as needed for low stimulation environment. 2. Bright light phototherapy ordered on 12/10. Patient is tolerating therapy well. 4. 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 1. Neurosurgery following with no interventions planned. 5. Left sixth rib fracture. 1. Pain control. 1. Lidoderm patches. 2. Tylenol. 2. Symbicort twice daily. 3. Albuterol nebulizer every 6 hours as needed. 6. Abrasion to left forehead. 1. Wound care. 7. Laceration of left lower lip. 1. Healing appropriately. 8. Subacute infarct of the right thalamus. 1. Stable. 2. Continue with therapies.   9. Severe cognitive impairments/Mild oral dysphagia/Mild dysarthria/Mild-moderate expressive language deficits. 1. (MOCA) version 7.2 completed. Patient scored 9/30. Repeat on 12/2 - [facility]) version 8.1 completed. Patient scored 10/30. 2. SLP. 3. Diet downgraded on 12/2 to soft and bite-sized within liquids. 10. Left orbital floor fracture with posterior blowout component/Nasal bone fracture. 1. Conservative treatment of this fracture as the components involve the posterior aspect of the left orbital floor without clinical evidence of inferior rectus impingement. No need to surgical intervention for the nasal bone fractures. 11. Hematuria. 1. Resolved. 12. Coronary artery disease with history of 2 stents and now with 90% severe stenosis of the apical portion of the LAD and mild diastolic dysfunction of the left ventricle noted on heart catheterization completed on 10/22/2020 by Dr. Bess Barr with deployment of PCI to circumflex artery/Medication nonadherence to recommended dual antiplatelet therapy and cardiac rehabilitation following recent cardiac stenting. 1. Aspirin has been started. 2. Holding Brilinta for now. 13. Cardiac arrhythmia with symptomatic bradycardia/Orthostatic Hypotension. 1. Cardiology consult. 2. Metoprolol discontinued on 12/9. Plan is to monitor patient. 3. EKG on 12/9 with normal sinus rhythm, pulmonary disease pattern, left axis deviation with absence of prior supraventricular complexes. 4. TSH normal at 2.9 on 12/10.  5. Tilt table testing was positive on 12/11/2020.  1. Celexa 20 mg was started by Cardiology. 6. Loop recorder to be placed on 12/16. 14. Nutrition:  Consultation to dietician for nutritional counseling and recommendations. 1. Total protein 6.3 and albumin slightly low at 3.4 on 12/2/2020.  2. Vitamin D 25 hydroxy normal at 68 on 12/2/2020. 15. Electrolytes. 1. Normal on 12/140 with exception of:  2. Mild hyponatremia of 134 on 12/7. Normal at 135 on 12/10 and stable at 137 on 12/14. 16. Acute kidney injury.   1. Cr/BUN/GFR on 12/10 is worsened to 1.7/34/47 from prior 1.2/26/70 on 12/2. Improved on 12/14 to 1.4/26/58. 2. Encourage fluids. 17. Bladder: Noted on admission. Follow for now. 18. Bowel: Senna, colace, MOM  19. Rehabilitation nursing will be involved for bowel, bladder, skin, and pain management. Nursing will also provide education and training to patient and family. 20. Prophylaxis:  DVT: Lovenox. GI: None. 21.  and case management consultations for coordination of care and discharge planning. Chronic Problems:  1. Hypertension. 1. Amlodipine. 2. Bumex. 3. Cozaar. 4. Lopressor. Discontinued on 12/9 due to bradycardia. 2. Hyperlipidemia/Statin induced myositis. 1. Pravachol. 3. CKD 3.  1. Cr/BUN/GFR on 12/2 was 1.2/24/70 which is stable over prior labs. 4. Lumbar stenosis with neurogenic claudication status post lumbar laminectomy from L2-L3 bilaterally by Dr. Jona Lopez on 5/29/2015.  5. 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 6. Prostate cancer status post brachytherapy. 1. Tamsulosin. Labs reviewed on:  1. 12/1.  2. 12/2.  3. 12/7.  4. 12/10.  5. 12/14. Infectious Disease:  1. N/A    Missed Therapy Time:  12/9  60 minutes of Occupational Therapy missed secondary to the patient having symptomatic bradycardia. DME:    Discharge Plan:  Estimated Discharge Date: 12/16   Destination: home health  Services at Discharge: [address], Occupational Therapy, Speech Therapy, Nursing and aide 3x week  Is patient appropriate for an outpatient driving evaluation? no  Equipment at Discharge: RW    Greater than 50% of 30 minutes was spent with the patient reviewing his discharge plan for tomorrow including discharging to have a loop recorder placed before he is to leave the hospital, his progress with therapies, and his current level of pain control.      Nicholas Donahue MD

## 2020-12-15 NOTE — PROGRESS NOTES
[address] [facility] THERAPY  [address] [facility]  DAILY NOTE    TIME   SLP Individual Minutes  Time In: 1400  Time Out: 1430  Minutes: 30  Timed Code Treatment Minutes: 30 Minutes      Date: 12/15/2020  Patient Name: James Urbina      CSN: [id number]   : 1935  (81 y.o.)  Gender: male   Referring Physician: Tuyet Abrams MD  Diagnosis: Intraventricular hemorrhage   Secondary Diagnosis: Cognitive deficits, Dysphagia  Precautions: Fall risk, Aspiration risk   Current Diet: Soft and Bite Sized, Thin liquids   Swallowing Strategies: Full Upright Position, Small Bite/Sip, Alternate Solids and Liquids, Limit Distractions and Monitor for Fatigue  Date of Last MBS: Not Applicable    Pain:  No pain reported. Subjective:  Pt seated upright in recliner for duration of session. Pleasantly engaged throughout. Short-Term Goals:  SHORT TERM GOAL #1:  Goal 1: Pt will complete functional carryover tasks (i.e. orientation, immediate/delayed, working) with 80% accuracy, MOD cues and focus on compensatory memory strategies to enhance retention of newly learned medical information. INTERVENTIONS: Orientation - 100% indep with use of calendar    Immediate retention of visual information (picture scene) - 3/10 indep, 2/10 min cues, 1/10 mod cues, 4/10 max cues  *decreased attention to detail   *decreased immediate recall    SHORT TERM GOAL #2:  Goal 2: Pt will complete functional problem solving/safety awareness (i.e. time management, safety scenarios, verbal reasoning) with 80% accuracy, mod cues for enhanced safety and ADL contribution.   INTERVENTIONS: Organization of functional words into proper category - 21/30 indep, 3/30 min cues, 3/30 mod cues, 3/30 max cues  *decreased mental flexibility  *slow processing speed     SHORT TERM GOAL #3:  Goal 3: Pt will complete expressive language tasks (i.e. higher level confrontational/responsive naming, verbal sequencing, Assessment/Plan: Patient progressing toward established goals. Continues to require skilled care of licensed speech pathologist to progress toward achievement of established goals and plan of care.      Plan for Next Session: recall, reading comprehension, diet analysis     EVIE Yusuf

## 2020-12-15 NOTE — PLAN OF CARE
Care plan reviewed with patient. Patient verbalizes understanding of the plan of care and contribute to goal setting. Problem: Falls - Risk of:  Goal: Will remain free from falls  Description: Will remain free from falls  12/15/2020 1304 by Mikaela Hendrix LPN  Outcome: Ongoing  Note: Up with assist of one with walker and gait belt. Gait slow and steady, denies c/o. Tolerates well. Problem: IP BOWEL ELIMINATION  Goal: STG - patient will be accident free  Outcome: Ongoing  Note: Continent of bowel this shift. BM today. Problem: IP BLADDER/VOIDING  Goal: LTG - patient will achieve acceptable level of continence  Outcome: Ongoing  Note: Uses urinal at times. Continent thus far this shift      Problem: Skin Integrity:  Goal: Absence of new skin breakdown  Description: Absence of new skin breakdown  Outcome: Ongoing  Note: No new skin breakdown noted. Problem: Pain:  Goal: Pain level will decrease  Description: Pain level will decrease  Outcome: Ongoing  Note: Denies pain most of the time. Currently having knee pain of 6.  [place] scheduled for 1300

## 2020-12-16 ENCOUNTER — APPOINTMENT (OUTPATIENT)
Dept: CARDIAC CATH/INVASIVE PROCEDURES | Age: 85
End: 2020-12-16
Attending: INTERNAL MEDICINE
Payer: MEDICARE

## 2020-12-16 ENCOUNTER — HOSPITAL ENCOUNTER (OUTPATIENT)
Dept: INPATIENT UNIT | Age: 85
Discharge: HOME | End: 2020-12-16
Attending: INTERNAL MEDICINE | Admitting: INTERNAL MEDICINE
Payer: MEDICARE

## 2020-12-16 VITALS
HEIGHT: 69 IN | OXYGEN SATURATION: 94 % | WEIGHT: 213.85 LBS | BODY MASS INDEX: 31.67 KG/M2 | HEART RATE: 90 BPM | SYSTOLIC BLOOD PRESSURE: 126 MMHG | RESPIRATION RATE: 16 BRPM | DIASTOLIC BLOOD PRESSURE: 61 MMHG

## 2020-12-16 VITALS
TEMPERATURE: 97.7 F | HEIGHT: 69 IN | RESPIRATION RATE: 16 BRPM | BODY MASS INDEX: 31.73 KG/M2 | OXYGEN SATURATION: 92 % | DIASTOLIC BLOOD PRESSURE: 64 MMHG | HEART RATE: 77 BPM | WEIGHT: 214.2 LBS | SYSTOLIC BLOOD PRESSURE: 136 MMHG

## 2020-12-16 LAB — SARS-COV-2, NAAT: NOT DETECTED

## 2020-12-16 PROCEDURE — U0002 COVID-19 LAB TEST NON-CDC: HCPCS

## 2020-12-16 PROCEDURE — 94640 AIRWAY INHALATION TREATMENT: CPT

## 2020-12-16 PROCEDURE — 97530 THERAPEUTIC ACTIVITIES: CPT

## 2020-12-16 PROCEDURE — 33285 INSJ SUBQ CAR RHYTHM MNTR: CPT | Performed by: INTERNAL MEDICINE

## 2020-12-16 PROCEDURE — 94760 N-INVAS EAR/PLS OXIMETRY 1: CPT

## 2020-12-16 PROCEDURE — 2709999900 HC NON-CHARGEABLE SUPPLY

## 2020-12-16 PROCEDURE — 6370000000 HC RX 637 (ALT 250 FOR IP): Performed by: FAMILY MEDICINE

## 2020-12-16 PROCEDURE — 6360000002 HC RX W HCPCS: Performed by: INTERNAL MEDICINE

## 2020-12-16 PROCEDURE — 6360000002 HC RX W HCPCS

## 2020-12-16 PROCEDURE — C1764 EVENT RECORDER, CARDIAC: HCPCS

## 2020-12-16 PROCEDURE — 6370000000 HC RX 637 (ALT 250 FOR IP): Performed by: SURGERY

## 2020-12-16 RX ORDER — TAMSULOSIN HYDROCHLORIDE 0.4 MG/1
0.4 CAPSULE ORAL DAILY
Qty: 90 CAPSULE | Refills: 3 | Status: SHIPPED | OUTPATIENT
Start: 2020-12-16 | End: 2021-09-15 | Stop reason: SDUPTHER

## 2020-12-16 RX ADMIN — AMLODIPINE BESYLATE 5 MG: 5 TABLET ORAL at 08:47

## 2020-12-16 RX ADMIN — MULTIPLE VITAMINS W/ MINERALS TAB 1 TABLET: TAB at 08:47

## 2020-12-16 RX ADMIN — CEFAZOLIN 2 G: 10 INJECTION, POWDER, FOR SOLUTION INTRAVENOUS at 11:26

## 2020-12-16 RX ADMIN — ASPIRIN 81 MG: 81 TABLET, CHEWABLE ORAL at 08:54

## 2020-12-16 RX ADMIN — BUDESONIDE AND FORMOTEROL FUMARATE DIHYDRATE 2 PUFF: 80; 4.5 AEROSOL RESPIRATORY (INHALATION) at 03:50

## 2020-12-16 RX ADMIN — BUMETANIDE 1 MG: 1 TABLET ORAL at 08:47

## 2020-12-16 RX ADMIN — ACETAMINOPHEN 650 MG: 325 TABLET ORAL at 08:51

## 2020-12-16 RX ADMIN — LOSARTAN POTASSIUM 100 MG: 100 TABLET, FILM COATED ORAL at 08:47

## 2020-12-16 RX ADMIN — FAMOTIDINE 20 MG: 20 TABLET, FILM COATED ORAL at 08:54

## 2020-12-16 RX ADMIN — DICLOFENAC 2 G: 10 GEL TOPICAL at 08:47

## 2020-12-16 ASSESSMENT — PAIN - FUNCTIONAL ASSESSMENT: PAIN_FUNCTIONAL_ASSESSMENT: ACTIVITIES ARE NOT PREVENTED

## 2020-12-16 ASSESSMENT — PAIN DESCRIPTION - ORIENTATION: ORIENTATION: RIGHT;LEFT

## 2020-12-16 ASSESSMENT — PAIN SCALES - GENERAL
PAINLEVEL_OUTOF10: 4
PAINLEVEL_OUTOF10: 0
PAINLEVEL_OUTOF10: 4

## 2020-12-16 ASSESSMENT — PAIN DESCRIPTION - PAIN TYPE: TYPE: ACUTE PAIN

## 2020-12-16 ASSESSMENT — ENCOUNTER SYMPTOMS
ALLERGIC/IMMUNOLOGIC NEGATIVE: 1
COUGH: 0
TROUBLE SWALLOWING: 0
CONSTIPATION: 0
BACK PAIN: 0
SHORTNESS OF BREATH: 0
VOICE CHANGE: 0
FACIAL SWELLING: 1
NAUSEA: 0
DIARRHEA: 0

## 2020-12-16 ASSESSMENT — PAIN DESCRIPTION - PROGRESSION: CLINICAL_PROGRESSION: NOT CHANGED

## 2020-12-16 ASSESSMENT — PAIN DESCRIPTION - LOCATION: LOCATION: KNEE

## 2020-12-16 ASSESSMENT — PAIN DESCRIPTION - ONSET: ONSET: ON-GOING

## 2020-12-16 ASSESSMENT — PAIN DESCRIPTION - DESCRIPTORS: DESCRIPTORS: ACHING;DISCOMFORT

## 2020-12-16 ASSESSMENT — PAIN DESCRIPTION - FREQUENCY: FREQUENCY: CONTINUOUS

## 2020-12-16 NOTE — CONSULTS
[address] [place] [address]                                  CONSULTATION    PATIENT NAME: Willem Nam                    :        1935  MED REC NO:   [id number]                           ROOM:  ACCOUNT NO:   [de-identified]                           ADMIT DATE: 2020  PROVIDER:     Royal Jaimes. Shin Arvizu M.D.    [place] :  2020    The patient seen in the rehab unit. REASON FOR EVALUATION:  Syncopal episode, abnormal arrhythmia. HISTORY OF PRESENT ILLNESS:  This is a patient who is an 71-year-old  gentleman, who is known to have history of coronary artery disease. He  has a history of prior intervention of the circumflex. He had syncopal  episode with a trauma to the head and mild intracranial bleed. The  patient was then transferred to this rehab unit, and he did have the 30  days event monitor. He did have an episode of transient third degree AV  block, but at this time this was not reported whether the patient has  any symptoms, whether the patient was sleeping or being on the bed. There is no syncopal episode. His EKGs showed no acute changes. The  patient is not aware of any symptoms at this time. REVIEW OF SYSTEMS:  The patient has a history of hypertension, as  mentioned recent syncopal episode, trauma to the head and intracranial  bleed, history of hypertension, history of hypercholesterolemia. He is  morbidly obese, history of gastroesophageal reflux disease and  gastritis, history of spinal stenosis and laminectomy. He had history  of DVT in the past and he had history of myositis. The patient has  history of chronic renal insufficiency. He denied fever, chills or  rigors. He has no coughing. The patient has a history of knee surgery,  history of rotator cuff surgery, history of back surgery, multiple  endoscopies, history of hip surgery.

## 2020-12-16 NOTE — PROGRESS NOTES
Discharge Instructions        Patient discharged in stable condition as per order of attending physician at Time: 10:15 A. M. and patient went Via bed to outpatient cardiac cath lab to have a loop recorder implantation placed. AVS provided by RN at time of discharge, which includes all necessary medical information pertaining to the patients current course of illness, treatment, medications, post-discharge goals of care, and treatment preferences. Patient/ family verbalize understanding of discharge plan and are in agreement with goal/plan/treatment preferences. Belongings including cell phone (cell phone ) and clothing sent with patient and wife. Home medications sent home with patient N/A. Availability of \"My Chart\" offered to patient as a tool for updated health record.   Steps for activation discussed with patient as mentioned on AVS.

## 2020-12-16 NOTE — PROGRESS NOTES
[address] [address]  Inpatient Rehabilitation  Occupational Therapy  Discharge Note  Time:  Time In: 5132  Time Out: 0900  Timed Code Treatment Minutes: 27 Minutes  Minutes: 27    Date: 2020  Patient Name: Bala Duque,   Gender: male      Room: [facility]/064-A  MRN: [id number]  : 1935  (81 y.o.)  Referring Practitioner: Dr. Krissy Stout  Diagnosis: Intraventricular Hemorrhage  Additional Pertinent Hx: Pt admitted to inpt rehab for rehab needs after admission to the hospital s/p  fall at home resulting in an intraventricular hemorrhage, left sixth rib fracture, displaced left orbital floor fracture with deficits of gait instability and severe cognitive deficits with a MOCA score of 9/30 on . During IP rehab stay, patient with bradycardic event with plans for loop recorder to be placed at discharge. Restrictions/Precautions:  Restrictions/Precautions: Fall Risk, General Precautions  Position Activity Restriction  Other position/activity restrictions: Keep systolic blood pressure between 100-160 while moving. SUBJECTIVE: Patient in recliner upon arrival. Agreeable to OT with min encouragement, min increased time to initiate activity. Pt unable to get dressed in prep for discharge as he is getting a loop recorder placed later on this AM. Pt reporting all other grooming and all packing was done. PAIN: Denies pain    COGNITION: Slow Processing    ADL:   No ADL's completed this session. Tobi Hui BALANCE:  Sitting Balance:  Modified Independent. ADDITIONAL ACTIVITIES:  Patient identified a personal goal to increase UB strength and improve overall endurance so they can complete their toilet & shower transfers; skilled edu on UE strengthening and patient completed BUE strengthening exercises x15 reps x1 set this date with a medium resistive band in all joints and all planes. Patient tolerated fair, requiring mod rest breaks. Pt required mod cues for technique.       ASSESSMENT:  Activity Tolerance:  Patient tolerance of  treatment: good. Assessment: Keely Latham has made steady progress on IP rehab. He has progressed to a MI level with his rote BADL routines. He does demo some slow processing which affects his IADLs however at baseline his wife did primarily the IADLs at home. He does have an UB HEP for home use. [facility] to progress with strengthening, endurance, and to decrease fall risk and safety within his ADL / IADL routines. Discharge Recommendations: Home with nursing aide, Home with Home health OT  Equipment Recommendations: Other: Per pt's wife 12/14 has a toilet raiser and shower seat- denies need for BSC. Plan: Discharge home with supportive wife to assist PRN and [facility] OT. Patient Education  Patient Education: Home Exercise Program    Goals   Short term goals  Time Frame for Short term goals: 1 week  Short term goal 1: Pt will demonstrate functional mobility walking to/from the bathroom or bedside chair with SBA and RW  with min cues for aligning himself as neeed to prepare for doing self care. GOAL MET   Short term goal 2: Pt will complete ADL routine with no > min A and min cues for problem solving and task completion to increase independence in self care tasks GOAL MET   Short term goal 3: Pt will complete toileting routine and transfer with no SBA and min cues for safe tech to increase independence in self toileting tasks GOAL MET   Short term goal 4: Pt will complete BUE ROM and light resistance exercises while following verbal cues to increase his pain free movement for ease of doing self care.  GOAL MET   Short term goal 5: Pt will complete 1 step homemaking tasks with CGA and no > min cues for problem solving to increase ability to retrieve a snack GOAL MET   Long term goals  Time Frame for Long term goals : 2 week  Long term goal 1: Pt will complete ADL routine with S and no  cues for problem solving to increase independence in self care tasks GOAL MET   Long term goal 2: Pt will complete 1 step homemaking tasks with SBA and no cues for safe tech to increase ability to retrieve a glass of water. GOAL MET     Following session, patient left in safe position with all fall risk precautions in place.

## 2020-12-16 NOTE — PROGRESS NOTES
[address] [facility]  [facility]  DISCHARGE NOTE   (THIS IS NOT A TREATMENT NOTE)    TIME: N/A    Date: 2020  Patient Name: Libby Buerger      CSN: [id number]   : 1935  (81 y.o.)  Gender: male   Referring Physician: Samir Pressley MD  Diagnosis: Intraventricular hemorrhage   Secondary Diagnosis: Cognitive deficits, Dysphagia  Precautions: Fall risk, Aspiration risk   Current Diet: Soft and Bite Sized, Thin liquids   Swallowing Strategies: Full Upright Position, Small Bite/Sip, Alternate Solids and Liquids, Limit Distractions and Monitor for Fatigue  Date of Last MBS: Not Applicable    Pain:  No pain reported. Subjective: Attempted to see pt for discharge session at scheduled treatment time of 1000 this date. Surgery personnel in room preparing to transport pt off floor at time of [address] [place] [address] arrival. Scheduling conflict resulted in mis-treat. See details below re: goals met/not met as well as discharge summary. Short-Term Goals:  SHORT TERM GOAL #1:  Goal 1: Pt will complete functional carryover tasks (i.e. orientation, immediate/delayed, working) with 80% accuracy, MOD cues and focus on compensatory memory strategies to enhance retention of newly learned medical information. - GOAL NOT MET   INTERVENTIONS: Not addressed this date. PREVIOUS SESSION:   Orientation - 100% indep with use of calendar    Immediate retention of visual information (picture scene) - 310 indep, 2/10 min cues, 1/10 mod cues, 4/10 max cues  *decreased attention to detail   *decreased immediate recall    SHORT TERM GOAL #2:  Goal 2: Pt will complete functional problem solving/safety awareness (i.e. time management, safety scenarios, verbal reasoning) with 80% accuracy, mod cues for enhanced safety and ADL contribution. - GOAL MET   INTERVENTIONS: Not addressed this date.   PREVIOUS SESSION:   Organization of functional words into proper category -  indep, 3/30 min cues, 3/30 mod cues, 3/30 max cues  *decreased mental flexibility  *slow processing speed     SHORT TERM GOAL #3:  Goal 3: Pt will complete expressive language tasks (i.e. higher level confrontational/responsive naming, verbal sequencing, divergent naming) with 80% accuracy, min cues for improved expression of complex thoughts  INTERVENTIONS: Not addressed this date. - GOAL MET   PREVIOUS SESSION:   Verbal sequencing of ADL's (in 4-6 steps)  Brushing teeth - 3/4 indep, 1/4 min cues for detail  Morning routine - 5/6 indep, 1/6 max cues to \"get dressed\"  Preparing cereal - 3/5 indep, 2/5 min cues to identify need for bowl and spoon  Taking medications - 3/4 indep, 1/4 min cues for detail  *decreased verbal sequencing, however, majority of errors r/t lack of detail     SHORT TERM GOAL #4:  Goal 4: Pt will complete high level auditory/reading comprehension tasks (i.e. complex y/n questions, multi step and complex commands, prescription labels/directories, etc) with 90% accuracy, mod cues for improved understanding of complex medical information. - GOAL NOT MET   INTERVENTIONS: Not addressed this date. PREVIOUS SESSION:   Executing 1-step commands -- 2/5 indep, 3/5 no attempt despite max cues    Answering yes/no questions -- 2/5 indep, 3/5 no attempt despite max cues   *suspect overall receptive and expressive language skills fully impacted overall success this date     SHORT TERM GOAL #5:  Goal 5: Pt will consume a soft & bite size diet and thin liquids with adherence to skilled swallowing strategies (i.e. small bites/sips, slow rate) without oert s/s of aspiration to meet nutrition/hydration measures safely. - GOAL MET   INTERVENTIONS: Not addressed this date. PREVIOUS SESSION:   Completed a skilled dietary analysis on this date to determine recommendations to resume current diet level vs recommendations for initiation of advanced (regular) dietary analysis.  Meal tray included soft & bite size sausage and pancakess with thin liquids via cup. The pt demonstrated great success with his meal demonstrated by timely mastication, good bolus control/formation, and timely AP movement with essentially functional pharyngeal phase of the swallow -- no overt s/s of aspiration/penetration across all PO trials. *Patient did present with slight impulsive eating patterns on this date, requiring min A to \"'slow down\" between bites. *Pt verbalized satisfaction with soft & bite size solids. *recommended remain on soft & bite size solids with reinforcement to \"slow down\" with PO intake        Long-Term Goals:  Timeframe for Long-term Goals: 4 weeks    LONG TERM GOAL #1:  Goal 1: Patient will improve cognitive status to a min A level to safely return to PLOF home with wife, nearly 24 hour SUP - GOAL MET       Comprehension: 4 - Patient understands basic needs 75-90%+ of the time  Expression: 4 - Expresses basic ideas/needs 75-90%+ of the time  Social Interaction: 6 - Patient requires medication for mood and/or effect  Problem Solvin - Patient solves simple/routine tasks 75-90%+   Memory: 3 - Patient remembers 50%-74% of the time       EDUCATION:  See last note for details. ASSESSMENT:  SUMMARY  Pt has met 3/5 STG's and / LTG's this therapy period. Continues to present with a moderate cognitive impairment and a mild expressive/receptive language impairment characterized by deficits in auditory comprehension, functional problem solving, thought organization, verbal reasoning/higher level expression, mathematical computation, executive functioning, and sequencing. Improvements made in basic orientation with use of calendar, immediate recall, and telling time. Pt also presents with mild oral dysphagia characterized by prolonged and uncoordinated mastication resulting in decreased bolus formation with consumption of coarse solids.  Has been safely consuming soft and bite sized diet with thin liquids; will continue to complete dietary analyses and determine appropriateness for initiation of advanced texture trials as indicated. Suspect s/s of dysphagia and cognitive deficits both present PTA. Pt with overall low cognitive demand in home setting requiring significant assistance from wife. Continued ST services are no longer clinically indicated at this time; have already completed education with provision of written recommendations for ST HEP (wife aware). Will require 24/7 supervision. Activity Tolerance:  N/A  Assessment/Plan: Patient discharged from Speech Therapy at this time due to discharge from unit. Discharge Disposition: Good/At baseline. Continued Speech Therapy Services recommended: No - ST HEP.        Twyla Garcia M.S. [facility] [address]

## 2020-12-16 NOTE — FLOWSHEET NOTE
Patient in radial lounge post procedure with family. Dressing site is soft, non tender with no evidence of bleeding or swelling. [employer] rep at bedside instructing patient for procedure.

## 2020-12-16 NOTE — PROGRESS NOTES
[address] [address]  Recreational Therapy  Discharge Note  Inpatient Rehabilitation Unit           Date:  12/16/2020       Patient Name: Hernando Arevalo      MRN: [id number]       YOB: 1935 (81 y.o.)       Gender: male  Diagnosis: Intraventricular Hemorrhage  Referring Practitioner: Dr. Real Figueroa    Patient discharged from Recreational Therapy at this time. See recreational therapy notes for details.     Electronically signed by: TO Garcia  Date: 12/16/2020

## 2020-12-16 NOTE — FLOWSHEET NOTE
1035 Pt. Arrives from 2E with Cath lab staff at bedside. [employer] rep's Nessa and Marlenemarianne Love in the room. 1040 Baseline VS /68, RR 16, HR 96, SPO2 95% on RA  1041 Pt. Prepped and draped in sterile fashion. 0 Dr. Olya Ferrer notified. 1104 Dr. Olya Ferrer at bedside  1105 ml of Lidocaine given SQ per Dr. Olya Ferrer   1106 Incision made  1107 Device inserted. Reading relayed to Dr. Olya Ferrer. Dr. Olya Ferrer satisfied with placement. 1108 Site closed with surface sutures, mastisol, steri strips, triple antibiotic ointment, 4x4's and tegederm. 18 Dr. Melo Alcantara the RN to start an IV and give one dose of Ancef 2Gm. 1110 Dr. Olya Ferrer left.  Pt. Voided 250 ml   1111 Post procedure VS: /81, RR 16, HR 91, SPO2 96% on RA

## 2020-12-16 NOTE — PLAN OF CARE
Problem: Falls - Risk of:  Goal: Will remain free from falls  Description: Will remain free from falls  12/16/2020 0156 by Ricarda Caraballo LPN  Outcome: Ongoing  12/15/2020 1304 by Laine Poon LPN  Outcome: Ongoing  Note: Up with assist of one with walker and gait belt. Gait slow and steady, denies c/o. Tolerates well. Goal: Absence of physical injury  Description: Absence of physical injury  Outcome: Ongoing     Problem: Discharge Planning:  Goal: Discharged to appropriate level of care  Description: Discharged to appropriate level of care  Outcome: Ongoing     Problem: IP BOWEL ELIMINATION  Goal: LTG - patient will utilize adaptive techniques/equipment to complete bowel elimination  Outcome: Ongoing  Goal: LTG - patient will have regular and routine bowel evacuation  Outcome: Ongoing  Goal: STG - patient will be accident free  12/16/2020 0156 by Ricarda Caraballo LPN  Outcome: Ongoing  12/15/2020 1304 by Laine Poon LPN  Outcome: Ongoing  Note: Continent of bowel this shift. BM today. Goal: STG - Patient will verbalize signs and symptoms of constipation and how to prevent/alleviate  Outcome: Ongoing  Goal: STG - patient will be continent of stool  Outcome: Ongoing  Goal: STG - Patient verbalizes knowledge about relationship between diet, fluid intake, activity and medication on constipation  Outcome: Ongoing     Problem: IP BLADDER/VOIDING  Goal: LTG - patient will complete bladder elimination  Outcome: Ongoing  Goal: LTG - Patient will utilize adaptive techniques/equipment to complete bladder elimination  Outcome: Ongoing  Goal: LTG - patient will achieve acceptable level of continence  12/16/2020 0156 by Ricarda Caraballo LPN  Outcome: Ongoing  12/15/2020 1304 by Laine Poon LPN  Outcome: Ongoing  Note: Uses urinal at times.  Continent thus far this shift      Problem: Skin Integrity:  Goal: Will show no infection signs and symptoms  Description: Will show no infection signs and symptoms  Outcome: Ongoing  Goal: Absence of new skin breakdown  Description: Absence of new skin breakdown  12/16/2020 0156 by Cliff Juarez LPN  Outcome: Ongoing  12/15/2020 1304 by Jyoti Farnsworth LPN  Outcome: Ongoing  Note: No new skin breakdown noted. Problem: DISCHARGE BARRIERS  Goal: Patient's continuum of care needs are met  Outcome: Ongoing     Problem: Pain:  Goal: Pain level will decrease  Description: Pain level will decrease  12/16/2020 0156 by Cliff Juarez LPN  Outcome: Ongoing  12/15/2020 1304 by Jyoti Farnsworth LPN  Outcome: Ongoing  Note: Denies pain most of the time. Currently having knee pain of 6. [place] scheduled for 1300  Goal: Control of acute pain  Description: Control of acute pain  Outcome: Ongoing  Goal: Control of chronic pain  Description: Control of chronic pain  Outcome: Ongoing     Problem: Impaired respiratory status  Goal: Clear lung sounds  Outcome: Completed   Care plan reviewed with patient. Patient verbalizes understanding of the plan of care and contribute to goal setting.

## 2020-12-16 NOTE — H&P
[facility]   Sedation/Analgesia History and Physical    Pt Name: Shanti Arias  MRN: [id number]  YOB: 1935  Provider Performing Procedure: [address], MD  Primary Care Physician: Loli Hollis MD Pre-Procedure: syncopeent: I have discussed with the patient and/or the patient representative the indication, alternatives, and the possible risks and/or complications of the planned procedure and the anesthesia methods. The patient and/or representative appear to understand and agree to proceed. Medical History: has a past medical history of CAD (coronary artery disease), Chronic kidney disease, stage III (moderate), Closed fracture of six ribs of left side, COPD (chronic obstructive pulmonary disease) ([address] DVT, lower extremity ([address] Gastritis, Hematuria, Hypercholesteremia, Hypertension, Intracranial bleed ([address] Nasal bone fracture, Osteoarthritis, Presence of IVC filter, Prostate cancer ([address] Spinal stenosis, lumbar, s/p laminectomy, and Statin-induced myositis. .Surgical History:   has a past surgical history that includes knee surgery; Neck surgery; Rotator cuff repair; Cardiac surgery; back surgery (2006); other surgical history (5/29/2015); Endoscopy, colon, diagnostic; Cosmetic surgery; and Total hip arthroplasty (Bilateral). Allergies: Allergies as of 12/16/2020¢(No Known Allergies)Medications: Coumadin use last 5 days: NoAntiplatelet drug therapy use last 5 days:  YesOther anticoagulant use last 5 days:  NoPrior to Admission medications MedicationSigStart DateEnd DateTaking? Authorizing Providercitalopram (CELEXA) 20 MG tabletTake 1 tablet by mouth owkkjve47/15/20YeDelbert KerrmLODIPine (NORVASC) 5 MG tabletTake 1 tablet by mouth daily12/16/20YeKelsi Strauss MDticagrelor (BRILINTA) 90 MG TABS tabletTake 1 tablet by mouth 2 times daily10/23/20YesAjovanni Cuenca MDlosartan (COZAAR) 50 MG tabletTake 1 tablet by mouth daily10/23/20YesADelbert Leespirin 81 MG chewable tabletTake 1 tablet by mouth daily10/24/20YesAHalina Izquierdoravastatin (PRAVACHOL) 40 MG tabletTake 1 tablet by mouth daily10/23/20YesAjovanni Cuenca MDoxybutynin (DITROPAN) 5 MG tabletTAKE 1 TABLET TWICE A DAY9/25/20YesCheryl ASMITA Rolle-Cfinasteride (PROSCAR) 5 MG tabletTAKE 1 TABLET DAILY8/21/20YesTiffanie Lopezmsulosin (FLOMAX) 0.4 MG capsuleTAKE 1 CAPSULE NIGHTLY8/19/20YesValerie ALESSANDRA Beltran - CNPbumetanide (BUMEX) 1 MG tabletTake 1 tablet by mouth daily7/28/20YesAnil K ZoniadadaLAURENCEholecalciferol (VITAMIN D3) 5000 UNITS TABSTake 2,000 Units by mouth daily YesHistorical Provider, MDacetaminophen (TYLENOL) 500 MG tabletTake 500 mg by mouth every 4 hours as needed for PainYesHistorical Provider, Channingiclofenac sodium (VOLTAREN) 1 % GELApply 2 g topically 4 times daily Apply to Right knee. 12/16/20Faraz Hooper MDnitroGLYCERIN (NITROSTAT) 0.4 MG SL tabletPlace 1 tablet under the tongue every 5 minutes as needed for Chest pain up to max of 3 total doses. If no relief after 1 dose, call 911.10/23/20Shmuel Gutierresometasone-formoterol (DULERA) 100-5 MCG/ACT inhalerInhale 2 puffs into the lungs 2 times dailyHistorical Provider, MDalbuterol sulfate  (90 Base) MCG/ACT inhalerInhale 1 puff into the lungs every 4 hours as needed for WheezingHistorical Provider, MDVital SignsVitals:12/16/20 1247BP:126/61Pulse:90Resp:16SpO2:94%Physical:Heart:  regular rate and rhythmLungs:  ClearAbdomen:  SoftMental Status:  Alert and OrientedPlanned Procedure[de-identified] placement of loop recorder  Coronary Intervention\"    Sedation/ Anesthesia Plan: Midazolam and Sublimaze    ASA Classification: Class 3 - A patient with severe systemic disease that limits activity but is not incapacitating    Mallampati Airway Classification: II (soft palate, uvula, fauces visible)    · Pre-procedure diagnostic studies complete and results available. · Previous sedation/anesthesia experiences assessed. · The patient is an appropriate candidate to undergo the planned procedure sedation and anesthesia.  (Refer to nursing sedation/analgesia documentation record) · Formulation and discussion of sedation/procedure plan, risks, and expectations with patient and/or responsible adult completed. · Patient examined immediately prior to the procedure.  (Refer to nursing sedation/analgesia documentation record)    Angle Smith MD  Electronically signed 12/16/2020 at 12:53 PM  Patient Name: Mar Huang Record Number: [id number]  Date: 12/16/2020   Time: 12:53 PM   Room/Bed: 2E-06/006-A

## 2020-12-16 NOTE — PROGRESS NOTES
Plan remains for discharge home today, 12/16/2020. SW contacted [facility] with discharge notification of today, 12/16/2020. Information provided to [facility]. Agency has access to discharge instructions and face to face encounter.

## 2020-12-16 NOTE — PROGRESS NOTES
[address] [address]  INPATIENT PHYSICAL THERAPY  DISCHARGE NOTE  [facility] - [id number]    Time In: 0700  Time Out: 0730  Timed Code Treatment Minutes: 30 Minutes  Minutes: 30          Date: 2020  Patient Name: Bill Botello,  Gender:  male        MRN: [id number]  : 1935  (81 y.o.)     Referring Practitioner: Subhash Herman MD  Diagnosis: Intraventricular hemorrhage  Additional Pertinent Hx: Pt presenting at [facility] by activation of level 2 trauma, brought by EMS following a unwitnessed fall with unknown LOC; past medical history includes recent cardiac sten placement x2, hypertension, chronic kidney disease, CAD. Per wife report, she was standing in the bathroom when she heard a sound in the kitchen she open the bathroom door to see her  laying face down on the ground. Patient states he normally ambulates with walker, did have a walker at the time of fall, unclear if fall was precipitated by syncopal episode. Wife states that  does appear to be confused post fall, has been repetitive in speech since incident. Patient reports some mild discomfort over left face otherwise has no complaints on exam.  Found to have an intraventricular hemorrhage, a left orbital fx, nasal bone fx, and left 6th rib fx. To IP rehab .      Prior Level of Function:  Lives With: Spouse  Type of Home: House  Home Layout: One level  Home Access: Stairs to enter with rails  Entrance Stairs - Number of Steps: 1 step to enter and 1 step once inside the house  Entrance Stairs - Rails: Left  Home Equipment: Standard walker   Bathroom Shower/Tub: Walk-in shower  Bathroom Toilet: Handicap height  Bathroom Equipment: Built-in shower seat, Grab bars around toilet, Grab bars in shower  Bathroom Accessibility: Accessible    Receives Help From: Family  ADL Assistance: [facility]: Independent  Homemaking Responsibilities: No  Ambulation Assistance: Independent  Transfer Assistance: Independent  Active : No  Additional Comments: Pt ambualated short distances with a standard walker. He did his own self care. He had help with all of the cooking and cleaning prior to admission. Restrictions/Precautions:  Restrictions/Precautions: Fall Risk, General Precautions  Position Activity Restriction  Other position/activity restrictions: Keep systolic blood pressure between 100-160 while moving. SUBJECTIVE: pt in bed on arrival, agrees to therapy and eager for d/c this date. Voices no concerns with return to home. Requests to use restroom    PAIN: 0/10:     OBJECTIVE:  Bed Mobility:  Supine to Sit: Modified Independent, with head of bed raised, with increased time for completion    Transfers:  Sit to Stand: Stand By Assistance  Stand to Sit:Stand By Assistance    Ambulation:  Stand By Assistance  Distance: 15ft x2  Surface: Level Tile  Device:Rolling Walker  Gait Deviations:  Slow [place], Decreased Step Length Bilaterally, Decreased Gait Speed, Decreased Heel Strike Bilaterally and Mild Path Deviations    Stairs:  Platform:  6\" platform X 1 using Rolling Walker and Contact Guard Assistance, with increased time for completion. Balance:  Dynamic Standing Balance: Stand By Assistance, in restroom for lower body clothing donning and doffing and hand washing and brushing teeth at sink. no LOB noted, total ~15 minutes in restroom    Exercise:  none    Functional Outcome Measures: Not completed       ASSESSMENT:  Assessment: Patient progressing toward established goals. Kendall Bedoya has met 3/3 STGs and 2 LTGs during his stay. He continues to show improved gait mechanics and safety awareness throughout his stay. He is limited by decreased tolerance to activity and intermittent instability as well as generalized weakness. He will be discharged to home this date with continued HHPT services to ensure safe return to home and further improve level of independence. The patient has agreed to this plan during his stay. Activity Tolerance:  Patient tolerance of  treatment: good. Equipment Recommendations:Equipment Needed: No, reports having RW on 12/14  Discharge Recommendations:  24 hour supervision or assist, Continue to assess pending progress, Home with Home Health PT    Plan: d/c to home with assist as needed and Home Health PT    Patient Education  Patient Education: Plan of Care, Precautions/Restrictions, Transfers, Gait, Home Safety Education    Goals:  **Goal assessment from recent therapy sessions  Patient goals : does not state  Short term goals  Time Frame for Short term goals: 2 weeks  Short term goal 1: Pt to transfer supine <--> sit SBA to enable pt to get in/out of bed. - GOAL MET  Short term goal 2: Pt to transfer sit <--> stand SBA for increased functional mobility. - GOAL MET  Short term goal 3: Pt to ambulate 100 feet with SW/RW CGA for household and community ambulation. - GOAL MET  Long term goals  Time Frame for Long term goals : 4 weeks  Long term goal 1: Pt to transfer supine <--> sit Mod I to enable pt to get in/out of bed. - GOAL MET  Long term goal 2: Pt to transfer sit <--> stand supervision for increased functional mobility. - NOT MET  Long term goal 3: Pt to ambulate >150 feet with SW/RW SBA for household and community ambulation. - NOT MET  Long term goal 4: Pt to ascend/descend 1 step with SW/RW SBA for home entry. - NOT MET  Long term goal 5: Pt to perform car transfer CGA to enable pt to get in/out of the car. - GOAL MET  Long term goal 6: Pt to improve TUG test score to <45 sec to indicate improvement in overall mobility. - NOT MET    Following session, patient left in safe position with all fall risk precautions in place.

## 2020-12-16 NOTE — PROCEDURES
patient did receive the education about the transmission of the device  to the office. The patient received wound care instruction. The  patient has no acute complication from the procedure. Radha Mckinley M.D.    D: 12/16/2020 13:01:28       T: 12/16/2020 13:59:32     [email or web address]  Job#: [id number]     Doc#: [id number]    CC:  Souleymane Shook M.D.

## 2020-12-17 PROCEDURE — 2709999900 HC NON-CHARGEABLE SUPPLY

## 2020-12-17 PROCEDURE — C1764 EVENT RECORDER, CARDIAC: HCPCS

## 2020-12-25 NOTE — PROCEDURES
[address] [id number]                                 EVENT MONITOR    PATIENT NAME: Glenn Camacho                    :        1935  MED REC NO:   [id number]                           ROOM:       0007  ACCOUNT NO:   [de-identified]                           ADMIT DATE: 2020  PROVIDER:     Joanne Flanagan M.D. CLINICAL HISTORY AND INDICATION:  This is a patient with syncope. EVENT MONITOR DESCRIPTION:  Event monitor was attached to the patient  between 2020 and 2020. EVENT MONITOR FINDINGS:  Baseline rhythm showed sinus rhythm. There was  a short episode of 7 beats of a wide complex tachycardia, likely  nonsustained V-tach occurring at 11:35 a.m. on 2020. Otherwise,  no other sustained arrhythmias noted. CONCLUSION:  1. Sinus rhythm. 2.  One episode of 7 beats of nonsustained ventricular tachycardia as  described above. Otherwise no other sustained arrhythmias noted. 3.  Clinical correlation is recommended.         Godfrey Arrington M.D.    D: 2020 9:38:50       T: 2020 13:23:51     [email or web address]  Job#: [id number]     Doc#: [id number]    CC:

## 2020-12-27 PROBLEM — R77.8 ELEVATED TROPONIN: Status: RESOLVED | Noted: 2020-11-27 | Resolved: 2020-12-27

## 2020-12-27 PROBLEM — R79.89 ELEVATED TROPONIN: Status: RESOLVED | Noted: 2020-11-27 | Resolved: 2020-12-27

## 2021-01-04 ENCOUNTER — HOSPITAL ENCOUNTER (OUTPATIENT)
Dept: GENERAL RADIOLOGY | Age: 86
Discharge: HOME | End: 2021-01-04
Payer: MEDICARE

## 2021-01-04 ENCOUNTER — HOSPITAL ENCOUNTER (OUTPATIENT)
Age: 86
Discharge: HOME | End: 2021-01-04
Payer: MEDICARE

## 2021-01-04 DIAGNOSIS — M54.50 LOW BACK PAIN, UNSPECIFIED BACK PAIN LATERALITY, UNSPECIFIED CHRONICITY, UNSPECIFIED WHETHER SCIATICA PRESENT: ICD-10-CM

## 2021-01-04 PROCEDURE — 72114 X-RAY EXAM L-S SPINE BENDING: CPT

## 2021-01-07 NOTE — DISCHARGE SUMMARY
Printed on:01/07/21 4986   Medication Information                      acetaminophen (TYLENOL) 500 MG tablet  Take 500 mg by mouth every 4 hours as needed for Pain             albuterol sulfate  (90 Base) MCG/ACT inhaler  Inhale 1 puff into the lungs every 4 hours as needed for Wheezing             aspirin 81 MG chewable tablet  Take 1 tablet by mouth daily             bumetanide (BUMEX) 1 MG tablet  Take 1 tablet by mouth daily             Cholecalciferol (VITAMIN D3) 5000 UNITS TABS  Take 2,000 Units by mouth daily              finasteride (PROSCAR) 5 MG tablet  TAKE 1 TABLET DAILY             losartan (COZAAR) 50 MG tablet  Take 1 tablet by mouth daily             mometasone-formoterol (DULERA) 100-5 MCG/ACT inhaler  Inhale 2 puffs into the lungs 2 times daily             nitroGLYCERIN (NITROSTAT) 0.4 MG SL tablet  Place 1 tablet under the tongue every 5 minutes as needed for Chest pain up to max of 3 total doses. If no relief after 1 dose, call 911. oxybutynin (DITROPAN) 5 MG tablet  TAKE 1 TABLET TWICE A DAY             pravastatin (PRAVACHOL) 40 MG tablet  Take 1 tablet by mouth daily             ticagrelor (BRILINTA) 90 MG TABS tablet  Take 1 tablet by mouth 2 times daily                 Patient Instructions:     Activity: activity as tolerated  Diet: No diet orders on file    Code Status: Prior    Follow-up visits:   Kin Taylor MD  [address] [place] [address]  [phone]    In 4 weeks  [facility] follow up     [address] [place] [address]     In 2 weeks  Recent PCI, s/p ICH, resumed ASA brilinta held with bleed, reealuate reinitiation         Consults:   Neurology, neurosurgery, plastic surgery, PM&R, hospitalist    Examination:  Vitals:  Vitals:    11/30/20 2236 12/01/20 0407 12/01/20 0738 12/01/20 0945   BP: 138/81 (!) 149/86  138/75   Pulse:  77  79   Resp:  16  18   Temp:  97.8 °F (36.6 °C)  98.2 °F (36.8 °C) TempSrc:  Oral  Oral   SpO2:  96% 95% 96%   Weight:  221 lb 14.4 oz (100.7 kg)     Height:         Weight: Weight: 221 lb 14.4 oz (100.7 kg)     24 hour intake/output:No intake or output data in the 24 hours ending 01/07/21 1523        Significant Diagnostics:   Radiology: Xr Lumbar Spine (min 6 Views)    Result Date: 1/4/2021  PROCEDURE: XR LUMBAR SPINE (MIN 6 VIEWS) CLINICAL INFORMATION: Low back pain, unspecified back pain laterality, unspecified chronicity, unspecified whether sciatica present . COMPARISON: 3/30/2016 TECHNIQUE: AP, lateral, bilateral obliques and flexion-extension views FINDINGS: Postsurgical changes of bowel surgery through L5 laminectomy and fusion with pedicle screws and brackets. Intervertebral spacer device at L4-5. Complete incorporation of the spacer device. There is anterior wedging of L2 with slight loss of height less than 25% loss of vertebral body height slightly progressed since the prior exam. There is no evidence of instability on flexion-extension. There is no spondylolysis. There is mild dextroscoliosis. An IVC filter is noted. Pedicle screws. Be appropriately positioned L5 pedicle screws are difficult to accurately assess there may be slight encroachment on the canal     No acute abnormality. Chronic L3-L5 laminectomy and fusion as discussed above. No evidence of instability on flexion-extension. Minimal progression of anterior wedging of L2 **This report has been created using voice recognition software. It may contain minor errors which are inherent in voice recognition technology. ** Final report electronically signed by Dr. Jordan Subramanian on 1/4/2021 4:36 PM      Labs: No results found for this or any previous visit (from the past 72 hour(s)).     Discharge condition: stable  Disposition: Discharge/Readmit  Time spent on discharge: >35 minutes    Electronically signed by ALESSANDRA Fox CNP on 1/7/2021 at 3:23 PM

## 2021-01-08 ENCOUNTER — OFFICE VISIT (OUTPATIENT)
Dept: NEUROLOGY | Age: 86
End: 2021-01-08
Payer: MEDICARE

## 2021-01-08 VITALS
SYSTOLIC BLOOD PRESSURE: 118 MMHG | DIASTOLIC BLOOD PRESSURE: 72 MMHG | BODY MASS INDEX: 31.58 KG/M2 | HEIGHT: 69 IN | HEART RATE: 68 BPM

## 2021-01-08 DIAGNOSIS — I61.1 NONTRAUMATIC CORTICAL HEMORRHAGE OF CEREBRAL HEMISPHERE, UNSPECIFIED LATERALITY (HCC): Primary | ICD-10-CM

## 2021-01-08 LAB
GFR SERPL CREATININE-BSD FRML MDRD: 39 ML/MIN/1.73M2
GFR SERPL CREATININE-BSD FRML MDRD: 48 ML/MIN/1.73M2

## 2021-01-08 PROCEDURE — 1111F DSCHRG MED/CURRENT MED MERGE: CPT | Performed by: PSYCHIATRY & NEUROLOGY

## 2021-01-08 PROCEDURE — 1036F TOBACCO NON-USER: CPT | Performed by: PSYCHIATRY & NEUROLOGY

## 2021-01-08 PROCEDURE — 99213 OFFICE O/P EST LOW 20 MIN: CPT | Performed by: PSYCHIATRY & NEUROLOGY

## 2021-01-08 PROCEDURE — G8484 FLU IMMUNIZE NO ADMIN: HCPCS | Performed by: PSYCHIATRY & NEUROLOGY

## 2021-01-08 PROCEDURE — 4040F PNEUMOC VAC/ADMIN/RCVD: CPT | Performed by: PSYCHIATRY & NEUROLOGY

## 2021-01-08 PROCEDURE — G8417 CALC BMI ABV UP PARAM F/U: HCPCS | Performed by: PSYCHIATRY & NEUROLOGY

## 2021-01-08 PROCEDURE — G8427 DOCREV CUR MEDS BY ELIG CLIN: HCPCS | Performed by: PSYCHIATRY & NEUROLOGY

## 2021-01-08 PROCEDURE — 1123F ACP DISCUSS/DSCN MKR DOCD: CPT | Performed by: PSYCHIATRY & NEUROLOGY

## 2021-01-08 RX ORDER — AMIODARONE HYDROCHLORIDE 200 MG/1
TABLET ORAL
COMMUNITY
Start: 2021-01-06 | End: 2022-07-06

## 2021-01-08 NOTE — PROGRESS NOTES
NEUROLOGY OUT PATIENT FOLLOW UP NOTE:  1/8/20211:08 PM    Subjective:  Apoorva Dumont is here for follow up for septum pallucidum small ICH, came back to follow up with me. No complaint, back on brillinta since 12/2020      HPI:  81year old man with CAD on aspirin and brillinta, prostate cancer, HTN, patient came to the ED yesterday because he fell and loss conscious, and had abrasion to the fore headache. Patient denies any symptoms prior to that and can't remember anything during the fall.     In the ED, he was found to have a small septum pellucidum ICH. Patient Active Problem List   Diagnosis    CA prostate, adenoca ([facility]    Chronic kidney disease, stage III (moderate)    Hypercholesteremia    Osteoarthritis    CAD (coronary artery disease)    H/O class III angina pectoris    Gastritis    Hypertension, essential, benign    HLD (hyperlipidemia)    Spinal stenosis, lumbar, s/p laminectomy    S/P laminectomy    Benign essential HTN    Syncope    ALDA (acute kidney injury) ([facility]    DVT, lower extremity (HCC)    Fluid overload    Benign prostatic hyperplasia with urinary obstruction    PVD (peripheral vascular disease) ([address]    DVT (deep venous thrombosis) (HCC)    Presence of IVC filter    Gross hematuria    Hematuria    History of DVT (deep vein thrombosis)    Benign non-nodular prostatic hyperplasia with lower urinary tract symptoms    History of prostate cancer    H/O prostate cancer    Proteinuria    Encephalopathy    Altered mental status    Intraventricular hemorrhage (HCC)    Fracture of left orbital floor (HCC)    Nasal bone fracture    Closed fracture of six ribs of left side    Closed fracture of one rib of left side    Intracranial bleed (HCC)    Postural hypotension    COPD (chronic obstructive pulmonary disease) ([facility])             ROS:  Respiratory : no cough, no shortness of breath  Cardiac: no chest pain. No palpitations. There were no vitals filed for this visit. General Appearance:  awake, alert, oriented, in no acute distress and well developed, well nourished, he is slow to response, but is at his baseline  Gen: NAD, Language is Intact. Skin: no rash, lesion, d moist to touch. warm  Head: no rash, no icterus  Neck: There is no carotid bruits. The Neck is supple. There is no neck lymphadenopathy. Neuro: CN 2-12 grossly intact with no focal deficits. Power 4+/5 Throughout symmetric,  Long tracts are intact. Cerebellar exam is Intact. Sensory exam is intact to light touch. Gait is not tested, patient is on wheelchair. Musculoskeletal:  Has no hand arthritis, no limitation of ROM in any of the four extremities,. Lower extremities 1+ edema  The abdomen is soft, intact bowel sounds.          DATA:  Results for orders placed or performed during the hospital encounter of 12/01/20   CBC   Result Value Ref Range    WBC 5.0 4.8 - 10.8 thou/mm3    RBC 4.79 4.70 - 6.10 mill/mm3    Hemoglobin 15.0 14.0 - 18.0 gm/dl    Hematocrit 47.7 42.0 - 52.0 %    MCV 99.6 (H) 80.0 - 94.0 fL    MCH 31.3 26.0 - 33.0 pg    MCHC 31.4 (L) 32.2 - 35.5 gm/dl    RDW-CV 12.7 11.5 - 14.5 %    RDW-SD 46.5 (H) 35.0 - 45.0 fL    Platelets 017 343 - 155 thou/mm3    MPV 10.6 9.4 - 12.4 fL   Comprehensive Metabolic Panel   Result Value Ref Range    Glucose 103 70 - 108 mg/dL    CREATININE 1.2 0.4 - 1.2 mg/dL    BUN 24 (H) 7 - 22 mg/dL    Sodium 140 135 - 145 meq/L    Potassium 4.4 3.5 - 5.2 meq/L    Chloride 104 98 - 111 meq/L    CO2 27 23 - 33 meq/L    Calcium 9.4 8.5 - 10.5 mg/dL    AST 21 5 - 40 U/L    Alkaline Phosphatase 64 38 - 126 U/L    Total Protein 6.3 6.1 - 8.0 g/dL    Alb 3.4 (L) 3.5 - 5.1 g/dL    Total Bilirubin 0.7 0.3 - 1.2 mg/dL    ALT 22 11 - 66 U/L   Vitamin D 25 Hydroxy   Result Value Ref Range    Vit D, 25-Hydroxy 68 30 - 100 ng/ml   Anion Gap   Result Value Ref Range    Anion Gap 9.0 8.0 - 16.0 meq/L   Glomerular Filtration Rate, Estimated Result Value Ref Range    Est, Glom Filt Rate 70 (A) ml/min/1.73m2   CBC   Result Value Ref Range    WBC 6.1 4.8 - 10.8 thou/mm3    RBC 4.59 (L) 4.70 - 6.10 mill/mm3    Hemoglobin 14.2 14.0 - 18.0 gm/dl    Hematocrit 45.9 42.0 - 52.0 %    .0 (H) 80.0 - 94.0 fL    MCH 30.9 26.0 - 33.0 pg    MCHC 30.9 (L) 32.2 - 35.5 gm/dl    RDW-CV 12.6 11.5 - 14.5 %    RDW-SD 46.1 (H) 35.0 - 45.0 fL    Platelets 449 401 - 587 thou/mm3    MPV 10.2 9.4 - 12.4 fL   Basic Metabolic Panel   Result Value Ref Range    Sodium 134 (L) 135 - 145 meq/L    Potassium 4.1 3.5 - 5.2 meq/L    Chloride 99 98 - 111 meq/L    CO2 26 23 - 33 meq/L    Glucose 108 70 - 108 mg/dL    BUN 26 (H) 7 - 22 mg/dL    CREATININE 1.2 0.4 - 1.2 mg/dL    Calcium 9.2 8.5 - 10.5 mg/dL   Anion Gap   Result Value Ref Range    Anion Gap 9.0 8.0 - 16.0 meq/L   Glomerular Filtration Rate, Estimated   Result Value Ref Range    Est, Glom Filt Rate 70 (A) ml/min/1.73m2   Magnesium   Result Value Ref Range    Magnesium 2.3 1.6 - 2.4 mg/dL   TSH without Reflex   Result Value Ref Range    TSH 2.900 0.400 - 4.200 uIU/mL   Basic Metabolic Panel   Result Value Ref Range    Sodium 135 135 - 145 meq/L    Potassium 4.2 3.5 - 5.2 meq/L    Chloride 100 98 - 111 meq/L    CO2 21 (L) 23 - 33 meq/L    Glucose 131 (H) 70 - 108 mg/dL    BUN 34 (H) 7 - 22 mg/dL    CREATININE 1.7 (H) 0.4 - 1.2 mg/dL    Calcium 9.5 8.5 - 10.5 mg/dL   Anion Gap   Result Value Ref Range    Anion Gap 14.0 8.0 - 16.0 meq/L   Glomerular Filtration Rate, Estimated   Result Value Ref Range    Est, Glom Filt Rate 39 (A) ml/min/1.73m2   CBC   Result Value Ref Range    WBC 5.6 4.8 - 10.8 thou/mm3    RBC 4.23 (L) 4.70 - 6.10 mill/mm3    Hemoglobin 13.0 (L) 14.0 - 18.0 gm/dl    Hematocrit 42.6 42.0 - 52.0 %    .7 (H) 80.0 - 94.0 fL    MCH 30.7 26.0 - 33.0 pg    MCHC 30.5 (L) 32.2 - 35.5 gm/dl    RDW-CV 12.7 11.5 - 14.5 %    RDW-SD 47.6 (H) 35.0 - 45.0 fL    Platelets 080 206 - 748 thou/mm3 MPV 10.6 9.4 - 12.4 fL   Basic Metabolic Panel   Result Value Ref Range    Sodium 137 135 - 145 meq/L    Potassium 4.2 3.5 - 5.2 meq/L    Chloride 101 98 - 111 meq/L    CO2 25 23 - 33 meq/L    Glucose 108 70 - 108 mg/dL    BUN 26 (H) 7 - 22 mg/dL    CREATININE 1.4 (H) 0.4 - 1.2 mg/dL    Calcium 9.1 8.5 - 10.5 mg/dL   Anion Gap   Result Value Ref Range    Anion Gap 11.0 8.0 - 16.0 meq/L   Glomerular Filtration Rate, Estimated   Result Value Ref Range    Est, Glom Filt Rate 48 (A) ml/min/1.73m2   COVID-19   Result Value Ref Range    SARS-CoV-2, NAAT NOT DETECTED NOT DETECTED   EKG 12 Lead   Result Value Ref Range    Ventricular Rate 78 BPM    Atrial Rate 78 BPM    P-R Interval 198 ms    QRS Duration 82 ms    Q-T Interval 360 ms    QTc Calculation (Bazett) 410 ms    P Axis 74 degrees    R Axis -41 degrees    T Axis 71 degrees          No results found for this or any previous visit. No results found for this or any previous visit. No results found for this or any previous visit. No results found for this or any previous visit. No results found for this or any previous visit. Results for orders placed during the hospital encounter of 11/26/20   MRI BRAIN W WO CONTRAST    Narrative PROCEDURE: MRI BRAIN W [address] INFORMATIONunusual septum pellucidum ICH/IVH, r/o tumor, stroke. Head trauma. Loss of consciousness. COMPARISON: Head CT 11/27/2020. TECHNIQUE: Multiplanar and multiple spin echo T1 and T2-weighted images were obtained through the brain before and after the administration of intravenous contrast.    FINDINGS:        On the diffusion-weighted images, there is a faint area of high signal in the right thalamus. This does not have low signal on the ADC map. There is some high signal on the FLAIR and T2-weighted sequences. This is not consistent with an acute infarct. This may represent a late subacute infarct. The brain volume is reduced. There is an area of susceptibility artifact within the leaflets of the septum pellucidum consistent with a small site of hemorrhage. There is also some blood products layering in the occipital horns of the lateral ventricles consistent with   intraventricular hemorrhage. There is mineralization in the medial aspects of the basal ganglia bilaterally. There is an old microhemorrhage in the left frontal lobe. There is no hydrocephalus, midline shift or mass effect. On the FLAIR and T2-weighted sequences, there is a moderate-severe amount of abnormal signal in the white matter the brain suggesting chronic small vessel ischemic changes. There are old lacunar type infarcts in the thalami and basal ganglia bilaterally. There is no abnormal enhancement in the brain. The major intracranial vascular flow voids are present. The midline craniocervical junction structures are normal.  The brainstem and pituitary gland are normal.            Impression    1. No acute findings. 2. 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 3. 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 4. Moderate-severe chronic small vessel ischemic changes. 5. Small amount of recent hemorrhage at the level septum pellucidum and in the occipital horns of the lateral ventricles. **This report has been created using voice recognition software. It may contain minor errors which are inherent in voice recognition technology. **      Final report electronically signed by Dr. Bijal Estrada on 11/29/2020 1:32 PM     No results found for this or any previous visit. Results for orders placed during the hospital encounter of 11/26/20   CT HEAD WO CONTRAST    Narrative CT head without contrast    Comparison:  CT,SR  - CT HEAD WO CONTRAST  - 11/26/2020 10:44 PM EST    Findings:  No intracranial mass, midline shift or hydrocephalus. Involutional change of brain parenchyma, compatible with advanced age. Severe white matter disease. Hematoma within the left maxillary sinus. Left orbital floor fracture without change. Impression 1. Small focus of intraventricular hemorrhage associated with the   midportion of the septum pellucidum, without significant change. 2. Left orbital floor fracture with herniation of periorbital fat and the   inferior rectus muscle into the maxillary sinus. This document has been electronically signed by: Keyanna Wright MD on   11/28/2020 12:27 AM    All CT scans at this facility use dose modulation, iterative   reconstruction, and/or weight-based  dosing when appropriate to reduce radiation dose to as low as reasonably   achievable. Assessment:    81year old man with CAD with stent, had a small septum pallucidum ICH, came back for follow up     Plan:  1.  doing well  2. Ok to con't aspirin and brillinta for the cardiac stent, benefit outweight risk, pt understand  3. Keep BP well control  4. See me as needed. Call if any questions or concerns. Time spent evaluating patient, reviewing records, counseling with more than 50% of the time spent for counseling was 26 min.     Isma Lopez MD  Attending Neurologist/Neurointensivist

## 2021-01-08 NOTE — PATIENT INSTRUCTIONS
Continue aspirin 81mg daily    Continue brillita 90mg twice a day for your cardiac stent. Keep your blood pressure normal    See me as needed.

## 2021-05-28 NOTE — TELEPHONE ENCOUNTER
Patient wife on HIPPA was advised urine culture is postive for E. Coli and should continue Cipro in its entirety. She voiced understanding.
Statement Selected

## 2021-06-25 ENCOUNTER — HOSPITAL ENCOUNTER (EMERGENCY)
Age: 86
Discharge: HOME | End: 2021-06-25
Payer: MEDICARE

## 2021-06-25 VITALS
RESPIRATION RATE: 16 BRPM | OXYGEN SATURATION: 98 % | SYSTOLIC BLOOD PRESSURE: 132 MMHG | TEMPERATURE: 98.3 F | DIASTOLIC BLOOD PRESSURE: 77 MMHG | HEART RATE: 78 BPM

## 2021-06-25 DIAGNOSIS — H00.012 HORDEOLUM OF RIGHT LOWER EYELID, UNSPECIFIED HORDEOLUM TYPE: Primary | ICD-10-CM

## 2021-06-25 DIAGNOSIS — C61 PROSTATE CANCER (HCC): Primary | ICD-10-CM

## 2021-06-25 PROCEDURE — 99213 OFFICE O/P EST LOW 20 MIN: CPT | Performed by: EMERGENCY MEDICINE

## 2021-06-25 PROCEDURE — 99213 OFFICE O/P EST LOW 20 MIN: CPT

## 2021-06-25 ASSESSMENT — ENCOUNTER SYMPTOMS
EYE PAIN: 0
PHOTOPHOBIA: 0
EYE ITCHING: 0
COLOR CHANGE: 0
EYE DISCHARGE: 0
EYE REDNESS: 1

## 2021-06-25 ASSESSMENT — PAIN SCALES - GENERAL: PAINLEVEL_OUTOF10: 3

## 2021-06-25 ASSESSMENT — PAIN DESCRIPTION - LOCATION: LOCATION: EYE

## 2021-06-25 ASSESSMENT — PAIN DESCRIPTION - ORIENTATION: ORIENTATION: RIGHT

## 2021-06-25 ASSESSMENT — PAIN DESCRIPTION - PAIN TYPE: TYPE: ACUTE PAIN

## 2021-06-25 NOTE — ED TRIAGE NOTES
Queta Blinks arrives to room with complaint of left eye bottom lid stye symptoms started 10 days ago.

## 2021-07-07 ENCOUNTER — OFFICE VISIT (OUTPATIENT)
Dept: UROLOGY | Age: 86
End: 2021-07-07
Payer: MEDICARE

## 2021-07-07 VITALS — WEIGHT: 230 LBS | BODY MASS INDEX: 34.07 KG/M2 | HEIGHT: 69 IN

## 2021-07-07 DIAGNOSIS — N13.8 BPH WITH OBSTRUCTION/LOWER URINARY TRACT SYMPTOMS: ICD-10-CM

## 2021-07-07 DIAGNOSIS — C61 PROSTATE CANCER (HCC): Primary | ICD-10-CM

## 2021-07-07 DIAGNOSIS — N40.1 BPH WITH OBSTRUCTION/LOWER URINARY TRACT SYMPTOMS: ICD-10-CM

## 2021-07-07 LAB
ALBUMIN SERPL-MCNC: 3.8 G/DL (ref 3.2–5.3)
ALK PHOSPHATASE: 57 U/L (ref 39–130)
ALT SERPL-CCNC: 11 U/L (ref 0–40)
ANION GAP SERPL CALCULATED.3IONS-SCNC: 9 MMOL/L (ref 5–15)
APPEARANCE: ABNORMAL
AST SERPL-CCNC: 16 U/L (ref 0–41)
BILIRUB SERPL-MCNC: 0.6 MG/DL (ref 0.3–1.2)
BILIRUBIN: NEGATIVE
BUN BLDV-MCNC: 21 MG/DL (ref 5–27)
CALCIUM SERPL-MCNC: 9.5 MG/DL (ref 8.5–10.5)
CHLORIDE BLD-SCNC: 112 MMOL/L (ref 98–109)
CO2: 25 MMOL/L (ref 22–32)
COLOR: YELLOW
CREAT SERPL-MCNC: 1.55 MG/DL (ref 0.6–1.3)
CREATINE, URINE: 247.67 MG/DL
CREATINE, URINE: 247.67 MG/DL
EGFR AFRICAN AMERICAN: 52 ML/MIN/1.73SQ.M
EGFR IF NONAFRICAN AMERICAN: 43 ML/MIN/1.73SQ.M
GLUCOSE BLD-MCNC: NEGATIVE MG/DL
GLUCOSE: 83 MG/DL (ref 65–99)
KETONES, URINE: NEGATIVE MG/DL
LEUKOCYTE ESTERASE, URINE: ABNORMAL
MICROALBUMIN/CREAT 24H UR: 12.8 MG/DL (ref 0–1.9)
MICROALBUMIN/CREAT UR-RTO: 51.7 MG/G CREAT (ref 0–30)
NITRITE, URINE: NEGATIVE
OCCULT BLOOD,URINE: ABNORMAL
OTHER MICROSCOPIC ELEMENTS: ABNORMAL
PH: 5.5 (ref 5–8.5)
POTASSIUM SERPL-SCNC: 5 MMOL/L (ref 3.5–5)
PROTEIN, URINE: 100 MG/DL
PROTEIN, URINE: 670 MG/L
PROTEIN/CREAT RATIO: 0.27
PSA, ULTRASENSITIVE: 0.57 NG/ML (ref 0–4)
RBC: 77 /HPF (ref 0–5)
SODIUM BLD-SCNC: 146 MMOL/L (ref 134–146)
SP GRAVITY MISCELLANEOUS: 1.03 (ref 1–1.03)
TOTAL PROTEIN: 6.8 G/DL (ref 6–8)
UROBILINOGEN, URINE: <1.1 EU/DL
WBC: >720 /HPF (ref 0–5)

## 2021-07-07 PROCEDURE — 4040F PNEUMOC VAC/ADMIN/RCVD: CPT | Performed by: UROLOGY

## 2021-07-07 PROCEDURE — 1036F TOBACCO NON-USER: CPT | Performed by: UROLOGY

## 2021-07-07 PROCEDURE — G8427 DOCREV CUR MEDS BY ELIG CLIN: HCPCS | Performed by: UROLOGY

## 2021-07-07 PROCEDURE — 1123F ACP DISCUSS/DSCN MKR DOCD: CPT | Performed by: UROLOGY

## 2021-07-07 PROCEDURE — 99214 OFFICE O/P EST MOD 30 MIN: CPT | Performed by: UROLOGY

## 2021-07-07 PROCEDURE — G8417 CALC BMI ABV UP PARAM F/U: HCPCS | Performed by: UROLOGY

## 2021-07-07 NOTE — PROGRESS NOTES
Dr. Shabana Morales MD  [address]   [facility]      Patient:  Ovi Howell  YOB: 1935  Date: 7/7/2021    HISTORY OF PRESENT ILLNESS:   The patient is a 81 y.o. male who presents today for follow-up for the following problem(s): prostate cancer candy 7 treated with EBRT in 2006. PSA has been ~0.50 since. Overall the problem(s) : show no change. Associated Symptoms: No dysuria, gross hematuria. Pain Severity: 0/10    Summary of old records:   0.57     07/2021  0.59     06/2020  0. 52     04/2019  0.28     01/2013    Imaging/Labs reviewed during today's visit:  I have independently reviewed and verified the following films during today's visit. PSA, see above. 0.57    Last several PSA's:  No results found for: PSA    Last total testosterone:  No results found for: TESTOSTERONE    Urinalysis today:  No results found for this visit on 07/07/21.     Last BUN and creatinine:  Lab Results   Component Value Date    BUN 21 07/06/2021     Lab Results   Component Value Date    CREATININE 1.55 (H) 07/06/2021       Imaging Reviewed during this Office Visit:   (results were independently reviewed by physician and radiology report verified)    PAST MEDICAL, FAMILY AND SOCIAL HISTORY UPDATE:  Past Medical History:   Diagnosis Date    CAD (coronary artery disease)     Chronic kidney disease, stage III (moderate) ([address]     Closed fracture of six ribs of left side 11/27/2020    COPD (chronic obstructive pulmonary disease) ([address] 12/12/2020    DVT, lower extremity (HCC)     Gastritis     Hematuria     Hypercholesteremia     Hypertension     Intracranial bleed ([address]     Nasal bone fracture 11/27/2020    Osteoarthritis     Presence of IVC filter     Prostate cancer ([address]     Seed implants    Spinal stenosis, lumbar, s/p laminectomy 6/1/2015    Statin-induced myositis      Past Surgical History:   Procedure Laterality Date    BACK SURGERY  2006    fusion    CARDIAC SURGERY      2 stents    COSMETIC SURGERY      ENDOSCOPY, COLON, DIAGNOSTIC      KNEE SURGERY      NECK SURGERY      OTHER SURGICAL HISTORY  5/29/2015    DECOMPRESSIVE LUMBAR LAMINECTOMY L2-3    ROTATOR CUFF REPAIR      TOTAL HIP ARTHROPLASTY Bilateral      Family History   Problem Relation Age of Onset    Heart Disease Mother     High Blood Pressure Mother     Prostate Cancer Neg Hx     Cancer Neg Hx     Diabetes Neg Hx     Kidney Disease Neg Hx     Stroke Neg Hx      Outpatient Medications Marked as Taking for the 7/7/21 encounter (Office Visit) with Campbell Ceron MD   Medication Sig Dispense Refill    PACERONE 200 MG tablet TAKE 1 TABLET BY MOUTH TWICE DAILY      tamsulosin (FLOMAX) 0.4 MG capsule Take 1 capsule by mouth daily 90 capsule 3    citalopram (CELEXA) 20 MG tablet Take 1 tablet by mouth nightly (Patient taking differently: Take 10 mg by mouth nightly ) 30 tablet 3    diclofenac sodium (VOLTAREN) 1 % GEL Apply 2 g topically 4 times daily Apply to Right knee. 150 g 0    ticagrelor (BRILINTA) 90 MG TABS tablet Take 1 tablet by mouth 2 times daily 60 tablet 3    nitroGLYCERIN (NITROSTAT) 0.4 MG SL tablet Place 1 tablet under the tongue every 5 minutes as needed for Chest pain up to max of 3 total doses.  If no relief after 1 dose, call 911. 25 tablet 3    losartan (COZAAR) 50 MG tablet Take 1 tablet by mouth daily (Patient taking differently: Take 25 mg by mouth daily ) 30 tablet 3    aspirin 81 MG chewable tablet Take 1 tablet by mouth daily 30 tablet 3    pravastatin (PRAVACHOL) 40 MG tablet Take 1 tablet by mouth daily 30 tablet 3    oxybutynin (DITROPAN) 5 MG tablet TAKE 1 TABLET TWICE A  tablet 3    finasteride (PROSCAR) 5 MG tablet TAKE 1 TABLET DAILY 90 tablet 3    bumetanide (BUMEX) 1 MG tablet Take 1 tablet by mouth daily 30 tablet 3    mometasone-formoterol (DULERA) 100-5 MCG/ACT inhaler Inhale 2 puffs into the lungs 2 times daily      albuterol sulfate  (90 Base) MCG/ACT inhaler Inhale 1 puff into the lungs every 4 hours as needed for Wheezing      Cholecalciferol (VITAMIN D3) 5000 UNITS TABS Take 2,000 Units by mouth daily       acetaminophen (TYLENOL) 500 MG tablet Take 500 mg by mouth every 4 hours as needed for Pain         Losartan and Rivaroxaban  Social History     Tobacco Use   Smoking Status Former Smoker    Packs/day: 0.50    Years: 25.00    Pack years: 12.50    Types: Cigarettes    Quit date: 1997    Years since quittin.2   Smokeless Tobacco Never Used       Social History     Substance and Sexual Activity   Alcohol Use No    Alcohol/week: 0.0 standard drinks       REVIEW OF SYSTEMS:  Constitutional: negative  Eyes: negative  Respiratory: negative  Cardiovascular: negative  Gastrointestinal: negative  Musculoskeletal: negative  Genitourinary: negative except for what is in HPI  Skin: negative   Neurological: negative  Hematological/Lymphatic: negative  Psychological: negative    Physical Exam:    There were no vitals filed for this visit. Patient is a 81 y.o. male in no acute distress and alert and oriented to person, place and time. NAD, A/o  Non labored respiration  Normal peripheral pulses  Skin- warm and dry  Psych- normal mood and affect      Assessment and Plan      1. Prostate cancer ([address]    2. BPH with obstruction/lower urinary tract symptoms    3. OAB       Plan:      No follow-ups on file. Flomax 0.4 mg po qd for BPH symptoms.   PSA stable  See back in 1 year with PSA  [facility] for OAB         Dr. Rachana Britton MD

## 2021-07-27 ENCOUNTER — OFFICE VISIT (OUTPATIENT)
Dept: NEPHROLOGY | Age: 86
End: 2021-07-27
Payer: MEDICARE

## 2021-07-27 VITALS
OXYGEN SATURATION: 97 % | WEIGHT: 223 LBS | DIASTOLIC BLOOD PRESSURE: 58 MMHG | SYSTOLIC BLOOD PRESSURE: 110 MMHG | TEMPERATURE: 98.7 F | HEART RATE: 72 BPM | BODY MASS INDEX: 32.93 KG/M2

## 2021-07-27 DIAGNOSIS — I10 HTN (HYPERTENSION), BENIGN: ICD-10-CM

## 2021-07-27 DIAGNOSIS — E87.0 HYPERNATREMIA: ICD-10-CM

## 2021-07-27 DIAGNOSIS — N18.31 STAGE 3A CHRONIC KIDNEY DISEASE (HCC): Primary | ICD-10-CM

## 2021-07-27 PROCEDURE — 1123F ACP DISCUSS/DSCN MKR DOCD: CPT | Performed by: INTERNAL MEDICINE

## 2021-07-27 PROCEDURE — 1036F TOBACCO NON-USER: CPT | Performed by: INTERNAL MEDICINE

## 2021-07-27 PROCEDURE — 4040F PNEUMOC VAC/ADMIN/RCVD: CPT | Performed by: INTERNAL MEDICINE

## 2021-07-27 PROCEDURE — G8417 CALC BMI ABV UP PARAM F/U: HCPCS | Performed by: INTERNAL MEDICINE

## 2021-07-27 PROCEDURE — G8428 CUR MEDS NOT DOCUMENT: HCPCS | Performed by: INTERNAL MEDICINE

## 2021-07-27 PROCEDURE — 99213 OFFICE O/P EST LOW 20 MIN: CPT | Performed by: INTERNAL MEDICINE

## 2021-07-27 RX ORDER — BUMETANIDE 1 MG/1
0.5 TABLET ORAL DAILY
Qty: 30 TABLET | Refills: 3 | Status: SHIPPED | OUTPATIENT
Start: 2021-07-27 | End: 2022-07-06

## 2021-07-27 NOTE — PROGRESS NOTES
[facility]        Outpatient Follow-Up note         7/27/2021 1:40 PM    Patient Name:   Hernando Arevalo  YOB: 1935  Primary Care Physician:  Roberto Gant MD     Chief Complaint / Reason for follow-up : Follow Up of CKD     Interval History :  Patient seen and examined by me. Feels well. No chest pain shortness of breath . B/L leg swelling. Did not bring meds. Past History :  Past Medical History:   Diagnosis Date    CAD (coronary artery disease)     Chronic kidney disease, stage III (moderate) ([address]     Closed fracture of six ribs of left side 11/27/2020    COPD [facility]ch[facility] obstructive [facility] disease) [facility] 12/12/2020    DVT, lower extremity (HCC)     Gastritis     Hematuria     Hypercholesteremia     Hypertension     Intracranial bleed (HCC)     Nasal bone fracture 11/27/2020    Osteoarthritis     Presence of IVC filter     Prostate cancer (HCC)     Seed implants    Spinal stenosis, lumbar, s/p laminectomy 6/1/2015    Statin-induced myositis      Past Surgical History:   Procedure Laterality Date    BACK SURGERY  2006    fusion    CARDIAC SURGERY      2 stents    COSMETIC SURGERY      ENDOSCOPY, COLON, DIAGNOSTIC      KNEE SURGERY      NECK SURGERY      OTHER SURGICAL HISTORY  5/29/2015    DECOMPRESSIVE LUMBAR LAMINECTOMY L2-3    ROTATOR CUFF REPAIR      TOTAL HIP ARTHROPLASTY Bilateral         Medications :     Outpatient Medications Marked as Taking for the 7/27/21 encounter (Office Visit) with Penny Saunders MD   Medication Sig Dispense Refill    PACERONE 200 MG tablet TAKE 1 TABLET BY MOUTH TWICE DAILY      tamsulosin (FLOMAX) 0.4 MG capsule Take 1 capsule by mouth daily 90 capsule 3    citalopram (CELEXA) 20 MG tablet Take 1 tablet by mouth nightly (Patient taking differently: Take 10 mg by mouth nightly ) 30 tablet 3    diclofenac sodium (VOLTAREN) 1 % GEL Apply 2 g topically 4 times daily Apply to Right knee.  150 g 0    ticagrelor (BRILINTA) 90 MG TABS tablet Take 1 tablet by mouth 2 times daily 60 tablet 3    nitroGLYCERIN (NITROSTAT) 0.4 MG SL tablet Place 1 tablet under the tongue every 5 minutes as needed for Chest pain up to max of 3 total doses.  If no relief after 1 dose, call 911. 25 tablet 3    losartan (COZAAR) 50 MG tablet Take 1 tablet by mouth daily (Patient taking differently: Take 25 mg by mouth daily ) 30 tablet 3    aspirin 81 MG chewable tablet Take 1 tablet by mouth daily 30 tablet 3    pravastatin (PRAVACHOL) 40 MG tablet Take 1 tablet by mouth daily 30 tablet 3    oxybutynin (DITROPAN) 5 MG tablet TAKE 1 TABLET TWICE A  tablet 3    finasteride (PROSCAR) 5 MG tablet TAKE 1 TABLET DAILY 90 tablet 3    bumetanide (BUMEX) 1 MG tablet Take 1 tablet by mouth daily 30 tablet 3    mometasone-formoterol (DULERA) 100-5 MCG/ACT inhaler Inhale 2 puffs into the lungs 2 times daily      albuterol sulfate  (90 Base) MCG/ACT inhaler Inhale 1 puff into the lungs every 4 hours as needed for Wheezing      Cholecalciferol (VITAMIN D3) 5000 UNITS TABS Take 2,000 Units by mouth daily       acetaminophen (TYLENOL) 500 MG tablet Take 500 mg by mouth every 4 hours as needed for Pain         Vitals     BP (!) 110/58 (Site: Left Upper Arm, Position: Sitting, Cuff Size: Medium Adult)   Pulse 72   Temp 98.7 °F (37.1 °C) (Temporal)   Wt 223 lb (101.2 kg)   SpO2 97%   BMI 32.93 kg/m²  Wt Readings from Last 3 Encounters:   07/27/21 223 lb (101.2 kg)   07/07/21 230 lb (104.3 kg)   12/16/20 213 lb 13.5 oz (97 kg)        Physical Exam     General -- no distress  Lungs -- clear  Heart -- S1, S2 heard, JVD - no  Abdomen - soft, non-tender  Extremities -- edema none   CNS - awake and alert    Labs, Radiology and Tests    Labs -    4/16 4/17 4/18 4/19 7/20 7/21      Potassium 4.2 4.3 4.8 4.0 4.2 5.0      BUN 21 22 22 22 23 21      Creatinine 1.3 1.2 1.2 1.35 1.32 1.55      eGFR  52 49 45 52 52                  UPCR  250

## 2021-08-28 LAB
BUN BLDV-MCNC: 21 MG/DL (ref 5–27)
CREAT SERPL-MCNC: 1.47 MG/DL (ref 0.6–1.3)
EGFR AFRICAN AMERICAN: 55 ML/MIN/1.73SQ.M
EGFR IF NONAFRICAN AMERICAN: 46 ML/MIN/1.73SQ.M
POTASSIUM SERPL-SCNC: 4.2 MMOL/L (ref 3.5–5)

## 2021-09-15 RX ORDER — TAMSULOSIN HYDROCHLORIDE 0.4 MG/1
0.4 CAPSULE ORAL DAILY
Qty: 90 CAPSULE | Refills: 3 | Status: SHIPPED | OUTPATIENT
Start: 2021-09-15

## 2021-09-15 NOTE — TELEPHONE ENCOUNTER
Bill Botello called requesting a refill on the following medications:  Requested Prescriptions     Pending Prescriptions Disp Refills    tamsulosin (FLOMAX) 0.4 MG capsule 90 capsule 3     Sig: Take 1 capsule by mouth daily     Pharmacy verified:[facility]  . [email or web address]      Date of last visit:   Date of next visit (if applicable): Visit date not found

## 2021-09-30 RX ORDER — FINASTERIDE 5 MG/1
TABLET, FILM COATED ORAL
Qty: 90 TABLET | Refills: 3 | Status: SHIPPED | OUTPATIENT
Start: 2021-09-30 | End: 2022-10-07

## 2021-09-30 NOTE — TELEPHONE ENCOUNTER
Yves Gomez called requesting a refill on the following medications:  Requested Prescriptions     Pending Prescriptions Disp Refills    finasteride (PROSCAR) 5 MG tablet [Pharmacy Med Name: FINASTERIDE TABS 5MG] 90 tablet 3     Sig: TAKE 1 TABLET DAILY     Pharmacy verified:  [email or web address]      Date of last visit: 07/07/2021  Date of next visit (if applicable): 64/05/4698

## 2021-12-01 RX ORDER — OXYBUTYNIN CHLORIDE 5 MG/1
5 TABLET ORAL 2 TIMES DAILY
Qty: 180 TABLET | Refills: 3 | Status: SHIPPED | OUTPATIENT
Start: 2021-12-01 | End: 2022-02-10

## 2021-12-01 NOTE — TELEPHONE ENCOUNTER
Ru Gonzalez called requesting a refill on the following medications:  Requested Prescriptions     Pending Prescriptions Disp Refills    oxybutynin (DITROPAN) 5 MG tablet 180 tablet 3     Sig: Take 1 tablet by mouth 2 times daily     Pharmacy verified:  [email or web address]      Date of last visit:   Date of next visit (if applicable): Visit date not found

## 2021-12-17 NOTE — CONSULTS
Assumed care of patient at bedside report from Veronica BAUM. Updated on POC. Pt alert and oriented x4, VSS on RA. Denies chest pain or SOB. Up ad erick. Call light and belongings within reach. Bed locked and in lowest position. Fall precautions in place.     Patient was seen and examined face-to-face by me (Dr. David Vegas MD). The chart, progress notes, labs and radiographs were reviewed. Case discussed with patient's primary nurse. Questions and concerns were addressed.   I agree with the note as created with additional comments as noted  Stop b-blockers and monitor  Dr. David Vegas MD   Department of Cardiology

## 2022-01-05 NOTE — PROGRESS NOTES
[address]  Occupational Therapy  Daily Note  Time:    Time In: 1400  Time Out: 1430  Timed Code Treatment Minutes: 30 Minutes  Minutes: 30     Date: 12/10/2020  Patient Name: Yves Gomez,   Gender: male      Room: [facility]64/064-A  MRN: [id number]  : 1935  (81 y.o.)  Referring Practitioner: Dr. Ila Poon  Diagnosis: Intraventricular Hemorrhage  Additional Pertinent Hx: Pt admitted to inpt rehab for rehab needs after admission to the hospital s/p  fall at home resulting in an intraventricular hemorrhage, left sixth rib fracture, displaced left orbital floor fracture with deficits of gait instability and severe cognitive deficits with a MOCA score of 9/30 on . Restrictions/Precautions:  Restrictions/Precautions: Fall Risk, General Precautions  Position Activity Restriction  Other position/activity restrictions: Keep systolic blood pressure between 100-160 while moving. SUBJECTIVE: Pt pleasant and cooperative, unaware of scheduled PM therapy, but agreeable once shown therapy schedule. Pt's daughter, Eilas Ran, present initially, but left at beginning of therapy session. Pt's daughter reports only concern for discharge next week is walker safety. PAIN: 10:      COGNITION: Slow Processing and Decreased Recall    ADL:   No ADL's completed this session. BALANCE:  Sitting Balance:  Independent. Standing Balance: Stand By Assistance. BED MOBILITY:  Sit to Supine: Minimal Assistance A to bring RLE into bed     TRANSFERS:  Sit to Stand:  Stand By Assistance, with increased time for completion. Stand to Sit: Stand By Assistance. FUNCTIONAL MOBILITY:  Assistive Device: Rolling Walker  Assist Level:  Stand By Assistance.    Distance:  within room      ADDITIONAL ACTIVITIES:  Patient identified a personal goal to increase UB strength and improve overall endurance so they can complete their toilet & shower transfers; skilled edu on UE strengthening and patient completed BUE strengthening exercises x10 reps x1  set this date with a medium resistance band in all joints and all planes. Patient tolerated well, requiring occasional rest breaks. Pt required min cues for technique. ASSESSMENT:  Activity Tolerance:  Patient tolerance of  treatment: good. Discharge Recommendations: Home with nursing aide, Home with Home health OT    Equipment Recommendations: Other: Pt has a toilet raiser  and shower seat. Need to confirm with spouse. Plan: Times per week: 5xs weeks x 90 min, 1 x week x 30 min  Current Treatment Recommendations: Functional Mobility Training, Endurance Training, Self-Care / ADL, Safety Education & Training, Strengthening, Patient/Caregiver Education & Training, Equipment Evaluation, Education, & procurement, Home Management Training    Patient Education  Patient Education: Home Exercise Program, Home Safety, Importance of Increasing Activity and Assistive Device Safety    Goals  Short term goals  Time Frame for Short term goals: 1 week  Short term goal 1: Pt will demonstrate functional mobility walking to/from the bathroom or bedside chair with SBA and RW  with min cues for aligning himself as neeed to prepare for doing self care. Short term goal 2: Pt will complete ADL routine with no > min A and min cues for problem solving and task completion to increase independence in self care tasks  Short term goal 3: Pt will complete toileting routine and transfer with no SBA and min cues for safe tech to increase independence in self toileting tasks  Short term goal 4: Pt will complete BUE ROM and light resistance exercises while following verbal cues to increase his pain free movement for ease of doing self care.   Short term goal 5: Pt will complete 1 step homemaking tasks with CGA and no > min cues for problem solving to increase ability to retrieve a snack  Long term goals  Time Frame for Long term goals : 2 week  Long term goal 1: Pt will complete ADL routine with S and no  cues for problem solving to increase independence in self care tasks  Long term goal 2: Pt will complete 1 step homemaking tasks with SBA and no cues for safe tech to increase ability to retrieve a glass of water. Following session, patient left in safe position with all fall risk precautions in place. 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 Doing well, but c/o pain. Neuro intact, VSS. SBP <140. Giving 0.25 of hydromorphone now and will reassess pain. Denies changes in vision, N/V    T(C): 37.8 (01-05-22 @ 15:15), Max: 37.8 (01-05-22 @ 15:15)  HR: 80 (01-05-22 @ 16:45) (80 - 97)  BP: 114/68 (01-05-22 @ 16:45) (114/68 - 130/65)  RR: 12 (01-05-22 @ 16:45) (12 - 18)  SpO2: 100% (01-05-22 @ 16:45) (100% - 100%)      01-05-22 @ 07:01  -  01-05-22 @ 17:04  --------------------------------------------------------  IN: 0 mL / OUT: 55 mL / NET: -55 mL        acetaminophen     Tablet .. 650 milliGRAM(s) Oral every 6 hours PRN  ceFAZolin  Injectable. 2000 milliGRAM(s) IV Push every 8 hours  influenza   Vaccine 0.5 milliLiter(s) IntraMuscular once  levETIRAcetam 500 milliGRAM(s) Oral every 12 hours  ondansetron Injectable 4 milliGRAM(s) IV Push every 6 hours PRN  sodium chloride 0.9%. 1000 milliLiter(s) IV Continuous <Continuous>      RADIOLOGY:     Exam:  General: NAD, pt is comfortably sitting up in bed, on room air  HEENT: CN II-XII grossly intact, PERRL 3mm briskly reactive, EOMI b/l, face symmetric, tongue midline, neck FROM  Cardiovascular: RRR, normal S1 and S2   Respiratory: lungs CTAB, no wheezing, rhonchi, or crackles   GI: normoactive BS to auscultation, abd soft, NTND   Neuro: A&Ox3, No aphasia, speech clear, no dysmetria, no pronator drift. Follows commands.  VALDEZ x4 spontaneously, 5/5 strength in all extremities throughout. SILT throughout   Extremities: distal pulses 2+ x4  Wound/incision: headwrap in place, C/D/I. Anterior incision w/ dressing C/D/I. 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 Patient now s/p L clipping crani of supra opthalmic aneurysm with intra-op angio 1/5      Plan:  NEURO:  -Neuro/vital checks q1H  -Pain control PRN  -Decadron taper x 3 days to off  -Monitor SHARON output  -Keppra 500 BID    CARDIO:  -SBP < 140    PULM:  -Satting well on RA    GI:  -ADAT  -Bowel regimen PRN  -PPI while on decadron    RENAL:  -Na goal 135-145  -IVF until adequate PO intake    ENDO:  -ISS while on Decadron  -F/u A1C in AM    ID:  -Periop ancef  -Afebrile    HEME:  -SCDs for DVT ppx  -Trend H/H post op    Case discussed with Dr. Headley and Dr Tapia, family updated with plan  PT/OT on hold post op, will reassess in AM   DISPO: ICU    Assessment:  Present when checked    []  GCS  E   V  M     Heart Failure: []Acute, [] acute on chronic , []chronic  Heart Failure:  [] Diastolic (HFpEF), [] Systolic (HFrEF), []Combined (HFpEF and HFrEF), [] RHF, [] Pulm HTN, [] Other    [] ELIZABETH, [] ATN, [] AIN, [] other  [] CKD1, [] CKD2, [] CKD 3, [] CKD 4, [] CKD 5, []ESRD    Encephalopathy: [] Metabolic, [] Hepatic, [] toxic, [] Neurological, [] Other    Abnormal Nurtitional Status: [] malnurtition (see nutrition note), [ ]underweight: BMI < 19, [] morbid obesity: BMI >40, [] Cachexia    [] Sepsis  [] hypovolemic shock,[] cardiogenic shock, [] hemorrhagic shock, [] neuogenic shock  [] Acute Respiratory Failure  []Cerebral edema, [] Brain compression/ herniation,   [] Functional quadriplegia  [] Acute blood loss anemia         NEUROSURGERY POST OP NOTE:    POD# 0 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 Doing well, but c/o pain. Neuro intact, VSS. SBP <140. Giving 0.25 of hydromorphone now and will reassess pain. Denies changes in vision, N/V    T(C): 37.8 (01-05-22 @ 15:15), Max: 37.8 (01-05-22 @ 15:15)  HR: 80 (01-05-22 @ 16:45) (80 - 97)  BP: 114/68 (01-05-22 @ 16:45) (114/68 - 130/65)  RR: 12 (01-05-22 @ 16:45) (12 - 18)  SpO2: 100% (01-05-22 @ 16:45) (100% - 100%)      01-05-22 @ 07:01  -  01-05-22 @ 17:04  --------------------------------------------------------  IN: 0 mL / OUT: 55 mL / NET: -55 mL        acetaminophen     Tablet .. 650 milliGRAM(s) Oral every 6 hours PRN  ceFAZolin  Injectable. 2000 milliGRAM(s) IV Push every 8 hours  influenza   Vaccine 0.5 milliLiter(s) IntraMuscular once  levETIRAcetam 500 milliGRAM(s) Oral every 12 hours  ondansetron Injectable 4 milliGRAM(s) IV Push every 6 hours PRN  sodium chloride 0.9%. 1000 milliLiter(s) IV Continuous <Continuous>      RADIOLOGY:     Exam:  General: NAD, pt is comfortably sitting up in bed, on room air  HEENT: CN II-XII grossly intact, PERRL 3mm briskly reactive, EOMI b/l, face symmetric, tongue midline, neck FROM  Cardiovascular: RRR, normal S1 and S2   Respiratory: lungs CTAB, no wheezing, rhonchi, or crackles   GI: normoactive BS to auscultation, abd soft, NTND   Neuro: A&Ox3, No aphasia, speech clear, no dysmetria, no pronator drift. Follows commands.  VALDEZ x4 spontaneously, 5/5 strength in all extremities throughout. SILT throughout   Extremities: distal pulses 2+ x4  Wound/incision: headwrap in place, C/D/I. Anterior incision w/ dressing C/D/I. 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 Headley and Dr Tapia, family updated with plan  PT/OT on hold post op, will reassess in AM   DISPO: ICU    Assessment:  Present when checked    []  GCS  E   V  M     Heart Failure: []Acute, [] acute on chronic , []chronic  Heart Failure:  [] Diastolic (HFpEF), [] Systolic (HFrEF), []Combined (HFpEF and HFrEF), [] RHF, [] Pulm HTN, [] Other    [] ELIZABETH, [] ATN, [] AIN, [] other  [] CKD1, [] CKD2, [] CKD 3, [] CKD 4, [] CKD 5, []ESRD    Encephalopathy: [] Metabolic, [] Hepatic, [] toxic, [] Neurological, [] Other    Abnormal Nurtitional Status: [] malnurtition (see nutrition note), [ ]underweight: BMI < 19, [] morbid obesity: BMI >40, [] Cachexia    [] Sepsis  [] hypovolemic shock,[] cardiogenic shock, [] hemorrhagic shock, [] neuogenic shock  [] Acute Respiratory Failure  []Cerebral edema, [] Brain compression/ herniation,   [] Functional quadriplegia  [] Acute blood loss anemia

## 2022-02-10 RX ORDER — OXYBUTYNIN CHLORIDE 5 MG/1
TABLET ORAL
Qty: 180 TABLET | Refills: 3 | Status: SHIPPED | OUTPATIENT
Start: 2022-02-10

## 2022-02-10 NOTE — TELEPHONE ENCOUNTER
Alvarez Presley called requesting a refill on the following medications:  Requested Prescriptions     Pending Prescriptions Disp Refills    oxybutynin (DITROPAN) 5 MG tablet [Pharmacy Med Name: OXYBUTYNIN CHLORIDE TABS 5MG] 180 tablet 3     Sig: TAKE 1 [place] verified:  [email or web address]      Date of last visit: 07/07/2021  Date of next visit (if applicable): 4/8/7038

## 2022-03-24 ENCOUNTER — HOSPITAL ENCOUNTER (OUTPATIENT)
Dept: INTERVENTIONAL RADIOLOGY/VASCULAR | Age: 87
Discharge: HOME | End: 2022-03-24
Payer: MEDICARE

## 2022-03-24 DIAGNOSIS — M79.605 BILATERAL LEG PAIN: ICD-10-CM

## 2022-03-24 DIAGNOSIS — M79.604 BILATERAL LEG PAIN: ICD-10-CM

## 2022-03-24 PROCEDURE — 93970 EXTREMITY STUDY: CPT

## 2022-04-12 PROCEDURE — G2066 INTER DEVC REMOTE 30D: HCPCS | Performed by: INTERNAL MEDICINE

## 2022-04-12 PROCEDURE — 93298 REM INTERROG DEV EVAL SCRMS: CPT | Performed by: INTERNAL MEDICINE

## 2022-04-14 ENCOUNTER — PROCEDURE VISIT (OUTPATIENT)
Dept: CARDIOLOGY CLINIC | Age: 87
End: 2022-04-14

## 2022-04-14 NOTE — LETTER
[facility]   [address] [address]  Phone: [phone]  Fax: [address]        April 14, 2022      ***      Sincerely,        Roxanne Crooks

## 2022-04-20 ENCOUNTER — PROCEDURE VISIT (OUTPATIENT)
Dept: CARDIOLOGY CLINIC | Age: 87
End: 2022-04-20
Payer: MEDICARE

## 2022-04-20 DIAGNOSIS — R55 SYNCOPE, UNSPECIFIED SYNCOPE TYPE: Primary | ICD-10-CM

## 2022-05-12 PROCEDURE — G2066 INTER DEVC REMOTE 30D: HCPCS | Performed by: INTERNAL MEDICINE

## 2022-05-12 PROCEDURE — 93298 REM INTERROG DEV EVAL SCRMS: CPT | Performed by: INTERNAL MEDICINE

## 2022-05-19 ENCOUNTER — PROCEDURE VISIT (OUTPATIENT)
Dept: CARDIOLOGY CLINIC | Age: 87
End: 2022-05-19
Payer: MEDICARE

## 2022-05-19 DIAGNOSIS — R55 SYNCOPE, UNSPECIFIED SYNCOPE TYPE: Primary | ICD-10-CM

## 2022-05-19 NOTE — PROGRESS NOTES
[employer] REVEAL CHECK       BATTERY   OK      4 TACHY EPISODES  LONGEST   5 MIN 57 SEC  RATE 167-207    QUESTIONABLE BURSTS OF A -FIB    SEE STRIP

## 2022-05-20 ENCOUNTER — HOSPITAL ENCOUNTER (OUTPATIENT)
Dept: INTERVENTIONAL RADIOLOGY/VASCULAR | Age: 87
Discharge: HOME | End: 2022-05-20
Payer: MEDICARE

## 2022-05-20 VITALS
TEMPERATURE: 96.6 F | HEART RATE: 60 BPM | SYSTOLIC BLOOD PRESSURE: 170 MMHG | DIASTOLIC BLOOD PRESSURE: 90 MMHG | RESPIRATION RATE: 18 BRPM | OXYGEN SATURATION: 97 %

## 2022-05-20 DIAGNOSIS — G89.29 OTHER CHRONIC BACK PAIN: ICD-10-CM

## 2022-05-20 DIAGNOSIS — M54.9 OTHER CHRONIC BACK PAIN: ICD-10-CM

## 2022-05-20 PROCEDURE — 6360000002 HC RX W HCPCS: Performed by: RADIOLOGY

## 2022-05-20 PROCEDURE — 6360000004 HC RX CONTRAST MEDICATION: Performed by: RADIOLOGY

## 2022-05-20 PROCEDURE — 62323 NJX INTERLAMINAR LMBR/SAC: CPT

## 2022-05-20 PROCEDURE — 2500000003 HC RX 250 WO HCPCS: Performed by: RADIOLOGY

## 2022-05-20 PROCEDURE — 2709999900 IR FLUORO GUIDED LUMBAR PUNCTURE THERAPY

## 2022-05-20 RX ORDER — BUPIVACAINE HYDROCHLORIDE 2.5 MG/ML
5 INJECTION, SOLUTION EPIDURAL; INFILTRATION; INTRACAUDAL ONCE
Status: COMPLETED | OUTPATIENT
Start: 2022-05-20 | End: 2022-05-20

## 2022-05-20 RX ORDER — DEXAMETHASONE SODIUM PHOSPHATE 4 MG/ML
4 INJECTION, SOLUTION INTRA-ARTICULAR; INTRALESIONAL; INTRAMUSCULAR; INTRAVENOUS; SOFT TISSUE ONCE
Status: COMPLETED | OUTPATIENT
Start: 2022-05-20 | End: 2022-05-20

## 2022-05-20 RX ADMIN — BUPIVACAINE HYDROCHLORIDE 12.5 MG: 2.5 INJECTION, SOLUTION EPIDURAL; INFILTRATION; INTRACAUDAL; PERINEURAL at 16:48

## 2022-05-20 RX ADMIN — DEXAMETHASONE SODIUM PHOSPHATE 4 MG: 4 INJECTION, SOLUTION INTRAMUSCULAR; INTRAVENOUS at 16:48

## 2022-05-20 RX ADMIN — IOHEXOL 1 ML: 180 INJECTION INTRAVENOUS at 16:48

## 2022-05-20 ASSESSMENT — PAIN SCALES - GENERAL
PAINLEVEL_OUTOF10: 7
PAINLEVEL_OUTOF10: 0

## 2022-05-20 NOTE — PROGRESS NOTES
1450 Patient in wheelchair to OPN for Caudal block. Patient states he held his Aspirin and states he does not take Brilinta. Patient 2 assist to bed from wheelchair. Patient states he has pain in lower back travels down right leg to toes. Has numbness to right leg. Patient pedal push and pull weak but equal. Wife states they are doing this procedure to help with his mobility. Patient states he is able to walk with walker. PT RIGHTS AND RESPONSIBILITIES OFFERED TO PT.    1630 Patient to IR for procedure. 1705 Patient back to room from IR. Patient denies any new pain or numbness. No bleeding noted to coccyx area. 1720 Assisted patient to edge of bed with 2 assist. Patient able to stand with help. Able to walk with holding on to bed rail. 36 AVS reviewed with patient and wife. Verbalizes understanding. Patient discharged in wheelchair .          _M___ Safety:       (Environmental)   [place] to environment   Ensure ID band is correct and in place/ allergy band as needed   Assess for fall risk   Initiate fall precautions as applicable (fall band, side rails, etc.)   Call light within reach   Bed in low position/ wheels locked    _M___ Pain:        Assess pain level and characteristics   Administer analgesics as ordered   Assess effectiveness of pain management and report to MD as needed    _M___ Knowledge Deficit:   Assess baseline knowledge   Provide teaching at level of understanding   Provide teaching via preferred learning method   Evaluate teaching effectiveness    _M___ Hemodynamic/Respiratory Status:       (Pre and Post Procedure Monitoring)   Assess/Monitor vital signs and LOC   Assess Baseline SpO2 prior to any sedation   Obtain weight/height   Assess vital signs/ LOC until patient meets discharge criteria   Monitor procedure site and notify MD of any issues

## 2022-05-20 NOTE — H&P
Formulation and discussion of sedation / procedure plans, risks, benefits, side effects and alternatives with patient and/or responsible adult completed.     Electronically signed by Karoline Ma MD on 5/20/22 at 4:51 PM EDT

## 2022-05-20 NOTE — PROGRESS NOTES
1631 Pt arrived in IR. C/O pain to lower back and down both legs. 7/10. C/o chronic numbness/tingling to bilateral lower extremities. [address] Procedure started with Dr. Darell Horn  [phone] Procedure complete. Pt tolerated well.   8630 Pt back to cart, positioned for comfort. 1655 Pt transported to [facility] via cart. Skin pink, warm, dry. Respirations even and unlabored. Pedal push/pull equal and strong.

## 2022-05-20 NOTE — OP NOTE
Department of Radiology  Post Procedure Progress Note    Pre-Procedure Diagnosis:  Leg pain and weakness     Procedure Performed:  Caudal block/steroid injection      Anesthesia: local     Findings: successful    Immediate Complications:  None    Estimated Blood Loss: minimal    SEE DICTATED PROCEDURE NOTE FOR COMPLETE DETAILS.       Noreen Hernandez MD   5/20/2022 4:51 PM

## 2022-06-11 PROCEDURE — G2066 INTER DEVC REMOTE 30D: HCPCS | Performed by: INTERNAL MEDICINE

## 2022-06-11 PROCEDURE — 93298 REM INTERROG DEV EVAL SCRMS: CPT | Performed by: INTERNAL MEDICINE

## 2022-06-28 LAB
ANION GAP SERPL CALCULATED.3IONS-SCNC: 11 MEQ/L (ref 10–19)
BUN BLDV-MCNC: 16 MG/DL (ref 8–23)
CALCIUM SERPL-MCNC: 9.2 MG/DL (ref 8.5–10.5)
CHLORIDE BLD-SCNC: 108 MEQ/L (ref 95–107)
CO2: 23 MEQ/L (ref 19–31)
CREAT SERPL-MCNC: 1.25 MG/DL (ref 0.8–1.4)
CREATINE, URINE: 130.6 MG/DL
CREATINE, URINE: 130.6 MG/DL
EGFR IF NONAFRICAN AMERICAN: 52 ML/MIN/1.73
GLUCOSE: 108 MG/DL (ref 70–99)
MICROALBUMIN/CREAT 24H UR: 18.7 MG/DL
POTASSIUM SERPL-SCNC: 4.3 MEQ/L (ref 3.5–5.4)
PROTEIN, URINE: 53 MG/DL
PROTEIN/CREAT RATIO: 406 MG/G
SODIUM BLD-SCNC: 142 MEQ/L (ref 133–146)

## 2022-06-30 ENCOUNTER — OFFICE VISIT (OUTPATIENT)
Dept: CARDIOLOGY CLINIC | Age: 87
End: 2022-06-30
Payer: MEDICARE

## 2022-06-30 VITALS
SYSTOLIC BLOOD PRESSURE: 128 MMHG | BODY MASS INDEX: 32.93 KG/M2 | DIASTOLIC BLOOD PRESSURE: 78 MMHG | HEART RATE: 73 BPM | HEIGHT: 69 IN

## 2022-06-30 DIAGNOSIS — I10 BENIGN ESSENTIAL HTN: Primary | ICD-10-CM

## 2022-06-30 PROCEDURE — G8427 DOCREV CUR MEDS BY ELIG CLIN: HCPCS | Performed by: INTERNAL MEDICINE

## 2022-06-30 PROCEDURE — G8417 CALC BMI ABV UP PARAM F/U: HCPCS | Performed by: INTERNAL MEDICINE

## 2022-06-30 PROCEDURE — 1036F TOBACCO NON-USER: CPT | Performed by: INTERNAL MEDICINE

## 2022-06-30 PROCEDURE — 93000 ELECTROCARDIOGRAM COMPLETE: CPT | Performed by: INTERNAL MEDICINE

## 2022-06-30 PROCEDURE — 99213 OFFICE O/P EST LOW 20 MIN: CPT | Performed by: INTERNAL MEDICINE

## 2022-06-30 PROCEDURE — 1123F ACP DISCUSS/DSCN MKR DOCD: CPT | Performed by: INTERNAL MEDICINE

## 2022-06-30 ASSESSMENT — ENCOUNTER SYMPTOMS
ANAL BLEEDING: 0
VOICE CHANGE: 0
APNEA: 0
WHEEZING: 0
VOMITING: 0
CHOKING: 0
SHORTNESS OF BREATH: 0
TROUBLE SWALLOWING: 0
BLOOD IN STOOL: 0
COLOR CHANGE: 0
COUGH: 0
ABDOMINAL PAIN: 0
NAUSEA: 0
ABDOMINAL DISTENTION: 0
STRIDOR: 0
CHEST TIGHTNESS: 0

## 2022-06-30 NOTE — PROGRESS NOTES
[address]  Dept: [address]: [phone]     6/30/2022       Flynn Calvo is here today for   Chief Complaint   Patient presents with    Follow-up     Labs           Referring Physician:  No ref. provider found     Patient Active Problem List   Diagnosis    CA prostate, adenoca [facility] [address]    Chronic kidney disease, stage III (moderate) ([facility] [address]    Hypercholesteremia    Osteoarthritis    CAD (coronary artery disease)    H/O class III angina pectoris    Gastritis    Hypertension, essential, benign    HLD (hyperlipidemia)    Spinal stenosis, lumbar, s/p laminectomy    S/P laminectomy    Benign essential HTN    Syncope    ALDA (acute kidney injury) ([facility] [address]    DVT, lower extremity (HCC)    Fluid overload    Benign prostatic hyperplasia with urinary obstruction    PVD (peripheral vascular disease) ([facility] [address]    DVT (deep venous thrombosis) (HCC)    Presence of IVC filter    Gross hematuria    Hematuria    History of DVT (deep vein thrombosis)    Benign non-nodular prostatic hyperplasia with lower urinary tract symptoms    History of prostate cancer    H/O prostate cancer    Proteinuria    Encephalopathy    Altered mental status    Intraventricular hemorrhage (HCC)    Fracture of left orbital floor (HCC)    Nasal bone fracture    Closed fracture of six ribs of left side    Closed fracture of one rib of left side    Intracranial bleed (HCC)    Postural hypotension    COPD (chronic obstructive pulmonary disease) (HCC)       Review of Systems   Constitutional: Negative for activity change, appetite change, fatigue, fever and unexpected weight change. HENT: Negative for congestion, trouble swallowing and voice change. Eyes: Negative for visual disturbance. Respiratory: Negative for apnea, cough, choking, chest tightness, shortness of breath, wheezing and stridor. Cardiovascular: Negative for chest pain, palpitations and leg swelling. Gastrointestinal: Negative for abdominal distention, abdominal pain, anal bleeding, blood in stool, nausea and vomiting. Endocrine: Negative for cold intolerance and heat intolerance. Genitourinary: Negative for hematuria. Musculoskeletal: Positive for arthralgias and joint swelling. Negative for gait problem and myalgias. Skin: Negative for color change and rash. Allergic/Immunologic: Negative for environmental allergies and food allergies. Neurological: Negative for dizziness, tremors, syncope, facial asymmetry, weakness, light-headedness, numbness and headaches. Hematological: Does not bruise/bleed easily. Psychiatric/Behavioral: Negative for agitation, behavioral problems and sleep disturbance.         Past Medical History:   Diagnosis Date    CAD (coronary artery disease)     Chronic kidney disease, stage III (moderate) [facility]     Closed fracture of six ribs of left side 11/27/2020    COPD (chronic obstructive pulmonary disease) ([facility] [address] 12/12/2020    DVT, lower extremity (HCC)     Gastritis     Hematuria     Hypercholesteremia     Hypertension     Intracranial bleed (HCC)     Nasal bone fracture 11/27/2020    Osteoarthritis     Presence of IVC filter     Prostate cancer ([facility])     Seed implants    Spinal stenosis, lumbar, s/p laminectomy 6/1/2015    Statin-induced myositis        Allergies   Allergen Reactions    Losartan Swelling    Rivaroxaban Other (See Comments)       Current Outpatient Medications   Medication Sig Dispense Refill    oxybutynin (DITROPAN) 5 MG tablet TAKE 1 TABLET TWICE A  tablet 3    finasteride (PROSCAR) 5 MG tablet TAKE 1 TABLET DAILY 90 tablet 3    tamsulosin (FLOMAX) 0.4 MG capsule Take 1 capsule by mouth daily 90 capsule 3    bumetanide (BUMEX) 1 MG tablet Take 0.5 tablets by mouth daily (Patient taking differently: Take 1 mg by mouth daily ) 30 tablet 3    PACERONE 200 MG tablet TAKE 1 TABLET BY MOUTH TWICE DAILY      ticagrelor (BRILINTA) 90 MG TABS tablet Take 1 tablet by mouth 2 times daily 60 tablet 3    nitroGLYCERIN (NITROSTAT) 0.4 MG SL tablet Place 1 tablet under the tongue every 5 minutes as needed for Chest pain up to max of 3 total doses. If no relief after 1 dose, call 911. 25 tablet 3    losartan (COZAAR) 50 MG tablet Take 1 tablet by mouth daily (Patient taking differently: Take 25 mg by mouth daily ) 30 tablet 3    aspirin 81 MG chewable tablet Take 1 tablet by mouth daily 30 tablet 3    pravastatin (PRAVACHOL) 40 MG tablet Take 1 tablet by mouth daily 30 tablet 3    mometasone-formoterol (DULERA) 100-5 MCG/ACT inhaler Inhale 2 puffs into the lungs 2 times daily      albuterol sulfate  (90 Base) MCG/ACT inhaler Inhale 1 puff into the lungs every 4 hours as needed for Wheezing      Cholecalciferol (VITAMIN D3) 5000 UNITS TABS Take 2,000 Units by mouth daily       acetaminophen (TYLENOL) 500 MG tablet Take 500 mg by mouth every 4 hours as needed for Pain      citalopram (CELEXA) 20 MG tablet Take 1 tablet by mouth nightly (Patient not taking: Reported on 2022) 30 tablet 3    diclofenac sodium (VOLTAREN) 1 % GEL Apply 2 g topically 4 times daily Apply to Right knee. (Patient not taking: Reported on 2022) 150 g 0     No current facility-administered medications for this visit.        Social History     Socioeconomic History    Marital status:      Spouse name: Lucretia Perea Number of children: None    Years of education: None    Highest education level: None   Occupational History    None   Tobacco Use    Smoking status: Former Smoker     Packs/day: 0.50     Years: 25.00     Pack years: 12.50     Types: Cigarettes     Quit date: 1997     Years since quittin.2    Smokeless tobacco: Never Used   Vaping Use    Vaping Use: Never used   Substance and Sexual Activity    Alcohol use: No     Alcohol/week: 0.0 standard drinks    Drug use: No    Sexual activity: Not Currently   Other Topics Concern    None   Social History Narrative    None     Social Determinants of Health     Financial Resource Strain:     Difficulty of Paying Living Expenses: Not on file   Food Insecurity:     Worried About Running Out of Food in the Last Year: Not on file    Jeff of Food in the Last Year: Not on file   Transportation Needs:     Lack of Transportation (Medical): Not on file    Lack of Transportation (Non-Medical): Not on file   Physical Activity:     Days of Exercise per Week: Not on file    Minutes of Exercise per Session: Not on file   Stress:     Feeling of Stress : Not on file   Social Connections:     Frequency of Communication with Friends and Family: Not on file    Frequency of Social Gatherings with Friends and Family: Not on file    Attends Oriental orthodox Services: Not on file    Active Member of [address] [place] [address] [employer] or Organizations: Not on file    Attends Club or Organization Meetings: Not on file    Marital Status: Not on file   Intimate Partner Violence:     Fear of Current or Ex-Partner: Not on file    Emotionally Abused: Not on file    Physically Abused: Not on file    Sexually Abused: Not on file   Housing Stability:     Unable to Pay for Housing in the Last Year: Not on file    Number of [place] in the Last Year: Not on file    Unstable Housing in the Last Year: Not on file       Family History   Problem Relation Age of Onset    Heart Disease Mother     High Blood Pressure Mother     Prostate Cancer Neg Hx     Cancer Neg Hx     Diabetes Neg Hx     Kidney Disease Neg Hx     Stroke Neg Hx        Blood pressure 128/78, pulse 73, height 5' 9\" (1.753 m).     Physical Exam:    General Appearance: alert and oriented to person, place and time, in no acute distress  Cardiovascular: normal rate, regular rhythm, normal S1 and S2, no murmurs, rubs, clicks, or gallops, distal pulses intact, no carotid bruits, no JVD  Pulmonary/Chest: clear to auscultation bilaterally- no wheezes, rales or rhonchi, normal air movement, no respiratory distress  Abdomen: soft, non-tender, non-distended, normal bowel sounds, no masses   Extremities: no cyanosis, clubbing or edema, pulse   Skin: warm and dry, no rash or erythema  Head: normocephalic and atraumatic  Eyes: pupils equal, round, and reactive to light  Neck: supple and non-tender without mass, no thyromegaly   Musculoskeletal: normal range of motion, no joint swelling, deformity or tenderness  Neurological: alert, oriented, normal speech, no focal findings or movement disorder noted    Lab Data:    Cardiac Enzymes:  No results for input(s): CKTOTAL, CKMB, CKMBINDEX, TROPONINI in the last 72 hours.     CBC:   Lab Results   Component Value Date/Time    WBC >720 07/06/2021 01:15 PM    WBC 5.6 12/14/2020 05:21 AM    RBC 77 07/06/2021 01:15 PM    HGB 13.0 12/14/2020 05:21 AM    HCT 42.6 12/14/2020 05:21 AM     12/14/2020 05:21 AM       CMP:    Lab Results   Component Value Date/Time     06/27/2022 11:21 AM    K 4.3 06/27/2022 11:21 AM    K 4.1 11/27/2020 03:28 AM     06/27/2022 11:21 AM    CO2 23 06/27/2022 11:21 AM    BUN 16 06/27/2022 11:21 AM    CREATININE 1.25 06/27/2022 11:21 AM    LABGLOM 48 12/14/2020 05:21 AM    GLUCOSE 108 06/27/2022 11:21 AM    CALCIUM 9.2 06/27/2022 11:21 AM       Hepatic Function Panel:    Lab Results   Component Value Date/Time    ALKPHOS 57 07/06/2021 01:15 PM    ALKPHOS 64 12/02/2020 06:18 AM    ALT 11 07/06/2021 01:15 PM    AST 16 07/06/2021 01:15 PM    PROT 6.8 07/06/2021 01:15 PM    PROT 7.6 04/11/2018 12:00 AM    BILITOT Negative 07/06/2021 01:15 PM    BILITOT 0.6 07/06/2021 01:15 PM    BILIDIR <0.2 11/02/2015 03:30 AM    LABALBU 3.8 07/06/2021 01:15 PM       Magnesium:    Lab Results   Component Value Date/Time    MG 2.3 12/10/2020 02:41 PM       PT/INR:    Lab Results   Component Value Date/Time    INR 1.03 11/26/2020 09:15 PM       HgBA1c:    Lab Results Component Value Date/Time    LABA1C 5.2 11/28/2020 04:02 AM       FLP:    Lab Results   Component Value Date/Time    TRIG 96 11/28/2020 04:02 AM    HDL 49 11/28/2020 04:02 AM    LDLCALC 98 11/28/2020 04:02 AM       TSH:    Lab Results   Component Value Date/Time    TSH 2.900 12/10/2020 02:41 PM        Diagnosis Orders   1. Benign essential HTN  42836 - [place] ELECTROCARDIOGRAM, COMPLETE    CBC    Basic Metabolic Panel    Lipid Panel    Hepatic Function Panel        Assessment/Plan    81years old patient is here for coronary artery disease history of prior intervention of the circumflex significant disease of the LAD the patient is having significant pain and because of his knees he is in the wheelchair he is morbidly obese patient could not remember what medication he is taking and we will not the wife was instructed again to bring the actual bottles so we can adjust the medication the patient has a loop recorder there is no significant arrhythmia patient has no syncopal episode. The patient had a history of hypertension and hypercholesterolemia history of enlarged prostate he will continue with the current medication the patient will be seen annually seek medical attention for any change in clinical condition end of dictation thank    Orders Placed This Encounter   Procedures    CBC     Standing Status:   Future     Standing Expiration Date:   6/30/2023    Basic Metabolic Panel     Standing Status:   Future     Standing Expiration Date:   6/30/2023    Lipid Panel     Standing Status:   Future     Standing Expiration Date:   6/30/2023     Order Specific Question:   Is Patient Fasting?/# of Hours     Answer:   12 hours    Hepatic Function Panel     Standing Status:   Future     Standing Expiration Date:   6/30/2023    86920 - MO ELECTROCARDIOGRAM, COMPLETE       Return in about 1 year (around 6/30/2023) for cad.      Milagros Eisenberg MD

## 2022-07-06 ENCOUNTER — OFFICE VISIT (OUTPATIENT)
Dept: UROLOGY | Age: 87
End: 2022-07-06
Payer: MEDICARE

## 2022-07-06 VITALS
BODY MASS INDEX: 33.03 KG/M2 | DIASTOLIC BLOOD PRESSURE: 70 MMHG | WEIGHT: 223 LBS | HEIGHT: 69 IN | SYSTOLIC BLOOD PRESSURE: 124 MMHG

## 2022-07-06 DIAGNOSIS — N32.81 OAB (OVERACTIVE BLADDER): ICD-10-CM

## 2022-07-06 DIAGNOSIS — N40.1 BPH WITH OBSTRUCTION/LOWER URINARY TRACT SYMPTOMS: ICD-10-CM

## 2022-07-06 DIAGNOSIS — N13.8 BPH WITH OBSTRUCTION/LOWER URINARY TRACT SYMPTOMS: ICD-10-CM

## 2022-07-06 DIAGNOSIS — C61 PROSTATE CANCER (HCC): Primary | ICD-10-CM

## 2022-07-06 PROCEDURE — 1123F ACP DISCUSS/DSCN MKR DOCD: CPT | Performed by: UROLOGY

## 2022-07-06 PROCEDURE — G8427 DOCREV CUR MEDS BY ELIG CLIN: HCPCS | Performed by: UROLOGY

## 2022-07-06 PROCEDURE — 99214 OFFICE O/P EST MOD 30 MIN: CPT | Performed by: UROLOGY

## 2022-07-06 PROCEDURE — G8417 CALC BMI ABV UP PARAM F/U: HCPCS | Performed by: UROLOGY

## 2022-07-06 PROCEDURE — 1036F TOBACCO NON-USER: CPT | Performed by: UROLOGY

## 2022-07-06 RX ORDER — PRAVASTATIN SODIUM 80 MG/1
80 TABLET ORAL DAILY
COMMUNITY

## 2022-07-06 RX ORDER — FLUTICASONE FUROATE AND VILANTEROL 100; 25 UG/1; UG/1
POWDER RESPIRATORY (INHALATION) DAILY
COMMUNITY

## 2022-07-06 RX ORDER — METOPROLOL TARTRATE 50 MG/1
50 TABLET, FILM COATED ORAL 2 TIMES DAILY
COMMUNITY
End: 2022-09-26 | Stop reason: SDUPTHER

## 2022-07-06 RX ORDER — OXYBUTYNIN CHLORIDE 10 MG/1
10 TABLET, EXTENDED RELEASE ORAL DAILY
Qty: 90 TABLET | Refills: 3 | Status: ON HOLD | OUTPATIENT
Start: 2022-07-06 | End: 2022-08-18 | Stop reason: HOSPADM

## 2022-07-06 NOTE — PROGRESS NOTES
Dr. Kory Muhammad MD  [facility])  Urology Clinic      Patient:  Dorian Marques  YOB: 1935  Date: 7/6/2022    HISTORY OF PRESENT ILLNESS:   The patient is a 81 y.o. male who presents today for follow-up for the following problem(s): prostate cancer candy 7 treated with EBRT in 2006. PSA has been ~0.50 since. Overall the problem(s) : show no change. Associated Symptoms: No dysuria, gross hematuria. Pain Severity: 0/10    Summary of old records:   0.57     07/2021  0.59     06/2020  0. 52     04/2019  0.28     01/2013    Imaging/Labs reviewed during today's visit:  I have independently reviewed and verified the following films during today's visit. PSA, see above. 0.57    Last several PSA's:  No results found for: PSA    Last total testosterone:  No results found for: TESTOSTERONE    Urinalysis today:  No results found for this visit on 07/06/22.     Last BUN and creatinine:  Lab Results   Component Value Date    BUN 16 06/27/2022     Lab Results   Component Value Date    CREATININE 1.25 06/27/2022       Imaging Reviewed during this Office Visit:   (results were independently reviewed by physician and radiology report verified)    PAST MEDICAL, FAMILY AND SOCIAL HISTORY UPDATE:  Past Medical History:   Diagnosis Date    CAD (coronary artery disease)     Chronic kidney disease, stage III (moderate) ([address]     Closed fracture of six ribs of left side 11/27/2020    COPD (chronic obstructive pulmonary disease) ([address] 12/12/2020    DVT, lower extremity (HCC)     Gastritis     Hematuria     Hypercholesteremia     Hypertension     Intracranial bleed ([address]     Nasal bone fracture 11/27/2020    Osteoarthritis     Presence of IVC filter     Prostate cancer ([address]     Seed implants    Spinal stenosis, lumbar, s/p laminectomy 6/1/2015    Statin-induced myositis      Past Surgical History:   Procedure Laterality Date    BACK SURGERY  2006    fusion    CARDIAC SURGERY      2 stents    COSMETIC SURGERY      ENDOSCOPY, COLON, DIAGNOSTIC      KNEE SURGERY      NECK SURGERY      OTHER SURGICAL HISTORY  2015    DECOMPRESSIVE LUMBAR LAMINECTOMY L2-3    ROTATOR CUFF REPAIR      TOTAL HIP ARTHROPLASTY Bilateral      Family History   Problem Relation Age of Onset    Heart Disease Mother     High Blood Pressure Mother     Prostate Cancer Neg Hx     Cancer Neg Hx     Diabetes Neg Hx     Kidney Disease Neg Hx     Stroke Neg Hx      Outpatient Medications Marked as Taking for the 22 encounter (Office Visit) with Jacky Alexander MD   Medication Sig Dispense Refill    metoprolol tartrate (LOPRESSOR) 50 MG tablet Take 50 mg by mouth 2 times daily      pravastatin (PRAVACHOL) 80 MG tablet Take 80 mg by mouth daily      Naproxen Sodium (ALEVE PO) Take by mouth as needed      fluticasone-vilanterol (BREO ELLIPTA) 100-25 MCG/INH AEPB inhaler Inhale into the lungs daily      oxybutynin (DITROPAN) 5 MG tablet TAKE 1 TABLET TWICE A  tablet 3    finasteride (PROSCAR) 5 MG tablet TAKE 1 TABLET DAILY 90 tablet 3    tamsulosin (FLOMAX) 0.4 MG capsule Take 1 capsule by mouth daily 90 capsule 3    aspirin 81 MG chewable tablet Take 1 tablet by mouth daily 30 tablet 3    albuterol sulfate  (90 Base) MCG/ACT inhaler Inhale 1 puff into the lungs every 4 hours as needed for Wheezing      Cholecalciferol (VITAMIN D3) 5000 UNITS TABS Take 5,000 Units by mouth daily          Losartan and Rivaroxaban  Social History     Tobacco Use   Smoking Status Former Smoker    Packs/day: 0.50    Years: 25.00    Pack years: 12.50    Types: Cigarettes    Quit date: 1997    Years since quittin.2   Smokeless Tobacco Never Used       Social History     Substance and Sexual Activity   Alcohol Use No    Alcohol/week: 0.0 standard drinks       REVIEW OF SYSTEMS:  Constitutional: negative  Eyes: negative  Respiratory: negative  Cardiovascular: negative  Gastrointestinal: negative  Musculoskeletal: negative  Genitourinary: negative except for what is in HPI  Skin: negative   Neurological: negative  Hematological/Lymphatic: negative  Psychological: negative    Physical Exam:      Vitals:    07/06/22 1358   BP: 124/70     Patient is a 81 y.o. male in no acute distress and alert and oriented to person, place and time. NAD, A/o  Non labored respiration  Normal peripheral pulses  Skin- warm and dry  Psych- normal mood and affect      Assessment and Plan      1. Prostate cancer ([facility] [address]    2. BPH with obstruction/lower urinary tract symptoms    3. OAB       Plan:      No follow-ups on file. Flomax 0.4 mg po qd for BPH symptoms. PSA stable  Stop finasteride.    See back in 1 year with PSA  Ditropan ER 10mg daily for OAB         Dr. Darylene Sallies, MD

## 2022-07-08 ENCOUNTER — PROCEDURE VISIT (OUTPATIENT)
Dept: CARDIOLOGY CLINIC | Age: 87
End: 2022-07-08
Payer: MEDICARE

## 2022-07-08 DIAGNOSIS — R55 SYNCOPE, UNSPECIFIED SYNCOPE TYPE: Primary | ICD-10-CM

## 2022-07-08 NOTE — PROGRESS NOTES
.. [employer] REVEAL CHECK       BATTERY   OK    3 TACHY EPISODES  LONGEST   3 MIN 27 SEC  158-171 BPM @ 1005    1 MIKEL EPISODE  41 BPM @1400 FOR 6 SEC.     RUNS OF SVT    AVG RATE   <60 TO 70 BPM

## 2022-07-26 ENCOUNTER — PROCEDURE VISIT (OUTPATIENT)
Dept: CARDIOLOGY CLINIC | Age: 87
End: 2022-07-26
Payer: MEDICARE

## 2022-07-26 ENCOUNTER — OFFICE VISIT (OUTPATIENT)
Dept: NEPHROLOGY | Age: 87
End: 2022-07-26
Payer: MEDICARE

## 2022-07-26 VITALS
HEIGHT: 69 IN | WEIGHT: 233 LBS | DIASTOLIC BLOOD PRESSURE: 64 MMHG | HEART RATE: 66 BPM | SYSTOLIC BLOOD PRESSURE: 118 MMHG | OXYGEN SATURATION: 98 % | BODY MASS INDEX: 34.51 KG/M2

## 2022-07-26 DIAGNOSIS — E87.79 OTHER FLUID OVERLOAD: ICD-10-CM

## 2022-07-26 DIAGNOSIS — I10 HTN (HYPERTENSION), BENIGN: ICD-10-CM

## 2022-07-26 DIAGNOSIS — R55 SYNCOPE, UNSPECIFIED SYNCOPE TYPE: Primary | ICD-10-CM

## 2022-07-26 DIAGNOSIS — N18.31 STAGE 3A CHRONIC KIDNEY DISEASE (HCC): Primary | ICD-10-CM

## 2022-07-26 PROCEDURE — 99214 OFFICE O/P EST MOD 30 MIN: CPT | Performed by: INTERNAL MEDICINE

## 2022-07-26 PROCEDURE — G8427 DOCREV CUR MEDS BY ELIG CLIN: HCPCS | Performed by: INTERNAL MEDICINE

## 2022-07-26 PROCEDURE — 1123F ACP DISCUSS/DSCN MKR DOCD: CPT | Performed by: INTERNAL MEDICINE

## 2022-07-26 PROCEDURE — 1036F TOBACCO NON-USER: CPT | Performed by: INTERNAL MEDICINE

## 2022-07-26 PROCEDURE — G8417 CALC BMI ABV UP PARAM F/U: HCPCS | Performed by: INTERNAL MEDICINE

## 2022-07-26 RX ORDER — BUMETANIDE 1 MG/1
1 TABLET ORAL 2 TIMES DAILY
Qty: 60 TABLET | Refills: 0 | Status: SHIPPED | OUTPATIENT
Start: 2022-07-26 | End: 2022-08-11

## 2022-07-26 NOTE — PROGRESS NOTES
.. [employer] REVEAL CHECK       BATTERY   OK    6 TACHY EPISODES  LONGEST 6MIN 15 SEC  RATE  - 176 BPM    SEE STRIP

## 2022-07-26 NOTE — PROGRESS NOTES
\[facility]\""[facility]        Outpatient Follow-Up note         7/26/2022 1:14 PM    Patient Name:   Royal Tan  YOB: 1935  Primary Care Physician:  Jamie Lockett MD     Chief Complaint / Reason for follow-up : Follow Up of CKD     Interval History :  Patient seen and examined by me. Feels well. No chest pain shortness of breath . Patient apparently has been having swelling, bilateral lower extremities moderate in severity associated with a weight gain of at least 10 pounds, no associated symptoms of shortness of breath.   Apparently was started on some diuretics by the PCP which apparently does not seems to be doing much     Past History :  Past Medical History:   Diagnosis Date    CAD (coronary artery disease)     Chronic kidney disease, stage III (moderate) (HCC)     Closed fracture of six ribs of left side 11/27/2020    COPD (chronic obstructive pulmonary disease) ([facility] [address] 12/12/2020    DVT, lower extremity (HCC)     Gastritis     Hematuria     Hypercholesteremia     Hypertension     Intracranial bleed (HCC)     Nasal bone fracture 11/27/2020    Osteoarthritis     Presence of IVC filter     Prostate cancer (HCC)     Seed implants    Spinal stenosis, lumbar, s/p laminectomy 6/1/2015    Statin-induced myositis      Past Surgical History:   Procedure Laterality Date    BACK SURGERY  2006    fusion    CARDIAC SURGERY      2 stents    COSMETIC SURGERY      ENDOSCOPY, COLON, DIAGNOSTIC      KNEE SURGERY      NECK SURGERY      OTHER SURGICAL HISTORY  5/29/2015    DECOMPRESSIVE LUMBAR LAMINECTOMY L2-3    ROTATOR CUFF REPAIR      TOTAL HIP ARTHROPLASTY Bilateral         Medications :     Outpatient Medications Marked as Taking for the 7/26/22 encounter (Office Visit) with Saurabh Coleman MD   Medication Sig Dispense Refill    metoprolol tartrate (LOPRESSOR) 50 MG tablet Take 50 mg by mouth 2 times daily      pravastatin (PRAVACHOL) 80 MG tablet Take 80 mg by mouth daily      Naproxen Sodium (ALEVE PO) Take by mouth as needed      fluticasone-vilanterol (BREO ELLIPTA) 100-25 MCG/INH AEPB inhaler Inhale into the lungs daily      oxybutynin (DITROPAN XL) 10 MG extended release tablet Take 1 tablet by mouth daily 90 tablet 3    oxybutynin (DITROPAN) 5 MG tablet TAKE 1 TABLET TWICE A  tablet 3    finasteride (PROSCAR) 5 MG tablet TAKE 1 TABLET DAILY 90 tablet 3    tamsulosin (FLOMAX) 0.4 MG capsule Take 1 capsule by mouth daily 90 capsule 3    nitroGLYCERIN (NITROSTAT) 0.4 MG SL tablet Place 1 tablet under the tongue every 5 minutes as needed for Chest pain up to max of 3 total doses.  If no relief after 1 dose, call 911. 25 tablet 3    aspirin 81 MG chewable tablet Take 1 tablet by mouth daily 30 tablet 3    albuterol sulfate  (90 Base) MCG/ACT inhaler Inhale 1 puff into the lungs every 4 hours as needed for Wheezing      Cholecalciferol (VITAMIN D3) 5000 UNITS TABS Take 5,000 Units by mouth daily          Vitals     /64 (Site: Left Upper Arm, Position: Sitting, Cuff Size: Large Adult)   Pulse 66   Ht 5' 9\" (1.753 m)   Wt 233 lb (105.7 kg)   SpO2 98%   BMI 34.41 kg/m²  Wt Readings from Last 3 Encounters:   07/26/22 233 lb (105.7 kg)   07/06/22 223 lb (101.2 kg)   07/27/21 223 lb (101.2 kg)        Physical Exam     General -- no distress  Lungs -- clear  Heart -- S1, S2 heard, JVD - no  Abdomen - soft, non-tender  Extremities -- edema none   CNS - awake and alert    Labs, Radiology and Tests    Labs -    4/16 4/17 4/18 4/19 7/20 7/21 6/22     Potassium 4.2 4.3 4.8 4.0 4.2 5.0 4.3     BUN 21 22 22 22 23 21 16     Creatinine 1.3 1.2 1.2 1.35 1.32 1.55 1.25     eGFR  52 49 45 52 52 52                 UPCR  250 290 100 90 270 406     UMCR  38 37 15 3.9                     Renal Ultrasound Scan -- 3/2016  Right kidney 12 cm left kidney 12.3 cm   Bilateral hydronephrosis and left hydroureter nephrosis   Increase in resistive indices consistent with medical renal disease   2.9 cm cyst on the right kidney      Assessment    Chronic kidney disease stage III - chronic kidney disease secondary to contrast-induced nephropathy and long-standing hypertension and senile nephrosclerosis. - Patient's GFR is overall stable at this time however patient does bilateral worsening lower extremity edema   -He is not taking any diuretics he used to be on at one-point. I will start him on a Bumex 1 mg twice a day for 3 days, 1500 mL fluid restriction and after that start taking Bumex 1 mg daily   -Lab work in in 1 week follow-up in 2 weeks  Essential hypertension-running well continue current meds  Extensive DVT in the past - currently is only on aspirin. BPH/prostrate cancer-stable, follows up with urology. Cyst on the right kidney. Fluid overload -worsened plan as mentioned above  FU in 2 weeks    Tests and orders placed this Encounter     No orders of the defined types were placed in this encounter. Pacheco Correa M.D  [facility].

## 2022-08-02 PROCEDURE — 93298 REM INTERROG DEV EVAL SCRMS: CPT | Performed by: INTERNAL MEDICINE

## 2022-08-02 PROCEDURE — G2066 INTER DEVC REMOTE 30D: HCPCS | Performed by: INTERNAL MEDICINE

## 2022-08-10 LAB
ANION GAP SERPL CALCULATED.3IONS-SCNC: 13 MEQ/L (ref 7–16)
BUN BLDV-MCNC: 21 MG/DL (ref 8–23)
CALCIUM SERPL-MCNC: 9.4 MG/DL (ref 8.5–10.5)
CHLORIDE BLD-SCNC: 105 MEQ/L (ref 95–107)
CO2: 25 MEQ/L (ref 19–31)
CREAT SERPL-MCNC: 1.47 MG/DL (ref 0.8–1.4)
EGFR IF NONAFRICAN AMERICAN: 46 ML/MIN/1.73
GLUCOSE: 109 MG/DL (ref 70–99)
POTASSIUM SERPL-SCNC: 4.6 MEQ/L (ref 3.5–5.4)
SODIUM BLD-SCNC: 143 MEQ/L (ref 133–146)

## 2022-08-11 ENCOUNTER — HOSPITAL ENCOUNTER (INPATIENT)
Age: 87
LOS: 7 days | Discharge: NURSING, REHAB OR LONG-TERM CARE | DRG: 291 | End: 2022-08-18
Attending: EMERGENCY MEDICINE | Admitting: INTERNAL MEDICINE
Payer: MEDICARE

## 2022-08-11 ENCOUNTER — APPOINTMENT (OUTPATIENT)
Dept: GENERAL RADIOLOGY | Age: 87
DRG: 291 | End: 2022-08-11
Payer: MEDICARE

## 2022-08-11 ENCOUNTER — OFFICE VISIT (OUTPATIENT)
Dept: NEPHROLOGY | Age: 87
End: 2022-08-11
Payer: MEDICARE

## 2022-08-11 ENCOUNTER — TELEPHONE (OUTPATIENT)
Dept: NEPHROLOGY | Age: 87
End: 2022-08-11

## 2022-08-11 VITALS
SYSTOLIC BLOOD PRESSURE: 152 MMHG | WEIGHT: 235 LBS | DIASTOLIC BLOOD PRESSURE: 79 MMHG | HEIGHT: 69 IN | OXYGEN SATURATION: 92 % | HEART RATE: 84 BPM | BODY MASS INDEX: 34.8 KG/M2

## 2022-08-11 DIAGNOSIS — J81.0 ACUTE PULMONARY EDEMA (HCC): ICD-10-CM

## 2022-08-11 DIAGNOSIS — M79.89 LEG SWELLING: ICD-10-CM

## 2022-08-11 DIAGNOSIS — E87.79 OTHER FLUID OVERLOAD: ICD-10-CM

## 2022-08-11 DIAGNOSIS — R60.1 ANASARCA: Primary | ICD-10-CM

## 2022-08-11 DIAGNOSIS — I50.23 ACUTE ON CHRONIC SYSTOLIC CHF (CONGESTIVE HEART FAILURE), NYHA CLASS 4 (HCC): ICD-10-CM

## 2022-08-11 DIAGNOSIS — N18.31 STAGE 3A CHRONIC KIDNEY DISEASE (HCC): Primary | ICD-10-CM

## 2022-08-11 DIAGNOSIS — I10 HTN (HYPERTENSION), BENIGN: ICD-10-CM

## 2022-08-11 DIAGNOSIS — N39.0 URINARY TRACT INFECTION WITHOUT HEMATURIA, SITE UNSPECIFIED: ICD-10-CM

## 2022-08-11 LAB
ALBUMIN SERPL-MCNC: 3.6 G/DL (ref 3.5–5.1)
ALP BLD-CCNC: 78 U/L (ref 38–126)
ALT SERPL-CCNC: 11 U/L (ref 11–66)
ANION GAP SERPL CALCULATED.3IONS-SCNC: 11 MEQ/L (ref 8–16)
AST SERPL-CCNC: 16 U/L (ref 5–40)
BACTERIA: ABNORMAL /HPF
BASOPHILS # BLD: 0.6 %
BASOPHILS ABSOLUTE: 0 THOU/MM3 (ref 0–0.1)
BILIRUB SERPL-MCNC: 0.6 MG/DL (ref 0.3–1.2)
BILIRUBIN DIRECT: < 0.2 MG/DL (ref 0–0.3)
BILIRUBIN URINE: NEGATIVE
BLOOD, URINE: NEGATIVE
BUN BLDV-MCNC: 21 MG/DL (ref 7–22)
CALCIUM SERPL-MCNC: 9.1 MG/DL (ref 8.5–10.5)
CASTS 2: ABNORMAL /LPF
CASTS UA: ABNORMAL /LPF
CHARACTER, URINE: ABNORMAL
CHLORIDE BLD-SCNC: 105 MEQ/L (ref 98–111)
CO2: 25 MEQ/L (ref 23–33)
COLOR: YELLOW
CREAT SERPL-MCNC: 1.3 MG/DL (ref 0.4–1.2)
CRYSTALS, UA: ABNORMAL
EKG ATRIAL RATE: 58 BPM
EKG P AXIS: 57 DEGREES
EKG P-R INTERVAL: 246 MS
EKG Q-T INTERVAL: 402 MS
EKG QRS DURATION: 90 MS
EKG QTC CALCULATION (BAZETT): 394 MS
EKG T AXIS: 88 DEGREES
EKG VENTRICULAR RATE: 58 BPM
EOSINOPHIL # BLD: 4.4 %
EOSINOPHILS ABSOLUTE: 0.2 THOU/MM3 (ref 0–0.4)
EPITHELIAL CELLS, UA: ABNORMAL /HPF
ERYTHROCYTE [DISTWIDTH] IN BLOOD BY AUTOMATED COUNT: 12.9 % (ref 11.5–14.5)
ERYTHROCYTE [DISTWIDTH] IN BLOOD BY AUTOMATED COUNT: 47.6 FL (ref 35–45)
GFR SERPL CREATININE-BSD FRML MDRD: 63 ML/MIN/1.73M2
GLUCOSE BLD-MCNC: 87 MG/DL (ref 70–108)
GLUCOSE URINE: NEGATIVE MG/DL
HCT VFR BLD CALC: 47.2 % (ref 42–52)
HEMOGLOBIN: 14.3 GM/DL (ref 14–18)
IMMATURE GRANS (ABS): 0.02 THOU/MM3 (ref 0–0.07)
IMMATURE GRANULOCYTES: 0.4 %
KETONES, URINE: ABNORMAL
LEUKOCYTE ESTERASE, URINE: ABNORMAL
LYMPHOCYTES # BLD: 15.7 %
LYMPHOCYTES ABSOLUTE: 0.8 THOU/MM3 (ref 1–4.8)
MCH RBC QN AUTO: 30.5 PG (ref 26–33)
MCHC RBC AUTO-ENTMCNC: 30.3 GM/DL (ref 32.2–35.5)
MCV RBC AUTO: 100.6 FL (ref 80–94)
MISCELLANEOUS 2: ABNORMAL
MONOCYTES # BLD: 9 %
MONOCYTES ABSOLUTE: 0.4 THOU/MM3 (ref 0.4–1.3)
NITRITE, URINE: POSITIVE
NUCLEATED RED BLOOD CELLS: 0 /100 WBC
OSMOLALITY CALCULATION: 283.6 MOSMOL/KG (ref 275–300)
PH UA: 5.5 (ref 5–9)
PLATELET # BLD: 144 THOU/MM3 (ref 130–400)
PMV BLD AUTO: 10.2 FL (ref 9.4–12.4)
POTASSIUM REFLEX MAGNESIUM: 4.1 MEQ/L (ref 3.5–5.2)
PRO-BNP: 430.7 PG/ML (ref 0–1800)
PROTEIN UA: ABNORMAL
RBC # BLD: 4.69 MILL/MM3 (ref 4.7–6.1)
RBC URINE: ABNORMAL /HPF
RENAL EPITHELIAL, UA: ABNORMAL
SEG NEUTROPHILS: 69.9 %
SEGMENTED NEUTROPHILS ABSOLUTE COUNT: 3.4 THOU/MM3 (ref 1.8–7.7)
SODIUM BLD-SCNC: 141 MEQ/L (ref 135–145)
SPECIFIC GRAVITY, URINE: 1.02 (ref 1–1.03)
TOTAL PROTEIN: 6.5 G/DL (ref 6.1–8)
TROPONIN T: 0.01 NG/ML
UROBILINOGEN, URINE: 1 EU/DL (ref 0–1)
WBC # BLD: 4.8 THOU/MM3 (ref 4.8–10.8)
WBC UA: > 100 /HPF
YEAST: ABNORMAL

## 2022-08-11 PROCEDURE — 80048 BASIC METABOLIC PNL TOTAL CA: CPT

## 2022-08-11 PROCEDURE — 87077 CULTURE AEROBIC IDENTIFY: CPT

## 2022-08-11 PROCEDURE — 2500000003 HC RX 250 WO HCPCS: Performed by: STUDENT IN AN ORGANIZED HEALTH CARE EDUCATION/TRAINING PROGRAM

## 2022-08-11 PROCEDURE — 84484 ASSAY OF TROPONIN QUANT: CPT

## 2022-08-11 PROCEDURE — 1036F TOBACCO NON-USER: CPT | Performed by: INTERNAL MEDICINE

## 2022-08-11 PROCEDURE — 99214 OFFICE O/P EST MOD 30 MIN: CPT | Performed by: INTERNAL MEDICINE

## 2022-08-11 PROCEDURE — 2580000003 HC RX 258: Performed by: STUDENT IN AN ORGANIZED HEALTH CARE EDUCATION/TRAINING PROGRAM

## 2022-08-11 PROCEDURE — 83880 ASSAY OF NATRIURETIC PEPTIDE: CPT

## 2022-08-11 PROCEDURE — 87086 URINE CULTURE/COLONY COUNT: CPT

## 2022-08-11 PROCEDURE — 81001 URINALYSIS AUTO W/SCOPE: CPT

## 2022-08-11 PROCEDURE — 80076 HEPATIC FUNCTION PANEL: CPT

## 2022-08-11 PROCEDURE — G8417 CALC BMI ABV UP PARAM F/U: HCPCS | Performed by: INTERNAL MEDICINE

## 2022-08-11 PROCEDURE — G8427 DOCREV CUR MEDS BY ELIG CLIN: HCPCS | Performed by: INTERNAL MEDICINE

## 2022-08-11 PROCEDURE — 99285 EMERGENCY DEPT VISIT HI MDM: CPT

## 2022-08-11 PROCEDURE — 99223 1ST HOSP IP/OBS HIGH 75: CPT | Performed by: INTERNAL MEDICINE

## 2022-08-11 PROCEDURE — 1123F ACP DISCUSS/DSCN MKR DOCD: CPT | Performed by: INTERNAL MEDICINE

## 2022-08-11 PROCEDURE — 1200000003 HC TELEMETRY R&B

## 2022-08-11 PROCEDURE — 87186 SC STD MICRODIL/AGAR DIL: CPT

## 2022-08-11 PROCEDURE — 6360000002 HC RX W HCPCS: Performed by: STUDENT IN AN ORGANIZED HEALTH CARE EDUCATION/TRAINING PROGRAM

## 2022-08-11 PROCEDURE — 71045 X-RAY EXAM CHEST 1 VIEW: CPT

## 2022-08-11 PROCEDURE — 85025 COMPLETE CBC W/AUTO DIFF WBC: CPT

## 2022-08-11 PROCEDURE — 36415 COLL VENOUS BLD VENIPUNCTURE: CPT

## 2022-08-11 RX ORDER — BUMETANIDE 2 MG/1
2 TABLET ORAL 2 TIMES DAILY
Qty: 180 TABLET | Refills: 1
Start: 2022-08-11

## 2022-08-11 RX ORDER — BUMETANIDE 0.25 MG/ML
2 INJECTION, SOLUTION INTRAMUSCULAR; INTRAVENOUS ONCE
Status: COMPLETED | OUTPATIENT
Start: 2022-08-11 | End: 2022-08-11

## 2022-08-11 RX ADMIN — CEFTRIAXONE SODIUM 1000 MG: 1 INJECTION, POWDER, FOR SOLUTION INTRAMUSCULAR; INTRAVENOUS at 22:08

## 2022-08-11 RX ADMIN — BUMETANIDE 2 MG: 0.25 INJECTION INTRAMUSCULAR; INTRAVENOUS at 22:08

## 2022-08-11 ASSESSMENT — ENCOUNTER SYMPTOMS
CHOKING: 0
RECTAL PAIN: 0
CHEST TIGHTNESS: 0
COUGH: 0
SINUS PAIN: 0
SHORTNESS OF BREATH: 1
EYE DISCHARGE: 0
FACIAL SWELLING: 0
SINUS PRESSURE: 0
EYE PAIN: 0
BLOOD IN STOOL: 0
ABDOMINAL PAIN: 0
PHOTOPHOBIA: 0
APNEA: 0
EYE REDNESS: 0
ANAL BLEEDING: 0

## 2022-08-11 ASSESSMENT — PAIN - FUNCTIONAL ASSESSMENT: PAIN_FUNCTIONAL_ASSESSMENT: NONE - DENIES PAIN

## 2022-08-11 NOTE — ED PROVIDER NOTES
[facility] ENCOUNTER          Pt Name: Kathy Musa  MRN: [id number]  [place] 1935  Date of evaluation: 8/11/2022  Treating Resident Physician: Shayne Kraft MD  Supervising Physician: Flynn Díaz, [address] [place] [address]       Chief Complaint   Patient presents with    Leg Swelling     History obtained from the patient. HISTORY OF PRESENT ILLNESS    HPI  Kathy Musa is a 81 y.o. male with PMHx of CKD, CAD, hyperlipidemia, hypertension, DVT, PVD, prostate cancer who presents to the emergency department for evaluation of leg swelling. Patient to ED due to increasing leg swelling and shortness of breath. The symptoms have been increasing for the past month. Seen by his nephrologist today and advised to come to ED for further evaluation. Nephrologist also increased his Bumex to 2 mg p.o. twice daily today. Per wife, patient is not mobile much. Sits in wheelchair a lot. Patient is currently only taking aspirin. Does have an IVC filter. The patient has no other acute complaints at this time. REVIEW OF SYSTEMS   Review of Systems   Constitutional:  Negative for activity change, chills, diaphoresis and fatigue. HENT:  Negative for congestion, ear discharge, ear pain, facial swelling, hearing loss, sinus pressure and sinus pain. Eyes:  Negative for photophobia, pain, discharge and redness. Respiratory:  Positive for shortness of breath. Negative for apnea, cough, choking and chest tightness. Cardiovascular:  Positive for leg swelling. Negative for chest pain. Gastrointestinal:  Negative for abdominal pain, anal bleeding, blood in stool and rectal pain. Endocrine: Negative for polydipsia, polyphagia and polyuria. Genitourinary:  Negative for dysuria, flank pain, genital sores and hematuria. Musculoskeletal:  Negative for gait problem, joint swelling, myalgias, neck pain and neck stiffness.    Skin:  Negative for pallor, rash and wound. Neurological:  Negative for tremors, seizures, syncope, facial asymmetry and headaches. Hematological:  Negative for adenopathy. Psychiatric/Behavioral:  Negative for agitation, behavioral problems, hallucinations, self-injury and suicidal ideas. PAST MEDICAL AND SURGICAL HISTORY     Past Medical History:   Diagnosis Date    CAD (coronary artery disease)     Chronic kidney disease, stage III (moderate) (HCC)     Closed fracture of six ribs of left side 11/27/2020    COPD (chronic obstructive pulmonary disease) ([facility] [address] 12/12/2020    DVT, lower extremity (HCC)     Gastritis     Hematuria     Hypercholesteremia     Hypertension     Intracranial bleed (HCC)     Nasal bone fracture 11/27/2020    Osteoarthritis     Presence of IVC filter     Prostate cancer (HCC)     Seed implants    Spinal stenosis, lumbar, s/p laminectomy 6/1/2015    Statin-induced myositis      Past Surgical History:   Procedure Laterality Date    BACK SURGERY  2006    fusion    CARDIAC SURGERY      2 stents    COSMETIC SURGERY      ENDOSCOPY, COLON, DIAGNOSTIC      KNEE SURGERY      NECK SURGERY      OTHER SURGICAL HISTORY  5/29/2015    DECOMPRESSIVE LUMBAR LAMINECTOMY L2-3    ROTATOR CUFF REPAIR      TOTAL HIP ARTHROPLASTY Bilateral          MEDICATIONS     Current Facility-Administered Medications:     cefTRIAXone (ROCEPHIN) 1,000 mg in dextrose 5 % 50 mL IVPB mini-bag, 1,000 mg, IntraVENous, Once, Griselda Del Cid MD    Current Outpatient Medications:     bumetanide (BUMEX) 2 MG tablet, Take 1 tablet by mouth in the morning and 1 tablet before bedtime. , Disp: 180 tablet, Rfl: 1    metoprolol tartrate (LOPRESSOR) 50 MG tablet, Take 50 mg by mouth 2 times daily, Disp: , Rfl:     pravastatin (PRAVACHOL) 80 MG tablet, Take 80 mg by mouth daily, Disp: , Rfl:     Naproxen Sodium (ALEVE PO), Take by mouth as needed, Disp: , Rfl:     fluticasone-vilanterol (BREO ELLIPTA) 100-25 MCG/INH AEPB inhaler, Inhale into the lungs seen to have worsening fluid overload status. Bumex was increased to 2 mg p.o. twice daily. PHYSICAL EXAM     ED Triage Vitals [08/11/22 1739]   BP Temp Temp Source Heart Rate Resp SpO2 Height Weight   (!) 172/82 97.8 °F (36.6 °C) Oral 67 22 95 % 5' 9\" (1.753 m) 235 lb (106.6 kg)     Initial vital signs and nursing assessment reviewed and abnormal from hypertension . Body mass index is 34.7 kg/m². Pulsoximetry is normal per my interpretation. Additional Vital Signs:  Vitals:    08/11/22 1739   BP: (!) 172/82   Pulse: 67   Resp: 22   Temp: 97.8 °F (36.6 °C)   SpO2: 95%       Physical Exam  Constitutional:       Appearance: He is obese. He is ill-appearing. He is not diaphoretic. HENT:      Head: Normocephalic and atraumatic. Right Ear: External ear normal.      Left Ear: External ear normal.      Nose: Nose normal. No congestion or rhinorrhea. Mouth/Throat:      Mouth: Mucous membranes are moist.      Pharynx: Oropharynx is clear. Eyes:      General: No scleral icterus. Right eye: No discharge. Left eye: No discharge. Extraocular Movements: Extraocular movements intact. Conjunctiva/sclera: Conjunctivae normal.      Pupils: Pupils are equal, round, and reactive to light. Cardiovascular:      Rate and Rhythm: Normal rate and regular rhythm. Pulses: Normal pulses. Heart sounds: Normal heart sounds. No murmur heard. Pulmonary:      Effort: Pulmonary effort is normal. No respiratory distress. Breath sounds: No stridor. Examination of the right-middle field reveals rhonchi. Examination of the left-middle field reveals rhonchi. Examination of the right-lower field reveals rhonchi. Examination of the left-lower field reveals rhonchi. Rhonchi present. No wheezing. Chest:      Chest wall: No tenderness. Abdominal:      General: Abdomen is flat. Bowel sounds are normal. There is no distension. Palpations: Abdomen is soft. There is no mass.       Tenderness: There is no abdominal tenderness. There is no guarding or rebound. Musculoskeletal:         General: Swelling and tenderness present. No deformity or signs of injury. Normal range of motion. Cervical back: Normal range of motion and neck supple. Right lower le+ Pitting Edema present. Left lower le+ Pitting Edema present. Skin:     General: Skin is warm and dry. Capillary Refill: Capillary refill takes less than 2 seconds. Coloration: Skin is not jaundiced. Findings: No bruising or erythema. Neurological:      General: No focal deficit present. Mental Status: He is alert and oriented to person, place, and time. Motor: No weakness. Psychiatric:         Mood and Affect: Mood normal.         Behavior: Behavior normal.         Thought Content:  Thought content normal.     ED RESULTS   Laboratory results:  Labs Reviewed   BASIC METABOLIC PANEL W/ REFLEX TO MG FOR LOW K - Abnormal; Notable for the following components:       Result Value    Creatinine 1.3 (*)     All other components within normal limits   CBC WITH AUTO DIFFERENTIAL - Abnormal; Notable for the following components:    RBC 4.69 (*)     .6 (*)     MCHC 30.3 (*)     RDW-SD 47.6 (*)     Lymphocytes Absolute 0.8 (*)     All other components within normal limits   TROPONIN - Abnormal; Notable for the following components:    Troponin T 0.014 (*)     All other components within normal limits   GLOMERULAR FILTRATION RATE, ESTIMATED - Abnormal; Notable for the following components:    Est, Glom Filt Rate 63 (*)     All other components within normal limits   URINE WITH REFLEXED MICRO - Abnormal; Notable for the following components:    Ketones, Urine TRACE (*)     Protein, UA TRACE (*)     Nitrite, Urine POSITIVE (*)     Leukocyte Esterase, Urine MODERATE (*)     All other components within normal limits   CULTURE, REFLEXED, URINE    Narrative:     Source: urine, clean catch       Site:           Current Antibiotics: not stated   HEPATIC FUNCTION PANEL   BRAIN NATRIURETIC PEPTIDE   ANION GAP   OSMOLALITY       Radiologic studies results:  XR CHEST PORTABLE   Final Result   Left basilar pleural thickening without change. This may be secondary to    scarring. Cannot exclude a small effusion. This document has been electronically signed by: Maritza Decker MD on    08/11/2022 06:54 PM          ED Medications administered this visit:   Medications   cefTRIAXone (ROCEPHIN) 1,000 mg in dextrose 5 % 50 mL IVPB mini-bag (has no administration in time range)         ED COURSE     ED Course as of 08/11/22 2127   Thu Aug 11, 2022   1913 Creatinine(!): 1.3  Improved  [EL]   1913 XR CHEST PORTABLE  Left basilar pleural thickening without change. This may be secondary to  scarring. Cannot exclude a small effusion. [EL]   1917 Troponin T(!): 0.014  baseline [EL]   2029 Nitrite, Urine(!): POSITIVE [EL]   2029 Leukocyte Esterase, Urine(!): MODERATE [EL]      ED Course User Index  [EL] Neli Navarro MD     MEDICAL DECISION MAKING   Initial Assessment:   81-year-old male chief complaint leg swelling x1 month. Past medical history significant for CKD, CAD, DVT, prostate cancer, COPD    Given the patient's above chief complaint and findings on history and physical examination, I thought it was appropriate to consider the following emergency medical conditions:  Pulmonary edema, CAD, ACS, NSTEMI, STEMI, kidney failure, ALDA, DVT, CHF, volume overload  Although some of these diagnoses are unlikely they were considered in my medical decision making. 81-year-old male chief complaint leg swelling x1 month. Past medical history significant for CKD, CAD, DVT, prostate cancer, COPD. Seen by nephrology today and sent to ED for further evaluation due to volume overload status. On physical exam, patient has anasarca, 4+ pitting edema bilaterally. Lung sounds wet. Troponin 0.014 which is baseline. proBNP 430.   Patient does have a UTI with positive nitrites. Given 1g ceftriaxone in ED. Would like to admit this patient for pulmonary edema, anasarca. MEDICATION CHANGES     New Prescriptions    No medications on file         FINAL DISPOSITION     Final diagnoses:   Leg swelling   Anasarca   Acute pulmonary edema (HCC)     Condition: condition: stable  Dispo: Admit to med/surg floor      This transcription was electronically signed. Parts of this transcriptions may have been dictated by use of voice recognition software and electronically transcribed, and parts may have been transcribed with the assistance of an ED scribe. The transcription may contain errors not detected in proofreading. Please refer to my supervising physician's documentation if my documentation differs.     Electronically Signed: Helena Hodgkin, MD, 08/11/22, 9:27 PM         Lilliam Hopper MD  Resident  08/11/22 3973

## 2022-08-11 NOTE — TELEPHONE ENCOUNTER
Dr. Radhames Urbano would like to speak to Dr. [facility] about this patient. He asked me to call and have her call his cell phone.

## 2022-08-11 NOTE — PROGRESS NOTES
[facility]        Outpatient Follow-Up note         8/11/2022 11:23 AM    Patient Name:   Mallorie Smith  YOB: 1935  Primary Care Physician:  Manda Harada, MD     Chief Complaint / Reason for follow-up : Follow Up of CKD     Interval History :  Patient seen and examined by me. Feels well. No chest pain shortness of breath . Patient apparently has been having swelling, bilateral lower extremities moderate in severity associated with a weight gain of at least 10 pounds, no associated symptoms of shortness of breath. Not much improvement with p.o. Bumex     Past History :  Past Medical History:   Diagnosis Date    CAD (coronary artery disease)     Chronic kidney disease, stage III (moderate) (HCC)     Closed fracture of six ribs of left side 11/27/2020    COPD (chronic obstructive pulmonary disease) ([facility] [address] 12/12/2020    DVT, lower extremity (HCC)     Gastritis     Hematuria     Hypercholesteremia     Hypertension     Intracranial bleed (HCC)     Nasal bone fracture 11/27/2020    Osteoarthritis     Presence of IVC filter     Prostate cancer (HCC)     Seed implants    Spinal stenosis, lumbar, s/p laminectomy 6/1/2015    Statin-induced myositis      Past Surgical History:   Procedure Laterality Date    BACK SURGERY  2006    fusion    CARDIAC SURGERY      2 stents    COSMETIC SURGERY      ENDOSCOPY, COLON, DIAGNOSTIC      KNEE SURGERY      NECK SURGERY      OTHER SURGICAL HISTORY  5/29/2015    DECOMPRESSIVE LUMBAR LAMINECTOMY L2-3    ROTATOR CUFF REPAIR      TOTAL HIP ARTHROPLASTY Bilateral         Medications :     Outpatient Medications Marked as Taking for the 8/11/22 encounter (Office Visit) with Aren Wing MD   Medication Sig Dispense Refill    bumetanide (BUMEX) 1 MG tablet Take 1 tablet by mouth in the morning and 1 tablet before bedtime.  60 tablet 0    metoprolol tartrate (LOPRESSOR) 50 MG tablet Take 50 mg by mouth 2 times daily      pravastatin (PRAVACHOL) 80 MG tablet Take 80 mg by mouth daily      Naproxen Sodium (ALEVE PO) Take by mouth as needed      fluticasone-vilanterol (BREO ELLIPTA) 100-25 MCG/INH AEPB inhaler Inhale into the lungs daily      oxybutynin (DITROPAN XL) 10 MG extended release tablet Take 1 tablet by mouth daily 90 tablet 3    oxybutynin (DITROPAN) 5 MG tablet TAKE 1 TABLET TWICE A  tablet 3    finasteride (PROSCAR) 5 MG tablet TAKE 1 TABLET DAILY 90 tablet 3    tamsulosin (FLOMAX) 0.4 MG capsule Take 1 capsule by mouth daily 90 capsule 3    nitroGLYCERIN (NITROSTAT) 0.4 MG SL tablet Place 1 tablet under the tongue every 5 minutes as needed for Chest pain up to max of 3 total doses.  If no relief after 1 dose, call 911. 25 tablet 3    aspirin 81 MG chewable tablet Take 1 tablet by mouth daily 30 tablet 3    albuterol sulfate  (90 Base) MCG/ACT inhaler Inhale 1 puff into the lungs every 4 hours as needed for Wheezing      Cholecalciferol (VITAMIN D3) 5000 UNITS TABS Take 5,000 Units by mouth daily          Vitals     BP (!) 152/79 (Site: Right Upper Arm, Position: Sitting, Cuff Size: Small Adult)   Pulse 84   Ht 5' 9\" (1.753 m)   Wt 235 lb (106.6 kg)   SpO2 92%   BMI 34.70 kg/m²  Wt Readings from Last 3 Encounters:   08/11/22 235 lb (106.6 kg)   07/26/22 233 lb (105.7 kg)   07/06/22 223 lb (101.2 kg)        Physical Exam     General -- no distress  Lungs -- clear  Heart -- S1, S2 heard, JVD - no  Abdomen - soft, non-tender  Extremities -- edema noted B/L  CNS - awake and alert    Labs, Radiology and Tests    Labs -    4/16 4/17 4/18 4/19 7/20 7/21 6/22 8/22    Potassium 4.2 4.3 4.8 4.0 4.2 5.0 4.3 4.6    BUN 21 22 22 22 23 21 16 21    Creatinine 1.3 1.2 1.2 1.35 1.32 1.55 1.25 1.47    eGFR  52 49 45 52 52 52 46                UPCR  250 290 100 90 270 406     UMCR  38 37 15 3.9                     Renal Ultrasound Scan -- 3/2016  Right kidney 12 cm left kidney 12.3 cm   Bilateral hydronephrosis and left hydroureter nephrosis Increase in resistive indices consistent with medical renal disease   2.9 cm cyst on the right kidney      Assessment    Chronic kidney disease stage III - chronic kidney disease secondary to contrast-induced nephropathy and long-standing hypertension and senile nephrosclerosis. - Patient's GFR is overall stable at this time however patient does bilateral worsening lower extremity edema   -We will increase his Bumex to 2 mg p.o. twice daily. Call back with the weight may be in a week or so we will check a BMP as well and week   -has DVT of the left leg   Essential hypertension-running well continue current meds  Extensive DVT in the past - currently is only on aspirin. Still has DVT   BPH/prostrate cancer-stable, follows up with urology. Cyst on the right kidney. Fluid overload -worsened plan as mentioned above  FU in 6 weeks . Tried calling the PCP office with Dr. Nico Hubbard but unable to reach will let my office call so that we can discuss with her about the DVT and what further plans are    Tests and orders placed this Encounter     Orders Placed This Encounter   Procedures    Basic Metabolic Panel    Basic Metabolic Ashley Edmond M.D  [facility].

## 2022-08-12 ENCOUNTER — APPOINTMENT (OUTPATIENT)
Dept: CT IMAGING | Age: 87
DRG: 291 | End: 2022-08-12
Payer: MEDICARE

## 2022-08-12 ENCOUNTER — APPOINTMENT (OUTPATIENT)
Dept: INTERVENTIONAL RADIOLOGY/VASCULAR | Age: 87
DRG: 291 | End: 2022-08-12
Payer: MEDICARE

## 2022-08-12 LAB
ANION GAP SERPL CALCULATED.3IONS-SCNC: 11 MEQ/L (ref 8–16)
BASOPHILS # BLD: 0.5 %
BASOPHILS ABSOLUTE: 0 THOU/MM3 (ref 0–0.1)
BUN BLDV-MCNC: 19 MG/DL (ref 7–22)
CALCIUM SERPL-MCNC: 9.4 MG/DL (ref 8.5–10.5)
CHLORIDE BLD-SCNC: 104 MEQ/L (ref 98–111)
CHOLESTEROL, TOTAL: 244 MG/DL (ref 100–199)
CO2: 25 MEQ/L (ref 23–33)
CREAT SERPL-MCNC: 1.2 MG/DL (ref 0.4–1.2)
EOSINOPHIL # BLD: 6 %
EOSINOPHILS ABSOLUTE: 0.3 THOU/MM3 (ref 0–0.4)
ERYTHROCYTE [DISTWIDTH] IN BLOOD BY AUTOMATED COUNT: 12.7 % (ref 11.5–14.5)
ERYTHROCYTE [DISTWIDTH] IN BLOOD BY AUTOMATED COUNT: 47.1 FL (ref 35–45)
FERRITIN: 135 NG/ML (ref 22–322)
GFR SERPL CREATININE-BSD FRML MDRD: 70 ML/MIN/1.73M2
GLUCOSE BLD-MCNC: 84 MG/DL (ref 70–108)
HCT VFR BLD CALC: 49.2 % (ref 42–52)
HDLC SERPL-MCNC: 51 MG/DL
HEMOGLOBIN: 14.9 GM/DL (ref 14–18)
IMMATURE GRANS (ABS): 0.03 THOU/MM3 (ref 0–0.07)
IMMATURE GRANULOCYTES: 0.5 %
IRON SATURATION: 34 % (ref 20–50)
IRON: 80 UG/DL (ref 65–195)
LDL CHOLESTEROL CALCULATED: 172 MG/DL
LV EF: 50 %
LVEF MODALITY: NORMAL
LYMPHOCYTES # BLD: 20.7 %
LYMPHOCYTES ABSOLUTE: 1.2 THOU/MM3 (ref 1–4.8)
MAGNESIUM: 2.3 MG/DL (ref 1.6–2.4)
MCH RBC QN AUTO: 30.4 PG (ref 26–33)
MCHC RBC AUTO-ENTMCNC: 30.3 GM/DL (ref 32.2–35.5)
MCV RBC AUTO: 100.4 FL (ref 80–94)
MONOCYTES # BLD: 10.7 %
MONOCYTES ABSOLUTE: 0.6 THOU/MM3 (ref 0.4–1.3)
NUCLEATED RED BLOOD CELLS: 0 /100 WBC
ORGANISM: ABNORMAL
PLATELET # BLD: 145 THOU/MM3 (ref 130–400)
PMV BLD AUTO: 10.6 FL (ref 9.4–12.4)
POTASSIUM SERPL-SCNC: 4 MEQ/L (ref 3.5–5.2)
RBC # BLD: 4.9 MILL/MM3 (ref 4.7–6.1)
SEG NEUTROPHILS: 61.6 %
SEGMENTED NEUTROPHILS ABSOLUTE COUNT: 3.5 THOU/MM3 (ref 1.8–7.7)
SODIUM BLD-SCNC: 140 MEQ/L (ref 135–145)
T4 FREE: 1.4 NG/DL (ref 0.93–1.76)
TOTAL IRON BINDING CAPACITY: 235 UG/DL (ref 171–450)
TRIGL SERPL-MCNC: 105 MG/DL (ref 0–199)
TSH SERPL DL<=0.05 MIU/L-ACNC: 3.89 UIU/ML (ref 0.4–4.2)
URINE CULTURE REFLEX: ABNORMAL
WBC # BLD: 5.7 THOU/MM3 (ref 4.8–10.8)

## 2022-08-12 PROCEDURE — 6360000004 HC RX CONTRAST MEDICATION: Performed by: INTERNAL MEDICINE

## 2022-08-12 PROCEDURE — 82728 ASSAY OF FERRITIN: CPT

## 2022-08-12 PROCEDURE — 83735 ASSAY OF MAGNESIUM: CPT

## 2022-08-12 PROCEDURE — 6370000000 HC RX 637 (ALT 250 FOR IP): Performed by: INTERNAL MEDICINE

## 2022-08-12 PROCEDURE — 94640 AIRWAY INHALATION TREATMENT: CPT

## 2022-08-12 PROCEDURE — 1200000003 HC TELEMETRY R&B

## 2022-08-12 PROCEDURE — 97110 THERAPEUTIC EXERCISES: CPT

## 2022-08-12 PROCEDURE — 84439 ASSAY OF FREE THYROXINE: CPT

## 2022-08-12 PROCEDURE — 97162 PT EVAL MOD COMPLEX 30 MIN: CPT

## 2022-08-12 PROCEDURE — 71275 CT ANGIOGRAPHY CHEST: CPT

## 2022-08-12 PROCEDURE — 99233 SBSQ HOSP IP/OBS HIGH 50: CPT

## 2022-08-12 PROCEDURE — 80048 BASIC METABOLIC PNL TOTAL CA: CPT

## 2022-08-12 PROCEDURE — 2500000003 HC RX 250 WO HCPCS: Performed by: INTERNAL MEDICINE

## 2022-08-12 PROCEDURE — 94760 N-INVAS EAR/PLS OXIMETRY 1: CPT

## 2022-08-12 PROCEDURE — 80061 LIPID PANEL: CPT

## 2022-08-12 PROCEDURE — 36415 COLL VENOUS BLD VENIPUNCTURE: CPT

## 2022-08-12 PROCEDURE — 85025 COMPLETE CBC W/AUTO DIFF WBC: CPT

## 2022-08-12 PROCEDURE — 93970 EXTREMITY STUDY: CPT

## 2022-08-12 PROCEDURE — 6360000002 HC RX W HCPCS: Performed by: INTERNAL MEDICINE

## 2022-08-12 PROCEDURE — 93306 TTE W/DOPPLER COMPLETE: CPT

## 2022-08-12 PROCEDURE — 6360000002 HC RX W HCPCS

## 2022-08-12 PROCEDURE — 2580000003 HC RX 258

## 2022-08-12 PROCEDURE — 83550 IRON BINDING TEST: CPT

## 2022-08-12 PROCEDURE — 2580000003 HC RX 258: Performed by: INTERNAL MEDICINE

## 2022-08-12 PROCEDURE — 84443 ASSAY THYROID STIM HORMONE: CPT

## 2022-08-12 PROCEDURE — 6370000000 HC RX 637 (ALT 250 FOR IP)

## 2022-08-12 PROCEDURE — 83540 ASSAY OF IRON: CPT

## 2022-08-12 RX ORDER — ASPIRIN 81 MG/1
81 TABLET, CHEWABLE ORAL DAILY
Status: DISCONTINUED | OUTPATIENT
Start: 2022-08-12 | End: 2022-08-18 | Stop reason: HOSPADM

## 2022-08-12 RX ORDER — BUMETANIDE 0.25 MG/ML
2 INJECTION, SOLUTION INTRAMUSCULAR; INTRAVENOUS
Status: DISCONTINUED | OUTPATIENT
Start: 2022-08-12 | End: 2022-08-17

## 2022-08-12 RX ORDER — ENOXAPARIN SODIUM 100 MG/ML
40 INJECTION SUBCUTANEOUS DAILY
Status: DISCONTINUED | OUTPATIENT
Start: 2022-08-12 | End: 2022-08-12 | Stop reason: SDUPTHER

## 2022-08-12 RX ORDER — SODIUM CHLORIDE 0.9 % (FLUSH) 0.9 %
5-40 SYRINGE (ML) INJECTION PRN
Status: DISCONTINUED | OUTPATIENT
Start: 2022-08-12 | End: 2022-08-18 | Stop reason: HOSPADM

## 2022-08-12 RX ORDER — SODIUM CHLORIDE 0.9 % (FLUSH) 0.9 %
5-40 SYRINGE (ML) INJECTION EVERY 12 HOURS SCHEDULED
Status: DISCONTINUED | OUTPATIENT
Start: 2022-08-12 | End: 2022-08-18 | Stop reason: HOSPADM

## 2022-08-12 RX ORDER — FINASTERIDE 5 MG/1
5 TABLET, FILM COATED ORAL DAILY
Status: DISCONTINUED | OUTPATIENT
Start: 2022-08-12 | End: 2022-08-18 | Stop reason: HOSPADM

## 2022-08-12 RX ORDER — VITAMIN B COMPLEX
5000 TABLET ORAL DAILY
Status: DISCONTINUED | OUTPATIENT
Start: 2022-08-12 | End: 2022-08-18 | Stop reason: HOSPADM

## 2022-08-12 RX ORDER — POLYETHYLENE GLYCOL 3350 17 G/17G
17 POWDER, FOR SOLUTION ORAL DAILY PRN
Status: DISCONTINUED | OUTPATIENT
Start: 2022-08-12 | End: 2022-08-18 | Stop reason: HOSPADM

## 2022-08-12 RX ORDER — ONDANSETRON 4 MG/1
4 TABLET, ORALLY DISINTEGRATING ORAL EVERY 8 HOURS PRN
Status: DISCONTINUED | OUTPATIENT
Start: 2022-08-12 | End: 2022-08-18 | Stop reason: HOSPADM

## 2022-08-12 RX ORDER — HYDRALAZINE HYDROCHLORIDE 25 MG/1
25 TABLET, FILM COATED ORAL EVERY 12 HOURS SCHEDULED
Status: DISCONTINUED | OUTPATIENT
Start: 2022-08-13 | End: 2022-08-18 | Stop reason: HOSPADM

## 2022-08-12 RX ORDER — ONDANSETRON 2 MG/ML
4 INJECTION INTRAMUSCULAR; INTRAVENOUS EVERY 6 HOURS PRN
Status: DISCONTINUED | OUTPATIENT
Start: 2022-08-12 | End: 2022-08-18 | Stop reason: HOSPADM

## 2022-08-12 RX ORDER — ACETAMINOPHEN 325 MG/1
650 TABLET ORAL EVERY 6 HOURS PRN
Status: DISCONTINUED | OUTPATIENT
Start: 2022-08-12 | End: 2022-08-18 | Stop reason: HOSPADM

## 2022-08-12 RX ORDER — PRAVASTATIN SODIUM 80 MG/1
80 TABLET ORAL DAILY
Status: DISCONTINUED | OUTPATIENT
Start: 2022-08-12 | End: 2022-08-18 | Stop reason: HOSPADM

## 2022-08-12 RX ORDER — METOPROLOL TARTRATE 50 MG/1
50 TABLET, FILM COATED ORAL 2 TIMES DAILY
Status: DISCONTINUED | OUTPATIENT
Start: 2022-08-12 | End: 2022-08-18 | Stop reason: HOSPADM

## 2022-08-12 RX ORDER — ACETAMINOPHEN 650 MG/1
650 SUPPOSITORY RECTAL EVERY 6 HOURS PRN
Status: DISCONTINUED | OUTPATIENT
Start: 2022-08-12 | End: 2022-08-18 | Stop reason: HOSPADM

## 2022-08-12 RX ORDER — OXYBUTYNIN CHLORIDE 10 MG/1
10 TABLET, EXTENDED RELEASE ORAL DAILY
Status: DISCONTINUED | OUTPATIENT
Start: 2022-08-12 | End: 2022-08-18 | Stop reason: HOSPADM

## 2022-08-12 RX ORDER — SODIUM CHLORIDE 9 MG/ML
INJECTION, SOLUTION INTRAVENOUS PRN
Status: DISCONTINUED | OUTPATIENT
Start: 2022-08-12 | End: 2022-08-18 | Stop reason: HOSPADM

## 2022-08-12 RX ORDER — HYDRALAZINE HYDROCHLORIDE 25 MG/1
25 TABLET, FILM COATED ORAL EVERY 8 HOURS SCHEDULED
Status: DISCONTINUED | OUTPATIENT
Start: 2022-08-12 | End: 2022-08-12

## 2022-08-12 RX ORDER — TAMSULOSIN HYDROCHLORIDE 0.4 MG/1
0.4 CAPSULE ORAL DAILY
Status: DISCONTINUED | OUTPATIENT
Start: 2022-08-12 | End: 2022-08-18 | Stop reason: HOSPADM

## 2022-08-12 RX ORDER — HEPARIN SODIUM 5000 [USP'U]/ML
5000 INJECTION, SOLUTION INTRAVENOUS; SUBCUTANEOUS EVERY 8 HOURS SCHEDULED
Status: DISCONTINUED | OUTPATIENT
Start: 2022-08-12 | End: 2022-08-13

## 2022-08-12 RX ORDER — IPRATROPIUM BROMIDE AND ALBUTEROL SULFATE 2.5; .5 MG/3ML; MG/3ML
1 SOLUTION RESPIRATORY (INHALATION) EVERY 6 HOURS PRN
Status: DISCONTINUED | OUTPATIENT
Start: 2022-08-12 | End: 2022-08-18 | Stop reason: HOSPADM

## 2022-08-12 RX ADMIN — OXYBUTYNIN CHLORIDE 10 MG: 10 TABLET, EXTENDED RELEASE ORAL at 07:48

## 2022-08-12 RX ADMIN — BUMETANIDE 2 MG: 0.25 INJECTION INTRAMUSCULAR; INTRAVENOUS at 15:34

## 2022-08-12 RX ADMIN — METOPROLOL TARTRATE 50 MG: 50 TABLET, FILM COATED ORAL at 03:13

## 2022-08-12 RX ADMIN — HEPARIN SODIUM 5000 UNITS: 5000 INJECTION INTRAVENOUS; SUBCUTANEOUS at 22:13

## 2022-08-12 RX ADMIN — HEPARIN SODIUM 5000 UNITS: 5000 INJECTION INTRAVENOUS; SUBCUTANEOUS at 13:07

## 2022-08-12 RX ADMIN — IOPAMIDOL 80 ML: 755 INJECTION, SOLUTION INTRAVENOUS at 01:16

## 2022-08-12 RX ADMIN — ASPIRIN 81 MG CHEWABLE TABLET 81 MG: 81 TABLET CHEWABLE at 07:48

## 2022-08-12 RX ADMIN — HYDRALAZINE HYDROCHLORIDE 25 MG: 25 TABLET, FILM COATED ORAL at 13:07

## 2022-08-12 RX ADMIN — HEPARIN SODIUM 5000 UNITS: 5000 INJECTION INTRAVENOUS; SUBCUTANEOUS at 06:29

## 2022-08-12 RX ADMIN — MOMETASONE FUROATE AND FORMOTEROL FUMARATE DIHYDRATE 2 PUFF: 100; 5 AEROSOL RESPIRATORY (INHALATION) at 09:03

## 2022-08-12 RX ADMIN — Medication 5000 UNITS: at 07:52

## 2022-08-12 RX ADMIN — TAMSULOSIN HYDROCHLORIDE 0.4 MG: 0.4 CAPSULE ORAL at 07:52

## 2022-08-12 RX ADMIN — BUMETANIDE 2 MG: 0.25 INJECTION INTRAMUSCULAR; INTRAVENOUS at 06:22

## 2022-08-12 RX ADMIN — CEFTRIAXONE SODIUM 1000 MG: 1 INJECTION, POWDER, FOR SOLUTION INTRAMUSCULAR; INTRAVENOUS at 22:29

## 2022-08-12 RX ADMIN — MOMETASONE FUROATE AND FORMOTEROL FUMARATE DIHYDRATE 2 PUFF: 100; 5 AEROSOL RESPIRATORY (INHALATION) at 20:37

## 2022-08-12 RX ADMIN — FINASTERIDE 5 MG: 5 TABLET, FILM COATED ORAL at 07:52

## 2022-08-12 RX ADMIN — SODIUM CHLORIDE, PRESERVATIVE FREE 10 ML: 5 INJECTION INTRAVENOUS at 07:51

## 2022-08-12 RX ADMIN — SODIUM CHLORIDE, PRESERVATIVE FREE 10 ML: 5 INJECTION INTRAVENOUS at 22:13

## 2022-08-12 RX ADMIN — PRAVASTATIN SODIUM 80 MG: 80 TABLET ORAL at 07:52

## 2022-08-12 RX ADMIN — METOPROLOL TARTRATE 50 MG: 50 TABLET, FILM COATED ORAL at 22:12

## 2022-08-12 NOTE — PLAN OF CARE
Problem: Safety - Adult  Goal: Free from fall injury  Outcome: Progressing   Patient using call light appropriately.

## 2022-08-12 NOTE — PROGRESS NOTES
[address] [place] [address]  INPATIENT PHYSICAL THERAPY  EVALUATION  STR MED SURG 8B - 8B-35/035-A    Time In: 3224  Time Out: 1145  Timed Code Treatment Minutes: 10 Minutes  Minutes: 21          Date: 2022  Patient Name: Ranulfo Doty,  Gender:  male        MRN: [id number]  : 1935  (81 y.o.)      Referring Practitioner: Lauryn Walters CNP  Diagnosis: Anasarca  Additional Pertinent Hx: Ranulfo Doty is a 81 y.o. male with PMHx of HFpEF, prostate cancer treated in , DVT status post IVC filter, CKD stage III, CAD status post PCI, hypertension who presents to [address] [place] [address] with aggressive lower extremity edema and weight gain. Patient has noted over the past several days that he has been gaining weight and has developed worsened lower extremity edema. He is also noticing shortness of breath and fatigue. He went to his nephrologist today and it was noted that he had gained weight and so his Bumex was increased in dose. Patient's symptoms worsened at home so he came to the emergency room. While in the ER he was found to be in what appeared to be acute congestive heart failure. He was started on IV diuretics. The urinalysis also demonstrated evidence of urinary tract infection and he has endorsed urinary frequency. Of note in chart review it appears that he had an ultrasound of his leg in March of this year which found a moderate sized DVT, however, patient denies ever being on an anticoagulant at this time. They state they were never notified of a blood clot. Restrictions/Precautions:  Restrictions/Precautions: Fall Risk    Subjective:  Chart Reviewed: Yes  Patient assessed for rehabilitation services?: Yes  Family / Caregiver Present: No  Subjective: RN approved session, pt is supine in bed, agreeable to PT.     General:  Overall Orientation Status: Within Functional Limits  Vision: Within Functional Limits  Hearing: Within functional limits       Pain: increased in B legs, knees when up, does not quantify    Vitals: Vitals not assessed per clinical judgement, see nursing flowsheet    Social/Functional History:    Lives With: Spouse  Type of Home: House  Home Layout: One level  Home Access: Stairs to enter without rails  Entrance Stairs - Number of Steps: 1  Home Equipment: Walker, standard, Wheelchair-manual        Ambulation Assistance: Independent  Transfer Assistance: Independent        Additional Comments: Pt amb with SW, wife does the housework    OBJECTIVE:  Range of Motion:  Bilateral Lower Extremity: WFL    Strength:  Bilateral Lower Extremity: Impaired - grossly 3+/5    Balance:  Static Sitting Balance:  Stand By Assistance  Static Standing Balance: Minimal Assistance, X 1  Dynamic Standing Balance: Minimal Assistance, X 1    Bed Mobility:  Supine to Sit: Contact Guard Assistance, X 1, with head of bed raised, with rail, with increased time for completion  Scooting: Stand By Assistance    Transfers:  Sit to Stand: Minimal Assistance, X 1, with increased time for completion, cues for hand placement  Stand to Sit:Minimal Assistance, X 1, with increased time for completion, cues for hand placement    Ambulation:  Minimal Assistance, X 1  Distance: 2 feet to recliner  Surface: Level Tile  Device:Rolling Walker  Gait Deviations: Forward Flexed Posture, Slow Anne [place], Decreased Step Length Bilaterally, Decreased Weight Shift Bilaterally, Decreased Gait Speed, Decreased Heel Strike Bilaterally, and Wide Base of Support  **Pt shaky when up, wide LEXII, increased posterior lean requiring constant min A for balance    Exercise:  Patient was guided in 1 set(s) 10 reps of exercise to both lower extremities. Ankle pumps, Heelslides, and Hip abduction/adduction. Exercises were completed for increased independence with functional mobility.     Functional Outcome Measures: Completed  AM-PAC Inpatient Mobility without Stair Climbing Raw Score : 13  AM-PAC Inpatient without Stair Climbing T-Scale Score : 38.96    ASSESSMENT:  Activity Tolerance:  Patient tolerance of  treatment: good. Treatment Initiated: Treatment and education initiated within context of evaluation. Evaluation time included review of current medical information, gathering information related to past medical, social and functional history, completion of standardized testing, formal and informal observation of tasks, assessment of data and development of plan of care and goals. Treatment time included skilled education and facilitation of tasks to increase safety and independence with functional mobility for improved independence and quality of life. Assessment: Body Structures, Functions, Activity Limitations Requiring Skilled Therapeutic Intervention: Decreased functional mobility , Decreased endurance, Decreased balance, Decreased strength, Increased pain  Assessment: Pt tolerates session fair, limited by generalized weakness, impaired endurance, shaky and unsteady when up. PT to continue to progress strength and functional mobility. Therapy Prognosis: Good    Requires PT Follow-Up: Yes    Discharge Recommendations:  Discharge Recommendations: [address]    Patient Education:      .     Patient Education  Education Given To: Patient  Education Provided: Role of Therapy, Plan of Care  Education Method: Verbal  Barriers to Learning: None  Education Outcome: Verbalized understanding       Equipment Recommendations:  Equipment Needed: No    Plan:  Current Treatment Recommendations: Strengthening, Balance training, Functional mobility training, Transfer training, Neuromuscular re-education, Stair training, Gait training, Endurance training, Safety education & training, Therapeutic activities, Patient/Caregiver education & training, Home exercise program  Plan:  (3-5x GM)    Goals:  Patient goals : to get better  Short Term Goals  Time Frame for Short term goals: by discharge  Short term goal 1: Pt to transfer supine <--> sit S to enable pt to get in/out of bed. Short term goal 2: Pt to transfer sit <--> stand S for increased functional mobility. Short term goal 3: Pt to ambulate >20 feet with RW SBA for household ambulation. Short term goal 4: Pt to ascend/descend 1 step with RW SBA for home entry. Long Term Goals  Time Frame for Long term goals : NA due to short length of stay. Following session, patient left in safe position with all fall risk precautions in place.

## 2022-08-12 NOTE — H&P
Hospitalist History and Physical          Patient: Marvine Dubin  : 1935  MRN: [id number]     Acct: [de-identified]    PCP: Clemente Lucio MD  Date of Admission: 2022  Date of Service: Pt seen/examined on 22  and Admitted to Inpatient with expected LOS greater than two midnights due to medical therapy. Hospital Problems             Last Modified POA    * (Principal) Acute on chronic systolic CHF (congestive heart failure), NYHA class 4 ([address] 2022 Yes       Assessment and Plan:    Acute congestive heart failure: Patient with a prior history of diastolic dysfunction with normal ejection fraction. He appears to be significantly volume overloaded with JVD and 3+ lower extremity edema.   -Begin IV diuresis  -Fluid restrict, low-sodium diet  -Strict I's and O's  -Monitor renal function and electrolytes  -Check echocardiogram  -Check iron studies to see if patient would benefit from IV iron. Ferric carboxymaltose for iron deficiency at discharge after acute heart failure: a multicentre, double-blind, randomised, controlled trial.Scar BISWAS, et al. [facility] Stair. 2020;396([id number]):[id number]. Epub 2020. Urinary tract infection: He has a prior history of prostate cancer treated back in . He does have urinary frequency. Urinalysis shows many bacteria with positive nitrites and leukocyte Esterase. Started on Rocephin in the ER. We will continue. History of recent extensive DVT: Patient had a lower extremity ultrasound which demonstrated a DVT in his left lower extremity from the popliteal to the common femoral vein. Patient states he was never started on anticoagulation. No documentation in the chart following this imaging study. I recommended starting anticoagulation while reassess the clot burden. At this time by now the clot would likely be chronic in nature. The benefit of anticoagulation would be unclear.   If there is still clot burden we will likely recommend starting anticoagulation pending the result.  -We will check CTA of his chest given the dyspnea on exertion, untreated DVT, chest tightness  -Family would like to hold on anticoagulation until imaging is complete    History of CKD: Patient's GFR is currently slightly better than baseline. Will monitor closely with contrast given during CT study. Troponin elevation: Suspect likely related to his acute illness and CKD. Does have some chest tightness but no acute ischemic changes noted on EKG. History of prostate cancer: Treated in 2006. [facility] urology locally. We will continue Flomax, oxybutynin, finasteride    History of CAD: Documentation of prior PCI. Continue pravastatin, Lopressor, aspirin    Obesity: BMI noted to be 34 we will continue diuresis and attempt to provide lifestyle modification advice    COPD: Continue home inhalers. Does not appear to be in acute exacerbation at this time    =======================================================================      Chief Complaint: Lower extremity edema    History Of Present Illness:  Chantelle Richards is a 81 y.o. male with PMHx of HFpEF, prostate cancer treated in 2006, DVT status post IVC filter, CKD stage III, CAD status post PCI, hypertension who presents to [address] [place] [address] with aggressive lower extremity edema and weight gain. Patient has noted over the past several days that he has been gaining weight and has developed worsened lower extremity edema. He is also noticing shortness of breath and fatigue. He went to his nephrologist today and it was noted that he had gained weight and so his Bumex was increased in dose. Patient's symptoms worsened at home so he came to the emergency room. While in the ER he was found to be in what appeared to be acute congestive heart failure. He was started on IV diuretics. The urinalysis also demonstrated evidence of urinary tract infection and he has endorsed urinary frequency.   Of note in chart review it appears that he had an ultrasound of his leg in March of this year which found a moderate sized DVT, however, patient denies ever being on an anticoagulant at this time. They state they were never notified of a blood clot. Past Medical History:        Diagnosis Date    Acute on chronic systolic CHF (congestive heart failure), NYHA class 4 ([facility]) 8/11/2022    CAD (coronary artery disease)     Chronic kidney disease, stage III (moderate) ([facility])     Closed fracture of six ribs of left side 11/27/2020    COPD (chronic obstructive pulmonary disease) ([facility] [address] 12/12/2020    DVT, lower extremity ([facility])     Gastritis     Hematuria     Hypercholesteremia     Hypertension     Intracranial bleed ([facility])     Nasal bone fracture 11/27/2020    Osteoarthritis     Presence of IVC filter     Prostate cancer ([facility])     Seed implants    Spinal stenosis, lumbar, s/p laminectomy 6/1/2015    Statin-induced myositis        Past Surgical History:        Procedure Laterality Date    BACK SURGERY  2006    fusion    CARDIAC SURGERY      2 stents    COSMETIC SURGERY      ENDOSCOPY, COLON, DIAGNOSTIC      KNEE SURGERY      NECK SURGERY      OTHER SURGICAL HISTORY  5/29/2015    DECOMPRESSIVE LUMBAR LAMINECTOMY L2-3    ROTATOR CUFF REPAIR      TOTAL HIP ARTHROPLASTY Bilateral        Medications Prior to Admission:   Prior to Admission medications    Medication Sig Start Date End Date Taking? Authorizing Provider   bumetanide (BUMEX) 2 MG tablet Take 1 tablet by mouth in the morning and 1 tablet before bedtime.  8/11/22   Saurabh Coleman MD   metoprolol tartrate (LOPRESSOR) 50 MG tablet Take 50 mg by mouth 2 times daily    Historical Provider, MD   pravastatin (PRAVACHOL) 80 MG tablet Take 80 mg by mouth daily    Historical Provider, MD   Naproxen Sodium (ALEVE PO) Take by mouth as needed    Historical Provider, MD   fluticasone-vilanterol (BREO ELLIPTA) 100-25 MCG/INH AEPB inhaler Inhale into the lungs daily    Historical Provider, MD   oxybutynin distress  Eyes:  Pupils equal, round, and reactive to light. Conjunctivae/corneas clear. HENT: Head normal in appearance. External nares normal.  Oral mucosa moist without lesions. Hearing grossly intact. Neck: Supple, with full range of motion. Trachea midline. JVD is present at approximately 5 cm  Respiratory:  Normal respiratory effort. Diffuse minimal expiratory wheezing throughout  Cardiovascular: Normal rate, regular rhythm with normal S1/S2 without murmurs. 3+ lower extremity edema bilaterally  Abdomen: Soft, non-tender, non-distended with normal bowel sounds. Musculoskeletal: No joint swelling or tenderness. Normal tone. No abnormal movements. Skin: Warm and dry. No rashes or lesions. Neurologic:  No focal sensory/motor deficits in the upper and lower extremities. Cranial nerves:  grossly non-focal 2-12. Psychiatric: Alert and oriented, normal insight and thought content. Capillary Refill: Brisk,< 3 seconds. Peripheral Pulses: +2 palpable, equal bilaterally. Labs:     Recent Labs     08/11/22 1822   WBC 4.8   HGB 14.3   HCT 47.2        Recent Labs     08/09/22  1028 08/11/22 1822    141   K 4.6 4.1    105   CO2 25 25   BUN 21 21   CREATININE 1.47* 1.3*   CALCIUM 9.4 9.1     Recent Labs     08/11/22  1822   AST 16   ALT 11   BILIDIR <0.2   BILITOT 0.6   ALKPHOS 78     No results for input(s): INR in the last 72 hours. No results for input(s): Faviola Parisian in the last 72 hours. Lab Results   Component Value Date/Time    NITRU POSITIVE 08/11/2022 08:10 PM    WBCUA > 100 08/11/2022 08:10 PM    BACTERIA MANY 08/11/2022 08:10 PM    RBCUA 0-2 08/11/2022 08:10 PM    BLOODU NEGATIVE 08/11/2022 08:10 PM    SPECGRAV 1.022 03/30/2016 11:30 AM    GLUCOSEU NEGATIVE 08/11/2022 08:10 PM         Radiology:     XR CHEST PORTABLE   Final Result   Left basilar pleural thickening without change. This may be secondary to    scarring. Cannot exclude a small effusion.       This document has been electronically signed by: Ella Cordova MD on    08/11/2022 06:54 PM             PT/OT Eval Status:  will be assessed  Diet: No diet orders on file  DVT prophylaxis: Subcu heparin. Family considering oral anticoagulation pending DVT work-up  Code Status: Prior  Disposition: admit to acute     Thank you Dolores Sauer MD for the opportunity to be involved in this patient's care.     Electronically signed by Maynor Young MD on 8/11/2022 at 10:40 PM.

## 2022-08-12 NOTE — ED NOTES
ED to inpatient nurses report    Chief Complaint   Patient presents with    Leg Swelling      Present to ED from home  LOC: alert and orientated to name, place, date  Vital signs   Vitals:    08/11/22 1739 08/11/22 2212 08/11/22 2216   BP: (!) 172/82 (!) 174/88 (!) 196/99   Pulse: 67 50 57   Resp: 22 22 18   Temp: 97.8 °F (36.6 °C)     TempSrc: Oral     SpO2: 95% 96% 96%   Weight: 235 lb (106.6 kg)     Height: 5' 9\" (1.753 m)        Oxygen Baseline room air    Current needs required none   LDAs:   Peripheral IV 08/11/22 Right Antecubital (Active)   Site Assessment Clean, dry & intact 08/11/22 2204     Mobility: Requires assistance * 2  Pending ED orders: none  Present condition: stable    Our promise was given to patient    C-SSRS    Swallow Screening    Preferred Language: English     Electronically signed by Niki Juarez RN on 8/11/2022 at 11:55 PM       Niki Juarez, [address] [place] [address]  [facility]/11/22 [facility]

## 2022-08-12 NOTE — ED NOTES
Patient resting in bed. Respirations easy and unlabored. No distress noted. Call light within reach.        Lauren Martinez RN  08/12/22 3912

## 2022-08-12 NOTE — FLOWSHEET NOTE
08/12/22 1402   Encounter Summary   Encounter Overview/Reason  Initial Encounter   Service Provided For: Patient   Referral/Consult From: [facility] Unknown   Last Encounter  08/12/22   Complexity of Encounter Low   Begin Time 1250   End Time  1303   Total Time Calculated 13 min   Encounter    Type Initial Screen/Assessment   Assessment/Intervention/Outcome   Assessment Sad   Intervention Active listening;Nurtured Hope   Outcome Comfort;Receptive     Assessment:    Patient did not talk much. Welcomed in but did not have anything to say. Provided spiritual encouragement. He has children and grandchildren. Intervention:  Since he was not communicating, I talked with him. I offered a prayer. Outcome:  He did not respond.    Care Plan: Followup

## 2022-08-12 NOTE — PROGRESS NOTES
Hospitalist Progress Note    Patient:  Kathy Musa      Unit/Bed:8B-35/035-A    YOB: 1935    MRN: [id number]       Acct: [de-identified]     PCP: Fran Block MD    Date of Admission: 8/11/2022    Assessment/Plan:    Acute congestive heart failure:   Patient with a prior history of diastolic dysfunction with normal ejection fraction. He presented to ED and appered to be significantly volume overloaded with JVD and 3+ lower extremity edema. Echo notable for EF estimated 11%, grade 1 diastolic dysfunction, no WMA, mild MR, mild TR  Daily weights, strict I&O's  Continue IV Bumex 2 mg twice daily  Cardiac diet, 2 L fluid restriction, 2 g sodium diet     Urinary tract infection:   He has a prior history of prostate cancer treated back in 2006. He does have urinary frequency. UA notable for trace ketones, trace proteins, positive nitrite, moderate LE, greater than 100 WBC, many bacteria   Urine culture positive for enteric gram-negative bacilli with colonization count greater than 100,000 CFU per mL   Continue Rocephin (initiated 8/11) -will tailor antibiotics according to sensitivity results    History of recent extensive DVT:   Patient had a lower extremity ultrasound 3/24/22 which demonstrated a DVT in his left lower extremity from the popliteal to the common femoral vein. Patient states he was never started on anticoagulation. No documentation in the chart following this imaging study. I recommended starting anticoagulation while reassess the clot burden. At this time by now the clot would likely be chronic in nature. The benefit of anticoagulation would be unclear. If there is still clot burden we will likely recommend starting anticoagulation pending the result.   Repeat lower extremity Doppler notable for interval resolution of left-sided DVT, however notable for interval development of a DVT in the right popliteal vein and posterior tibial vein  Discussed starting anticoagulation with patient, patient stating he wants to hold off on starting [address] at this time. Patient currently getting heparin for DVT prophylaxis but not appropriate DVT dose. We will continue to encourage patient and educate patient on importance of [address]. CTA chest negative for PE, notable for low lung volumes     CKD stage II  Baseline creatinine range ~1.2 -1.4. Creatinine stable  Patient's GFR is currently slightly better than baseline. Renally dose medications  Daily lab     Troponin elevation (POA):   Suspect likely related to his acute illness and CKD. Does have some chest tightness but no acute ischemic changes noted on EKG. History of prostate cancer:   Treated in 2006. Follows Dr. Deborah Ramos in urology locally. Continue Flomax, oxybutynin, finasteride     CAD:   Documentation of prior PCI. Continue pravastatin, Lopressor, aspirin     COPD without acute exacerbation  Continue scheduled Dulera  As needed bronchodilators  Pulmonary toilet: Incentive spirometer, Acapella, cough, deep breathe    Essential hypertension, uncontrolled  Continue Bumex, Lopressor  Start hydralazine 25 mg 2 times daily    Hyperlipidemia  Statin      Obesity:   BMI noted to be 33.63 kg/m²  We will continue diuresis and attempt to provide lifestyle modification advice    Weakness secondary to physical deconditioning  PT/OT consulted, appreciate recs      Expected discharge date:  Pending clinical course    Disposition:    [x] Home       [] TCU       [] Rehab       [] Psych       [] SNF       [] [facility]       [] Other-    Chief Complaint: Lower extremity edema    Hospital Course: Per HPI documented 8/11/22: \"Stone Clifton is a 81 y.o. male with PMHx of HFpEF, prostate cancer treated in 2006, DVT status post IVC filter, CKD stage III, CAD status post PCI, hypertension who presents to [address] [place] [address] with aggressive lower extremity edema and weight gain.   Patient has noted over the past several tired this morning. Still reports dyspnea on exertion    Medications:  Reviewed    Infusion Medications    sodium chloride       Scheduled Medications    aspirin  81 mg Oral Daily    Vitamin D  5,000 Units Oral Daily    finasteride  5 mg Oral Daily    mometasone-formoterol  2 puff Inhalation BID    metoprolol tartrate  50 mg Oral BID    oxybutynin  10 mg Oral Daily    pravastatin  80 mg Oral Daily    tamsulosin  0.4 mg Oral Daily    bumetanide  2 mg IntraVENous BID AC    sodium chloride flush  5-40 mL IntraVENous 2 times per day    heparin (porcine)  5,000 Units SubCUTAneous 3 times per day     PRN Meds: sodium chloride flush, sodium chloride, ondansetron **OR** ondansetron, polyethylene glycol, acetaminophen **OR** acetaminophen      Intake/Output Summary (Last 24 hours) at 8/12/2022 0723  Last data filed at 8/12/2022 0428  Gross per 24 hour   Intake 50 ml   Output 450 ml   Net -400 ml       Diet:  ADULT DIET; Regular; Low Sodium (2 gm); 2000 ml    Exam:  BP (!) 184/75   Pulse 52   Temp 97.6 °F (36.4 °C) (Oral)   Resp 17   Ht 5' 9\" (1.753 m)   Wt 227 lb 11.8 oz (103.3 kg)   SpO2 94%   BMI 33.63 kg/m²     General appearance: No apparent distress, appears older than stated age and cooperative. Chronically ill-appearing  HEENT: Pupils equal, round, and reactive to light. Conjunctivae/corneas clear. Neck: Supple, with full range of motion. Jugular venous distention noted ~4 cm. Trachea midline. Respiratory:  Normal respiratory effort, able to speak full clear sentences. Clear to auscultation, bilaterally with expiratory wheezes in the bases. No rales/rhonchi. Cardiovascular: Regular rate and rhythm with normal S1/S2 without murmurs, rubs or gallops. +3-4 pitting edema in BLE  Abdomen: Soft, non-tender, non-distended with normal bowel sounds. Musculoskeletal: passive and active ROM x 4 extremities. Skin: Skin color, texture, turgor normal.  No rashes or lesions.   Neurologic:  Neurovascularly intact without any focal sensory/motor deficits. Cranial nerves: II-XII intact, grossly non-focal.  Psychiatric: Alert and oriented, thought content appropriate, normal insight  Capillary Refill: Brisk,< 3 seconds   Peripheral Pulses: +2 palpable, equal bilaterally       Labs:   Recent Labs     08/11/22 1822 08/12/22  0539   WBC 4.8 5.7   HGB 14.3 14.9   HCT 47.2 49.2    145     Recent Labs     08/09/22  1028 08/11/22 1822 08/12/22  0539    141 140   K 4.6 4.1 4.0    105 104   CO2 25 25 25   BUN 21 21 19   CREATININE 1.47* 1.3* 1.2   CALCIUM 9.4 9.1 9.4     Recent Labs     08/11/22 1822   AST 16   ALT 11   BILIDIR <0.2   BILITOT 0.6   ALKPHOS 78     No results for input(s): INR in the last 72 hours. No results for input(s): Tatyana Dross in the last 72 hours. Microbiology:      Urinalysis:      Lab Results   Component Value Date/Time    NITRU POSITIVE 08/11/2022 08:10 PM    WBCUA > 100 08/11/2022 08:10 PM    BACTERIA MANY 08/11/2022 08:10 PM    RBCUA 0-2 08/11/2022 08:10 PM    BLOODU NEGATIVE 08/11/2022 08:10 PM    SPECGRAV 1.022 03/30/2016 11:30 AM    GLUCOSEU NEGATIVE 08/11/2022 08:10 PM       Radiology:  CTA CHEST W WO CONTRAST    Result Date: 8/12/2022  CTA of the chest after administration of IV contrast. Technique: Axial CT images from the lung apices through the adrenal glands after administration of IV contrast. Sagittal and coronal reconstruction images provided. Comparison:  CT,SR - CTA CHEST W WO CONTRAST - 11/26/2020 10:44 PM EST Findings: No pulmonary embolus. Normal thoracic aorta. No aneurysm or dissection. No mediastinal or axillary adenopathy. Normal thyroid. Low lung volumes with areas of subsegmental atelectasis. No pulmonary nodules. No areas of consolidation. No pneumothorax. Normal bones. Small hiatal hernia. Several small hypodensities within the liver. Impression: Low lung volumes.  This document has been electronically signed by: Keyla Martinez MD on 08/12/2022 01:43 AM All CTs at this facility use dose modulation techniques and iterative reconstructions, and/or weight-based dosing when appropriate to reduce radiation to a low as reasonably achievable. 3D Post-processing was performed on this study. XR CHEST PORTABLE    Result Date: 8/11/2022  1 view chest x-ray Comparison: CR,SR - XR CHEST PORTABLE - 11/26/2020 10:02 PM EST Findings: Mild left basilar pleural thickening without change. No gross evidence of focal pulmonary infiltrate. Normal size heart. Partial visualization of cervical spine fusion hardware. Left basilar pleural thickening without change. This may be secondary to scarring. Cannot exclude a small effusion. This document has been electronically signed by: Dashawn Wheat MD on 08/11/2022 06:54 PM      DVT prophylaxis: [] Lovenox                                 [] SCDs                                 [x] SQ Heparin                                 [] Encourage ambulation           [] Already on Anticoagulation     Code Status: Full Code    PT/OT Eval Status: Per PT note documented 8/12/2022: \"ASSESSMENT:  Activity Tolerance:  Patient tolerance of  treatment: good. Treatment Initiated: Treatment and education initiated within context of evaluation. Evaluation time included review of current medical information, gathering information related to past medical, social and functional history, completion of standardized testing, formal and informal observation of tasks, assessment of data and development of plan of care and goals. Treatment time included skilled education and facilitation of tasks to increase safety and independence with functional mobility for improved independence and quality of life. Assessment:   Body Structures, Functions, Activity Limitations Requiring Skilled Therapeutic Intervention: Decreased functional mobility , Decreased endurance, Decreased balance, Decreased strength, Increased pain  Assessment: Pt tolerates session fair, limited by generalized weakness, impaired endurance, shaky and unsteady when up. PT to continue to progress strength and functional mobility. Therapy Prognosis: Good     Requires PT Follow-Up: Yes     Discharge Recommendations:  Discharge Recommendations: Subacute/Skilled Nursing Facility\"      Pending OT recommendations    Tele:   [x] yes             [] no    Sinus bradycardia overnight. Currently in normal sinus rhythm without ectopy.     Active Hospital Problems    Diagnosis Date Noted    Acute on chronic systolic CHF (congestive heart failure), [facility] [I50.23] 08/11/2022     Priority: Medium       Electronically signed by ALESSANDRA Gamboa CNP on 8/12/2022 at 7:23 AM

## 2022-08-13 LAB
ANION GAP SERPL CALCULATED.3IONS-SCNC: 12 MEQ/L (ref 8–16)
APTT: 30.6 SECONDS (ref 22–38)
BUN BLDV-MCNC: 21 MG/DL (ref 7–22)
CALCIUM SERPL-MCNC: 9.6 MG/DL (ref 8.5–10.5)
CHLORIDE BLD-SCNC: 101 MEQ/L (ref 98–111)
CO2: 29 MEQ/L (ref 23–33)
CREAT SERPL-MCNC: 1.3 MG/DL (ref 0.4–1.2)
ERYTHROCYTE [DISTWIDTH] IN BLOOD BY AUTOMATED COUNT: 12.8 % (ref 11.5–14.5)
ERYTHROCYTE [DISTWIDTH] IN BLOOD BY AUTOMATED COUNT: 46.5 FL (ref 35–45)
GFR SERPL CREATININE-BSD FRML MDRD: 63 ML/MIN/1.73M2
GLUCOSE BLD-MCNC: 106 MG/DL (ref 70–108)
HCT VFR BLD CALC: 51.1 % (ref 42–52)
HEMOGLOBIN: 15.9 GM/DL (ref 14–18)
HEPARIN UNFRACTIONATED: 1.58 U/ML (ref 0.3–0.7)
INR BLD: 1.04 (ref 0.85–1.13)
MAGNESIUM: 2.3 MG/DL (ref 1.6–2.4)
MCH RBC QN AUTO: 30.8 PG (ref 26–33)
MCHC RBC AUTO-ENTMCNC: 31.1 GM/DL (ref 32.2–35.5)
MCV RBC AUTO: 99 FL (ref 80–94)
PLATELET # BLD: 163 THOU/MM3 (ref 130–400)
PMV BLD AUTO: 10.5 FL (ref 9.4–12.4)
POTASSIUM SERPL-SCNC: 4.6 MEQ/L (ref 3.5–5.2)
RBC # BLD: 5.16 MILL/MM3 (ref 4.7–6.1)
SODIUM BLD-SCNC: 142 MEQ/L (ref 135–145)
TROPONIN T: 0.01 NG/ML
WBC # BLD: 5.6 THOU/MM3 (ref 4.8–10.8)

## 2022-08-13 PROCEDURE — 84484 ASSAY OF TROPONIN QUANT: CPT

## 2022-08-13 PROCEDURE — 85730 THROMBOPLASTIN TIME PARTIAL: CPT

## 2022-08-13 PROCEDURE — 99232 SBSQ HOSP IP/OBS MODERATE 35: CPT

## 2022-08-13 PROCEDURE — 80048 BASIC METABOLIC PNL TOTAL CA: CPT

## 2022-08-13 PROCEDURE — 36415 COLL VENOUS BLD VENIPUNCTURE: CPT

## 2022-08-13 PROCEDURE — 94669 MECHANICAL CHEST WALL OSCILL: CPT

## 2022-08-13 PROCEDURE — 6370000000 HC RX 637 (ALT 250 FOR IP): Performed by: INTERNAL MEDICINE

## 2022-08-13 PROCEDURE — 94640 AIRWAY INHALATION TREATMENT: CPT

## 2022-08-13 PROCEDURE — 83735 ASSAY OF MAGNESIUM: CPT

## 2022-08-13 PROCEDURE — 94760 N-INVAS EAR/PLS OXIMETRY 1: CPT

## 2022-08-13 PROCEDURE — 85520 HEPARIN ASSAY: CPT

## 2022-08-13 PROCEDURE — 6360000002 HC RX W HCPCS: Performed by: INTERNAL MEDICINE

## 2022-08-13 PROCEDURE — 2500000003 HC RX 250 WO HCPCS: Performed by: INTERNAL MEDICINE

## 2022-08-13 PROCEDURE — 6360000002 HC RX W HCPCS

## 2022-08-13 PROCEDURE — 85027 COMPLETE CBC AUTOMATED: CPT

## 2022-08-13 PROCEDURE — 85610 PROTHROMBIN TIME: CPT

## 2022-08-13 PROCEDURE — 2580000003 HC RX 258: Performed by: INTERNAL MEDICINE

## 2022-08-13 PROCEDURE — 2580000003 HC RX 258

## 2022-08-13 PROCEDURE — 1200000003 HC TELEMETRY R&B

## 2022-08-13 RX ORDER — HEPARIN SODIUM 1000 [USP'U]/ML
80 INJECTION, SOLUTION INTRAVENOUS; SUBCUTANEOUS ONCE
Status: COMPLETED | OUTPATIENT
Start: 2022-08-13 | End: 2022-08-13

## 2022-08-13 RX ORDER — HEPARIN SODIUM 1000 [USP'U]/ML
40 INJECTION, SOLUTION INTRAVENOUS; SUBCUTANEOUS PRN
Status: DISCONTINUED | OUTPATIENT
Start: 2022-08-13 | End: 2022-08-15

## 2022-08-13 RX ORDER — HEPARIN SODIUM 1000 [USP'U]/ML
80 INJECTION, SOLUTION INTRAVENOUS; SUBCUTANEOUS PRN
Status: DISCONTINUED | OUTPATIENT
Start: 2022-08-13 | End: 2022-08-15

## 2022-08-13 RX ORDER — HEPARIN SODIUM 10000 [USP'U]/100ML
5-30 INJECTION, SOLUTION INTRAVENOUS CONTINUOUS
Status: DISCONTINUED | OUTPATIENT
Start: 2022-08-13 | End: 2022-08-15

## 2022-08-13 RX ADMIN — SODIUM CHLORIDE, PRESERVATIVE FREE 10 ML: 5 INJECTION INTRAVENOUS at 07:58

## 2022-08-13 RX ADMIN — METOPROLOL TARTRATE 50 MG: 50 TABLET, FILM COATED ORAL at 08:02

## 2022-08-13 RX ADMIN — BUMETANIDE 2 MG: 0.25 INJECTION INTRAMUSCULAR; INTRAVENOUS at 15:39

## 2022-08-13 RX ADMIN — PRAVASTATIN SODIUM 80 MG: 80 TABLET ORAL at 08:01

## 2022-08-13 RX ADMIN — HEPARIN SODIUM 8100 UNITS: 1000 INJECTION, SOLUTION INTRAVENOUS; SUBCUTANEOUS at 15:40

## 2022-08-13 RX ADMIN — BUMETANIDE 2 MG: 0.25 INJECTION INTRAMUSCULAR; INTRAVENOUS at 06:45

## 2022-08-13 RX ADMIN — CEFTRIAXONE SODIUM 1000 MG: 1 INJECTION, POWDER, FOR SOLUTION INTRAMUSCULAR; INTRAVENOUS at 22:23

## 2022-08-13 RX ADMIN — HEPARIN SODIUM 5000 UNITS: 5000 INJECTION INTRAVENOUS; SUBCUTANEOUS at 06:47

## 2022-08-13 RX ADMIN — MOMETASONE FUROATE AND FORMOTEROL FUMARATE DIHYDRATE 2 PUFF: 100; 5 AEROSOL RESPIRATORY (INHALATION) at 06:08

## 2022-08-13 RX ADMIN — HEPARIN SODIUM 18 UNITS/KG/HR: 10000 INJECTION, SOLUTION INTRAVENOUS at 15:46

## 2022-08-13 RX ADMIN — METOPROLOL TARTRATE 50 MG: 50 TABLET, FILM COATED ORAL at 22:17

## 2022-08-13 RX ADMIN — FINASTERIDE 5 MG: 5 TABLET, FILM COATED ORAL at 08:02

## 2022-08-13 RX ADMIN — TAMSULOSIN HYDROCHLORIDE 0.4 MG: 0.4 CAPSULE ORAL at 08:05

## 2022-08-13 RX ADMIN — Medication 5000 UNITS: at 08:01

## 2022-08-13 RX ADMIN — OXYBUTYNIN CHLORIDE 10 MG: 10 TABLET, EXTENDED RELEASE ORAL at 08:01

## 2022-08-13 RX ADMIN — ASPIRIN 81 MG CHEWABLE TABLET 81 MG: 81 TABLET CHEWABLE at 07:58

## 2022-08-13 RX ADMIN — MOMETASONE FUROATE AND FORMOTEROL FUMARATE DIHYDRATE 2 PUFF: 100; 5 AEROSOL RESPIRATORY (INHALATION) at 16:03

## 2022-08-13 NOTE — PLAN OF CARE
Problem: Discharge Planning  Goal: Discharge to home or other facility with appropriate resources  Outcome: Progressing  Note: Patient plans to discharge home once diuretics are completed. Problem: Safety - Adult  Goal: Free from fall injury  Outcome: Progressing  Note: Patient free from falls. Bed in low locked position, side rails up x2. Call light and personal belongings within reach. Problem: ABCDS Injury Assessment  Goal: Absence of physical injury  Outcome: Progressing  Note: Patient free from accidental injury. Bed in low locked position, side rails up x2. Call light and personal belongings within reach. Problem: Skin/Tissue Integrity  Goal: Absence of new skin breakdown  Description: 1. Monitor for areas of redness and/or skin breakdown  2. Assess vascular access sites hourly  3. Every 4-6 hours minimum:  Change oxygen saturation probe site  4. Every 4-6 hours:  If on nasal continuous positive airway pressure, respiratory therapy assess nares and determine need for appliance change or resting period. Outcome: Progressing  Note: No skin breakdown noted this shift. Patient encouraged to change position frequently. Care plan reviewed with patient no questions or concerns at this time.

## 2022-08-13 NOTE — PROGRESS NOTES
Nutrition Education    Received c/s for diet education - HF. Pt. Seen - reports avoiding added salt. States good appetite and intake pta. Educated on low sodium diet; current fluid restriction  Learners: Patient  Readiness: Acceptance  Method: Explanation, Handout, and Teachback  Response: Verbalizes Understanding  Contact name and number provided.     Clearance QUINN Resendiz, BENNY  Contact Number: [phone]

## 2022-08-13 NOTE — PROGRESS NOTES
for interval development of a DVT in the right popliteal vein and posterior tibial vein  Discussed starting anticoagulation with patient, patient stating he wants to hold off on starting [address] at this time. Patient currently getting heparin for DVT prophylaxis but not appropriate DVT dose. We will continue to encourage patient and educate patient on importance of [address].  8/13: Readdressed starting [facility] given new DVT. Length discussion at bedside about risks vs benefits. Patient now agreeable to starting [facility]. Start heparin gtt (initiated 8/13)  Will transition to [address] prior to d/c      CKD stage II  Baseline creatinine range ~1.2 -1.4. Creatinine stable  Patient's GFR is currently slightly better than baseline. Renally dose medications  Daily lab     Troponin elevation (POA):   Suspect likely related to his acute illness and CKD. Denies chest pains, no acute ischemic changes noted on EKG. History of prostate cancer:   Treated in 2006. Follows Dr. Diogenes Hammer in urology locally. Continue Flomax, oxybutynin, finasteride     CAD:   Documentation of prior PCI. Continue pravastatin, Lopressor, aspirin     COPD without acute exacerbation  Continue scheduled Dulera  As needed bronchodilators  Pulmonary toilet: Incentive spirometer, Acapella, cough, deep breathe    Essential hypertension, uncontrolled  Continue Bumex, Lopressor  Continue hydralazine 25 mg 2 times daily    Hyperlipidemia  Statin      Obesity:   BMI noted to be 32.95 kg/m²  We will continue diuresis and attempt to provide lifestyle modification advice    Weakness secondary to physical deconditioning  PT/OT consulted, appreciate recs: Recommending SNF. Discussed with patient, he is wanting to think about this and discuss with his wife.       Expected discharge date:  Pending clinical course    Disposition:    [x] Home       [] TCU       [] Rehab       [] Psych       [] SNF       [] [place]       [] Other-    Chief Complaint: Lower extremity edema    Hospital Course: Per HPI documented 8/11/22: \"Stone Glynn is a 81 y.o. male with PMHx of HFpEF, prostate cancer treated in 2006, DVT status post IVC filter, CKD stage III, CAD status post PCI, hypertension who presents to [address] [place] [address] with aggressive lower extremity edema and weight gain. Patient has noted over the past several days that he has been gaining weight and has developed worsened lower extremity edema. He is also noticing shortness of breath and fatigue. He went to his nephrologist today and it was noted that he had gained weight and so his Bumex was increased in dose. Patient's symptoms worsened at home so he came to the emergency room. While in the ER he was found to be in what appeared to be acute congestive heart failure. He was started on IV diuretics. The urinalysis also demonstrated evidence of urinary tract infection and he has endorsed urinary frequency. Of note in chart review it appears that he had an ultrasound of his leg in March of this year which found a moderate sized DVT, however, patient denies ever being on an anticoagulant at this time. They state they were never notified of a blood clot. \"     8/12/22: Resumed care of patient today. Patient sitting up in bed, chronically ill-appearing, in no apparent distress resting in bed. Remains afebrile, satting 98% on room air, with hemodynamically stable vital signs. Patient admitted overnight. Will obtain 2D echo today, repeat venous Doppler of lower extremities, CTA chest.  Urine culture growing enteric gram-negative bacilli, will continue Rocephin at this time and tailor antibiotics pending sensitivity results. Continue IV diuresis with Bumex for acute decompensated heart failure. Lengthy discussion at the bedside with patient about resuming anticoagulation given history of DVT in the left lower extremity. Patient still refusing to start [address] at this time.   Will await results of venous ultrasound and rediscuss with patient. Of note patient noted to be very hypertensive this morning with a systolic BP in the 706G. We will start patient on hydralazine. 8/13/22: Afebrile, with hemodynamically stable vital signs. No acute events overnight. Venous ultrasound notable for resolution of left DVT, no right DVT. Lengthy discussion at bedside with patient about need to start [address] given recurrent DVTs. Risks versus benefits [address] discussed. Patient agreeable to start anticoagulation at this time, will start heparin for now and transition over to [address] prior to discharge. Patient had 2435 mL urine output overnight, approximate 12 pound weight loss since admission. Still complains of subjective dyspnea on exertion. Continue IV diuresis. Urine culture notable for E. coli. Sensitive to Rocephin, will continue. Subjective (past 24 hours):      Denies chest pains, shortness of breath at rest, abdominal pains, nausea, vomiting, diarrhea, fever, chills, dizziness, lightheadedness. States he feels very tired this morning.   Still reports dyspnea on exertion    Medications:  Reviewed    Infusion Medications    sodium chloride       Scheduled Medications    aspirin  81 mg Oral Daily    Vitamin D  5,000 Units Oral Daily    finasteride  5 mg Oral Daily    mometasone-formoterol  2 puff Inhalation BID    metoprolol tartrate  50 mg Oral BID    oxybutynin  10 mg Oral Daily    pravastatin  80 mg Oral Daily    tamsulosin  0.4 mg Oral Daily    bumetanide  2 mg IntraVENous BID AC    sodium chloride flush  5-40 mL IntraVENous 2 times per day    heparin (porcine)  5,000 Units SubCUTAneous 3 times per day    cefTRIAXone (ROCEPHIN) IV  1,000 mg IntraVENous Q24H    hydrALAZINE  25 mg Oral 2 times per day     PRN Meds: sodium chloride flush, sodium chloride, ondansetron **OR** ondansetron, polyethylene glycol, acetaminophen **OR** acetaminophen, ipratropium-albuterol      Intake/Output Summary (Last 24 hours) at 8/13/2022 0743  Last data filed at 8/13/2022 0716  Gross per 24 hour   Intake 20 ml   Output 2735 ml   Net -2715 ml       Diet:  ADULT DIET; Regular; Low Fat/Low Chol/High Fiber/2 gm Na; 2000 ml    Exam:  BP (!) 144/75   Pulse 71   Temp 98 °F (36.7 °C) (Oral)   Resp 18   Ht 5' 9\" (1.753 m)   Wt 223 lb 1.7 oz (101.2 kg)   SpO2 96%   BMI 32.95 kg/m²     General appearance: No apparent distress, appears older than stated age and cooperative. Chronically ill-appearing  HEENT: Pupils equal, round, and reactive to light. Conjunctivae/corneas clear. Neck: Supple, with full range of motion. Jugular venous distention noted ~2 cm. Trachea midline. Respiratory:  Normal respiratory effort, able to speak full clear sentences. Clear to auscultation, bilaterally with expiratory wheezes in the bases. No rales/rhonchi. Cardiovascular: Regular rate and rhythm with normal S1/S2 without murmurs, rubs or gallops. +2 pitting edema in BLE  Abdomen: Soft, non-tender, non-distended with normal bowel sounds. Musculoskeletal: passive and active ROM x 4 extremities. Skin: Skin color, texture, turgor normal.  No rashes or lesions. Neurologic:  Neurovascularly intact without any focal sensory/motor deficits. Cranial nerves: II-XII intact, grossly non-focal.  Psychiatric: Alert and oriented, thought content appropriate, normal insight  Capillary Refill: Brisk,< 3 seconds   Peripheral Pulses: +2 palpable, equal bilaterally       Labs:   Recent Labs     08/11/22 1822 08/12/22  0539   WBC 4.8 5.7   HGB 14.3 14.9   HCT 47.2 49.2    145     Recent Labs     08/11/22 1822 08/12/22  0539    140   K 4.1 4.0    104   CO2 25 25   BUN 21 19   CREATININE 1.3* 1.2   CALCIUM 9.1 9.4     Recent Labs     08/11/22 1822   AST 16   ALT 11   BILIDIR <0.2   BILITOT 0.6   ALKPHOS 78     No results for input(s): INR in the last 72 hours. No results for input(s): Lucretia Fuchs in the last 72 hours.     Microbiology:      Urinalysis:      Lab Results Component Value Date/Time    NITRU POSITIVE 08/11/2022 08:10 PM    WBCUA > 100 08/11/2022 08:10 PM    BACTERIA MANY 08/11/2022 08:10 PM    RBCUA 0-2 08/11/2022 08:10 PM    BLOODU NEGATIVE 08/11/2022 08:10 PM    SPECGRAV 1.022 03/30/2016 11:30 AM    GLUCOSEU NEGATIVE 08/11/2022 08:10 PM       Radiology:  CTA CHEST W WO CONTRAST    Result Date: 8/12/2022  CTA of the chest after administration of IV contrast. Technique: Axial CT images from the lung apices through the adrenal glands after administration of IV contrast. Sagittal and coronal reconstruction images provided. Comparison:  CT,SR - CTA CHEST W WO CONTRAST - 11/26/2020 10:44 PM EST Findings: No pulmonary embolus. Normal thoracic aorta. No aneurysm or dissection. No mediastinal or axillary adenopathy. Normal thyroid. Low lung volumes with areas of subsegmental atelectasis. No pulmonary nodules. No areas of consolidation. No pneumothorax. Normal bones. Small hiatal hernia. Several small hypodensities within the liver. Impression: Low lung volumes. This document has been electronically signed by: Souleymane Lainez MD on 08/12/2022 01:43 AM All CTs at this facility use dose modulation techniques and iterative reconstructions, and/or weight-based dosing when appropriate to reduce radiation to a low as reasonably achievable. 3D Post-processing was performed on this study. XR CHEST PORTABLE    Result Date: 8/11/2022  1 view chest x-ray Comparison: CR,SR - XR CHEST PORTABLE - 11/26/2020 10:02 PM EST Findings: Mild left basilar pleural thickening without change. No gross evidence of focal pulmonary infiltrate. Normal size heart. Partial visualization of cervical spine fusion hardware. Left basilar pleural thickening without change. This may be secondary to scarring. Cannot exclude a small effusion.  This document has been electronically signed by: Rajni Moore MD on 08/11/2022 06:54 PM      DVT prophylaxis: [] Lovenox                                 [] SCDs                                 [x] Heparin                                 [] Encourage ambulation           [] Already on Anticoagulation     Code Status: Full Code    PT/OT Eval Status: Per PT note documented 8/12/2022: \"ASSESSMENT:  Activity Tolerance:  Patient tolerance of  treatment: good. Treatment Initiated: Treatment and education initiated within context of evaluation. Evaluation time included review of current medical information, gathering information related to past medical, social and functional history, completion of standardized testing, formal and informal observation of tasks, assessment of data and development of plan of care and goals. Treatment time included skilled education and facilitation of tasks to increase safety and independence with functional mobility for improved independence and quality of life. Assessment: Body Structures, Functions, Activity Limitations Requiring Skilled Therapeutic Intervention: Decreased functional mobility , Decreased endurance, Decreased balance, Decreased strength, Increased pain  Assessment: Pt tolerates session fair, limited by generalized weakness, impaired endurance, shaky and unsteady when up. PT to continue to progress strength and functional mobility. Therapy Prognosis: Good     Requires PT Follow-Up: Yes     Discharge Recommendations:  Discharge Recommendations: Subacute/Skilled Nursing Facility\"      Pending OT recommendations    Tele:   [x] yes             [] no    Normal sinus rhythm, without ectopy.     Active Hospital Problems    Diagnosis Date Noted    Acute on chronic systolic CHF (congestive heart failure), NYHA class 4 [facility] [I50.23] 08/11/2022     Priority: Medium       Electronically signed by ALESSANDRA Kearney CNP on 8/13/2022 at 7:43 AM

## 2022-08-13 NOTE — PLAN OF CARE
Problem: Respiratory - Adult  Goal: Clear lung sounds  8/13/2022 1607 by Gordo Cosme RCP  Outcome: Progressing

## 2022-08-14 LAB
ANION GAP SERPL CALCULATED.3IONS-SCNC: 13 MEQ/L (ref 8–16)
ANION GAP SERPL CALCULATED.3IONS-SCNC: 14 MEQ/L (ref 8–16)
BASOPHILS # BLD: 0.6 %
BASOPHILS ABSOLUTE: 0 THOU/MM3 (ref 0–0.1)
BUN BLDV-MCNC: 24 MG/DL (ref 7–22)
BUN BLDV-MCNC: 24 MG/DL (ref 7–22)
CALCIUM SERPL-MCNC: 9.2 MG/DL (ref 8.5–10.5)
CALCIUM SERPL-MCNC: 9.4 MG/DL (ref 8.5–10.5)
CHLORIDE BLD-SCNC: 100 MEQ/L (ref 98–111)
CHLORIDE BLD-SCNC: 99 MEQ/L (ref 98–111)
CO2: 26 MEQ/L (ref 23–33)
CO2: 27 MEQ/L (ref 23–33)
CREAT SERPL-MCNC: 1.3 MG/DL (ref 0.4–1.2)
CREAT SERPL-MCNC: 1.4 MG/DL (ref 0.4–1.2)
EOSINOPHIL # BLD: 4.9 %
EOSINOPHILS ABSOLUTE: 0.2 THOU/MM3 (ref 0–0.4)
ERYTHROCYTE [DISTWIDTH] IN BLOOD BY AUTOMATED COUNT: 13 % (ref 11.5–14.5)
ERYTHROCYTE [DISTWIDTH] IN BLOOD BY AUTOMATED COUNT: 46.5 FL (ref 35–45)
GFR SERPL CREATININE-BSD FRML MDRD: 58 ML/MIN/1.73M2
GFR SERPL CREATININE-BSD FRML MDRD: 63 ML/MIN/1.73M2
GLUCOSE BLD-MCNC: 108 MG/DL (ref 70–108)
GLUCOSE BLD-MCNC: 99 MG/DL (ref 70–108)
HCT VFR BLD CALC: 50.7 % (ref 42–52)
HEMOGLOBIN: 16 GM/DL (ref 14–18)
HEPARIN UNFRACTIONATED: 0.76 U/ML (ref 0.3–0.7)
HEPARIN UNFRACTIONATED: 0.89 U/ML (ref 0.3–0.7)
HEPARIN UNFRACTIONATED: 0.97 U/ML (ref 0.3–0.7)
HEPARIN UNFRACTIONATED: 1.24 U/ML (ref 0.3–0.7)
IMMATURE GRANS (ABS): 0.02 THOU/MM3 (ref 0–0.07)
IMMATURE GRANULOCYTES: 0.4 %
LYMPHOCYTES # BLD: 15.7 %
LYMPHOCYTES ABSOLUTE: 0.8 THOU/MM3 (ref 1–4.8)
MAGNESIUM: 2.3 MG/DL (ref 1.6–2.4)
MCH RBC QN AUTO: 30.5 PG (ref 26–33)
MCHC RBC AUTO-ENTMCNC: 31.6 GM/DL (ref 32.2–35.5)
MCV RBC AUTO: 96.8 FL (ref 80–94)
MONOCYTES # BLD: 7.1 %
MONOCYTES ABSOLUTE: 0.4 THOU/MM3 (ref 0.4–1.3)
NUCLEATED RED BLOOD CELLS: 0 /100 WBC
PLATELET # BLD: 159 THOU/MM3 (ref 130–400)
PMV BLD AUTO: 10.5 FL (ref 9.4–12.4)
POTASSIUM REFLEX MAGNESIUM: 3.9 MEQ/L (ref 3.5–5.2)
POTASSIUM SERPL-SCNC: 4.4 MEQ/L (ref 3.5–5.2)
RBC # BLD: 5.24 MILL/MM3 (ref 4.7–6.1)
SEG NEUTROPHILS: 71.3 %
SEGMENTED NEUTROPHILS ABSOLUTE COUNT: 3.6 THOU/MM3 (ref 1.8–7.7)
SODIUM BLD-SCNC: 139 MEQ/L (ref 135–145)
SODIUM BLD-SCNC: 140 MEQ/L (ref 135–145)
WBC # BLD: 5.1 THOU/MM3 (ref 4.8–10.8)

## 2022-08-14 PROCEDURE — 85520 HEPARIN ASSAY: CPT

## 2022-08-14 PROCEDURE — 6370000000 HC RX 637 (ALT 250 FOR IP)

## 2022-08-14 PROCEDURE — 36415 COLL VENOUS BLD VENIPUNCTURE: CPT

## 2022-08-14 PROCEDURE — 80048 BASIC METABOLIC PNL TOTAL CA: CPT

## 2022-08-14 PROCEDURE — 6370000000 HC RX 637 (ALT 250 FOR IP): Performed by: INTERNAL MEDICINE

## 2022-08-14 PROCEDURE — 83735 ASSAY OF MAGNESIUM: CPT

## 2022-08-14 PROCEDURE — 94640 AIRWAY INHALATION TREATMENT: CPT

## 2022-08-14 PROCEDURE — 85025 COMPLETE CBC W/AUTO DIFF WBC: CPT

## 2022-08-14 PROCEDURE — 1200000003 HC TELEMETRY R&B

## 2022-08-14 PROCEDURE — 6360000002 HC RX W HCPCS

## 2022-08-14 PROCEDURE — 2500000003 HC RX 250 WO HCPCS: Performed by: INTERNAL MEDICINE

## 2022-08-14 PROCEDURE — 2580000003 HC RX 258

## 2022-08-14 PROCEDURE — 94760 N-INVAS EAR/PLS OXIMETRY 1: CPT

## 2022-08-14 PROCEDURE — 94669 MECHANICAL CHEST WALL OSCILL: CPT

## 2022-08-14 PROCEDURE — 99232 SBSQ HOSP IP/OBS MODERATE 35: CPT

## 2022-08-14 RX ADMIN — METOPROLOL TARTRATE 50 MG: 50 TABLET, FILM COATED ORAL at 20:36

## 2022-08-14 RX ADMIN — HEPARIN SODIUM 10 UNITS/KG/HR: 10000 INJECTION, SOLUTION INTRAVENOUS at 19:43

## 2022-08-14 RX ADMIN — HEPARIN SODIUM 13 UNITS/KG/HR: 10000 INJECTION, SOLUTION INTRAVENOUS at 07:45

## 2022-08-14 RX ADMIN — BUMETANIDE 2 MG: 0.25 INJECTION INTRAMUSCULAR; INTRAVENOUS at 16:00

## 2022-08-14 RX ADMIN — PRAVASTATIN SODIUM 80 MG: 80 TABLET ORAL at 07:55

## 2022-08-14 RX ADMIN — MOMETASONE FUROATE AND FORMOTEROL FUMARATE DIHYDRATE 2 PUFF: 100; 5 AEROSOL RESPIRATORY (INHALATION) at 07:35

## 2022-08-14 RX ADMIN — BUMETANIDE 2 MG: 0.25 INJECTION INTRAMUSCULAR; INTRAVENOUS at 06:22

## 2022-08-14 RX ADMIN — OXYBUTYNIN CHLORIDE 10 MG: 10 TABLET, EXTENDED RELEASE ORAL at 07:55

## 2022-08-14 RX ADMIN — CEFTRIAXONE SODIUM 1000 MG: 1 INJECTION, POWDER, FOR SOLUTION INTRAMUSCULAR; INTRAVENOUS at 20:40

## 2022-08-14 RX ADMIN — HYDRALAZINE HYDROCHLORIDE 25 MG: 25 TABLET, FILM COATED ORAL at 07:54

## 2022-08-14 RX ADMIN — FINASTERIDE 5 MG: 5 TABLET, FILM COATED ORAL at 07:55

## 2022-08-14 RX ADMIN — HYDRALAZINE HYDROCHLORIDE 25 MG: 25 TABLET, FILM COATED ORAL at 20:36

## 2022-08-14 RX ADMIN — Medication 5000 UNITS: at 07:54

## 2022-08-14 RX ADMIN — TAMSULOSIN HYDROCHLORIDE 0.4 MG: 0.4 CAPSULE ORAL at 07:55

## 2022-08-14 RX ADMIN — ASPIRIN 81 MG CHEWABLE TABLET 81 MG: 81 TABLET CHEWABLE at 07:58

## 2022-08-14 RX ADMIN — MOMETASONE FUROATE AND FORMOTEROL FUMARATE DIHYDRATE 2 PUFF: 100; 5 AEROSOL RESPIRATORY (INHALATION) at 16:40

## 2022-08-14 RX ADMIN — METOPROLOL TARTRATE 50 MG: 50 TABLET, FILM COATED ORAL at 07:54

## 2022-08-14 NOTE — PROGRESS NOTES
Hospitalist Progress Note    Patient:  Reba Atkinson      Unit/Bed:8B-35/035-A    YOB: 1935    MRN: [id number]       Acct: [de-identified]     PCP: Jeremie Salomon MD    Date of Admission: 8/11/2022    Assessment/Plan:    Acute congestive heart failure:   Patient with a prior history of diastolic dysfunction with normal ejection fraction. He presented to ED and appered to be significantly volume overloaded with JVD and 3+ lower extremity edema (POA). Echo notable for EF estimated 22%, grade 1 diastolic dysfunction, no WMA, mild MR, mild TR  Daily weights, strict I&O's  Continue IV Bumex 2 mg twice daily  24h/UOP: 3,850 mL  Approximate 13 lb weight loss since admission  Cardiac diet, 2 L fluid restriction, 2 g sodium diet     Acute cystitis:   He has a prior history of prostate cancer treated back in 2006. He does have urinary frequency. UA notable for trace ketones, trace proteins, positive nitrite, moderate LE, greater than 100 WBC, many bacteria   Urine cx + E. Coli - sensitive to Rocephin - continue  Continue Rocephin (initiated 8/11)    History of recent extensive DVT:   Patient had a lower extremity ultrasound 3/24/22 which demonstrated a DVT in his left lower extremity from the popliteal to the common femoral vein. Patient states he was never started on anticoagulation. No documentation in the chart following this imaging study. I recommended starting anticoagulation while reassess the clot burden. At this time by now the clot would likely be chronic in nature. The benefit of anticoagulation would be unclear. If there is still clot burden we will likely recommend starting anticoagulation pending the result.   CTA chest negative for PE, notable for low lung volumes  Repeat lower extremity Doppler notable for interval resolution of left-sided DVT, however notable for interval development of a DVT in the right popliteal vein and posterior tibial vein  Discussed starting anticoagulation with patient, patient stating he wants to hold off on starting [address] at this time. Patient currently getting heparin for DVT prophylaxis but not appropriate DVT dose. We will continue to encourage patient and educate patient on importance of [address].  8/13: Readdressed starting [facility] given new DVT. Lengthy discussion at bedside about risks vs benefits. Patient now agreeable to starting [facility]. Continue heparin gtt (initiated 8/13)  Will transition to [address] prior to d/c      CKD stage II  Baseline creatinine range ~1.2 -1.4. Creatinine stable  Patient's GFR is currently slightly better than baseline. Renally dose medications  Daily lab     Troponin elevation (POA):   Suspect likely related to his acute illness and CKD. Denies chest pains, no acute ischemic changes noted on EKG. History of prostate cancer:   Treated in 2006. Follows Dr. Etienne Salomon in urology locally. Continue Flomax, oxybutynin, finasteride     CAD:   Documentation of prior PCI. Continue pravastatin, Lopressor, aspirin     COPD without acute exacerbation  Continue scheduled Dulera  As needed bronchodilators  Pulmonary toilet: Incentive spirometer, Acapella, cough, deep breathe    Essential hypertension, uncontrolled, improving  Continue Bumex, Lopressor  Continue hydralazine 25 mg 2 times daily    Hyperlipidemia  Statin      Obesity:   BMI noted to be 32.95 kg/m²  We will continue diuresis and attempt to provide lifestyle modification advice    Weakness secondary to physical deconditioning  PT/OT consulted, appreciate recs: Recommending SNF. Discussed with patient, he is wanting to think about this and discuss with his wife.       Expected discharge date:  Pending clinical course    Disposition:    [x] Home       [] TCU       [] Rehab       [] Psych       [] SNF       [] [place]       [] Other-    Chief Complaint: Lower extremity edema    Hospital Course: Per HPI documented 8/11/22: \"Stone Gomez is a 81 y.o. male with PMHx of HFpEF, prostate cancer treated in 2006, DVT status post IVC filter, CKD stage III, CAD status post PCI, hypertension who presents to [facility] [place] [address] with aggressive lower extremity edema and weight gain. Patient has noted over the past several days that he has been gaining weight and has developed worsened lower extremity edema. He is also noticing shortness of breath and fatigue. He went to his nephrologist today and it was noted that he had gained weight and so his Bumex was increased in dose. Patient's symptoms worsened at home so he came to the emergency room. While in the ER he was found to be in what appeared to be acute congestive heart failure. He was started on IV diuretics. The urinalysis also demonstrated evidence of urinary tract infection and he has endorsed urinary frequency. Of note in chart review it appears that he had an ultrasound of his leg in March of this year which found a moderate sized DVT, however, patient denies ever being on an anticoagulant at this time. They state they were never notified of a blood clot. \"     8/12/22: Resumed care of patient today. Patient sitting up in bed, chronically ill-appearing, in no apparent distress resting in bed. Remains afebrile, satting 98% on room air, with hemodynamically stable vital signs. Patient admitted overnight. Will obtain 2D echo today, repeat venous Doppler of lower extremities, CTA chest.  Urine culture growing enteric gram-negative bacilli, will continue Rocephin at this time and tailor antibiotics pending sensitivity results. Continue IV diuresis with Bumex for acute decompensated heart failure. Lengthy discussion at the bedside with patient about resuming anticoagulation given history of DVT in the left lower extremity. Patient still refusing to start [facility] at this time. Will await results of venous ultrasound and rediscuss with patient.   Of note patient noted to be very hypertensive this morning with a systolic BP in the 961Z. We will start patient on hydralazine. 8/13/22: Afebrile, with hemodynamically stable vital signs. No acute events overnight. Venous ultrasound notable for resolution of left DVT, no right DVT. Lengthy discussion at bedside with patient about need to start [address] given recurrent DVTs. Risks versus benefits [address] discussed. Patient agreeable to start anticoagulation at this time, will start heparin for now and transition over to [address] prior to discharge. Patient had 2435 mL urine output overnight, approximate 12 pound weight loss since admission. Still complains of subjective dyspnea on exertion. Continue IV diuresis. Urine culture notable for E. coli. Sensitive to Rocephin, will continue. 8/14/22: patient resting in bed comfortably, in no apparent distress. Afebrile, with hemodynamically stable vital signs. On Heparin gtt for DVT, doing well. Diuresing well. Still has + 2 pitting edema and subjective complaints of ALFONSO. Will continue IV diuresis. Will transition IV abx over to p.o. today. Discussed with patient about PT/OT and going to SNF upon discharge. Patient stated \"I don't think I need that, and I've decided I don't want to got. \" Educated patient on benefits of PT/OT, patient still refusing. Offered  assistance and patient stated he did not want this either. Subjective (past 24 hours):        Patient still complaining of some ALFONSO stating it feels the same. Denies chest pains, shortness of breath at rest, abdominal pains, nausea, vomiting, diarrhea, fever, chills, dizziness, lightheadedness.       Medications:  Reviewed    Infusion Medications    heparin (PORCINE) Infusion 13 Units/kg/hr (08/14/22 7044)    sodium chloride       Scheduled Medications    aspirin  81 mg Oral Daily    Vitamin D  5,000 Units Oral Daily    finasteride  5 mg Oral Daily    mometasone-formoterol  2 puff Inhalation BID    metoprolol tartrate  50 mg Oral BID    oxybutynin  10 mg Oral Daily pravastatin  80 mg Oral Daily    tamsulosin  0.4 mg Oral Daily    bumetanide  2 mg IntraVENous BID AC    sodium chloride flush  5-40 mL IntraVENous 2 times per day    cefTRIAXone (ROCEPHIN) IV  1,000 mg IntraVENous Q24H    hydrALAZINE  25 mg Oral 2 times per day     PRN Meds: heparin (porcine), heparin (porcine), sodium chloride flush, sodium chloride, ondansetron **OR** ondansetron, polyethylene glycol, acetaminophen **OR** acetaminophen, ipratropium-albuterol      Intake/Output Summary (Last 24 hours) at 8/14/2022 0901  Last data filed at 8/14/2022 0819  Gross per 24 hour   Intake 890 ml   Output 3850 ml   Net -2960 ml       Diet:  ADULT DIET; Regular; Low Fat/Low Chol/High Fiber/2 gm Na; 2000 ml    Exam:  BP (!) 147/82   Pulse 64   Temp 97.9 °F (36.6 °C) (Oral)   Resp 18   Ht 5' 9\" (1.753 m)   Wt 222 lb 10.6 oz (101 kg)   SpO2 95%   BMI 32.88 kg/m²     General appearance: No apparent distress, appears older than stated age and cooperative. Chronically ill-appearing  HEENT: Pupils equal, round, and reactive to light. Conjunctivae/corneas clear. Neck: Supple, with full range of motion. No jugular venous distention. Respiratory:  Normal respiratory effort, able to speak full clear sentences. Clear to auscultation, bilaterally with expiratory wheezes in the bases. No rales/rhonchi. Cardiovascular: Regular rate and rhythm with normal S1/S2 without murmurs, rubs or gallops. +2 pitting edema in BLE  Abdomen: Soft, non-tender, non-distended with normal bowel sounds. Musculoskeletal: passive and active ROM x 4 extremities. Skin: Skin color, texture, turgor normal.  No rashes or lesions. Neurologic:  Neurovascularly intact without any focal sensory/motor deficits.  Cranial nerves: II-XII intact, grossly non-focal.  Psychiatric: Alert and oriented, thought content appropriate, normal insight  Capillary Refill: Brisk,< 3 seconds   Peripheral Pulses: +2 palpable, equal bilaterally       Labs:   Recent Labs 08/12/22  0539 08/13/22  1525 08/14/22  0744   WBC 5.7 5.6 5.1   HGB 14.9 15.9 16.0   HCT 49.2 51.1 50.7    163 159     Recent Labs     08/12/22  0539 08/13/22  0824 08/14/22  0744    142 139   K 4.0 4.6 3.9    101 100   CO2 25 29 26   BUN 19 21 24*   CREATININE 1.2 1.3* 1.3*   CALCIUM 9.4 9.6 9.4     Recent Labs     08/11/22  1822   AST 16   ALT 11   BILIDIR <0.2   BILITOT 0.6   ALKPHOS 78     Recent Labs     08/13/22  1525   INR 1.04     No results for input(s): Martinez Begin in the last 72 hours. Microbiology:      Urinalysis:      Lab Results   Component Value Date/Time    NITRU POSITIVE 08/11/2022 08:10 PM    WBCUA > 100 08/11/2022 08:10 PM    BACTERIA MANY 08/11/2022 08:10 PM    RBCUA 0-2 08/11/2022 08:10 PM    BLOODU NEGATIVE 08/11/2022 08:10 PM    SPECGRAV 1.022 03/30/2016 11:30 AM    GLUCOSEU NEGATIVE 08/11/2022 08:10 PM       Radiology:  CTA CHEST W WO CONTRAST    Result Date: 8/12/2022  CTA of the chest after administration of IV contrast. Technique: Axial CT images from the lung apices through the adrenal glands after administration of IV contrast. Sagittal and coronal reconstruction images provided. Comparison:  CT,SR - CTA CHEST W WO CONTRAST - 11/26/2020 10:44 PM EST Findings: No pulmonary embolus. Normal thoracic aorta. No aneurysm or dissection. No mediastinal or axillary adenopathy. Normal thyroid. Low lung volumes with areas of subsegmental atelectasis. No pulmonary nodules. No areas of consolidation. No pneumothorax. Normal bones. Small hiatal hernia. Several small hypodensities within the liver. Impression: Low lung volumes. This document has been electronically signed by: Dolores Marcum MD on 08/12/2022 01:43 AM All CTs at this facility use dose modulation techniques and iterative reconstructions, and/or weight-based dosing when appropriate to reduce radiation to a low as reasonably achievable. 3D Post-processing was performed on this study.     XR CHEST PORTABLE    Result Date: 8/11/2022  1 view chest x-ray Comparison: CR,SR - XR CHEST PORTABLE - 11/26/2020 10:02 PM EST Findings: Mild left basilar pleural thickening without change. No gross evidence of focal pulmonary infiltrate. Normal size heart. Partial visualization of cervical spine fusion hardware. Left basilar pleural thickening without change. This may be secondary to scarring. Cannot exclude a small effusion. This document has been electronically signed by: Lorelei Yancey MD on 08/11/2022 06:54 PM      DVT prophylaxis: [] Lovenox                                 [] SCDs                                 [x] Heparin                                 [] Encourage ambulation           [] Already on Anticoagulation     Code Status: Full Code    PT/OT Eval Status: Per PT note documented 8/12/2022: \"ASSESSMENT:  Activity Tolerance:  Patient tolerance of  treatment: good. Treatment Initiated: Treatment and education initiated within context of evaluation. Evaluation time included review of current medical information, gathering information related to past medical, social and functional history, completion of standardized testing, formal and informal observation of tasks, assessment of data and development of plan of care and goals. Treatment time included skilled education and facilitation of tasks to increase safety and independence with functional mobility for improved independence and quality of life. Assessment: Body Structures, Functions, Activity Limitations Requiring Skilled Therapeutic Intervention: Decreased functional mobility , Decreased endurance, Decreased balance, Decreased strength, Increased pain  Assessment: Pt tolerates session fair, limited by generalized weakness, impaired endurance, shaky and unsteady when up. PT to continue to progress strength and functional mobility.   Therapy Prognosis: Good     Requires PT Follow-Up: Yes     Discharge Recommendations:  Discharge Recommendations: Subacute/Skilled Nursing Facility\"      Pending OT recommendations    Tele:   [x] yes             [] no    Normal sinus rhythm, without ectopy.     Active Hospital Problems    Diagnosis Date Noted    Acute on chronic systolic CHF (congestive heart failure), NYHA class 4 (RUSTca 75.) [I50.23] 08/11/2022     Priority: Medium       Electronically signed by ALESSANDRA Puga CNP on 8/14/2022 at 9:01 AM

## 2022-08-14 NOTE — PLAN OF CARE
Problem: Respiratory - Adult  Goal: Clear lung sounds  Outcome: Progressing    Continue treatments to improve breath sounds, increase aeration and decrease WOB.

## 2022-08-15 PROBLEM — R60.1 ANASARCA: Status: ACTIVE | Noted: 2022-08-15

## 2022-08-15 LAB
ANION GAP SERPL CALCULATED.3IONS-SCNC: 15 MEQ/L (ref 8–16)
BASOPHILS # BLD: 0.5 %
BASOPHILS ABSOLUTE: 0 THOU/MM3 (ref 0–0.1)
BUN BLDV-MCNC: 27 MG/DL (ref 7–22)
CALCIUM SERPL-MCNC: 9.3 MG/DL (ref 8.5–10.5)
CHLORIDE BLD-SCNC: 99 MEQ/L (ref 98–111)
CO2: 25 MEQ/L (ref 23–33)
CREAT SERPL-MCNC: 1.5 MG/DL (ref 0.4–1.2)
EOSINOPHIL # BLD: 3.9 %
EOSINOPHILS ABSOLUTE: 0.2 THOU/MM3 (ref 0–0.4)
ERYTHROCYTE [DISTWIDTH] IN BLOOD BY AUTOMATED COUNT: 12.9 % (ref 11.5–14.5)
ERYTHROCYTE [DISTWIDTH] IN BLOOD BY AUTOMATED COUNT: 48.1 FL (ref 35–45)
GFR SERPL CREATININE-BSD FRML MDRD: 54 ML/MIN/1.73M2
GLUCOSE BLD-MCNC: 105 MG/DL (ref 70–108)
HCT VFR BLD CALC: 49.4 % (ref 42–52)
HEMOGLOBIN: 15 GM/DL (ref 14–18)
HEPARIN UNFRACTIONATED: 0.5 U/ML (ref 0.3–0.7)
HEPARIN UNFRACTIONATED: 0.51 U/ML (ref 0.3–0.7)
HEPARIN UNFRACTIONATED: 1.88 U/ML (ref 0.3–0.7)
IMMATURE GRANS (ABS): 0.02 THOU/MM3 (ref 0–0.07)
IMMATURE GRANULOCYTES: 0.4 %
LYMPHOCYTES # BLD: 14.6 %
LYMPHOCYTES ABSOLUTE: 0.8 THOU/MM3 (ref 1–4.8)
MAGNESIUM: 2.4 MG/DL (ref 1.6–2.4)
MCH RBC QN AUTO: 30.5 PG (ref 26–33)
MCHC RBC AUTO-ENTMCNC: 30.4 GM/DL (ref 32.2–35.5)
MCV RBC AUTO: 100.4 FL (ref 80–94)
MONOCYTES # BLD: 9.6 %
MONOCYTES ABSOLUTE: 0.5 THOU/MM3 (ref 0.4–1.3)
NUCLEATED RED BLOOD CELLS: 0 /100 WBC
PLATELET # BLD: 148 THOU/MM3 (ref 130–400)
PMV BLD AUTO: 10.6 FL (ref 9.4–12.4)
POTASSIUM REFLEX MAGNESIUM: 4 MEQ/L (ref 3.5–5.2)
RBC # BLD: 4.92 MILL/MM3 (ref 4.7–6.1)
SEG NEUTROPHILS: 71 %
SEGMENTED NEUTROPHILS ABSOLUTE COUNT: 4 THOU/MM3 (ref 1.8–7.7)
SODIUM BLD-SCNC: 139 MEQ/L (ref 135–145)
WBC # BLD: 5.6 THOU/MM3 (ref 4.8–10.8)

## 2022-08-15 PROCEDURE — 97166 OT EVAL MOD COMPLEX 45 MIN: CPT

## 2022-08-15 PROCEDURE — 99232 SBSQ HOSP IP/OBS MODERATE 35: CPT

## 2022-08-15 PROCEDURE — 97530 THERAPEUTIC ACTIVITIES: CPT

## 2022-08-15 PROCEDURE — 6370000000 HC RX 637 (ALT 250 FOR IP)

## 2022-08-15 PROCEDURE — 36415 COLL VENOUS BLD VENIPUNCTURE: CPT

## 2022-08-15 PROCEDURE — 85025 COMPLETE CBC W/AUTO DIFF WBC: CPT

## 2022-08-15 PROCEDURE — 1200000003 HC TELEMETRY R&B

## 2022-08-15 PROCEDURE — 99221 1ST HOSP IP/OBS SF/LOW 40: CPT | Performed by: PHYSICAL MEDICINE & REHABILITATION

## 2022-08-15 PROCEDURE — 97116 GAIT TRAINING THERAPY: CPT

## 2022-08-15 PROCEDURE — 2500000003 HC RX 250 WO HCPCS: Performed by: INTERNAL MEDICINE

## 2022-08-15 PROCEDURE — 6370000000 HC RX 637 (ALT 250 FOR IP): Performed by: INTERNAL MEDICINE

## 2022-08-15 PROCEDURE — 80048 BASIC METABOLIC PNL TOTAL CA: CPT

## 2022-08-15 PROCEDURE — 94640 AIRWAY INHALATION TREATMENT: CPT

## 2022-08-15 PROCEDURE — 83735 ASSAY OF MAGNESIUM: CPT

## 2022-08-15 PROCEDURE — 85520 HEPARIN ASSAY: CPT

## 2022-08-15 PROCEDURE — 6360000002 HC RX W HCPCS

## 2022-08-15 PROCEDURE — 97110 THERAPEUTIC EXERCISES: CPT

## 2022-08-15 PROCEDURE — 2580000003 HC RX 258: Performed by: INTERNAL MEDICINE

## 2022-08-15 PROCEDURE — 97535 SELF CARE MNGMENT TRAINING: CPT

## 2022-08-15 PROCEDURE — 94760 N-INVAS EAR/PLS OXIMETRY 1: CPT

## 2022-08-15 RX ORDER — IPRATROPIUM BROMIDE AND ALBUTEROL SULFATE 2.5; .5 MG/3ML; MG/3ML
1 SOLUTION RESPIRATORY (INHALATION) ONCE
Status: COMPLETED | OUTPATIENT
Start: 2022-08-15 | End: 2022-08-15

## 2022-08-15 RX ORDER — AMOXICILLIN AND CLAVULANATE POTASSIUM 875; 125 MG/1; MG/1
1 TABLET, FILM COATED ORAL EVERY 12 HOURS SCHEDULED
Status: DISCONTINUED | OUTPATIENT
Start: 2022-08-15 | End: 2022-08-15

## 2022-08-15 RX ORDER — AMOXICILLIN AND CLAVULANATE POTASSIUM 875; 125 MG/1; MG/1
1 TABLET, FILM COATED ORAL EVERY 12 HOURS SCHEDULED
Status: COMPLETED | OUTPATIENT
Start: 2022-08-15 | End: 2022-08-17

## 2022-08-15 RX ADMIN — TAMSULOSIN HYDROCHLORIDE 0.4 MG: 0.4 CAPSULE ORAL at 08:07

## 2022-08-15 RX ADMIN — OXYBUTYNIN CHLORIDE 10 MG: 10 TABLET, EXTENDED RELEASE ORAL at 08:07

## 2022-08-15 RX ADMIN — Medication 5000 UNITS: at 08:06

## 2022-08-15 RX ADMIN — SODIUM CHLORIDE, PRESERVATIVE FREE 10 ML: 5 INJECTION INTRAVENOUS at 21:10

## 2022-08-15 RX ADMIN — BUMETANIDE 2 MG: 0.25 INJECTION INTRAMUSCULAR; INTRAVENOUS at 16:34

## 2022-08-15 RX ADMIN — MOMETASONE FUROATE AND FORMOTEROL FUMARATE DIHYDRATE 2 PUFF: 100; 5 AEROSOL RESPIRATORY (INHALATION) at 15:28

## 2022-08-15 RX ADMIN — AMOXICILLIN AND CLAVULANATE POTASSIUM 1 TABLET: 875; 125 TABLET, FILM COATED ORAL at 21:11

## 2022-08-15 RX ADMIN — HYDRALAZINE HYDROCHLORIDE 25 MG: 25 TABLET, FILM COATED ORAL at 08:07

## 2022-08-15 RX ADMIN — PRAVASTATIN SODIUM 80 MG: 80 TABLET ORAL at 08:07

## 2022-08-15 RX ADMIN — IPRATROPIUM BROMIDE AND ALBUTEROL SULFATE 1 AMPULE: .5; 3 SOLUTION RESPIRATORY (INHALATION) at 09:25

## 2022-08-15 RX ADMIN — HEPARIN SODIUM 10 UNITS/KG/HR: 10000 INJECTION, SOLUTION INTRAVENOUS at 08:13

## 2022-08-15 RX ADMIN — HYDRALAZINE HYDROCHLORIDE 25 MG: 25 TABLET, FILM COATED ORAL at 21:10

## 2022-08-15 RX ADMIN — ASPIRIN 81 MG CHEWABLE TABLET 81 MG: 81 TABLET CHEWABLE at 08:06

## 2022-08-15 RX ADMIN — AMOXICILLIN AND CLAVULANATE POTASSIUM 1 TABLET: 875; 125 TABLET, FILM COATED ORAL at 11:43

## 2022-08-15 RX ADMIN — APIXABAN 10 MG: 5 TABLET, FILM COATED ORAL at 21:11

## 2022-08-15 RX ADMIN — APIXABAN 10 MG: 5 TABLET, FILM COATED ORAL at 09:37

## 2022-08-15 RX ADMIN — METOPROLOL TARTRATE 50 MG: 50 TABLET, FILM COATED ORAL at 08:07

## 2022-08-15 RX ADMIN — FINASTERIDE 5 MG: 5 TABLET, FILM COATED ORAL at 08:07

## 2022-08-15 RX ADMIN — BUMETANIDE 2 MG: 0.25 INJECTION INTRAMUSCULAR; INTRAVENOUS at 05:46

## 2022-08-15 RX ADMIN — METOPROLOL TARTRATE 50 MG: 50 TABLET, FILM COATED ORAL at 21:10

## 2022-08-15 RX ADMIN — TIOTROPIUM BROMIDE INHALATION SPRAY 2 PUFF: 3.12 SPRAY, METERED RESPIRATORY (INHALATION) at 15:28

## 2022-08-15 RX ADMIN — MOMETASONE FUROATE AND FORMOTEROL FUMARATE DIHYDRATE 2 PUFF: 100; 5 AEROSOL RESPIRATORY (INHALATION) at 05:18

## 2022-08-15 ASSESSMENT — ENCOUNTER SYMPTOMS
TROUBLE SWALLOWING: 0
RHINORRHEA: 0
SHORTNESS OF BREATH: 1
BACK PAIN: 0
DIARRHEA: 0
NAUSEA: 0
CONSTIPATION: 0
ABDOMINAL PAIN: 0
VOMITING: 0
COUGH: 0
WHEEZING: 0

## 2022-08-15 ASSESSMENT — PAIN SCALES - GENERAL
PAINLEVEL_OUTOF10: 0
PAINLEVEL_OUTOF10: 0

## 2022-08-15 NOTE — PLAN OF CARE
Problem: Respiratory - Adult  Goal: Clear lung sounds  Outcome: Progressing    Cont treatments to improve breath sounds, increase aeration and decrease WOB.

## 2022-08-15 NOTE — PROGRESS NOTES
[facility]  INPATIENT PHYSICAL THERAPY  DAILY NOTE  STRZ MED SURG 8B - 8B-35/035-A    Time In:   Time Out: 1100  Timed Code Treatment Minutes: 25 Minutes  Minutes: 25          Date: 8/15/2022  Patient Name: Windy Schaeffer,  Gender:  male        MRN: [id number]  : 1935  (81 y.o.)     Referring Practitioner: Windy Hernandez CNP  Diagnosis: Anasarca  Additional Pertinent Hx: Windy Schaeffer is a 81 y.o. male with PMHx of HFpEF, prostate cancer treated in , DVT status post IVC filter, CKD stage III, CAD status post PCI, hypertension who presents to [facility] with aggressive lower extremity edema and weight gain. Patient has noted over the past several days that he has been gaining weight and has developed worsened lower extremity edema. He is also noticing shortness of breath and fatigue. He went to his nephrologist today and it was noted that he had gained weight and so his Bumex was increased in dose. Patient's symptoms worsened at home so he came to the emergency room. While in the ER he was found to be in what appeared to be acute congestive heart failure. He was started on IV diuretics. The urinalysis also demonstrated evidence of urinary tract infection and he has endorsed urinary frequency. Of note in chart review it appears that he had an ultrasound of his leg in March of this year which found a moderate sized DVT, however, patient denies ever being on an anticoagulant at this time. They state they were never notified of a blood clot.      Prior Level of Function:  Lives With: Spouse  Type of Home: House  Home Layout: One level  Home Access: Stairs to enter without rails  Entrance Stairs - Number of Steps: 1  Home Equipment: PartSimple, standard, [address]   Bathroom Shower/Tub: Walk-in shower, Shower chair with back  Bathroom Toilet: Standard  Bathroom Equipment: Toilet raiser, Grab bars in shower  Bathroom Accessibility: [address] Help From: Family  ADL Assistance: Needs assistance  Homemaking Assistance: Needs assistance  Homemaking Responsibilities: No  Ambulation Assistance: Independent  Transfer Assistance: Independent  Active : No  Additional Comments: Pt amb with SW, wife does the housework. He also had help with LE dressing and doing shower transfers. Pt went out when going to MD appointments. Restrictions/Precautions:  Restrictions/Precautions: Fall Risk     SUBJECTIVE: cooperative, discussed at length safety concerns for pt return home and benefits of an inpt therapy stay    PAIN: no pain per pt    Vitals: Vitals not assessed per clinical judgement, see nursing flowsheet    OBJECTIVE:  Bed Mobility:  Not Tested    Transfers:  Sit to Stand: [employer], took 4 trials to successfully std from bedside chair, cues for wt shift fwd and hand placement  Stand to Sit:Contact Guard Assistance, cues for hand placement and controlled descent    Ambulation:  Contact Guard Assistance  Distance: 40'x1  Surface: Level Tile  Device:Rolling Walker  Gait Deviations: Forward Flexed Posture, Slow [place], Decreased Step Length Bilaterally, Decreased Heel Strike Bilaterally, Wide Base of Support, Unsteady Gait, and pt fatigued at end of amb, cues to keep walker closer for safety        Exercise:  Patient was guided in 1 set(s) 10 reps of exercise to both lower extremities. Seated marches, Seated heel/toe raises, and Long arc quads. Cues to complete full ROM. Exercises were completed for increased independence with functional mobility. Functional Outcome Measures: Completed  AM-PAC Inpatient Mobility without Stair Climbing Raw Score : 15  AM-PAC Inpatient without Stair Climbing T-Scale Score : 43.03    ASSESSMENT:  Assessment: Patient progressing toward established goals. and pt inc distance amb, CGA with walker however required a lot of cues for safety and fatigued quickly.   Safety concerns with pt inc fall risk for home mobility. Activity Tolerance:  Patient tolerance of  treatment: fair. Equipment Recommendations:Equipment Needed: No  Discharge Recommendations: Continue to assess pending progress, Patient would benefit from continued PT at discharge, and Inpatient Therapy Stay  Plan: Current Treatment Recommendations: Strengthening, Balance training, Functional mobility training, Transfer training, Neuromuscular re-education, Stair training, Gait training, Endurance training, Safety education & training, Therapeutic activities, Patient/Caregiver education & training, Home exercise program  Plan:  (3-5x GM)    Patient Education  Patient Education: Home Exercise Program, Transfers, Gait    Goals:  Patient goals : to get better  Short Term Goals  Time Frame for Short term goals: by discharge  Short term goal 1: Pt to transfer supine <--> sit S to enable pt to get in/out of bed. Short term goal 2: Pt to transfer sit <--> stand S for increased functional mobility. Short term goal 3: Pt to ambulate >20 feet with RW SBA for household ambulation. Short term goal 4: Pt to ascend/descend 1 step with RW SBA for home entry. Long Term Goals  Time Frame for Long term goals : NA due to short length of stay. Following session, patient left in safe position with all fall risk precautions in place.

## 2022-08-15 NOTE — PLAN OF CARE
Problem: Discharge Planning  Goal: Discharge to home or other facility with appropriate resources  8/15/2022 0927 by Barbara La RN  Outcome: Progressing  Flowsheets (Taken 8/15/2022 9850)  Discharge to home or other facility with appropriate resources: Identify barriers to discharge with patient and caregiver  Note: Patient planning to discharge home with spouse but would benefit from therapy. PT/OT consults for discharge recommendations. Problem: Safety - Adult  Goal: Free from fall injury  8/15/2022 0927 by Barbara La RN  Outcome: Progressing  Flowsheets (Taken 8/15/2022 1439)  Free From Fall Injury: Instruct family/caregiver on patient safety  Note: No falls this shift. Fall precautions and alarms in use. Patient uses call light appropriately. Problem: ABCDS Injury Assessment  Goal: Absence of physical injury  8/15/2022 3516 by Barbara La RN  Outcome: Progressing  Note: Patient free from falls. No injuries noted. Problem: Skin/Tissue Integrity  Goal: Absence of new skin breakdown  Description: 1. Monitor for areas of redness and/or skin breakdown  2. Assess vascular access sites hourly  3. Every 4-6 hours minimum:  Change oxygen saturation probe site  4. Every 4-6 hours:  If on nasal continuous positive airway pressure, respiratory therapy assess nares and determine need for appliance change or resting period. 8/15/2022 8546 by Barbara La RN  Outcome: Progressing  Note: Skin assessed every shift. Encouraging repositioning for pressure ulcer prevention.         Problem: Chronic Conditions and Co-morbidities  Goal: Patient's chronic conditions and co-morbidity symptoms are monitored and maintained or improved  8/15/2022 0927 by Barbara La RN  Outcome: Progressing  Flowsheets (Taken 8/15/2022 8218)  Care Plan - Patient's Chronic Conditions and Co-Morbidity Symptoms are Monitored and Maintained or Improved: Monitor and assess patient's chronic conditions and comorbid symptoms for stability, deterioration, or improvement  Note: Chronic conditions and co morbidities managed. Problem: Pain  Goal: Verbalizes/displays adequate comfort level or baseline comfort level  Outcome: Progressing  Flowsheets (Taken 8/15/2022 2527)  Verbalizes/displays adequate comfort level or baseline comfort level:   Encourage patient to monitor pain and request assistance   Assess pain using appropriate pain scale  Note: Patient has denied pain so far this morning. PRN Tylenol if needed. Pain goal 0/10. Care plan reviewed with patient. Patient verbalizes understanding of the plan of care and contributes to goal setting.

## 2022-08-15 NOTE — PROGRESS NOTES
Hospitalist Progress Note    Patient:  Harmeet Salcido      Unit/Bed:8B-35/035-A    YOB: 1935    MRN: [id number]       Acct: [de-identified]     PCP: Titus Alvarado MD    Date of Admission: 8/11/2022    Assessment/Plan:    Acute congestive heart failure, improving:   Patient with a prior history of diastolic dysfunction with normal ejection fraction. He presented to ED and appered to be significantly volume overloaded with JVD and 3+ lower extremity edema (POA). Echo notable for EF estimated 99%, grade 1 diastolic dysfunction, no WMA, mild MR, mild TR  Daily weights, strict I&O's  Continue IV Bumex 2 mg twice daily  24h/UOP: 3,530 mL  Approximate 13 lb weight loss since admission  Cardiac diet, 2 L fluid restriction, 2 g sodium diet  ELISA hose     Acute cystitis:   He has a prior history of prostate cancer treated back in 2006. He does have urinary frequency. UA notable for trace ketones, trace proteins, positive nitrite, moderate LE, greater than 100 WBC, many bacteria   Urine cx + E. Coli - sensitive to Rocephin - continue  8/15: Sensitive to Augmentin  Received Rocephin 8/11 - 8/14 -> Switch over to p.o. Augmentin 8/15     History of recent extensive DVT:   Patient had a lower extremity ultrasound 3/24/22 which demonstrated a DVT in his left lower extremity from the popliteal to the common femoral vein. Patient states he was never started on anticoagulation. No documentation in the chart following this imaging study. I recommended starting anticoagulation while reassess the clot burden. CTA chest negative for PE, notable for low lung volumes  Repeat lower extremity Doppler notable for interval resolution of left-sided DVT, however notable for interval development of a DVT in the right popliteal vein and posterior tibial vein  Discussed starting anticoagulation with patient, patient stating he wants to hold off on starting [address] at this time.   Patient currently getting heparin for DVT prophylaxis but not appropriate DVT dose. We will continue to encourage patient and educate patient on importance of [address].  8/13: Readdressed starting [facility] given new DVT. Lengthy discussion at bedside about risks vs benefits. Patient now agreeable to starting [facility]. Received heparin gtt 8/13 -8/15  - Start Eliquis 10 mg BID x 7 days (initiated 8/15), followed by Eliquis 5 mg BID  Will need to f/u with PCP within 1 week upon discharge    CKD stage II  Baseline creatinine range ~1.2 -1.4. Creatinine stable  Patient's GFR is currently slightly better than baseline. Renally dose medications  Daily lab     Troponin elevation (POA):   Suspect likely related to his acute illness and CKD. Denies chest pains, no acute ischemic changes noted on EKG. History of prostate cancer:   Treated in 2006. Follows Dr. Keyon Treadwell in urology locally. Continue Flomax, oxybutynin, finasteride     CAD:   Documentation of prior PCI. Continue pravastatin, Lopressor, aspirin     COPD without acute exacerbation  Continue scheduled Dulera and Spiriva   As needed bronchodilators  Pulmonary toilet: Incentive spirometer, Acapella, cough, deep breathe    Essential hypertension, uncontrolled, improving  Continue Bumex, Lopressor  Continue hydralazine 25 mg 2 times daily    Hyperlipidemia  Statin      Obesity:   BMI noted to be 32.95 kg/m²  We will continue diuresis and attempt to provide lifestyle modification advice    Weakness secondary to physical deconditioning  PT/OT consulted, appreciate recs: Recommending IPR  IPR coordinator consulted, IPR consulted.       Expected discharge date:  Pending clinical course    Disposition:    [] Home       [] TCU       [x] Rehab vs SNF       [] Psych       [] SNF       [] [place]       [] Other-    Chief Complaint: Lower extremity edema    Hospital Course: Per HPI documented 8/11/22: \"Stone Bello is a 81 y.o. male with PMHx of HFpEF, prostate cancer treated in 2006, DVT status post IVC filter, CKD stage III, CAD status post PCI, hypertension who presents to [facility] with aggressive lower extremity edema and weight gain. Patient has noted over the past several days that he has been gaining weight and has developed worsened lower extremity edema. He is also noticing shortness of breath and fatigue. He went to his nephrologist today and it was noted that he had gained weight and so his Bumex was increased in dose. Patient's symptoms worsened at home so he came to the emergency room. While in the ER he was found to be in what appeared to be acute congestive heart failure. He was started on IV diuretics. The urinalysis also demonstrated evidence of urinary tract infection and he has endorsed urinary frequency. Of note in chart review it appears that he had an ultrasound of his leg in March of this year which found a moderate sized DVT, however, patient denies ever being on an anticoagulant at this time. They state they were never notified of a blood clot. \"     8/12/22: Resumed care of patient today. Patient sitting up in bed, chronically ill-appearing, in no apparent distress resting in bed. Remains afebrile, satting 98% on room air, with hemodynamically stable vital signs. Patient admitted overnight. Will obtain 2D echo today, repeat venous Doppler of lower extremities, CTA chest.  Urine culture growing enteric gram-negative bacilli, will continue Rocephin at this time and tailor antibiotics pending sensitivity results. Continue IV diuresis with Bumex for acute decompensated heart failure. Lengthy discussion at the bedside with patient about resuming anticoagulation given history of DVT in the left lower extremity. Patient still refusing to start [address] [place] [address] at this time. Will await results of venous ultrasound and rediscuss with patient. Of note patient noted to be very hypertensive this morning with a systolic BP in the 698T.   We will start patient on chest pains, shortness of breath at rest, abdominal pains, nausea, vomiting, diarrhea, fever, chills, dizziness, lightheadedness. States he had a bowel movement yesterday morning. Reports good appetite. Medications:  Reviewed    Infusion Medications    sodium chloride       Scheduled Medications    apixaban  10 mg Oral BID    Followed by    Cyn Plater ON 8/22/2022] apixaban  5 mg Oral BID    amoxicillin-clavulanate  1 tablet Oral 2 times per day    aspirin  81 mg Oral Daily    Vitamin D  5,000 Units Oral Daily    finasteride  5 mg Oral Daily    mometasone-formoterol  2 puff Inhalation BID    metoprolol tartrate  50 mg Oral BID    oxybutynin  10 mg Oral Daily    pravastatin  80 mg Oral Daily    tamsulosin  0.4 mg Oral Daily    bumetanide  2 mg IntraVENous BID AC    sodium chloride flush  5-40 mL IntraVENous 2 times per day    hydrALAZINE  25 mg Oral 2 times per day     PRN Meds: heparin (porcine), heparin (porcine), sodium chloride flush, sodium chloride, ondansetron **OR** ondansetron, polyethylene glycol, acetaminophen **OR** acetaminophen, ipratropium-albuterol      Intake/Output Summary (Last 24 hours) at 8/15/2022 1433  Last data filed at 8/15/2022 1406  Gross per 24 hour   Intake 720 ml   Output 3375 ml   Net -2655 ml       Diet:  ADULT DIET; Regular; Low Fat/Low Chol/High Fiber/2 gm Na; 2000 ml    Exam:  /69   Pulse 64   Temp 97.6 °F (36.4 °C) (Oral)   Resp 17   Ht 5' 9\" (1.753 m)   Wt 224 lb 13.9 oz (102 kg)   SpO2 98%   BMI 33.21 kg/m²     General appearance: No apparent distress, appears older than stated age and cooperative. Chronically ill-appearing  HEENT: Pupils equal, round, and reactive to light. Conjunctivae/corneas clear. Neck: Supple, with full range of motion. No jugular venous distention. Respiratory:  Normal respiratory effort, able to speak full clear sentences. Clear to auscultation, bilaterally with expiratory wheezes in the bases. No rales/rhonchi.   Cardiovascular: Regular adenopathy. Normal thyroid. Low lung volumes with areas of subsegmental atelectasis. No pulmonary nodules. No areas of consolidation. No pneumothorax. Normal bones. Small hiatal hernia. Several small hypodensities within the liver. Impression: Low lung volumes. This document has been electronically signed by: Bj Nam MD on 08/12/2022 01:43 AM All CTs at this facility use dose modulation techniques and iterative reconstructions, and/or weight-based dosing when appropriate to reduce radiation to a low as reasonably achievable. 3D Post-processing was performed on this study. XR CHEST PORTABLE    Result Date: 8/11/2022  1 view chest x-ray Comparison: CR,SR - XR CHEST PORTABLE - 11/26/2020 10:02 PM EST Findings: Mild left basilar pleural thickening without change. No gross evidence of focal pulmonary infiltrate. Normal size heart. Partial visualization of cervical spine fusion hardware. Left basilar pleural thickening without change. This may be secondary to scarring. Cannot exclude a small effusion. This document has been electronically signed by: Willis Emanuel MD on 08/11/2022 06:54 PM      DVT prophylaxis: [] Lovenox                                 [] SCDs                                 [x] Heparin                                 [] Encourage ambulation           [] Already on Anticoagulation     Code Status: Full Code    PT/OT Eval Status: Per PT note documented 8/15/2022: \"ASSESSMENT:  Assessment: Patient progressing toward established goals. and pt inc distance amb, CGA with walker however required a lot of cues for safety and fatigued quickly. Safety concerns with pt inc fall risk for home mobility. Activity Tolerance:  Patient tolerance of  treatment: fair.       Equipment Recommendations:Equipment Needed: No  Discharge Recommendations: Continue to assess pending progress, Patient would benefit from continued PT at discharge, and Inpatient Therapy Stay  Plan: Current Treatment Recommendations: Strengthening, Balance training, Functional mobility training, Transfer training, Neuromuscular re-education, Stair training, Gait training, Endurance training, Safety education & training, Therapeutic activities, Patient/Caregiver education & training, Home exercise program  Plan:  (3-5x GM)\"      Per OT note documented 8/15/2022: \"Assessment:  Assessment: Patient would benefit from continued skilled OT services to address above deficits. He presents with anasarca. Pt had limited mobility prior to admission. Pt had an ultra sound of his leg in March and it showed a moderate sized DVT. He does have IVC filter. Pt also has hx of Prostate Cancer and COPD. Pt was having help with getting into and out of the shower at home. He also had help with lower body dressing as he has been wearing ELISA hose daily. Pt walked with a standard walker prior to admission. He was not walking far distances, however. Pt demonstrated walking to/from the bathroom with CGA while using a rolling walker and showed slow movements with short steps throughout. Rigidity noted. Pt shaved at the sink with SBA. He needed to hold onto the sink with 1 hand throughout the ADL. Pt had stood for 3.5 minutes. Performance deficits / Impairments: Decreased functional mobility , Decreased ADL status, Decreased endurance, Decreased balance  Prognosis: Good  REQUIRES OT FOLLOW-UP: Yes  Decision Making: Medium Complexity     Treatment Initiated: Treatment and education initiated within context of evaluation. Evaluation time included review of current medical information, gathering information related to past medical, social and functional history, completion of standardized testing, formal and informal observation of tasks, assessment of data and development of plan of care and goals.   Treatment time included skilled education and facilitation of tasks to increase safety and independence with ADL's for improved functional independence and quality of life. Pt had help with donning ELISA hose and was shown a technique for donning them using the bag in which they came. Pt was reminded to have his wife put them on each morning. Pt demonstrated use of both the incentive spirometer and a cappella. 10 reps each. No cues needed for the a cappella but pt needed min cues for slowing down the pace of his inhale breath when using the incentive spirometer. Pt could state his goal for therapy. He agreed to home health if he needed to continue with therapy after he is discharged. Per discussion with PT, pt's limited endurance would be the determining factor for whether pt could be admitted to IP Rehab. Discharge Recommendations:  Continue to assess pending progress     Patient Education:  Patient Education  Education Given To: Patient  Education Provided: Role of Therapy, Plan of Care  Education Provided Comments: Strategy for donning ELISA hose with help from his spouse; technique for using incentive spirometer  Education Method: Demonstration  Barriers to Learning: None  Education Outcome: Verbalized understanding     Equipment Recommendations:  Equipment Needed: No\"        Tele:   [x] yes             [] no    Normal sinus rhythm, without ectopy.     Active Hospital Problems    Diagnosis Date Noted    Acute on chronic systolic CHF (congestive heart failure), NYHA class 4 ([facility] [I50.23] 08/11/2022     Priority: Medium       Electronically signed by ALESSANDRA Miranda CNP on 8/15/2022 at 2:33 PM

## 2022-08-15 NOTE — PLAN OF CARE
Problem: Discharge Planning  Goal: Discharge to home or other facility with appropriate resources  Outcome: Progressing     Problem: Safety - Adult  Goal: Free from fall injury  Outcome: Progressing     Problem: ABCDS Injury Assessment  Goal: Absence of physical injury  Outcome: Progressing     Problem: Skin/Tissue Integrity  Goal: Absence of new skin breakdown  Description: 1. Monitor for areas of redness and/or skin breakdown  2. Assess vascular access sites hourly  3. Every 4-6 hours minimum:  Change oxygen saturation probe site  4. Every 4-6 hours:  If on nasal continuous positive airway pressure, respiratory therapy assess nares and determine need for appliance change or resting period. Outcome: Progressing     Problem: Chronic Conditions and Co-morbidities  Goal: Patient's chronic conditions and co-morbidity symptoms are monitored and maintained or improved  Outcome: Progressing   Care plan reviewed with patient. Patient verbalize understanding of the plan of care and contribute to goal setting.

## 2022-08-15 NOTE — CONSULTS
Physical Medicine & Rehabilitation Consultation Note      Admitting Physician: ALESSANDRA Preciado - *    Primary Care Provider: Loki Vuong MD     Reason for Consult:  Therapy need    History of Present Illness:  Hercules Kussmaul is a 81 y.o. right-handed -American male with a history of hypertension, DVT requiring IVC filter placement, COPD, chronic kidney disease III, coronary artery disease requiring coronary artery stenting, congestive heart failure, prostate cancer, left ribs and nasal l bone fractures in 2020, intracranial hemorrhage, status post bilateral hips and left knee replacement surgeries, status post left shoulder rotator cuff surgery, status post lumbar fusion surgery, was admitted to [address] [place] [address] on 8/11/2022 due to increasing lower extremities swelling secondary to acute on chronic congestive heart failure. The patient is a somewhat poor historian. History primarily obtained from EMR. The patient says he has been having increasing lower extremities swelling with weight gain for the past 2 months. He was seen by nephrologist on 8/11/2022 and concern was raised about the previous diagnosis of DVT. He later was advised to go to ER for evaluation. Therefore he went to [address] [place] [address] ER on 8/11/2022 for evaluation. He was found to have elevated troponin to 2.014 but normal proBNP of 430.7. Urinalysis revealed presence of nitrite, moderate leukocyte esterase and trace ketone suggesting of urinary tract infection. He was also found to have significant volume overload with jugular vein distention and 3+ pitting edema at bilateral lower extremities. Therefore he was diagnosed of acute on chronic congestive heart failure and was admitted. He was started on IV Bumex with fluid restriction and low-sodium diet. Rocephin was started for urinary tract infection. CTA of the chest was done on 8/12/2022 and showed no pulmonary embolism.   Bilateral lower following session. ADL:   Feeding: with set-up. Help provided for opening packets on his tray  Grooming: Stand By Assistance. Shaving while standing at the sink with verbal cues for wiping off the razor blade occasionally  Lower Extremity Dressing: Maximum Assistance. Don/doffing slipper socks and donning ELISA hose . BALANCE:  Sitting Balance:  Stand By Assistance. Scooting forward in the chair  Standing Balance: Stand By Assistance. Shaving at the sink- holding onto the sink with his L hand     BED MOBILITY:  Not Tested     TRANSFERS:  Sit to Stand:  Minimal Assistance. From the recliner chair  Stand to Sit: [address] To the chair with cues to sit as slowly as possible     FUNCTIONAL MOBILITY:  Assistive Device: Rolling Walker  Assist Level:  Contact Guard Assistance. Distance: To and from bathroom  Pt had small steps and slow pace. No LOB noted. Extra time needed to get positioned at the sink for his ADL.       ST:  Not requested      Past Medical History:        Diagnosis Date    Acute on chronic systolic CHF (congestive heart failure), NYHA class 4 ([address] 08/11/2022    CAD (coronary artery disease)     Status post coronary artery stenting    Chronic kidney disease, stage III (moderate) ([facility])     Closed fracture of six ribs of left side 11/27/2020    COPD (chronic obstructive pulmonary disease) ([address] 12/12/2020    Patient denies 8/15/2022    DVT, lower extremity ([address]     Gastritis     Hematuria     Hypercholesteremia     Hypertension     Intracranial bleed ([address]     Nasal bone fracture 11/27/2020    Osteoarthritis     Presence of IVC filter     Prostate cancer ([address]     Seed implants    Spinal stenosis, lumbar, s/p laminectomy 06/01/2015    Statin-induced myositis        Past Surgical History:        Procedure Laterality Date    BACK SURGERY  2006    fusion    CARDIAC SURGERY      2 stents    COSMETIC SURGERY      ENDOSCOPY, COLON, DIAGNOSTIC      IVC FILTER INSERTION      NECK SURGERY 0758    sodium chloride flush 0.9 % injection 5-40 mL  5-40 mL IntraVENous PRN Willie Ortiz MD        0.9 % sodium chloride infusion   IntraVENous PRN Willie Ortiz MD        ondansetron (ZOFRAN-ODT) disintegrating tablet 4 mg  4 mg Oral Q8H PRN Willie Ortiz MD        Or    ondansetron (ZOFRAN) injection 4 mg  4 mg IntraVENous Q6H PRN Willie Ortiz MD        polyethylene glycol (GLYCOLAX) packet 17 g  17 g Oral Daily PRN Willie Ortiz MD        acetaminophen (TYLENOL) tablet 650 mg  650 mg Oral Q6H PRN Willie Ortiz MD        Or    acetaminophen (TYLENOL) suppository 650 mg  650 mg Rectal Q6H PRN Willie Ortiz MD        ipratropium-albuterol (DUONEB) nebulizer solution 1 ampule  1 ampule Inhalation Q6H PRN ALESSANDRA Rangel - HERMINIA        hydrALAZINE (APRESOLINE) tablet 25 mg  25 mg Oral 2 times per day ALESSANDRA Rangel - CNP   25 mg at 08/15/22 0807        Social History:  Social History     Socioeconomic History    Marital status:      Spouse name: Zay Anderson    Number of children: Not on file    Years of education: Not on file    Highest education level: Not on file   Occupational History    Not on file   Tobacco Use    Smoking status: Former     Packs/day: 0.50     Years: 25.00     Pack years: 12.50     Types: Cigarettes     Quit date: 1997     Years since quittin.3    Smokeless tobacco: Never   Vaping Use    Vaping Use: Never used   Substance and Sexual Activity    Alcohol use: No     Alcohol/week: 0.0 standard drinks    Drug use: No    Sexual activity: Not Currently   Other Topics Concern    Not on file   Social History Narrative    Not on file     Social Determinants of Health     Financial Resource Strain: Not on file   Food Insecurity: Not on file   Transportation Needs: Not on file   Physical Activity: Not on file   Stress: Not on file   Social Connections: Not on file   Intimate Partner Violence: Not on file   Housing Stability: Not on file Occupation: Unable to assess because the patient refused to answer  Lives with: His wife  Home setup: 1 level house without step outside; further details could not be obtained due to patient's refusal to answer  Previous level of independence: Patient says his wife has been assisting him in ADLs but unable to assess further details due to patient refusal to answer any further questioning. Family History:       Problem Relation Age of Onset    Heart Disease Mother     High Blood Pressure Mother     Prostate Cancer Neg Hx     Cancer Neg Hx     Diabetes Neg Hx     Kidney Disease Neg Hx     Stroke Neg Hx        Review of Systems:  Review of Systems   Unable to perform ROS: Other (Patient uncooperative)   Constitutional:  Negative for chills, diaphoresis, fatigue and fever. HENT:  Negative for hearing loss, rhinorrhea, sneezing and trouble swallowing. Eyes:  Negative for visual disturbance. Respiratory:  Positive for shortness of breath (with exertiion). Negative for cough and wheezing. Cardiovascular:  Positive for leg swelling (bilateral legss). Gastrointestinal:  Negative for abdominal pain, constipation, diarrhea, nausea and vomiting. Genitourinary:  Negative for dysuria. Musculoskeletal:  Positive for arthralgias (bilateral knee pain with walking) and gait problem. Negative for back pain, myalgias and neck pain. Neurological:  Negative for dizziness, speech difficulty, weakness, numbness and headaches. Psychiatric/Behavioral:  Positive for agitation. Negative for confusion.         Physical Exam:  /69   Pulse 64   Temp 97.6 °F (36.4 °C) (Oral)   Resp 17   Ht 5' 9\" (1.753 m)   Wt 224 lb 13.9 oz (102 kg)   SpO2 98%   BMI 33.21 kg/m²   Physical Exam  General:  well-developed, well nourished -American male; in no acute distress ; somewhat flat affect with appropriate mood; patient became agitated in the middle of questioning and refused answering further question and refused to be examined  Eyes: pupil equally round ; extra-ocular motion appears to be intact bilaterally  Head, Ear, Nose, Mouth & Throat : normocephalic ; no visible discharge from ears or nose ; no obvious deformity ; no facial swelling ; oral mucosa pink   Neck : Patient refused to be examined  Cardiovascular : Patient refused to be exam  Pulmonary : Patient refused to be examined  Gastrointestinal : Patient refused to be examined   Back : Patient refused to be examined  Skin: no skin lesion or rash at the visible bilateral upper extremities and face skin  Musculoskeletal : Patient refused to be examined  Cerebral :  alert ; awake ; oriented to place, person and time; became somewhat agitated in the middle of evaluation and became uncooperative  Cerebellum : Patient refused to be examined  Cranial Nerves : Unable to assess due to patient's refusal to be examined Sensory : Unable to assess due to patient's refusal to be examined   Motor : Unable to assess due to patient's refusal to be examined  Reflex :  Unable to assess due to patient's refusal to be examined   Pathological Reflex :  Unable to assess due to patient's refusal to be examined  Gait : Not assessed      Diagnostics:  Recent Results (from the past 24 hour(s))   Anti-Xa, Unfractionated Heparin    Collection Time: 08/14/22  6:18 PM   Result Value Ref Range    Heparin Unfractionated 0.76 (H) 0.30 - 0.70 U/ml   Magnesium    Collection Time: 08/15/22 12:19 AM   Result Value Ref Range    Magnesium 2.4 1.6 - 2.4 mg/dL   CBC with Auto Differential    Collection Time: 08/15/22 12:19 AM   Result Value Ref Range    WBC 5.6 4.8 - 10.8 thou/mm3    RBC 4.92 4.70 - 6.10 mill/mm3    Hemoglobin 15.0 14.0 - 18.0 gm/dl    Hematocrit 49.4 42.0 - 52.0 %    .4 (H) 80.0 - 94.0 fL    MCH 30.5 26.0 - 33.0 pg    MCHC 30.4 (L) 32.2 - 35.5 gm/dl    RDW-CV 12.9 11.5 - 14.5 %    RDW-SD 48.1 (H) 35.0 - 45.0 fL    Platelets 619 504 - 105 thou/mm3    MPV 10.6 9.4 - 12.4 fL Seg Neutrophils 71.0 %    Lymphocytes 14.6 %    Monocytes 9.6 %    Eosinophils 3.9 %    Basophils 0.5 %    Immature Granulocytes 0.4 %    Segs Absolute 4.0 1.8 - 7.7 thou/mm3    Lymphocytes Absolute 0.8 (L) 1.0 - 4.8 thou/mm3    Monocytes Absolute 0.5 0.4 - 1.3 thou/mm3    Eosinophils Absolute 0.2 0.0 - 0.4 thou/mm3    Basophils Absolute 0.0 0.0 - 0.1 thou/mm3    Immature Grans (Abs) 0.02 0.00 - 0.07 thou/mm3    nRBC 0 /100 wbc   Basic Metabolic Panel w/ Reflex to MG    Collection Time: 08/15/22 12:19 AM   Result Value Ref Range    Sodium 139 135 - 145 meq/L    Potassium reflex Magnesium 4.0 3.5 - 5.2 meq/L    Chloride 99 98 - 111 meq/L    CO2 25 23 - 33 meq/L    Glucose 105 70 - 108 mg/dL    BUN 27 (H) 7 - 22 mg/dL    Creatinine 1.5 (H) 0.4 - 1.2 mg/dL    Calcium 9.3 8.5 - 10.5 mg/dL   Anti-Xa, Unfractionated Heparin    Collection Time: 08/15/22 12:19 AM   Result Value Ref Range    Heparin Unfractionated 0.51 0.30 - 0.70 U/ml   Anion Gap    Collection Time: 08/15/22 12:19 AM   Result Value Ref Range    Anion Gap 15.0 8.0 - 16.0 meq/L   Glomerular Filtration Rate, Estimated    Collection Time: 08/15/22 12:19 AM   Result Value Ref Range    Est, Glom Filt Rate 54 (A) ml/min/1.73m2   Anti-Xa, Unfractionated Heparin    Collection Time: 08/15/22  5:59 AM   Result Value Ref Range    Heparin Unfractionated 0.50 0.30 - 0.70 U/ml   Anti-Xa, Unfractionated Heparin    Collection Time: 08/15/22 12:32 PM   Result Value Ref Range    Heparin Unfractionated 1.88 (HH) 0.30 - 0.70 U/ml      Latest Reference Range & Units 8/11/22 18:22   Sodium 135 - 145 meq/L 141   Potassium 3.5 - 5.2 meq/L 4.1   Chloride 98 - 111 meq/L 105   CO2 23 - 33 meq/L 25   BUN,BUNPL 7 - 22 mg/dL 21   Creatinine 0.4 - 1.2 mg/dL 1.3 (H)   Anion Gap 8.0 - 16.0 meq/L 11.0   Est, Glom Filt Rate ml/min/1.73m2 63 !   (H): Data is abnormally high  !: Data is abnormal     Latest Reference Range & Units 8/11/22 18:22   WBC 4.8 - 10.8 thou/mm3 4.8   RBC 4.70 - 6.10 mill/mm3 4.69 (L)   Hemoglobin Quant 14.0 - 18.0 gm/dl 14.3   Hematocrit 42.0 - 52.0 % 47.2   MCV 80.0 - 94.0 fL 100.6 (H)   MCH 26.0 - 33.0 pg 30.5   MCHC 32.2 - 35.5 gm/dl 30.3 (L)   MPV 9.4 - 12.4 fL 10.2   RDW-CV 11.5 - 14.5 % 12.9   RDW-SD 35.0 - 45.0 fL 47.6 (H)   Platelet Count 466 - 400 thou/mm3 144   Lymphocytes Absolute 1.0 - 4.8 thou/mm3 0.8 (L)   Monocytes Absolute 0.4 - 1.3 thou/mm3 0.4   Eosinophils Absolute 0.0 - 0.4 thou/mm3 0.2   Basophils Absolute 0.0 - 0.1 thou/mm3 0.0   Seg Neutrophils % 69.9   Segs Absolute 1.8 - 7.7 thou/mm3 3.4   Lymphocytes % 15.7   Monocytes % 9.0   Eosinophils % 4.4   Basophils % 0.6   Immature Grans (Abs) 0.00 - 0.07 thou/mm3 0.02   Immature Granulocytes % 0.4   Nucleated Red Blood Cells /100 wbc 0   (L): Data is abnormally low  (H): Data is abnormally high     Latest Reference Range & Units 8/11/22 20:10   Color, UA STRAW-YELLOW  YELLOW   Glucose, UA NEGATIVE mg/dl NEGATIVE   Bilirubin, Urine NEGATIVE  NEGATIVE   Ketones, Urine NEGATIVE  TRACE ! Blood, Urine NEGATIVE  NEGATIVE   pH, UA 5.0 - 9.0  5.5   Protein, UA NEGATIVE  TRACE ! Urobilinogen, Urine 0.0 - 1.0 eu/dl 1.0   Nitrite, Urine NEGATIVE  POSITIVE ! Leukocyte Esterase, Urine NEGATIVE  MODERATE !    Casts UA NONE SEEN /lpf >15 HYALINE   CASTS 2 NONE SEEN /lpf NONE SEEN   WBC, UA 0-4/hpf /hpf > 100   RBC, UA 0-2/hpf /hpf 0-2   Epithelial Cells, UA 3-5/hpf /hpf NONE SEEN   Renal Epithelial, UA NONE SEEN  NONE SEEN   Bacteria, UA FEW/NONE SEEN /hpf MANY   Yeast, Urine NONE SEEN  NONE SEEN   Crystals, UA NONE SEEN  NONE SEEN   Character, Urine CLEAR-SL CLOUD  SL CLOUDY   !: Data is abnormal     Latest Reference Range & Units 8/11/22 18:22 8/13/22 08:24   Pro-BNP 0.0 - 1800.0 pg/mL 430.7    Troponin T ng/ml 0.014 ! 0.013 !   !: Data is abnormal     Latest Reference Range & Units 8/12/22 05:39   CHOLESTEROL, TOTAL, 218303 100 - 199 mg/dL 244 (H)   HDL Cholesterol mg/dL 51   LDL Calculated mg/dL 172   Triglycerides 0 - 199 mg/dL 105   (H): Data is abnormally high    Urine culture (8/11/2022) :   Component 8/11/22 2010    Organism Escherichia coli Abnormal     Urine Culture Reflex [place] count: >100,000 CFU/mL    Susceptibility  Escherichia coli (1)  Antibiotic Interpretation Microscan  Method    amoxicillin-clavulanate Sensitive <=2 mcg/mL BACTERIAL SUSCEPTIBILITY PANEL BY KAR    cefOXitin Sensitive <=4 mcg/mL BACTERIAL SUSCEPTIBILITY PANEL BY KAR    cefTRIAXone Sensitive <=1 mcg/mL BACTERIAL SUSCEPTIBILITY PANEL BY KAR    ampicillin Sensitive <=2 mcg/mL BACTERIAL SUSCEPTIBILITY PANEL BY KAR    gentamicin Sensitive <=1 mcg/mL BACTERIAL SUSCEPTIBILITY PANEL BY KAR    trimethoprim-sulfamethoxazole Sensitive <=20 mcg/mL BACTERIAL SUSCEPTIBILITY PANEL BY KAR    tetracycline Sensitive <=1 mcg/mL BACTERIAL SUSCEPTIBILITY PANEL BY KAR    nitrofurantoin Sensitive <=16 mcg/mL BACTERIAL SUSCEPTIBILITY PANEL BY KAR         Latest Reference Range & Units 8/14/22 07:44 8/14/22 10:31 8/15/22 00:19   Sodium 135 - 145 meq/L 139 140 139   Potassium 3.5 - 5.2 meq/L 3.9 4.4 4.0   Chloride 98 - 111 meq/L 100 99 99   CO2 23 - 33 meq/L 26 27 25   BUN,BUNPL 7 - 22 mg/dL 24 (H) 24 (H) 27 (H)   Creatinine 0.4 - 1.2 mg/dL 1.3 (H) 1.4 (H) 1.5 (H)   Anion Gap 8.0 - 16.0 meq/L 13.0 14.0 15.0   Est, Glom Filt Rate ml/min/1.73m2 63 ! 58 ! 54 !   (H): Data is abnormally high  !: Data is abnormal     Latest Reference Range & Units 8/13/22 15:25 8/14/22 07:44 8/15/22 00:19   WBC 4.8 - 10.8 thou/mm3 5.6 5.1 5.6   RBC 4.70 - 6.10 mill/mm3 5.16 5.24 4.92   Hemoglobin Quant 14.0 - 18.0 gm/dl 15.9 16.0 15.0   Hematocrit 42.0 - 52.0 % 51.1 50.7 49.4   MCV 80.0 - 94.0 fL 99.0 (H) 96.8 (H) 100.4 (H)   MCH 26.0 - 33.0 pg 30.8 30.5 30.5   MCHC 32.2 - 35.5 gm/dl 31.1 (L) 31.6 (L) 30.4 (L)   MPV 9.4 - 12.4 fL 10.5 10.5 10.6   RDW-CV 11.5 - 14.5 % 12.8 13.0 12.9   RDW-SD 35.0 - 45.0 fL 46.5 (H) 46.5 (H) 48.1 (H)   Platelet Count 281 - 400 thou/mm3 163 159 148   Lymphocytes Absolute 1.0 - 4.8 thou/mm3  0.8 (L) 0.8 (L)   Monocytes Absolute 0.4 - 1.3 thou/mm3  0.4 0.5   Eosinophils Absolute 0.0 - 0.4 thou/mm3  0.2 0.2   Basophils Absolute 0.0 - 0.1 thou/mm3  0.0 0.0   Seg Neutrophils %  71.3 71.0   Segs Absolute 1.8 - 7.7 thou/mm3  3.6 4.0   Lymphocytes %  15.7 14.6   Monocytes %  7.1 9.6   Eosinophils %  4.9 3.9   Basophils %  0.6 0.5   Immature Grans (Abs) 0.00 - 0.07 thou/mm3  0.02 0.02   Immature Granulocytes %  0.4 0.4   Nucleated Red Blood Cells /100 wbc  0 0   (H): Data is abnormally high  (L): Data is abnormally low      MRI of brain with and without contrast (11/29/2020) : Impression   1. No acute findings. 2. 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 3. 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 4. Moderate-severe chronic small vessel ischemic changes. 5. Small amount of recent hemorrhage at the level septum pellucidum and in the occipital horns of the lateral ventricles. Bilateral lower extremities venous duplex Doppler study (3/24/2022) : Impression   Deep venous thrombosis involving the left popliteal vein and extending into the mid femoral vein with some flow around the thrombus. Portable chest x-ray (8/11/2022) : Impression   Left basilar pleural thickening without change. This may be secondary to    scarring. Cannot exclude a small effusion. CTA of the chest with and without contrast (8/12/2022) : Findings: No pulmonary embolus. Normal thoracic aorta. No aneurysm or dissection. No mediastinal or axillary adenopathy. Normal thyroid. Low lung volumes with areas of subsegmental atelectasis. No pulmonary    nodules. No areas of consolidation. No pneumothorax. Normal bones. Small hiatal hernia. Several small hypodensities within the liver. Impression   Low lung volumes. Bilateral lower extremities venous duplex Doppler study (8/12/2022) : Impression   1. Interval resolution of left-sided DVT   2. Interval development of a DVT in the right popliteal vein and posterior tibial vein. Echocardiogram (8/12/2022) :   Conclusions Summary  Technically difficult examination. This is a suboptimal study due to poor echocardiographic window. antroseptal wall Doppler parameters were consistent with abnormal left ventricular relaxation (grade 1 diastolic dysfunction). Normal left ventricle size and systolic function. Ejection fraction was estimated at 50-%. There were no regional left ventricular wall motion abnormalities and wall thickness was within normal limits. Doppler parameters were consistent with abnormal left ventricular relaxation (grade 1 diastolic dysfunction). Impression:  Increasing bilateral lower extremities edema with weight gain, shortness of breath on exertion secondary to fluid overload with acute on chronic congestive heart failure causing impaired ADL and ambulation  E. coli urinary tract infection  Right popliteal vein and posterior tibial vein DVT  Hypertension  COPD  Obesity  History of chronic kidney disease class III  History of coronary artery disease requiring coronary artery stenting  History of congestive heart failure with preserved ejection fraction  History of previous left popliteal vein DVT extending to mid femoral vein  History of IVC filter placement  History of bilateral hips and left knee replacement surgeries  History of L2-3 laminectomy    Continuing PT and OT treatment are still medically indicated to improve his function. Because the patient is somewhat impatient and not very cooperative in comprehensive evaluation by me, I do not think he has the capacity to tolerate minimal 3-hour rehab intervention required for intensive inpatient rehab treatment program.  At the present time the patient still needs significant assistance for ADLs and short distance ambulation.   I will suggest SNF subacute rehab placement as discharge rehab plan.      Recommendations:  Continue ongoing PT and OT treatment while the patient remains in acute hospital  I do not think the patient has the capacity to tolerate minimal 3-hour rehab intervention required for intensive inpatient rehab treatment program.  Recommend SNF subacute rehab program placement as discharge rehabilitation plan. It was my pleasure to evaluate [facility] today. Please call with questions.     Khushboo Garcia MD

## 2022-08-15 NOTE — PROGRESS NOTES
sink.    Vitals: Vitals not assessed per clinical judgement, see nursing flowsheet    Social/Functional History:  Lives With: Spouse  Type of Home: House  Home Layout: One level  Home Access: Stairs to enter without rails  Entrance Stairs - Number of Steps: 1  Home Equipment: Berry Rhein, standard, Wheelchair-manual   Bathroom Shower/Tub: Walk-in shower, Shower chair with back  Bathroom Toilet: Standard  Bathroom Equipment: Toilet raiser, Grab bars in shower  Bathroom Accessibility: Accessible    Receives Help From: Family  ADL Assistance: Needs assistance  Homemaking Assistance: Needs assistance  Homemaking Responsibilities: No  Ambulation Assistance: Independent  Transfer Assistance: Independent    Active : No  Patient's  Info: Pt's spouse and family provides transportation. Occupation: Retired  Type of Occupation: Retired from [address]: Watching TV- game shows  Additional Comments: Pt amb with SW, wife does the housework. He also had help with LE dressing and doing shower transfers. Pt went out when going to MD appointments. VISION:Corrected  Wears reading glasses  HEARING:  [place]    COGNITION: WFL    RANGE OF MOTION:  Bilateral Upper Extremity:  WFL    STRENGTH:  Bilateral Upper Extremity:  Impaired - 3+/5 deltoid; 4-/5 pectoral; 4-/5 biceps and 4/5 triceps    SENSATION:   WFL    ADL:   Feeding: with set-up. Help provided for opening packets on his tray  Grooming: Stand By Assistance. Shaving while standing at the sink with verbal cues for wiping off the razor blade occasionally  Lower Extremity Dressing: Maximum Assistance. Don/doffing slipper socks and donning ELISA hose . BALANCE:  Sitting Balance:  Stand By Assistance. Scooting forward in the chair  Standing Balance: Stand By Assistance. Shaving at the sink- holding onto the sink with his L hand    BED MOBILITY:  Not Tested    TRANSFERS:  Sit to Stand:  Minimal Assistance.  From the recliner chair  Stand to Sit: Contact Guard Assistance. To the chair with cues to sit as slowly as possible    FUNCTIONAL MOBILITY:  Assistive Device: Rolling Walker  Assist Level:  Contact Guard Assistance. Distance: To and from bathroom  Pt had small steps and slow pace. No LOB noted. Extra time needed to get positioned at the sink for his ADL. Activity Tolerance:  Patient tolerance of  treatment: fair. Pt stood for 3-4 minutes while shaving. Pt was very tired following the ADL. He had agreed to remain in the recliner chair following session. Assessment:  Assessment: Patient would benefit from continued skilled OT services to address above deficits. He presents with anasarca. Pt had limited mobility prior to admission. Pt had an ultra sound of his leg in March and it showed a moderate sized DVT. He does have IVC filter. Pt also has hx of Prostate Cancer and COPD. Pt was having help with getting into and out of the shower at home. He also had help with lower body dressing as he has been wearing ELISA hose daily. Pt walked with a standard walker prior to admission. He was not walking far distances, however. Pt demonstrated walking to/from the bathroom with CGA while using a rolling walker and showed slow movements with short steps throughout. Rigidity noted. Pt shaved at the sink with SBA. He needed to hold onto the sink with 1 hand throughout the ADL. Pt had stood for 3.5 minutes. Performance deficits / Impairments: Decreased functional mobility , Decreased ADL status, Decreased endurance, Decreased balance  Prognosis: Good  REQUIRES OT FOLLOW-UP: Yes  Decision Making: Medium Complexity    Treatment Initiated: Treatment and education initiated within context of evaluation.   Evaluation time included review of current medical information, gathering information related to past medical, social and functional history, completion of standardized testing, formal and informal observation of tasks, assessment of data and development of plan of care and goals. Treatment time included skilled education and facilitation of tasks to increase safety and independence with ADL's for improved functional independence and quality of life. Pt had help with donning ELISA hose and was shown a technique for donning them using the bag in which they came. Pt was reminded to have his wife put them on each morning. Pt demonstrated use of both the incentive spirometer and a cappella. 10 reps each. No cues needed for the a cappella but pt needed min cues for slowing down the pace of his inhale breath when using the incentive spirometer. Pt could state his goal for therapy. He agreed to home health if he needed to continue with therapy after he is discharged. Per discussion with PT, pt's limited endurance would be the determining factor for whether pt could be admitted to IP Rehab. Discharge Recommendations:  Continue to assess pending progress    Patient Education:     Patient Education  Education Given To: Patient  Education Provided: Role of Therapy, Plan of Care  Education Provided Comments: Strategy for donning ELISA hose with help from his spouse; technique for using incentive spirometer  Education Method: Demonstration  Barriers to Learning: None  Education Outcome: Verbalized understanding    Equipment Recommendations:  Equipment Needed: No    Plan:  Times per Week: 5x  Current Treatment Recommendations: Endurance training, Functional mobility training, Balance training, Self-Care / ADL, Safety education & training  Plan Comment: Pt would benefit from continued skilled OT services when medically stable and discharged from Acute. HHOT vs SNF is recommended. Specific Instructions for Next Treatment: Functional mobility; ADLs and standing balance; UE exercises. See long-term goal time frame for expected duration of plan of care. If no long-term goals established, a short length of stay is anticipated.     Goals:  Patient goals : \"I want to get stronger and get around better so I can return home. \" pt states. Short Term Goals  Time Frame for Short term goals: By discharge  Short Term Goal 1: Pt will demonstrate functional mobility walking to/from the bathroom with SBA using a standard walker to prepare for doing his toileting routine. Short Term Goal 2: Pt will complete transfers to/from various surfaces including an elevated toilet seat with armrests with SBA to increase his independence with toileting routine. Short Term Goal 3: Pt will complete simple ADLs while standing for 5 minute duration with SBA while using 1 hand release as needed to increase his endurance and balance for ease of doing sinkside grooming. Short Term Goal 4: Pt will complete BUE ROM/moderate resistance exercises with any handout or demonstration provided to increase his strength for ease of doing self care and transfers. Additional Goals?: No         Following session, patient left in safe position with all fall risk precautions in place.

## 2022-08-15 NOTE — PROGRESS NOTES
PM&R is recommending SNF for discharge disposition as do not feel patient will be able to tolerate 3 hours of therapy. Spoke with patient and explained this to him. Voicemail message left with MENDEL Torres.

## 2022-08-16 LAB
ANION GAP SERPL CALCULATED.3IONS-SCNC: 11 MEQ/L (ref 8–16)
BASOPHILS # BLD: 0.7 %
BASOPHILS ABSOLUTE: 0 THOU/MM3 (ref 0–0.1)
BUN BLDV-MCNC: 29 MG/DL (ref 7–22)
CALCIUM SERPL-MCNC: 9.3 MG/DL (ref 8.5–10.5)
CHLORIDE BLD-SCNC: 99 MEQ/L (ref 98–111)
CO2: 28 MEQ/L (ref 23–33)
CREAT SERPL-MCNC: 1.5 MG/DL (ref 0.4–1.2)
EOSINOPHIL # BLD: 5.1 %
EOSINOPHILS ABSOLUTE: 0.3 THOU/MM3 (ref 0–0.4)
ERYTHROCYTE [DISTWIDTH] IN BLOOD BY AUTOMATED COUNT: 12.8 % (ref 11.5–14.5)
ERYTHROCYTE [DISTWIDTH] IN BLOOD BY AUTOMATED COUNT: 48.5 FL (ref 35–45)
GFR SERPL CREATININE-BSD FRML MDRD: 54 ML/MIN/1.73M2
GLUCOSE BLD-MCNC: 98 MG/DL (ref 70–108)
HCT VFR BLD CALC: 52.1 % (ref 42–52)
HEMOGLOBIN: 15.5 GM/DL (ref 14–18)
IMMATURE GRANS (ABS): 0.03 THOU/MM3 (ref 0–0.07)
IMMATURE GRANULOCYTES: 0.5 %
LYMPHOCYTES # BLD: 14.8 %
LYMPHOCYTES ABSOLUTE: 0.8 THOU/MM3 (ref 1–4.8)
MAGNESIUM: 2.4 MG/DL (ref 1.6–2.4)
MCH RBC QN AUTO: 30.2 PG (ref 26–33)
MCHC RBC AUTO-ENTMCNC: 29.8 GM/DL (ref 32.2–35.5)
MCV RBC AUTO: 101.6 FL (ref 80–94)
MONOCYTES # BLD: 11.1 %
MONOCYTES ABSOLUTE: 0.6 THOU/MM3 (ref 0.4–1.3)
NUCLEATED RED BLOOD CELLS: 0 /100 WBC
PLATELET # BLD: 142 THOU/MM3 (ref 130–400)
PMV BLD AUTO: 10.2 FL (ref 9.4–12.4)
POTASSIUM REFLEX MAGNESIUM: 4.3 MEQ/L (ref 3.5–5.2)
RBC # BLD: 5.13 MILL/MM3 (ref 4.7–6.1)
SEG NEUTROPHILS: 67.8 %
SEGMENTED NEUTROPHILS ABSOLUTE COUNT: 3.9 THOU/MM3 (ref 1.8–7.7)
SODIUM BLD-SCNC: 138 MEQ/L (ref 135–145)
WBC # BLD: 5.7 THOU/MM3 (ref 4.8–10.8)

## 2022-08-16 PROCEDURE — 6370000000 HC RX 637 (ALT 250 FOR IP)

## 2022-08-16 PROCEDURE — 1200000003 HC TELEMETRY R&B

## 2022-08-16 PROCEDURE — 83735 ASSAY OF MAGNESIUM: CPT

## 2022-08-16 PROCEDURE — 2580000003 HC RX 258: Performed by: INTERNAL MEDICINE

## 2022-08-16 PROCEDURE — 6370000000 HC RX 637 (ALT 250 FOR IP): Performed by: INTERNAL MEDICINE

## 2022-08-16 PROCEDURE — 36415 COLL VENOUS BLD VENIPUNCTURE: CPT

## 2022-08-16 PROCEDURE — 97110 THERAPEUTIC EXERCISES: CPT

## 2022-08-16 PROCEDURE — 85025 COMPLETE CBC W/AUTO DIFF WBC: CPT

## 2022-08-16 PROCEDURE — 94640 AIRWAY INHALATION TREATMENT: CPT

## 2022-08-16 PROCEDURE — 94760 N-INVAS EAR/PLS OXIMETRY 1: CPT

## 2022-08-16 PROCEDURE — 2500000003 HC RX 250 WO HCPCS: Performed by: INTERNAL MEDICINE

## 2022-08-16 PROCEDURE — 80048 BASIC METABOLIC PNL TOTAL CA: CPT

## 2022-08-16 PROCEDURE — 97535 SELF CARE MNGMENT TRAINING: CPT

## 2022-08-16 PROCEDURE — 99233 SBSQ HOSP IP/OBS HIGH 50: CPT

## 2022-08-16 RX ADMIN — HYDRALAZINE HYDROCHLORIDE 25 MG: 25 TABLET, FILM COATED ORAL at 09:15

## 2022-08-16 RX ADMIN — TIOTROPIUM BROMIDE INHALATION SPRAY 2 PUFF: 3.12 SPRAY, METERED RESPIRATORY (INHALATION) at 05:45

## 2022-08-16 RX ADMIN — MOMETASONE FUROATE AND FORMOTEROL FUMARATE DIHYDRATE 2 PUFF: 100; 5 AEROSOL RESPIRATORY (INHALATION) at 15:34

## 2022-08-16 RX ADMIN — AMOXICILLIN AND CLAVULANATE POTASSIUM 1 TABLET: 875; 125 TABLET, FILM COATED ORAL at 09:14

## 2022-08-16 RX ADMIN — AMOXICILLIN AND CLAVULANATE POTASSIUM 1 TABLET: 875; 125 TABLET, FILM COATED ORAL at 20:40

## 2022-08-16 RX ADMIN — APIXABAN 10 MG: 5 TABLET, FILM COATED ORAL at 20:41

## 2022-08-16 RX ADMIN — TAMSULOSIN HYDROCHLORIDE 0.4 MG: 0.4 CAPSULE ORAL at 09:12

## 2022-08-16 RX ADMIN — ASPIRIN 81 MG CHEWABLE TABLET 81 MG: 81 TABLET CHEWABLE at 09:12

## 2022-08-16 RX ADMIN — BUMETANIDE 2 MG: 0.25 INJECTION INTRAMUSCULAR; INTRAVENOUS at 06:31

## 2022-08-16 RX ADMIN — PRAVASTATIN SODIUM 80 MG: 80 TABLET ORAL at 09:14

## 2022-08-16 RX ADMIN — MOMETASONE FUROATE AND FORMOTEROL FUMARATE DIHYDRATE 2 PUFF: 100; 5 AEROSOL RESPIRATORY (INHALATION) at 05:45

## 2022-08-16 RX ADMIN — APIXABAN 10 MG: 5 TABLET, FILM COATED ORAL at 09:13

## 2022-08-16 RX ADMIN — METOPROLOL TARTRATE 50 MG: 50 TABLET, FILM COATED ORAL at 20:40

## 2022-08-16 RX ADMIN — BUMETANIDE 2 MG: 0.25 INJECTION INTRAMUSCULAR; INTRAVENOUS at 15:46

## 2022-08-16 RX ADMIN — Medication 5000 UNITS: at 09:13

## 2022-08-16 RX ADMIN — SODIUM CHLORIDE, PRESERVATIVE FREE 10 ML: 5 INJECTION INTRAVENOUS at 09:15

## 2022-08-16 RX ADMIN — METOPROLOL TARTRATE 50 MG: 50 TABLET, FILM COATED ORAL at 09:12

## 2022-08-16 RX ADMIN — FINASTERIDE 5 MG: 5 TABLET, FILM COATED ORAL at 09:14

## 2022-08-16 RX ADMIN — HYDRALAZINE HYDROCHLORIDE 25 MG: 25 TABLET, FILM COATED ORAL at 20:40

## 2022-08-16 RX ADMIN — SODIUM CHLORIDE, PRESERVATIVE FREE 10 ML: 5 INJECTION INTRAVENOUS at 20:40

## 2022-08-16 RX ADMIN — OXYBUTYNIN CHLORIDE 10 MG: 10 TABLET, EXTENDED RELEASE ORAL at 09:12

## 2022-08-16 NOTE — PROGRESS NOTES
Hospitalist Progress Note    Patient:  Dorian Marques      Unit/Bed:8B-35/035-A    YOB: 1935    MRN: [id number]       Acct: [de-identified]     PCP: Almaz Robb MD    Date of Admission: 8/11/2022    Assessment/Plan:    Acute congestive heart failure, mildly reduced EF, improving:   Patient with a prior history of diastolic dysfunction with normal ejection fraction. He presented to ED and appered to be significantly volume overloaded with JVD and 3+ lower extremity edema (POA). Echo notable for EF estimated 17%, grade 1 diastolic dysfunction, no WMA, mild MR, mild TR  Daily weights, strict I&O's  Continue IV Bumex 2 mg twice daily -> switch over to p.o. Bumex soon.   24h/UOP: 2,975 mL  Approximate 12 lb weight loss since admission  Cardiac diet, 2 L fluid restriction, 2 g sodium diet  ELISA hose     Acute cystitis:   He has a prior history of prostate cancer treated back in 2006. He does have urinary frequency. UA notable for trace ketones, trace proteins, positive nitrite, moderate LE, greater than 100 WBC, many bacteria   Urine cx + E. Coli - sensitive to Rocephin - continue  8/15: Sensitive to Augmentin  Received Rocephin 8/11 - 8/14 -> Switch over to p.o. Augmentin 8/15     History of recent extensive DVT:   Patient had a lower extremity ultrasound 3/24/22 which demonstrated a DVT in his left lower extremity from the popliteal to the common femoral vein. Patient states he was never started on anticoagulation. No documentation in the chart following this imaging study. I recommended starting anticoagulation while reassess the clot burden.    CTA chest negative for PE, notable for low lung volumes  Repeat lower extremity Doppler notable for interval resolution of left-sided DVT, however notable for interval development of a DVT in the right popliteal vein and posterior tibial vein  Discussed starting anticoagulation with patient, patient stating he wants to hold off on starting [address] at this time. Patient currently getting heparin for DVT prophylaxis but not appropriate DVT dose. We will continue to encourage patient and educate patient on importance of [address].  8/13: Readdressed starting [facility] given new DVT. Lengthy discussion at bedside about risks vs benefits. Patient now agreeable to starting [facility]. Received heparin gtt 8/13 -8/15  Continue Eliquis 10 mg BID x 7 days (initiated 8/15), followed by Eliquis 5 mg BID  Will need to f/u with PCP within 1 week upon discharge    CKD stage II  Baseline creatinine range ~1.2 -1.4. Creatinine stable  8/15: Cr 1.5, monitor  8/16: Cr stable at 1.5 , monitor. Patient's GFR is currently slightly better than baseline. Renally dose medications  Daily lab     Troponin elevation (POA):   Suspect likely related to his acute illness and CKD. Denies chest pains, no acute ischemic changes noted on EKG. History of prostate cancer:   Treated in 2006. Follows Dr. Jose Angel Barton in urology locally. Continue Flomax, oxybutynin, finasteride     CAD:   Documentation of prior PCI.     Continue pravastatin, Lopressor, aspirin     COPD without acute exacerbation  Continue scheduled Dulera and Spiriva   As needed bronchodilators  Pulmonary toilet: Incentive spirometer, Acapella, cough, deep breathe    Essential hypertension, uncontrolled, improving  Continue Bumex, Lopressor  Continue hydralazine 25 mg 2 times daily    Hyperlipidemia  Statin      Obesity:   BMI noted to be 32.95 kg/m²  We will continue diuresis and attempt to provide lifestyle modification advice    Weakness secondary to physical deconditioning  PT/OT consulted, appreciate recs: Recommending IPR  IPR coordinator consulted, IPR consulted -> declined by IPR  Recommendation is SNF      Expected discharge date:  Pending clinical course    Disposition:    [] Home       [] TCU       [] SNF       [] Psych       [x] SNF       [] [place]       [] Other-    Chief Complaint: Lower extremity edema    Hospital Course: Per HPI documented 8/11/22: \"Stone Nugent is a 81 y.o. male with PMHx of HFpEF, prostate cancer treated in 2006, DVT status post IVC filter, CKD stage III, CAD status post PCI, hypertension who presents to [address] [place] [address] with aggressive lower extremity edema and weight gain. Patient has noted over the past several days that he has been gaining weight and has developed worsened lower extremity edema. He is also noticing shortness of breath and fatigue. He went to his nephrologist today and it was noted that he had gained weight and so his Bumex was increased in dose. Patient's symptoms worsened at home so he came to the emergency room. While in the ER he was found to be in what appeared to be acute congestive heart failure. He was started on IV diuretics. The urinalysis also demonstrated evidence of urinary tract infection and he has endorsed urinary frequency. Of note in chart review it appears that he had an ultrasound of his leg in March of this year which found a moderate sized DVT, however, patient denies ever being on an anticoagulant at this time. They state they were never notified of a blood clot. \"     8/12/22: Resumed care of patient today. Patient sitting up in bed, chronically ill-appearing, in no apparent distress resting in bed. Remains afebrile, satting 98% on room air, with hemodynamically stable vital signs. Patient admitted overnight. Will obtain 2D echo today, repeat venous Doppler of lower extremities, CTA chest.  Urine culture growing enteric gram-negative bacilli, will continue Rocephin at this time and tailor antibiotics pending sensitivity results. Continue IV diuresis with Bumex for acute decompensated heart failure. Lengthy discussion at the bedside with patient about resuming anticoagulation given history of DVT in the left lower extremity. Patient still refusing to start [facility] at this time.   Will await results of venous ultrasound and placement. Patient still stating he does not want to go to SNF. [place] he will consider IPR.      8/16/22: Afebrile, with hemodynamically stable vital signs. No acute events overnight. Patient creatinine remaining stable at 1.5. Patient reporting subjective improvement in his dyspnea on his exertion. Stating it is much better today. He still has +1 pitting edema in his left lower extremity and +2 pitting edema in his right lower extremity. We will continue IV Bumex at this time. We will switch over to p.o. Bumex soon. Patient wife is at the bedside today. Lengthy discussion at the bedside with patient and his wife that inpatient rehab has refused patient and is recommending SNF placement. Patient's wife informed me that patient has difficulty moving his right lower extremity and \"drags it along the side of him. \"  I discussed SNF placement upon discharge with patient and his wife. Patient's wife informed me that patient is supposed to get back surgery relatively soon. Educated patient and his wife about the importance of having good rehab/strength prior to getting a back surgery done. Additionally informed patient's wife and patient that given him starting on a blood thinner, if he were to fall he is at increased risk for internal bleeding which could be very bad. Recommended that it would be in his best interest to go to a SNF. Patient and his wife stated they would discuss whether or not they would go through with this. [facility]  updated. Subjective (past 24 hours):      Denies chest pains, shortness of breath at rest, abdominal pains, nausea, vomiting, diarrhea, fever, chills, dizziness, lightheadedness.      Medications:  Reviewed    Infusion Medications    sodium chloride       Scheduled Medications    apixaban  10 mg Oral BID    Followed by    Estee Davis ON 8/22/2022] apixaban  5 mg Oral BID    amoxicillin-clavulanate  1 tablet Oral 2 times per day    tiotropium  2 puff Inhalation Daily aspirin  81 mg Oral Daily    Vitamin D  5,000 Units Oral Daily    finasteride  5 mg Oral Daily    mometasone-formoterol  2 puff Inhalation BID    metoprolol tartrate  50 mg Oral BID    oxybutynin  10 mg Oral Daily    pravastatin  80 mg Oral Daily    tamsulosin  0.4 mg Oral Daily    bumetanide  2 mg IntraVENous BID AC    sodium chloride flush  5-40 mL IntraVENous 2 times per day    hydrALAZINE  25 mg Oral 2 times per day     PRN Meds: sodium chloride flush, sodium chloride, ondansetron **OR** ondansetron, polyethylene glycol, acetaminophen **OR** acetaminophen, ipratropium-albuterol      Intake/Output Summary (Last 24 hours) at 8/16/2022 2143  Last data filed at 8/16/2022 2044  Gross per 24 hour   Intake 1400 ml   Output 3700 ml   Net -2300 ml       Diet:  ADULT DIET; Regular; Low Fat/Low Chol/High Fiber/2 gm Na; 2000 ml    Exam:  /71   Pulse 85   Temp 98.2 °F (36.8 °C)   Resp 14   Ht 5' 9\" (1.753 m)   Wt 223 lb 5.2 oz (101.3 kg)   SpO2 92%   BMI 32.98 kg/m²     General appearance: No apparent distress, appears older than stated age and cooperative. Chronically ill-appearing  HEENT: Pupils equal, round, and reactive to light. Conjunctivae/corneas clear. Neck: Supple, with full range of motion. No jugular venous distention. Respiratory:  Normal respiratory effort, able to speak full clear sentences. Clear to auscultation, bilaterally with expiratory wheezes in the bases. No rales/rhonchi. Cardiovascular: Regular rate and rhythm with normal S1/S2 without murmurs, rubs or gallops. +1 pitting edema LLE, +2 pitting edema in RLE  Abdomen: Soft, non-tender, non-distended with normal bowel sounds. Musculoskeletal: passive and active ROM x 4 extremities. Skin: Skin color, texture, turgor normal.  No rashes or lesions. Neurologic:  Neurovascularly intact without any focal sensory/motor deficits.  Cranial nerves: II-XII intact, grossly non-focal.  Psychiatric: Alert and oriented, thought content appropriate, normal insight  Capillary Refill: Brisk,< 3 seconds   Peripheral Pulses: +2 palpable, equal bilaterally       Labs:   Recent Labs     08/14/22  0744 08/15/22  0019 08/16/22  0540   WBC 5.1 5.6 5.7   HGB 16.0 15.0 15.5   HCT 50.7 49.4 52.1*    148 142     Recent Labs     08/14/22  1031 08/15/22  0019 08/16/22  0540    139 138   K 4.4 4.0 4.3   CL 99 99 99   CO2 27 25 28   BUN 24* 27* 29*   CREATININE 1.4* 1.5* 1.5*   CALCIUM 9.2 9.3 9.3     No results for input(s): AST, ALT, BILIDIR, BILITOT, ALKPHOS in the last 72 hours. No results for input(s): INR in the last 72 hours. No results for input(s): Adonica Hoose in the last 72 hours. Microbiology:      Urinalysis:      Lab Results   Component Value Date/Time    NITRU POSITIVE 08/11/2022 08:10 PM    WBCUA > 100 08/11/2022 08:10 PM    BACTERIA MANY 08/11/2022 08:10 PM    RBCUA 0-2 08/11/2022 08:10 PM    BLOODU NEGATIVE 08/11/2022 08:10 PM    SPECGRAV 1.022 03/30/2016 11:30 AM    GLUCOSEU NEGATIVE 08/11/2022 08:10 PM       Radiology:  CTA CHEST W WO CONTRAST    Result Date: 8/12/2022  CTA of the chest after administration of IV contrast. Technique: Axial CT images from the lung apices through the adrenal glands after administration of IV contrast. Sagittal and coronal reconstruction images provided. Comparison:  CT,SR - CTA CHEST W WO CONTRAST - 11/26/2020 10:44 PM EST Findings: No pulmonary embolus. Normal thoracic aorta. No aneurysm or dissection. No mediastinal or axillary adenopathy. Normal thyroid. Low lung volumes with areas of subsegmental atelectasis. No pulmonary nodules. No areas of consolidation. No pneumothorax. Normal bones. Small hiatal hernia. Several small hypodensities within the liver. Impression: Low lung volumes.  This document has been electronically signed by: Danielle Santos MD on 08/12/2022 01:43 AM All CTs at this facility use dose modulation techniques and iterative reconstructions, and/or weight-based dosing when appropriate to reduce radiation to a low as reasonably achievable. 3D Post-processing was performed on this study. XR CHEST PORTABLE    Result Date: 8/11/2022  1 view chest x-ray Comparison: DUSTIN,SR - XR CHEST PORTABLE - 11/26/2020 10:02 PM EST Findings: Mild left basilar pleural thickening without change. No gross evidence of focal pulmonary infiltrate. Normal size heart. Partial visualization of cervical spine fusion hardware. Left basilar pleural thickening without change. This may be secondary to scarring. Cannot exclude a small effusion. This document has been electronically signed by: Rachel Cordero MD on 08/11/2022 06:54 PM      DVT prophylaxis: [] Lovenox                                 [] SCDs                                 [x] Heparin                                 [] Encourage ambulation           [] Already on Anticoagulation     Code Status: Full Code    PT/OT Eval Status: Per PT note documented 8/15/2022: \"ASSESSMENT:  Assessment: Patient progressing toward established goals. and pt inc distance amb, CGA with walker however required a lot of cues for safety and fatigued quickly. Safety concerns with pt inc fall risk for home mobility. Activity Tolerance:  Patient tolerance of  treatment: fair. Equipment Recommendations:Equipment Needed: No  Discharge Recommendations: Continue to assess pending progress, Patient would benefit from continued PT at discharge, and Inpatient Therapy Stay  Plan: Current Treatment Recommendations: Strengthening, Balance training, Functional mobility training, Transfer training, Neuromuscular re-education, Stair training, Gait training, Endurance training, Safety education & training, Therapeutic activities, Patient/Caregiver education & training, Home exercise program  Plan:  (3-5x GM)\"      Per OT note documented 8/15/2022: \"Assessment:  Assessment: Patient would benefit from continued skilled OT services to address above deficits.   He presents with anasarca. Pt had limited mobility prior to admission. Pt had an ultra sound of his leg in March and it showed a moderate sized DVT. He does have IVC filter. Pt also has hx of Prostate Cancer and COPD. Pt was having help with getting into and out of the shower at home. He also had help with lower body dressing as he has been wearing ELISA hose daily. Pt walked with a standard walker prior to admission. He was not walking far distances, however. Pt demonstrated walking to/from the bathroom with CGA while using a rolling walker and showed slow movements with short steps throughout. Rigidity noted. Pt shaved at the sink with SBA. He needed to hold onto the sink with 1 hand throughout the ADL. Pt had stood for 3.5 minutes. Performance deficits / Impairments: Decreased functional mobility , Decreased ADL status, Decreased endurance, Decreased balance  Prognosis: Good  REQUIRES OT FOLLOW-UP: Yes  Decision Making: Medium Complexity     Treatment Initiated: Treatment and education initiated within context of evaluation. Evaluation time included review of current medical information, gathering information related to past medical, social and functional history, completion of standardized testing, formal and informal observation of tasks, assessment of data and development of plan of care and goals. Treatment time included skilled education and facilitation of tasks to increase safety and independence with ADL's for improved functional independence and quality of life. Pt had help with donning ELISA hose and was shown a technique for donning them using the bag in which they came. Pt was reminded to have his wife put them on each morning. Pt demonstrated use of both the incentive spirometer and a cappella. 10 reps each. No cues needed for the a cappella but pt needed min cues for slowing down the pace of his inhale breath when using the incentive spirometer. Pt could state his goal for therapy.  He agreed to home health if he needed to continue with therapy after he is discharged. Per discussion with PT, pt's limited endurance would be the determining factor for whether pt could be admitted to IP Rehab. Discharge Recommendations:  Continue to assess pending progress     Patient Education:  Patient Education  Education Given To: Patient  Education Provided: Role of Therapy, Plan of Care  Education Provided Comments: Strategy for donning ELISA hose with help from his spouse; technique for using incentive spirometer  Education Method: Demonstration  Barriers to Learning: None  Education Outcome: Verbalized understanding     Equipment Recommendations:  Equipment Needed: No\"        Tele:   [x] yes             [] no    Normal sinus rhythm, without ectopy.     Active Hospital Problems    Diagnosis Date Noted    Anasarca [R60.1] 08/15/2022     Priority: Medium    Acute on chronic systolic CHF (congestive heart failure), NYHA class 4 [facility] [I50.23] 08/11/2022     Priority: Medium       Electronically signed by ALESSANDRA Allison CNP on 8/16/2022 at 9:43 PM

## 2022-08-16 NOTE — PLAN OF CARE
Problem: Respiratory - Adult  Goal: Clear lung sounds  Outcome: Progressing    Continue tx's to improve breath sounds, increase aeration and decrease WOB.

## 2022-08-16 NOTE — PLAN OF CARE
Problem: Discharge Planning  Goal: Discharge to home or other facility with appropriate resources  Outcome: Progressing  Flowsheets (Taken 8/16/2022 0915)  Discharge to home or other facility with appropriate resources: Identify barriers to discharge with patient and caregiver     Problem: Safety - Adult  Goal: Free from fall injury  Outcome: Progressing     Problem: ABCDS Injury Assessment  Goal: Absence of physical injury  Outcome: Progressing     Problem: Skin/Tissue Integrity  Goal: Absence of new skin breakdown  Description: 1. Monitor for areas of redness and/or skin breakdown  2. Assess vascular access sites hourly  3. Every 4-6 hours minimum:  Change oxygen saturation probe site  4. Every 4-6 hours:  If on nasal continuous positive airway pressure, respiratory therapy assess nares and determine need for appliance change or resting period.   Outcome: Progressing     Problem: Chronic Conditions and Co-morbidities  Goal: Patient's chronic conditions and co-morbidity symptoms are monitored and maintained or improved  Outcome: Progressing  Flowsheets (Taken 8/16/2022 0915)  Care Plan - Patient's Chronic Conditions and Co-Morbidity Symptoms are Monitored and Maintained or Improved: Monitor and assess patient's chronic conditions and comorbid symptoms for stability, deterioration, or improvement     Problem: Respiratory - Adult  Goal: Clear lung sounds  8/16/2022 0556 by Munir Zeng RCP  Outcome: Progressing     Problem: Pain  Goal: Verbalizes/displays adequate comfort level or baseline comfort level  Outcome: Progressing

## 2022-08-16 NOTE — PROGRESS NOTES
[address]  Occupational Therapy  Daily Note  Time:   Time In: 7889  Time Out: 3125  Timed Code Treatment Minutes: 26 Minutes  Minutes: 26          Date: 2022  Patient Name: Sahra Puentes,   Gender: male      Room: [facility]35/035-A  MRN: [id number]  : 1935  (81 y.o.)  Referring Practitioner: SALO Will  Diagnosis: Anasarca  Additional Pertinent Hx: Pt with PMHx of HFpEF, prostate cancer treated in , DVT status post IVC filter, CKD stage III, CAD status post PCI, hypertension who presents to [address] [place] [address] with aggressive lower extremity edema and weight gain. Patient has noted over the past several days that he has been gaining weight and has developed worsened lower extremity edema. He is also noticing shortness of breath and fatigue. He went to his nephrologist today and it was noted that he had gained weight and so his Bumex was increased in dose. Patient's symptoms worsened at home so he came to the emergency room. While in the ER he was found to be in what appeared to be acute congestive heart failure. He was started on IV diuretics. The urinalysis also demonstrated evidence of urinary tract infection and he has endorsed urinary frequency. Of note in chart review it appears that he had an ultrasound of his leg in March of this year which found a moderate sized DVT, however, patient denies ever being on an anticoagulant at this time. They state they were never notified of a blood clot. Restrictions/Precautions:  Restrictions/Precautions: Fall Risk     SUBJECTIVE: RN okayed OT treatment. Pt. Seated in chair upon arrival pleasant and agreeable to participate. PAIN:Declines    Vitals: Vitals not assessed per clinical judgement, see nursing flowsheet    COGNITION: WFL    ADL:   Toileting: Stand By Assistance and [employer]. Attempted to use the urnal although unsucessful .     BALANCE:  Sitting Balance:  Stand By Assistance. Standing Balance: Contact Guard Assistance. BED MOBILITY:  Not Tested    TRANSFERS:  Sit to Stand:  Contact Guard Assistance. Stand to Sit: Contact Guard Assistance. FUNCTIONAL MOBILITY:  Assistive Device: Walker  Assist Level:  Contact Guard Assistance. Distance: To and from bathroom  Slow pace no LOB demo good walker safety. ADDITIONAL ACTIVITIES:  Pt completed B UE exs with light resistance band x 10 reps, x 1 set, with good tolerance of exs, with  RBs throughout, and visual and verba cues for tech with resistance band to improve strength and overall activity tolerance to support basic Adls. ASSESSMENT:     Activity Tolerance:  Patient tolerance of  treatment: fair. Discharge Recommendations: Continue to assess pending progress and Patient would benefit from continued OT at discharge  Equipment Recommendations: Equipment Needed: No  Plan: Times per Week: 5x  Specific Instructions for Next Treatment: Functional mobility; ADLs and standing balance; UE exercises  Current Treatment Recommendations: Endurance training, Functional mobility training, Balance training, Self-Care / ADL, Safety education & training  Plan Comment: Pt would benefit from continued skilled OT services when medically stable and discharged from Acute. HHOT vs SNF is recommended. Patient Education  Patient Education: ADL's, Home Exercise Program, and Assistive Device Safety and safety with functional mobility and transfers. Goals  Short Term Goals  Time Frame for Short term goals: By discharge  Short Term Goal 1: Pt will demonstrate functional mobility walking to/from the bathroom with SBA using a standard walker to prepare for doing his toileting routine. Short Term Goal 2: Pt will complete transfers to/from various surfaces including an elevated toilet seat with armrests with SBA to increase his independence with toileting routine.   Short Term Goal 3: Pt will complete simple ADLs while standing for 5 minute duration with SBA while using 1 hand release as needed to increase his endurance and balance for ease of doing sinkside grooming. Short Term Goal 4: Pt will complete BUE ROM/moderate resistance exercises with any handout or demonstration provided to increase his strength for ease of doing self care and transfers. Additional Goals?: No    Following session, patient left in safe position with all fall risk precautions in place.

## 2022-08-17 LAB
ANION GAP SERPL CALCULATED.3IONS-SCNC: 14 MEQ/L (ref 8–16)
BASOPHILS # BLD: 0.6 %
BASOPHILS ABSOLUTE: 0 THOU/MM3 (ref 0–0.1)
BUN BLDV-MCNC: 33 MG/DL (ref 7–22)
CALCIUM SERPL-MCNC: 9.6 MG/DL (ref 8.5–10.5)
CHLORIDE BLD-SCNC: 100 MEQ/L (ref 98–111)
CO2: 26 MEQ/L (ref 23–33)
CREAT SERPL-MCNC: 1.3 MG/DL (ref 0.4–1.2)
EOSINOPHIL # BLD: 4.9 %
EOSINOPHILS ABSOLUTE: 0.3 THOU/MM3 (ref 0–0.4)
ERYTHROCYTE [DISTWIDTH] IN BLOOD BY AUTOMATED COUNT: 13.1 % (ref 11.5–14.5)
ERYTHROCYTE [DISTWIDTH] IN BLOOD BY AUTOMATED COUNT: 49.3 FL (ref 35–45)
GFR SERPL CREATININE-BSD FRML MDRD: 63 ML/MIN/1.73M2
GLUCOSE BLD-MCNC: 97 MG/DL (ref 70–108)
HCT VFR BLD CALC: 51.3 % (ref 42–52)
HEMOGLOBIN: 15.4 GM/DL (ref 14–18)
IMMATURE GRANS (ABS): 0.04 THOU/MM3 (ref 0–0.07)
IMMATURE GRANULOCYTES: 0.6 %
LYMPHOCYTES # BLD: 11 %
LYMPHOCYTES ABSOLUTE: 0.8 THOU/MM3 (ref 1–4.8)
MCH RBC QN AUTO: 30.5 PG (ref 26–33)
MCHC RBC AUTO-ENTMCNC: 30 GM/DL (ref 32.2–35.5)
MCV RBC AUTO: 101.6 FL (ref 80–94)
MONOCYTES # BLD: 9.4 %
MONOCYTES ABSOLUTE: 0.6 THOU/MM3 (ref 0.4–1.3)
NUCLEATED RED BLOOD CELLS: 0 /100 WBC
PLATELET # BLD: 129 THOU/MM3 (ref 130–400)
PMV BLD AUTO: 10.6 FL (ref 9.4–12.4)
POTASSIUM REFLEX MAGNESIUM: 4.1 MEQ/L (ref 3.5–5.2)
RBC # BLD: 5.05 MILL/MM3 (ref 4.7–6.1)
SEG NEUTROPHILS: 73.5 %
SEGMENTED NEUTROPHILS ABSOLUTE COUNT: 5.1 THOU/MM3 (ref 1.8–7.7)
SODIUM BLD-SCNC: 140 MEQ/L (ref 135–145)
WBC # BLD: 6.9 THOU/MM3 (ref 4.8–10.8)

## 2022-08-17 PROCEDURE — 80048 BASIC METABOLIC PNL TOTAL CA: CPT

## 2022-08-17 PROCEDURE — 6370000000 HC RX 637 (ALT 250 FOR IP)

## 2022-08-17 PROCEDURE — 97110 THERAPEUTIC EXERCISES: CPT

## 2022-08-17 PROCEDURE — 2500000003 HC RX 250 WO HCPCS: Performed by: INTERNAL MEDICINE

## 2022-08-17 PROCEDURE — 6370000000 HC RX 637 (ALT 250 FOR IP): Performed by: INTERNAL MEDICINE

## 2022-08-17 PROCEDURE — 1200000003 HC TELEMETRY R&B

## 2022-08-17 PROCEDURE — 94760 N-INVAS EAR/PLS OXIMETRY 1: CPT

## 2022-08-17 PROCEDURE — 94640 AIRWAY INHALATION TREATMENT: CPT

## 2022-08-17 PROCEDURE — 97530 THERAPEUTIC ACTIVITIES: CPT

## 2022-08-17 PROCEDURE — 36415 COLL VENOUS BLD VENIPUNCTURE: CPT

## 2022-08-17 PROCEDURE — 85025 COMPLETE CBC W/AUTO DIFF WBC: CPT

## 2022-08-17 PROCEDURE — 2580000003 HC RX 258: Performed by: INTERNAL MEDICINE

## 2022-08-17 PROCEDURE — 99232 SBSQ HOSP IP/OBS MODERATE 35: CPT | Performed by: INTERNAL MEDICINE

## 2022-08-17 RX ORDER — BUMETANIDE 1 MG/1
2 TABLET ORAL DAILY
Status: DISCONTINUED | OUTPATIENT
Start: 2022-08-17 | End: 2022-08-18 | Stop reason: HOSPADM

## 2022-08-17 RX ADMIN — OXYBUTYNIN CHLORIDE 10 MG: 10 TABLET, EXTENDED RELEASE ORAL at 08:27

## 2022-08-17 RX ADMIN — METOPROLOL TARTRATE 50 MG: 50 TABLET, FILM COATED ORAL at 21:15

## 2022-08-17 RX ADMIN — TIOTROPIUM BROMIDE INHALATION SPRAY 2 PUFF: 3.12 SPRAY, METERED RESPIRATORY (INHALATION) at 07:57

## 2022-08-17 RX ADMIN — HYDRALAZINE HYDROCHLORIDE 25 MG: 25 TABLET, FILM COATED ORAL at 21:16

## 2022-08-17 RX ADMIN — BUMETANIDE 2 MG: 0.25 INJECTION INTRAMUSCULAR; INTRAVENOUS at 06:16

## 2022-08-17 RX ADMIN — FINASTERIDE 5 MG: 5 TABLET, FILM COATED ORAL at 08:29

## 2022-08-17 RX ADMIN — BUMETANIDE 2 MG: 1 TABLET ORAL at 15:42

## 2022-08-17 RX ADMIN — ACETAMINOPHEN 650 MG: 325 TABLET ORAL at 21:16

## 2022-08-17 RX ADMIN — ASPIRIN 81 MG CHEWABLE TABLET 81 MG: 81 TABLET CHEWABLE at 08:27

## 2022-08-17 RX ADMIN — HYDRALAZINE HYDROCHLORIDE 25 MG: 25 TABLET, FILM COATED ORAL at 08:28

## 2022-08-17 RX ADMIN — TAMSULOSIN HYDROCHLORIDE 0.4 MG: 0.4 CAPSULE ORAL at 08:27

## 2022-08-17 RX ADMIN — AMOXICILLIN AND CLAVULANATE POTASSIUM 1 TABLET: 875; 125 TABLET, FILM COATED ORAL at 08:27

## 2022-08-17 RX ADMIN — APIXABAN 10 MG: 5 TABLET, FILM COATED ORAL at 08:27

## 2022-08-17 RX ADMIN — MOMETASONE FUROATE AND FORMOTEROL FUMARATE DIHYDRATE 2 PUFF: 100; 5 AEROSOL RESPIRATORY (INHALATION) at 17:26

## 2022-08-17 RX ADMIN — AMOXICILLIN AND CLAVULANATE POTASSIUM 1 TABLET: 875; 125 TABLET, FILM COATED ORAL at 21:15

## 2022-08-17 RX ADMIN — SODIUM CHLORIDE, PRESERVATIVE FREE 10 ML: 5 INJECTION INTRAVENOUS at 21:18

## 2022-08-17 RX ADMIN — MOMETASONE FUROATE AND FORMOTEROL FUMARATE DIHYDRATE 2 PUFF: 100; 5 AEROSOL RESPIRATORY (INHALATION) at 07:57

## 2022-08-17 RX ADMIN — APIXABAN 10 MG: 5 TABLET, FILM COATED ORAL at 21:15

## 2022-08-17 RX ADMIN — Medication 5000 UNITS: at 08:26

## 2022-08-17 RX ADMIN — PRAVASTATIN SODIUM 80 MG: 80 TABLET ORAL at 08:29

## 2022-08-17 RX ADMIN — SODIUM CHLORIDE, PRESERVATIVE FREE 10 ML: 5 INJECTION INTRAVENOUS at 08:28

## 2022-08-17 RX ADMIN — METOPROLOL TARTRATE 50 MG: 50 TABLET, FILM COATED ORAL at 08:27

## 2022-08-17 ASSESSMENT — PAIN - FUNCTIONAL ASSESSMENT: PAIN_FUNCTIONAL_ASSESSMENT: ACTIVITIES ARE NOT PREVENTED

## 2022-08-17 ASSESSMENT — PAIN SCALES - GENERAL
PAINLEVEL_OUTOF10: 6
PAINLEVEL_OUTOF10: 2

## 2022-08-17 ASSESSMENT — PAIN DESCRIPTION - PAIN TYPE: TYPE: ACUTE PAIN

## 2022-08-17 ASSESSMENT — PAIN DESCRIPTION - LOCATION: LOCATION: LEG

## 2022-08-17 ASSESSMENT — PAIN DESCRIPTION - ORIENTATION: ORIENTATION: LEFT

## 2022-08-17 ASSESSMENT — PAIN DESCRIPTION - FREQUENCY: FREQUENCY: CONTINUOUS

## 2022-08-17 ASSESSMENT — PAIN DESCRIPTION - ONSET: ONSET: ON-GOING

## 2022-08-17 ASSESSMENT — PAIN DESCRIPTION - DESCRIPTORS: DESCRIPTORS: ACHING;DISCOMFORT

## 2022-08-17 NOTE — PROGRESS NOTES
[facility] 8B  Occupational Therapy  Daily Note  Time:   Time In: 5600  Time Out: 7959  Timed Code Treatment Minutes: 25 Minutes  Minutes: 25          Date: 2022  Patient Name: Ranulfo Doty,   Gender: male      Room: 8B-35/035-A  MRN: [id number]  : 1935  (81 y.o.)  Referring Practitioner: ALESSANDRA Carter-CNP  Diagnosis: Anasarca  Additional Pertinent Hx: Pt with PMHx of HFpEF, prostate cancer treated in , DVT status post IVC filter, CKD stage III, CAD status post PCI, hypertension who presents to [facility] with aggressive lower extremity edema and weight gain. Patient has noted over the past several days that he has been gaining weight and has developed worsened lower extremity edema. He is also noticing shortness of breath and fatigue. He went to his nephrologist today and it was noted that he had gained weight and so his Bumex was increased in dose. Patient's symptoms worsened at home so he came to the emergency room. While in the ER he was found to be in what appeared to be acute congestive heart failure. He was started on IV diuretics. The urinalysis also demonstrated evidence of urinary tract infection and he has endorsed urinary frequency. Of note in chart review it appears that he had an ultrasound of his leg in March of this year which found a moderate sized DVT, however, patient denies ever being on an anticoagulant at this time. They state they were never notified of a blood clot. Restrictions/Precautions:  Restrictions/Precautions: Fall Risk     SUBJECTIVE: Pt seated in bedside chair upon arrival, agreeable to OT session. PAIN: \"A little\": LLE    Vitals: Vitals not assessed per clinical judgement, see nursing flowsheet    COGNITION: Slow Processing and pt is [place]    ADL:   No ADL's completed this session. Pt declined . BALANCE:  Sitting Balance:  Supervision.  Bedside chair  Standing Balance: Contact Guard Assistance, Minimal Assistance. Ibrahima up initial standing, primarily CGA  X1 minute in prep for mobility. BED MOBILITY:  Not Tested    TRANSFERS:  Sit to Stand: Moderate Assistance, X 1. From bedside chair  Stand to Sit: [employer]. Comment: Pt states he feels stiff after being in chair so long today, however, requests to remain in chair at end of session. FUNCTIONAL MOBILITY:  Assistive Device: Rolling Walker   Assist Level:  Contact Guard Assistance. Distance:   Completed functional mobility household distance within unit hallway at very slow pace, no LOB noted. Pt requires no cues for walker safety- lengthy seated rest break after trial of mobility, min fatigue noted. ADDITIONAL ACTIVITIES:  Patient identified a personal goal to increase UB strength and improve overall endurance so they can complete their toilet & shower transfers; skilled edu on UE strengthening. Completed BUE exercises x10 reps x1 sets using min resistance band in all joints/planes to increase strength and endurance required for ADLs. Pt required min rest break between each exercise and min v/c for proper technique    ASSESSMENT:     Activity Tolerance:  Patient tolerance of  treatment: fair. Discharge Recommendations: Subacute/skilled nursing facility  Equipment Recommendations: Equipment Needed: No  Plan: Times per Week: 5x  Specific Instructions for Next Treatment: Functional mobility; ADLs and standing balance; UE exercises  Current Treatment Recommendations: Endurance training, Functional mobility training, Balance training, Self-Care / ADL, Safety education & training  Plan Comment: Pt would benefit from continued skilled OT services when medically stable and discharged from Acute. HHOT vs SNF is recommended. Patient Education  Patient Education: Home Exercise Program, Importance of Increasing Activity, Assistive Device Safety, and Safety with transfers and mobility.     Goals  Short Term Goals  Time Frame for Short term goals: By discharge  Short Term Goal 1: Pt will demonstrate functional mobility walking to/from the bathroom with SBA using a standard walker to prepare for doing his toileting routine. Short Term Goal 2: Pt will complete transfers to/from various surfaces including an elevated toilet seat with armrests with SBA to increase his independence with toileting routine. Short Term Goal 3: Pt will complete simple ADLs while standing for 5 minute duration with SBA while using 1 hand release as needed to increase his endurance and balance for ease of doing sinkside grooming. Short Term Goal 4: Pt will complete BUE ROM/moderate resistance exercises with any handout or demonstration provided to increase his strength for ease of doing self care and transfers.   Additional Goals?: No    Following session, patient left in safe position with all fall risk precautions in place

## 2022-08-17 NOTE — PROGRESS NOTES
appropriate DVT dose. We will continue to encourage patient and educate patient on importance of [facility] AUTHORITY.  8/13: Readdressed starting [facility] given new DVT. Lengthy discussion at bedside about risks vs benefits. Patient now agreeable to starting [facility]. Received heparin gtt 8/13 -8/15  Continue Eliquis 10 mg BID x 7 days (initiated 8/15), followed by Eliquis 5 mg BID  Will need to f/u with PCP within 1 week upon discharge    CKD stage II  Baseline creatinine range ~1.2 -1.4. Creatinine stable  8/15: Cr 1.5, monitor  8/16: Cr stable at 1.5 , monitor. Patient's GFR is currently slightly better than baseline. Renally dose medications  Daily lab     Troponin elevation (POA):   Suspect likely related to his acute illness and CKD. Denies chest pains, no acute ischemic changes noted on EKG. History of prostate cancer:   Treated in 2006. Follows Dr. Rosalinda Dasilva in urology locally. Continue Flomax, oxybutynin, finasteride     CAD:   Documentation of prior PCI.     Continue pravastatin, Lopressor, aspirin     COPD without acute exacerbation  Continue scheduled Dulera and Spiriva   As needed bronchodilators  Pulmonary toilet: Incentive spirometer, Acapella, cough, deep breathe    Essential hypertension, uncontrolled, improving  Continue Bumex, Lopressor  Continue hydralazine 25 mg 2 times daily    Hyperlipidemia  Statin      Obesity:   BMI noted to be 32.95 kg/m²  We will continue diuresis and attempt to provide lifestyle modification advice    Weakness secondary to physical deconditioning  PT/OT consulted, appreciate recs: Recommending IPR  IPR coordinator consulted, IPR consulted -> declined by IPR  Recommendation is SNF    Disposition plan-planning for nursing home now-awaiting precertification    Chief Complaint: Lower extremity edema    Hospital Course: Per HPI documented 8/11/22: \"Stone Navarrete is a 81 y.o. male with PMHx of HFpEF, prostate cancer treated in 2006, DVT status post IVC filter, CKD stage III, CAD status post PCI, hypertension who presents to [facility] with aggressive lower extremity edema and weight gain. Patient has noted over the past several days that he has been gaining weight and has developed worsened lower extremity edema. He is also noticing shortness of breath and fatigue. He went to his nephrologist today and it was noted that he had gained weight and so his Bumex was increased in dose. Patient's symptoms worsened at home so he came to the emergency room. While in the ER he was found to be in what appeared to be acute congestive heart failure. He was started on IV diuretics. The urinalysis also demonstrated evidence of urinary tract infection and he has endorsed urinary frequency. Of note in chart review it appears that he had an ultrasound of his leg in March of this year which found a moderate sized DVT, however, patient denies ever being on an anticoagulant at this time. They state they were never notified of a blood clot. \"     8/12/22: Resumed care of patient today. Patient sitting up in bed, chronically ill-appearing, in no apparent distress resting in bed. Remains afebrile, satting 98% on room air, with hemodynamically stable vital signs. Patient admitted overnight. Will obtain 2D echo today, repeat venous Doppler of lower extremities, CTA chest.  Urine culture growing enteric gram-negative bacilli, will continue Rocephin at this time and tailor antibiotics pending sensitivity results. Continue IV diuresis with Bumex for acute decompensated heart failure. Lengthy discussion at the bedside with patient about resuming anticoagulation given history of DVT in the left lower extremity. Patient still refusing to start [facility] at this time. Will await results of venous ultrasound and rediscuss with patient. Of note patient noted to be very hypertensive this morning with a systolic BP in the 397G. We will start patient on hydralazine.     8/13/22: Afebrile, with hemodynamically stable vital signs. No acute events overnight. Venous ultrasound notable for resolution of left DVT, no right DVT. Lengthy discussion at bedside with patient about need to start [facility] given recurrent DVTs. Risks versus benefits [facility] discussed. Patient agreeable to start anticoagulation at this time, will start heparin for now and transition over to [facility] prior to discharge. Patient had 2435 mL urine output overnight, approximate 12 pound weight loss since admission. Still complains of subjective dyspnea on exertion. Continue IV diuresis. Urine culture notable for E. coli. Sensitive to Rocephin, will continue. 8/14/22: patient resting in bed comfortably, in no apparent distress. Afebrile, with hemodynamically stable vital signs. On Heparin gtt for DVT, doing well. Diuresing well. Still has + 2 pitting edema and subjective complaints of ALFONSO. Will continue IV diuresis. Will transition IV abx over to p.o. today. Discussed with patient about PT/OT and going to SNF upon discharge. Patient stated \"I don't think I need that, and I've decided I don't want to got. \" Educated patient on benefits of PT/OT, patient still refusing. Offered HH assistance and patient stated he did not want this either. 8/15/22: Patient sitting up in bedside recliner, well-appearing, no apparent distress. Satting 98% on room air, hemodynamically stable vital signs. No acute events overnight. Patient continues to diurese well. Pitting edema in bilateral lower extremities improving. Patient stating he is feeling better. States that his shortness of breath with exertion is improving. Was notified by RN staff the patient is very weak when transferring from bed to reclining chair. I discussed with patient about possible IPR and possible SNF placement. Patient still stating he does not want to go to SNF. Kacy Big he will consider IPR.      8/16/22: Afebrile, with hemodynamically stable vital signs. No acute events overnight.   Patient creatinine remaining stable at 1.5. Patient reporting subjective improvement in his dyspnea on his exertion. Stating it is much better today. He still has +1 pitting edema in his left lower extremity and +2 pitting edema in his right lower extremity. We will continue IV Bumex at this time. We will switch over to p.o. Bumex soon. Patient wife is at the bedside today. Lengthy discussion at the bedside with patient and his wife that inpatient rehab has refused patient and is recommending SNF placement. Patient's wife informed me that patient has difficulty moving his right lower extremity and \"drags it along the side of him. \"  I discussed SNF placement upon discharge with patient and his wife. Patient's wife informed me that patient is supposed to get back surgery relatively soon. Educated patient and his wife about the importance of having good rehab/strength prior to getting a back surgery done. Additionally informed patient's wife and patient that given him starting on a blood thinner, if he were to fall he is at increased risk for internal bleeding which could be very bad. Recommended that it would be in his best interest to go to a SNF. Patient and his wife stated they would discuss whether or not they would go through with this. Fidelia Stark  updated. Subjective (past 24 hours):      Denies chest pains, shortness of breath at rest, abdominal pains, nausea, vomiting, diarrhea, fever, chills, dizziness, lightheadedness.    Pleasant no other overnight events    Medications:  Reviewed    Infusion Medications    sodium chloride       Scheduled Medications    apixaban  10 mg Oral BID    Followed by    Cyn Alanr ON 8/22/2022] apixaban  5 mg Oral BID    amoxicillin-clavulanate  1 tablet Oral 2 times per day    tiotropium  2 puff Inhalation Daily    aspirin  81 mg Oral Daily    Vitamin D  5,000 Units Oral Daily    finasteride  5 mg Oral Daily    mometasone-formoterol  2 puff Inhalation BID    metoprolol tartrate  50 mg Oral BID    oxybutynin  10 mg Oral Daily    pravastatin  80 mg Oral Daily    tamsulosin  0.4 mg Oral Daily    bumetanide  2 mg IntraVENous BID AC    sodium chloride flush  5-40 mL IntraVENous 2 times per day    hydrALAZINE  25 mg Oral 2 times per day     PRN Meds: sodium chloride flush, sodium chloride, ondansetron **OR** ondansetron, polyethylene glycol, acetaminophen **OR** acetaminophen, ipratropium-albuterol      Intake/Output Summary (Last 24 hours) at 8/17/2022 1447  Last data filed at 8/17/2022 0815  Gross per 24 hour   Intake 920 ml   Output 2050 ml   Net -1130 ml         Diet:  ADULT DIET; Regular; Low Fat/Low Chol/High Fiber/2 gm Na; 2000 ml    Exam:  /62   Pulse 71   Temp 97.9 °F (36.6 °C) (Oral)   Resp 17   Ht 5' 9\" (1.753 m)   Wt 243 lb 2.7 oz (110.3 kg)   SpO2 99%   BMI 35.91 kg/m²     General appearance: No apparent distress, appears older than stated age and cooperative. Chronically ill-appearing  HEENT: Pupils equal, round, and reactive to light. Conjunctivae/corneas clear. Neck: Supple, with full range of motion. No jugular venous distention. Respiratory:  Normal respiratory effort, able to speak full clear sentences. Clear to auscultation, bilaterally with expiratory wheezes in the bases. No rales/rhonchi. Cardiovascular: Regular rate and rhythm with normal S1/S2 without murmurs, rubs or gallops. +1 pitting edema LLE, +2 pitting edema in RLE  Abdomen: Soft, non-tender, non-distended with normal bowel sounds. Musculoskeletal: passive and active ROM x 4 extremities. Skin: Skin color, texture, turgor normal.  No rashes or lesions. Neurologic:  Neurovascularly intact without any focal sensory/motor deficits.  Cranial nerves: II-XII intact, grossly non-focal.  Psychiatric: Alert and oriented, thought content appropriate, normal insight  Capillary Refill: Brisk,< 3 seconds   Peripheral Pulses: +2 palpable, equal bilaterally       Labs:   Recent Labs 08/15/22  0019 08/16/22  0540 08/17/22  0534   WBC 5.6 5.7 6.9   HGB 15.0 15.5 15.4   HCT 49.4 52.1* 51.3    142 129*       Recent Labs     08/15/22  0019 08/16/22  0540 08/17/22  0534    138 140   K 4.0 4.3 4.1   CL 99 99 100   CO2 25 28 26   BUN 27* 29* 33*   CREATININE 1.5* 1.5* 1.3*   CALCIUM 9.3 9.3 9.6       No results for input(s): AST, ALT, BILIDIR, BILITOT, ALKPHOS in the last 72 hours. No results for input(s): INR in the last 72 hours. No results for input(s): Curlie Lat in the last 72 hours. Microbiology:      Urinalysis:      Lab Results   Component Value Date/Time    NITRU POSITIVE 08/11/2022 08:10 PM    WBCUA > 100 08/11/2022 08:10 PM    BACTERIA MANY 08/11/2022 08:10 PM    RBCUA 0-2 08/11/2022 08:10 PM    BLOODU NEGATIVE 08/11/2022 08:10 PM    SPECGRAV 1.022 03/30/2016 11:30 AM    GLUCOSEU NEGATIVE 08/11/2022 08:10 PM       Radiology:  CTA CHEST W WO CONTRAST    Result Date: 8/12/2022  CTA of the chest after administration of IV contrast. Technique: Axial CT images from the lung apices through the adrenal glands after administration of IV contrast. Sagittal and coronal reconstruction images provided. Comparison:  CT,SR - CTA CHEST W WO CONTRAST - 11/26/2020 10:44 PM EST Findings: No pulmonary embolus. Normal thoracic aorta. No aneurysm or dissection. No mediastinal or axillary adenopathy. Normal thyroid. Low lung volumes with areas of subsegmental atelectasis. No pulmonary nodules. No areas of consolidation. No pneumothorax. Normal bones. Small hiatal hernia. Several small hypodensities within the liver. Impression: Low lung volumes. This document has been electronically signed by: Tanya Hill MD on 08/12/2022 01:43 AM All CTs at this facility use dose modulation techniques and iterative reconstructions, and/or weight-based dosing when appropriate to reduce radiation to a low as reasonably achievable. 3D Post-processing was performed on this study.     XR CHEST PORTABLE    Result Date: 8/11/2022  1 view chest x-ray Comparison: CR,SR - XR CHEST PORTABLE - 11/26/2020 10:02 PM EST Findings: Mild left basilar pleural thickening without change. No gross evidence of focal pulmonary infiltrate. Normal size heart. Partial visualization of cervical spine fusion hardware. Left basilar pleural thickening without change. This may be secondary to scarring. Cannot exclude a small effusion.  This document has been electronically signed by: Nolan Martinez MD on 08/11/2022 06:54 PM      DVT prophylaxis: [] Lovenox                                 [] SCDs                                 [x] Heparin                                 [] Encourage ambulation           [] Already on Anticoagulation     Code Status: Full Code    Electronically signed by Elin Martin MD on 8/17/2022 at 2:47 PM

## 2022-08-17 NOTE — PLAN OF CARE
Problem: Respiratory - Adult  Goal: Clear lung sounds  Outcome: Progressing   Pt on mdi's to improve breath sounds/aeration. Pt agrees with his care plan.

## 2022-08-17 NOTE — PROGRESS NOTES
Educated patient regarding heart failure management by explaining the importance of obtaining daily weights at the same time every day with approximately the same amount of clothing, how to recognize symptoms, low sodium diet and taking prescribed medications as ordered. Patient was given our heart failure booklet, a weight chart. Patient was receptive to the education given and verbalized understanding. Patient follows with Dr. Jose Lange and wishes to follow up with after discharge for CHF.

## 2022-08-18 VITALS
WEIGHT: 237.2 LBS | RESPIRATION RATE: 18 BRPM | HEART RATE: 60 BPM | DIASTOLIC BLOOD PRESSURE: 68 MMHG | BODY MASS INDEX: 35.13 KG/M2 | OXYGEN SATURATION: 93 % | TEMPERATURE: 97.6 F | SYSTOLIC BLOOD PRESSURE: 133 MMHG | HEIGHT: 69 IN

## 2022-08-18 LAB
ANION GAP SERPL CALCULATED.3IONS-SCNC: 12 MEQ/L (ref 8–16)
BASOPHILS # BLD: 0.4 %
BASOPHILS ABSOLUTE: 0 THOU/MM3 (ref 0–0.1)
BUN BLDV-MCNC: 37 MG/DL (ref 7–22)
CALCIUM SERPL-MCNC: 9 MG/DL (ref 8.5–10.5)
CHLORIDE BLD-SCNC: 101 MEQ/L (ref 98–111)
CO2: 27 MEQ/L (ref 23–33)
CREAT SERPL-MCNC: 1.5 MG/DL (ref 0.4–1.2)
EOSINOPHIL # BLD: 4.2 %
EOSINOPHILS ABSOLUTE: 0.3 THOU/MM3 (ref 0–0.4)
ERYTHROCYTE [DISTWIDTH] IN BLOOD BY AUTOMATED COUNT: 12.9 % (ref 11.5–14.5)
ERYTHROCYTE [DISTWIDTH] IN BLOOD BY AUTOMATED COUNT: 46.4 FL (ref 35–45)
GFR SERPL CREATININE-BSD FRML MDRD: 54 ML/MIN/1.73M2
GLUCOSE BLD-MCNC: 115 MG/DL (ref 70–108)
HCT VFR BLD CALC: 48 % (ref 42–52)
HEMOGLOBIN: 14.7 GM/DL (ref 14–18)
IMMATURE GRANS (ABS): 0.04 THOU/MM3 (ref 0–0.07)
IMMATURE GRANULOCYTES: 0.6 %
LYMPHOCYTES # BLD: 13.3 %
LYMPHOCYTES ABSOLUTE: 0.9 THOU/MM3 (ref 1–4.8)
MCH RBC QN AUTO: 30.1 PG (ref 26–33)
MCHC RBC AUTO-ENTMCNC: 30.6 GM/DL (ref 32.2–35.5)
MCV RBC AUTO: 98.4 FL (ref 80–94)
MONOCYTES # BLD: 8.5 %
MONOCYTES ABSOLUTE: 0.6 THOU/MM3 (ref 0.4–1.3)
NUCLEATED RED BLOOD CELLS: 0 /100 WBC
PLATELET # BLD: 142 THOU/MM3 (ref 130–400)
PMV BLD AUTO: 10.4 FL (ref 9.4–12.4)
POTASSIUM REFLEX MAGNESIUM: 4.1 MEQ/L (ref 3.5–5.2)
RBC # BLD: 4.88 MILL/MM3 (ref 4.7–6.1)
SARS-COV-2, NAAT: NOT  DETECTED
SEG NEUTROPHILS: 73 %
SEGMENTED NEUTROPHILS ABSOLUTE COUNT: 5 THOU/MM3 (ref 1.8–7.7)
SODIUM BLD-SCNC: 140 MEQ/L (ref 135–145)
WBC # BLD: 6.8 THOU/MM3 (ref 4.8–10.8)

## 2022-08-18 PROCEDURE — 6370000000 HC RX 637 (ALT 250 FOR IP)

## 2022-08-18 PROCEDURE — 85025 COMPLETE CBC W/AUTO DIFF WBC: CPT

## 2022-08-18 PROCEDURE — 87635 SARS-COV-2 COVID-19 AMP PRB: CPT

## 2022-08-18 PROCEDURE — 99239 HOSP IP/OBS DSCHRG MGMT >30: CPT | Performed by: INTERNAL MEDICINE

## 2022-08-18 PROCEDURE — 94640 AIRWAY INHALATION TREATMENT: CPT

## 2022-08-18 PROCEDURE — 80048 BASIC METABOLIC PNL TOTAL CA: CPT

## 2022-08-18 PROCEDURE — 2580000003 HC RX 258: Performed by: INTERNAL MEDICINE

## 2022-08-18 PROCEDURE — 36415 COLL VENOUS BLD VENIPUNCTURE: CPT

## 2022-08-18 PROCEDURE — 6370000000 HC RX 637 (ALT 250 FOR IP): Performed by: INTERNAL MEDICINE

## 2022-08-18 RX ORDER — AMOXICILLIN AND CLAVULANATE POTASSIUM 500; 125 MG/1; MG/1
1 TABLET, FILM COATED ORAL 3 TIMES DAILY
Qty: 15 TABLET | Refills: 0 | DISCHARGE
Start: 2022-08-18 | End: 2022-08-23

## 2022-08-18 RX ADMIN — HYDRALAZINE HYDROCHLORIDE 25 MG: 25 TABLET, FILM COATED ORAL at 08:38

## 2022-08-18 RX ADMIN — METOPROLOL TARTRATE 50 MG: 50 TABLET, FILM COATED ORAL at 08:38

## 2022-08-18 RX ADMIN — BUMETANIDE 2 MG: 1 TABLET ORAL at 08:38

## 2022-08-18 RX ADMIN — APIXABAN 10 MG: 5 TABLET, FILM COATED ORAL at 08:38

## 2022-08-18 RX ADMIN — SODIUM CHLORIDE, PRESERVATIVE FREE 10 ML: 5 INJECTION INTRAVENOUS at 08:37

## 2022-08-18 RX ADMIN — MOMETASONE FUROATE AND FORMOTEROL FUMARATE DIHYDRATE 2 PUFF: 100; 5 AEROSOL RESPIRATORY (INHALATION) at 06:15

## 2022-08-18 RX ADMIN — Medication 5000 UNITS: at 08:37

## 2022-08-18 RX ADMIN — ASPIRIN 81 MG CHEWABLE TABLET 81 MG: 81 TABLET CHEWABLE at 08:38

## 2022-08-18 RX ADMIN — OXYBUTYNIN CHLORIDE 10 MG: 10 TABLET, EXTENDED RELEASE ORAL at 08:38

## 2022-08-18 RX ADMIN — TIOTROPIUM BROMIDE INHALATION SPRAY 2 PUFF: 3.12 SPRAY, METERED RESPIRATORY (INHALATION) at 06:14

## 2022-08-18 RX ADMIN — TAMSULOSIN HYDROCHLORIDE 0.4 MG: 0.4 CAPSULE ORAL at 08:38

## 2022-08-18 RX ADMIN — PRAVASTATIN SODIUM 80 MG: 80 TABLET ORAL at 08:38

## 2022-08-18 RX ADMIN — FINASTERIDE 5 MG: 5 TABLET, FILM COATED ORAL at 08:38

## 2022-08-18 ASSESSMENT — PAIN DESCRIPTION - ORIENTATION
ORIENTATION: RIGHT;LEFT
ORIENTATION: RIGHT;LEFT

## 2022-08-18 ASSESSMENT — PAIN SCALES - GENERAL
PAINLEVEL_OUTOF10: 6
PAINLEVEL_OUTOF10: 6

## 2022-08-18 ASSESSMENT — PAIN DESCRIPTION - LOCATION: LOCATION: LEG

## 2022-08-18 ASSESSMENT — PAIN DESCRIPTION - DESCRIPTORS
DESCRIPTORS: ACHING
DESCRIPTORS: ACHING

## 2022-08-18 NOTE — PROGRESS NOTES
Report called to [facility]. All questions answered. Discharge paperwork was given to wife to take to the ECF. Wife will be transporting patient to the ECF. Patient with all possessions.

## 2022-08-18 NOTE — DISCHARGE INSTR - COC
Continuity of Care Form    Patient Name: Tanvi Greene   :  1935  MRN:  [id number]    Admit date:  2022  Discharge date:  22    Code Status Order: Full Code   Advance Directives:     Admitting Physician:  Feli Enamorado MD  PCP: Mary Culp MD    Discharging Nurse: Serafin Dotson Unit/Room#: 9N-55/557-X  Discharging Unit Phone Number: [phone]    Emergency Contact:   Extended Emergency Contact Information  Primary Emergency Contact: [address] Phone: [phone]  Relation: Child  Secondary Emergency Contact: Binh Onofre  [place] Phone: [phone]  Relation: Spouse  Preferred language: English   needed? No    Past Surgical History:  Past Surgical History:   Procedure Laterality Date    BACK SURGERY      fusion    CARDIAC SURGERY      2 stents    COSMETIC SURGERY      ENDOSCOPY, COLON, DIAGNOSTIC      IVC FILTER INSERTION      NECK SURGERY      OTHER SURGICAL HISTORY  2015    DECOMPRESSIVE LUMBAR LAMINECTOMY L2-3    ROTATOR CUFF REPAIR Left     TOTAL HIP ARTHROPLASTY Bilateral     TOTAL KNEE ARTHROPLASTY Left        Immunization History: There is no immunization history on file for this patient.     Active Problems:  Patient Active Problem List   Diagnosis Code    CA prostate, adenoca ([address] C61    Chronic kidney disease, stage III (moderate) (HCC) N18.30    Hypercholesteremia E78.00    Osteoarthritis M19.90    CAD (coronary artery disease) I25.10    H/O class III angina pectoris Z86.79    Gastritis K29.70    Hypertension, essential, benign I10    HLD (hyperlipidemia) E78.5    Spinal stenosis, lumbar, s/p laminectomy M48.061    S/P laminectomy Z98.890    Benign essential HTN I10    Syncope R55    ALDA (acute kidney injury) ([address] N17.9    DVT, lower extremity ([address] I82.409    Fluid overload E87.70    Benign prostatic hyperplasia with urinary obstruction N40.1, N13.8    PVD (peripheral vascular disease) ([address] I73.9    DVT (deep venous thrombosis) ([facility]) I82.409    Presence of IVC filter Z95.828    Gross hematuria R31.0    Hematuria R31.9    History of DVT (deep vein thrombosis) Z86.718    Benign non-nodular prostatic hyperplasia with lower urinary tract symptoms N40.1    History of prostate cancer Z85.46    H/O prostate cancer Z85.46    Proteinuria R80.9    Encephalopathy G93.40    Altered mental status R41.82    Intraventricular hemorrhage ([facility] I61.5    Fracture of left orbital floor ([facility] S02. 32XA    Nasal bone fracture S02. 2XXA    Closed fracture of six ribs of left side S22.42XA    Closed fracture of one rib of left side S22.32XA    Intracranial bleed ([facility]) I62.9    Postural hypotension I95.1    COPD (chronic obstructive pulmonary disease) ([facility]) J44.9    Acute on chronic systolic CHF (congestive heart failure), NYHA class 4 ([facility]) I50.23    Anasarca R60.1       Isolation/Infection:   Isolation            No Isolation          Patient Infection Status       Infection Onset Added Last Indicated Last Indicated By Review Planned Expiration Resolved Resolved By    None active    Resolved    COVID-19 (Rule Out) 12/16/20 12/16/20 12/16/20 COVID-19 (Ordered)   12/16/20 Rule-Out Test Resulted    COVID-19 (Rule Out) 11/29/20 11/29/20 11/29/20 COVID-19 (Ordered)   11/29/20 Rule-Out Test Resulted    COVID-19 (Rule Out) 10/19/20 10/19/20 10/19/20 COVID-19 (Ordered)   10/20/20 Rule-Out Test Resulted            Nurse Assessment:  Last Vital Signs: /66   Pulse 73   Temp 98.1 °F (36.7 °C) (Oral)   Resp 18   Ht 5' 9\" (1.753 m)   Wt 237 lb 3.2 oz (107.6 kg)   SpO2 95%   BMI 35.03 kg/m²     Last documented pain score (0-10 scale): Pain Level: 6 (patient states he does not need pain relief at this time)  Last Weight:   Wt Readings from Last 1 Encounters:   08/18/22 237 lb 3.2 oz (107.6 kg)     Mental Status:  oriented    IV Access:  - None    Nursing Mobility/ADLs:  Walking   Assisted  Transfer  Assisted  Bathing Assisted  Dressing  Assisted  Toileting  Assisted  Feeding  Independent  Med Admin  Assisted  Med Delivery   whole    Wound Care Documentation and Therapy:        Elimination:  Continence: Bowel: Yes  Bladder: Yes  Urinary Catheter: None   Colostomy/Ileostomy/Ileal Conduit: No       Date of Last BM: 8/17/22    Intake/Output Summary (Last 24 hours) at 8/18/2022 1037  Last data filed at 8/18/2022 1013  Gross per 24 hour   Intake 600 ml   Output 900 ml   Net -300 ml     I/O last 3 completed shifts: In: 900 [P.O.:900]  Out: 0487 92 73 82 [Urine:1850]    Safety Concerns: At Risk for Falls    Impairments/Disabilities:      None    Nutrition Therapy:  Current Nutrition Therapy:   - Oral Diet:  General    Routes of Feeding: Oral  Liquids: Thin Liquids  Daily Fluid Restriction: yes - amount 2000ml  Last Modified Barium Swallow with Video (Video Swallowing Test): not done    Treatments at the Time of Hospital Discharge:   Respiratory Treatments: YES  Oxygen Therapy:  is not on home oxygen therapy.   Ventilator:    - No ventilator support    Rehab Therapies: Physical Therapy and Occupational Therapy  Weight Bearing Status/Restrictions: No weight bearing restrictions  Other Medical Equipment (for information only, NOT a DME order):  walker  Other Treatments: NA    Patient's personal belongings (please select all that are sent with patient):  None    RN SIGNATURE:  Electronically signed by Lucita Alanis RN on 8/18/22 at 10:41 AM EDT    CASE MANAGEMENT/SOCIAL WORK SECTION    Inpatient Status Date: 8/11/2022    Readmission Risk Assessment Score:  Readmission Risk              Risk of Unplanned Readmission:  15           Discharging to Facility/ Agency   Name: [facility]  Address: [place] [address] [address]  Phone: [phone]  Fax: [phone]    Dialysis Facility (if applicable)   Name:  Address:  Dialysis Schedule:  Phone:  Fax:    / signature: Electronically signed by Leontine Moritz, TALI on 8/18/22 at 2:15 PM EDT    PHYSICIAN SECTION    Prognosis: Good    Condition at Discharge: Stable    Rehab Potential (if transferring to Rehab): Good    Recommended Labs or Other Treatments After Discharge: see above    Physician Certification: I certify the above information and transfer of Sahra Puentes  is necessary for the continuing treatment of the diagnosis listed and that he requires [facility] for greater 30 days.      Update Admission H&P: No change in H&P    PHYSICIAN SIGNATURE:  Electronically signed by Bev Silva MD on 8/18/22 at 1:40 PM EDT

## 2022-08-18 NOTE — CARE COORDINATION
8/15/22, 12:49 PM EDT    DISCHARGE ON GOING EVALUATION    [address] day: 4  Location: [facility]35/035-A Reason for admit: Anasarca [R60.1]  Acute pulmonary edema ([address] [J81.0]  Leg swelling [M79.89]  Acute on chronic systolic CHF (congestive heart failure), NYHA class 4 (HCC) [I50.23]  Urinary tract infection without hematuria, site unspecified [N39.0]   Procedure: No  Barriers to Discharge: Heparin gtt now discontinued. Eliquis bid. Rocephin discontinued for UTI, now on po Augmentin. Bumex IV Bid continues. PT/OT following. PCP: Joseph Rasmussen MD  Readmission Risk Score: 15.9%  Patient Goals/Plan/Treatment Preferences: From home with spouse however pt is not moving well and could benefit from Rehab. This is also therapy's recommendation. Pt does not want and ECF but is agreeable to a stay in Inpatient Rehab. Consult placed to IPR.
8/16/22, 11:44 AM EDT    DISCHARGE ON GOING EVALUATION    [address] day: 5  Location: [facility]35/035-A Reason for admit: Anasarca [R60.1]  Acute pulmonary edema ([address] [J81.0]  Leg swelling [M79.89]  Acute on chronic systolic CHF (congestive heart failure), NYHA class 4 (HCC) [I50.23]  Urinary tract infection without hematuria, site unspecified [N39.0]   Procedure: No.  Barriers to Discharge: Denied IPR as Physiatry does not feel pt can tolerate 3 hrs of therapy per day. SW consulted for discharge needs. Therapy does recommend inpt stay for PT but pt has declined this thus far. Augmentin for cystitis. Eliquis initiated 8-15. Creat 1.5 today. Continue to diurese with IV Bumex 2mg bid. PCP: Loki Vuong MD  Readmission Risk Score: 15.6%  Patient Goals/Plan/Treatment Preferences: From home with spouse. Discharge disposition/needs uncertain presently as IPR denied pt. SW is consulted.
8/17/22, 1:44 PM EDT    DISCHARGE ON GOING [address] day: 6  Location: [address] [place], [place] [address]A Reason for admit: Anasarca [R60.1]  Acute pulmonary edema ([address] [J81.0]  Leg swelling [M79.89]  Acute on chronic systolic CHF (congestive heart failure), NYHA class 4 (HCC) [I50.23]  Urinary tract infection without hematuria, site unspecified [N39.0]   Procedure: No.  Barriers to Discharge: CHF Nurse has seen pt. PT/OT following. Await acceptance to [facility]. PCP: Quan Barrientos MD  Readmission Risk Score: 16.9%  Patient Goals/Plan/Treatment Preferences: Await acceptance [facility]. SW following.
8/17/22, 3:23 PM EDT    DISCHARGE PLANNING EVALUATION    Spoke with Chitra River at [facility], everything looks good to accept. With his insurance is covered 100% days 1-20 and starting day  will be $58.35 per day. Chitra River has left messages for wife and daughter to ensure they can pay the co-pay if he is still there day 24. Will plan for tomorrow acceptance sher song as Chitra River is able to reach wife or daughter.
8/18/22, 1:56 PM EDT    DISCHARGE PLANNING EVALUATION    Shanelle from [place] [address] called, they are ready to accept patient. Advised will discharge today. Will notify when discharge orders are completed. Wife will transport. Spoke with wife on phone, advised [place] will take today. She will transport. 8/18/22, 2:16 PM EDT    Patient goals/plan/ treatment preferences discussed by  and . Patient goals/plan/ treatment preferences reviewed with patient/ family. Patient/ family verbalize understanding of discharge plan and are in agreement with goal/plan/treatment preferences. Understanding was demonstrated using the teach back method. AVS provided by RN at time of discharge, which includes all necessary medical information pertaining to the patients current course of illness, treatment, post-discharge goals of care, and treatment preferences. Services At/After Discharge: [facility] (SNF), Aide services, Nursing service, OT, and PT       IMM Letter  IMM Letter given to Patient/Family/Significant other/Guardian/POA/by[de-identified] Melissa QUEEN Case Manager. IMM Letter date given[de-identified] 08/17/22  IMM Letter time given[de-identified] 1101       [facility]. Shanelle from [facility] aware of discharge. Faxed AVS and MAR. Wife will transport.
with goal/plan/treatment preferences. Understanding was demonstrated using the teach back method. AVS provided by RN at time of discharge, which includes all necessary medical information pertaining to the patients current course of illness, treatment, post-discharge goals of care, and treatment preferences. Services At/After Discharge: None       IMM Letter  IMM Letter given to Patient/Family/Significant other/Guardian/POA/by[de-identified] Melissa QUEEN Case Manager. IMM Letter date given[de-identified] 08/12/22  IMM Letter time given[de-identified] 7480     Potential discharge in next 24-48 hours. No discharge needs anticipated at this time.

## 2022-08-18 NOTE — DISCHARGE SUMMARY
Hospitalist discharge note      Patient:  Hercules Kussmaul    Unit/Bed:8B-35/035-A  YOB: 1935  MRN: [id number]   Acct: [de-identified]     PCP: Loki Vuong MD  Date of Admission: 8/11/2022       Assessment/Plan:    Acute congestive heart failure, mildly reduced EF, improving: Compensated  Patient with a prior history of diastolic dysfunction with normal ejection fraction. He presented to ED and appered to be significantly volume overloaded with JVD and 3+ lower extremity edema (POA). Echo notable for EF estimated 02%, grade 1 diastolic dysfunction, no WMA, mild MR, mild TR  Daily weights, strict I&O's  Change IV Bumex to oral Bumex 8/17  Lost up to -12 L so far  Cardiac diet, 2 L fluid restriction, 2 g sodium diet  ELISA hose     Acute cystitis:   He has a prior history of prostate cancer treated back in 2006. He does have urinary frequency. UA notable for trace ketones, trace proteins, positive nitrite, moderate LE, greater than 100 WBC, many bacteria   Urine cx + E. Coli - sensitive to Rocephin - continue  8/15: Sensitive to Augmentin  Received Rocephin 8/11 - 8/14 -> Switch over to p.o. Augmentin 8/15     History of recent extensive DVT:   Patient had a lower extremity ultrasound 3/24/22 which demonstrated a DVT in his left lower extremity from the popliteal to the common femoral vein. Patient states he was never started on anticoagulation. No documentation in the chart following this imaging study. I recommended starting anticoagulation while reassess the clot burden. CTA chest negative for PE, notable for low lung volumes  Repeat lower extremity Doppler notable for interval resolution of left-sided DVT, however notable for interval development of a DVT in the right popliteal vein and posterior tibial vein  Discussed starting anticoagulation with patient, patient stating he wants to hold off on starting [address] at this time.   Patient currently getting heparin for DVT prophylaxis but not appropriate DVT dose. We will continue to encourage patient and educate patient on importance of [address].  8/13: Readdressed starting [facility] given new DVT. Lengthy discussion at bedside about risks vs benefits. Patient now agreeable to starting [facility]. Received heparin gtt 8/13 -8/15  Continue Eliquis 10 mg BID x 7 days (initiated 8/15), followed by Eliquis 5 mg BID  Will need to f/u with PCP within 1 week upon discharge    CKD stage II  Baseline creatinine range ~1.2 -1.4. Creatinine stable  8/15: Cr 1.5, monitor  8/16: Cr stable at 1.5 , monitor. Patient's GFR is currently slightly better than baseline. Renally dose medications  Daily lab     Troponin elevation (POA):   Suspect likely related to his acute illness and CKD. Denies chest pains, no acute ischemic changes noted on EKG. History of prostate cancer:   Treated in 2006. Follows Dr. Deborah Ramos in urology locally. Continue Flomax, oxybutynin, finasteride     CAD:   Documentation of prior PCI.     Continue pravastatin, Lopressor, aspirin     COPD without acute exacerbation  Continue scheduled Dulera and Spiriva   As needed bronchodilators  Pulmonary toilet: Incentive spirometer, Acapella, cough, deep breathe    Essential hypertension, uncontrolled, improving  Continue Bumex, Lopressor  Continue hydralazine 25 mg 2 times daily    Hyperlipidemia  Statin      Obesity:   BMI noted to be 32.95 kg/m²  We will continue diuresis and attempt to provide lifestyle modification advice    Weakness secondary to physical deconditioning  PT/OT consulted, appreciate recs: Recommending IPR  IPR coordinator consulted, IPR consulted -> declined by IPR  Recommendation is SNF    Plan for discharge today to nursing home  Stable for discharge  Discharge date-8/18/2022  PCP in 1 week  Nephrology as outpatient as scheduled  CHF clinic as outpatient    Discharge time 35 minutes    Chief Complaint: Lower extremity edema    Hospital Course: Per HPI documented 8/11/22: \"Stone Clifton is a 81 y.o. male with PMHx of HFpEF, prostate cancer treated in 2006, DVT status post IVC filter, CKD stage III, CAD status post PCI, hypertension who presents to [facility] with aggressive lower extremity edema and weight gain. Patient has noted over the past several days that he has been gaining weight and has developed worsened lower extremity edema. He is also noticing shortness of breath and fatigue. He went to his nephrologist today and it was noted that he had gained weight and so his Bumex was increased in dose. Patient's symptoms worsened at home so he came to the emergency room. While in the ER he was found to be in what appeared to be acute congestive heart failure. He was started on IV diuretics. The urinalysis also demonstrated evidence of urinary tract infection and he has endorsed urinary frequency. Of note in chart review it appears that he had an ultrasound of his leg in March of this year which found a moderate sized DVT, however, patient denies ever being on an anticoagulant at this time. They state they were never notified of a blood clot. \"     8/12/22: Resumed care of patient today. Patient sitting up in bed, chronically ill-appearing, in no apparent distress resting in bed. Remains afebrile, satting 98% on room air, with hemodynamically stable vital signs. Patient admitted overnight. Will obtain 2D echo today, repeat venous Doppler of lower extremities, CTA chest.  Urine culture growing enteric gram-negative bacilli, will continue Rocephin at this time and tailor antibiotics pending sensitivity results. Continue IV diuresis with Bumex for acute decompensated heart failure. Lengthy discussion at the bedside with patient about resuming anticoagulation given history of DVT in the left lower extremity. Patient still refusing to start [address] at this time. Will await results of venous ultrasound and rediscuss with patient.   Of note patient noted to be very hypertensive this morning with a systolic BP in the 069G. We will start patient on hydralazine. 8/13/22: Afebrile, with hemodynamically stable vital signs. No acute events overnight. Venous ultrasound notable for resolution of left DVT, no right DVT. Lengthy discussion at bedside with patient about need to start [address] given recurrent DVTs. Risks versus benefits [address] discussed. Patient agreeable to start anticoagulation at this time, will start heparin for now and transition over to [address] prior to discharge. Patient had 2435 mL urine output overnight, approximate 12 pound weight loss since admission. Still complains of subjective dyspnea on exertion. Continue IV diuresis. Urine culture notable for E. coli. Sensitive to Rocephin, will continue. 8/14/22: patient resting in bed comfortably, in no apparent distress. Afebrile, with hemodynamically stable vital signs. On Heparin gtt for DVT, doing well. Diuresing well. Still has + 2 pitting edema and subjective complaints of ALFONSO. Will continue IV diuresis. Will transition IV abx over to p.o. today. Discussed with patient about PT/OT and going to SNF upon discharge. Patient stated \"I don't think I need that, and I've decided I don't want to got. \" Educated patient on benefits of PT/OT, patient still refusing. Offered HH assistance and patient stated he did not want this either. 8/15/22: Patient sitting up in bedside recliner, well-appearing, no apparent distress. Satting 98% on room air, hemodynamically stable vital signs. No acute events overnight. Patient continues to diurese well. Pitting edema in bilateral lower extremities improving. Patient stating he is feeling better. States that his shortness of breath with exertion is improving. Was notified by RN staff the patient is very weak when transferring from bed to reclining chair. I discussed with patient about possible IPR and possible SNF placement. Patient still stating he does not want to go to SNF.   Said he will consider IPR.      8/16/22: Afebrile, with hemodynamically stable vital signs. No acute events overnight. Patient creatinine remaining stable at 1.5. Patient reporting subjective improvement in his dyspnea on his exertion. Stating it is much better today. He still has +1 pitting edema in his left lower extremity and +2 pitting edema in his right lower extremity. We will continue IV Bumex at this time. We will switch over to p.o. Bumex soon. Patient wife is at the bedside today. Lengthy discussion at the bedside with patient and his wife that inpatient rehab has refused patient and is recommending SNF placement. Patient's wife informed me that patient has difficulty moving his right lower extremity and \"drags it along the side of him. \"  I discussed SNF placement upon discharge with patient and his wife. Patient's wife informed me that patient is supposed to get back surgery relatively soon. Educated patient and his wife about the importance of having good rehab/strength prior to getting a back surgery done. Additionally informed patient's wife and patient that given him starting on a blood thinner, if he were to fall he is at increased risk for internal bleeding which could be very bad. Recommended that it would be in his best interest to go to a SNF. Patient and his wife stated they would discuss whether or not they would go through with this. Belinda Garcia  updated. Medication List        START taking these medications      amoxicillin-clavulanate 500-125 MG per tablet  Commonly known as: Augmentin  Take 1 tablet by mouth 3 times daily for 5 days     apixaban starter pack 5 MG Tbpk tablet  Commonly known as: ELIQUIS  Take 1 tablet by mouth See Admin Instructions     nitroGLYCERIN 0.4 MG SL tablet  Commonly known as: Nitrostat  Place 1 tablet under the tongue every 5 minutes as needed for Chest pain up to max of 3 total doses. If no relief after 1 dose, call 911.      tiotropium 2.5 MCG/ACT Aers inhaler  Commonly known as: SPIRIVA RESPIMAT  Inhale 2 puffs into the lungs daily  Start taking on: August 19, 2022            CHANGE how you take these medications      oxybutynin 5 MG tablet  Commonly known as: DITROPAN  TAKE 1 TABLET TWICE A DAY  What changed: Another medication with the same name was removed. Continue taking this medication, and follow the directions you see here. CONTINUE taking these medications      albuterol sulfate  (90 Base) MCG/ACT inhaler  Commonly known as: PROVENTIL;VENTOLIN;PROAIR     aspirin 81 MG chewable tablet  Take 1 tablet by mouth daily     bumetanide 2 MG tablet  Commonly known as: BUMEX  Take 1 tablet by mouth in the morning and 1 tablet before bedtime. finasteride 5 MG tablet  Commonly known as: PROSCAR  TAKE 1 TABLET DAILY     fluticasone-vilanterol 100-25 MCG/INH Aepb inhaler  Commonly known as: BREO ELLIPTA     metoprolol tartrate 50 MG tablet  Commonly known as: LOPRESSOR     pravastatin 80 MG tablet  Commonly known as: PRAVACHOL     tamsulosin 0.4 MG capsule  Commonly known as: FLOMAX  Take 1 capsule by mouth daily     Vitamin D3 125 MCG (5000 UT) Tabs            STOP taking these medications      ALEVE PO               Where to Get Your Medications        Information about where to get these medications is not yet available    Ask your nurse or doctor about these medications  amoxicillin-clavulanate 500-125 MG per tablet  apixaban starter pack 5 MG Tbpk tablet  tiotropium 2.5 MCG/ACT Aers inhaler          Intake/Output Summary (Last 24 hours) at 8/18/2022 1434  Last data filed at 8/18/2022 1145  Gross per 24 hour   Intake 840 ml   Output 1250 ml   Net -410 ml         Diet:  ADULT DIET;  Regular; Low Fat/Low Chol/High Fiber/2 gm Na; 2000 ml    Exam:  /68   Pulse 60   Temp 97.6 °F (36.4 °C) (Oral)   Resp 18   Ht 5' 9\" (1.753 m)   Wt 237 lb 3.2 oz (107.6 kg)   SpO2 93%   BMI 35.03 kg/m²     General appearance: No apparent distress, appears older than stated age and cooperative. Chronically ill-appearing  HEENT: Pupils equal, round, and reactive to light. Conjunctivae/corneas clear. Neck: Supple, with full range of motion. No jugular venous distention. Respiratory:  Normal respiratory effort, able to speak full clear sentences. Clear to auscultation, bilaterally with expiratory wheezes in the bases. No rales/rhonchi. Cardiovascular: Regular rate and rhythm with normal S1/S2 without murmurs, rubs or gallops. +1 pitting edema LLE, +2 pitting edema in RLE  Abdomen: Soft, non-tender, non-distended with normal bowel sounds. Musculoskeletal: passive and active ROM x 4 extremities. Skin: Skin color, texture, turgor normal.  No rashes or lesions. Neurologic:  Neurovascularly intact without any focal sensory/motor deficits. Cranial nerves: II-XII intact, grossly non-focal.  Psychiatric: Alert and oriented, thought content appropriate, normal insight  Capillary Refill: Brisk,< 3 seconds   Peripheral Pulses: +2 palpable, equal bilaterally       Labs:   Recent Labs     08/16/22  0540 08/17/22  0534 08/18/22  0551   WBC 5.7 6.9 6.8   HGB 15.5 15.4 14.7   HCT 52.1* 51.3 48.0    129* 142       Recent Labs     08/16/22  0540 08/17/22  0534 08/18/22  0551    140 140   K 4.3 4.1 4.1   CL 99 100 101   CO2 28 26 27   BUN 29* 33* 37*   CREATININE 1.5* 1.3* 1.5*   CALCIUM 9.3 9.6 9.0       No results for input(s): AST, ALT, BILIDIR, BILITOT, ALKPHOS in the last 72 hours. No results for input(s): INR in the last 72 hours. No results for input(s): Curlie Lat in the last 72 hours.     Microbiology:      Urinalysis:      Lab Results   Component Value Date/Time    NITRU POSITIVE 08/11/2022 08:10 PM    WBCUA > 100 08/11/2022 08:10 PM    BACTERIA MANY 08/11/2022 08:10 PM    RBCUA 0-2 08/11/2022 08:10 PM    BLOODU NEGATIVE 08/11/2022 08:10 PM    SPECGRAV 1.022 03/30/2016 11:30 AM    GLUCOSEU NEGATIVE 08/11/2022 08:10 PM

## 2022-08-18 NOTE — PROGRESS NOTES
Spiritual Care Follow Up:  Umang Nicholas is an 81year old male who is in bed on 8B. No family is present at this time. This  had active listening, gave words of encouragement and offered prayer of which Stone declined. He is needing sleep and rest.  This spiritual care contact was short. He is waiting to [facility] to [facility] upon his discharged.     08/18/22 0940   Encounter Summary   Encounter Overview/Reason  Spiritual/Emotional Needs   Service Provided For: Patient   Referral/Consult From: Simon   Last Encounter  08/18/22   Complexity of Encounter Low   Begin Time 0940   End Time  0950   Total Time Calculated 10 min   Spiritual/Emotional needs   Type Spiritual Support

## 2022-08-18 NOTE — PROGRESS NOTES
Patient taken by wheelchair to discharge lobby. Wife will be transporting patient to [place] [id number].

## 2022-08-19 ENCOUNTER — PROCEDURE VISIT (OUTPATIENT)
Dept: CARDIOLOGY CLINIC | Age: 87
End: 2022-08-19
Payer: MEDICARE

## 2022-08-19 DIAGNOSIS — R55 SYNCOPE, UNSPECIFIED SYNCOPE TYPE: Primary | ICD-10-CM

## 2022-08-19 PROCEDURE — 93298 REM INTERROG DEV EVAL SCRMS: CPT | Performed by: INTERNAL MEDICINE

## 2022-08-19 PROCEDURE — G2066 INTER DEVC REMOTE 30D: HCPCS | Performed by: INTERNAL MEDICINE

## 2022-08-19 NOTE — PROGRESS NOTES
[facility]aura [facility] [employer] REVEAL CHECK       BATTERY GOOD    9 TACHY EPISODES  3 MIN 48 SEC  RATE  BPM    11 MIKEL EPISODES  SLOWEST 36 BPM @ 12:00    PT IS NOT ACTIVE    AVG V RATE <60 TO 70 BPM

## 2022-09-06 ENCOUNTER — HOSPITAL ENCOUNTER (EMERGENCY)
Age: 87
Discharge: HOME | End: 2022-09-06
Attending: EMERGENCY MEDICINE
Payer: MEDICARE

## 2022-09-06 ENCOUNTER — APPOINTMENT (OUTPATIENT)
Dept: INTERVENTIONAL RADIOLOGY/VASCULAR | Age: 87
End: 2022-09-06
Payer: MEDICARE

## 2022-09-06 VITALS
HEIGHT: 69 IN | RESPIRATION RATE: 19 BRPM | HEART RATE: 72 BPM | OXYGEN SATURATION: 99 % | SYSTOLIC BLOOD PRESSURE: 125 MMHG | BODY MASS INDEX: 32.58 KG/M2 | TEMPERATURE: 98.2 F | DIASTOLIC BLOOD PRESSURE: 76 MMHG | WEIGHT: 220 LBS

## 2022-09-06 DIAGNOSIS — I82.402 LEG DVT (DEEP VENOUS THROMBOEMBOLISM), ACUTE, LEFT (HCC): Primary | ICD-10-CM

## 2022-09-06 DIAGNOSIS — I82.531 CHRONIC DEEP VEIN THROMBOSIS (DVT) OF POPLITEAL VEIN OF RIGHT LOWER EXTREMITY (HCC): ICD-10-CM

## 2022-09-06 PROCEDURE — 6370000000 HC RX 637 (ALT 250 FOR IP): Performed by: PHYSICIAN ASSISTANT

## 2022-09-06 PROCEDURE — 99284 EMERGENCY DEPT VISIT MOD MDM: CPT

## 2022-09-06 PROCEDURE — 93970 EXTREMITY STUDY: CPT

## 2022-09-06 RX ADMIN — APIXABAN 10 MG: 5 TABLET, FILM COATED ORAL at 15:36

## 2022-09-06 ASSESSMENT — ENCOUNTER SYMPTOMS
NAUSEA: 0
VOMITING: 0
DIARRHEA: 0
SHORTNESS OF BREATH: 0
SORE THROAT: 0
ABDOMINAL PAIN: 0
EYES NEGATIVE: 1
COUGH: 0

## 2022-09-06 NOTE — ED PROVIDER NOTES
[address] [facility]"" [address] [address] EMERGENCY DEPT    EMERGENCY MEDICINE     Pt Name: Hercules Kussmaul  MRN: [id number]  [place] 1935  Date of evaluation: 9/6/2022  Provider: Rene Gordon PA-C    CHIEF COMPLAINT       Chief Complaint   Patient presents with    Leg Pain       HISTORY OF PRESENT ILLNESS    Hercules Kussmaul is a pleasant 81 y.o. male who presents to the emergency department for left leg swelling. Patient states that approximately 3 to 4 weeks ago he was admitted to the hospital for a few reasons 1 of them being acute DVT right lower extremity. He was started on Eliquis and discharged to a rehab facility. Patient states while at the rehab facility approximately 2 to 3 weeks ago while he was being transported he bumped the side of his left leg which caused slight bruising to the area. Patient has noticed in the last few days the swelling to the area has worsened around the bruise. No other new injury since then. He states his right leg pain and swelling has been improving. Patient states there is no pain to the left leg with ambulation or movement but only when you push against the swollen area. No complaints of fevers or chills. No complaints of chest pain or shortness of breath. Rates the pain 3/10 dull ache nonradiating. He states when he was discharged from the rehab facility last Friday he was never given a prescription of Eliquis and so has not been taking his blood thinner since discharge from the rehab facility. He was supposed to take 1 months worth. Triage notes and Nursing notes were reviewed by myself. Any discrepancies are addressed above.     PAST MEDICAL HISTORY     Past Medical History:   Diagnosis Date    Acute on chronic systolic CHF (congestive heart failure), NYHA class 4 ([address] 08/11/2022    CAD (coronary artery disease)     Status post coronary artery stenting    Chronic kidney disease, stage III (moderate) ([facility])     Closed fracture of six ribs of left side 11/27/2020    COPD (chronic obstructive pulmonary disease) ([facility] 12/12/2020    Patient denies 8/15/2022    DVT, lower extremity ([facility] [address]     Gastritis     Hematuria     Hypercholesteremia     Hypertension     Intracranial bleed ([facility] [address]     Nasal bone fracture 11/27/2020    Osteoarthritis     Presence of IVC filter     Prostate cancer ([facility] [address]     Seed implants    Spinal stenosis, lumbar, s/p laminectomy 06/01/2015    Statin-induced myositis        SURGICAL HISTORY       Past Surgical History:   Procedure Laterality Date    BACK SURGERY  2006    fusion    CARDIAC SURGERY      2 stents    COSMETIC SURGERY      ENDOSCOPY, COLON, DIAGNOSTIC      IVC FILTER INSERTION      NECK SURGERY      OTHER SURGICAL HISTORY  05/29/2015    DECOMPRESSIVE LUMBAR LAMINECTOMY L2-3    ROTATOR CUFF REPAIR Left     TOTAL HIP ARTHROPLASTY Bilateral     TOTAL KNEE ARTHROPLASTY Left        CURRENT MEDICATIONS       Previous Medications    ALBUTEROL SULFATE  (90 BASE) MCG/ACT INHALER    Inhale 1 puff into the lungs every 4 hours as needed for Wheezing    ASPIRIN 81 MG CHEWABLE TABLET    Take 1 tablet by mouth daily    BUMETANIDE (BUMEX) 2 MG TABLET    Take 1 tablet by mouth in the morning and 1 tablet before bedtime. CHOLECALCIFEROL (VITAMIN D3) 5000 UNITS TABS    Take 5,000 Units by mouth daily     FINASTERIDE (PROSCAR) 5 MG TABLET    TAKE 1 TABLET DAILY    FLUTICASONE-VILANTEROL (BREO ELLIPTA) 100-25 MCG/INH AEPB INHALER    Inhale into the lungs daily    METOPROLOL TARTRATE (LOPRESSOR) 50 MG TABLET    Take 50 mg by mouth 2 times daily    NITROGLYCERIN (NITROSTAT) 0.4 MG SL TABLET    Place 1 tablet under the tongue every 5 minutes as needed for Chest pain up to max of 3 total doses. If no relief after 1 dose, call 911.     OXYBUTYNIN (DITROPAN) 5 MG TABLET    TAKE 1 TABLET TWICE A DAY    PRAVASTATIN (PRAVACHOL) 80 MG TABLET    Take 80 mg by mouth daily    TAMSULOSIN (FLOMAX) 0.4 MG CAPSULE    Take 1 capsule by mouth daily    TIOTROPIUM (SPIRIVA RESPIMAT) 2.5 MCG/ACT AERS INHALER    Inhale 2 puffs into the lungs daily       ALLERGIES     Losartan and Rivaroxaban    FAMILY HISTORY       Family History   Problem Relation Age of Onset    Heart Disease Mother     High Blood Pressure Mother     Prostate Cancer Neg Hx     Cancer Neg Hx     Diabetes Neg Hx     Kidney Disease Neg Hx     Stroke Neg Hx         SOCIAL HISTORY       Social History     Socioeconomic History    Marital status:      Spouse name: Diogo    Number of children: None    Years of education: None    Highest education level: None   Tobacco Use    Smoking status: Former     Packs/day: 0.50     Years: 25.00     Pack years: 12.50     Types: Cigarettes     Start date:      Quit date: 1997     Years since quittin.4    Smokeless tobacco: Never   Vaping Use    Vaping Use: Never used   Substance and Sexual Activity    Alcohol use: Not Currently     Comment: Drank 3 bottles of beer about once weekly in the past    Drug use: No    Sexual activity: Not Currently       REVIEW OF SYSTEMS     Review of Systems   Constitutional:  Negative for chills and fever. HENT:  Negative for congestion, ear pain and sore throat. Eyes: Negative. Respiratory:  Negative for cough and shortness of breath. Cardiovascular:  Negative for chest pain and palpitations. Gastrointestinal:  Negative for abdominal pain, diarrhea, nausea and vomiting. Endocrine: Negative. Genitourinary:  Negative for dysuria and frequency. Musculoskeletal:  Positive for myalgias. Negative for neck pain. Skin:  Negative for rash. Neurological:  Negative for dizziness, light-headedness and headaches. Hematological: Negative. Psychiatric/Behavioral: Negative. All other systems reviewed and are negative. Except as noted above the remainder of the review of systems was reviewed and is.      SCREENINGS        [place] Coma Scale  Eye Opening: Spontaneous  Best Verbal Response: Oriented  Best Motor Response: Obeys commands  [place] Coma Scale Score: 15                 PHYSICAL EXAM     INITIAL VITALS:  height is 5' 9\" (1.753 m) and weight is 220 lb (99.8 kg). His temperature is 98.2 °F (36.8 °C). His blood pressure is 125/76 and his pulse is 72. His respiration is 19 and oxygen saturation is 99%. Physical Exam  Vitals and nursing note reviewed. Exam conducted with a chaperone present. Constitutional:       General: He is not in acute distress. Appearance: Normal appearance. He is normal weight. He is not ill-appearing, toxic-appearing or diaphoretic. HENT:      Head: Normocephalic and atraumatic. Right Ear: External ear normal.      Left Ear: External ear normal.      Nose: Nose normal.      Mouth/Throat:      Mouth: Mucous membranes are moist.   Eyes:      Extraocular Movements: Extraocular movements intact. Pupils: Pupils are equal, round, and reactive to light. Cardiovascular:      Rate and Rhythm: Normal rate and regular rhythm. Pulses: Normal pulses. Heart sounds: Normal heart sounds. No murmur heard. Pulmonary:      Effort: Pulmonary effort is normal. No respiratory distress. Breath sounds: Normal breath sounds. Abdominal:      General: Bowel sounds are normal. There is no distension. Palpations: Abdomen is soft. Tenderness: There is no abdominal tenderness. Musculoskeletal:         General: Normal range of motion. Cervical back: Normal range of motion and neck supple. Comments: Left lower extremity demonstrates left leg lateral swelling approximately 3 x 3 x 2 cm with slight warmth to touch and tender to palpation. Noticeable surrounding ecchymosis as well. No significant erythema or signs of infection noted. No drainage noted. Skin is intact. Full active range of motion of foot, ankle, knee without pain. Right lower extremity there is a skin that is intact. Mildly positive homans. Skin:     General: Skin is warm and dry.       Capillary Refill: Capillary refill takes less than 2 seconds. Neurological:      Mental Status: He is alert and oriented to person, place, and time. GCS: GCS eye subscore is 4. GCS verbal subscore is 5. GCS motor subscore is 6. Psychiatric:         Mood and Affect: Mood normal.         Behavior: Behavior normal.         Thought Content: Thought content normal.       DIFFERENTIAL DIAGNOSIS:   Differential diagnoses are discussed    DIAGNOSTIC RESULTS     EKG:(none if blank)  All EKGs are interpreted by the Emergency Department Physician who either signs or Co-signs this chart in the absence of a cardiologist.          RADIOLOGY: (none if blank)   I directly visualized the following images and reviewed the radiologist interpretations. Interpretation per the Radiologist below, if available at the time of this note:  VL DUP LOWER EXTREMITY VENOUS BILATERAL   Final Result   Acute thrombus in distal left femoral vein. Residual thrombus in the right peroneal vein. **This report has been created using voice recognition software. It may contain minor errors which are inherent in voice recognition technology. **      Final report electronically signed by Dr. Courtney Shepard on 9/6/2022 2:48 PM          LABS:   Labs Reviewed - No data to display    All other labs were within normal range or not returned as of this dictation. Please note, any cultures that may have been sent were not resulted at the time of this patient visit. EMERGENCY DEPARTMENT COURSE:   Vitals:    Vitals:    09/06/22 1248   BP: 125/76   Pulse: 72   Resp: 19   Temp: 98.2 °F (36.8 °C)   SpO2: 99%   Weight: 220 lb (99.8 kg)   Height: 5' 9\" (1.753 m)     1:47 PM EDT: The patient was seen and evaluated.     PROCEDURES: (None if blank)  Procedures         ED Medications administered this visit:    Medications   apixaban (ELIQUIS) tablet 10 mg (has no administration in time range)       MDM:  Patient is 28-year-old male who came to the ED to be evaluated for left leg pain and swelling. Appropriate testing/imaging of venous duplex ultrasound bilateral lower extremities was done based on the patient's initial complaints, history, and physical exam.   Pertinent results were ultrasound demonstrating acute left distal femoral DVT, residual right DVT. Discussed findings with patient. Patient has no complaints of shortness of breath, chest pain. Vital signs are stable and afebrile. Patient currently not on blood thinners. Recommend at this point giving first dose of Eliquis 10 mg and will send starter pack for acute DVT. Recommend follow-up with PCP in the next 3 to 5 days for reevaluation and management of DVT. If patient has worsening symptoms of shortness of breath, chest pain may follow-up to ED for reevaluation as soon as possible. Patient was seen independently by myself. The patient's final impression and disposition and plan was determined by myself. Strict return precautions and follow up instructions were discussed with the patient prior to discharge, with which the patient agrees. Physical assessment findings, diagnostic testing(s) if applicable, and vital signs reviewed with patient/patient representative. Questions answered. Medications as directed, including OTC medications for supportive care. Education provided on medications. Differential diagnosis(s) discussed with patient/patient representative. Home care/self care instructions reviewed with patient/patient representative. Patient is to follow-up with family care provider in 3 days if no improvement. Patient is to go to the emergency department if symptoms worsen. Patient/patient representative is aware of care plan, questions answered, verbalizes understanding and is in agreement. CRITICAL CARE:   None    CONSULTS:  None    PROCEDURES:  None    FINAL IMPRESSION      1. Leg DVT (deep venous thromboembolism), acute, left ([address]    2.  Chronic deep vein thrombosis (DVT) of popliteal vein of right lower extremity [facility])          DISPOSITION/PLAN   Discharge home    PATIENT REFERRED TO:  MD Jerry Desai [address]  [address]  [address] [facility]  [phone]    In 3 days  For re-evaluation and management of dvt    [facility] EMERGENCY DEPT  [address]  [address] [place] [address]  [phone]  In 2 days  If symptoms worsen    DISCHARGEMEDICATIONS:  New Prescriptions    APIXABAN STARTER PACK (ELIQUIS DVT/PE STARTER PACK) 5 MG TBPK TABLET    Take 1 tablet by mouth See Admin Instructions            (Please note that portions of this note were completed with a voice recognition program.  Efforts were made to edit the dictations but occasionallywords are mis-transcribed.)      VICKIE Felder(electronically signed)  Physician Associate, Emergency Department        VICKIE Felder  09/06/22 4712

## 2022-09-06 NOTE — ED TRIAGE NOTES
Patient presents to the ED with complaints of left knee pain that has been ongoing for the last three weeks. Patient is unsure if he injured it or not but there has been increase in swelling.

## 2022-09-06 NOTE — DISCHARGE INSTRUCTIONS
Take Eliquis as instructed. Start tomorrow morning. Follow up with PCP in the next 3 days for re-evaluation. If worsening chest pain, shortness of breath follow up to ED for further evaluation.

## 2022-09-20 LAB
ANION GAP SERPL CALCULATED.3IONS-SCNC: 13 MEQ/L (ref 7–16)
BUN BLDV-MCNC: 17 MG/DL (ref 8–23)
CALCIUM SERPL-MCNC: 9.4 MG/DL (ref 8.5–10.5)
CHLORIDE BLD-SCNC: 106 MEQ/L (ref 95–107)
CO2: 23 MEQ/L (ref 19–31)
CREAT SERPL-MCNC: 1.44 MG/DL (ref 0.8–1.4)
EGFR IF NONAFRICAN AMERICAN: 48 ML/MIN/1.73
GLUCOSE: 105 MG/DL (ref 70–99)
POTASSIUM SERPL-SCNC: 4.1 MEQ/L (ref 3.5–5.4)
SODIUM BLD-SCNC: 142 MEQ/L (ref 133–146)

## 2022-09-21 ENCOUNTER — OFFICE VISIT (OUTPATIENT)
Dept: NEPHROLOGY | Age: 87
End: 2022-09-21
Payer: MEDICARE

## 2022-09-21 VITALS
BODY MASS INDEX: 33.97 KG/M2 | WEIGHT: 230 LBS | HEART RATE: 69 BPM | DIASTOLIC BLOOD PRESSURE: 75 MMHG | OXYGEN SATURATION: 97 % | SYSTOLIC BLOOD PRESSURE: 127 MMHG

## 2022-09-21 DIAGNOSIS — N18.31 STAGE 3A CHRONIC KIDNEY DISEASE (HCC): Primary | ICD-10-CM

## 2022-09-21 DIAGNOSIS — I10 HTN (HYPERTENSION), BENIGN: ICD-10-CM

## 2022-09-21 DIAGNOSIS — E87.79 OTHER FLUID OVERLOAD: ICD-10-CM

## 2022-09-21 PROCEDURE — 99213 OFFICE O/P EST LOW 20 MIN: CPT | Performed by: INTERNAL MEDICINE

## 2022-09-21 PROCEDURE — G8428 CUR MEDS NOT DOCUMENT: HCPCS | Performed by: INTERNAL MEDICINE

## 2022-09-21 PROCEDURE — 1123F ACP DISCUSS/DSCN MKR DOCD: CPT | Performed by: INTERNAL MEDICINE

## 2022-09-21 PROCEDURE — 1036F TOBACCO NON-USER: CPT | Performed by: INTERNAL MEDICINE

## 2022-09-21 PROCEDURE — G8417 CALC BMI ABV UP PARAM F/U: HCPCS | Performed by: INTERNAL MEDICINE

## 2022-09-21 RX ORDER — APIXABAN 5 MG/1
5 TABLET, FILM COATED ORAL 2 TIMES DAILY
COMMUNITY
Start: 2022-09-06

## 2022-09-21 NOTE — PROGRESS NOTES
[facility]        Outpatient Follow-Up note         9/21/2022 1:10 PM    Patient Name:   Mallorie Smith  YOB: 1935  Primary Care Physician:  Manda Harada, MD     Chief Complaint / Reason for follow-up : Follow Up of CKD     Interval History :  Patient seen and examined by me. Feels well. No chest pain shortness of breath .   Was sent to ER for DVT in legs and he was started on anticoagulation     Past History :  Past Medical History:   Diagnosis Date    Acute on chronic systolic CHF (congestive heart failure), NYHA class 4 ([address] 08/11/2022    CAD (coronary artery disease)     Status post coronary artery stenting    Chronic kidney disease, stage III (moderate) ([facility])     Closed fracture of six ribs of left side 11/27/2020    COPD (chronic obstructive pulmonary disease) ([address] 12/12/2020    Patient denies 8/15/2022    DVT, lower extremity ([address]     Gastritis     Hematuria     Hypercholesteremia     Hypertension     Intracranial bleed ([address]     Nasal bone fracture 11/27/2020    Osteoarthritis     Presence of IVC filter     Prostate cancer ([address]     Seed implants    Spinal stenosis, lumbar, s/p laminectomy 06/01/2015    Statin-induced myositis      Past Surgical History:   Procedure Laterality Date    BACK SURGERY  2006    fusion    CARDIAC SURGERY      2 stents    COSMETIC SURGERY      ENDOSCOPY, COLON, DIAGNOSTIC      IVC FILTER INSERTION      NECK SURGERY      OTHER SURGICAL HISTORY  05/29/2015    DECOMPRESSIVE LUMBAR LAMINECTOMY L2-3    ROTATOR CUFF REPAIR Left     TOTAL HIP ARTHROPLASTY Bilateral     TOTAL KNEE ARTHROPLASTY Left         Medications :     Outpatient Medications Marked as Taking for the 9/21/22 encounter (Office Visit) with Aren Wing MD   Medication Sig Dispense Refill    tiotropium (SPIRIVA RESPIMAT) 2.5 MCG/ACT AERS inhaler Inhale 2 puffs into the lungs daily      bumetanide (BUMEX) 2 MG tablet Take 1 tablet by mouth in the morning and 1 tablet before bedtime. 180 tablet 1    metoprolol tartrate (LOPRESSOR) 50 MG tablet Take 50 mg by mouth 2 times daily      pravastatin (PRAVACHOL) 80 MG tablet Take 80 mg by mouth daily      fluticasone-vilanterol (BREO ELLIPTA) 100-25 MCG/INH AEPB inhaler Inhale into the lungs daily      oxybutynin (DITROPAN) 5 MG tablet TAKE 1 TABLET TWICE A  tablet 3    finasteride (PROSCAR) 5 MG tablet TAKE 1 TABLET DAILY 90 tablet 3    tamsulosin (FLOMAX) 0.4 MG capsule Take 1 capsule by mouth daily 90 capsule 3    nitroGLYCERIN (NITROSTAT) 0.4 MG SL tablet Place 1 tablet under the tongue every 5 minutes as needed for Chest pain up to max of 3 total doses.  If no relief after 1 dose, call 911. 25 tablet 3    aspirin 81 MG chewable tablet Take 1 tablet by mouth daily 30 tablet 3    albuterol sulfate  (90 Base) MCG/ACT inhaler Inhale 1 puff into the lungs every 4 hours as needed for Wheezing      Cholecalciferol (VITAMIN D3) 5000 UNITS TABS Take 5,000 Units by mouth daily          Vitals     /75 (Site: Left Upper Arm, Position: Sitting, Cuff Size: Large Adult)   Pulse 69   Wt 230 lb (104.3 kg)   SpO2 97%   BMI 33.97 kg/m²  Wt Readings from Last 3 Encounters:   09/21/22 230 lb (104.3 kg)   09/06/22 220 lb (99.8 kg)   08/18/22 237 lb 3.2 oz (107.6 kg)        Physical Exam     General -- no distress  Lungs -- clear  Heart -- S1, S2 heard, JVD - no  Abdomen - soft, non-tender  Extremities -- edema noted B/L, better  CNS - awake and alert    Labs, Radiology and Tests    Labs -    4/16 4/17 4/18 4/19 7/20 7/21 6/22 8/22 9/22    Potassium 4.2 4.3 4.8 4.0 4.2 5.0 4.3 4.6 4.1    BUN 21 22 22 22 23 21 16 21 17    Creatinine 1.3 1.2 1.2 1.35 1.32 1.55 1.25 1.47 1.44    eGFR  52 49 45 52 52 52 46 48                 UPCR  250 290 100 90 270 406      UMCR  38 37 15 3.9                       Renal Ultrasound Scan -- 3/2016  Right kidney 12 cm left kidney 12.3 cm   Bilateral hydronephrosis and left hydroureter nephrosis Increase in resistive indices consistent with medical renal disease   2.9 cm cyst on the right kidney      Assessment    Chronic kidney disease stage III - chronic kidney disease secondary to contrast-induced nephropathy and long-standing hypertension and senile nephrosclerosis. - Patient's GFR is overall stable at this time however patient does bilateral worsening lower extremity edema   -on bumex and getting better  Essential hypertension-running well continue current meds  Extensive DVT in the past on eliquis  BPH/prostrate cancer-stable, follows up with urology. Cyst on the right kidney. Fluid overload -worsened plan as mentioned above  FU in 6 months    Tests and orders placed this Encounter     No orders of the defined types were placed in this encounter. Lonnie France M.D  [facility].

## 2022-09-22 ENCOUNTER — PROCEDURE VISIT (OUTPATIENT)
Dept: CARDIOLOGY CLINIC | Age: 87
End: 2022-09-22
Payer: MEDICARE

## 2022-09-22 DIAGNOSIS — R55 SYNCOPE, UNSPECIFIED SYNCOPE TYPE: Primary | ICD-10-CM

## 2022-09-22 PROCEDURE — G2066 INTER DEVC REMOTE 30D: HCPCS | Performed by: INTERNAL MEDICINE

## 2022-09-22 PROCEDURE — 93298 REM INTERROG DEV EVAL SCRMS: CPT | Performed by: INTERNAL MEDICINE

## 2022-09-26 ENCOUNTER — OFFICE VISIT (OUTPATIENT)
Dept: CARDIOLOGY CLINIC | Age: 87
End: 2022-09-26
Payer: MEDICARE

## 2022-09-26 VITALS
DIASTOLIC BLOOD PRESSURE: 78 MMHG | HEART RATE: 69 BPM | BODY MASS INDEX: 34.07 KG/M2 | HEIGHT: 69 IN | SYSTOLIC BLOOD PRESSURE: 132 MMHG | WEIGHT: 230 LBS | RESPIRATION RATE: 18 BRPM

## 2022-09-26 DIAGNOSIS — E78.5 DYSLIPIDEMIA (HIGH LDL; LOW HDL): ICD-10-CM

## 2022-09-26 DIAGNOSIS — I50.23 ACUTE ON CHRONIC SYSTOLIC CHF (CONGESTIVE HEART FAILURE), NYHA CLASS 4 (HCC): Primary | ICD-10-CM

## 2022-09-26 DIAGNOSIS — I48.0 PAROXYSMAL ATRIAL FIBRILLATION (HCC): ICD-10-CM

## 2022-09-26 PROCEDURE — 1123F ACP DISCUSS/DSCN MKR DOCD: CPT | Performed by: INTERNAL MEDICINE

## 2022-09-26 PROCEDURE — 1036F TOBACCO NON-USER: CPT | Performed by: INTERNAL MEDICINE

## 2022-09-26 PROCEDURE — G8417 CALC BMI ABV UP PARAM F/U: HCPCS | Performed by: INTERNAL MEDICINE

## 2022-09-26 PROCEDURE — 93000 ELECTROCARDIOGRAM COMPLETE: CPT | Performed by: INTERNAL MEDICINE

## 2022-09-26 PROCEDURE — 99213 OFFICE O/P EST LOW 20 MIN: CPT | Performed by: INTERNAL MEDICINE

## 2022-09-26 PROCEDURE — G8427 DOCREV CUR MEDS BY ELIG CLIN: HCPCS | Performed by: INTERNAL MEDICINE

## 2022-09-26 RX ORDER — METOPROLOL TARTRATE 50 MG/1
50 TABLET, FILM COATED ORAL 2 TIMES DAILY
Qty: 180 TABLET | Refills: 3 | Status: SHIPPED | OUTPATIENT
Start: 2022-09-26

## 2022-09-26 RX ORDER — FINASTERIDE 5 MG/1
TABLET, FILM COATED ORAL
Qty: 90 TABLET | Refills: 3 | Status: CANCELLED | OUTPATIENT
Start: 2022-09-26

## 2022-09-26 ASSESSMENT — ENCOUNTER SYMPTOMS
NAUSEA: 0
SHORTNESS OF BREATH: 0
STRIDOR: 0
APNEA: 0
VOMITING: 0
CHEST TIGHTNESS: 0
WHEEZING: 0
ANAL BLEEDING: 0
COUGH: 0
CHOKING: 0
BLOOD IN STOOL: 0
ABDOMINAL DISTENTION: 0
VOICE CHANGE: 0
ABDOMINAL PAIN: 0
COLOR CHANGE: 0
TROUBLE SWALLOWING: 0

## 2022-09-26 NOTE — PROGRESS NOTES
[address] [facility]  Dept: [address]: [phone]     9/26/2022       Clarissa Epley is here today for   Chief Complaint   Patient presents with    Follow-up           Referring Physician:  No ref. provider found     Patient Active Problem List   Diagnosis    CA prostate, adenoca ([address]    Chronic kidney disease, stage III (moderate) (HCC)    Hypercholesteremia    Osteoarthritis    CAD (coronary artery disease)    H/O class III angina pectoris    Gastritis    Hypertension, essential, benign    HLD (hyperlipidemia)    Spinal stenosis, lumbar, s/p laminectomy    S/P laminectomy    Benign essential HTN    Syncope    ALDA (acute kidney injury) ([address]    DVT, lower extremity ([facility])    Fluid overload    Benign prostatic hyperplasia with urinary obstruction    PVD (peripheral vascular disease) ([facility])    DVT (deep venous thrombosis) ([facility])    Presence of IVC filter    Gross hematuria    Hematuria    History of DVT (deep vein thrombosis)    Benign non-nodular prostatic hyperplasia with lower urinary tract symptoms    History of prostate cancer    H/O prostate cancer    Proteinuria    Encephalopathy    Altered mental status    Intraventricular hemorrhage ([address]    Fracture of left orbital floor ([address]    Nasal bone fracture    Closed fracture of six ribs of left side    Closed fracture of one rib of left side    Intracranial bleed (HCC)    Postural hypotension    COPD (chronic obstructive pulmonary disease) (HCC)    Acute on chronic systolic CHF (congestive heart failure), NYHA class 4 ([facility])    Anasarca       Review of Systems   Constitutional:  Negative for activity change, appetite change, fatigue, fever and unexpected weight change. HENT:  Negative for congestion, trouble swallowing and voice change. Eyes:  Negative for visual disturbance.    Respiratory:  Negative for apnea, cough, choking, chest tightness, shortness of breath, wheezing and stridor. Cardiovascular:  Negative for chest pain, palpitations and leg swelling. Gastrointestinal:  Negative for abdominal distention, abdominal pain, anal bleeding, blood in stool, nausea and vomiting. Endocrine: Negative for cold intolerance and heat intolerance. Genitourinary:  Negative for hematuria. Musculoskeletal:  Negative for arthralgias, gait problem, joint swelling and myalgias. Skin:  Negative for color change and rash. Allergic/Immunologic: Negative for environmental allergies and food allergies. Neurological:  Negative for dizziness, tremors, syncope, facial asymmetry, weakness, light-headedness, numbness and headaches. Hematological:  Does not bruise/bleed easily. Psychiatric/Behavioral:  Negative for agitation, behavioral problems and sleep disturbance.        Past Medical History:   Diagnosis Date    Acute on chronic systolic CHF (congestive heart failure), NYHA class 4 ([facility]) 08/11/2022    CAD (coronary artery disease)     Status post coronary artery stenting    Chronic kidney disease, stage III (moderate) ([facility])     Closed fracture of six ribs of left side 11/27/2020    COPD (chronic obstructive pulmonary disease) ([facility] 12/12/2020    Patient denies 8/15/2022    DVT, lower extremity ([address]     Gastritis     Hematuria     Hypercholesteremia     Hypertension     Intracranial bleed ([facility])     Nasal bone fracture 11/27/2020    Osteoarthritis     Presence of IVC filter     Prostate cancer ([address]     Seed implants    Spinal stenosis, lumbar, s/p laminectomy 06/01/2015    Statin-induced myositis        Allergies   Allergen Reactions    Losartan Swelling    Rivaroxaban Other (See Comments)     hematuria       Current Outpatient Medications   Medication Sig Dispense Refill    metoprolol tartrate (LOPRESSOR) 50 MG tablet Take 1 tablet by mouth 2 times daily 180 tablet 3    apixaban (ELIQUIS) 5 MG TABS tablet Take 1 tablet by mouth 2 times daily 180 Blood Pressure Mother     Prostate Cancer Neg Hx     Cancer Neg Hx     Diabetes Neg Hx     Kidney Disease Neg Hx     Stroke Neg Hx        Blood pressure 132/78, pulse 69, resp. rate 18, height 5' 9\" (1.753 m), weight 230 lb (104.3 kg). Physical Exam:    General Appearance: alert and oriented to person, place and time, in no acute distress  Cardiovascular: normal rate, regular rhythm, normal S1 and S2, no murmurs, rubs, clicks, or gallops, distal pulses intact, no carotid bruits, no JVD  Pulmonary/Chest: clear to auscultation bilaterally- no wheezes, rales or rhonchi, normal air movement, no respiratory distress  Abdomen: soft, non-tender, non-distended, normal bowel sounds, no masses   Extremities: no cyanosis, clubbing or edema, pulse   Skin: warm and dry, no rash or erythema  Head: normocephalic and atraumatic  Eyes: pupils equal, round, and reactive to light  Neck: supple and non-tender without mass, no thyromegaly   Musculoskeletal: normal range of motion, no joint swelling, deformity or tenderness  Neurological: alert, oriented, normal speech, no focal findings or movement disorder noted    Lab Data:    Cardiac Enzymes:  No results for input(s): CKTOTAL, CKMB, CKMBINDEX, TROPONINI in the last 72 hours.     CBC:   Lab Results   Component Value Date/Time    WBC 6.8 08/18/2022 05:51 AM    RBC 4.88 08/18/2022 05:51 AM    RBC 77 07/06/2021 01:15 PM    HGB 14.7 08/18/2022 05:51 AM    HCT 48.0 08/18/2022 05:51 AM     08/18/2022 05:51 AM       CMP:    Lab Results   Component Value Date/Time     09/19/2022 09:55 AM    K 4.1 09/19/2022 09:55 AM    K 4.1 08/18/2022 05:51 AM     09/19/2022 09:55 AM    CO2 23 09/19/2022 09:55 AM    BUN 17 09/19/2022 09:55 AM    CREATININE 1.44 09/19/2022 09:55 AM    LABGLOM 54 08/18/2022 05:51 AM    GLUCOSE 105 09/19/2022 09:55 AM    CALCIUM 9.4 09/19/2022 09:55 AM       Hepatic Function Panel:    Lab Results   Component Value Date/Time    ALKPHOS 78 08/11/2022 06:22 PM wise he seems to be stable we will continue with the current medication. He will be seen in 6 months with the lab work he was advised to increase his activity as much as he can follow-up with his family physician for his other medical problems the lab work the my thoughts about his management and the EKG findings and the findings on the event monitor were all shared with him the event monitor showed episodes of paroxysmal atrial flutter. He will be seen in 6 months. End of dictation    Orders Placed This Encounter   Procedures    CBC     Standing Status:   Future     Standing Expiration Date:   3/28/0212    Basic Metabolic Panel     Standing Status:   Future     Standing Expiration Date:   9/26/2023    Lipid Panel     Standing Status:   Future     Standing Expiration Date:   9/26/2023     Order Specific Question:   Is Patient Fasting?/# of Hours     Answer:   12 hours    Hepatic Function Panel     Standing Status:   Future     Standing Expiration Date:   9/26/2023    48130 - MT ELECTROCARDIOGRAM, COMPLETE       Return for cad.      Sherwin Nunez MD

## 2022-10-07 RX ORDER — FINASTERIDE 5 MG/1
TABLET, FILM COATED ORAL
Qty: 90 TABLET | Refills: 3 | Status: SHIPPED | OUTPATIENT
Start: 2022-10-07

## 2022-10-24 PROCEDURE — G2066 INTER DEVC REMOTE 30D: HCPCS | Performed by: INTERNAL MEDICINE

## 2022-10-24 PROCEDURE — 93298 REM INTERROG DEV EVAL SCRMS: CPT | Performed by: INTERNAL MEDICINE

## 2022-10-27 ENCOUNTER — PROCEDURE VISIT (OUTPATIENT)
Dept: CARDIOLOGY CLINIC | Age: 87
End: 2022-10-27
Payer: MEDICARE

## 2022-10-27 DIAGNOSIS — R55 SYNCOPE, UNSPECIFIED SYNCOPE TYPE: Primary | ICD-10-CM

## 2022-10-27 NOTE — PROGRESS NOTES
[facility] medtronic linq     Hx syncope  PVC's     Previous merna and tachy  9/22/23 @ 23:07 merna @ 40bpm  9/23/22 SVT rate 154  On Eliquis, 0 AF burden

## 2022-11-14 ENCOUNTER — TELEPHONE (OUTPATIENT)
Dept: CARDIOLOGY CLINIC | Age: 87
End: 2022-11-14

## 2022-11-14 RX ORDER — OXYBUTYNIN CHLORIDE 5 MG/1
TABLET ORAL
Qty: 180 TABLET | Refills: 3 | Status: CANCELLED | OUTPATIENT
Start: 2022-11-14

## 2022-11-14 RX ORDER — OXYBUTYNIN CHLORIDE 5 MG/1
5 TABLET ORAL 2 TIMES DAILY
Qty: 180 TABLET | Refills: 3 | Status: SHIPPED | OUTPATIENT
Start: 2022-11-14

## 2022-11-14 RX ORDER — TAMSULOSIN HYDROCHLORIDE 0.4 MG/1
0.4 CAPSULE ORAL DAILY
Qty: 90 CAPSULE | Refills: 3 | Status: SHIPPED | OUTPATIENT
Start: 2022-11-14

## 2022-11-14 NOTE — TELEPHONE ENCOUNTER
Rubina Barajas  Pt of Olga Ortiz:     Has had history of tachy/merna episodes  Afib - taking eliquis    11/5/22 at 1035: 8 seconds tachy - rates 214  11/13/22 at 2343: 26 seconds merna minimum rates 39    Pt is scheduled to come back to your office 6/28/23

## 2022-11-14 NOTE — TELEPHONE ENCOUNTER
Patient would like a refill for flomax and ditropan to [facility].  Next office visit 07/19/2023

## 2022-11-14 NOTE — TELEPHONE ENCOUNTER
Per patients wife the patient had not stopped his finasteride but he will stop taking it and she was informed that the flomax was sent to [facility]     She voiced understanding

## 2022-11-14 NOTE — TELEPHONE ENCOUNTER
Done.     I di see that the patient's finasteride was refilled in October. Per Dr. Shiloh Arredondo notes from July, the patient was to discontinue this medication. Please make the patient aware.

## 2022-12-07 ENCOUNTER — PROCEDURE VISIT (OUTPATIENT)
Dept: CARDIOLOGY CLINIC | Age: 87
End: 2022-12-07
Payer: MEDICARE

## 2022-12-07 ENCOUNTER — TELEPHONE (OUTPATIENT)
Dept: CARDIOLOGY CLINIC | Age: 87
End: 2022-12-07

## 2022-12-07 DIAGNOSIS — R55 SYNCOPE, UNSPECIFIED SYNCOPE TYPE: Primary | ICD-10-CM

## 2022-12-07 PROCEDURE — G2066 INTER DEVC REMOTE 30D: HCPCS | Performed by: INTERNAL MEDICINE

## 2022-12-07 PROCEDURE — 93298 REM INTERROG DEV EVAL SCRMS: CPT | Performed by: INTERNAL MEDICINE

## 2022-12-07 NOTE — PROGRESS NOTES
Dr Mckeon Fat pt   [employer] carelink linq remote  Battery ok merna episodes marked    Sinus tach episode marked     Tachy episode looks like it could be vt at a rate of bpm on 11/05/22 at 10:35 am for :08 seconds       Was having some pvcs prior

## 2022-12-21 ENCOUNTER — TELEPHONE (OUTPATIENT)
Dept: CARDIOLOGY CLINIC | Age: 87
End: 2022-12-21

## 2022-12-21 NOTE — TELEPHONE ENCOUNTER
12/20/22- 4 min 21 seconds tachy - ?RVR         Call to Marcelina Frausto. Dr Shawnee Garcia aware of afib. He is medicated appropriately already.

## 2023-01-11 ENCOUNTER — PROCEDURE VISIT (OUTPATIENT)
Dept: CARDIOLOGY CLINIC | Age: 88
End: 2023-01-11
Payer: MEDICARE

## 2023-01-11 DIAGNOSIS — R55 SYNCOPE, UNSPECIFIED SYNCOPE TYPE: Primary | ICD-10-CM

## 2023-01-11 PROCEDURE — G2066 INTER DEVC REMOTE 30D: HCPCS | Performed by: INTERNAL MEDICINE

## 2023-01-11 PROCEDURE — 93298 REM INTERROG DEV EVAL SCRMS: CPT | Performed by: INTERNAL MEDICINE

## 2023-01-11 NOTE — PROGRESS NOTES
[facility] [employer] Lin[facility]   Patient of [facility]    History of syncope    Battery okay    Episodes:  Tachy rates 150s-222  -- lasting 6 seconds to 5 in 16 seconds  Paco rates 39-41 lasting 8 seconds    Strips in media tab

## 2023-02-15 ENCOUNTER — PROCEDURE VISIT (OUTPATIENT)
Dept: CARDIOLOGY CLINIC | Age: 88
End: 2023-02-15
Payer: MEDICARE

## 2023-02-15 DIAGNOSIS — R55 SYNCOPE, UNSPECIFIED SYNCOPE TYPE: Primary | ICD-10-CM

## 2023-02-15 PROCEDURE — 93298 REM INTERROG DEV EVAL SCRMS: CPT | Performed by: INTERNAL MEDICINE

## 2023-02-15 PROCEDURE — G2066 INTER DEVC REMOTE 30D: HCPCS | Performed by: INTERNAL MEDICINE

## 2023-02-15 NOTE — PROGRESS NOTES
[facility]   Patient of [facility] MARIEE    History of syncope    Battery okay     Episodes:  Tachy rates 150s - 15-31 seconds  Paco rates 39-43- 10-11 seconds    Strips in media

## 2023-03-22 ENCOUNTER — PROCEDURE VISIT (OUTPATIENT)
Dept: CARDIOLOGY CLINIC | Age: 88
End: 2023-03-22
Payer: MEDICARE

## 2023-03-22 DIAGNOSIS — R55 SYNCOPE, UNSPECIFIED SYNCOPE TYPE: Primary | ICD-10-CM

## 2023-03-22 LAB
ABSOLUTE BASO #: 0.03 K/UL (ref 0–0.2)
ABSOLUTE EOS #: 0.1 K/UL (ref 0–0.5)
ABSOLUTE LYMPH #: 0.79 K/UL (ref 1–4)
ABSOLUTE MONO #: 0.42 K/UL (ref 0.2–1)
ABSOLUTE NEUT #: 3.01 K/UL (ref 1.5–7.5)
BASOPHILS RELATIVE PERCENT: 0.7 %
EOSINOPHILS RELATIVE PERCENT: 2.3 %
HCT VFR BLD CALC: 49.4 % (ref 40–51)
HEMOGLOBIN: 16.4 G/DL (ref 13.5–17)
LYMPHOCYTE %: 18.1 %
MCH RBC QN AUTO: 30.2 PG (ref 25–33)
MCHC RBC AUTO-ENTMCNC: 33.2 G/DL (ref 31–36)
MCV RBC AUTO: 91 FL (ref 80–99)
MONOCYTES # BLD: 9.6 %
NEUTROPHILS RELATIVE PERCENT: 69.1 %
PDW BLD-RTO: 12.2 % (ref 11.5–15)
PLATELETS: 174 K/UL (ref 130–400)
PMV BLD AUTO: 10.6 FL (ref 9.3–13)
RBC: 5.43 M/UL (ref 4.5–6.1)
WBC: 4.4 K/UL (ref 3.5–11)

## 2023-03-22 PROCEDURE — 93298 REM INTERROG DEV EVAL SCRMS: CPT | Performed by: INTERNAL MEDICINE

## 2023-03-22 PROCEDURE — G2066 INTER DEVC REMOTE 30D: HCPCS | Performed by: INTERNAL MEDICINE

## 2023-03-22 NOTE — PROGRESS NOTES
Dr Duc Benavides pt     [employer] [facility] linq remote   Battery ok        24 merna episodes   All look like they are in the middle of the night except for on in Feb @ 15:48 am

## 2023-03-23 LAB
ANION GAP SERPL CALCULATED.3IONS-SCNC: 10 MEQ/L (ref 7–16)
BUN BLDV-MCNC: 19 MG/DL (ref 8–23)
CALCIUM SERPL-MCNC: 9.9 MG/DL (ref 8.5–10.5)
CHLORIDE BLD-SCNC: 106 MEQ/L (ref 95–107)
CO2: 27 MEQ/L (ref 19–31)
CREAT SERPL-MCNC: 1.27 MG/DL (ref 0.8–1.4)
EGFR IF NONAFRICAN AMERICAN: 55 ML/MIN/1.73
GLUCOSE: 104 MG/DL (ref 70–99)
POTASSIUM SERPL-SCNC: 4.8 MEQ/L (ref 3.5–5.4)
SODIUM BLD-SCNC: 143 MEQ/L (ref 133–146)

## 2023-03-28 ENCOUNTER — OFFICE VISIT (OUTPATIENT)
Dept: NEPHROLOGY | Age: 88
End: 2023-03-28
Payer: MEDICARE

## 2023-03-28 VITALS
WEIGHT: 230 LBS | SYSTOLIC BLOOD PRESSURE: 132 MMHG | HEART RATE: 84 BPM | BODY MASS INDEX: 33.97 KG/M2 | OXYGEN SATURATION: 98 % | DIASTOLIC BLOOD PRESSURE: 70 MMHG

## 2023-03-28 DIAGNOSIS — I10 HTN (HYPERTENSION), BENIGN: ICD-10-CM

## 2023-03-28 DIAGNOSIS — N18.31 STAGE 3A CHRONIC KIDNEY DISEASE (HCC): Primary | ICD-10-CM

## 2023-03-28 DIAGNOSIS — E87.79 OTHER FLUID OVERLOAD: ICD-10-CM

## 2023-03-28 PROCEDURE — 1123F ACP DISCUSS/DSCN MKR DOCD: CPT | Performed by: INTERNAL MEDICINE

## 2023-03-28 PROCEDURE — G8417 CALC BMI ABV UP PARAM F/U: HCPCS | Performed by: INTERNAL MEDICINE

## 2023-03-28 PROCEDURE — G8427 DOCREV CUR MEDS BY ELIG CLIN: HCPCS | Performed by: INTERNAL MEDICINE

## 2023-03-28 PROCEDURE — 1036F TOBACCO NON-USER: CPT | Performed by: INTERNAL MEDICINE

## 2023-03-28 PROCEDURE — 99213 OFFICE O/P EST LOW 20 MIN: CPT | Performed by: INTERNAL MEDICINE

## 2023-03-28 PROCEDURE — G8484 FLU IMMUNIZE NO ADMIN: HCPCS | Performed by: INTERNAL MEDICINE

## 2023-03-28 NOTE — PROGRESS NOTES
Renal Ultrasound Scan -- 3/2016  Right kidney 12 cm left kidney 12.3 cm   Bilateral hydronephrosis and left hydroureter nephrosis   Increase in resistive indices consistent with medical renal disease   2.9 cm cyst on the right kidney      Assessment    Chronic kidney disease stage III - chronic kidney disease secondary to contrast-induced nephropathy and long-standing hypertension and senile nephrosclerosis. - Patient's GFR is overall stable at this time however patient does bilateral worsening lower extremity edema   -on bumex and getting better  Essential hypertension-running well continue current meds  Extensive DVTon eliquis  BPH/prostrate cancer-stable, follows up with urology. Cyst on the right kidney. Fluid overload -worsened plan as mentioned above  FU in 12 months    Tests and orders placed this Encounter     Orders Placed This Encounter   Procedures    Urinalysis with Microscopic    Protein / creatinine ratio, urine    Microalbumin / Creatinine Urine Ratio    Comprehensive Metabolic Panel           Malvin Schwab, M.D  [facility].

## 2023-04-26 ENCOUNTER — PROCEDURE VISIT (OUTPATIENT)
Dept: CARDIOLOGY CLINIC | Age: 88
End: 2023-04-26

## 2023-04-26 DIAGNOSIS — R55 SYNCOPE, UNSPECIFIED SYNCOPE TYPE: Primary | ICD-10-CM

## 2023-04-26 NOTE — PROGRESS NOTES
Dr Mayo Desai pt   [employer] carelink linq remote   Battery ok  20 merna episodes   3 tachy episodes

## 2023-05-31 ENCOUNTER — PROCEDURE VISIT (OUTPATIENT)
Dept: CARDIOLOGY CLINIC | Age: 88
End: 2023-05-31
Payer: MEDICARE

## 2023-05-31 DIAGNOSIS — R55 SYNCOPE, UNSPECIFIED SYNCOPE TYPE: Primary | ICD-10-CM

## 2023-05-31 PROCEDURE — G2066 INTER DEVC REMOTE 30D: HCPCS | Performed by: INTERNAL MEDICINE

## 2023-05-31 PROCEDURE — 93298 REM INTERROG DEV EVAL SCRMS: CPT | Performed by: INTERNAL MEDICINE

## 2023-05-31 NOTE — PROGRESS NOTES
[facility]   Patient of [facility]    History of syncope/tachy/merna    Battery okay    Episodes:  Khadra Bake rates 36-40  Tachy- 2 minutes 17 seconds rate 167

## 2023-06-01 ENCOUNTER — TELEPHONE (OUTPATIENT)
Dept: CARDIOLOGY CLINIC | Age: 88
End: 2023-06-01

## 2023-06-01 NOTE — TELEPHONE ENCOUNTER
[facility]   Pt of [facility]    Full summary report done 5/31/23.  Continues tachy merna    5/31/23 1017: 8 min 13 seconds RVR rates 160-170s

## 2023-06-07 NOTE — TELEPHONE ENCOUNTER
Josseline Mobley called requesting a refill on the following medications:  Requested Prescriptions     Pending Prescriptions Disp Refills    finasteride (PROSCAR) 5 MG tablet [Pharmacy Med Name: FINASTERIDE TABS 5MG] 90 tablet 3     Sig: TAKE 1 TABLET DAILY     Pharmacy verified:  [email or web address]      Date of last visit: 07/06/2022  Date of next visit (if applicable): 80/00/9880 She was highly confused, agitated and manic in the ED  She had to get Ativan, benadryl and Haldol to finally calm down  TelePsych consulted due to decompensated psychiatric illness   - recommend continuing risperidone 2 mg QHS as long as QTc is <500   - PEC  - inpatient hospitalization at psychiatric facility recommended    QTc <500, will administer Risperdal as long as QTc stays in appropriate range

## 2023-06-28 ENCOUNTER — OFFICE VISIT (OUTPATIENT)
Dept: CARDIOLOGY CLINIC | Age: 88
End: 2023-06-28
Payer: MEDICARE

## 2023-06-28 VITALS
HEIGHT: 69 IN | DIASTOLIC BLOOD PRESSURE: 91 MMHG | HEART RATE: 67 BPM | WEIGHT: 230 LBS | RESPIRATION RATE: 18 BRPM | SYSTOLIC BLOOD PRESSURE: 151 MMHG | BODY MASS INDEX: 34.07 KG/M2

## 2023-06-28 DIAGNOSIS — I10 HYPERTENSION, ESSENTIAL, BENIGN: Primary | ICD-10-CM

## 2023-06-28 DIAGNOSIS — E78.5 DYSLIPIDEMIA (HIGH LDL; LOW HDL): ICD-10-CM

## 2023-06-28 PROCEDURE — G8417 CALC BMI ABV UP PARAM F/U: HCPCS | Performed by: INTERNAL MEDICINE

## 2023-06-28 PROCEDURE — 1123F ACP DISCUSS/DSCN MKR DOCD: CPT | Performed by: INTERNAL MEDICINE

## 2023-06-28 PROCEDURE — 1036F TOBACCO NON-USER: CPT | Performed by: INTERNAL MEDICINE

## 2023-06-28 PROCEDURE — 99214 OFFICE O/P EST MOD 30 MIN: CPT | Performed by: INTERNAL MEDICINE

## 2023-06-28 PROCEDURE — G8427 DOCREV CUR MEDS BY ELIG CLIN: HCPCS | Performed by: INTERNAL MEDICINE

## 2023-06-28 PROCEDURE — 93000 ELECTROCARDIOGRAM COMPLETE: CPT | Performed by: INTERNAL MEDICINE

## 2023-06-28 RX ORDER — METOPROLOL TARTRATE 50 MG/1
50 TABLET, FILM COATED ORAL 2 TIMES DAILY
Qty: 180 TABLET | Refills: 3 | Status: SHIPPED | OUTPATIENT
Start: 2023-06-28

## 2023-06-28 RX ORDER — PRAVASTATIN SODIUM 80 MG/1
80 TABLET ORAL DAILY
Qty: 90 TABLET | Refills: 3 | Status: SHIPPED | OUTPATIENT
Start: 2023-06-28

## 2023-06-28 RX ORDER — CLONIDINE HYDROCHLORIDE 0.1 MG/1
0.1 TABLET ORAL 2 TIMES DAILY
Qty: 180 TABLET | Refills: 3 | Status: SHIPPED | OUTPATIENT
Start: 2023-06-28

## 2023-06-28 ASSESSMENT — ENCOUNTER SYMPTOMS
ABDOMINAL PAIN: 0
BLOOD IN STOOL: 0
APNEA: 0
WHEEZING: 0
ANAL BLEEDING: 0
VOMITING: 0
CHEST TIGHTNESS: 0
NAUSEA: 0
STRIDOR: 0
COLOR CHANGE: 0
VOICE CHANGE: 0
BACK PAIN: 1
ABDOMINAL DISTENTION: 0
CHOKING: 0
COUGH: 0
TROUBLE SWALLOWING: 0
SHORTNESS OF BREATH: 1

## 2023-07-04 LAB — PSA, ULTRASENSITIVE: 1.89 NG/ML

## 2023-07-05 ENCOUNTER — TELEPHONE (OUTPATIENT)
Dept: CARDIOLOGY CLINIC | Age: 88
End: 2023-07-05

## 2023-07-05 NOTE — TELEPHONE ENCOUNTER
DR Nahid Cardenas PT   [employer] CAR[facility]INK LINQ TACHY EPISODE  BATTERY OK    1 TACHY EPISODE ON 7/23/23 AT A RATE

## 2023-07-12 PROCEDURE — 93298 REM INTERROG DEV EVAL SCRMS: CPT | Performed by: INTERNAL MEDICINE

## 2023-07-12 PROCEDURE — G2066 INTER DEVC REMOTE 30D: HCPCS | Performed by: INTERNAL MEDICINE

## 2023-07-13 ENCOUNTER — PROCEDURE VISIT (OUTPATIENT)
Dept: CARDIOLOGY CLINIC | Age: 88
End: 2023-07-13
Payer: MEDICARE

## 2023-07-13 DIAGNOSIS — R55 SYNCOPE, UNSPECIFIED SYNCOPE TYPE: Primary | ICD-10-CM

## 2023-07-19 ENCOUNTER — OFFICE VISIT (OUTPATIENT)
Dept: UROLOGY | Age: 88
End: 2023-07-19
Payer: MEDICARE

## 2023-07-19 VITALS — HEIGHT: 69 IN | BODY MASS INDEX: 34.07 KG/M2 | RESPIRATION RATE: 16 BRPM | WEIGHT: 230 LBS

## 2023-07-19 DIAGNOSIS — N32.81 OAB (OVERACTIVE BLADDER): ICD-10-CM

## 2023-07-19 DIAGNOSIS — C61 PROSTATE CANCER (HCC): Primary | ICD-10-CM

## 2023-07-19 DIAGNOSIS — N13.8 BPH WITH OBSTRUCTION/LOWER URINARY TRACT SYMPTOMS: ICD-10-CM

## 2023-07-19 DIAGNOSIS — N40.1 BPH WITH OBSTRUCTION/LOWER URINARY TRACT SYMPTOMS: ICD-10-CM

## 2023-07-19 PROCEDURE — 99214 OFFICE O/P EST MOD 30 MIN: CPT | Performed by: UROLOGY

## 2023-07-19 PROCEDURE — 1123F ACP DISCUSS/DSCN MKR DOCD: CPT | Performed by: UROLOGY

## 2023-07-19 PROCEDURE — G8427 DOCREV CUR MEDS BY ELIG CLIN: HCPCS | Performed by: UROLOGY

## 2023-07-19 PROCEDURE — 1036F TOBACCO NON-USER: CPT | Performed by: UROLOGY

## 2023-07-19 PROCEDURE — G8417 CALC BMI ABV UP PARAM F/U: HCPCS | Performed by: UROLOGY

## 2023-07-19 RX ORDER — FINASTERIDE 5 MG/1
5 TABLET, FILM COATED ORAL DAILY
Qty: 90 TABLET | Refills: 3 | Status: SHIPPED | OUTPATIENT
Start: 2023-07-19

## 2023-07-19 RX ORDER — TAMSULOSIN HYDROCHLORIDE 0.4 MG/1
0.4 CAPSULE ORAL DAILY
Qty: 90 CAPSULE | Refills: 3 | Status: SHIPPED | OUTPATIENT
Start: 2023-07-19

## 2023-07-19 NOTE — PROGRESS NOTES
Dr. Keyana Vaughn MD  [facility]  Urology Clinic      Patient:  Cl Tobias  YOB: 1935  Date: 7/19/2023    HISTORY OF PRESENT ILLNESS:   The patient is a 81 y.o. male who presents today for follow-up for the following problem(s): prostate cancer candy 7 treated with EBRT in 2006. PSA has been ~0.50 since until 2023 when it jumped to 1.89. Overall the problem(s) : worsening PSA  Associated Symptoms: No dysuria, gross hematuria. Pain Severity: 0/10    Summary of old records:   0.57     07/2021  0.59     06/2020  0.52     04/2019  0.28     01/2013    Imaging/Labs reviewed during today's visit:  I have independently reviewed and verified the following films during today's visit. PSA, see above. 0.57    Last several PSA's:  No results found for: PSA    Last total testosterone:  No results found for: TESTOSTERONE    Urinalysis today:  No results found for this visit on 07/19/23.     Last BUN and creatinine:  Lab Results   Component Value Date    BUN 19 03/22/2023     Lab Results   Component Value Date    CREATININE 1.27 03/22/2023       Imaging Reviewed during this Office Visit:   (results were independently reviewed by physician and radiology report verified)    PAST MEDICAL, FAMILY AND SOCIAL HISTORY UPDATE:  Past Medical History:   Diagnosis Date    Acute on chronic systolic CHF (congestive heart failure), NYHA class 4 ([address]) 08/11/2022    CAD (coronary artery disease)     Status post coronary artery stenting    Chronic kidney disease, stage III (moderate) ([facility])     Closed fracture of six ribs of left side 11/27/2020    COPD (chronic obstructive pulmonary disease) ([address]) 12/12/2020    Patient denies 8/15/2022    DVT, lower extremity ([address])     Gastritis     Hematuria     Hypercholesteremia     Hypertension     Intracranial bleed ([address])     Nasal bone fracture 11/27/2020    Osteoarthritis     Presence of IVC filter     Prostate cancer ([address])     Seed implants    Spinal stenosis, lumbar, s/p

## 2023-08-16 ENCOUNTER — PROCEDURE VISIT (OUTPATIENT)
Dept: CARDIOLOGY CLINIC | Age: 88
End: 2023-08-16

## 2023-08-16 DIAGNOSIS — R55 SYNCOPE, UNSPECIFIED SYNCOPE TYPE: Primary | ICD-10-CM

## 2023-08-16 DIAGNOSIS — I48.0 PAROXYSMAL ATRIAL FIBRILLATION (HCC): ICD-10-CM

## 2023-08-16 NOTE — PROGRESS NOTES
[facility]   Patient of [facility]    History of syncope/ AF - taking eliquis    Battery okay    Episodes:  Paco rates 38-40  Tachy rate 160-170 - longest 6 min  AF

## 2023-09-20 ENCOUNTER — PROCEDURE VISIT (OUTPATIENT)
Dept: CARDIOLOGY CLINIC | Age: 88
End: 2023-09-20
Payer: MEDICARE

## 2023-09-20 DIAGNOSIS — R55 SYNCOPE, UNSPECIFIED SYNCOPE TYPE: Primary | ICD-10-CM

## 2023-09-20 PROCEDURE — 93298 REM INTERROG DEV EVAL SCRMS: CPT | Performed by: NUCLEAR MEDICINE

## 2023-09-20 PROCEDURE — G2066 INTER DEVC REMOTE 30D: HCPCS | Performed by: NUCLEAR MEDICINE

## 2023-09-20 NOTE — PROGRESS NOTES
[facility]   Patient of [facility]    Current EGM 9/20/23    History of syncope/ AF- taking eliquis    Battery okay    Episodes:  Known merna rates 38-41  Known tachy - rate 158    Additional episodes may be available per episode list. Note to patient on letter to send manual download to ensure all episodes are reviewed.

## 2023-10-25 ENCOUNTER — PROCEDURE VISIT (OUTPATIENT)
Dept: CARDIOLOGY CLINIC | Age: 88
End: 2023-10-25

## 2023-10-25 DIAGNOSIS — R55 SYNCOPE, UNSPECIFIED SYNCOPE TYPE: Primary | ICD-10-CM

## 2023-10-25 NOTE — PROGRESS NOTES
[facility] [employer] [facility]   Patient of [facility]    Current EGM 10/23/23    History of syncope    Battery okay    Episodes:  Known tachy -9/29/23- 6 min rate 162  Known merna rates 37-40

## 2023-11-15 ENCOUNTER — OFFICE VISIT (OUTPATIENT)
Dept: UROLOGY | Age: 88
End: 2023-11-15
Payer: MEDICARE

## 2023-11-15 VITALS — WEIGHT: 230 LBS | HEIGHT: 69 IN | BODY MASS INDEX: 34.07 KG/M2 | RESPIRATION RATE: 14 BRPM

## 2023-11-15 DIAGNOSIS — N32.81 OAB (OVERACTIVE BLADDER): ICD-10-CM

## 2023-11-15 DIAGNOSIS — N40.1 BPH WITH OBSTRUCTION/LOWER URINARY TRACT SYMPTOMS: ICD-10-CM

## 2023-11-15 DIAGNOSIS — C61 PROSTATE CANCER (HCC): Primary | ICD-10-CM

## 2023-11-15 DIAGNOSIS — N13.8 BPH WITH OBSTRUCTION/LOWER URINARY TRACT SYMPTOMS: ICD-10-CM

## 2023-11-15 LAB — PSA, ULTRASENSITIVE: 1.38 NG/ML

## 2023-11-15 PROCEDURE — 1123F ACP DISCUSS/DSCN MKR DOCD: CPT | Performed by: UROLOGY

## 2023-11-15 PROCEDURE — 99214 OFFICE O/P EST MOD 30 MIN: CPT | Performed by: UROLOGY

## 2023-11-15 PROCEDURE — G8427 DOCREV CUR MEDS BY ELIG CLIN: HCPCS | Performed by: UROLOGY

## 2023-11-15 PROCEDURE — 1036F TOBACCO NON-USER: CPT | Performed by: UROLOGY

## 2023-11-15 PROCEDURE — G8417 CALC BMI ABV UP PARAM F/U: HCPCS | Performed by: UROLOGY

## 2023-11-15 PROCEDURE — G8484 FLU IMMUNIZE NO ADMIN: HCPCS | Performed by: UROLOGY

## 2023-11-15 NOTE — PROGRESS NOTES
Dr. Nighat Gallegos MD  [address]  Urology Clinic      Patient:  Anibal Aldrich  YOB: 1935  Date: 11/15/2023    HISTORY OF PRESENT ILLNESS:   The patient is a 81 y.o. male who presents today for follow-up for the following problem(s): prostate cancer candy 7 treated with EBRT in 2006. PSA has been ~0.50 since until 2023 when it jumped to 1.89. Overall the problem(s) : worsening PSA  Associated Symptoms: No dysuria, gross hematuria. Pain Severity: 0/10    Summary of old records:   0.57     07/2021  0.59     06/2020  0.52     04/2019  0.28     01/2013    Imaging/Labs reviewed during today's visit:  I have independently reviewed and verified the following films during today's visit. PSA, see above. 0.57    Last several PSA's:  No results found for: \"PSA\"    Last total testosterone:  No results found for: \"TESTOSTERONE\"    Urinalysis today:  No results found for this visit on 11/15/23.     Last BUN and creatinine:  Lab Results   Component Value Date    BUN 19 03/22/2023     Lab Results   Component Value Date    CREATININE 1.27 03/22/2023       Imaging Reviewed during this Office Visit:   (results were independently reviewed by physician and radiology report verified)    PAST MEDICAL, FAMILY AND SOCIAL HISTORY UPDATE:  Past Medical History:   Diagnosis Date    Acute on chronic systolic CHF (congestive heart failure), NYHA class 4 ([address]) 08/11/2022    CAD (coronary artery disease)     Status post coronary artery stenting    Chronic kidney disease, stage III (moderate) ([facility])     Closed fracture of six ribs of left side 11/27/2020    COPD (chronic obstructive pulmonary disease) ([address]) 12/12/2020    Patient denies 8/15/2022    DVT, lower extremity ([address])     Gastritis     Hematuria     Hypercholesteremia     Hypertension     Intracranial bleed ([address])     Nasal bone fracture 11/27/2020    Osteoarthritis     Presence of IVC filter     Prostate cancer ([address])     Seed implants    Spinal stenosis, lumbar,

## 2023-11-29 ENCOUNTER — PROCEDURE VISIT (OUTPATIENT)
Dept: CARDIOLOGY CLINIC | Age: 88
End: 2023-11-29
Payer: MEDICARE

## 2023-11-29 DIAGNOSIS — R55 SYNCOPE, UNSPECIFIED SYNCOPE TYPE: Primary | ICD-10-CM

## 2023-11-29 PROCEDURE — G2066 INTER DEVC REMOTE 30D: HCPCS | Performed by: INTERNAL MEDICINE

## 2023-11-29 PROCEDURE — 93298 REM INTERROG DEV EVAL SCRMS: CPT | Performed by: INTERNAL MEDICINE

## 2023-11-29 NOTE — PROGRESS NOTES
Dr Carol Noyola pt   [employer] carelink linq remote   I called this pt and gave him the phone number to call medtr[employer].  He has additional episodes for review and we cannot see them until he sends a manul download       Battery ok  Known merna episodes in the past

## 2023-12-14 ENCOUNTER — TELEPHONE (OUTPATIENT)
Dept: CARDIOLOGY CLINIC | Age: 88
End: 2023-12-14

## 2023-12-14 NOTE — TELEPHONE ENCOUNTER
Patient's daughter Maren Del Cid, on hipaa, called stating he is needing to transfer care and is requesting to see marcela. Pt scheduled for f/u with maddy 06/27/2024. Maren Del Cid said to ask to speak with pt's spouse, Mara La or Mrs. Jacqualin Goldmann, for scheduling. She said shes the one that takes care of all of his appts for him.

## 2023-12-26 ENCOUNTER — TELEPHONE (OUTPATIENT)
Dept: CARDIOLOGY CLINIC | Age: 88
End: 2023-12-26

## 2023-12-26 DIAGNOSIS — I48.91 ATRIAL FIBRILLATION, UNSPECIFIED TYPE (HCC): Primary | ICD-10-CM

## 2024-01-09 ENCOUNTER — TELEPHONE (OUTPATIENT)
Dept: FAMILY MEDICINE CLINIC | Age: 89
End: 2024-01-09

## 2024-01-09 NOTE — TELEPHONE ENCOUNTER
----- Message from Partha Garcia sent at 1/9/2024 12:53 PM EST -----  Subject: Message to Provider    QUESTIONS  Information for Provider? Pt was unable to make appointment on 01/09/24.   Pt was trying for 1 hr to get in the car but was unable to because he is   hard at walking. Pt would like to know if he can have registration papers   mailed.  ---------------------------------------------------------------------------  --------------  CALL BACK INFO  [phone]; OK to leave message on voicemail  ---------------------------------------------------------------------------  --------------  SCRIPT ANSWERS  Relationship to Patient? Spouse/Partner  Representative Name? [email or web address]  Is the representative on the Communication Release of Information ([employer])   form in Epic? Yes

## 2024-01-09 NOTE — TELEPHONE ENCOUNTER
Future Appointments   Date Time Provider Department Center   2/29/2024  1:00 PM Juve Mcintosh,  [facility] [place]   3/13/2024  1:00 PM Brenda Galdamez MD [facility] [place]   3/26/2024  1:00 PM [facility] [place] [facility] [place]   6/24/2024  9:45 AM Krysta Rodriguez MD [facility] [place]   8/13/2024  1:30 PM Jonathan Elizabeth PA-C [facility] [place] [facility]

## 2024-01-16 ENCOUNTER — PROCEDURE VISIT (OUTPATIENT)
Dept: CARDIOLOGY CLINIC | Age: 89
End: 2024-01-16

## 2024-01-16 DIAGNOSIS — R55 SYNCOPE, UNSPECIFIED SYNCOPE TYPE: Primary | ICD-10-CM

## 2024-01-16 NOTE — PROGRESS NOTES
[facility] linq     Former [facility] pt, donte Zazueta   Current egm dated 1/15/24  Known PAF/eliquis  0% AF burden  Previously mentioned tachy episodes ~ last event 1/1/24 for just shy 2 minute

## 2024-01-17 RX ORDER — OXYBUTYNIN CHLORIDE 5 MG/1
5 TABLET ORAL 2 TIMES DAILY
Qty: 180 TABLET | Refills: 3 | Status: SHIPPED | OUTPATIENT
Start: 2024-01-17

## 2024-01-17 NOTE — TELEPHONE ENCOUNTER
[facility] called requesting a refill on the following medications:  Requested Prescriptions     Pending Prescriptions Disp Refills    oxyBUTYnin (DITROPAN) 5 MG tablet 180 tablet 3     Sig: Take 1 tablet by mouth 2 times daily     Pharmacy verified:  [email or web address]      Date of last visit:   Date of next visit (if applicable): 08/2024

## 2024-01-23 NOTE — PROGRESS NOTES
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  She was an ex-smoker quit 3 years ago has multiple medical problems including dyspnea on mild exertion.  She has a longstanding history of Marfan syndrome and she walks with a walking stick.  She also gets tired with minimal walking.      1/19/2024  VATS, ROBOT-ASSISTED, OR ROBOT-ASSISTED lobectomy BLOCK, NERVE, INTERCOSTAL, 2 OR MORE, EXCISION, LYMPH NODE, LOBECTOMY-ROBOTIC         01/20/2024 patient had lot of pain issues overnight otherwise she is hemodynamically stable chest tube output minimal drainage no air leak Mathias catheter was discontinued this morning.     01/21/2024 patient is in the bed lot of pain issues better than yesterday chest tube output minimal no air leak poor inspiratory effort incentive spirometry less than 500 hemodynamically stable blood sugars have been running a little high    01/22/2024 patient chest tube output diminished no air leak serosanguineous drainage patient ambulated and walked yesterday blood sugars have been well controlled pain is controlled with medications appetite is back.  Incisions healing well    01/23/2024 patient is waiting for rehab placement skilled nursing facility versus going home with home health and PTOT outpatient        OUTCOME: Patient tolerated treatment/procedure well without complication and is now ready for discharge.    DISPOSITION: Home-Health Care [facility] [place]    FINAL DIAGNOSIS:  Malignant neoplasm of lung    FOLLOWUP: In clinic    DISCHARGE INSTRUCTIONS:    Discharge Procedure Orders   OXYGEN FOR HOME USE     Order Specific  "Question Answer Comments   Liter Flow 2    Duration Continuous    Qualifying Test Performed at: Rest    Oxygen saturation: 86    Portable mode: continuous    Route nasal cannula    Device: home concentrator with portable concentrator    Length of need (in months): 3 mos    Patient condition with qualifying saturation Other - List qualifying diagnosis and code    Select a diagnosis & list the code in the comments Pulmonary nodule 1 cm or greater in diameter [[id number]]    Height: 5' 10" (1.778 m)    Weight: 102.5 kg (226 lb)    Alternative treatment measures have been tried or considered and deemed clinically ineffective. Yes S/p right lower lobectomy     WALKER FOR HOME USE     Order Specific Question Answer Comments   Type of Walker: Heavy duty (300+ lbs)    With wheels? Yes    Height: 5' 10" (1.778 m)    Weight: 112.5 kg (248 lb 0.3 oz)    Length of need (1-99 months): 99    Does patient have medical equipment at home? cane, straight    Does patient have medical equipment at home? shower chair    Does patient have medical equipment at home? bedside commode    Does patient have medical equipment at home? walker, rolling    Please check all that apply: Patient is unable to safely ambulate without equipment.      COMMODE FOR HOME USE     Order Specific Question Answer Comments   Type: Heavy duty drop arm    Height: 5' 10" (1.778 m)    Weight: 112.5 kg (248 lb 0.3 oz)    Does patient have medical equipment at home? cane, straight    Does patient have medical equipment at home? shower chair    Does patient have medical equipment at home? bedside commode    Does patient have medical equipment at home? walker, rolling    Length of need (1-99 months): 99      Ambulatory referral/consult to Home Health   Standing Status: Future   Referral Priority: Routine Referral Type: Home Health   Referral Reason: Specialty Services Required   Requested Specialty: Home Health Services   Number of Visits Requested: 1     Diet Cardiac " Independent  Transfer Assistance: Independent  Active : No  Additional Comments: Pt ambualated short distances with a standard walker. He did his own self care. He had help with all of the cooking and cleaning prior to admission. Restrictions/Precautions:  Restrictions/Precautions: Fall Risk, General Precautions  Position Activity Restriction  Other position/activity restrictions: Keep systolic blood pressure between 100-160 while moving. SUBJECTIVE: pt in bed on arrival reported that he wasn't feeling good today and that his stomach is all upset noted he had been incont of BM in bed needed assist to get cleaned up and then this happened again while he was exercising did notify nursing pt had been incont 2x during PT session   Checked BP during session pt was 138/72 and   PAIN: pt c/o of upset stomach    OBJECTIVE:  Bed Mobility:  Supine to Sit: Contact Guard Assistance, HOB was elevated sightly   Sit to Supine: Stand By Assistance, with extra time given and cues to get straight in bed      Transfers:  Sit to Stand: Contact Guard Assistance, to SBA   Stand to Sit:Contact [employer], pt very slow and guarded when going to sit down     Ambulation:  Contact Guard Assistance  Distance: 12x4, 60x1  Surface: Level Tile  Device:Rolling Walker  Gait Deviations:  First trip to and from [facility] didn't use air cast or df assist strap noted wide base of support flexed posture with decreased step length and heelstrike, with aircast and df strap he did demonstrate slight increased heelstrike however steps cont to be shortened and wide base of support     Balance:  Pt stood in bathroom with UE at support with SBA while he needed assist for jacinto care and clothing management, he was able to release subhash UEs at sink to wash his hands but did require CGA cues for posture     Exercise:  Patient was guided in 1 set(s) 10 reps of exercise to both lower extremities.   Seated marches, Long arc quads and limited ex as     No dressing needed     Activity as tolerated        TIME SPENT ON DISCHARGE: 35 minutes   pt needed to use bathroom . Exercises were completed for increased independence with functional mobility. Functional Outcome Measures: Not completed       ASSESSMENT:  Assessment: pt not feeling well this pm, most time spent standing in bathroom for hygiene activities  Activity Tolerance:  Patient tolerance of  treatment: fair. Equipment Recommendations:Equipment Needed: Yes  Mobility Devices: Katherin Flash: Rolling  Other: Has SW, will monitor for needs,  RW  Discharge Recommendations:    24 hour supervision or assist, Continue to assess pending progress    Plan: Times per week: 5x/week 90 min, 1x/week 30 min  Current Treatment Recommendations: Strengthening, Safety Education & Training, Balance Training, Endurance Training, Functional Mobility Training, Patient/Caregiver Education & Training, Transfer Training, Gait Training, Wheelchair Mobility Training, Neuromuscular Re-education, Home Exercise Program, Stair training    Patient Education  Patient Education: Plan of Care    Goals:  Patient goals : does not state  Short term goals  Time Frame for Short term goals: 2 weeks  Short term goal 1: Pt to transfer supine <--> sit SBA to enable pt to get in/out of bed. Short term goal 2: Pt to transfer sit <--> stand SBA for increased functional mobility. Short term goal 3: Pt to ambulate 100 feet with SW/RW CGA for household and community ambulation. Long term goals  Time Frame for Long term goals : 4 weeks  Long term goal 1: Pt to transfer supine <--> sit SBA to enable pt to get in/out of bed. Long term goal 2: Pt to transfer sit <--> stand supervision for increased functional mobility. Long term goal 3: Pt to ambulate >150 feet with SW/RW SBA for household and community ambulation. Long term goal 4: Pt to ascend/descend 1 step with SW/RW SBA for home entry. Long term goal 5: Pt to perform car transfer CGA to enable pt to get in/out of the car.   Long term goal 6: Pt to improve TUG test score to <45 sec to indicate improvement in overall mobility. Following session, patient left in safe position with all fall risk precautions in place.

## 2024-01-31 NOTE — PROGRESS NOTES
Chief Complaint   Patient presents with    Establish Care    Coronary Artery Disease    Atrial Fibrillation    Congestive Heart Failure    Hypertension    Chronic Kidney Disease    Hyperlipidemia    History of stroke/Hemorrhage    History of Prostate Cancer    Benign Prostatic Hypertrophy    COPD    Hx of recurrent DVT's     History obtained from spouse and the patient.    SUBJECTIVE:  Stone Sharp is a 88 y.o. male that presents today for establishing care with new physician, etc. [facility].    -CAD/AFib/HFpEF:  Follows with cardio at [facility]  On Eliquis, ASA, Lopressor, Bumex and Pravastatin.   Last EF 50% in AUG 2022  Denies CP, SOB, orthopnea or PND      -HTN/CKD3a:    HPI:  See's nephro as well  Due for labs yet    Taking meds as prescribed ?: yes  Tolerating well ?: yes  Side Effects ?: no  BP at home ?: <140/90  Working on TLCS ?: yes  Chest Pain/SOB/Palpitations? 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       -Hx of prostate cancer/BPH:  Follows with urology  Treated EBRT 2006  Also treated for BPH  LUTS controlled      -COPD:  on Spiriva and Breo  See's Dr[facility] Luis at [facility]  Denies cough, wheezing or SOB      -Hx of bilateral DVT:  Had DVT left popliteal in March 2022  Had DVT right popliteal in AUG 2022  Then had DVT left distal femoral vein in SEPT 2022  Has chronic bilat edema from this.       Age/Gender Health Maintenance    Lipid -   Lab Results   Component Value Date    CHOL 244 (H) 08/12/2022    CHOL 166 11/28/2020    CHOL 196 10/22/2020     Lab Results   Component Value Date    TRIG 105

## 2024-02-20 ENCOUNTER — PROCEDURE VISIT (OUTPATIENT)
Dept: CARDIOLOGY CLINIC | Age: 89
End: 2024-02-20
Payer: MEDICARE

## 2024-02-20 DIAGNOSIS — R55 SYNCOPE, UNSPECIFIED SYNCOPE TYPE: Primary | ICD-10-CM

## 2024-02-20 PROCEDURE — 93298 REM INTERROG DEV EVAL SCRMS: CPT | Performed by: NUCLEAR MEDICINE

## 2024-02-20 NOTE — PROGRESS NOTES
DR NATH PT   [employer] LIN[employer] REMOTE   BATTERY OK    KNOWN MIKEL EPISODES   1 PVC SEEN  EKG CURRENT 2/18/24   PT NOTIFIED TO SEND A MANUAL DOWNLOAD DUE TO ADDITIONAL EPISODES THAT WE CANNOT SEE UNLESS THE PT SENDS US A MANUAL TRANSMISSION

## 2024-02-29 ENCOUNTER — OFFICE VISIT (OUTPATIENT)
Dept: FAMILY MEDICINE CLINIC | Age: 89
End: 2024-02-29
Payer: MEDICARE

## 2024-02-29 VITALS
SYSTOLIC BLOOD PRESSURE: 132 MMHG | RESPIRATION RATE: 18 BRPM | OXYGEN SATURATION: 98 % | TEMPERATURE: 97.7 F | HEIGHT: 69 IN | DIASTOLIC BLOOD PRESSURE: 72 MMHG | HEART RATE: 72 BPM | BODY MASS INDEX: 33.33 KG/M2 | WEIGHT: 225 LBS

## 2024-02-29 DIAGNOSIS — I48.91 ATRIAL FIBRILLATION, UNSPECIFIED TYPE (HCC): ICD-10-CM

## 2024-02-29 DIAGNOSIS — I25.10 ASHD (ARTERIOSCLEROTIC HEART DISEASE): Primary | ICD-10-CM

## 2024-02-29 DIAGNOSIS — E78.5 DYSLIPIDEMIA: ICD-10-CM

## 2024-02-29 DIAGNOSIS — N40.1 BENIGN PROSTATIC HYPERPLASIA WITH LOWER URINARY TRACT SYMPTOMS, SYMPTOM DETAILS UNSPECIFIED: ICD-10-CM

## 2024-02-29 DIAGNOSIS — J44.9 CHRONIC OBSTRUCTIVE PULMONARY DISEASE, UNSPECIFIED COPD TYPE (HCC): ICD-10-CM

## 2024-02-29 DIAGNOSIS — Z85.46 HISTORY OF PROSTATE CANCER: ICD-10-CM

## 2024-02-29 DIAGNOSIS — Z86.718 HISTORY OF RECURRENT DEEP VEIN THROMBOSIS (DVT): ICD-10-CM

## 2024-02-29 DIAGNOSIS — Z86.73 HISTORY OF CVA (CEREBROVASCULAR ACCIDENT): ICD-10-CM

## 2024-02-29 DIAGNOSIS — N18.31 STAGE 3A CHRONIC KIDNEY DISEASE (HCC): ICD-10-CM

## 2024-02-29 DIAGNOSIS — Z86.79 HISTORY OF INTRACRANIAL HEMORRHAGE: ICD-10-CM

## 2024-02-29 DIAGNOSIS — I50.32 CHRONIC HEART FAILURE WITH PRESERVED EJECTION FRACTION (HCC): ICD-10-CM

## 2024-02-29 DIAGNOSIS — I10 HYPERTENSION, ESSENTIAL: ICD-10-CM

## 2024-02-29 PROBLEM — S22.42XA: Status: RESOLVED | Noted: 2020-11-27 | Resolved: 2024-02-29

## 2024-02-29 PROBLEM — Z87.81 HISTORY OF FRACTURE OF NASAL BONE: Status: ACTIVE | Noted: 2020-11-27

## 2024-02-29 PROBLEM — I95.1 POSTURAL HYPOTENSION: Status: RESOLVED | Noted: 2020-12-12 | Resolved: 2024-02-29

## 2024-02-29 PROBLEM — G93.40 ENCEPHALOPATHY: Status: RESOLVED | Noted: 2019-04-28 | Resolved: 2024-02-29

## 2024-02-29 PROBLEM — Z87.891 FORMER SMOKER: Status: ACTIVE | Noted: 2024-02-29

## 2024-02-29 PROBLEM — R60.1 ANASARCA: Status: RESOLVED | Noted: 2022-08-15 | Resolved: 2024-02-29

## 2024-02-29 PROBLEM — I50.23 ACUTE ON CHRONIC SYSTOLIC CHF (CONGESTIVE HEART FAILURE), NYHA CLASS 4 (HCC): Status: RESOLVED | Noted: 2022-08-11 | Resolved: 2024-02-29

## 2024-02-29 PROBLEM — S02.2XXA NASAL BONE FRACTURE: Status: RESOLVED | Noted: 2020-11-27 | Resolved: 2024-02-29

## 2024-02-29 PROBLEM — Z87.81 HISTORY OF FRACTURE OF NASAL BONE: Chronic | Status: ACTIVE | Noted: 2020-11-27

## 2024-02-29 PROBLEM — S02.32XA FRACTURE OF LEFT ORBITAL FLOOR (HCC): Status: RESOLVED | Noted: 2020-11-27 | Resolved: 2024-02-29

## 2024-02-29 PROBLEM — R80.9 PROTEINURIA: Status: RESOLVED | Noted: 2017-04-11 | Resolved: 2024-02-29

## 2024-02-29 PROBLEM — I50.30 HEART FAILURE WITH PRESERVED EJECTION FRACTION (HCC): Chronic | Status: ACTIVE | Noted: 2024-02-29

## 2024-02-29 PROBLEM — I61.5 INTRAVENTRICULAR HEMORRHAGE (HCC): Status: RESOLVED | Noted: 2020-11-27 | Resolved: 2024-02-29

## 2024-02-29 PROBLEM — Z87.891 FORMER SMOKER: Chronic | Status: ACTIVE | Noted: 2024-02-29

## 2024-02-29 PROBLEM — I50.30 HEART FAILURE WITH PRESERVED EJECTION FRACTION (HCC): Status: ACTIVE | Noted: 2024-02-29

## 2024-02-29 PROCEDURE — 1123F ACP DISCUSS/DSCN MKR DOCD: CPT | Performed by: FAMILY MEDICINE

## 2024-02-29 PROCEDURE — G8427 DOCREV CUR MEDS BY ELIG CLIN: HCPCS | Performed by: FAMILY MEDICINE

## 2024-02-29 PROCEDURE — 1036F TOBACCO NON-USER: CPT | Performed by: FAMILY MEDICINE

## 2024-02-29 PROCEDURE — 99204 OFFICE O/P NEW MOD 45 MIN: CPT | Performed by: FAMILY MEDICINE

## 2024-02-29 PROCEDURE — G8417 CALC BMI ABV UP PARAM F/U: HCPCS | Performed by: FAMILY MEDICINE

## 2024-02-29 PROCEDURE — 3023F SPIROM DOC REV: CPT | Performed by: FAMILY MEDICINE

## 2024-02-29 PROCEDURE — G8484 FLU IMMUNIZE NO ADMIN: HCPCS | Performed by: FAMILY MEDICINE

## 2024-02-29 SDOH — ECONOMIC STABILITY: FOOD INSECURITY: WITHIN THE PAST 12 MONTHS, THE FOOD YOU BOUGHT JUST DIDN'T LAST AND YOU DIDN'T HAVE MONEY TO GET MORE.: NEVER TRUE

## 2024-02-29 SDOH — ECONOMIC STABILITY: INCOME INSECURITY: HOW HARD IS IT FOR YOU TO PAY FOR THE VERY BASICS LIKE FOOD, HOUSING, MEDICAL CARE, AND HEATING?: NOT HARD AT ALL

## 2024-02-29 SDOH — ECONOMIC STABILITY: FOOD INSECURITY: WITHIN THE PAST 12 MONTHS, YOU WORRIED THAT YOUR FOOD WOULD RUN OUT BEFORE YOU GOT MONEY TO BUY MORE.: NEVER TRUE

## 2024-02-29 SDOH — ECONOMIC STABILITY: HOUSING INSECURITY
IN THE LAST 12 MONTHS, WAS THERE A TIME WHEN YOU DID NOT HAVE A STEADY PLACE TO SLEEP OR SLEPT IN A SHELTER (INCLUDING NOW)?: NO

## 2024-02-29 ASSESSMENT — PATIENT HEALTH QUESTIONNAIRE - PHQ9
SUM OF ALL RESPONSES TO PHQ QUESTIONS 1-9: 0
SUM OF ALL RESPONSES TO PHQ QUESTIONS 1-9: 0
2. FEELING DOWN, DEPRESSED OR HOPELESS: 0
SUM OF ALL RESPONSES TO PHQ QUESTIONS 1-9: 0
1. LITTLE INTEREST OR PLEASURE IN DOING THINGS: 0
SUM OF ALL RESPONSES TO PHQ QUESTIONS 1-9: 0
SUM OF ALL RESPONSES TO PHQ9 QUESTIONS 1 & 2: 0

## 2024-02-29 NOTE — PATIENT INSTRUCTIONS
LAB INSTRUCTIONS:    Please complete labs within 4 week(s).    Please fast for 8 hours prior to lab collection.    The clinic will call you within 1 week of collection. If you have not heard from us within that amount of time, please call us at [phone].

## 2024-03-12 NOTE — PATIENT INSTRUCTIONS
Follow with Cardologist -       You may receive a survey regarding the care you received during your visit.  Your input is valuable to us.  We encourage you to complete and return your survey.  We hope you will choose us in the future for your healthcare needs.    Thank you for choosing [facility]!    Your Medical Assistant Today:  My HARP      Your LPN Today:  Katarzyna HERRON  Your Device Clinic RN Today:  Gabrielle RAMIREZ  Your Provider for Today: Dr. Galdamez  Ph. [phone]

## 2024-03-13 ENCOUNTER — NURSE ONLY (OUTPATIENT)
Dept: CARDIOLOGY CLINIC | Age: 89
End: 2024-03-13

## 2024-03-13 ENCOUNTER — OFFICE VISIT (OUTPATIENT)
Dept: CARDIOLOGY CLINIC | Age: 89
End: 2024-03-13
Payer: MEDICARE

## 2024-03-13 VITALS
HEIGHT: 69 IN | BODY MASS INDEX: 33.23 KG/M2 | SYSTOLIC BLOOD PRESSURE: 136 MMHG | HEART RATE: 85 BPM | DIASTOLIC BLOOD PRESSURE: 77 MMHG | OXYGEN SATURATION: 95 %

## 2024-03-13 DIAGNOSIS — I48.91 ATRIAL FIBRILLATION, UNSPECIFIED TYPE (HCC): Primary | Chronic | ICD-10-CM

## 2024-03-13 DIAGNOSIS — I48.0 PAROXYSMAL ATRIAL FIBRILLATION (HCC): Primary | ICD-10-CM

## 2024-03-13 PROCEDURE — G8484 FLU IMMUNIZE NO ADMIN: HCPCS | Performed by: INTERNAL MEDICINE

## 2024-03-13 PROCEDURE — 99204 OFFICE O/P NEW MOD 45 MIN: CPT | Performed by: INTERNAL MEDICINE

## 2024-03-13 PROCEDURE — G8427 DOCREV CUR MEDS BY ELIG CLIN: HCPCS | Performed by: INTERNAL MEDICINE

## 2024-03-13 PROCEDURE — 1036F TOBACCO NON-USER: CPT | Performed by: INTERNAL MEDICINE

## 2024-03-13 PROCEDURE — 93000 ELECTROCARDIOGRAM COMPLETE: CPT | Performed by: INTERNAL MEDICINE

## 2024-03-13 PROCEDURE — G8417 CALC BMI ABV UP PARAM F/U: HCPCS | Performed by: INTERNAL MEDICINE

## 2024-03-13 PROCEDURE — 1123F ACP DISCUSS/DSCN MKR DOCD: CPT | Performed by: INTERNAL MEDICINE

## 2024-03-13 NOTE — PROGRESS NOTES
[facility]   Heart Center (EP)  [address] [address]  Dept: [phone]  Cardiac Electrophysiology: Consultation Note  Patient's demographics:  Date:   3/13/2024  Patient name:              Stone Sharp  YOB: 1935  Sex:    male   MRN:   [id number]    Primary Care Physician:  Juve Mcintosh DO    Cardiologist:  Stewart Rodriguez MD    Reason for Consultation:  Atrial fibrillation.     Clinical Summary:  88/M with approximately fibrillation, on apixaban is here to establish care.  He is in the wheelchair because of Parkinson disease accompanied by his wife.  Has no complaints.  Denies palpitations, chest pain, shortness of breath. Medical Hx: PAF detected on loop recorder, CAD/PCI-LCx (2015), HTN, HPL, CKD, hx of hemorrhagic stroke, recurrent DVT and loop recorder (10/22/2020) implanted for falls and AV block.     Review of systems:  Constitutional: Negative for chills and fever  HENT: Negative for congestion, sinus pressure, sneezing and sore throat.    Eyes: Negative for pain, discharge, redness and itching.   Respiratory: Negative for apnea, cough  Gastrointestinal: Negative for blood in stool, constipation, diarrhea   Endocrine: Negative for cold intolerance, heat intolerance, polydipsia.  Genitourinary: Negative for dysuria, enuresis, flank pain and hematuria.   Musculoskeletal: Negative for arthralgias, joint swelling and neck pain.   Neurological: Negative for numbness and headaches.   Psychiatric/Behavioral: Negative for agitation, confusion, decreased concentration and dysphoric mood.      Past Medical History::  Past Medical History:   Diagnosis Date    ASHD (arteriosclerotic heart disease)     Status post coronary artery stenting    Atrial fibrillation (HCC)     BPH (benign prostatic hyperplasia)     CKD (chronic kidney disease) stage 3, GFR 30-59 ml/min (HCC)     COPD (chronic obstructive pulmonary disease) (HCC)     Dyslipidemia

## 2024-03-13 NOTE — PROGRESS NOTES
[employer] linq loop recorder   Former maddy pt   AF 0.1% burden, HVR  he's unaware of it   Known merna

## 2024-03-16 ENCOUNTER — APPOINTMENT (OUTPATIENT)
Dept: CT IMAGING | Age: 89
End: 2024-03-16
Payer: MEDICARE

## 2024-03-16 ENCOUNTER — HOSPITAL ENCOUNTER (EMERGENCY)
Age: 89
Discharge: ANOTHER HOSPITAL | End: 2024-03-16
Attending: EMERGENCY MEDICINE
Payer: MEDICARE

## 2024-03-16 VITALS
WEIGHT: 225 LBS | DIASTOLIC BLOOD PRESSURE: 73 MMHG | HEART RATE: 68 BPM | TEMPERATURE: 98.4 F | OXYGEN SATURATION: 96 % | SYSTOLIC BLOOD PRESSURE: 112 MMHG | BODY MASS INDEX: 33.23 KG/M2 | RESPIRATION RATE: 23 BRPM

## 2024-03-16 DIAGNOSIS — I71.011 AORTIC ARCH DISSECTION (HCC): Primary | ICD-10-CM

## 2024-03-16 LAB
ABO: NORMAL
ANION GAP SERPL CALC-SCNC: 11 MEQ/L (ref 8–16)
ANTIBODY IDENTIFICATION: NORMAL
APTT PPP: 27.8 SECONDS (ref 22–38)
BASOPHILS ABSOLUTE: 0 THOU/MM3 (ref 0–0.1)
BASOPHILS NFR BLD AUTO: 0.6 %
BUN SERPL-MCNC: 18 MG/DL (ref 7–22)
CALCIUM SERPL-MCNC: 9 MG/DL (ref 8.5–10.5)
CFT BLD TEG: 1.9 MINUTES (ref 0.8–2.1)
CHLORIDE SERPL-SCNC: 104 MEQ/L (ref 98–111)
CLOT ANGLE BLD TEG: 17.8 MM (ref 15–32)
CLOT ANGLE BLD TEG: 70.3 DEG (ref 63–78)
CLOT INIT BLD TEG: 5.8 MINUTES (ref 4.6–9.1)
CLOT INIT P HPASE BLD TEG: 6.1 MINUTES (ref 4.3–8.3)
CO2 SERPL-SCNC: 24 MEQ/L (ref 23–33)
CREAT SERPL-MCNC: 1 MG/DL (ref 0.4–1.2)
DEPRECATED RDW RBC AUTO: 46.1 FL (ref 35–45)
EKG ATRIAL RATE: 66 BPM
EKG ATRIAL RATE: 80 BPM
EKG P AXIS: 52 DEGREES
EKG P AXIS: 53 DEGREES
EKG P-R INTERVAL: 182 MS
EKG P-R INTERVAL: 202 MS
EKG Q-T INTERVAL: 360 MS
EKG Q-T INTERVAL: 394 MS
EKG QRS DURATION: 78 MS
EKG QRS DURATION: 80 MS
EKG QTC CALCULATION (BAZETT): 413 MS
EKG QTC CALCULATION (BAZETT): 415 MS
EKG R AXIS: -12 DEGREES
EKG R AXIS: -7 DEGREES
EKG T AXIS: 95 DEGREES
EKG T AXIS: 98 DEGREES
EKG VENTRICULAR RATE: 66 BPM
EKG VENTRICULAR RATE: 80 BPM
EOSINOPHIL NFR BLD AUTO: 1.3 %
EOSINOPHILS ABSOLUTE: 0.1 THOU/MM3 (ref 0–0.4)
ERYTHROCYTE [DISTWIDTH] IN BLOOD BY AUTOMATED COUNT: 12.5 % (ref 11.5–14.5)
FUNCT FIBRINOGEN LEVEL: 324.8 MG/DL (ref 278–581)
GFR SERPL CREATININE-BSD FRML MDRD: > 60 ML/MIN/1.73M2
GLUCOSE SERPL-MCNC: 116 MG/DL (ref 70–108)
HCT VFR BLD AUTO: 47.7 % (ref 42–52)
HGB BLD-MCNC: 15 GM/DL (ref 14–18)
IMM GRANULOCYTES # BLD AUTO: 0.02 THOU/MM3 (ref 0–0.07)
IMM GRANULOCYTES NFR BLD AUTO: 0.3 %
INR PPP: 1.16 (ref 0.85–1.13)
LYMPHOCYTES ABSOLUTE: 0.6 THOU/MM3 (ref 1–4.8)
LYMPHOCYTES NFR BLD AUTO: 9.8 %
MAGNESIUM SERPL-MCNC: 2 MG/DL (ref 1.6–2.4)
MCF BLD TEG: 53.7 MM (ref 52–69)
MCF.EXTRINSIC BLD ROTEM: 54.2 MM (ref 52–70)
MCH RBC QN AUTO: 31.2 PG (ref 26–33)
MCHC RBC AUTO-ENTMCNC: 31.4 GM/DL (ref 32.2–35.5)
MCV RBC AUTO: 99.2 FL (ref 80–94)
MONOCYTES ABSOLUTE: 0.4 THOU/MM3 (ref 0.4–1.3)
MONOCYTES NFR BLD AUTO: 6 %
NEUTROPHILS NFR BLD AUTO: 82 %
NRBC BLD AUTO-RTO: 0 /100 WBC
OSMOLALITY SERPL CALC.SUM OF ELEC: 280.4 MOSMOL/KG (ref 275–300)
PLATELET # BLD AUTO: 160 THOU/MM3 (ref 130–400)
PMV BLD AUTO: 10.6 FL (ref 9.4–12.4)
POTASSIUM SERPL-SCNC: 4.2 MEQ/L (ref 3.5–5.2)
RBC # BLD AUTO: 4.81 MILL/MM3 (ref 4.7–6.1)
RH FACTOR: NORMAL
SEGMENTED NEUTROPHILS ABSOLUTE COUNT: 5.2 THOU/MM3 (ref 1.8–7.7)
SODIUM SERPL-SCNC: 139 MEQ/L (ref 135–145)
T4 FREE SERPL-MCNC: 1.37 NG/DL (ref 0.93–1.68)
TROPONIN, HIGH SENSITIVITY: 38 NG/L (ref 0–12)
TROPONIN, HIGH SENSITIVITY: 39 NG/L (ref 0–12)
TSH SERPL DL<=0.005 MIU/L-ACNC: 4.8 UIU/ML (ref 0.4–4.2)
WBC # BLD AUTO: 6.3 THOU/MM3 (ref 4.8–10.8)

## 2024-03-16 PROCEDURE — 6360000002 HC RX W HCPCS: Performed by: EMERGENCY MEDICINE

## 2024-03-16 PROCEDURE — 85025 COMPLETE CBC W/AUTO DIFF WBC: CPT

## 2024-03-16 PROCEDURE — 96366 THER/PROPH/DIAG IV INF ADDON: CPT

## 2024-03-16 PROCEDURE — 70498 CT ANGIOGRAPHY NECK: CPT

## 2024-03-16 PROCEDURE — 6370000000 HC RX 637 (ALT 250 FOR IP): Performed by: EMERGENCY MEDICINE

## 2024-03-16 PROCEDURE — 80048 BASIC METABOLIC PNL TOTAL CA: CPT

## 2024-03-16 PROCEDURE — 85347 COAGULATION TIME ACTIVATED: CPT

## 2024-03-16 PROCEDURE — 96375 TX/PRO/DX INJ NEW DRUG ADDON: CPT

## 2024-03-16 PROCEDURE — 2580000003 HC RX 258: Performed by: EMERGENCY MEDICINE

## 2024-03-16 PROCEDURE — 70450 CT HEAD/BRAIN W/O DYE: CPT

## 2024-03-16 PROCEDURE — 6360000002 HC RX W HCPCS: Performed by: STUDENT IN AN ORGANIZED HEALTH CARE EDUCATION/TRAINING PROGRAM

## 2024-03-16 PROCEDURE — 86900 BLOOD TYPING SEROLOGIC ABO: CPT

## 2024-03-16 PROCEDURE — 70496 CT ANGIOGRAPHY HEAD: CPT

## 2024-03-16 PROCEDURE — 96365 THER/PROPH/DIAG IV INF INIT: CPT

## 2024-03-16 PROCEDURE — 85384 FIBRINOGEN ACTIVITY: CPT

## 2024-03-16 PROCEDURE — 85610 PROTHROMBIN TIME: CPT

## 2024-03-16 PROCEDURE — 96374 THER/PROPH/DIAG INJ IV PUSH: CPT

## 2024-03-16 PROCEDURE — 71275 CT ANGIOGRAPHY CHEST: CPT

## 2024-03-16 PROCEDURE — 83735 ASSAY OF MAGNESIUM: CPT

## 2024-03-16 PROCEDURE — 86905 BLOOD TYPING RBC ANTIGENS: CPT

## 2024-03-16 PROCEDURE — 85576 BLOOD PLATELET AGGREGATION: CPT

## 2024-03-16 PROCEDURE — 85730 THROMBOPLASTIN TIME PARTIAL: CPT

## 2024-03-16 PROCEDURE — 99285 EMERGENCY DEPT VISIT HI MDM: CPT

## 2024-03-16 PROCEDURE — 93005 ELECTROCARDIOGRAM TRACING: CPT | Performed by: EMERGENCY MEDICINE

## 2024-03-16 PROCEDURE — 84443 ASSAY THYROID STIM HORMONE: CPT

## 2024-03-16 PROCEDURE — 36415 COLL VENOUS BLD VENIPUNCTURE: CPT

## 2024-03-16 PROCEDURE — 84439 ASSAY OF FREE THYROXINE: CPT

## 2024-03-16 PROCEDURE — 84484 ASSAY OF TROPONIN QUANT: CPT

## 2024-03-16 PROCEDURE — 86870 RBC ANTIBODY IDENTIFICATION: CPT

## 2024-03-16 PROCEDURE — 96368 THER/DIAG CONCURRENT INF: CPT

## 2024-03-16 PROCEDURE — 6360000004 HC RX CONTRAST MEDICATION: Performed by: EMERGENCY MEDICINE

## 2024-03-16 PROCEDURE — 86901 BLOOD TYPING SEROLOGIC RH(D): CPT

## 2024-03-16 PROCEDURE — 2580000003 HC RX 258: Performed by: STUDENT IN AN ORGANIZED HEALTH CARE EDUCATION/TRAINING PROGRAM

## 2024-03-16 RX ORDER — ONDANSETRON 2 MG/ML
4 INJECTION INTRAMUSCULAR; INTRAVENOUS ONCE
Status: COMPLETED | OUTPATIENT
Start: 2024-03-16 | End: 2024-03-16

## 2024-03-16 RX ORDER — SODIUM CHLORIDE 9 MG/ML
50 INJECTION, SOLUTION INTRAVENOUS ONCE
Status: COMPLETED | OUTPATIENT
Start: 2024-03-16 | End: 2024-03-16

## 2024-03-16 RX ORDER — ESMOLOL HYDROCHLORIDE 10 MG/ML
25-300 INJECTION, SOLUTION INTRAVENOUS CONTINUOUS
Status: DISCONTINUED | OUTPATIENT
Start: 2024-03-16 | End: 2024-03-16 | Stop reason: HOSPADM

## 2024-03-16 RX ORDER — MORPHINE SULFATE 2 MG/ML
2 INJECTION, SOLUTION INTRAMUSCULAR; INTRAVENOUS ONCE
Status: COMPLETED | OUTPATIENT
Start: 2024-03-16 | End: 2024-03-16

## 2024-03-16 RX ORDER — MORPHINE SULFATE 2 MG/ML
2 INJECTION, SOLUTION INTRAMUSCULAR; INTRAVENOUS
Status: COMPLETED | OUTPATIENT
Start: 2024-03-16 | End: 2024-03-16

## 2024-03-16 RX ORDER — NITROGLYCERIN 0.4 MG/1
0.4 TABLET SUBLINGUAL EVERY 5 MIN PRN
Status: DISCONTINUED | OUTPATIENT
Start: 2024-03-16 | End: 2024-03-16 | Stop reason: HOSPADM

## 2024-03-16 RX ADMIN — IOPAMIDOL 80 ML: 755 INJECTION, SOLUTION INTRAVENOUS at 14:20

## 2024-03-16 RX ADMIN — ONDANSETRON 4 MG: 2 INJECTION INTRAMUSCULAR; INTRAVENOUS at 16:29

## 2024-03-16 RX ADMIN — MORPHINE SULFATE 2 MG: 2 INJECTION, SOLUTION INTRAMUSCULAR; INTRAVENOUS at 16:29

## 2024-03-16 RX ADMIN — SODIUM CHLORIDE 50 ML: 9 INJECTION, SOLUTION INTRAVENOUS at 17:22

## 2024-03-16 RX ADMIN — NITROGLYCERIN 0.4 MG: 0.4 TABLET, ORALLY DISINTEGRATING SUBLINGUAL at 13:35

## 2024-03-16 RX ADMIN — Medication 2000 UNITS: at 17:12

## 2024-03-16 RX ADMIN — ESMOLOL HYDROCHLORIDE 50 MCG/KG/MIN: 10 INJECTION INTRAVENOUS at 14:33

## 2024-03-16 RX ADMIN — SODIUM CHLORIDE 7.5 MG/HR: 9 INJECTION, SOLUTION INTRAVENOUS at 16:35

## 2024-03-16 RX ADMIN — IOPAMIDOL 80 ML: 755 INJECTION, SOLUTION INTRAVENOUS at 12:24

## 2024-03-16 RX ADMIN — MORPHINE SULFATE 2 MG: 2 INJECTION, SOLUTION INTRAMUSCULAR; INTRAVENOUS at 17:32

## 2024-03-16 RX ADMIN — SODIUM CHLORIDE 5 MG/HR: 9 INJECTION, SOLUTION INTRAVENOUS at 15:22

## 2024-03-16 RX ADMIN — NITROGLYCERIN 0.4 MG: 0.4 TABLET, ORALLY DISINTEGRATING SUBLINGUAL at 13:40

## 2024-03-16 ASSESSMENT — PAIN DESCRIPTION - LOCATION
LOCATION: CHEST

## 2024-03-16 ASSESSMENT — PAIN SCALES - GENERAL
PAINLEVEL_OUTOF10: 8
PAINLEVEL_OUTOF10: 6
PAINLEVEL_OUTOF10: 5
PAINLEVEL_OUTOF10: 0
PAINLEVEL_OUTOF10: 5
PAINLEVEL_OUTOF10: 6
PAINLEVEL_OUTOF10: 4
PAINLEVEL_OUTOF10: 4
PAINLEVEL_OUTOF10: 6

## 2024-03-16 ASSESSMENT — HEART SCORE
ECG: 0
ECG: NORMAL

## 2024-03-16 ASSESSMENT — PAIN - FUNCTIONAL ASSESSMENT: PAIN_FUNCTIONAL_ASSESSMENT: 0-10

## 2024-03-16 NOTE — ED NOTES
Robbie started per Dr. Stein. Pt resting in bed watching TV with family at bedside. States pain is unchanged at 6/10.

## 2024-03-16 NOTE — ED NOTES
Patient transferred to room 4- report to Alexandria Griffith and Dr. Stein to bedside. Pharmacy called to send esmolol.

## 2024-03-16 NOTE — ED PROVIDER NOTES
Signed out to me at shift change.  Patient presents for chest pain.  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  Neurologically intact.  Patient is on esmolol and nicardipine drip.  /73.  Heart rate 68.  Accepted by [facility] in stable conditions.     Steve Zhang MD  03/16/24 [id number]

## 2024-03-16 NOTE — ED NOTES
Dr Stein and Dr. Griffith notified of patients chest pain of 10/10 at this time along with patients nausea. EKG completed. Orders for oral nitro at this time. See MAR.

## 2024-03-16 NOTE — ED NOTES
Dr. Stein notified of pt's HR in 70s with -150s. Verbal order from Dr. Stein to titrate esmolol up to help decrease BP.

## 2024-03-16 NOTE — ED PROVIDER NOTES
[facility] EMERGENCY DEPT      EMERGENCY MEDICINE     Pt Name: Stone Shapr  MRN: [id number]  Birthdate 1935  Date of evaluation: 3/16/2024  Provider: Guanaco Stein DO  Supervising Physician: Wilfredo Griffith DO    CHIEF COMPLAINT       Chief Complaint   Patient presents with    Chest Pain     HISTORY OF PRESENT ILLNESS   Stone Sharp is a 88 y.o. male with a history of CKD, COPD, DVT, HTN, obesity who presents to the emergency department from home for evaluation of chest pain.  This started last night while he was at rest, left-sided, nonradiating.  He he thought it was indigestion.  It went away after a few minutes without any intervention.  He was undergo sleep just fine.  This morning the pain when once again returns, once again the left side.  Is associate with diaphoresis and him being unable to lift his arm for couple minutes.  This too is resolved.  He denies any current weakness or loss of sensation.  He denies any history of stroke in the past.  He reports being compliant with his Eliquis was not taken his dose this morning.  And route he was given aspirin and nitro which she reports helped his pain some.  Patient denies orthopnea, leg swelling, recent travel, recent surgery, cough, congestion, rhinorrhea, fever.  He has been having longstanding exertional dyspnea.    PASTMEDICAL HISTORY     Past Medical History:   Diagnosis Date    ASHD (arteriosclerotic heart disease)     Status post coronary artery stenting    Atrial fibrillation (HCC)     BPH (benign prostatic hyperplasia)     CKD (chronic kidney disease) stage 3, GFR 30-59 ml/min (HCC)     COPD (chronic obstructive pulmonary disease) (HCC)     Dyslipidemia     Former smoker     Heart failure with preserved ejection fraction (HCC)     History of CVA (cerebrovascular accident)     History of fracture of nasal bone 11/27/2020    History of intracranial hemorrhage     History of prostate cancer     Seed implants    History of recurrent deep  vein thrombosis (DVT)     Hypertension, essential     Osteoarthritis     Presence of IVC filter     Spinal stenosis, lumbar, s/p laminectomy 06/01/2015       Patient Active Problem List   Diagnosis Code    CKD (chronic kidney disease) stage 3, GFR 30-59 ml/min ([facility]) N18.30    Hypertension, essential I10    Osteoarthritis M19.90    ASHD (arteriosclerotic heart disease) I25.10    Dyslipidemia E78.5    Spinal stenosis, lumbar, s/p laminectomy M48.061    S/P laminectomy Z98.890    Presence of IVC filter Z95.828    BPH (benign prostatic hyperplasia) N40.0    History of prostate cancer Z85.46    COPD (chronic obstructive pulmonary disease) ([facility]) J44.9    Atrial fibrillation ([facility]) I48.91    Former smoker Z87.891    Heart failure with preserved ejection fraction ([facility]) I50.30    History of CVA (cerebrovascular accident) Z86.73    History of fracture of nasal bone Z87.81    History of intracranial hemorrhage Z86.79    History of recurrent deep vein thrombosis (DVT) Z86.718     SURGICAL HISTORY       Past Surgical History:   Procedure Laterality Date    BACK SURGERY  2006    fusion    CARDIAC SURGERY      2 stents    COSMETIC SURGERY      ENDOSCOPY, COLON, DIAGNOSTIC      IVC FILTER INSERTION      NECK SURGERY      OTHER SURGICAL HISTORY  05/29/2015    DECOMPRESSIVE LUMBAR LAMINECTOMY L2-3    ROTATOR CUFF REPAIR Left     TOTAL HIP ARTHROPLASTY Bilateral     TOTAL KNEE ARTHROPLASTY Left        CURRENT MEDICATIONS       Previous Medications    ALBUTEROL SULFATE  (90 BASE) MCG/ACT INHALER    Inhale 1 puff into the lungs every 4 hours as needed for Wheezing    APIXABAN (ELIQUIS) 5 MG TABS TABLET    Take 1 tablet by mouth in the morning and at bedtime    ASPIRIN 81 MG CHEWABLE TABLET    Take 1 tablet by mouth daily    BUMETANIDE (BUMEX) 2 MG TABLET    Take 1 tablet by mouth in the morning and 1 tablet before bedtime.    CHOLECALCIFEROL (VITAMIN D3) 5000 UNITS TABS    Take 1 tablet by mouth daily    CLONIDINE (CATAPRES) 0.1

## 2024-03-16 NOTE — ED PROVIDER NOTES
recognition software. It may contain minor errors which are inherent in voice recognition technology.**      Final report electronically signed by Dr. Agnieszka Ly on 3/16/2024 2:03 PM      CTA HEAD W WO CONTRAST   Final Result      1. 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 There is associated mediastinal hematoma. A dedicated CTA of the chest using the aortic dissection protocol is recommended.   2. No stenosis of either carotid bulb.   3. Severe stenosis of the origin the left vertebral artery.   4. Intracranial atherosclerosis with irregularity of the posterior cerebral arteries, basilar artery and distal vertebral arteries.         The finding of the aortic dissection was telephoned to Dr. Guanaco Stein, at 1:49 PM on 3/16/2024 prior to completing the dictation..         **This report has been created using voice recognition software. It may contain minor errors which are inherent in voice recognition technology.**      Final report electronically signed by Dr. Agnieszka Ly on 3/16/2024 2:03 PM          LABS: (none if blank)  Labs Reviewed   CBC WITH AUTO DIFFERENTIAL - Abnormal; Notable for the following components:       Result Value    MCV 99.2 (*)     MCHC 31.4 (*)     RDW-SD 46.1 (*)     Lymphocytes Absolute 0.6 (*)     All other components within normal limits   BASIC METABOLIC PANEL - Abnormal; Notable for the following components:    Glucose 116 (*)     All other components within normal limits   TROPONIN - Abnormal; Notable for the following components:    Troponin, High Sensitivity 38 (*)     All other components within normal limits   TSH - Abnormal; Notable for the following components:    TSH 4.800 (*)     All other components within normal limits   PROTIME-INR - Abnormal; Notable for the following components:    INR 1.16 (*)     All other components within normal limits   TROPONIN - Abnormal; Notable for the following  dictated by use of voice recognition software and electronically transcribed. The transcription may contain errors not detected in proofreading. Please refer to my supervising physician's documentation if my documentation differs.    Electronically Signed: Asif Nina MD, 03/16/24, 5:47 PM

## 2024-03-16 NOTE — ED NOTES
Presents to ER with concern of CP that started last night by EMS. He reports after laying down pain subsided. Reports when waking up this am pain started in left mid chest. Pain 8/10. EMS gave 4 baby asa and 1 nitro. Pain 5/10 after meds. Wife reports pt was diaphoretic and \"gagging\" this am. Reports no sob. Reports dizziness with nausea earlier. EKG complete. Placed on cardiac monitor. Report given to Romelia QUEEN

## 2024-03-18 LAB
EKG ATRIAL RATE: 66 BPM
EKG ATRIAL RATE: 80 BPM
EKG P AXIS: 52 DEGREES
EKG P AXIS: 53 DEGREES
EKG P-R INTERVAL: 182 MS
EKG P-R INTERVAL: 202 MS
EKG Q-T INTERVAL: 360 MS
EKG Q-T INTERVAL: 394 MS
EKG QRS DURATION: 78 MS
EKG QRS DURATION: 80 MS
EKG QTC CALCULATION (BAZETT): 413 MS
EKG QTC CALCULATION (BAZETT): 415 MS
EKG R AXIS: -12 DEGREES
EKG R AXIS: -7 DEGREES
EKG T AXIS: 95 DEGREES
EKG T AXIS: 98 DEGREES
EKG VENTRICULAR RATE: 66 BPM
EKG VENTRICULAR RATE: 80 BPM

## 2024-03-18 PROCEDURE — 93010 ELECTROCARDIOGRAM REPORT: CPT | Performed by: INTERNAL MEDICINE

## 2024-03-26 ENCOUNTER — OFFICE VISIT (OUTPATIENT)
Dept: NEPHROLOGY | Age: 89
End: 2024-03-26
Payer: MEDICARE

## 2024-03-26 VITALS — OXYGEN SATURATION: 92 % | HEART RATE: 69 BPM | SYSTOLIC BLOOD PRESSURE: 107 MMHG | DIASTOLIC BLOOD PRESSURE: 55 MMHG

## 2024-03-26 DIAGNOSIS — N18.31 STAGE 3A CHRONIC KIDNEY DISEASE (HCC): Primary | ICD-10-CM

## 2024-03-26 DIAGNOSIS — E87.79 OTHER FLUID OVERLOAD: ICD-10-CM

## 2024-03-26 DIAGNOSIS — I10 HTN (HYPERTENSION), BENIGN: ICD-10-CM

## 2024-03-26 PROCEDURE — 1036F TOBACCO NON-USER: CPT | Performed by: INTERNAL MEDICINE

## 2024-03-26 PROCEDURE — G8417 CALC BMI ABV UP PARAM F/U: HCPCS | Performed by: INTERNAL MEDICINE

## 2024-03-26 PROCEDURE — 99213 OFFICE O/P EST LOW 20 MIN: CPT | Performed by: INTERNAL MEDICINE

## 2024-03-26 PROCEDURE — G8427 DOCREV CUR MEDS BY ELIG CLIN: HCPCS | Performed by: INTERNAL MEDICINE

## 2024-03-26 PROCEDURE — G8484 FLU IMMUNIZE NO ADMIN: HCPCS | Performed by: INTERNAL MEDICINE

## 2024-03-26 PROCEDURE — 1123F ACP DISCUSS/DSCN MKR DOCD: CPT | Performed by: INTERNAL MEDICINE

## 2024-03-26 RX ORDER — AMLODIPINE BESYLATE 5 MG/1
5 TABLET ORAL DAILY
COMMUNITY
Start: 2024-03-21

## 2024-03-26 RX ORDER — FUROSEMIDE 20 MG/1
20 TABLET ORAL EVERY OTHER DAY
COMMUNITY
Start: 2024-03-24

## 2024-03-26 RX ORDER — HYDROCODONE BITARTRATE AND ACETAMINOPHEN 5; 325 MG/1; MG/1
1 TABLET ORAL EVERY 6 HOURS PRN
COMMUNITY

## 2024-03-26 RX ORDER — ATORVASTATIN CALCIUM 10 MG/1
10 TABLET, FILM COATED ORAL DAILY
COMMUNITY

## 2024-03-26 RX ORDER — FERROUS SULFATE 325(65) MG
325 TABLET ORAL
COMMUNITY

## 2024-03-26 RX ORDER — LIDOCAINE 50 MG/G
1 PATCH TOPICAL DAILY
COMMUNITY

## 2024-03-26 NOTE — PROGRESS NOTES
[facility]        Outpatient Follow-Up note         3/26/2024 1:08 PM    Patient Name:   Stone Sharp  YOB: 1935  Primary Care Physician:  Juve Mcintosh DO     Chief Complaint / Reason for follow-up : Follow Up of CKD     Interval History :  Patient seen and examined by me. Feels well.  No chest pain shortness of breath .  Had a ruptured abdominal aortic aneurysm and had surgery done 2 weeks ago     Past History :  Past Medical History:   Diagnosis Date    ASHD (arteriosclerotic heart disease)     Status post coronary artery stenting    Atrial fibrillation (HCC)     BPH (benign prostatic hyperplasia)     CKD (chronic kidney disease) stage 3, GFR 30-59 ml/min (HCC)     COPD (chronic obstructive pulmonary disease) (HCC)     Dyslipidemia     Former smoker     Heart failure with preserved ejection fraction (HCC)     History of CVA (cerebrovascular accident)     History of fracture of nasal bone 11/27/2020    History of intracranial hemorrhage     History of prostate cancer     Seed implants    History of recurrent deep vein thrombosis (DVT)     Hypertension, essential     Osteoarthritis     Presence of IVC filter     Spinal stenosis, lumbar, s/p laminectomy 06/01/2015     Past Surgical History:   Procedure Laterality Date    BACK SURGERY  2006    fusion    CARDIAC SURGERY      2 stents    COSMETIC SURGERY      ENDOSCOPY, COLON, DIAGNOSTIC      IVC FILTER INSERTION      NECK SURGERY      OTHER SURGICAL HISTORY  05/29/2015    DECOMPRESSIVE LUMBAR LAMINECTOMY L2-3    ROTATOR CUFF REPAIR Left     TOTAL HIP ARTHROPLASTY Bilateral     TOTAL KNEE ARTHROPLASTY Left         Medications :     Outpatient Medications Marked as Taking for the 3/26/24 encounter (Office Visit) with Zain Mcqueen MD   Medication Sig Dispense Refill    amLODIPine (NORVASC) 5 MG tablet Take 1 tablet by mouth daily      atorvastatin (LIPITOR) 10 MG tablet Take 1 tablet by mouth daily

## 2024-04-01 PROCEDURE — 93298 REM INTERROG DEV EVAL SCRMS: CPT | Performed by: NUCLEAR MEDICINE

## 2024-04-02 ENCOUNTER — PROCEDURE VISIT (OUTPATIENT)
Dept: CARDIOLOGY CLINIC | Age: 89
End: 2024-04-02
Payer: MEDICARE

## 2024-04-02 DIAGNOSIS — R55 SYNCOPE, UNSPECIFIED SYNCOPE TYPE: Primary | ICD-10-CM

## 2024-04-02 NOTE — PROGRESS NOTES
[facility] [employer]   Patient of Carlita    Current EGM 3/29/24    History of syncope  Hx AF - taking eliquis     Episodes:  Paco rates 38-40s    Additional episodes may be available per episode list. Call to patient, spoke with wife. Pt is at [facility] for Rehab. Call to [facility]. Asked nurse, Zunilda, to send manual download to ensure all episodes are reviewed.

## 2024-04-22 ENCOUNTER — TELEPHONE (OUTPATIENT)
Dept: CARDIOLOGY CLINIC | Age: 89
End: 2024-04-22

## 2024-04-22 NOTE — TELEPHONE ENCOUNTER
Unscheduled [facility] [employer]  Pt of Carlita/Elaine     4/21/24 0222- 4 seconds pause    Call to Addy starla. Pt had no episodes yesterday morning. Will continue to monitor for s/s of low heart rates.

## 2024-05-07 ENCOUNTER — PROCEDURE VISIT (OUTPATIENT)
Dept: CARDIOLOGY CLINIC | Age: 89
End: 2024-05-07
Payer: MEDICARE

## 2024-05-07 DIAGNOSIS — I48.91 ATRIAL FIBRILLATION, UNSPECIFIED TYPE (HCC): Primary | ICD-10-CM

## 2024-05-07 PROCEDURE — 93298 REM INTERROG DEV EVAL SCRMS: CPT | Performed by: NUCLEAR MEDICINE

## 2024-05-07 NOTE — PROGRESS NOTES
[facility] [employer]   Patient of Carlita    Current EGM 5/6/24    History of syncope    Episodes:  4/28/24 0045- 3 second pause - asymptomatic   4/21/24 0222- 4 seconds pause --asymptomatic     Known merna 38-40s    Additional episodes may be available per episode list. Call to [facility] Dilma- spoke to Lenore- asked to send manual download to ensure all episodes are reviewed.

## 2024-05-20 ENCOUNTER — TELEPHONE (OUTPATIENT)
Dept: CARDIOLOGY CLINIC | Age: 89
End: 2024-05-20

## 2024-05-20 NOTE — TELEPHONE ENCOUNTER
Unscheduled [facility]     Resides at [facility], talked to wife Diogo (both of them are named Stone!)  5/18/24 4 minutes AF RVR rates 188  This is a baki pt   Known merna during sleep hours

## 2024-06-11 ENCOUNTER — PROCEDURE VISIT (OUTPATIENT)
Dept: CARDIOLOGY CLINIC | Age: 89
End: 2024-06-11

## 2024-06-11 DIAGNOSIS — R55 SYNCOPE, UNSPECIFIED SYNCOPE TYPE: Primary | ICD-10-CM

## 2024-06-11 NOTE — PROGRESS NOTES
DR NATH PT   [employer] REMOTE   BATTERY OK  EKG CURRENT 6/11/24 6/1/24 HIGH HEART RATE EPISODE     HAS AFIB/  VT?? RATE 207 BPM FOR 2 MINUTES /PLEASE VERIFY

## 2024-06-24 ENCOUNTER — OFFICE VISIT (OUTPATIENT)
Dept: CARDIOLOGY CLINIC | Age: 89
End: 2024-06-24
Payer: MEDICARE

## 2024-06-24 VITALS — HEART RATE: 60 BPM | SYSTOLIC BLOOD PRESSURE: 124 MMHG | DIASTOLIC BLOOD PRESSURE: 62 MMHG

## 2024-06-24 DIAGNOSIS — I10 PRIMARY HYPERTENSION: ICD-10-CM

## 2024-06-24 DIAGNOSIS — E78.01 FAMILIAL HYPERCHOLESTEROLEMIA: ICD-10-CM

## 2024-06-24 DIAGNOSIS — I25.10 CORONARY ARTERY DISEASE INVOLVING NATIVE CORONARY ARTERY OF NATIVE HEART WITHOUT ANGINA PECTORIS: ICD-10-CM

## 2024-06-24 DIAGNOSIS — I48.0 PAROXYSMAL ATRIAL FIBRILLATION (HCC): Primary | ICD-10-CM

## 2024-06-24 PROCEDURE — G8417 CALC BMI ABV UP PARAM F/U: HCPCS | Performed by: NUCLEAR MEDICINE

## 2024-06-24 PROCEDURE — 1036F TOBACCO NON-USER: CPT | Performed by: NUCLEAR MEDICINE

## 2024-06-24 PROCEDURE — G8428 CUR MEDS NOT DOCUMENT: HCPCS | Performed by: NUCLEAR MEDICINE

## 2024-06-24 PROCEDURE — 99214 OFFICE O/P EST MOD 30 MIN: CPT | Performed by: NUCLEAR MEDICINE

## 2024-06-24 PROCEDURE — 1123F ACP DISCUSS/DSCN MKR DOCD: CPT | Performed by: NUCLEAR MEDICINE

## 2024-06-24 RX ORDER — ACETAMINOPHEN 325 MG/1
650 TABLET ORAL 4 TIMES DAILY
COMMUNITY

## 2024-06-24 RX ORDER — ATORVASTATIN CALCIUM 40 MG/1
40 TABLET, FILM COATED ORAL NIGHTLY
COMMUNITY
End: 2024-06-24 | Stop reason: CLARIF

## 2024-06-24 RX ORDER — SENNOSIDES A AND B 8.6 MG/1
1 TABLET, FILM COATED ORAL DAILY
COMMUNITY

## 2024-06-24 RX ORDER — ESCITALOPRAM OXALATE 10 MG/1
10 TABLET ORAL DAILY
COMMUNITY

## 2024-06-24 RX ORDER — IPRATROPIUM BROMIDE AND ALBUTEROL SULFATE 2.5; .5 MG/3ML; MG/3ML
1 SOLUTION RESPIRATORY (INHALATION) EVERY 6 HOURS PRN
COMMUNITY

## 2024-06-24 RX ORDER — ALLOPURINOL 100 MG/1
50 TABLET ORAL DAILY
COMMUNITY
End: 2024-06-24 | Stop reason: CLARIF

## 2024-06-24 RX ORDER — M-VIT,TX,IRON,MINS/CALC/FOLIC 27MG-0.4MG
1 TABLET ORAL DAILY
COMMUNITY

## 2024-06-24 RX ORDER — ONDANSETRON 4 MG/1
4 TABLET, FILM COATED ORAL EVERY 8 HOURS PRN
COMMUNITY

## 2024-06-24 RX ORDER — PREDNISONE 20 MG/1
20 TABLET ORAL DAILY
COMMUNITY

## 2024-06-24 RX ORDER — POLYETHYLENE GLYCOL 3350 17 G/17G
17 POWDER, FOR SOLUTION ORAL DAILY
COMMUNITY

## 2024-06-24 ASSESSMENT — ENCOUNTER SYMPTOMS
ABDOMINAL DISTENTION: 0
CONSTIPATION: 0
VOMITING: 0
ANAL BLEEDING: 0
PHOTOPHOBIA: 0
BLOOD IN STOOL: 0
BACK PAIN: 1
SHORTNESS OF BREATH: 1
DIARRHEA: 0
COLOR CHANGE: 0
CHEST TIGHTNESS: 0
ABDOMINAL PAIN: 0
NAUSEA: 0
RECTAL PAIN: 0

## 2024-06-24 NOTE — PROGRESS NOTES
New patient from Dr. Cuenca.  Patient was given wrong medication list [facility].  [facility]   
Received medication list from nursing home.  Medication list updated.   
  Neurological:      Mental Status: He is alert. He is disoriented.      Cranial Nerves: No cranial nerve deficit.      Sensory: No sensory deficit.      Motor: No weakness.      Coordination: Coordination normal.      Gait: Gait normal.      Deep Tendon Reflexes: Reflexes normal.       /62   Pulse 60     Assessment:  Assessment & Plan    Diagnosis Orders   1. Paroxysmal atrial fibrillation (HCC)        2. Primary hypertension        3. Familial hypercholesterolemia        4. Coronary artery disease involving native coronary artery of native heart without angina pectoris        As above  Complicated patient with minimal information   Especially from [facility] surgery for some aneurysm       Plan:  No follow-ups on file.  As above  Will try to get records  Continue risk factor modification and medical management  Thank you for allowing me to participate in the care of your patient. Please don't hesitate to contact me regarding any further issues related to the patient care    Orders Placed:  No orders of the defined types were placed in this encounter.      Medications Prescribed:  No orders of the defined types were placed in this encounter.         Discussed use, benefit, and side effects of prescribed medications. All patient questions answered. Pt voicedunderstanding. Instructed to continue current medications, diet and exercise. Continue risk factor modification and medical management. Patient agreed with treatment plan. Follow up as directed.    Electronically signedby Krysta Rodriguez MD on 6/24/2024 at 9:57 AM

## 2024-07-16 ENCOUNTER — PROCEDURE VISIT (OUTPATIENT)
Dept: CARDIOLOGY CLINIC | Age: 89
End: 2024-07-16
Payer: MEDICARE

## 2024-07-16 DIAGNOSIS — R55 SYNCOPE, UNSPECIFIED SYNCOPE TYPE: Primary | ICD-10-CM

## 2024-07-16 PROCEDURE — 93298 REM INTERROG DEV EVAL SCRMS: CPT | Performed by: NUCLEAR MEDICINE

## 2024-07-16 NOTE — PROGRESS NOTES
[facility] [employer] [facility]   Patient of Carlita    Current EGM 7-16-24    History of Syncope/AF on Eliquis    Episodes  Tachy 6-19-24

## 2024-07-17 ENCOUNTER — TELEPHONE (OUTPATIENT)
Dept: FAMILY MEDICINE CLINIC | Age: 89
End: 2024-07-17

## 2024-07-17 NOTE — TELEPHONE ENCOUNTER
Could do Monday at 11  Need discharge med list  Thanks!  
Future Appointments   Date Time Provider Department Center   7/22/2024 11:00 AM Juve Mcintosh, DO [facility]am [facility] [place]   8/14/2024  1:00 PM Jonathan Elizabeth PA-C [facility] [place] [facility] [place]   8/29/2024  2:00 PM Juve Mcintosh, DO Fam Med [facility] [place]   3/26/2025  1:00 PM Zain Mcqueen MD [facility] [place]   6/23/2025 11:00 AM Krysta Rodriguez MD N [facility] [place]    Luzma stated they will fax d'c summary and list of meds to office and send them with patient as well  
Pt is getting discharged from [facility] on 7/20/24, Luzma called requesting an appointment with in 7 days of discharge. I did schedule pt for 8/1/24 @ 10:20 am.    Please advise if this appointment date is good.  
[[phone]]

## 2024-07-22 ENCOUNTER — TELEPHONE (OUTPATIENT)
Dept: FAMILY MEDICINE CLINIC | Age: 89
End: 2024-07-22

## 2024-07-22 ENCOUNTER — CARE COORDINATION (OUTPATIENT)
Dept: CARE COORDINATION | Age: 89
End: 2024-07-22

## 2024-07-22 NOTE — CARE COORDINATION
PCP referral for support.  Stone is currently admitted to [facility] and is planning discharge to home today.  PCP is asking for ACM to help arrange transportation to PCP appt scheduled for Monday July 29th at 11:00am.    Spoke with staff at [facility] to inquire about how they will be transferring him to home.  I spoke with Luzma ALBERTS from [facility].  She states that they ordered a standing lift for him at home and also ordering [facility] for nursing, aide and therapy support. She informed me that they are taking him home in their bus as he is wheelchair transport.  Plan is for discharge today.    I called [facility] to arrange transportation.  They informed me that because he is not Medicaid, there will be a cost for wheelchair transportation which will be $107 round trip.  Wife would like to look at other options.     I called [facility].  Provided them with the information needed. They are going to  call wife and get intake information.  States they should be able to transport with no issues.

## 2024-07-22 NOTE — TELEPHONE ENCOUNTER
Noted.  I have set up transportation for his appt for Monday July 29th at 11 am with [facility].  There will be no out of pocket expense and they are able to complete the wheelchair transport.  Thank you!

## 2024-07-22 NOTE — TELEPHONE ENCOUNTER
Pt is being d/c'd from [facility]  Appt rescheduled for next Monday  Future Appointments   Date Time Provider Department Center   7/29/2024 11:00 AM Juve Mcintosh, DO [facility]am [facility] [place]   8/14/2024  1:00 PM Jonathan Elizabeth PA-C [facility] [place] [facility] [place]   8/29/2024  2:00 PM Juve Mcintosh, DO [facility]am [facility] [place]   3/26/2025  1:00 PM Zain Mcqueen MD [facility] [place]   6/23/2025 11:00 AM Krysta Rodriguez MD [facility] - [place]          Kecia, this pt will need transportation he is a 3 person assist   sit to stand    Dr Livingston wanted me to reach out to you to see if you can help with transportation       No

## 2024-07-23 ENCOUNTER — TELEPHONE (OUTPATIENT)
Dept: FAMILY MEDICINE CLINIC | Age: 89
End: 2024-07-23

## 2024-07-23 NOTE — TELEPHONE ENCOUNTER
Anju from [facility] called and stated she admitted him for PT, OT and SN. POC will be faxed over to be signed. Was unsure how things will go at home due to him being a lot of care.

## 2024-07-23 NOTE — TELEPHONE ENCOUNTER
Care Transitions Initial Follow Up Call    Outreach made within 2 business days of discharge: Yes    Patient: Stone Sharp Patient : 1935   MRN: [id number]  Reason for Admission:   Discharge Date: 3/16/24       Spoke with: Stone    Discharge department/facility: [facility] Interactive Patient Contact:  Was patient able to fill all prescriptions: Yes  Was patient instructed to bring all medications to the follow-up visit: Yes  Is patient taking all medications as directed in the discharge summary? Yes  Does patient understand their discharge instructions: Yes  Does patient have questions or concerns that need addressed prior to 7-14 day follow up office visit: no    Scheduled appointment with PCP within 7-14 days    Follow Up  Future Appointments   Date Time Provider Department Center   2024 11:00 AM Juve Mcintosh, DO [facility] [place]   2024  1:00 PM Jonathan Elizabeth PA-C [facility] [place] [facility] [place]   2024  2:00 PM Juve Mcintosh, DO [facility] [place]   3/26/2025  1:00 PM Zain Mcqueen MD [facility] [place]   2025 11:00 AM Krysta Rodriguez MD [facility] - [place]       Kerry Truong LPN

## 2024-07-27 ENCOUNTER — APPOINTMENT (OUTPATIENT)
Dept: MRI IMAGING | Age: 89
End: 2024-07-27
Payer: MEDICARE

## 2024-07-27 ENCOUNTER — APPOINTMENT (OUTPATIENT)
Dept: CT IMAGING | Age: 89
End: 2024-07-27
Payer: MEDICARE

## 2024-07-27 ENCOUNTER — HOSPITAL ENCOUNTER (INPATIENT)
Age: 89
LOS: 5 days | Discharge: HOME | End: 2024-08-01
Attending: EMERGENCY MEDICINE | Admitting: STUDENT IN AN ORGANIZED HEALTH CARE EDUCATION/TRAINING PROGRAM
Payer: MEDICARE

## 2024-07-27 ENCOUNTER — APPOINTMENT (OUTPATIENT)
Dept: GENERAL RADIOLOGY | Age: 89
End: 2024-07-27
Payer: MEDICARE

## 2024-07-27 DIAGNOSIS — Z95.828 PRESENCE OF IVC FILTER: Chronic | ICD-10-CM

## 2024-07-27 DIAGNOSIS — N39.0 COMPLICATED UTI (URINARY TRACT INFECTION): ICD-10-CM

## 2024-07-27 DIAGNOSIS — R60.0 BILATERAL LEG EDEMA: ICD-10-CM

## 2024-07-27 DIAGNOSIS — R06.02 SHORTNESS OF BREATH: Primary | ICD-10-CM

## 2024-07-27 DIAGNOSIS — I50.33 ACUTE ON CHRONIC HEART FAILURE WITH PRESERVED EJECTION FRACTION (HCC): Chronic | ICD-10-CM

## 2024-07-27 PROBLEM — G93.40 ENCEPHALOPATHY: Status: ACTIVE | Noted: 2024-07-27

## 2024-07-27 LAB
ALBUMIN SERPL BCG-MCNC: 2.5 G/DL (ref 3.5–5.1)
ALBUMIN SERPL BCG-MCNC: 2.9 G/DL (ref 3.5–5.1)
ALP SERPL-CCNC: 39 U/L (ref 38–126)
ALP SERPL-CCNC: 49 U/L (ref 38–126)
ALT SERPL W/O P-5'-P-CCNC: 16 U/L (ref 11–66)
ALT SERPL W/O P-5'-P-CCNC: 20 U/L (ref 11–66)
AMMONIA PLAS-MCNC: 37 UMOL/L (ref 11–60)
ANION GAP SERPL CALC-SCNC: 12 MEQ/L (ref 8–16)
ANION GAP SERPL CALC-SCNC: 5 MEQ/L (ref 8–16)
APTT PPP: 28 SECONDS (ref 22–38)
AST SERPL-CCNC: 15 U/L (ref 5–40)
AST SERPL-CCNC: 19 U/L (ref 5–40)
BACTERIA: ABNORMAL
BASE EXCESS BLDA CALC-SCNC: 2.9 MMOL/L (ref -2–3)
BASOPHILS ABSOLUTE: 0 THOU/MM3 (ref 0–0.1)
BASOPHILS NFR BLD AUTO: 0.5 %
BILIRUB SERPL-MCNC: 0.8 MG/DL (ref 0.3–1.2)
BILIRUB SERPL-MCNC: 1.1 MG/DL (ref 0.3–1.2)
BILIRUB UR QL STRIP: NEGATIVE
BUN SERPL-MCNC: 16 MG/DL (ref 7–22)
BUN SERPL-MCNC: 17 MG/DL (ref 7–22)
CALCIUM SERPL-MCNC: 8.1 MG/DL (ref 8.5–10.5)
CALCIUM SERPL-MCNC: 8.7 MG/DL (ref 8.5–10.5)
CASTS #/AREA URNS LPF: ABNORMAL /LPF
CASTS #/AREA URNS LPF: ABNORMAL /LPF
CHARACTER UR: ABNORMAL
CHARCOAL URNS QL MICRO: ABNORMAL
CHLORIDE SERPL-SCNC: 102 MEQ/L (ref 98–111)
CHLORIDE SERPL-SCNC: 104 MEQ/L (ref 98–111)
CK SERPL-CCNC: 60 U/L (ref 55–170)
CO2 SERPL-SCNC: 27 MEQ/L (ref 23–33)
CO2 SERPL-SCNC: 31 MEQ/L (ref 23–33)
COLLECTED BY:: ABNORMAL
COLOR UR: YELLOW
CREAT SERPL-MCNC: 1.3 MG/DL (ref 0.4–1.2)
CREAT SERPL-MCNC: 1.3 MG/DL (ref 0.4–1.2)
CRYSTALS URNS QL MICRO: ABNORMAL
DEPRECATED RDW RBC AUTO: 56.6 FL (ref 35–45)
DEVICE: ABNORMAL
EKG ATRIAL RATE: 76 BPM
EKG P AXIS: 72 DEGREES
EKG P-R INTERVAL: 174 MS
EKG Q-T INTERVAL: 372 MS
EKG QRS DURATION: 74 MS
EKG QTC CALCULATION (BAZETT): 418 MS
EKG R AXIS: 4 DEGREES
EKG T AXIS: 147 DEGREES
EKG VENTRICULAR RATE: 76 BPM
EOSINOPHIL NFR BLD AUTO: 0.7 %
EOSINOPHILS ABSOLUTE: 0 THOU/MM3 (ref 0–0.4)
EPITHELIAL CELLS, UA: ABNORMAL /HPF
ERYTHROCYTE [DISTWIDTH] IN BLOOD BY AUTOMATED COUNT: 15.2 % (ref 11.5–14.5)
ETHANOL SERPL-MCNC: < 0.01 % (ref 0–0.08)
GFR SERPL CREATININE-BSD FRML MDRD: 53 ML/MIN/1.73M2
GFR SERPL CREATININE-BSD FRML MDRD: 53 ML/MIN/1.73M2
GLUCOSE SERPL-MCNC: 101 MG/DL (ref 70–108)
GLUCOSE SERPL-MCNC: 101 MG/DL (ref 70–108)
GLUCOSE UR QL STRIP.AUTO: NEGATIVE MG/DL
HCO3 BLDA-SCNC: 29 MMOL/L (ref 23–28)
HCT VFR BLD AUTO: 40.7 % (ref 42–52)
HGB BLD-MCNC: 12 GM/DL (ref 14–18)
HGB UR QL STRIP.AUTO: ABNORMAL
IMM GRANULOCYTES # BLD AUTO: 0.21 THOU/MM3 (ref 0–0.07)
IMM GRANULOCYTES NFR BLD AUTO: 3.4 %
INR PPP: 1.7 (ref 0.85–1.13)
KETONES UR QL STRIP.AUTO: NEGATIVE
LACTATE SERPL-SCNC: 1.5 MMOL/L (ref 0.5–2)
LACTIC ACID, SEPSIS: 2.9 MMOL/L (ref 0.5–1.9)
LEUKOCYTE ESTERASE UR QL STRIP.AUTO: ABNORMAL
LYMPHOCYTES ABSOLUTE: 0.6 THOU/MM3 (ref 1–4.8)
LYMPHOCYTES NFR BLD AUTO: 10.2 %
MAGNESIUM SERPL-MCNC: 2 MG/DL (ref 1.6–2.4)
MAGNESIUM SERPL-MCNC: 2 MG/DL (ref 1.6–2.4)
MCH RBC QN AUTO: 29.9 PG (ref 26–33)
MCHC RBC AUTO-ENTMCNC: 29.5 GM/DL (ref 32.2–35.5)
MCV RBC AUTO: 101.5 FL (ref 80–94)
MONOCYTES ABSOLUTE: 0.3 THOU/MM3 (ref 0.4–1.3)
MONOCYTES NFR BLD AUTO: 4.9 %
NEUTROPHILS ABSOLUTE: 4.9 THOU/MM3 (ref 1.8–7.7)
NEUTROPHILS NFR BLD AUTO: 80.3 %
NITRITE UR QL STRIP.AUTO: NEGATIVE
NRBC BLD AUTO-RTO: 0 /100 WBC
NT-PROBNP SERPL IA-MCNC: 2471 PG/ML (ref 0–449)
OSMOLALITY SERPL CALC.SUM OF ELEC: 277.4 MOSMOL/KG (ref 275–300)
OSMOLALITY SERPL CALC.SUM OF ELEC: 286.3 MOSMOL/KG (ref 275–300)
PCO2 TEMP ADJ BLDMV: 47 MMHG (ref 41–51)
PH BLDMV: 7.4 [PH] (ref 7.31–7.41)
PH UR STRIP.AUTO: 8 [PH] (ref 5–9)
PLATELET # BLD AUTO: 107 THOU/MM3 (ref 130–400)
PMV BLD AUTO: 10.5 FL (ref 9.4–12.4)
PO2 BLDMV: 29 MMHG (ref 25–40)
POC CREATININE WHOLE BLOOD: 1 MG/DL (ref 0.5–1.2)
POTASSIUM SERPL-SCNC: 3.5 MEQ/L (ref 3.5–5.2)
POTASSIUM SERPL-SCNC: 3.9 MEQ/L (ref 3.5–5.2)
PROT SERPL-MCNC: 4.3 G/DL (ref 6.1–8)
PROT SERPL-MCNC: 5.5 G/DL (ref 6.1–8)
PROT UR STRIP.AUTO-MCNC: 30 MG/DL
RBC # BLD AUTO: 4.01 MILL/MM3 (ref 4.7–6.1)
RBC #/AREA URNS HPF: ABNORMAL /HPF
RENAL EPI CELLS #/AREA URNS HPF: ABNORMAL /[HPF]
SAO2 % BLDMV: 53 %
SITE: ABNORMAL
SODIUM SERPL-SCNC: 138 MEQ/L (ref 135–145)
SODIUM SERPL-SCNC: 143 MEQ/L (ref 135–145)
SP GR UR REFRACT.AUTO: > 1.03 (ref 1–1.03)
T4 FREE SERPL-MCNC: 1.58 NG/DL (ref 0.93–1.68)
TROPONIN, HIGH SENSITIVITY: 71 NG/L (ref 0–12)
TROPONIN, HIGH SENSITIVITY: 82 NG/L (ref 0–12)
TSH SERPL DL<=0.005 MIU/L-ACNC: 1.74 UIU/ML (ref 0.4–4.2)
UROBILINOGEN UR QL STRIP.AUTO: 1 EU/DL (ref 0–1)
VENTILATION MODE VENT: ABNORMAL
WBC # BLD AUTO: 6.1 THOU/MM3 (ref 4.8–10.8)
WBC #/AREA URNS HPF: ABNORMAL /HPF
YEAST LIKE FUNGI URNS QL MICRO: ABNORMAL

## 2024-07-27 PROCEDURE — 82565 ASSAY OF CREATININE: CPT

## 2024-07-27 PROCEDURE — 87186 SC STD MICRODIL/AGAR DIL: CPT

## 2024-07-27 PROCEDURE — 85610 PROTHROMBIN TIME: CPT

## 2024-07-27 PROCEDURE — 99223 1ST HOSP IP/OBS HIGH 75: CPT | Performed by: STUDENT IN AN ORGANIZED HEALTH CARE EDUCATION/TRAINING PROGRAM

## 2024-07-27 PROCEDURE — 6370000000 HC RX 637 (ALT 250 FOR IP)

## 2024-07-27 PROCEDURE — 80053 COMPREHEN METABOLIC PANEL: CPT

## 2024-07-27 PROCEDURE — 81001 URINALYSIS AUTO W/SCOPE: CPT

## 2024-07-27 PROCEDURE — 71045 X-RAY EXAM CHEST 1 VIEW: CPT

## 2024-07-27 PROCEDURE — 87040 BLOOD CULTURE FOR BACTERIA: CPT

## 2024-07-27 PROCEDURE — 36415 COLL VENOUS BLD VENIPUNCTURE: CPT

## 2024-07-27 PROCEDURE — 85730 THROMBOPLASTIN TIME PARTIAL: CPT

## 2024-07-27 PROCEDURE — 87077 CULTURE AEROBIC IDENTIFY: CPT

## 2024-07-27 PROCEDURE — 85025 COMPLETE CBC W/AUTO DIFF WBC: CPT

## 2024-07-27 PROCEDURE — 99285 EMERGENCY DEPT VISIT HI MDM: CPT

## 2024-07-27 PROCEDURE — 6360000004 HC RX CONTRAST MEDICATION: Performed by: INTERNAL MEDICINE

## 2024-07-27 PROCEDURE — 82077 ASSAY SPEC XCP UR&BREATH IA: CPT

## 2024-07-27 PROCEDURE — 87086 URINE CULTURE/COLONY COUNT: CPT

## 2024-07-27 PROCEDURE — 84439 ASSAY OF FREE THYROXINE: CPT

## 2024-07-27 PROCEDURE — 70498 CT ANGIOGRAPHY NECK: CPT

## 2024-07-27 PROCEDURE — 94640 AIRWAY INHALATION TREATMENT: CPT

## 2024-07-27 PROCEDURE — 6360000002 HC RX W HCPCS: Performed by: EMERGENCY MEDICINE

## 2024-07-27 PROCEDURE — 84484 ASSAY OF TROPONIN QUANT: CPT

## 2024-07-27 PROCEDURE — 70551 MRI BRAIN STEM W/O DYE: CPT

## 2024-07-27 PROCEDURE — 83735 ASSAY OF MAGNESIUM: CPT

## 2024-07-27 PROCEDURE — 83605 ASSAY OF LACTIC ACID: CPT

## 2024-07-27 PROCEDURE — 1200000003 HC TELEMETRY R&B

## 2024-07-27 PROCEDURE — 84443 ASSAY THYROID STIM HORMONE: CPT

## 2024-07-27 PROCEDURE — 70496 CT ANGIOGRAPHY HEAD: CPT

## 2024-07-27 PROCEDURE — 82550 ASSAY OF CK (CPK): CPT

## 2024-07-27 PROCEDURE — 1200000000 HC SEMI PRIVATE

## 2024-07-27 PROCEDURE — 70450 CT HEAD/BRAIN W/O DYE: CPT

## 2024-07-27 PROCEDURE — 83880 ASSAY OF NATRIURETIC PEPTIDE: CPT

## 2024-07-27 PROCEDURE — 82140 ASSAY OF AMMONIA: CPT

## 2024-07-27 PROCEDURE — 6360000002 HC RX W HCPCS

## 2024-07-27 PROCEDURE — 93005 ELECTROCARDIOGRAM TRACING: CPT

## 2024-07-27 PROCEDURE — 93005 ELECTROCARDIOGRAM TRACING: CPT | Performed by: EMERGENCY MEDICINE

## 2024-07-27 PROCEDURE — 2700000000 HC OXYGEN THERAPY PER DAY

## 2024-07-27 PROCEDURE — 82803 BLOOD GASES ANY COMBINATION: CPT

## 2024-07-27 PROCEDURE — 93010 ELECTROCARDIOGRAM REPORT: CPT | Performed by: INTERNAL MEDICINE

## 2024-07-27 PROCEDURE — 2580000003 HC RX 258: Performed by: EMERGENCY MEDICINE

## 2024-07-27 RX ORDER — ENOXAPARIN SODIUM 100 MG/ML
40 INJECTION SUBCUTANEOUS EVERY 24 HOURS
Status: DISCONTINUED | OUTPATIENT
Start: 2024-07-27 | End: 2024-07-27

## 2024-07-27 RX ORDER — SODIUM CHLORIDE 9 MG/ML
INJECTION, SOLUTION INTRAVENOUS CONTINUOUS
Status: DISCONTINUED | OUTPATIENT
Start: 2024-07-27 | End: 2024-07-27

## 2024-07-27 RX ORDER — SODIUM CHLORIDE 0.9 % (FLUSH) 0.9 %
5-40 SYRINGE (ML) INJECTION PRN
Status: DISCONTINUED | OUTPATIENT
Start: 2024-07-27 | End: 2024-08-01 | Stop reason: HOSPADM

## 2024-07-27 RX ORDER — POTASSIUM CHLORIDE 20 MEQ/1
40 TABLET, EXTENDED RELEASE ORAL PRN
Status: DISCONTINUED | OUTPATIENT
Start: 2024-07-27 | End: 2024-08-01 | Stop reason: HOSPADM

## 2024-07-27 RX ORDER — POTASSIUM CHLORIDE 7.45 MG/ML
10 INJECTION INTRAVENOUS PRN
Status: DISCONTINUED | OUTPATIENT
Start: 2024-07-27 | End: 2024-08-01 | Stop reason: HOSPADM

## 2024-07-27 RX ORDER — FUROSEMIDE 10 MG/ML
40 INJECTION INTRAMUSCULAR; INTRAVENOUS DAILY
Status: DISCONTINUED | OUTPATIENT
Start: 2024-07-27 | End: 2024-07-29

## 2024-07-27 RX ORDER — 0.9 % SODIUM CHLORIDE 0.9 %
500 INTRAVENOUS SOLUTION INTRAVENOUS ONCE
Status: DISCONTINUED | OUTPATIENT
Start: 2024-07-27 | End: 2024-07-27

## 2024-07-27 RX ORDER — TAMSULOSIN HYDROCHLORIDE 0.4 MG/1
0.4 CAPSULE ORAL DAILY
Status: DISCONTINUED | OUTPATIENT
Start: 2024-07-28 | End: 2024-08-01 | Stop reason: HOSPADM

## 2024-07-27 RX ORDER — OXYBUTYNIN CHLORIDE 5 MG/1
5 TABLET ORAL 2 TIMES DAILY
Status: DISCONTINUED | OUTPATIENT
Start: 2024-07-27 | End: 2024-08-01 | Stop reason: HOSPADM

## 2024-07-27 RX ORDER — IPRATROPIUM BROMIDE AND ALBUTEROL SULFATE 2.5; .5 MG/3ML; MG/3ML
1 SOLUTION RESPIRATORY (INHALATION) EVERY 6 HOURS PRN
Status: DISCONTINUED | OUTPATIENT
Start: 2024-07-27 | End: 2024-07-28

## 2024-07-27 RX ORDER — ACETAMINOPHEN 650 MG/1
650 SUPPOSITORY RECTAL EVERY 6 HOURS PRN
Status: DISCONTINUED | OUTPATIENT
Start: 2024-07-27 | End: 2024-08-01 | Stop reason: HOSPADM

## 2024-07-27 RX ORDER — ESCITALOPRAM OXALATE 10 MG/1
10 TABLET ORAL DAILY
Status: DISCONTINUED | OUTPATIENT
Start: 2024-07-28 | End: 2024-08-01 | Stop reason: HOSPADM

## 2024-07-27 RX ORDER — ALBUTEROL SULFATE 90 UG/1
1 AEROSOL, METERED RESPIRATORY (INHALATION) EVERY 6 HOURS PRN
Status: DISCONTINUED | OUTPATIENT
Start: 2024-07-27 | End: 2024-08-01 | Stop reason: HOSPADM

## 2024-07-27 RX ORDER — ONDANSETRON 4 MG/1
4 TABLET, ORALLY DISINTEGRATING ORAL EVERY 8 HOURS PRN
Status: DISCONTINUED | OUTPATIENT
Start: 2024-07-27 | End: 2024-08-01 | Stop reason: HOSPADM

## 2024-07-27 RX ORDER — FINASTERIDE 5 MG/1
5 TABLET, FILM COATED ORAL DAILY
Status: DISCONTINUED | OUTPATIENT
Start: 2024-07-28 | End: 2024-08-01 | Stop reason: HOSPADM

## 2024-07-27 RX ORDER — ACETAMINOPHEN 325 MG/1
650 TABLET ORAL EVERY 6 HOURS PRN
Status: DISCONTINUED | OUTPATIENT
Start: 2024-07-27 | End: 2024-08-01 | Stop reason: HOSPADM

## 2024-07-27 RX ORDER — ONDANSETRON 2 MG/ML
4 INJECTION INTRAMUSCULAR; INTRAVENOUS EVERY 6 HOURS PRN
Status: DISCONTINUED | OUTPATIENT
Start: 2024-07-27 | End: 2024-08-01 | Stop reason: HOSPADM

## 2024-07-27 RX ORDER — SODIUM CHLORIDE 9 MG/ML
INJECTION, SOLUTION INTRAVENOUS PRN
Status: DISCONTINUED | OUTPATIENT
Start: 2024-07-27 | End: 2024-08-01 | Stop reason: HOSPADM

## 2024-07-27 RX ORDER — AMLODIPINE BESYLATE 5 MG/1
5 TABLET ORAL DAILY
Status: DISCONTINUED | OUTPATIENT
Start: 2024-07-28 | End: 2024-08-01 | Stop reason: HOSPADM

## 2024-07-27 RX ORDER — 0.9 % SODIUM CHLORIDE 0.9 %
1000 INTRAVENOUS SOLUTION INTRAVENOUS ONCE
Status: COMPLETED | OUTPATIENT
Start: 2024-07-27 | End: 2024-07-27

## 2024-07-27 RX ORDER — MAGNESIUM SULFATE IN WATER 40 MG/ML
2000 INJECTION, SOLUTION INTRAVENOUS PRN
Status: DISCONTINUED | OUTPATIENT
Start: 2024-07-27 | End: 2024-08-01 | Stop reason: HOSPADM

## 2024-07-27 RX ORDER — ATORVASTATIN CALCIUM 10 MG/1
10 TABLET, FILM COATED ORAL NIGHTLY
Status: DISCONTINUED | OUTPATIENT
Start: 2024-07-27 | End: 2024-08-01 | Stop reason: HOSPADM

## 2024-07-27 RX ORDER — METOPROLOL TARTRATE 100 MG/1
100 TABLET ORAL 2 TIMES DAILY
Status: DISCONTINUED | OUTPATIENT
Start: 2024-07-27 | End: 2024-08-01 | Stop reason: HOSPADM

## 2024-07-27 RX ORDER — SODIUM CHLORIDE 0.9 % (FLUSH) 0.9 %
5-40 SYRINGE (ML) INJECTION EVERY 12 HOURS SCHEDULED
Status: DISCONTINUED | OUTPATIENT
Start: 2024-07-27 | End: 2024-08-01 | Stop reason: HOSPADM

## 2024-07-27 RX ORDER — POLYETHYLENE GLYCOL 3350 17 G/17G
17 POWDER, FOR SOLUTION ORAL DAILY PRN
Status: DISCONTINUED | OUTPATIENT
Start: 2024-07-27 | End: 2024-08-01 | Stop reason: HOSPADM

## 2024-07-27 RX ADMIN — FUROSEMIDE 40 MG: 10 INJECTION, SOLUTION INTRAMUSCULAR; INTRAVENOUS at 19:41

## 2024-07-27 RX ADMIN — MOMETASONE FUROATE AND FORMOTEROL FUMARATE DIHYDRATE 2 PUFF: 100; 5 AEROSOL RESPIRATORY (INHALATION) at 21:49

## 2024-07-27 RX ADMIN — CEFTRIAXONE SODIUM 2000 MG: 2 INJECTION, POWDER, FOR SOLUTION INTRAMUSCULAR; INTRAVENOUS at 17:00

## 2024-07-27 RX ADMIN — IOPAMIDOL 80 ML: 755 INJECTION, SOLUTION INTRAVENOUS at 14:17

## 2024-07-27 RX ADMIN — SODIUM CHLORIDE: 9 INJECTION, SOLUTION INTRAVENOUS at 16:59

## 2024-07-27 RX ADMIN — SODIUM CHLORIDE 1000 ML: 9 INJECTION, SOLUTION INTRAVENOUS at 14:50

## 2024-07-27 ASSESSMENT — PAIN - FUNCTIONAL ASSESSMENT
PAIN_FUNCTIONAL_ASSESSMENT: NONE - DENIES PAIN
PAIN_FUNCTIONAL_ASSESSMENT: NONE - DENIES PAIN

## 2024-07-27 NOTE — ED NOTES
ED to inpatient nurses report      Chief Complaint:  Chief Complaint   Patient presents with    Altered Mental Status    Extremity Weakness     Present to ED from: Home    MOA:     LOC: alert and orientated to name and place  Mobility: Fully dependent  Oxygen Baseline: Room air    Current needs required: 2L NC     Code Status:   Prior    What abnormal results were found and what did you give/do to treat them? UTI; antibiotics   Any procedures or intervention occur? N/a    Mental Status:  Level of Consciousness: Alert (0)    Psych Assessment:        Vitals:  Patient Vitals for the past 24 hrs:   BP Temp Temp src Pulse Resp SpO2 Weight   07/27/24 1701 123/77 -- -- 84 20 100 % --   07/27/24 1613 125/62 -- -- 71 18 97 % --   07/27/24 1541 (!) 135/56 -- -- 69 18 97 % --   07/27/24 1446 (!) 126/56 -- -- 65 21 99 % --   07/27/24 1435 102/76 -- -- -- -- -- --   07/27/24 1423 105/78 -- -- 71 -- -- --   07/27/24 1415 (!) 91/47 -- -- -- -- -- --   07/27/24 1410 -- -- -- -- -- -- 94.3 kg (208 lb)   07/27/24 1408 (!) 76/65 97.7 °F (36.5 °C) Axillary 62 18 99 % --        LDAs:   Peripheral IV 07/27/24 Left;Dorsal Forearm (Active)   Site Assessment Clean, dry & intact 07/27/24 1702   Line Status Infusing 07/27/24 1702   Phlebitis Assessment No symptoms 07/27/24 1702   Infiltration Assessment 0 07/27/24 1702       Peripheral IV 07/27/24 Posterior;Right Hand (Active)   Site Assessment Clean, dry & intact 07/27/24 1702   Line Status Normal saline locked 07/27/24 1702   Phlebitis Assessment No symptoms 07/27/24 1702   Infiltration Assessment 0 07/27/24 1702       Ambulatory Status:  No data recorded    Diagnosis:  DISPOSITION Admitted 07/27/2024 04:28:02 PM   Final diagnoses:   None        Consults:  IP CONSULT TO NEUROLOGY     Pain Score:  Pain Assessment  Pain Assessment: None - Denies Pain    C-SSRS:        Sepsis Screening:       Kailey Fall Risk:       Swallow Screening        Preferred Language:   English      ALLERGIES

## 2024-07-27 NOTE — H&P
HISTORY & PHYSICAL       Patient:  Stone Sharp  YOB: 1935    MRN: [id number]     Acct: [id number]    PCP: Juve Mcintosh DO    Date of Admission: 7/27/2024    Date of Service: Pt seen/examined on 7/27 and Admitted to Inpatient with expected LOS greater than two midnights due to medical therapy.    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 will follow-up formal reading  MRI brain 7/27 wet read: Encephalomalacia of right temporal lobe from an old infarct, generalized atrophy, no acute ischemic infarct.  Will follow-up formal reading  S/p NS bolus x 1 in the ED  Afebrile, no white count, sugars normal  Ammonia normal  LFTs normal  CK normal  UA: 30 protein, moderate LE,  WBC, 50-75 RBC, many bacteria  Follow-up CT A/P  Follow-up urine culture  Follow-up blood cultures x 2  Follow-up respiratory culture  Follow-up pneumonia panel  Follow-up TSH, T4  As patient came in altered will do bedside swallow eval every shift and if patient passes will advance to dysphagia diet and advance as tolerated after that.  Follow-up palliative care eval for goals of care    Acute CHF exacerbation  Patient is hypervolemic on exam, crackles heard throughout, chest x-ray concerning for vascular congestion  Echo 8/12/2022: EF 50%, grade 1 diastolic dysfunction  BNP 2471 compared to 430.7 on 8/11/2022  Will start patient on IV Lasix daily  Will get a bladder scan every 6 hours for the next 24 hours  Follow-up echo  Hold off on IVF pending  senescent changes of the brain including generalized atrophy   and chronic microvascular ischemic disease in the white matter.      Case was discussed by Dr. Garcia with Cy Jung at 2:23 p.m. 7/27/2024 via   telephone.         **This report has been created using voice recognition software. It may contain   minor errors which are inherent in voice recognition technology.**      Electronically signed by Dr Ramon Sandhu      CT ABDOMEN PELVIS W IV CONTRAST Additional Contrast? None    (Results Pending)         Thank you Juve Mcintosh DO for the opportunity to be involved in this patient's care.    Electronically signed by Karrie Ng MD on 7/27/2024 at 7:13 PM

## 2024-07-27 NOTE — PLAN OF CARE
Problem: Skin/Tissue Integrity  Goal: Absence of new skin breakdown  Description: 1.  Monitor for areas of redness and/or skin breakdown  2.  Assess vascular access sites hourly  3.  Every 4-6 hours minimum:  Change oxygen saturation probe site  4.  Every 4-6 hours:  If on nasal continuous positive airway pressure, respiratory therapy assess nares and determine need for appliance change or resting period.  Outcome: Progressing     Problem: ABCDS Injury Assessment  Goal: Absence of physical injury  Outcome: Progressing     Problem: Safety - Adult  Goal: Free from fall injury  Outcome: Progressing     Problem: Pain  Goal: Verbalizes/displays adequate comfort level or baseline comfort level  Outcome: Progressing     Problem: Skin/Tissue Integrity - Adult  Goal: Skin integrity remains intact  Outcome: Progressing     Problem: Metabolic/Fluid and Electrolytes - Adult  Goal: Electrolytes maintained within normal limits  Outcome: Progressing      -- DO NOT REPLY / DO NOT REPLY ALL --  -- Message is from Engagement Center Operations (ECO) --    General Patient Message: pt mother phoned in and is is returning RNEden S call.    Please give pt mother a call back at the number listed on record.          Caller Information       Type Contact Phone/Fax    11/28/2023 04:45 PM CST Phone (Incoming) AGNIESZKA JOSE (Mother) [phone]        Alternative phone number: n/a    Can a detailed message be left? Yes    Message Turnaround:     Is it Working Hours? Yes - Working Hours     [place]:    Please give this turnaround time to the caller:   \"This message will be sent to [state Provider's name]. The clinical team will fulfill your request as soon as they review your message.\"

## 2024-07-27 NOTE — ED NOTES
RN started fluids and antibiotics. Pt resting with call light in reach and family at bedside. Pt denies needs

## 2024-07-27 NOTE — CONSULTS
(THERAPEUTIC MULTIVITAMIN-MINERALS) tablet Take 1 tablet by mouth daily      ipratropium 0.5 mg-albuterol 2.5 mg (DUONEB) 0.5-2.5 (3) MG/3ML SOLN nebulizer solution Inhale 3 mLs into the lungs every 6 hours as needed for Shortness of Breath      escitalopram (LEXAPRO) 10 MG tablet Take 1 tablet by mouth daily      polyethylene glycol (MIRALAX) 17 g PACK packet Take 17 g by mouth daily      ondansetron (ZOFRAN) 4 MG tablet Take 1 tablet by mouth every 8 hours as needed for Nausea or Vomiting      predniSONE (DELTASONE) 20 MG tablet Take 1 tablet by mouth daily      senna (SENOKOT) 8.6 MG tablet Take 1 tablet by mouth daily      amLODIPine (NORVASC) 5 MG tablet Take 1 tablet by mouth daily      atorvastatin (LIPITOR) 10 MG tablet Take 1 tablet by mouth at bedtime      lidocaine (LIDODERM) 5 % Place 1 patch onto the skin daily 12 hours on, 12 hours off.      furosemide (LASIX) 20 MG tablet Take 1 tablet by mouth daily      ferrous sulfate (IRON 325) 325 (65 Fe) MG tablet Take 1 tablet by mouth daily (with breakfast)      oxyBUTYnin (DITROPAN) 5 MG tablet Take 1 tablet by mouth 2 times daily 180 tablet 3    finasteride (PROSCAR) 5 MG tablet Take 1 tablet by mouth daily 90 tablet 3    tamsulosin (FLOMAX) 0.4 MG capsule Take 1 capsule by mouth daily 90 capsule 3    apixaban (ELIQUIS) 5 MG TABS tablet Take 1 tablet by mouth in the morning and at bedtime 180 tablet 3    fluticasone-vilanterol (BREO ELLIPTA) 100-25 MCG/INH AEPB inhaler Inhale into the lungs daily      aspirin 81 MG chewable tablet Take 1 tablet by mouth daily 30 tablet 3    albuterol sulfate  (90 Base) MCG/ACT inhaler Inhale 1 puff into the lungs every 6 hours as needed for Wheezing      Cholecalciferol (VITAMIN D3) 5000 UNITS TABS Take 1 tablet by mouth daily         Past History    Past Medical History:   has a past medical history of ASHD (arteriosclerotic heart disease), Atrial fibrillation (HCC), BPH (benign prostatic hyperplasia), CKD (chronic  Exam     Mental Status   Follows 1 step commands.   Attention: decreased. Concentration: decreased.   Speech: slurred   Level of consciousness: alert  Able to repeat.   Alert and oriented to name only. Follows simple commands with repeat prompting and cuing.      Cranial Nerves     CN III, IV, VI   Pupils are equal, round, and reactive to light.  Extraocular motions are normal.     CN V   Facial sensation intact.   Right facial sensation deficit: none  Left facial sensation deficit: none    CN VII   Left facial weakness: central (mild)    CN VIII   CN VIII normal.   Hearing: intact    CN IX, X   CN IX normal.   Palate: symmetric    CN XI   CN XI normal.   Right trapezius strength: normal  Left trapezius strength: normal    CN XII   CN XII normal.   Tongue: not atrophic  Fasciculations: absent  Tongue deviation: none    Blinks to threat in all 4 visual quadrants, bilaterally.      Motor Exam   Muscle bulk: normal  Overall muscle tone: normal  Initially, left upper extremity with mild drift.  However, when repeated in CT, bilateral upper extremities antigravity without drift.  Mildly decreased  strength on the left compared to the right.  No effort against gravity in bilateral lower extremities.  He does withdrawal from noxious stimuli applied to bilateral lower extremities.     Sensory Exam     Denies sensory deficits to light touch bilateral upper and lower extremities.     Gait, Coordination, and Reflexes     Tremor   Resting tremor: absent  Unable to assess coordination secondary to difficulty with multistep commands/mental status.    No adventitious motor movements noted on exam.        Labs/Imaging/Diagnostics   Labs:  CBC:  Recent Labs     07/27/24  1453   WBC 6.1   RBC 4.01*   HGB 12.0*   HCT 40.7*   .5*   *     CHEMISTRIES:  Recent Labs     07/27/24  1437 07/27/24  1453    138   K 3.5 3.9    102   CO2 27 31   BUN 16 17   CREATININE 1.3* 1.3*   GLUCOSE 101 101   MG 2.0 2.0

## 2024-07-27 NOTE — ED PROVIDER NOTES
Lactic Acid, Sepsis 2.9 (*)     All other components within normal limits   BLOOD GAS, VENOUS - Abnormal; Notable for the following components:    HCO3, Mixed 29 (*)     All other components within normal limits   GLOMERULAR FILTRATION RATE, ESTIMATED - Abnormal; Notable for the following components:    Est, Glom Filt Rate 53 (*)     All other components within normal limits   ANION GAP - Abnormal; Notable for the following components:    Anion Gap 5.0 (*)     All other components within normal limits   GLOMERULAR FILTRATION RATE, ESTIMATED - Abnormal; Notable for the following components:    Est, Glom Filt Rate 53 (*)     All other components within normal limits   MICROSCOPIC URINALYSIS - Abnormal; Notable for the following components:    Specific Gravity, UA >1.030 (*)     Blood, Urine LARGE (*)     Protein, UA 30 (*)     Leukocytes, UA MODERATE (*)     Character, Urine TURBID (*)     All other components within normal limits   CULTURE, BLOOD 1   CULTURE, BLOOD 1   APTT   ETHANOL   MAGNESIUM   AMMONIA   CK   ANION GAP   MAGNESIUM   OSMOLALITY   OSMOLALITY   POCT CREATININE   POCT GLUCOSE       EMERGENCY DEPARTMENT COURSE:   Vitals:    Vitals:    07/27/24 1435 07/27/24 1446 07/27/24 1541 07/27/24 1613   BP: 102/76 (!) 126/56 (!) 135/56 125/62   Pulse:  65 69 71   Resp:  21 18 18   Temp:       TempSrc:       SpO2:  99% 97% 97%   Weight:         No definite new infarct seen on the CT imaging done so far.  We are waiting for the MRI results.  Patient has been seen by the stroke team.  This was called out as a stroke alert.  Patient does not qualify for tPA for a myriad of reasons including anticoagulation and prior intracranial hemorrhage, as well as unclear time of onset.  There is also evidence of CHF.  Urine is positive and lactic is elevated.  Sepsis could be a consideration.  Rocephin started.  Case discussed with the hospitalist to arrange for admission      CRITICAL CARE:   CRITICAL CARE: There was a high  1 Principal Discharge DX:	Nausea vomiting and diarrhea

## 2024-07-27 NOTE — PROCEDURES
PROCEDURE NOTE  Date: 7/27/2024   Name: Stone Sharp  YOB: 1935    Procedures        EKG was completed and handed to RN

## 2024-07-27 NOTE — ED TRIAGE NOTES
Pt presents to ED via EMS with AMS and weakness. Stroke alert called in field by EMS. Per EMS, pt would not answer questions. Pt has left extremity weakness as well as a left facial droop. LKW at 12 PM today. Family states his mental status got worse today. Pt has generalized weakness at baseline and does not walk, per family. Family states pt was staring off into space, not responding.  per EMS. Dr. Salcedo and neuro PA at bedside on arrival. Pt taken directly to CT.

## 2024-07-27 NOTE — ED NOTES
Patient transported to [facility] on cart and in stable condition. Contacted floor before transport and spoke with Giovani.

## 2024-07-28 ENCOUNTER — APPOINTMENT (OUTPATIENT)
Dept: CT IMAGING | Age: 89
End: 2024-07-28
Payer: MEDICARE

## 2024-07-28 LAB
ANION GAP SERPL CALC-SCNC: 7 MEQ/L (ref 8–16)
BASOPHILS ABSOLUTE: 0 THOU/MM3 (ref 0–0.1)
BASOPHILS NFR BLD AUTO: 0.6 %
BUN SERPL-MCNC: 15 MG/DL (ref 7–22)
CALCIUM SERPL-MCNC: 7.8 MG/DL (ref 8.5–10.5)
CHLORIDE SERPL-SCNC: 106 MEQ/L (ref 98–111)
CO2 SERPL-SCNC: 27 MEQ/L (ref 23–33)
CREAT SERPL-MCNC: 1 MG/DL (ref 0.4–1.2)
DEPRECATED RDW RBC AUTO: 54.3 FL (ref 35–45)
EKG ATRIAL RATE: 68 BPM
EKG P AXIS: 98 DEGREES
EKG P-R INTERVAL: 174 MS
EKG Q-T INTERVAL: 414 MS
EKG QRS DURATION: 78 MS
EKG QTC CALCULATION (BAZETT): 440 MS
EKG R AXIS: 7 DEGREES
EKG T AXIS: 108 DEGREES
EKG VENTRICULAR RATE: 68 BPM
EOSINOPHIL NFR BLD AUTO: 1.5 %
EOSINOPHILS ABSOLUTE: 0.1 THOU/MM3 (ref 0–0.4)
ERYTHROCYTE [DISTWIDTH] IN BLOOD BY AUTOMATED COUNT: 15.2 % (ref 11.5–14.5)
GFR SERPL CREATININE-BSD FRML MDRD: 73 ML/MIN/1.73M2
GLUCOSE SERPL-MCNC: 83 MG/DL (ref 70–108)
HCT VFR BLD AUTO: 37.5 % (ref 42–52)
HGB BLD-MCNC: 11.6 GM/DL (ref 14–18)
IMM GRANULOCYTES # BLD AUTO: 0.14 THOU/MM3 (ref 0–0.07)
IMM GRANULOCYTES NFR BLD AUTO: 2.9 %
LACTATE SERPL-SCNC: 1.1 MMOL/L (ref 0.5–2)
LACTATE SERPL-SCNC: 1.5 MMOL/L (ref 0.5–2)
LYMPHOCYTES ABSOLUTE: 0.6 THOU/MM3 (ref 1–4.8)
LYMPHOCYTES NFR BLD AUTO: 13 %
MCH RBC QN AUTO: 30.3 PG (ref 26–33)
MCHC RBC AUTO-ENTMCNC: 30.9 GM/DL (ref 32.2–35.5)
MCV RBC AUTO: 97.9 FL (ref 80–94)
MONOCYTES ABSOLUTE: 0.3 THOU/MM3 (ref 0.4–1.3)
MONOCYTES NFR BLD AUTO: 6.7 %
NEUTROPHILS ABSOLUTE: 3.6 THOU/MM3 (ref 1.8–7.7)
NEUTROPHILS NFR BLD AUTO: 75.3 %
NRBC BLD AUTO-RTO: 0 /100 WBC
PLATELET # BLD AUTO: 102 THOU/MM3 (ref 130–400)
PMV BLD AUTO: 10 FL (ref 9.4–12.4)
POTASSIUM SERPL-SCNC: 4 MEQ/L (ref 3.5–5.2)
RBC # BLD AUTO: 3.83 MILL/MM3 (ref 4.7–6.1)
SODIUM SERPL-SCNC: 140 MEQ/L (ref 135–145)
WBC # BLD AUTO: 4.8 THOU/MM3 (ref 4.8–10.8)

## 2024-07-28 PROCEDURE — 6370000000 HC RX 637 (ALT 250 FOR IP)

## 2024-07-28 PROCEDURE — 80048 BASIC METABOLIC PNL TOTAL CA: CPT

## 2024-07-28 PROCEDURE — 51798 US URINE CAPACITY MEASURE: CPT

## 2024-07-28 PROCEDURE — 74177 CT ABD & PELVIS W/CONTRAST: CPT

## 2024-07-28 PROCEDURE — 2700000000 HC OXYGEN THERAPY PER DAY

## 2024-07-28 PROCEDURE — 83605 ASSAY OF LACTIC ACID: CPT

## 2024-07-28 PROCEDURE — 36415 COLL VENOUS BLD VENIPUNCTURE: CPT

## 2024-07-28 PROCEDURE — 99223 1ST HOSP IP/OBS HIGH 75: CPT | Performed by: INTERNAL MEDICINE

## 2024-07-28 PROCEDURE — 94761 N-INVAS EAR/PLS OXIMETRY MLT: CPT

## 2024-07-28 PROCEDURE — 6360000002 HC RX W HCPCS

## 2024-07-28 PROCEDURE — 1200000003 HC TELEMETRY R&B

## 2024-07-28 PROCEDURE — 85025 COMPLETE CBC W/AUTO DIFF WBC: CPT

## 2024-07-28 PROCEDURE — 99232 SBSQ HOSP IP/OBS MODERATE 35: CPT | Performed by: NURSE PRACTITIONER

## 2024-07-28 PROCEDURE — 2580000003 HC RX 258

## 2024-07-28 PROCEDURE — 93005 ELECTROCARDIOGRAM TRACING: CPT

## 2024-07-28 PROCEDURE — 93010 ELECTROCARDIOGRAM REPORT: CPT | Performed by: INTERNAL MEDICINE

## 2024-07-28 PROCEDURE — 99233 SBSQ HOSP IP/OBS HIGH 50: CPT | Performed by: STUDENT IN AN ORGANIZED HEALTH CARE EDUCATION/TRAINING PROGRAM

## 2024-07-28 PROCEDURE — 94640 AIRWAY INHALATION TREATMENT: CPT

## 2024-07-28 PROCEDURE — 1200000000 HC SEMI PRIVATE

## 2024-07-28 PROCEDURE — 6360000004 HC RX CONTRAST MEDICATION: Performed by: STUDENT IN AN ORGANIZED HEALTH CARE EDUCATION/TRAINING PROGRAM

## 2024-07-28 RX ORDER — IPRATROPIUM BROMIDE AND ALBUTEROL SULFATE 2.5; .5 MG/3ML; MG/3ML
1 SOLUTION RESPIRATORY (INHALATION) 3 TIMES DAILY
Status: DISCONTINUED | OUTPATIENT
Start: 2024-07-28 | End: 2024-07-28

## 2024-07-28 RX ORDER — IPRATROPIUM BROMIDE AND ALBUTEROL SULFATE 2.5; .5 MG/3ML; MG/3ML
1 SOLUTION RESPIRATORY (INHALATION)
Status: DISCONTINUED | OUTPATIENT
Start: 2024-07-29 | End: 2024-08-01 | Stop reason: HOSPADM

## 2024-07-28 RX ADMIN — MOMETASONE FUROATE AND FORMOTEROL FUMARATE DIHYDRATE 2 PUFF: 100; 5 AEROSOL RESPIRATORY (INHALATION) at 08:54

## 2024-07-28 RX ADMIN — APIXABAN 5 MG: 5 TABLET, FILM COATED ORAL at 09:31

## 2024-07-28 RX ADMIN — IPRATROPIUM BROMIDE AND ALBUTEROL SULFATE 1 DOSE: .5; 3 SOLUTION RESPIRATORY (INHALATION) at 13:20

## 2024-07-28 RX ADMIN — AMLODIPINE BESYLATE 5 MG: 5 TABLET ORAL at 09:31

## 2024-07-28 RX ADMIN — FUROSEMIDE 40 MG: 10 INJECTION, SOLUTION INTRAMUSCULAR; INTRAVENOUS at 09:30

## 2024-07-28 RX ADMIN — OXYBUTYNIN CHLORIDE 5 MG: 5 TABLET ORAL at 20:50

## 2024-07-28 RX ADMIN — CARBIDOPA AND LEVODOPA 1 TABLET: 10; 100 TABLET ORAL at 09:31

## 2024-07-28 RX ADMIN — IPRATROPIUM BROMIDE AND ALBUTEROL SULFATE 1 DOSE: .5; 3 SOLUTION RESPIRATORY (INHALATION) at 08:47

## 2024-07-28 RX ADMIN — ESCITALOPRAM OXALATE 10 MG: 10 TABLET ORAL at 09:30

## 2024-07-28 RX ADMIN — METOPROLOL TARTRATE 100 MG: 100 TABLET, FILM COATED ORAL at 20:50

## 2024-07-28 RX ADMIN — CARBIDOPA AND LEVODOPA 1 TABLET: 10; 100 TABLET ORAL at 20:49

## 2024-07-28 RX ADMIN — OXYBUTYNIN CHLORIDE 5 MG: 5 TABLET ORAL at 00:35

## 2024-07-28 RX ADMIN — CARBIDOPA AND LEVODOPA 1 TABLET: 10; 100 TABLET ORAL at 17:18

## 2024-07-28 RX ADMIN — CEFTRIAXONE SODIUM 1000 MG: 1 INJECTION, POWDER, FOR SOLUTION INTRAMUSCULAR; INTRAVENOUS at 17:18

## 2024-07-28 RX ADMIN — IOPAMIDOL 80 ML: 755 INJECTION, SOLUTION INTRAVENOUS at 07:46

## 2024-07-28 RX ADMIN — IPRATROPIUM BROMIDE AND ALBUTEROL SULFATE 1 DOSE: .5; 3 SOLUTION RESPIRATORY (INHALATION) at 19:45

## 2024-07-28 RX ADMIN — ATORVASTATIN CALCIUM 10 MG: 10 TABLET, FILM COATED ORAL at 20:49

## 2024-07-28 RX ADMIN — METOPROLOL TARTRATE 100 MG: 100 TABLET, FILM COATED ORAL at 09:31

## 2024-07-28 RX ADMIN — SODIUM CHLORIDE, PRESERVATIVE FREE 10 ML: 5 INJECTION INTRAVENOUS at 00:39

## 2024-07-28 RX ADMIN — APIXABAN 5 MG: 5 TABLET, FILM COATED ORAL at 20:49

## 2024-07-28 RX ADMIN — OXYBUTYNIN CHLORIDE 5 MG: 5 TABLET ORAL at 09:30

## 2024-07-28 RX ADMIN — SODIUM CHLORIDE, PRESERVATIVE FREE 10 ML: 5 INJECTION INTRAVENOUS at 09:31

## 2024-07-28 RX ADMIN — FINASTERIDE 5 MG: 5 TABLET, FILM COATED ORAL at 09:31

## 2024-07-28 RX ADMIN — TAMSULOSIN HYDROCHLORIDE 0.4 MG: 0.4 CAPSULE ORAL at 09:31

## 2024-07-28 RX ADMIN — ATORVASTATIN CALCIUM 10 MG: 10 TABLET, FILM COATED ORAL at 00:35

## 2024-07-28 RX ADMIN — METOPROLOL TARTRATE 100 MG: 100 TABLET, FILM COATED ORAL at 00:35

## 2024-07-28 RX ADMIN — APIXABAN 5 MG: 5 TABLET, FILM COATED ORAL at 00:35

## 2024-07-28 RX ADMIN — CARBIDOPA AND LEVODOPA 1 TABLET: 10; 100 TABLET ORAL at 12:42

## 2024-07-28 NOTE — PROGRESS NOTES
Neurology Progress Note    Date:7/28/2024       Room:[facility]-A  Patient Name:Stone Sharp     YOB: 1935     Age:88 y.o.  Chief Complaint   Patient presents with    Altered Mental Status    Extremity Weakness        Requesting Physician: Senait Lion MD     Reason for Consult:  Evaluate for stroke alert    Subjective     HPI: Stone Sharp is a 88 y.o. 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  Stroke alert activated en route at 1357 with an ETA of 10 minutes.  Last known well reportedly 2 hours prior to presentation, approximately noon today.  En route, /55, while in CT 76/55.  POC glucose 153 en route.  Previous neurologic deficits unclear, however reportedly able to answer questions and converse appropriately.  Per documentation, patient with a history of right-sided ICH in February 2024.  He is on Eliquis for atrial fibrillation, as well as aspirin 81 mg.  Initial NIH of 12: 2 missed orientation questions, intermittent command following, mild left-sided facial droop, mild left arm weakness, no effort against gravity bilateral lower extremities, dysarthria.  While in CT, left upper extremity weakness improved to only decreased  strength rather than drift. Non-contrasted CT of the head negative for acute intracranial hemorrhage.  Patient not a candidate for thrombolytics due to recent history of ICH and current anticoagulation.  Creatinine 1.05.  CTA of the head and neck negative for acute LVO, but does reveal chronic right M2 occlusion.  Therefore, patient not a candidate for endovascular intervention.  Stat MRI  sulci and gyri consistent with age-related volume loss. There is an area of encephalomalacia in the right posterior parietal and temporal lobes consistent with an old infarct. There is no hemorrhage. There are no intra-or extra-axial collections.  There is no hydrocephalus, midline shift or mass effect.  The gray-white matter differentiation is preserved. There is hypoattenuation in the periventricular white matter consistent with chronic microvascular ischemic disease. The paranasal sinuses and mastoid air cells are normally aerated.  There is no suspicious calvarial abnormality.     1. No acute intracranial hemorrhage, mass effect or midline shift. 2. 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 3. Severe chronic senescent changes of the brain including generalized atrophy and chronic microvascular ischemic disease in the white matter. Case was discussed by Dr. Garcia with Cy Jung at 2:23 p.m. 7/27/2024 via telephone. **This report has been created using voice recognition software. It may contain minor errors which are inherent in voice recognition technology.** Electronically signed by Dr Ramon Sandhu     Assessment and Plan:        Disorientation, dysarthria, left-sided weakness - likely recrudescence of prior stroke like symptoms secondary to hypotension/underlying infectious/metabolic derangement  Neuro:  Imaging  CTH WO: Negative for acute intracranial abnormalities, old infarct involving the right temporal lobe  CTA of head and neck negative for acute LVO, revealed chronic right M2 occlusion and incidental large left-sided pleural effusion. No need for carotid ultrasound.  STAT MRI of brain without contrast negative for acute intracranial abnormalities. Does show area of encephalomalacia in the right posterior temporal lobe  Stat CT head is needed if the patient develops new-onset altered mental status, a severe headache, or new-onset neurologic deficit  Risk factors and medications  Blood

## 2024-07-28 NOTE — PROCEDURES
PROCEDURE NOTE  Date: 7/28/2024   Name: Stone Sharp  YOB: 1935    Procedures Bladder scan was completed by J Leopold at 1712. The residual  amount of 377 ml was resulted to Griselda QUEEN

## 2024-07-28 NOTE — PLAN OF CARE
Problem: Skin/Tissue Integrity  Goal: Absence of new skin breakdown  Description: 1.  Monitor for areas of redness and/or skin breakdown  2.  Assess vascular access sites hourly  3.  Every 4-6 hours minimum:  Change oxygen saturation probe site  4.  Every 4-6 hours:  If on nasal continuous positive airway pressure, respiratory therapy assess nares and determine need for appliance change or resting period.  Outcome: Progressing     Problem: ABCDS Injury Assessment  Goal: Absence of physical injury  Outcome: Progressing  Flowsheets (Taken 7/28/2024 1433)  Absence of Physical Injury: Implement safety measures based on patient assessment     Problem: Safety - Adult  Goal: Free from fall injury  Outcome: Progressing  Flowsheets (Taken 7/28/2024 1433)  Free From Fall Injury:   Instruct family/caregiver on patient safety   Based on caregiver fall risk screen, instruct family/caregiver to ask for assistance with transferring infant if caregiver noted to have fall risk factors     Problem: Pain  Goal: Verbalizes/displays adequate comfort level or baseline comfort level  Outcome: Progressing  Flowsheets (Taken 7/28/2024 1433)  Verbalizes/displays adequate comfort level or baseline comfort level:   Encourage patient to monitor pain and request assistance   Assess pain using appropriate pain scale   Administer analgesics based on type and severity of pain and evaluate response   Implement non-pharmacological measures as appropriate and evaluate response   Consider cultural and social influences on pain and pain management   Notify Licensed Independent Practitioner if interventions unsuccessful or patient reports new pain     Problem: Skin/Tissue Integrity - Adult  Goal: Skin integrity remains intact  Outcome: Progressing  Flowsheets (Taken 7/28/2024 1433)  Skin Integrity Remains Intact:   Monitor for areas of redness and/or skin breakdown   Assess vascular access sites hourly     Problem: Metabolic/Fluid and Electrolytes -  Adult  Goal: Electrolytes maintained within normal limits  Outcome: Progressing  Flowsheets (Taken 7/28/2024 1433)  Electrolytes maintained within normal limits:   Monitor labs and assess patient for signs and symptoms of electrolyte imbalances   Administer electrolyte replacement as ordered   Monitor response to electrolyte replacements, including repeat lab results as appropriate   Fluid restriction as ordered   Instruct patient on fluid and nutrition restrictions as appropriate     Problem: Respiratory - Adult  Goal: Clear lung sounds  7/28/2024 1433 by Griselda Johnson RN  Outcome: Progressing   Care plan reviewed with patient and family.  Patient and family verbalize understanding of the plan of care and contribute to goal setting.

## 2024-07-28 NOTE — PROCEDURES
PROCEDURE NOTE  Date: 7/28/2024   Name: Stone Sharp  YOB: 1935    Procedures EKG completed, Given to Griselda QUEEN

## 2024-07-28 NOTE — RT PROTOCOL NOTE
RT Inhaler-Nebulizer Bronchodilator Protocol Note    There is a bronchodilator order in the chart from a provider indicating to follow the RT Bronchodilator Protocol and there is an “Initiate RT Inhaler-Nebulizer Bronchodilator Protocol” order as well (see protocol at bottom of note).    CXR Findings:  XR CHEST PORTABLE    Result Date: 7/27/2024  Cardiomegaly with pulmonary vascular congestion and a small to moderate left pleural effusion. **This report has been created using voice recognition software. It may contain minor errors which are inherent in voice recognition technology.** Electronically signed by Dr Ramon Sandhu      The findings from the last RT Protocol Assessment were as follows:   History Pulmonary Disease: Chronic pulmonary disease  Respiratory Pattern: Regular pattern and RR 12-20 bpm  Breath Sounds: Inspiratory and expiratory or bilateral wheezing and/or rhonchi  Cough: Strong, spontaneous, non-productive  Indication for Bronchodilator Therapy: Wheezing associated with pulm disorder  Bronchodilator Assessment Score: 8    Aerosolized bronchodilator medication orders have been revised according to the RT Inhaler-Nebulizer Bronchodilator Protocol below.    Respiratory Therapist to perform RT Therapy Protocol Assessment initially then follow the protocol.  Repeat RT Therapy Protocol Assessment PRN for score 0-3 or on second treatment, BID, and PRN for scores above 3.    No Indications - adjust the frequency to every 6 hours PRN wheezing or bronchospasm, if no treatments needed after 48 hours then discontinue using Per Protocol order mode.     If indication present, adjust the RT bronchodilator orders based on the Bronchodilator Assessment Score as indicated below.  Use Inhaler orders unless patient has one or more of the following: on home nebulizer, not able to hold breath for 10 seconds, is not alert and oriented, cannot activate and use MDI correctly, or respiratory rate 25 breaths per minute  or more, then use the equivalent nebulizer order(s) with same Frequency and PRN reasons based on the score.  If a patient is on this medication at home then do not decrease Frequency below that used at home.    0-3 - enter or revise RT bronchodilator order(s) to equivalent RT Bronchodilator order with Frequency of every 4 hours PRN for wheezing or increased work of breathing using Per Protocol order mode.        4-6 - enter or revise RT Bronchodilator order(s) to two equivalent RT bronchodilator orders with one order with BID Frequency and one order with Frequency of every 4 hours PRN wheezing or increased work of breathing using Per Protocol order mode.        7-10 - enter or revise RT Bronchodilator order(s) to two equivalent RT bronchodilator orders with one order with TID Frequency and one order with Frequency of every 4 hours PRN wheezing or increased work of breathing using Per Protocol order mode.       11-13 - enter or revise RT Bronchodilator order(s) to one equivalent RT bronchodilator order with QID Frequency and an Albuterol order with Frequency of every 4 hours PRN wheezing or increased work of breathing using Per Protocol order mode.      Greater than 13 - enter or revise RT Bronchodilator order(s) to one equivalent RT bronchodilator order with every 4 hours Frequency and an Albuterol order with Frequency of every 2 hours PRN wheezing or increased work of breathing using Per Protocol order mode.     RT to enter RT Home Evaluation for COPD & MDI Assessment order using Per Protocol order mode.    Electronically signed by Agnieszka Bernal RCP on 7/28/2024 at 8:50 AM

## 2024-07-28 NOTE — RT PROTOCOL NOTE
RT Inhaler-Nebulizer Bronchodilator Protocol Note    There is a bronchodilator order in the chart from a provider indicating to follow the RT Bronchodilator Protocol and there is an “Initiate RT Inhaler-Nebulizer Bronchodilator Protocol” order as well (see protocol at bottom of note).    CXR Findings:  XR CHEST PORTABLE    Result Date: 7/27/2024  Cardiomegaly with pulmonary vascular congestion and a small to moderate left pleural effusion. **This report has been created using voice recognition software. It may contain minor errors which are inherent in voice recognition technology.** Electronically signed by Dr Ramon Sandhu      The findings from the last RT Protocol Assessment were as follows:   History Pulmonary Disease: Chronic pulmonary disease  Respiratory Pattern: Regular pattern and RR 12-20 bpm  Breath Sounds: Slightly diminished and/or crackles  Cough: Strong, spontaneous, non-productive  Indication for Bronchodilator Therapy: Decreased or absent breath sounds  Bronchodilator Assessment Score: 4    Aerosolized bronchodilator medication orders have been revised according to the RT Inhaler-Nebulizer Bronchodilator Protocol below.    Respiratory Therapist to perform RT Therapy Protocol Assessment initially then follow the protocol.  Repeat RT Therapy Protocol Assessment PRN for score 0-3 or on second treatment, BID, and PRN for scores above 3.    No Indications - adjust the frequency to every 6 hours PRN wheezing or bronchospasm, if no treatments needed after 48 hours then discontinue using Per Protocol order mode.     If indication present, adjust the RT bronchodilator orders based on the Bronchodilator Assessment Score as indicated below.  Use Inhaler orders unless patient has one or more of the following: on home nebulizer, not able to hold breath for 10 seconds, is not alert and oriented, cannot activate and use MDI correctly, or respiratory rate 25 breaths per minute or more, then use the equivalent  nebulizer order(s) with same Frequency and PRN reasons based on the score.  If a patient is on this medication at home then do not decrease Frequency below that used at home.    0-3 - enter or revise RT bronchodilator order(s) to equivalent RT Bronchodilator order with Frequency of every 4 hours PRN for wheezing or increased work of breathing using Per Protocol order mode.        4-6 - enter or revise RT Bronchodilator order(s) to two equivalent RT bronchodilator orders with one order with BID Frequency and one order with Frequency of every 4 hours PRN wheezing or increased work of breathing using Per Protocol order mode.        7-10 - enter or revise RT Bronchodilator order(s) to two equivalent RT bronchodilator orders with one order with TID Frequency and one order with Frequency of every 4 hours PRN wheezing or increased work of breathing using Per Protocol order mode.       11-13 - enter or revise RT Bronchodilator order(s) to one equivalent RT bronchodilator order with QID Frequency and an Albuterol order with Frequency of every 4 hours PRN wheezing or increased work of breathing using Per Protocol order mode.      Greater than 13 - enter or revise RT Bronchodilator order(s) to one equivalent RT bronchodilator order with every 4 hours Frequency and an Albuterol order with Frequency of every 2 hours PRN wheezing or increased work of breathing using Per Protocol order mode.     RT to enter RT Home Evaluation for COPD & MDI Assessment order using Per Protocol order mode.    Electronically signed by Moiz Talbot RCP on 7/28/2024 at 7:49 PM

## 2024-07-28 NOTE — CONSULTS
[facility]   [facility] ([facility])  [address] [address]  Dept: [phone]  Cardiac Electrophysiology: Consultation Note  Patient's demographics:  Date:   7/28/2024  Patient name:              Stone Sharp  YOB: 1935  Sex:    male   MRN:   [id number]    Primary Care Physician:  Juve Mcintosh DO    Cardiologist:  Stewart Rodriguez MD    Reason for Consultation:  congestive heart failure.    Clinical Summary:  88/M, poor historian was brought to the emergency room by his family members for altered mental status, described at staring into the space.  Hemodynamically stable.  Noted to have urinary tract infection (UA-  WBC, 50-75 RBC and many bacteria). 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  Mild troponin elevation (38, 38, 82 and 71),  proBNP 2471 (previous 430) and lactic acid 2.9.  Started on ceftriaxone.    Patient is mildly confused and disoriented.  Does not report any complaint.  Denies chest pain, shortness of breath.  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      Review of systems:  Constitutional: Negative for chills and fever  HENT: Negative for congestion, sinus pressure, sneezing and sore throat.    Eyes: Negative for pain, discharge, redness and itching.   Respiratory: Negative for apnea, cough  Gastrointestinal: Negative for blood in stool, constipation, diarrhea   Endocrine: Negative for cold  TSH 1.740 07/27/2024    INR 1.70 (H) 07/27/2024    LABA1C 5.2 11/28/2020         Echocardiogram 8/12/2022: LVEF 50%, LVWT 1 cm, LAD/BRAYAN 30. No significant valvular abnormalities.   ECG sinus rhythm with isolated PVC.  Coronary angiogram attempted angioplasty to severe LAD stenosis (9/27/2015).        Impression:  Acute confusion, likely metabolic.   Decompensated heart failure, significant volume overload.  UTI, on ceftriaxone.  Mild flat troponine elevation, likely from CKD.   HFpEF (LVEF 50%). Significant volume overload.   Multivessel coronary artery disease/unsuccessful angioplasty to LAD (2015).  Paroxysmal atrial fibrillation, In sinus rhythm on Apixaban.  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  loop recorder in situ.    Assessment And Recommendations:  Patient's altered mental status most probably is metabolic in origin.  He has significant volume overload and will benefit with intravenous diuresis, lasix 20 mg IV daily once blood pressure improves. Venous Duplex ultrasound lower extremities. Follow up on echo. Further recommendations to follow.     **This report has been created using voice recognition software. It may contain minor errors which are inherent in voice recognition technology.**       Electronically signed by Brenda Galdamez MD, MRCP, FACC, RS on 7/28/2024 at 7:44 AM

## 2024-07-28 NOTE — PROGRESS NOTES
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 will follow-up formal reading  MRI brain 7/27 wet read shows encephalomalacia in the right temporal lobe from an old infarct, chronic microvascular ischemic changes, generalized atrophy, no diffusion restriction to suggest acute ischemic infarct. Pending final read  S/p NS bolus x 1 in the ED  Afebrile, no white count, sugars normal  Ammonia normal  LFTs normal  CK normal  UA: 30 protein, moderate LE,  WBC, 50-75 RBC, many bacteria  CT A/P pending results  Blood cultures x 2 pending  Respiratory culture pending  Pneumonia panel pending   TSH, T4 are WNL  Will do bedside swallow eval on 7/28 and started on dysphagia diet  Follow-up palliative care eval for goals of care     Acute CHF exacerbation  Patient is hypervolemic on exam, crackles heard throughout, chest x-ray concerning for vascular congestion  Echo 8/12/2022: EF 50%, grade 1 diastolic dysfunction  BNP 2471 compared to 430.7 on 8/11/2022  Will start patient on IV Lasix daily  Will get a bladder scan every 6 hours for the next 24 hours  Echo pending  Hold off on IVF pending echo  Compression wraps  Bladder scans every 6 hours     UTI, POA  Afebrile, no leukocytosis  UA: 30

## 2024-07-28 NOTE — RT PROTOCOL NOTE
RT Inhaler-Nebulizer Bronchodilator Protocol Note    There is a bronchodilator order in the chart from a provider indicating to follow the RT Bronchodilator Protocol and there is an “Initiate RT Inhaler-Nebulizer Bronchodilator Protocol” order as well (see protocol at bottom of note).    CXR Findings:  XR CHEST PORTABLE    Result Date: 7/27/2024  Cardiomegaly with pulmonary vascular congestion and a small to moderate left pleural effusion. **This report has been created using voice recognition software. It may contain minor errors which are inherent in voice recognition technology.** Electronically signed by Dr Ramon Sandhu      The findings from the last RT Protocol Assessment were as follows:   History Pulmonary Disease: Chronic pulmonary disease  Respiratory Pattern: Dyspnea on exertion or RR 21-25 bpm  Breath Sounds: Inspiratory and expiratory or bilateral wheezing and/or rhonchi  Cough: Strong, spontaneous, non-productive  Indication for Bronchodilator Therapy: Wheezing associated with pulm disorder  Bronchodilator Assessment Score: 10    Aerosolized bronchodilator medication orders have been revised according to the RT Inhaler-Nebulizer Bronchodilator Protocol below.    Respiratory Therapist to perform RT Therapy Protocol Assessment initially then follow the protocol.  Repeat RT Therapy Protocol Assessment PRN for score 0-3 or on second treatment, BID, and PRN for scores above 3.    No Indications - adjust the frequency to every 6 hours PRN wheezing or bronchospasm, if no treatments needed after 48 hours then discontinue using Per Protocol order mode.     If indication present, adjust the RT bronchodilator orders based on the Bronchodilator Assessment Score as indicated below.  Use Inhaler orders unless patient has one or more of the following: on home nebulizer, not able to hold breath for 10 seconds, is not alert and oriented, cannot activate and use MDI correctly, or respiratory rate 25 breaths per

## 2024-07-29 ENCOUNTER — CARE COORDINATION (OUTPATIENT)
Dept: CARE COORDINATION | Age: 89
End: 2024-07-29

## 2024-07-29 ENCOUNTER — APPOINTMENT (OUTPATIENT)
Dept: INTERVENTIONAL RADIOLOGY/VASCULAR | Age: 89
End: 2024-07-29
Payer: MEDICARE

## 2024-07-29 ENCOUNTER — APPOINTMENT (OUTPATIENT)
Age: 89
End: 2024-07-29
Payer: MEDICARE

## 2024-07-29 LAB
ANION GAP SERPL CALC-SCNC: 12 MEQ/L (ref 8–16)
BACTERIA UR CULT: ABNORMAL
BASOPHILS ABSOLUTE: 0 THOU/MM3 (ref 0–0.1)
BASOPHILS NFR BLD AUTO: 0.7 %
BUN SERPL-MCNC: 15 MG/DL (ref 7–22)
CALCIUM SERPL-MCNC: 8.5 MG/DL (ref 8.5–10.5)
CHLORIDE SERPL-SCNC: 106 MEQ/L (ref 98–111)
CO2 SERPL-SCNC: 27 MEQ/L (ref 23–33)
CREAT SERPL-MCNC: 0.9 MG/DL (ref 0.4–1.2)
DEPRECATED RDW RBC AUTO: 54.5 FL (ref 35–45)
ECHO AO ASC DIAM: 3.3 CM
ECHO AO ASCENDING AORTA INDEX: 1.57 CM/M2
ECHO AV CUSP MM: 1.9 CM
ECHO AV MEAN GRADIENT: 4 MMHG
ECHO AV MEAN VELOCITY: 0.9 M/S
ECHO AV PEAK GRADIENT: 6 MMHG
ECHO AV PEAK VELOCITY: 1.3 M/S
ECHO AV VELOCITY RATIO: 1.08
ECHO AV VTI: 28.1 CM
ECHO BSA: 2.14 M2
ECHO BSA: 2.14 M2
ECHO EST RA PRESSURE: 5 MMHG
ECHO LA AREA 4C: 14.3 CM2
ECHO LA DIAMETER INDEX: 1.38 CM/M2
ECHO LA DIAMETER: 2.9 CM
ECHO LA MAJOR AXIS: 4.8 CM
ECHO LA VOL MOD A4C: 33 ML (ref 18–58)
ECHO LA VOLUME INDEX MOD A4C: 16 ML/M2 (ref 16–34)
ECHO LV E' LATERAL VELOCITY: 5 CM/S
ECHO LV E' SEPTAL VELOCITY: 5 CM/S
ECHO LV FRACTIONAL SHORTENING: 32 % (ref 28–44)
ECHO LV INTERNAL DIMENSION DIASTOLE INDEX: 1.62 CM/M2
ECHO LV INTERNAL DIMENSION DIASTOLIC: 3.4 CM (ref 4.2–5.9)
ECHO LV INTERNAL DIMENSION SYSTOLIC INDEX: 1.1 CM/M2
ECHO LV INTERNAL DIMENSION SYSTOLIC: 2.3 CM
ECHO LV ISOVOLUMETRIC RELAXATION TIME (IVRT): 81 MS
ECHO LV IVSD: 1 CM (ref 0.6–1)
ECHO LV MASS 2D: 98.9 G (ref 88–224)
ECHO LV MASS INDEX 2D: 47.1 G/M2 (ref 49–115)
ECHO LV POSTERIOR WALL DIASTOLIC: 1 CM (ref 0.6–1)
ECHO LV RELATIVE WALL THICKNESS RATIO: 0.59
ECHO LVOT AV VTI INDEX: 1.03
ECHO LVOT MEAN GRADIENT: 5 MMHG
ECHO LVOT PEAK GRADIENT: 8 MMHG
ECHO LVOT PEAK VELOCITY: 1.4 M/S
ECHO LVOT VTI: 28.9 CM
ECHO MV A VELOCITY: 1.32 M/S
ECHO MV E DECELERATION TIME (DT): 307 MS
ECHO MV E VELOCITY: 0.71 M/S
ECHO MV E/A RATIO: 0.54
ECHO MV E/E' LATERAL: 14.2
ECHO MV E/E' RATIO (AVERAGED): 14.2
ECHO MV E/E' SEPTAL: 14.2
ECHO PV MAX VELOCITY: 0.6 M/S
ECHO PV PEAK GRADIENT: 2 MMHG
ECHO RV FREE WALL PEAK S': 17 CM/S
ECHO RV INTERNAL DIMENSION: 2.8 CM
ECHO RV TAPSE: 1.7 CM (ref 1.7–?)
ECHO TV E WAVE: 0.5 M/S
EKG ATRIAL RATE: 56 BPM
EKG P AXIS: 69 DEGREES
EKG P-R INTERVAL: 170 MS
EKG Q-T INTERVAL: 416 MS
EKG QRS DURATION: 84 MS
EKG QTC CALCULATION (BAZETT): 401 MS
EKG R AXIS: 3 DEGREES
EKG T AXIS: 121 DEGREES
EKG VENTRICULAR RATE: 56 BPM
EOSINOPHIL NFR BLD AUTO: 1.3 %
EOSINOPHILS ABSOLUTE: 0.1 THOU/MM3 (ref 0–0.4)
ERYTHROCYTE [DISTWIDTH] IN BLOOD BY AUTOMATED COUNT: 15.1 % (ref 11.5–14.5)
GFR SERPL CREATININE-BSD FRML MDRD: 82 ML/MIN/1.73M2
GLUCOSE SERPL-MCNC: 72 MG/DL (ref 70–108)
HCT VFR BLD AUTO: 42.8 % (ref 42–52)
HGB BLD-MCNC: 13.1 GM/DL (ref 14–18)
IMM GRANULOCYTES # BLD AUTO: 0.13 THOU/MM3 (ref 0–0.07)
IMM GRANULOCYTES NFR BLD AUTO: 2.3 %
LACTATE SERPL-SCNC: 1.3 MMOL/L (ref 0.5–2)
LACTATE SERPL-SCNC: 1.5 MMOL/L (ref 0.5–2)
LYMPHOCYTES ABSOLUTE: 0.7 THOU/MM3 (ref 1–4.8)
LYMPHOCYTES NFR BLD AUTO: 11.8 %
MCH RBC QN AUTO: 30.2 PG (ref 26–33)
MCHC RBC AUTO-ENTMCNC: 30.6 GM/DL (ref 32.2–35.5)
MCV RBC AUTO: 98.6 FL (ref 80–94)
MONOCYTES ABSOLUTE: 0.4 THOU/MM3 (ref 0.4–1.3)
MONOCYTES NFR BLD AUTO: 6.3 %
NEUTROPHILS ABSOLUTE: 4.3 THOU/MM3 (ref 1.8–7.7)
NEUTROPHILS NFR BLD AUTO: 77.6 %
NRBC BLD AUTO-RTO: 0 /100 WBC
ORGANISM: ABNORMAL
PLATELET # BLD AUTO: 123 THOU/MM3 (ref 130–400)
PMV BLD AUTO: 10.1 FL (ref 9.4–12.4)
POTASSIUM SERPL-SCNC: 3.6 MEQ/L (ref 3.5–5.2)
RBC # BLD AUTO: 4.34 MILL/MM3 (ref 4.7–6.1)
SODIUM SERPL-SCNC: 145 MEQ/L (ref 135–145)
WBC # BLD AUTO: 5.6 THOU/MM3 (ref 4.8–10.8)

## 2024-07-29 PROCEDURE — 51798 US URINE CAPACITY MEASURE: CPT

## 2024-07-29 PROCEDURE — 94640 AIRWAY INHALATION TREATMENT: CPT

## 2024-07-29 PROCEDURE — 51701 INSERT BLADDER CATHETER: CPT

## 2024-07-29 PROCEDURE — 93306 TTE W/DOPPLER COMPLETE: CPT

## 2024-07-29 PROCEDURE — 93306 TTE W/DOPPLER COMPLETE: CPT | Performed by: INTERNAL MEDICINE

## 2024-07-29 PROCEDURE — 93010 ELECTROCARDIOGRAM REPORT: CPT | Performed by: INTERNAL MEDICINE

## 2024-07-29 PROCEDURE — 6360000002 HC RX W HCPCS

## 2024-07-29 PROCEDURE — 6370000000 HC RX 637 (ALT 250 FOR IP)

## 2024-07-29 PROCEDURE — 80048 BASIC METABOLIC PNL TOTAL CA: CPT

## 2024-07-29 PROCEDURE — 85025 COMPLETE CBC W/AUTO DIFF WBC: CPT

## 2024-07-29 PROCEDURE — 99232 SBSQ HOSP IP/OBS MODERATE 35: CPT | Performed by: STUDENT IN AN ORGANIZED HEALTH CARE EDUCATION/TRAINING PROGRAM

## 2024-07-29 PROCEDURE — 36415 COLL VENOUS BLD VENIPUNCTURE: CPT

## 2024-07-29 PROCEDURE — 1200000003 HC TELEMETRY R&B

## 2024-07-29 PROCEDURE — 83605 ASSAY OF LACTIC ACID: CPT

## 2024-07-29 PROCEDURE — 94761 N-INVAS EAR/PLS OXIMETRY MLT: CPT

## 2024-07-29 PROCEDURE — 2580000003 HC RX 258

## 2024-07-29 PROCEDURE — 1200000000 HC SEMI PRIVATE

## 2024-07-29 PROCEDURE — 93970 EXTREMITY STUDY: CPT

## 2024-07-29 RX ORDER — FUROSEMIDE 20 MG/1
20 TABLET ORAL DAILY
Status: DISCONTINUED | OUTPATIENT
Start: 2024-07-30 | End: 2024-08-01 | Stop reason: HOSPADM

## 2024-07-29 RX ADMIN — CARBIDOPA AND LEVODOPA 1 TABLET: 10; 100 TABLET ORAL at 13:00

## 2024-07-29 RX ADMIN — OXYBUTYNIN CHLORIDE 5 MG: 5 TABLET ORAL at 09:14

## 2024-07-29 RX ADMIN — CARBIDOPA AND LEVODOPA 1 TABLET: 10; 100 TABLET ORAL at 09:14

## 2024-07-29 RX ADMIN — APIXABAN 5 MG: 5 TABLET, FILM COATED ORAL at 21:30

## 2024-07-29 RX ADMIN — APIXABAN 5 MG: 5 TABLET, FILM COATED ORAL at 09:15

## 2024-07-29 RX ADMIN — CARBIDOPA AND LEVODOPA 1 TABLET: 10; 100 TABLET ORAL at 21:30

## 2024-07-29 RX ADMIN — CEFTRIAXONE SODIUM 1000 MG: 1 INJECTION, POWDER, FOR SOLUTION INTRAMUSCULAR; INTRAVENOUS at 16:35

## 2024-07-29 RX ADMIN — CARBIDOPA AND LEVODOPA 1 TABLET: 10; 100 TABLET ORAL at 16:36

## 2024-07-29 RX ADMIN — IPRATROPIUM BROMIDE AND ALBUTEROL SULFATE 1 DOSE: .5; 3 SOLUTION RESPIRATORY (INHALATION) at 21:49

## 2024-07-29 RX ADMIN — SODIUM CHLORIDE, PRESERVATIVE FREE 10 ML: 5 INJECTION INTRAVENOUS at 09:19

## 2024-07-29 RX ADMIN — IPRATROPIUM BROMIDE AND ALBUTEROL SULFATE 1 DOSE: .5; 3 SOLUTION RESPIRATORY (INHALATION) at 07:12

## 2024-07-29 RX ADMIN — SODIUM CHLORIDE, PRESERVATIVE FREE 10 ML: 5 INJECTION INTRAVENOUS at 21:31

## 2024-07-29 RX ADMIN — OXYBUTYNIN CHLORIDE 5 MG: 5 TABLET ORAL at 21:30

## 2024-07-29 RX ADMIN — FUROSEMIDE 40 MG: 10 INJECTION, SOLUTION INTRAMUSCULAR; INTRAVENOUS at 09:14

## 2024-07-29 RX ADMIN — FINASTERIDE 5 MG: 5 TABLET, FILM COATED ORAL at 09:14

## 2024-07-29 RX ADMIN — METOPROLOL TARTRATE 100 MG: 100 TABLET, FILM COATED ORAL at 21:30

## 2024-07-29 RX ADMIN — TAMSULOSIN HYDROCHLORIDE 0.4 MG: 0.4 CAPSULE ORAL at 09:14

## 2024-07-29 RX ADMIN — ATORVASTATIN CALCIUM 10 MG: 10 TABLET, FILM COATED ORAL at 21:30

## 2024-07-29 RX ADMIN — ESCITALOPRAM OXALATE 10 MG: 10 TABLET ORAL at 09:14

## 2024-07-29 NOTE — PROCEDURES
PROCEDURE NOTE  Date: 7/29/2024   Name: Stone Sharp  YOB: 1935    Procedures  Bladder scan completed in the amount of 317 ml and handed to RN

## 2024-07-29 NOTE — PROGRESS NOTES
Cardiology Progress Note      Patient:  Stone Sharp  YOB: 1935  MRN: [id number]   Acct: [id number]  Admit Date:  7/27/2024  Primary Cardiologist: Krysta Rodriguez MD  Note per Dr Galdamez:  \"Cardiologist:  Stewart Rodriguez MD     Reason for Consultation:  congestive heart failure.     Clinical Summary:  88/M, poor historian was brought to the emergency room by his family members for altered mental status, described at staring into the space.  Hemodynamically stable.  Noted to have urinary tract infection (UA-  WBC, 50-75 RBC and many bacteria). 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  Mild troponin elevation (38, 38, 82 and 71),  proBNP 2471 (previous 430) and lactic acid 2.9.  Started on ceftriaxone.     Patient is mildly confused and disoriented.  Does not report any complaint.  Denies chest pain, shortness of breath.  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 Not feeling well. Denies sob, no chest pain, some swelling. Some orthopnea. C/o lack of appetite and back pain. He is feeling slightly better than yesterday. His breathing is improved. Off oxygen now. D/w patient about his current symptoms and he is urinating frequently. This should help improve his condition.     Objective:   BP (!) 127/59   Pulse 70   Temp 97.6 °F (36.4 °C) (Oral)   Resp 17   Ht 1.753 m (5' 9\")   Wt 94.3 kg (208 lb)   SpO2    CKTOTAL 60       CBC:   Lab Results   Component Value Date/Time    WBC 5.6 07/29/2024 05:36 AM    RBC 4.34 07/29/2024 05:36 AM    RBC 5.43 03/22/2023 10:40 AM    HGB 13.1 07/29/2024 05:36 AM    HCT 42.8 07/29/2024 05:36 AM     07/29/2024 05:36 AM       CMP:    Lab Results   Component Value Date/Time     07/29/2024 05:36 AM    K 3.6 07/29/2024 05:36 AM    K 3.5 07/27/2024 02:37 PM     07/29/2024 05:36 AM    CO2 27 07/29/2024 05:36 AM    BUN 15 07/29/2024 05:36 AM    CREATININE 0.9 07/29/2024 05:36 AM    LABGLOM 82 07/29/2024 05:36 AM    LABGLOM >60 03/16/2024 11:00 AM    GLUCOSE 72 07/29/2024 05:36 AM    GLUCOSE 104 03/22/2023 10:40 AM    CALCIUM 8.5 07/29/2024 05:36 AM       Hepatic Function Panel:    Lab Results   Component Value Date/Time    ALKPHOS 39 07/27/2024 02:53 PM    ALT 16 07/27/2024 02:53 PM    AST 15 07/27/2024 02:53 PM    BILITOT 0.8 07/27/2024 02:53 PM    BILITOT Negative 07/06/2021 01:15 PM    BILIDIR <0.2 08/11/2022 06:22 PM       Magnesium:    Lab Results   Component Value Date/Time    MG 2.0 07/27/2024 02:53 PM       PT/INR:    Lab Results   Component Value Date/Time    INR 1.70 07/27/2024 02:53 PM       HgBA1c:    Lab Results   Component Value Date/Time    LABA1C 5.2 11/28/2020 04:02 AM       FLP:    Lab Results   Component Value Date/Time    TRIG 105 08/12/2022 05:39 AM    HDL 51 08/12/2022 05:39 AM       TSH:    Lab Results   Component Value Date/Time    TSH 1.740 07/27/2024 10:13 PM         Assessment:  AMS/confusion-improved   Acute on chronic diastolic CHF-improving   UTI-on ABX  Elevated trop-flat, likely demand from CKD  Hx of Cad/unsuccessful PCI to LAD  PAF on eliquis  Hx of ruptured descending thoracic aorta s/p repair 03/2024  Hx of DVT/IVC filter  CKD3  Parkinsons  Loop recorder    Plan:  Patient has diuresed over 2L since yesterday. He is on room air. Kidney function remains stable. Will continue with IV diuretics for today.     D/w Dr Galdamez. Still recommends

## 2024-07-29 NOTE — PLAN OF CARE
Problem: ABCDS Injury Assessment  Goal: Absence of physical injury  7/29/2024 1458 by Tri Galvan RN  Outcome: Progressing  Flowsheets (Taken 7/29/2024 1458)  Absence of Physical Injury: Implement safety measures based on patient assessment     Problem: Skin/Tissue Integrity  Goal: Absence of new skin breakdown  Description: 1.  Monitor for areas of redness and/or skin breakdown  2.  Assess vascular access sites hourly  3.  Every 4-6 hours minimum:  Change oxygen saturation probe site  4.  Every 4-6 hours:  If on nasal continuous positive airway pressure, respiratory therapy assess nares and determine need for appliance change or resting period.  7/29/2024 1458 by Tri Galvan RN  Outcome: Progressing  Note: Skin assessment completed.  Patient turned every 2 hours and as needed.  No skin breakdown this shift.         Problem: Safety - Adult  Goal: Free from fall injury  7/29/2024 1458 by Tri Galvan RN  Outcome: Progressing  Flowsheets (Taken 7/29/2024 1458)  Free From Fall Injury:   Instruct family/caregiver on patient safety   Based on caregiver fall risk screen, instruct family/caregiver to ask for assistance with transferring infant if caregiver noted to have fall risk factors     Problem: Pain  Goal: Verbalizes/displays adequate comfort level or baseline comfort level  7/29/2024 1458 by Tri Galvan RN  Outcome: Progressing  Flowsheets (Taken 7/29/2024 1458)  Verbalizes/displays adequate comfort level or baseline comfort level:   Encourage patient to monitor pain and request assistance   Assess pain using appropriate pain scale     Problem: Skin/Tissue Integrity - Adult  Goal: Skin integrity remains intact  7/29/2024 1458 by Tri Galvan RN  Outcome: Progressing  Flowsheets  Taken 7/29/2024 1458  Skin Integrity Remains Intact:   Monitor for areas of redness and/or skin breakdown   Assess vascular access sites hourly  Taken 7/29/2024 0912  Skin Integrity Remains Intact: Monitor for areas of redness

## 2024-07-29 NOTE — CARE COORDINATION
Stone is currently admitted to [facility].  ACM will follow up upon discharge.

## 2024-07-29 NOTE — PROGRESS NOTES
07/29/24 1220   Encounter Summary   Encounter Overview/Reason Attempted Encounter   Service Provided For Patient   Referral/Consult From Rounding   Support System Children;Spouse   Last Encounter  07/29/24   Complexity of Encounter Low   Begin Time 1215   End Time  1220   Total Time Calculated 5 min   Assessment/Intervention/Outcome   Assessment Unable to assess   Intervention Prayer (assurance of)/[place]     During my encounter with the 88 yr old patient, I attempted to visit with the pt on 5K. The patient appears to be resting (not responsive/resting) now and I didn't want to disturb the patient. I or another  will attempt to visit the patient or the family at another time. The pt was admitted due to encephalo bill.

## 2024-07-29 NOTE — PROGRESS NOTES
PROGRESS NOTE      Patient:  Stone Sharp  Unit/Bed:5K-09/009-A  YOB: 1935  MRN: [id number]   Acct: [id number]    PCP: Juve Mcintosh DO    Date of Admission: 7/27/2024 LOS: 2    Date of Evaluation:  7/29/2024    Anticipated Discharge: Pending clinical course    Assessment/Plan:    Acute metabolic encephalopathy in the setting of underlying Parkinson's likely secondary to UTI versus CHF exacerbation.  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  CXR 7/27 shows cardiomegaly with pulmonary vascular congestion, concern for acute heart failure.  CTA head and neck no evidence of stenosis, moderate left pleural effusion  MRI brain 7/7 shows encephalomalacia in the right temporal lobe from an old infarct, chronic microvascular ischemic changes, generalized atrophy no diffusion restriction to suggest acute ischemic infarct  No leukocytosis, UTI with Proteus species present and is being treated with Rocephin, blood cultures x 2 no growth to date at 48 hours pending PNA and respiratory culture.  Thyroid functioning WNL, TSH and free T4 WNL  Continue dysphagia diet.  Goals of care evaluation per palliative  Acute on chronic CHF exacerbation, improving  Initially presented hypovolemic to [facility], with concerns of crackles heard throughout chest x-ray shows vascular congestion.  Echo on 8/12/2022 shows EF 50%, with grade 1 diastolic dysfunction.  Cardiology was consulted for further evaluation and recommendations, patient was started on IV Lasix.  Had good urinary output, will be transitioned to p.o. Lasix on 7/30  Continue compression wraps, is making adequate urine.  Continue to monitor I's and O's and daily weights.  Repeat echo on 7/29/2024 shows LVEF 60 to 65%, bilateral pleural effusions seen, liver cyst?  Cardiology following appreciate recommendations  UTI, POA,  urine culture growing Proteus species.  Urine culture is growing Proteus species that is pansensitive, will need 5-day course of Rocephin, first dose started on 7/28.  Liver cysts seen on CT abdomen and pelvis.  On CT abdomen pelvis 7/28 shows multiple hypodense lesions in the liver likely cysts with the largest measuring 5.3 to 3.5 cm,  Will order LFTs in the a.m. and consider liver ultrasound to evaluate further.  Gallbladder distention seen on CT abdomen and pelvis  CT abdomen pelvis obtained on 7/28, concern for gallbladder distention  Will order LFTs in a.m. and consider right upper quadrant ultrasound to further evaluate for cholelithiasis and cholecystitis  Elevated troponin, downtrending  Likely secondary to CKD, troponin 82 => 71, denies any chest pain or chest pressure, no SOB nausea or GI symptoms.  EKG shows NSR with PVCs, cardiology following no plans for acute ischemic workup  LVEF 60 to 65%, with concerns of bilateral pleural effusions.  Repeat echo obtained on 7/29  Monitor for signs and symptoms of ACS  Lactic acidosis, resolved  Repeat lactic acid below 2, can discontinue lactic acid monitoring  Echo shows normal LVEF, less likely for lactic acidosis due to CHF  Pseudo hypocalcemia/hypoalbuminemia  Corrected calcium is WNL, has hypoalbuminemia  Monitor calcium levels and replete if needed  A-fib on chronic anticoagulation with Eliquis secondary to hypercoagulable state  Follows [facility] cardiology, continue telemetry and Eliquis  Continue BMP and monitor electrolytes  CKD stage III  Follows nephrology, baseline creatinine appears to be around 1  Creatinine 0.9 on 7/29/2024  Avoid nephrotoxic agents and IV contrast if possible  Monitor renal function with BMP  CAD s/p PCI in 2015  Home aspirin held  Hypertension  Will transition patient to Lasix 20 mg daily on 7/30, continue to hold Norvasc  Continue Lopressor 100 mg twice daily with hold parameters  HLD  Continue Lipitor 10 mg  COPD  Continue Missy

## 2024-07-29 NOTE — PALLIATIVE CARE
Initial Evaluation        Patient:   Stone Sharp  YOB: 1935  Age:  88 y.o.  Room:  [facility]09/009-  MRN:  [id number]   Acct: [id number]    Date of Admission:  7/27/2024  2:06 PM  Date of Service:  7/29/2024  Completed By:  Maico Corral RN        Reason for Palliative Care Evaluation:-   Goals of Care     Current Concerns   Patient reported no symptoms at this time     Palliative Performance Scale   50%  Mainly sit/lie; Extensive disease; Can't do any work; Considerable assist; intake normal or reduced; LOC full/confusion     History    Patient admitted 7/27 from home with altered mental status and encephalopathy. Patient recently discharged from the nursing home to be cared for at home. Patient with past medical history of parkinson's, A-fib, BPH, CKD, COPD, HF, CVA, HTN.      Goals of Care Discussions and Plan           Plan/Follow-Up:-   Attempted to meet with patient and family, no family available at bedside. Patient noted to be oriented/disoriented per flow sheets. Will attempt to meet with spouse and patient later this afternoon.      Code Status:Full Code No additional code details    ACP documents on file:  -None    Healthcare Power of /Healthcare Surrogate Decision Makers:  Bharathi Sharp (spouse)            Electronically signed by Maico Corral RN on 7/29/2024 at 10:24 AM           Palliative Care Office: [phone]

## 2024-07-29 NOTE — CARE COORDINATION
DISCHARGE PLANNING EVALUATION  7/29/24, 10:56 AM EDT    Reason for Referral: Current [facility]   Decision Maker: Wife  Current Services: Current [facility] (unsure of services)  New Services Requested: Add aide services if not receiving   Family/ Social/ Home environment: Lives at home with wife   Payment Source:Medicare  Transportation at Discharge: [place] (wife is aware of private pay cost)  Post-acute (PAC) provider list was provided to patient. Patient was informed of their freedom to choose PAC provider. Discussed and offered to show the patient the relevant PAC Providers quality and resource use measures on Medicare Compare web site via computer based on patient's goals of care and treatment preferences. Questions regarding selection process were answered.      Teach Back Method used with Stone's wife regarding care plan and discharge planning  Patient and spouse verbalized understanding of the plan of care and contribute to goal setting.       Patient preferences and discharge plan: Patient's wife Yumiko is requesting that patient return to home with current [facility] as she reports that [facility] \"can't do anymore than I am doing here at home\". LEXUS did leave a message for [facility] and asked for a call back to confirm what services patient is receiving. Wife would like an aide added if not already on orders.     Electronically signed by TALI Lu on 7/29/2024 at 10:56 AM    1:01 PM- Left another message for María with [facility].     2:11 PM- This SW spoke with April from [facility], reports that patient is current for RN, PT and OT services. They are able to add an aide to services if they have new orders. LEXUS wrote an electric sticky note for provider to write new HH orders.         07/29/24 1047   Service Assessment   Patient Orientation Alert and Oriented   Cognition Alert   History Provided By Spouse   Primary Caregiver Family   Support Systems Spouse/Significant Other

## 2024-07-29 NOTE — PLAN OF CARE
Problem: Skin/Tissue Integrity  Goal: Absence of new skin breakdown  Description: 1.  Monitor for areas of redness and/or skin breakdown  2.  Assess vascular access sites hourly  3.  Every 4-6 hours minimum:  Change oxygen saturation probe site  4.  Every 4-6 hours:  If on nasal continuous positive airway pressure, respiratory therapy assess nares and determine need for appliance change or resting period.  7/29/2024 0220 by Bri Bennett RN  Outcome: Progressing  Note: No new skin breakdown noted.  Encouraged patient to reposition in bed.    7/28/2024 1433 by Griselda Johnson RN  Outcome: Progressing     Problem: ABCDS Injury Assessment  Goal: Absence of physical injury  7/29/2024 0220 by Bri Bennett RN  Outcome: Progressing  Flowsheets (Taken 7/28/2024 1433 by Griselda Johnson RN)  Absence of Physical Injury: Implement safety measures based on patient assessment  7/28/2024 1433 by Griselda Johnson RN  Outcome: Progressing  Flowsheets (Taken 7/28/2024 1433)  Absence of Physical Injury: Implement safety measures based on patient assessment     Problem: Safety - Adult  Goal: Free from fall injury  7/29/2024 0220 by Bri Bennett RN  Outcome: Progressing  Flowsheets (Taken 7/28/2024 1433 by Griselda Johnson RN)  Free From Fall Injury:   Instruct family/caregiver on patient safety   Based on caregiver fall risk screen, instruct family/caregiver to ask for assistance with transferring infant if caregiver noted to have fall risk factors  7/28/2024 1433 by Griselda Johnson RN  Outcome: Progressing  Flowsheets (Taken 7/28/2024 1433)  Free From Fall Injury:   Instruct family/caregiver on patient safety   Based on caregiver fall risk screen, instruct family/caregiver to ask for assistance with transferring infant if caregiver noted to have fall risk factors     Problem: Pain  Goal: Verbalizes/displays adequate comfort level or baseline comfort level  7/29/2024 0220 by Bri Bennett RN  Outcome: Progressing  Flowsheets  (Taken 7/28/2024 1433 by Griselda Johnson RN)  Verbalizes/displays adequate comfort level or baseline comfort level:   Encourage patient to monitor pain and request assistance   Assess pain using appropriate pain scale   Administer analgesics based on type and severity of pain and evaluate response   Implement non-pharmacological measures as appropriate and evaluate response   Consider cultural and social influences on pain and pain management   Notify Licensed Independent Practitioner if interventions unsuccessful or patient reports new pain  7/28/2024 1433 by Griselda Johnson RN  Outcome: Progressing  Flowsheets (Taken 7/28/2024 1433)  Verbalizes/displays adequate comfort level or baseline comfort level:   Encourage patient to monitor pain and request assistance   Assess pain using appropriate pain scale   Administer analgesics based on type and severity of pain and evaluate response   Implement non-pharmacological measures as appropriate and evaluate response   Consider cultural and social influences on pain and pain management   Notify Licensed Independent Practitioner if interventions unsuccessful or patient reports new pain     Problem: Skin/Tissue Integrity - Adult  Goal: Skin integrity remains intact  7/29/2024 0220 by Bri Bennett RN  Outcome: Progressing  Flowsheets (Taken 7/28/2024 1433 by Griselda Johnson RN)  Skin Integrity Remains Intact:   Monitor for areas of redness and/or skin breakdown   Assess vascular access sites hourly  7/28/2024 1433 by Griselda Johnson RN  Outcome: Progressing  Flowsheets (Taken 7/28/2024 1433)  Skin Integrity Remains Intact:   Monitor for areas of redness and/or skin breakdown   Assess vascular access sites hourly     Problem: Metabolic/Fluid and Electrolytes - Adult  Goal: Electrolytes maintained within normal limits  7/29/2024 0220 by Bri Bennett RN  Outcome: Progressing  Flowsheets (Taken 7/28/2024 1433 by Griselda Johnson RN)  Electrolytes maintained within normal

## 2024-07-29 NOTE — CARE COORDINATION
7/29/24, 10:47 AM EDT    DISCHARGE PLANNING EVALUATION    Attempted to speak with patient to complete assessment, however he asked that SW call his wife.    10:53 AM- Call placed to wife and completed assessment.

## 2024-07-29 NOTE — CARE COORDINATION
Case Management Assessment Initial Evaluation    Date/Time of Evaluation: 2024 8:02 AM  Assessment Completed by: Marylin Downs RN    If patient is discharged prior to next notation, then this note serves as note for discharge by case management.    Patient Name: Stone Sharp                   YOB: 1935  Diagnosis: Shortness of breath [R06.02]  Encephalopathy [G93.40]                   Date / Time: 2024  2:06 PM  Location: [address] [place] [address]     Patient Admission Status: Inpatient   Readmission Risk Low 0-14, Mod 15-19), High > 20: Readmission Risk Score: 20.4    Current PCP: Juve Mcintosh DO  Health Care Decision Makers:   Primary Decision Maker: Bharathi Sharp - Spouse - [phone]    Additional Case Management Notes: Patient presents due to concern of family for stroke. Patient symptoms of slurred speech, left-sided weakness, disorientation. Hospitalist following, Neurology and Cardiology consulted, Eliquis, Rocephin, Duoneb inhaler, IV Lasix, prn Tylenol, Proventil, and Zofran, electrolyte replacement protocol, daily BMP and CBC, minced and moist diet with 2000 ml fluid restriction, ace wrap to bilateral lower extremities, echocardiogram, telemetry, up with assistance, Palliative Care.     Procedures: N/A    Imagin/27 CT of head:  IMPRESSION:  1. No acute intracranial hemorrhage, mass effect or midline shift.  2. 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  3. Severe chronic senescent changes of the brain including generalized atrophy  and chronic microvascular ischemic disease in the white matter.     MRI of brain:  IMPRESSION:     1. Area of encephalomalacia in the right posterior temporal lobe. Associated  susceptibility artifact.  2. There is atrophy and dilatation of the third and lateral ventricles.  3. There is increased signal intensity in the white matter consistent with  ischemic changes. There is no evidence of an acute infarct.  4. There are old infarcts in  the right and left basal ganglia.  5. There are inflammatory changes in the right mastoid air cells.    Patient Goals/Plan/Treatment Preferences: Stone is from home with his wife. He is able to afford his medications and has the DME needed. Full assessment was completed by SS. Stone plans to return home at discharge and resume his services through [facility] for RN, PT/OT and aide, per his wife Bharathi.

## 2024-07-29 NOTE — PLAN OF CARE
Problem: Respiratory - Adult  Goal: Clear lung sounds  7/29/2024 0715 by Sandeep Dalton, P  Outcome: Progressing   Patient mutually agreed on goals.

## 2024-07-29 NOTE — PALLIATIVE CARE
Follow Up / Progress Note        Patient:   Stone Sharp  YOB: 1935  Age:  88 y.o.  Room:  [facility] [place] [facility] [place]  MRN:  [id number]         Family/Patient Discussion:  Met with patient wife Bharathi at bedside. Introduced palliative care and role on healthcare team. Wife stated patient hasn't been the same since his surgery at [facility] in March. Patient went to nursing home after that surgery and has declined. She recently brought him home from the nursing home and was caring for him there with help. Discussed code status and goals of care with Bharathi inquiring if they ever had discussed what his wishes would be prior to this admission. Educated on Full code, DNR-CCA, DNR-CC and explained that this often helps the medical team understand what their goals for his care are. Bharathi believes he would not want to be on a breathing machine long term but would like us to try everything before that. Explained this aligns with full code and aggressive medical care and Bharathi confirmed that is what their wishes are at this point. Palliative care contact information given to Bharathi.       Plan/Follow-Up:  Palliative care will continue to be available as needed, please call for additional needs.          Electronically signed by Maico Corral RN on 7/29/2024 at 1:02 PM             Palliative Care Office: [phone]

## 2024-07-30 ENCOUNTER — APPOINTMENT (OUTPATIENT)
Dept: GENERAL RADIOLOGY | Age: 89
End: 2024-07-30
Payer: MEDICARE

## 2024-07-30 ENCOUNTER — APPOINTMENT (OUTPATIENT)
Dept: ULTRASOUND IMAGING | Age: 89
End: 2024-07-30
Payer: MEDICARE

## 2024-07-30 PROBLEM — N39.0 COMPLICATED UTI (URINARY TRACT INFECTION): Status: ACTIVE | Noted: 2024-07-30

## 2024-07-30 LAB
ALBUMIN SERPL BCG-MCNC: 2.8 G/DL (ref 3.5–5.1)
ALP SERPL-CCNC: 43 U/L (ref 38–126)
ALT SERPL W/O P-5'-P-CCNC: 6 U/L (ref 11–66)
ANION GAP SERPL CALC-SCNC: 8 MEQ/L (ref 8–16)
AST SERPL-CCNC: 19 U/L (ref 5–40)
BASOPHILS ABSOLUTE: 0.1 THOU/MM3 (ref 0–0.1)
BASOPHILS NFR BLD AUTO: 1.1 %
BILIRUB CONJ SERPL-MCNC: 0.3 MG/DL (ref 0.1–13.8)
BILIRUB SERPL-MCNC: 0.8 MG/DL (ref 0.3–1.2)
BUN SERPL-MCNC: 16 MG/DL (ref 7–22)
CALCIUM SERPL-MCNC: 8.5 MG/DL (ref 8.5–10.5)
CHLORIDE SERPL-SCNC: 105 MEQ/L (ref 98–111)
CO2 SERPL-SCNC: 27 MEQ/L (ref 23–33)
CREAT SERPL-MCNC: 0.9 MG/DL (ref 0.4–1.2)
DEPRECATED RDW RBC AUTO: 54.1 FL (ref 35–45)
EOSINOPHIL NFR BLD AUTO: 1.7 %
EOSINOPHILS ABSOLUTE: 0.1 THOU/MM3 (ref 0–0.4)
ERYTHROCYTE [DISTWIDTH] IN BLOOD BY AUTOMATED COUNT: 15.1 % (ref 11.5–14.5)
GFR SERPL CREATININE-BSD FRML MDRD: 82 ML/MIN/1.73M2
GLUCOSE SERPL-MCNC: 105 MG/DL (ref 70–108)
HCT VFR BLD AUTO: 41.4 % (ref 42–52)
HGB BLD-MCNC: 12.7 GM/DL (ref 14–18)
IMM GRANULOCYTES # BLD AUTO: 0.11 THOU/MM3 (ref 0–0.07)
IMM GRANULOCYTES NFR BLD AUTO: 2.3 %
LYMPHOCYTES ABSOLUTE: 0.6 THOU/MM3 (ref 1–4.8)
LYMPHOCYTES NFR BLD AUTO: 12.7 %
MCH RBC QN AUTO: 30 PG (ref 26–33)
MCHC RBC AUTO-ENTMCNC: 30.7 GM/DL (ref 32.2–35.5)
MCV RBC AUTO: 97.6 FL (ref 80–94)
MONOCYTES ABSOLUTE: 0.4 THOU/MM3 (ref 0.4–1.3)
MONOCYTES NFR BLD AUTO: 8.1 %
NEUTROPHILS ABSOLUTE: 3.5 THOU/MM3 (ref 1.8–7.7)
NEUTROPHILS NFR BLD AUTO: 74.1 %
NRBC BLD AUTO-RTO: 0 /100 WBC
PLATELET # BLD AUTO: 119 THOU/MM3 (ref 130–400)
PMV BLD AUTO: 10.2 FL (ref 9.4–12.4)
POTASSIUM SERPL-SCNC: 3.2 MEQ/L (ref 3.5–5.2)
PROT SERPL-MCNC: 5.1 G/DL (ref 6.1–8)
RBC # BLD AUTO: 4.24 MILL/MM3 (ref 4.7–6.1)
SODIUM SERPL-SCNC: 140 MEQ/L (ref 135–145)
WBC # BLD AUTO: 4.7 THOU/MM3 (ref 4.8–10.8)

## 2024-07-30 PROCEDURE — 6370000000 HC RX 637 (ALT 250 FOR IP)

## 2024-07-30 PROCEDURE — 36415 COLL VENOUS BLD VENIPUNCTURE: CPT

## 2024-07-30 PROCEDURE — 99233 SBSQ HOSP IP/OBS HIGH 50: CPT | Performed by: STUDENT IN AN ORGANIZED HEALTH CARE EDUCATION/TRAINING PROGRAM

## 2024-07-30 PROCEDURE — 94640 AIRWAY INHALATION TREATMENT: CPT

## 2024-07-30 PROCEDURE — 2580000003 HC RX 258

## 2024-07-30 PROCEDURE — 80053 COMPREHEN METABOLIC PANEL: CPT

## 2024-07-30 PROCEDURE — 1200000003 HC TELEMETRY R&B

## 2024-07-30 PROCEDURE — 99232 SBSQ HOSP IP/OBS MODERATE 35: CPT | Performed by: NURSE PRACTITIONER

## 2024-07-30 PROCEDURE — 97162 PT EVAL MOD COMPLEX 30 MIN: CPT

## 2024-07-30 PROCEDURE — 74018 RADEX ABDOMEN 1 VIEW: CPT

## 2024-07-30 PROCEDURE — 1200000000 HC SEMI PRIVATE

## 2024-07-30 PROCEDURE — 97530 THERAPEUTIC ACTIVITIES: CPT

## 2024-07-30 PROCEDURE — 76705 ECHO EXAM OF ABDOMEN: CPT

## 2024-07-30 PROCEDURE — 85025 COMPLETE CBC W/AUTO DIFF WBC: CPT

## 2024-07-30 PROCEDURE — 6360000002 HC RX W HCPCS

## 2024-07-30 PROCEDURE — 82248 BILIRUBIN DIRECT: CPT

## 2024-07-30 RX ORDER — POTASSIUM CHLORIDE 20 MEQ/1
40 TABLET, EXTENDED RELEASE ORAL ONCE
Status: COMPLETED | OUTPATIENT
Start: 2024-07-30 | End: 2024-07-30

## 2024-07-30 RX ORDER — BISACODYL 10 MG
10 SUPPOSITORY, RECTAL RECTAL DAILY
Status: DISCONTINUED | OUTPATIENT
Start: 2024-07-30 | End: 2024-08-01 | Stop reason: HOSPADM

## 2024-07-30 RX ADMIN — METOPROLOL TARTRATE 100 MG: 100 TABLET, FILM COATED ORAL at 20:22

## 2024-07-30 RX ADMIN — CARBIDOPA AND LEVODOPA 1 TABLET: 10; 100 TABLET ORAL at 16:48

## 2024-07-30 RX ADMIN — SODIUM CHLORIDE, PRESERVATIVE FREE 10 ML: 5 INJECTION INTRAVENOUS at 20:22

## 2024-07-30 RX ADMIN — SODIUM CHLORIDE: 9 INJECTION, SOLUTION INTRAVENOUS at 16:51

## 2024-07-30 RX ADMIN — CARBIDOPA AND LEVODOPA 1 TABLET: 10; 100 TABLET ORAL at 20:22

## 2024-07-30 RX ADMIN — CARBIDOPA AND LEVODOPA 1 TABLET: 10; 100 TABLET ORAL at 13:40

## 2024-07-30 RX ADMIN — ESCITALOPRAM OXALATE 10 MG: 10 TABLET ORAL at 08:26

## 2024-07-30 RX ADMIN — FINASTERIDE 5 MG: 5 TABLET, FILM COATED ORAL at 08:26

## 2024-07-30 RX ADMIN — IPRATROPIUM BROMIDE AND ALBUTEROL SULFATE 1 DOSE: .5; 3 SOLUTION RESPIRATORY (INHALATION) at 20:50

## 2024-07-30 RX ADMIN — CEFTRIAXONE SODIUM 1000 MG: 1 INJECTION, POWDER, FOR SOLUTION INTRAMUSCULAR; INTRAVENOUS at 16:52

## 2024-07-30 RX ADMIN — OXYBUTYNIN CHLORIDE 5 MG: 5 TABLET ORAL at 08:25

## 2024-07-30 RX ADMIN — METOPROLOL TARTRATE 100 MG: 100 TABLET, FILM COATED ORAL at 08:25

## 2024-07-30 RX ADMIN — TAMSULOSIN HYDROCHLORIDE 0.4 MG: 0.4 CAPSULE ORAL at 08:25

## 2024-07-30 RX ADMIN — FUROSEMIDE 20 MG: 20 TABLET ORAL at 08:26

## 2024-07-30 RX ADMIN — OXYBUTYNIN CHLORIDE 5 MG: 5 TABLET ORAL at 20:22

## 2024-07-30 RX ADMIN — APIXABAN 5 MG: 5 TABLET, FILM COATED ORAL at 20:22

## 2024-07-30 RX ADMIN — SODIUM CHLORIDE, PRESERVATIVE FREE 10 ML: 5 INJECTION INTRAVENOUS at 08:28

## 2024-07-30 RX ADMIN — ATORVASTATIN CALCIUM 10 MG: 10 TABLET, FILM COATED ORAL at 20:22

## 2024-07-30 RX ADMIN — APIXABAN 5 MG: 5 TABLET, FILM COATED ORAL at 08:26

## 2024-07-30 RX ADMIN — IPRATROPIUM BROMIDE AND ALBUTEROL SULFATE 1 DOSE: .5; 3 SOLUTION RESPIRATORY (INHALATION) at 09:49

## 2024-07-30 RX ADMIN — POTASSIUM CHLORIDE 40 MEQ: 1500 TABLET, EXTENDED RELEASE ORAL at 08:25

## 2024-07-30 RX ADMIN — CARBIDOPA AND LEVODOPA 1 TABLET: 10; 100 TABLET ORAL at 08:25

## 2024-07-30 NOTE — PROGRESS NOTES
pleural effusions seen, liver cyst?  Cardiology recommended stress as outpatient, follow-up with [facility] cardiology in 6 to 8 weeks.  From cardiovascular team okay for discharge  UTI, POA, urine culture growing Proteus species.  Urine culture is growing Proteus species that is pansensitive, will need 5-day course of Rocephin, first dose started on 7/28.  Constipation:  Seen on KUB on 7/30, will do daily Dulcolax suppository daily and Enemeez rectal daily  Follow-up on BM, consider digital disimpaction due to increased stool burden  Hyperkalemia:  Potassium 3.2 on 7/30, replete per electrolyte replacement protocol  Follow-up with [facility]  Liver cysts seen on CT abdomen and pelvis.  On CT abdomen pelvis 7/28 shows multiple hypodense lesions in the liver likely cysts with the largest measuring 5.3 to 3.5 cm,  No evidence of transaminitis on HFP 7/30, liver US shows cholelithiasis and gallbladder sludge without evidence of acute cholecystitis  Gallbladder distention seen on CT abdomen and pelvis  CT abdomen pelvis obtained on 7/28, concern for gallbladder distention  No evidence of transaminitis on HFP 7/30, liver US shows cholelithiasis and gallbladder sludge without evidence of acute Roxana cystitis  Consider further imaging with HIDA or MRCP versus outpatient follow-up as patient does not have leukocytosis or signs of infection.  Elevated troponin, downtrending  Likely secondary to CKD, troponin 82 => 71, denies any chest pain or chest pressure, no SOB nausea or GI symptoms.  EKG shows NSR with PVCs, cardiology following no plans for acute ischemic workup  LVEF 60 to 65%, with concerns of bilateral pleural effusions.  Repeat echo obtained on 7/29  Monitor for signs and symptoms of ACS  Lactic acidosis, resolved  Repeat lactic acid below 2, can discontinue lactic acid monitoring  Echo shows normal LVEF, less likely for lactic acidosis due to CHF  Pseudo hypocalcemia/hypoalbuminemia  Corrected calcium is WNL, has    Cardiomegaly with pulmonary vascular congestion and a small to moderate left   pleural effusion.            **This report has been created using voice recognition software. It may contain   minor errors which are inherent in voice recognition technology.**      Electronically signed by Dr Ramon Sandhu      CTA HEAD W WO CONTRAST (CODE STROKE)   Final Result   1. 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   2. No significant hemodynamic stenosis in the right and left common and internal   carotid arteries.   3. There is antegrade flow in the right and left vertebral arteries.   4. 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   5. There are areas of narrowing in the P1 segments of both posterior cerebral   arteries bilaterally.   6. Otherwise negative CTA brain.   7. There is a left pleural effusion.               **This report has been created using voice recognition software. It may contain   minor errors which are inherent in voice recognition technology.**      Electronically signed by Dr. Krish Marte      CTA NECK W WO CONTRAST (CODE STROKE)   Final Result   1. 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   2. No significant hemodynamic stenosis in the right and left common and internal   carotid arteries.   3. There is antegrade flow in the right and left vertebral arteries.   4. 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   5. There are areas of narrowing in the P1 segments of both posterior cerebral   arteries bilaterally.   6. Otherwise negative CTA brain.   7. There is a left pleural effusion.               **This report has been created using voice recognition software. It may contain   minor errors which are inherent in voice recognition technology.**      Electronically signed by Dr. Krish Marte      CT HEAD WO CONTRAST   Final Result   1. No acute intracranial hemorrhage, mass

## 2024-07-30 NOTE — PLAN OF CARE
Problem: Skin/Tissue Integrity  Goal: Absence of new skin breakdown  Description: 1.  Monitor for areas of redness and/or skin breakdown  2.  Assess vascular access sites hourly  3.  Every 4-6 hours minimum:  Change oxygen saturation probe site  4.  Every 4-6 hours:  If on nasal continuous positive airway pressure, respiratory therapy assess nares and determine need for appliance change or resting period.  7/30/2024 0254 by Litzy Cardenas RN  Outcome: Progressing  Note: Routine skin assessments, purposeful hourly rounding. Pt encouraged and assisted to turn at least every 2 hours. Bath and hygiene care assistance offered to pt daily. Nutritional intake encouraged as ordered and appropriate.    7/29/2024 1458 by Tri Galvan RN  Outcome: Progressing  Note: Skin assessment completed.  Patient turned every 2 hours and as needed.  No skin breakdown this shift.         Problem: ABCDS Injury Assessment  Goal: Absence of physical injury  7/30/2024 0254 by Litzy Cardenas RN  Outcome: Progressing  Flowsheets (Taken 7/29/2024 1458 by Tri Galvan RN)  Absence of Physical Injury: Implement safety measures based on patient assessment  7/29/2024 1458 by Tri Galvan RN  Outcome: Progressing  Flowsheets (Taken 7/29/2024 1458)  Absence of Physical Injury: Implement safety measures based on patient assessment     Problem: Safety - Adult  Goal: Free from fall injury  7/30/2024 0254 by Litzy Cardenas RN  Outcome: Progressing  Flowsheets (Taken 7/29/2024 1458 by Tri Galvan RN)  Free From Fall Injury:   Instruct family/caregiver on patient safety   Based on caregiver fall risk screen, instruct family/caregiver to ask for assistance with transferring infant if caregiver noted to have fall risk factors  7/29/2024 1458 by Tri Galvan RN  Outcome: Progressing  Flowsheets (Taken 7/29/2024 1458)  Free From Fall Injury:   Instruct family/caregiver on patient safety   Based on caregiver fall risk screen, instruct family/caregiver

## 2024-07-30 NOTE — DISCHARGE INSTRUCTIONS
Stress as OP   Follow with Carlita et al.. 6-8 weeks     Salt restriction 2 gm ( 2,000 mg) daily   Fluid restriction 2 L daily    Weigh yourself daily: Should you gain 2-3 pounds in 1 days time or 3-5 pounds in 2 days time-- call our office ([phone]) for further recommendations    Will need a CBC and a BMP in few days

## 2024-07-30 NOTE — PLAN OF CARE
Care plan reviewed with patient and spouse.  Patient and spouse  Problem: Skin/Tissue Integrity  Goal: Absence of new skin breakdown  Description: 1.  Monitor for areas of redness and/or skin breakdown  2.  Assess vascular access sites hourly  3.  Every 4-6 hours minimum:  Change oxygen saturation probe site  4.  Every 4-6 hours:  If on nasal continuous positive airway pressure, respiratory therapy assess nares and determine need for appliance change or resting period.  Outcome: Progressing  Note: No skin break down during this admission. Turning the patient frequently. Educating on the need to keep skin clean and dry to prevent skin break down       Problem: ABCDS Injury Assessment  Goal: Absence of physical injury  Outcome: Progressing  Flowsheets (Taken 7/30/2024 1830)  Absence of Physical Injury: Implement safety measures based on patient assessment  Note: Patient absent of falls during this admission. Falling star program in place. Bedside table and call light within reach      Problem: Safety - Adult  Goal: Free from fall injury  Outcome: Progressing     Problem: Pain  Goal: Verbalizes/displays adequate comfort level or baseline comfort level  Outcome: Progressing  Flowsheets (Taken 7/30/2024 1830)  Verbalizes/displays adequate comfort level or baseline comfort level: Encourage patient to monitor pain and request assistance  Note:  Pain goal of 0/10. RN will use non-pharmacological interventions before administering ordered pain medications.        Problem: Skin/Tissue Integrity - Adult  Goal: Skin integrity remains intact  Outcome: Progressing  Flowsheets (Taken 7/30/2024 1830)  Skin Integrity Remains Intact: Monitor for areas of redness and/or skin breakdown     Problem: Metabolic/Fluid and Electrolytes - Adult  Goal: Electrolytes maintained within normal limits  Outcome: Progressing  Flowsheets (Taken 7/30/2024 1830)  Electrolytes maintained within normal limits: Monitor response to electrolyte

## 2024-07-30 NOTE — CARE COORDINATION
7/30/24, 2:18 PM EDT    DISCHARGE ON GOING EVALUATION    [facility] day: 3  Location: -09/009-A Reason for admit: Shortness of breath [R06.02]  Encephalopathy [G93.40]     Procedures:   7/29 ECHO: EF of 60 - 65%     Imaging since last note:   7/29 BLE Venous DUP: No evidence of an acute deep venous thrombosis. 2. 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 There is partial compressibility of the left femoral vein.  7/30 KUB: Nonobstructive bowel gas pattern   7/30 US Gallbladder: Cholelithiasis and gallbladder sludge without evidence of acute  cholecystitis. 2. Thick-walled anechoic structure in the left hepatic lobe of indeterminate  etiology. This finding is better characterized on CT of the abdomen and pelvis  from 7/28/2024    Barriers to Discharge: Hospitalist, Cardiology and Palliative care RN following. PT/OT. Fluid restriction. Eliquis bid. Sinemet. IV rocephin q24h. Transition to PO lasix. K 3.2 (3.6)- PO replacement today.     PCP: Juve Mcintosh DO  Readmission Risk Score: 18.5    Patient Goals/Plan/Treatment Preferences: Return home w/ wife and current [facility] vs SNF. SW following.

## 2024-07-30 NOTE — PROGRESS NOTES
[facility]  INPATIENT PHYSICAL THERAPY  EVALUATION  [facility] ONC MED 5K - 5K-09/009-A    Time In: 1051  Time Out: 1122  Timed Code Treatment Minutes: 23 Minutes  Minutes: 31          Date: 2024  Patient Name: Stone Sharp,  Gender:  male        MRN: [id number]  : 1935  (88 y.o.)      Referring Practitioner: Shorty Jc MD  Diagnosis: Encephalopathy  Additional Pertinent Hx: 88/M, poor historian was brought to the emergency room by his family members for altered mental status, described at staring into the space.  Hemodynamically stable.  Noted to have urinary tract infection (UA-  WBC, 50-75 RBC and many bacteria). 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  Mild troponin elevation (38, 38, 82 and 71),  proBNP 2471 (previous 430) and lactic acid 2.9. Per chart review: It is notable that the patient has a prior history of an intracranial hemorrhage.  Also has a history of parkinsonism and just got out of a nursing home last week.  The family reported that he was more active yesterday()     Restrictions/Precautions:  Restrictions/Precautions: General Precautions, Fall Risk    Subjective:  Chart Reviewed: Yes  Patient assessed for rehabilitation services?: Yes  Subjective: RN approved session. Pt pleasant and agreeable to therapy    General:  Overall Orientation Status: Within Functional Limits  Vision: Within Functional Limits  Hearing: Within functional limits       Pain: 0/10: denies pain       Vitals: Vitals not assessed per clinical judgement, see nursing flowsheet    Social/Functional History:    Lives With: Spouse  Type of Home: House  Home Layout: One level  Home Access: Stairs to enter with rails  Entrance Stairs - Number of Steps: 2  Home Equipment: Cane, Walker - Rolling

## 2024-07-30 NOTE — PROGRESS NOTES
07/30/24 1241   Encounter Summary   Encounter Overview/Reason Attempted Encounter   Service Provided For Patient   Referral/Consult From Rounding   Support System Children;Spouse   Last Encounter  07/30/24  (Palliative Care pt.)   Complexity of Encounter Low   Begin Time 1236   End Time  1241   Total Time Calculated 5 min   Assessment/Intervention/Outcome   Assessment Unable to assess   Intervention Prayer (assurance of)/[place]     During my encounter with the 88 yr old Palliative Care patient, I attempted to visit with the pt on 5K. The patient appears to be resting (not responsive/resting) now and I didn't want to disturb the patient. I or another  will attempt to visit the patient or the family at another time. The pt was admitted due to encephalopathy.

## 2024-07-30 NOTE — PROGRESS NOTES
[facility]  OCCUPATIONAL THERAPY MISSED TREATMENT NOTE  [facility] ONC MED 5K  5K-09/009-A      Date: 2024  Patient Name: Stone Sharp        CSN: [id number]   : 1935  (88 y.o.)  Gender: male   Referring Practitioner: ordering: Shorty Jc MD, attending: Senait Lion MD  Diagnosis: encephalopathy         REASON FOR MISSED TREATMENT: PT. Working with PT during attempt to see at 11:10 am.  OT staff to check back as able/appropriate.

## 2024-07-31 LAB
ANION GAP SERPL CALC-SCNC: 6 MEQ/L (ref 8–16)
BUN SERPL-MCNC: 12 MG/DL (ref 7–22)
CALCIUM SERPL-MCNC: 8.5 MG/DL (ref 8.5–10.5)
CHLORIDE SERPL-SCNC: 105 MEQ/L (ref 98–111)
CO2 SERPL-SCNC: 28 MEQ/L (ref 23–33)
CREAT SERPL-MCNC: 0.9 MG/DL (ref 0.4–1.2)
DEPRECATED RDW RBC AUTO: 53.4 FL (ref 35–45)
ERYTHROCYTE [DISTWIDTH] IN BLOOD BY AUTOMATED COUNT: 15 % (ref 11.5–14.5)
GFR SERPL CREATININE-BSD FRML MDRD: 82 ML/MIN/1.73M2
GLUCOSE SERPL-MCNC: 121 MG/DL (ref 70–108)
HCT VFR BLD AUTO: 41.9 % (ref 42–52)
HGB BLD-MCNC: 12.7 GM/DL (ref 14–18)
MCH RBC QN AUTO: 29.4 PG (ref 26–33)
MCHC RBC AUTO-ENTMCNC: 30.3 GM/DL (ref 32.2–35.5)
MCV RBC AUTO: 97 FL (ref 80–94)
PLATELET # BLD AUTO: 124 THOU/MM3 (ref 130–400)
PMV BLD AUTO: 9.9 FL (ref 9.4–12.4)
POTASSIUM SERPL-SCNC: 3.6 MEQ/L (ref 3.5–5.2)
RBC # BLD AUTO: 4.32 MILL/MM3 (ref 4.7–6.1)
SODIUM SERPL-SCNC: 139 MEQ/L (ref 135–145)
WBC # BLD AUTO: 4.5 THOU/MM3 (ref 4.8–10.8)

## 2024-07-31 PROCEDURE — 6370000000 HC RX 637 (ALT 250 FOR IP)

## 2024-07-31 PROCEDURE — 2580000003 HC RX 258

## 2024-07-31 PROCEDURE — 1200000000 HC SEMI PRIVATE

## 2024-07-31 PROCEDURE — 36415 COLL VENOUS BLD VENIPUNCTURE: CPT

## 2024-07-31 PROCEDURE — 80048 BASIC METABOLIC PNL TOTAL CA: CPT

## 2024-07-31 PROCEDURE — 94640 AIRWAY INHALATION TREATMENT: CPT

## 2024-07-31 PROCEDURE — 6360000002 HC RX W HCPCS

## 2024-07-31 PROCEDURE — 85027 COMPLETE CBC AUTOMATED: CPT

## 2024-07-31 PROCEDURE — 94761 N-INVAS EAR/PLS OXIMETRY MLT: CPT

## 2024-07-31 PROCEDURE — 99232 SBSQ HOSP IP/OBS MODERATE 35: CPT | Performed by: STUDENT IN AN ORGANIZED HEALTH CARE EDUCATION/TRAINING PROGRAM

## 2024-07-31 RX ADMIN — SODIUM CHLORIDE, PRESERVATIVE FREE 10 ML: 5 INJECTION INTRAVENOUS at 09:56

## 2024-07-31 RX ADMIN — APIXABAN 5 MG: 5 TABLET, FILM COATED ORAL at 20:29

## 2024-07-31 RX ADMIN — METOPROLOL TARTRATE 100 MG: 100 TABLET, FILM COATED ORAL at 20:28

## 2024-07-31 RX ADMIN — FINASTERIDE 5 MG: 5 TABLET, FILM COATED ORAL at 09:55

## 2024-07-31 RX ADMIN — DOCUSATE SODIUM 283 MG: 283 LIQUID RECTAL at 09:54

## 2024-07-31 RX ADMIN — TAMSULOSIN HYDROCHLORIDE 0.4 MG: 0.4 CAPSULE ORAL at 09:55

## 2024-07-31 RX ADMIN — CARBIDOPA AND LEVODOPA 1 TABLET: 10; 100 TABLET ORAL at 20:28

## 2024-07-31 RX ADMIN — SODIUM CHLORIDE, PRESERVATIVE FREE 10 ML: 5 INJECTION INTRAVENOUS at 20:28

## 2024-07-31 RX ADMIN — FUROSEMIDE 20 MG: 20 TABLET ORAL at 09:55

## 2024-07-31 RX ADMIN — IPRATROPIUM BROMIDE AND ALBUTEROL SULFATE 1 DOSE: .5; 3 SOLUTION RESPIRATORY (INHALATION) at 19:32

## 2024-07-31 RX ADMIN — CARBIDOPA AND LEVODOPA 1 TABLET: 10; 100 TABLET ORAL at 17:15

## 2024-07-31 RX ADMIN — ESCITALOPRAM OXALATE 10 MG: 10 TABLET ORAL at 09:55

## 2024-07-31 RX ADMIN — CARBIDOPA AND LEVODOPA 1 TABLET: 10; 100 TABLET ORAL at 09:55

## 2024-07-31 RX ADMIN — IPRATROPIUM BROMIDE AND ALBUTEROL SULFATE 1 DOSE: .5; 3 SOLUTION RESPIRATORY (INHALATION) at 09:05

## 2024-07-31 RX ADMIN — OXYBUTYNIN CHLORIDE 5 MG: 5 TABLET ORAL at 20:28

## 2024-07-31 RX ADMIN — BISACODYL 10 MG: 10 SUPPOSITORY RECTAL at 12:35

## 2024-07-31 RX ADMIN — CEFTRIAXONE SODIUM 1000 MG: 1 INJECTION, POWDER, FOR SOLUTION INTRAMUSCULAR; INTRAVENOUS at 17:12

## 2024-07-31 RX ADMIN — CARBIDOPA AND LEVODOPA 1 TABLET: 10; 100 TABLET ORAL at 14:23

## 2024-07-31 RX ADMIN — APIXABAN 5 MG: 5 TABLET, FILM COATED ORAL at 09:55

## 2024-07-31 RX ADMIN — ATORVASTATIN CALCIUM 10 MG: 10 TABLET, FILM COATED ORAL at 20:29

## 2024-07-31 RX ADMIN — OXYBUTYNIN CHLORIDE 5 MG: 5 TABLET ORAL at 09:55

## 2024-07-31 RX ADMIN — METOPROLOL TARTRATE 100 MG: 100 TABLET, FILM COATED ORAL at 09:55

## 2024-07-31 NOTE — PROGRESS NOTES
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  CXR 7/27 shows cardiomegaly with pulmonary vascular congestion, concern for acute heart failure.  CTA head and neck no evidence of stenosis, moderate left pleural effusion  MRI brain 7/7 shows encephalomalacia in the right temporal lobe from an old infarct, chronic microvascular ischemic changes, generalized atrophy no diffusion restriction to suggest acute ischemic infarct  No leukocytosis, UTI with Proteus species present and is being treated with Rocephin for total of 5 days started on 7/27, blood cultures x 2 no growth to date at 48 hours pending PNA and respiratory culture.  Rocephin initiated on 7/27 to 7/31  Thyroid functioning WNL, TSH and free T4 WNL  Advance diet to regular.  Goals of care evaluation per palliative  Wife would like patient to go home with home health  Acute on chronic CHF exacerbation, improving  Initially presented hypovolemic to [facility], with concerns of crackles heard throughout chest x-ray shows vascular congestion.  Echo on 8/12/2022 shows EF 50%, with grade 1 diastolic dysfunction.  Cardiology was consulted for further evaluation and recommendations, patient was started on IV Lasix.  Had good urinary output, continue Lasix 20 mg p.o on 7/30, hold amlodipine  Continue compression wraps, is making adequate urine.  Continue to monitor I's and O's and daily weights.  Repeat echo on 7/29/2024  report has been created using voice recognition software. It may contain   minor errors which are inherent in voice recognition technology.**      Electronically signed by Dr. Krish Marte      CT HEAD WO CONTRAST   Final Result   1. No acute intracranial hemorrhage, mass effect or midline shift.   2. 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   3. 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      Case was discussed by Dr. Garcia with Cy Jung at 2:23 p.m. 7/27/2024 via   telephone.         **This report has been created using voice recognition software. It may contain   minor errors which are inherent in voice recognition technology.**      Electronically signed by Dr Ramon Sandhu          Diet: ADULT DIET; Dysphagia - Minced and Moist; Low Sodium (2 gm); Low Potassium (Less than 3000 mg/day); 2000 ml    Microbiology: yes -reviewed  Antibiotics: yes -Rocephin 5-day course complete    Steroids: no    Telemetry: [x]Yes / []No  Telemetry Review of past 24 hours:  NSR    LDA: []CVC / []PICC / []Midline / []Navarro / []Drains / []Mediport / [x]PIV / []None    Labs (still needed?): [x]Yes / []No  IVF (still needed?): []Yes / [x]No    Level of care: []Step Down / [x]Med-Surg  Bed Status: [x]Inpatient / []Observation    DVT Prophylaxis: [] Lovenox / [] Heparin / [] SCDs / [x] Already on Systemic Anticoagulation / [] None     PT/OT: []Yes / [x]No    Disposition:    [x] Home with home health       [] TCU       [] Rehab       [] Psych       [] SNF       [] Long Term Care Facility       [x] Other-pending clinical course    Code Status: Full Code      An electronic signature was used to authenticate this note  - Shorty Jc MD PGY-2 on 7/31/2024 at 7:04 AM

## 2024-07-31 NOTE — PLAN OF CARE
Problem: Skin/Tissue Integrity  Goal: Absence of new skin breakdown  Description: 1.  Monitor for areas of redness and/or skin breakdown  2.  Assess vascular access sites hourly  3.  Every 4-6 hours minimum:  Change oxygen saturation probe site  4.  Every 4-6 hours:  If on nasal continuous positive airway pressure, respiratory therapy assess nares and determine need for appliance change or resting period.  7/31/2024 0029 by Luersman, Kristen, RN  Outcome: Progressing  Note: No new signs of skin breakdown     Problem: ABCDS Injury Assessment  Goal: Absence of physical injury  7/31/2024 0029 by Luersman, Kristen, RN  Outcome: Progressing  Flowsheets (Taken 7/31/2024 0029)  Absence of Physical Injury: Implement safety measures based on patient assessment     Problem: Safety - Adult  Goal: Free from fall injury  7/31/2024 0029 by Luersman, Kristen, RN  Outcome: Progressing  Flowsheets (Taken 7/31/2024 0029)  Free From Fall Injury: Instruct family/caregiver on patient safety     Problem: Pain  Goal: Verbalizes/displays adequate comfort level or baseline comfort level  7/31/2024 0029 by Luersman, Kristen, RN  Outcome: Progressing  Flowsheets (Taken 7/31/2024 0029)  Verbalizes/displays adequate comfort level or baseline comfort level:   Encourage patient to monitor pain and request assistance   Assess pain using appropriate pain scale  Note: Patient denies pain at this time     Problem: Skin/Tissue Integrity - Adult  Goal: Skin integrity remains intact  7/31/2024 0029 by Luersman, Kristen, RN  Outcome: Progressing  Flowsheets (Taken 7/31/2024 0029)  Skin Integrity Remains Intact: Monitor for areas of redness and/or skin breakdown     Problem: Metabolic/Fluid and Electrolytes - Adult  Goal: Electrolytes maintained within normal limits  7/31/2024 0029 by Luersman, Kristen, RN  Outcome: Progressing  Flowsheets (Taken 7/31/2024 0029)  Electrolytes maintained within normal limits: Monitor labs and assess patient for signs and  symptoms of electrolyte imbalances     Problem: Respiratory - Adult  Goal: Clear lung sounds  7/31/2024 0029 by Luersman, Kristen, RN  Outcome: Progressing  Note: No adventitious lung sounds noted     Problem: Chronic Conditions and Co-morbidities  Goal: Patient's chronic conditions and co-morbidity symptoms are monitored and maintained or improved  7/31/2024 0029 by Luersman, Kristen, RN  Outcome: Progressing  Flowsheets (Taken 7/31/2024 0029)  Care Plan - Patient's Chronic Conditions and Co-Morbidity Symptoms are Monitored and Maintained or Improved: Monitor and assess patient's chronic conditions and comorbid symptoms for stability, deterioration, or improvement     Care plan reviewed with patient.  Patient verbalized understanding of the plan of care and contribute to goal setting.

## 2024-07-31 NOTE — PROGRESS NOTES
[facility]  PHYSICAL THERAPY MISSED TREATMENT NOTE  [facility] ONC MED 5K    Date: 2024  Patient Name: Stone Sharp        MRN: [id number]   : 1935  (88 y.o.)  Gender: male   Referring Practitioner: Shorty Jc MD  Diagnosis: Encephalopathy         REASON FOR MISSED TREATMENT:  RN approves patient for PT this AM. However, upon entering the patients room, patient is not interactive and will only open eyes to sternal rub. RN states the patient did not sleep well last night but will assess him. Will re-attempt as time allows .    Natalie Bell, GHADA  8:42 AM  2024

## 2024-07-31 NOTE — RT PROTOCOL NOTE
RT Inhaler-Nebulizer Bronchodilator Protocol Note    There is a bronchodilator order in the chart from a provider indicating to follow the RT Bronchodilator Protocol and there is an “Initiate RT Inhaler-Nebulizer Bronchodilator Protocol” order as well (see protocol at bottom of note).    CXR Findings:  No results found.    The findings from the last RT Protocol Assessment were as follows:   History Pulmonary Disease: Chronic pulmonary disease  Respiratory Pattern: Regular pattern and RR 12-20 bpm  Breath Sounds: Intermittent or unilateral wheezes  Cough: Strong, spontaneous, non-productive  Indication for Bronchodilator Therapy: Decreased or absent breath sounds  Bronchodilator Assessment Score: 6    Aerosolized bronchodilator medication orders have been revised according to the RT Inhaler-Nebulizer Bronchodilator Protocol below.    Respiratory Therapist to perform RT Therapy Protocol Assessment initially then follow the protocol.  Repeat RT Therapy Protocol Assessment PRN for score 0-3 or on second treatment, BID, and PRN for scores above 3.    No Indications - adjust the frequency to every 6 hours PRN wheezing or bronchospasm, if no treatments needed after 48 hours then discontinue using Per Protocol order mode.     If indication present, adjust the RT bronchodilator orders based on the Bronchodilator Assessment Score as indicated below.  Use Inhaler orders unless patient has one or more of the following: on home nebulizer, not able to hold breath for 10 seconds, is not alert and oriented, cannot activate and use MDI correctly, or respiratory rate 25 breaths per minute or more, then use the equivalent nebulizer order(s) with same Frequency and PRN reasons based on the score.  If a patient is on this medication at home then do not decrease Frequency below that used at home.    0-3 - enter or revise RT bronchodilator order(s) to equivalent RT Bronchodilator order with Frequency of every 4 hours PRN for wheezing or

## 2024-07-31 NOTE — PROGRESS NOTES
[facility]  OCCUPATIONAL THERAPY MISSED TREATMENT NOTE  [facility] ONC MED 5K  5K-09/009-A      Date: 2024  Patient Name: Stone Sharp        CSN: [id number]   : 1935  (88 y.o.)  Gender: male   Referring Practitioner: ordering: Shorty Jc MD, attending: Senait Lion MD  Diagnosis: encephalopathy         REASON FOR MISSED TREATMENT:  patient supine in bed with head elevated upon OT arrival and appeared somnolent. Patient closing eyes when discussing OT. Patient unable to be roused to therapeutic level. OT to check back another date.

## 2024-07-31 NOTE — PLAN OF CARE
Problem: Respiratory - Adult  Goal: Clear lung sounds  7/31/2024 0910 by Agnieszka Bernal, RCP  Outcome: Progressing  Note: Txs to help improve lung aeration. Patient mutually agreed on goals.

## 2024-07-31 NOTE — RT PROTOCOL NOTE
RT Inhaler-Nebulizer Bronchodilator Protocol Note    There is a bronchodilator order in the chart from a provider indicating to follow the RT Bronchodilator Protocol and there is an “Initiate RT Inhaler-Nebulizer Bronchodilator Protocol” order as well (see protocol at bottom of note).    CXR Findings:  No results found.    The findings from the last RT Protocol Assessment were as follows:   History Pulmonary Disease: Chronic pulmonary disease  Respiratory Pattern: Regular pattern and RR 12-20 bpm  Breath Sounds: Slightly diminished and/or crackles  Cough: Strong, spontaneous, non-productive  Indication for Bronchodilator Therapy: Decreased or absent breath sounds  Bronchodilator Assessment Score: 4    Aerosolized bronchodilator medication orders have been revised according to the RT Inhaler-Nebulizer Bronchodilator Protocol below.    Respiratory Therapist to perform RT Therapy Protocol Assessment initially then follow the protocol.  Repeat RT Therapy Protocol Assessment PRN for score 0-3 or on second treatment, BID, and PRN for scores above 3.    No Indications - adjust the frequency to every 6 hours PRN wheezing or bronchospasm, if no treatments needed after 48 hours then discontinue using Per Protocol order mode.     If indication present, adjust the RT bronchodilator orders based on the Bronchodilator Assessment Score as indicated below.  Use Inhaler orders unless patient has one or more of the following: on home nebulizer, not able to hold breath for 10 seconds, is not alert and oriented, cannot activate and use MDI correctly, or respiratory rate 25 breaths per minute or more, then use the equivalent nebulizer order(s) with same Frequency and PRN reasons based on the score.  If a patient is on this medication at home then do not decrease Frequency below that used at home.    0-3 - enter or revise RT bronchodilator order(s) to equivalent RT Bronchodilator order with Frequency of every 4 hours PRN for wheezing or  increased work of breathing using Per Protocol order mode.        4-6 - enter or revise RT Bronchodilator order(s) to two equivalent RT bronchodilator orders with one order with BID Frequency and one order with Frequency of every 4 hours PRN wheezing or increased work of breathing using Per Protocol order mode.        7-10 - enter or revise RT Bronchodilator order(s) to two equivalent RT bronchodilator orders with one order with TID Frequency and one order with Frequency of every 4 hours PRN wheezing or increased work of breathing using Per Protocol order mode.       11-13 - enter or revise RT Bronchodilator order(s) to one equivalent RT bronchodilator order with QID Frequency and an Albuterol order with Frequency of every 4 hours PRN wheezing or increased work of breathing using Per Protocol order mode.      Greater than 13 - enter or revise RT Bronchodilator order(s) to one equivalent RT bronchodilator order with every 4 hours Frequency and an Albuterol order with Frequency of every 2 hours PRN wheezing or increased work of breathing using Per Protocol order mode.     RT to enter RT Home Evaluation for COPD & MDI Assessment order using Per Protocol order mode.    Electronically signed by Agnieszka Bernal RCP on 7/31/2024 at 9:10 AM

## 2024-07-31 NOTE — CARE COORDINATION
7/31/24, 1:45 PM EDT    DISCHARGE PLANNING EVALUATION    Call to pt's wife Diogo, discussed pt needing a significant amount of assistance. Wife reports they have a alisia lift at home and wheelchair. She reports her family helps her with getting pt up out of bed with the lift and back to bed. She reports she cleans him up as needed. Wife continue to plan for home health at discharge.

## 2024-07-31 NOTE — CARE COORDINATION
7/31/24, 10:50 AM EDT    DISCHARGE ON GOING EVALUATION    Stone [facility] day: 4  Location: -09/009-A Reason for admit: Shortness of breath [R06.02]  Encephalopathy [G93.40]     Procedures: 7/29 ECHO: EF of 60 - 65%     Imaging since last note: none     Barriers to Discharge: Hospitalist, Cardiology and Palliative Care RN following. Plan for OP stress test. PT/OT. Fluid restriction. Bowel regimen. Sinemet. IV rocephin q24h. Duonebs. AM labs pending. 1x PO potassium. External catheter. (+) BM     PCP: Juve Mcintosh,   Readmission Risk Score: 18.4    Patient Goals/Plan/Treatment Preferences: Return home w/ wife and current [facility] vs SNF. SW following.

## 2024-08-01 VITALS
SYSTOLIC BLOOD PRESSURE: 149 MMHG | DIASTOLIC BLOOD PRESSURE: 73 MMHG | BODY MASS INDEX: 30.81 KG/M2 | WEIGHT: 208 LBS | TEMPERATURE: 98.4 F | HEIGHT: 69 IN | RESPIRATION RATE: 17 BRPM | OXYGEN SATURATION: 100 % | HEART RATE: 64 BPM

## 2024-08-01 LAB
ANION GAP SERPL CALC-SCNC: 10 MEQ/L (ref 8–16)
BACTERIA BLD AEROBE CULT: NORMAL
BACTERIA BLD AEROBE CULT: NORMAL
BUN SERPL-MCNC: 11 MG/DL (ref 7–22)
CALCIUM SERPL-MCNC: 8.4 MG/DL (ref 8.5–10.5)
CHLORIDE SERPL-SCNC: 104 MEQ/L (ref 98–111)
CO2 SERPL-SCNC: 25 MEQ/L (ref 23–33)
CREAT SERPL-MCNC: 0.8 MG/DL (ref 0.4–1.2)
DEPRECATED RDW RBC AUTO: 53.7 FL (ref 35–45)
ERYTHROCYTE [DISTWIDTH] IN BLOOD BY AUTOMATED COUNT: 15 % (ref 11.5–14.5)
GFR SERPL CREATININE-BSD FRML MDRD: 85 ML/MIN/1.73M2
GLUCOSE SERPL-MCNC: 107 MG/DL (ref 70–108)
HCT VFR BLD AUTO: 38.9 % (ref 42–52)
HGB BLD-MCNC: 11.9 GM/DL (ref 14–18)
MCH RBC QN AUTO: 29.5 PG (ref 26–33)
MCHC RBC AUTO-ENTMCNC: 30.6 GM/DL (ref 32.2–35.5)
MCV RBC AUTO: 96.3 FL (ref 80–94)
PLATELET # BLD AUTO: 111 THOU/MM3 (ref 130–400)
PMV BLD AUTO: 9.8 FL (ref 9.4–12.4)
POTASSIUM SERPL-SCNC: 3.4 MEQ/L (ref 3.5–5.2)
RBC # BLD AUTO: 4.04 MILL/MM3 (ref 4.7–6.1)
SODIUM SERPL-SCNC: 139 MEQ/L (ref 135–145)
WBC # BLD AUTO: 4.5 THOU/MM3 (ref 4.8–10.8)

## 2024-08-01 PROCEDURE — 36415 COLL VENOUS BLD VENIPUNCTURE: CPT

## 2024-08-01 PROCEDURE — 97166 OT EVAL MOD COMPLEX 45 MIN: CPT

## 2024-08-01 PROCEDURE — 80048 BASIC METABOLIC PNL TOTAL CA: CPT

## 2024-08-01 PROCEDURE — 6370000000 HC RX 637 (ALT 250 FOR IP)

## 2024-08-01 PROCEDURE — 2580000003 HC RX 258

## 2024-08-01 PROCEDURE — 97110 THERAPEUTIC EXERCISES: CPT

## 2024-08-01 PROCEDURE — 99239 HOSP IP/OBS DSCHRG MGMT >30: CPT | Performed by: STUDENT IN AN ORGANIZED HEALTH CARE EDUCATION/TRAINING PROGRAM

## 2024-08-01 PROCEDURE — 94761 N-INVAS EAR/PLS OXIMETRY MLT: CPT

## 2024-08-01 PROCEDURE — 85027 COMPLETE CBC AUTOMATED: CPT

## 2024-08-01 PROCEDURE — 94640 AIRWAY INHALATION TREATMENT: CPT

## 2024-08-01 RX ORDER — POTASSIUM CHLORIDE 750 MG/1
10 TABLET, EXTENDED RELEASE ORAL DAILY
Qty: 30 TABLET | Refills: 0 | Status: SHIPPED | OUTPATIENT
Start: 2024-08-01 | End: 2024-08-31

## 2024-08-01 RX ADMIN — CARBIDOPA AND LEVODOPA 1 TABLET: 10; 100 TABLET ORAL at 08:58

## 2024-08-01 RX ADMIN — POTASSIUM BICARBONATE 40 MEQ: 782 TABLET, EFFERVESCENT ORAL at 06:11

## 2024-08-01 RX ADMIN — IPRATROPIUM BROMIDE AND ALBUTEROL SULFATE 1 DOSE: .5; 3 SOLUTION RESPIRATORY (INHALATION) at 07:43

## 2024-08-01 RX ADMIN — OXYBUTYNIN CHLORIDE 5 MG: 5 TABLET ORAL at 08:58

## 2024-08-01 RX ADMIN — SODIUM CHLORIDE, PRESERVATIVE FREE 10 ML: 5 INJECTION INTRAVENOUS at 09:01

## 2024-08-01 RX ADMIN — APIXABAN 5 MG: 5 TABLET, FILM COATED ORAL at 08:59

## 2024-08-01 RX ADMIN — FINASTERIDE 5 MG: 5 TABLET, FILM COATED ORAL at 08:59

## 2024-08-01 RX ADMIN — CARBIDOPA AND LEVODOPA 1 TABLET: 10; 100 TABLET ORAL at 17:29

## 2024-08-01 RX ADMIN — ACETAMINOPHEN 650 MG: 325 TABLET ORAL at 03:33

## 2024-08-01 RX ADMIN — DOCUSATE SODIUM 283 MG: 283 LIQUID RECTAL at 08:58

## 2024-08-01 RX ADMIN — FUROSEMIDE 20 MG: 20 TABLET ORAL at 08:59

## 2024-08-01 RX ADMIN — BISACODYL 10 MG: 10 SUPPOSITORY RECTAL at 09:01

## 2024-08-01 RX ADMIN — TAMSULOSIN HYDROCHLORIDE 0.4 MG: 0.4 CAPSULE ORAL at 08:59

## 2024-08-01 RX ADMIN — ESCITALOPRAM OXALATE 10 MG: 10 TABLET ORAL at 08:58

## 2024-08-01 RX ADMIN — CARBIDOPA AND LEVODOPA 1 TABLET: 10; 100 TABLET ORAL at 13:15

## 2024-08-01 ASSESSMENT — PAIN - FUNCTIONAL ASSESSMENT: PAIN_FUNCTIONAL_ASSESSMENT: ACTIVITIES ARE NOT PREVENTED

## 2024-08-01 ASSESSMENT — PAIN DESCRIPTION - ORIENTATION: ORIENTATION: RIGHT

## 2024-08-01 ASSESSMENT — PAIN SCALES - GENERAL
PAINLEVEL_OUTOF10: 0
PAINLEVEL_OUTOF10: 7

## 2024-08-01 ASSESSMENT — PAIN DESCRIPTION - DESCRIPTORS: DESCRIPTORS: ACHING;DISCOMFORT

## 2024-08-01 ASSESSMENT — PAIN DESCRIPTION - LOCATION: LOCATION: SHOULDER

## 2024-08-01 NOTE — PROGRESS NOTES
that patient's legs are less swollen than normal, and denies any sick contacts.  States that at baseline he can converse 8 with you and knows who he is talking to however does require some reorientation. \"    Restrictions/Precautions:  Restrictions/Precautions: General Precautions, Fall Risk  Position Activity Restriction  Other position/activity restrictions: hx of parkinson's    Subjective  Chart Reviewed: Yes, Orders, History and Physical, Progress Notes  Patient assessed for rehabilitation services?: Yes  Family / Caregiver Present: No    Subjective: Pt sitting up in bed, tech just finished feeding Pt    Pain: 0/10: no physical signs of pain during session    Vitals: Vitals not assessed per clinical judgement, see nursing flowsheet    Social/Functional History:  Lives With: Spouse  Type of Home: House  Home Layout: One level  Home Access: Stairs to enter with rails  Entrance Stairs - Number of Steps: 2  Home Equipment: Wheelchair - Manual (alisia)           ADL Assistance: Needs assistance  Homemaking Responsibilities: No  Ambulation Assistance: Non-ambulatory  Transfer Assistance: Needs assistance          Additional Comments: Per chart wife & other family provide significant A at home, have a alisia & w/c. No family present on eval, Pt reports he was feeding himself PLOF.    VISION:WFL    HEARING:  WFL    COGNITION: Slow Processing/slow to respond, Decreased Insight, Decreased Problem Solving, Decreased Safety Awareness, and Difficulty Following Commands  Pt oriented to self & only off by 1 day with ERUM & month    RANGE OF MOTION:  Observed R shoulder flexion to 45 degrees, elbow in mid range, hand AROM WFL; L shoulder flexion observed to 15 degrees, elbow mid range, & hand AROM WFL.   **attempted AAROM/PROM Pt resisting therapist, unable to determine PROM at this time      STRENGTH:  BUE appears 4/5 with Pt resisting therapist's attempts at AAROM/PROM    SENSATION:   WFL-BUE appears intact    ADL:   Feeding:

## 2024-08-01 NOTE — PLAN OF CARE
Problem: Respiratory - Adult  Goal: Clear lung sounds  8/1/2024 0749 by Dee Preston, RCP  Outcome: Progressing   Continue aerosols to improve breath sounds.

## 2024-08-01 NOTE — PROGRESS NOTES
[facility]  INPATIENT PHYSICAL THERAPY  DAILY NOTE  STRZ ONC MED 5K - 5K-09/009-A    Time In: 1131  Time Out: 1144  Timed Code Treatment Minutes: 13 Minutes  Minutes: 13          Date: 2024  Patient Name: Stone Sharp,  Gender:  male        MRN: [id number]  : 1935  (88 y.o.)     Referring Practitioner: Shorty Jc MD  Diagnosis: Encephalopathy  Additional Pertinent Hx: 88/M, poor historian was brought to the emergency room by his family members for altered mental status, described at staring into the space.  Hemodynamically stable.  Noted to have urinary tract infection (UA-  WBC, 50-75 RBC and many bacteria). 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  Mild troponin elevation (38, 38, 82 and 71),  proBNP 2471 (previous 430) and lactic acid 2.9. Per chart review: It is notable that the patient has a prior history of an intracranial hemorrhage.  Also has a history of parkinsonism and just got out of a nursing home last week.  The family reported that he was more active yesterday()     Prior Level of Function:  Lives With: Spouse  Type of Home: House  Home Layout: One level  Home Access: Stairs to enter with rails  Entrance Stairs - Number of Steps: 2  Home Equipment: Wheelchair - Manual (alisia)        ADL Assistance: Needs assistance  Homemaking Responsibilities: No  Ambulation Assistance: Non-ambulatory  Transfer Assistance: Needs assistance  Additional Comments: Per chart wife & other family provide significant A at home, have a alisia & w/c. No family present on eval, Pt reports he was feeding himself PLOF.    Restrictions/Precautions:  Restrictions/Precautions: General Precautions, Fall Risk  Position Activity Restriction  Other position/activity restrictions: hx of parkinson's     SUBJECTIVE: RN  Term Goals  Time Frame for Short Term Goals: by discharge  Short Term Goal 1: bed mobility with HOB flat, no rails, mod I for increased functional ind  Short Term Goal 2: sit <>stand from various surfaces with LRD mod I for safe transfers  Short Term Goal 3: PT to assess ambulation when appropriate  Long Term Goals  Time Frame for Long Term Goals : NA d/t short ELOS    Following session, patient left in safe position with all fall risk precautions in place.

## 2024-08-01 NOTE — FLOWSHEET NOTE
Phone conversation with patient's spouse / Bharathi,  reviewed medications and changes made., f/u visits which were scheduled.  Advised Patient will continue with [facility] and any questions  / concerns over next couple days they will be able to help.  Wife stated understanding, felt all questions were answered

## 2024-08-01 NOTE — DISCHARGE SUMMARY
DISCHARGE SUMMARY      Patient Identification:   tSone Sharp   : 1935  MRN: [id number]   Account: [id number]      Patient's PCP: Juve Mcintosh DO    Admit Date: 2024     Discharge Date: 2024      Admitting Physician: Senait Lion MD     Discharge Physician: Shorty Jc MD     Discharge Diagnoses:    Active Hospital Problems    Diagnosis Date Noted    Complicated UTI (urinary tract infection) [N39.0] 2024    Encephalopathy [G93.40] 2024       The patient was seen and examined on day of discharge and this discharge summary is in conjunction with any daily progress note from day of discharge.    Hospital Course:   Per original H&P    \"88 y.o. male who presented to [facility] with  has a past medical history of ASHD (arteriosclerotic heart disease), Atrial fibrillation ([facility]), BPH (benign prostatic hyperplasia), CKD (chronic kidney disease) stage 3, GFR 30-59 ml/min ([facility]), COPD (chronic obstructive pulmonary disease) ([facility]), Dyslipidemia, Former smoker, Heart failure with preserved ejection fraction ([facility]), History of CVA (cerebrovascular accident), History of fracture of nasal bone, History of intracranial hemorrhage, History of prostate cancer, History of recurrent deep vein thrombosis (DVT), Hypertension, essential, Osteoarthritis, Presence of IVC filter, and Spinal stenosis, lumbar, s/p laminectomy who presented to [facility]  for AMS and left-sided weakness.  History was provided by wife and daughter who was at bedside.  Daughter states that patient just returned from nursing home on Monday and has been doing fine.  States that this morning they took him out in his wheelchair in the front yard where they noticed generalized weakness and episodes of staring into space.  She states this has happened to him in the past about 2 years ago but is not sure what caused it at that time.  Wife states that patient has been sleeping and eating well.  Does admit to

## 2024-08-01 NOTE — CARE COORDINATION
8/1/24, 1:32 PM EDT    Patient goals/plan/ treatment preferences discussed by  and .  Patient goals/plan/ treatment preferences reviewed with patient/ family.  Patient/ family verbalize understanding of discharge plan and are in agreement with goal/plan/treatment preferences.  Understanding was demonstrated using the teach back method.  AVS provided by RN at time of discharge, which includes all necessary medical information pertaining to the patients current course of illness, treatment, post-discharge goals of care, and treatment preferences.     Services At/After Discharge: Home Health and In ambulance [facility], [facility]    LEXUS met with pt and wife this afternoon, updated on discharge orders in place. Wife confirms plans to return home with home health and not nursing facility, discussed transport home by ambulance. Call to [facility], transport set for 6:30pm. LEXUS faxed transport paperwork, copy placed on chart. LEXUS met with pt and wife, updated on transport time, wife will be home when pt comes home. Call to [facility], spoke with Litzy, updated on discharge today, they will follow up with pt at home.

## 2024-08-01 NOTE — PLAN OF CARE
Problem: Skin/Tissue Integrity  Goal: Absence of new skin breakdown  Description: 1.  Monitor for areas of redness and/or skin breakdown  2.  Assess vascular access sites hourly  3.  Every 4-6 hours minimum:  Change oxygen saturation probe site  4.  Every 4-6 hours:  If on nasal continuous positive airway pressure, respiratory therapy assess nares and determine need for appliance change or resting period.  Outcome: Progressing  Note: No new signs of skin breakdown     Problem: ABCDS Injury Assessment  Goal: Absence of physical injury  Outcome: Progressing  Flowsheets  Taken 7/31/2024 2326 by Luersman, Kristen, RN  Absence of Physical Injury: Implement safety measures based on patient assessment  Note: Patient remains free of falls     Problem: Safety - Adult  Goal: Free from fall injury  Outcome: Progressing  Flowsheets  Taken 7/31/2024 2326 by Luersman, Kristen, RN  Free From Fall Injury: Instruct family/caregiver on patient safety  Note: Call light within reach, bed alarm on, bed at lowest level     Problem: Pain  Goal: Verbalizes/displays adequate comfort level or baseline comfort level  Outcome: Progressing  Flowsheets  Taken 7/31/2024 2326 by Luersman, Kristen, RN  Verbalizes/displays adequate comfort level or baseline comfort level:   Encourage patient to monitor pain and request assistance   Assess pain using appropriate pain scale  Note: Patient denies pain at this time     Problem: Skin/Tissue Integrity - Adult  Goal: Skin integrity remains intact  Outcome: Progressing  Flowsheets  Taken 7/31/2024 2326 by Luersman, Kristen, RN  Skin Integrity Remains Intact: Monitor for areas of redness and/or skin breakdown    Problem: Metabolic/Fluid and Electrolytes - Adult  Goal: Electrolytes maintained within normal limits  Outcome: Progressing  Flowsheets  Taken 7/31/2024 2326 by Luersman, Kristen, RN  Electrolytes maintained within normal limits:   Monitor labs and assess patient for signs and symptoms of

## 2024-08-01 NOTE — RT PROTOCOL NOTE
RT Nebulizer Bronchodilator Protocol Note    There is a bronchodilator order in the chart from a provider indicating to follow the RT Bronchodilator Protocol and there is an “Initiate RT Bronchodilator Protocol” order as well (see protocol at bottom of note).    CXR Findings:  No results found.    The findings from the last RT Protocol Assessment were as follows:  Smoking: Chronic pulmonary disease  Respiratory Pattern: Regular pattern and RR 12-20 bpm  Breath Sounds: Slightly diminished and/or crackles  Cough: Strong, spontaneous, non-productive  Indication for Bronchodilator Therapy: Decreased or absent breath sounds  Bronchodilator Assessment Score: 4    Aerosolized bronchodilator medication orders have been revised according to the RT Nebulizer Bronchodilator Protocol below.    Respiratory Therapist to perform RT Therapy Protocol Assessment initially then follow the protocol.  Repeat RT Therapy Protocol Assessment PRN for score 0-3 or on second treatment, BID, and PRN for scores above 3.    No Indications - adjust the frequency to every 6 hours PRN wheezing or bronchospasm, if no treatments needed after 48 hours then discontinue using Per Protocol order mode.     If indication present, adjust the RT bronchodilator orders based on the Bronchodilator Assessment Score as indicated below.  If a patient is on this medication at home then do not decrease Frequency below that used at home.    0-3 - enter or revise RT bronchodilator order(s) to equivalent RT Bronchodilator order with Frequency of every 4 hours PRN for wheezing or increased work of breathing using Per Protocol order mode.       4-6 - enter or revise RT Bronchodilator order(s) to two equivalent RT bronchodilator orders with one order with BID Frequency and one order with Frequency of every 4 hours PRN wheezing or increased work of breathing using Per Protocol order mode.         7-10 - enter or revise RT Bronchodilator order(s) to two equivalent RT  bronchodilator orders with one order with TID Frequency and one order with Frequency of every 4 hours PRN wheezing or increased work of breathing using Per Protocol order mode.       11-13 - enter or revise RT Bronchodilator order(s) to one equivalent RT bronchodilator order with QID Frequency and an Albuterol order with Frequency of every 4 hours PRN wheezing or increased work of breathing using Per Protocol order mode.      Greater than 13 - enter or revise RT Bronchodilator order(s) to one equivalent RT bronchodilator order with every 4 hours Frequency and an Albuterol order with Frequency of every 2 hours PRN wheezing or increased work of breathing using Per Protocol order mode.     RT to enter RT Home Evaluation for COPD & MDI Assessment order using Per Protocol order mode.    Electronically signed by Dee Preston RCP on 8/1/2024 at 7:47 AM

## 2024-08-05 ENCOUNTER — TELEPHONE (OUTPATIENT)
Dept: FAMILY MEDICINE CLINIC | Age: 89
End: 2024-08-05

## 2024-08-05 NOTE — TELEPHONE ENCOUNTER
Care Transitions Initial Follow Up Call    Outreach made within 2 business days of discharge: Yes    Patient: Stone Sharp Patient : 1935   MRN: [id number]  Reason for Admission: Encephalopathy   Discharge Date: 24       Spoke with: Stone    Discharge department/facility: [facility]    TCM Interactive Patient Contact:  Was patient able to fill all prescriptions: Yes  Was patient instructed to bring all medications to the follow-up visit: Yes  Is patient taking all medications as directed in the discharge summary? Yes  Does patient understand their discharge instructions: Yes  Does patient have questions or concerns that need addressed prior to 7-14 day follow up office visit: no    Scheduled appointment with PCP within 7-14 days    Follow Up  Future Appointments   Date Time Provider Department Center   2024 10:00 AM Juve Mcintosh, DO Fam Med [facility] DE[facility]   2024  1:00 PM Jonathan Elizabeth PA-C [facility] [place] [facility] [place]   2024  2:00 PM Juve Mcintosh DO Fam Med [facility] DEP   2024 11:00 AM Barber Ramsay PA-C [facility] [place]   3/26/2025  1:00 PM Zain Mcqueen MD [facility] [place] [facility] [place]   2025 11:00 AM Krysta Rodriguez MD [facility] [place]       Kerry Truong LPN

## 2024-08-05 NOTE — TELEPHONE ENCOUNTER
Stone home health nurse wanted to inform of of 2 sores on his left glute. It's very minimal just a little red the nurse stated. She just wanted you to be aware

## 2024-08-06 ENCOUNTER — CARE COORDINATION (OUTPATIENT)
Dept: CARE COORDINATION | Age: 89
End: 2024-08-06

## 2024-08-06 PROBLEM — Z86.79 HISTORY OF DISSECTION OF THORACIC AORTA: Chronic | Status: ACTIVE | Noted: 2024-08-06

## 2024-08-06 PROBLEM — G93.40 ENCEPHALOPATHY: Status: RESOLVED | Noted: 2024-07-27 | Resolved: 2024-08-06

## 2024-08-06 PROBLEM — N39.0 COMPLICATED UTI (URINARY TRACT INFECTION): Status: RESOLVED | Noted: 2024-07-30 | Resolved: 2024-08-06

## 2024-08-06 NOTE — CARE COORDINATION
Ambulatory Care Coordination Note     8/6/2024 12:50 PM     Patient outreach attempt by this ACM today to perform care management follow up . AC was unable to reach the patient by telephone today; left voice message requesting a return phone call to this ACM.     ACM: Millie Perry RN     Care Summary Note:     PCP/Specialist follow up:   Future Appointments         Provider Specialty Dept Phone    8/7/2024 10:00 AM Juve Mcintosh DO Family Medicine [phone]    8/14/2024 1:00 PM Jonathan Elizabeth PA-C Urology [phone]    8/29/2024 2:00 PM Juve Mcintosh DO Family Medicine [phone]    9/16/2024 11:00 AM Barber Ramsay PA-C Cardiology [phone]    3/26/2025 1:00 PM Zain Mcqueen MD Nephrology [phone]    6/23/2025 11:00 AM Krysta Rodriguez MD Cardiology [phone]            Follow Up:   Plan for next AC outreach in approximately 1 week to complete:  Address any barriers and work towards graduation .

## 2024-08-06 NOTE — PROGRESS NOTES
Chief Complaint   Patient presents with    Follow-Up from Hospital     History obtained from spouse and the patient.    SUBJECTIVE:  Stone Sharp is a 88 y.o. male that presents today for     -Patient admitted to [facility] from 3/16 to 3/20/2024 for issues noted below.   D/c summary:  \"Brief Summary of Hospital Course for Discharge Summary:   Stone Sharp is a 88 y.o. male with a history of Parkinsons, Afib on Eliquis, PAF detected on loop recorder, CAD/PCI-LCx (2015), HTN, HPL, CKD, hx of hemorrhagic stroke, recurrent DVT and loop recorder (10/22/2020) implanted for falls and AV block who presented to [facility] with acute chest pain. He was transferred to [facility] and taken emergently to the OR on 3/16/24 for TEVAR ([place] TBE) per Dr. Wolfe.    Intraoperative findings include:   Ruptured descending thoracic aorta  GORE TBE 34 main with 15 branch SCA through 8 mm portal   Left groin cutdown at end of case for lack of pedal signals. Distal SFA /pop thrombus evacuated   Left brachial cutdown    The patient tolerated the procedure well. Patient admitted to the CVICU post operatively for frequent neurovascular checks, strict blood pressure control , and close hemodynamic monitoring. Patient's postoperative course was uncomplicated.    Hypertensive post-operative with no home meds. Patient started on norvasc with improvement of blood pressure. Patient instructed to follow with PCP in 1 week for blood pressure check.    Patient was started on IV fluids and weaned as tolerated. Diet was advanced as tolerated to a regular diet. Pain was initially controlled with IV pain medication and later transitioned to an oral regimen. Home medications have been resumed, including Eliquis and pravastatin. Patient will also be discharged on aspirin and norvasc.    The patient will be discharged to SNF per therapy recommendations. At time of discharge the patient is afebrile, hemodynamically stable, breathing comfortably, eating a

## 2024-08-07 ENCOUNTER — OFFICE VISIT (OUTPATIENT)
Dept: FAMILY MEDICINE CLINIC | Age: 89
End: 2024-08-07

## 2024-08-07 VITALS
OXYGEN SATURATION: 95 % | HEART RATE: 100 BPM | RESPIRATION RATE: 18 BRPM | BODY MASS INDEX: 30.81 KG/M2 | TEMPERATURE: 97.8 F | WEIGHT: 208 LBS | HEIGHT: 69 IN | DIASTOLIC BLOOD PRESSURE: 76 MMHG | SYSTOLIC BLOOD PRESSURE: 138 MMHG

## 2024-08-07 DIAGNOSIS — E78.5 DYSLIPIDEMIA: Chronic | ICD-10-CM

## 2024-08-07 DIAGNOSIS — J94.2 HEMOTHORAX ON LEFT: ICD-10-CM

## 2024-08-07 DIAGNOSIS — R53.81 PHYSICAL DECONDITIONING: ICD-10-CM

## 2024-08-07 DIAGNOSIS — I25.10 ASHD (ARTERIOSCLEROTIC HEART DISEASE): Chronic | ICD-10-CM

## 2024-08-07 DIAGNOSIS — Z86.718 HISTORY OF RECURRENT DEEP VEIN THROMBOSIS (DVT): Chronic | ICD-10-CM

## 2024-08-07 DIAGNOSIS — K82.9 GALLBLADDER DISEASE: ICD-10-CM

## 2024-08-07 DIAGNOSIS — K76.89 LIVER CYST: ICD-10-CM

## 2024-08-07 DIAGNOSIS — J44.9 CHRONIC OBSTRUCTIVE PULMONARY DISEASE, UNSPECIFIED COPD TYPE (HCC): ICD-10-CM

## 2024-08-07 DIAGNOSIS — G93.41 METABOLIC ENCEPHALOPATHY: ICD-10-CM

## 2024-08-07 DIAGNOSIS — I50.32 CHRONIC HEART FAILURE WITH PRESERVED EJECTION FRACTION (HCC): ICD-10-CM

## 2024-08-07 DIAGNOSIS — Z86.79 HISTORY OF INTRACRANIAL HEMORRHAGE: Chronic | ICD-10-CM

## 2024-08-07 DIAGNOSIS — I10 HYPERTENSION, ESSENTIAL: Chronic | ICD-10-CM

## 2024-08-07 DIAGNOSIS — D69.6 THROMBOCYTOPENIA (HCC): ICD-10-CM

## 2024-08-07 DIAGNOSIS — N39.0 ACUTE URINARY TRACT INFECTION: ICD-10-CM

## 2024-08-07 DIAGNOSIS — I71.012 DISSECTION OF DESCENDING THORACIC AORTA (HCC): Primary | ICD-10-CM

## 2024-08-07 DIAGNOSIS — G20.C PARKINSONISM, UNSPECIFIED PARKINSONISM TYPE (HCC): ICD-10-CM

## 2024-08-07 DIAGNOSIS — N40.1 BENIGN PROSTATIC HYPERPLASIA WITH LOWER URINARY TRACT SYMPTOMS, SYMPTOM DETAILS UNSPECIFIED: ICD-10-CM

## 2024-08-07 DIAGNOSIS — J90 PLEURAL EFFUSION, LEFT: ICD-10-CM

## 2024-08-07 DIAGNOSIS — D64.9 NORMOCYTIC ANEMIA: ICD-10-CM

## 2024-08-07 DIAGNOSIS — I48.91 ATRIAL FIBRILLATION, UNSPECIFIED TYPE (HCC): ICD-10-CM

## 2024-08-07 DIAGNOSIS — Z86.73 HISTORY OF CVA (CEREBROVASCULAR ACCIDENT): Chronic | ICD-10-CM

## 2024-08-07 DIAGNOSIS — Z85.46 HISTORY OF PROSTATE CANCER: Chronic | ICD-10-CM

## 2024-08-07 DIAGNOSIS — N18.31 STAGE 3A CHRONIC KIDNEY DISEASE (HCC): ICD-10-CM

## 2024-08-07 PROBLEM — R14.0 ABDOMINAL DISTENSION: Status: ACTIVE | Noted: 2024-08-07

## 2024-08-07 NOTE — PATIENT INSTRUCTIONS
LAB INSTRUCTIONS:    Please complete labs within 1 week(s).    Please fast for 8 hours prior to lab collection.    The clinic will call you within 1 week of collection. If you have not heard from us within that amount of time, please call us at [phone].

## 2024-08-12 ENCOUNTER — CARE COORDINATION (OUTPATIENT)
Dept: CARE COORDINATION | Age: 89
End: 2024-08-12

## 2024-08-12 NOTE — CARE COORDINATION
Ambulatory Care Coordination Note     8/12/2024 1:22 PM     ACM outreach attempt by this ACM today to perform care management follow up . ACM was unable to reach the patient by telephone today; left voice message requesting a return phone call to this ACM.     ACM: Millie Perry RN     Care Summary Note:     PCP/Specialist follow up:   Future Appointments         Provider Specialty Dept Phone    9/4/2024 1:45 PM Jonathan Elizabeth PA-C Urology [phone]    9/10/2024 2:20 PM Juve Mcintosh,  Family Medicine [phone]    9/16/2024 11:00 AM Barber Ramsay PA-C Cardiology [phone]    3/26/2025 1:00 PM Zain Mcqueen MD Nephrology [phone]    6/23/2025 11:00 AM Krysta Rodriguez MD Cardiology [phone]            Follow Up:   Plan for next ACM outreach in approximately 1 week to complete:  - disease specific assessments  - goal progression  - education .

## 2024-08-15 ENCOUNTER — LAB (OUTPATIENT)
Dept: LAB | Age: 89
End: 2024-08-15

## 2024-08-15 DIAGNOSIS — I71.012 DISSECTION OF DESCENDING THORACIC AORTA (HCC): ICD-10-CM

## 2024-08-15 DIAGNOSIS — I10 HYPERTENSION, ESSENTIAL: Chronic | ICD-10-CM

## 2024-08-15 DIAGNOSIS — N18.31 STAGE 3A CHRONIC KIDNEY DISEASE (HCC): ICD-10-CM

## 2024-08-15 LAB
ALBUMIN SERPL BCG-MCNC: 2.5 G/DL (ref 3.5–5.1)
ALP SERPL-CCNC: 67 U/L (ref 38–126)
ALT SERPL W/O P-5'-P-CCNC: 9 U/L (ref 11–66)
ANION GAP SERPL CALC-SCNC: 8 MEQ/L (ref 8–16)
AST SERPL-CCNC: 10 U/L (ref 5–40)
BASOPHILS ABSOLUTE: 0 THOU/MM3 (ref 0–0.1)
BASOPHILS NFR BLD AUTO: 0.7 %
BILIRUB SERPL-MCNC: 0.6 MG/DL (ref 0.3–1.2)
BUN SERPL-MCNC: 14 MG/DL (ref 7–22)
CALCIUM SERPL-MCNC: 8.4 MG/DL (ref 8.5–10.5)
CHLORIDE SERPL-SCNC: 110 MEQ/L (ref 98–111)
CHOLEST SERPL-MCNC: 113 MG/DL (ref 100–199)
CO2 SERPL-SCNC: 23 MEQ/L (ref 23–33)
CREAT SERPL-MCNC: 1 MG/DL (ref 0.4–1.2)
DEPRECATED RDW RBC AUTO: 55.9 FL (ref 35–45)
EOSINOPHIL NFR BLD AUTO: 3.4 %
EOSINOPHILS ABSOLUTE: 0.2 THOU/MM3 (ref 0–0.4)
ERYTHROCYTE [DISTWIDTH] IN BLOOD BY AUTOMATED COUNT: 15.5 % (ref 11.5–14.5)
GFR SERPL CREATININE-BSD FRML MDRD: 73 ML/MIN/1.73M2
GLUCOSE SERPL-MCNC: 88 MG/DL (ref 70–108)
HCT VFR BLD AUTO: 34.3 % (ref 42–52)
HDLC SERPL-MCNC: 32 MG/DL
HGB BLD-MCNC: 10.3 GM/DL (ref 14–18)
IMM GRANULOCYTES # BLD AUTO: 0.13 THOU/MM3 (ref 0–0.07)
IMM GRANULOCYTES NFR BLD AUTO: 2.2 %
LDLC SERPL CALC-MCNC: 63 MG/DL
LYMPHOCYTES ABSOLUTE: 0.9 THOU/MM3 (ref 1–4.8)
LYMPHOCYTES NFR BLD AUTO: 14.8 %
MCH RBC QN AUTO: 30 PG (ref 26–33)
MCHC RBC AUTO-ENTMCNC: 30 GM/DL (ref 32.2–35.5)
MCV RBC AUTO: 100 FL (ref 80–94)
MONOCYTES ABSOLUTE: 0.7 THOU/MM3 (ref 0.4–1.3)
MONOCYTES NFR BLD AUTO: 11.3 %
NEUTROPHILS ABSOLUTE: 4.1 THOU/MM3 (ref 1.8–7.7)
NEUTROPHILS NFR BLD AUTO: 67.6 %
NRBC BLD AUTO-RTO: 0 /100 WBC
PLATELET # BLD AUTO: 216 THOU/MM3 (ref 130–400)
PMV BLD AUTO: 10.2 FL (ref 9.4–12.4)
POTASSIUM SERPL-SCNC: 4.1 MEQ/L (ref 3.5–5.2)
PROT SERPL-MCNC: 5 G/DL (ref 6.1–8)
RBC # BLD AUTO: 3.43 MILL/MM3 (ref 4.7–6.1)
SODIUM SERPL-SCNC: 141 MEQ/L (ref 135–145)
TRIGL SERPL-MCNC: 91 MG/DL (ref 0–199)
TSH SERPL DL<=0.005 MIU/L-ACNC: 3.6 UIU/ML (ref 0.4–4.2)
WBC # BLD AUTO: 6 THOU/MM3 (ref 4.8–10.8)

## 2024-08-16 ENCOUNTER — TELEPHONE (OUTPATIENT)
Dept: FAMILY MEDICINE CLINIC | Age: 89
End: 2024-08-16

## 2024-08-16 DIAGNOSIS — D64.9 NORMOCYTIC ANEMIA: Primary | ICD-10-CM

## 2024-08-16 NOTE — TELEPHONE ENCOUNTER
I called and spoke to Bharathi and she verbalized understanding and had no questions at this time.    Sent lab orders to address on file 8/16/24

## 2024-08-16 NOTE — TELEPHONE ENCOUNTER
----- Message from Dr. Juve Mcintosh, DO sent at 8/16/2024  6:47 AM EDT -----  Please let wife know that labs are back:  Overall look good other than pts anemia is a little worse    I've ordered some f/u labs to check iron levels, b12 and folic acid levels r/o those as causes of his anemia  Next wk or so is fine, fasting    Let me know if questions, thanks!

## 2024-08-19 ENCOUNTER — APPOINTMENT (OUTPATIENT)
Dept: CT IMAGING | Age: 89
DRG: 871 | End: 2024-08-19
Payer: MEDICARE

## 2024-08-19 ENCOUNTER — HOSPITAL ENCOUNTER (INPATIENT)
Age: 89
LOS: 12 days | Discharge: NURSING, REHAB OR LONG-TERM CARE | DRG: 871 | End: 2024-09-01
Attending: EMERGENCY MEDICINE | Admitting: INTERNAL MEDICINE
Payer: MEDICARE

## 2024-08-19 ENCOUNTER — CARE COORDINATION (OUTPATIENT)
Dept: CARE COORDINATION | Age: 89
End: 2024-08-19

## 2024-08-19 DIAGNOSIS — R60.0 EDEMA OF LEFT UPPER ARM: ICD-10-CM

## 2024-08-19 DIAGNOSIS — J96.01 ACUTE HYPOXEMIC RESPIRATORY FAILURE DUE TO COVID-19 (HCC): Primary | ICD-10-CM

## 2024-08-19 DIAGNOSIS — U07.1 COVID-19: ICD-10-CM

## 2024-08-19 DIAGNOSIS — J18.9 PNEUMONIA OF LEFT LOWER LOBE DUE TO INFECTIOUS ORGANISM: ICD-10-CM

## 2024-08-19 DIAGNOSIS — R79.89 TROPONIN LEVEL ELEVATED: ICD-10-CM

## 2024-08-19 DIAGNOSIS — U07.1 ACUTE HYPOXEMIC RESPIRATORY FAILURE DUE TO COVID-19 (HCC): Primary | ICD-10-CM

## 2024-08-19 DIAGNOSIS — J90 PLEURAL EFFUSION, LEFT: ICD-10-CM

## 2024-08-19 LAB
ALBUMIN SERPL BCG-MCNC: 3.2 G/DL (ref 3.5–5.1)
ALP SERPL-CCNC: 78 U/L (ref 38–126)
ALT SERPL W/O P-5'-P-CCNC: 6 U/L (ref 11–66)
ANION GAP SERPL CALC-SCNC: 13 MEQ/L (ref 8–16)
APTT PPP: 33.5 SECONDS (ref 22–38)
AST SERPL-CCNC: 26 U/L (ref 5–40)
BASE EXCESS BLDA CALC-SCNC: -3.9 MMOL/L (ref -2–3)
BASOPHILS ABSOLUTE: 0 THOU/MM3 (ref 0–0.1)
BASOPHILS NFR BLD AUTO: 0.3 %
BILIRUB CONJ SERPL-MCNC: 0.2 MG/DL (ref 0.1–13.8)
BILIRUB SERPL-MCNC: 0.5 MG/DL (ref 0.3–1.2)
BUN SERPL-MCNC: 12 MG/DL (ref 7–22)
CALCIUM SERPL-MCNC: 8.9 MG/DL (ref 8.5–10.5)
CHLORIDE SERPL-SCNC: 102 MEQ/L (ref 98–111)
CO2 SERPL-SCNC: 22 MEQ/L (ref 23–33)
COLLECTED BY:: ABNORMAL
CREAT SERPL-MCNC: 1 MG/DL (ref 0.4–1.2)
DEPRECATED RDW RBC AUTO: 57.3 FL (ref 35–45)
DEVICE: ABNORMAL
EKG ATRIAL RATE: 113 BPM
EKG P AXIS: 59 DEGREES
EKG P-R INTERVAL: 166 MS
EKG Q-T INTERVAL: 296 MS
EKG QRS DURATION: 76 MS
EKG QTC CALCULATION (BAZETT): 406 MS
EKG R AXIS: 12 DEGREES
EKG T AXIS: 89 DEGREES
EKG VENTRICULAR RATE: 113 BPM
EOSINOPHIL NFR BLD AUTO: 0.3 %
EOSINOPHILS ABSOLUTE: 0 THOU/MM3 (ref 0–0.4)
ERYTHROCYTE [DISTWIDTH] IN BLOOD BY AUTOMATED COUNT: 15.3 % (ref 11.5–14.5)
FIO2 ON VENT O2 ANALYZER: 4 %
FLUAV RNA RESP QL NAA+PROBE: NOT DETECTED
FLUBV RNA RESP QL NAA+PROBE: NOT DETECTED
GFR SERPL CREATININE-BSD FRML MDRD: 73 ML/MIN/1.73M2
GLUCOSE SERPL-MCNC: 154 MG/DL (ref 70–108)
HCO3 BLDA-SCNC: 21 MMOL/L (ref 23–28)
HCT VFR BLD AUTO: 42.8 % (ref 42–52)
HGB BLD-MCNC: 12.7 GM/DL (ref 14–18)
IMM GRANULOCYTES # BLD AUTO: 0.11 THOU/MM3 (ref 0–0.07)
IMM GRANULOCYTES NFR BLD AUTO: 0.9 %
INR PPP: 1.78 (ref 0.85–1.13)
LACTIC ACID, SEPSIS: 2.7 MMOL/L (ref 0.5–1.9)
LACTIC ACID, SEPSIS: 3 MMOL/L (ref 0.5–1.9)
LYMPHOCYTES ABSOLUTE: 0.9 THOU/MM3 (ref 1–4.8)
LYMPHOCYTES NFR BLD AUTO: 7.5 %
MCH RBC QN AUTO: 29.9 PG (ref 26–33)
MCHC RBC AUTO-ENTMCNC: 29.7 GM/DL (ref 32.2–35.5)
MCV RBC AUTO: 100.7 FL (ref 80–94)
MONOCYTES ABSOLUTE: 0.4 THOU/MM3 (ref 0.4–1.3)
MONOCYTES NFR BLD AUTO: 3.5 %
NEUTROPHILS ABSOLUTE: 10.3 THOU/MM3 (ref 1.8–7.7)
NEUTROPHILS NFR BLD AUTO: 87.5 %
NRBC BLD AUTO-RTO: 0 /100 WBC
NT-PROBNP SERPL IA-MCNC: 1563 PG/ML (ref 0–449)
OSMOLALITY SERPL CALC.SUM OF ELEC: 276.7 MOSMOL/KG (ref 275–300)
PCO2 TEMP ADJ BLDMV: 38 MMHG (ref 41–51)
PH BLDMV: 7.35 [PH] (ref 7.31–7.41)
PLATELET # BLD AUTO: 258 THOU/MM3 (ref 130–400)
PMV BLD AUTO: 10 FL (ref 9.4–12.4)
PO2 BLDMV: 73 MMHG (ref 25–40)
POTASSIUM SERPL-SCNC: 4.4 MEQ/L (ref 3.5–5.2)
PROT SERPL-MCNC: 6.6 G/DL (ref 6.1–8)
RBC # BLD AUTO: 4.25 MILL/MM3 (ref 4.7–6.1)
SAO2 % BLDMV: 94 %
SARS-COV-2 RNA RESP QL NAA+PROBE: DETECTED
SITE: ABNORMAL
SODIUM SERPL-SCNC: 137 MEQ/L (ref 135–145)
TROPONIN, HIGH SENSITIVITY: 70 NG/L (ref 0–12)
TROPONIN, HIGH SENSITIVITY: 73 NG/L (ref 0–12)
VENTILATION MODE VENT: ABNORMAL
WBC # BLD AUTO: 11.8 THOU/MM3 (ref 4.8–10.8)

## 2024-08-19 PROCEDURE — 6360000002 HC RX W HCPCS: Performed by: EMERGENCY MEDICINE

## 2024-08-19 PROCEDURE — 2580000003 HC RX 258: Performed by: EMERGENCY MEDICINE

## 2024-08-19 PROCEDURE — 36415 COLL VENOUS BLD VENIPUNCTURE: CPT

## 2024-08-19 PROCEDURE — 94640 AIRWAY INHALATION TREATMENT: CPT

## 2024-08-19 PROCEDURE — 82803 BLOOD GASES ANY COMBINATION: CPT

## 2024-08-19 PROCEDURE — 85730 THROMBOPLASTIN TIME PARTIAL: CPT

## 2024-08-19 PROCEDURE — 87040 BLOOD CULTURE FOR BACTERIA: CPT

## 2024-08-19 PROCEDURE — 5A0955A ASSISTANCE WITH RESPIRATORY VENTILATION, GREATER THAN 96 CONSECUTIVE HOURS, HIGH NASAL FLOW/VELOCITY: ICD-10-PCS | Performed by: THORACIC SURGERY (CARDIOTHORACIC VASCULAR SURGERY)

## 2024-08-19 PROCEDURE — 99285 EMERGENCY DEPT VISIT HI MDM: CPT

## 2024-08-19 PROCEDURE — 96365 THER/PROPH/DIAG IV INF INIT: CPT

## 2024-08-19 PROCEDURE — 70450 CT HEAD/BRAIN W/O DYE: CPT

## 2024-08-19 PROCEDURE — 5A2204Z RESTORATION OF CARDIAC RHYTHM, SINGLE: ICD-10-PCS | Performed by: THORACIC SURGERY (CARDIOTHORACIC VASCULAR SURGERY)

## 2024-08-19 PROCEDURE — 6360000004 HC RX CONTRAST MEDICATION: Performed by: EMERGENCY MEDICINE

## 2024-08-19 PROCEDURE — 36600 WITHDRAWAL OF ARTERIAL BLOOD: CPT

## 2024-08-19 PROCEDURE — 94761 N-INVAS EAR/PLS OXIMETRY MLT: CPT

## 2024-08-19 PROCEDURE — 93010 ELECTROCARDIOGRAM REPORT: CPT | Performed by: INTERNAL MEDICINE

## 2024-08-19 PROCEDURE — 2700000000 HC OXYGEN THERAPY PER DAY

## 2024-08-19 PROCEDURE — 96375 TX/PRO/DX INJ NEW DRUG ADDON: CPT

## 2024-08-19 PROCEDURE — 80053 COMPREHEN METABOLIC PANEL: CPT

## 2024-08-19 PROCEDURE — 71275 CT ANGIOGRAPHY CHEST: CPT

## 2024-08-19 PROCEDURE — 6370000000 HC RX 637 (ALT 250 FOR IP): Performed by: EMERGENCY MEDICINE

## 2024-08-19 PROCEDURE — 93005 ELECTROCARDIOGRAM TRACING: CPT | Performed by: EMERGENCY MEDICINE

## 2024-08-19 PROCEDURE — 85610 PROTHROMBIN TIME: CPT

## 2024-08-19 PROCEDURE — 83605 ASSAY OF LACTIC ACID: CPT

## 2024-08-19 PROCEDURE — 5A09357 ASSISTANCE WITH RESPIRATORY VENTILATION, LESS THAN 24 CONSECUTIVE HOURS, CONTINUOUS POSITIVE AIRWAY PRESSURE: ICD-10-PCS | Performed by: NURSE PRACTITIONER

## 2024-08-19 PROCEDURE — 82248 BILIRUBIN DIRECT: CPT

## 2024-08-19 PROCEDURE — 87636 SARSCOV2 & INF A&B AMP PRB: CPT

## 2024-08-19 PROCEDURE — 83880 ASSAY OF NATRIURETIC PEPTIDE: CPT

## 2024-08-19 PROCEDURE — 94660 CPAP INITIATION&MGMT: CPT

## 2024-08-19 PROCEDURE — 74177 CT ABD & PELVIS W/CONTRAST: CPT

## 2024-08-19 PROCEDURE — 85025 COMPLETE CBC W/AUTO DIFF WBC: CPT

## 2024-08-19 PROCEDURE — 84484 ASSAY OF TROPONIN QUANT: CPT

## 2024-08-19 RX ORDER — 0.9 % SODIUM CHLORIDE 0.9 %
1000 INTRAVENOUS SOLUTION INTRAVENOUS ONCE
Status: COMPLETED | OUTPATIENT
Start: 2024-08-19 | End: 2024-08-19

## 2024-08-19 RX ORDER — IPRATROPIUM BROMIDE AND ALBUTEROL SULFATE 2.5; .5 MG/3ML; MG/3ML
1 SOLUTION RESPIRATORY (INHALATION) ONCE
Status: COMPLETED | OUTPATIENT
Start: 2024-08-19 | End: 2024-08-19

## 2024-08-19 RX ORDER — IOPAMIDOL 755 MG/ML
80 INJECTION, SOLUTION INTRAVASCULAR
Status: COMPLETED | OUTPATIENT
Start: 2024-08-19 | End: 2024-08-19

## 2024-08-19 RX ADMIN — SODIUM CHLORIDE 1000 ML: 9 INJECTION, SOLUTION INTRAVENOUS at 21:06

## 2024-08-19 RX ADMIN — PIPERACILLIN AND TAZOBACTAM 4500 MG: 4; .5 INJECTION, POWDER, LYOPHILIZED, FOR SOLUTION INTRAVENOUS at 21:44

## 2024-08-19 RX ADMIN — IPRATROPIUM BROMIDE AND ALBUTEROL SULFATE 1 DOSE: .5; 3 SOLUTION RESPIRATORY (INHALATION) at 21:31

## 2024-08-19 RX ADMIN — IOPAMIDOL 80 ML: 755 INJECTION, SOLUTION INTRAVENOUS at 21:17

## 2024-08-19 RX ADMIN — WATER 125 MG: 1 INJECTION INTRAMUSCULAR; INTRAVENOUS; SUBCUTANEOUS at 21:06

## 2024-08-19 SDOH — SOCIAL STABILITY: SOCIAL NETWORK: ARE YOU MARRIED, WIDOWED, DIVORCED, SEPARATED, NEVER MARRIED, OR LIVING WITH A PARTNER?: MARRIED

## 2024-08-19 SDOH — HEALTH STABILITY: MENTAL HEALTH
STRESS IS WHEN SOMEONE FEELS TENSE, NERVOUS, ANXIOUS, OR CAN'T SLEEP AT NIGHT BECAUSE THEIR MIND IS TROUBLED. HOW STRESSED ARE YOU?: ONLY A LITTLE

## 2024-08-19 SDOH — SOCIAL STABILITY: SOCIAL NETWORK: HOW OFTEN DO YOU GET TOGETHER WITH FRIENDS OR RELATIVES?: MORE THAN THREE TIMES A WEEK

## 2024-08-19 SDOH — ECONOMIC STABILITY: INCOME INSECURITY: HOW HARD IS IT FOR YOU TO PAY FOR THE VERY BASICS LIKE FOOD, HOUSING, MEDICAL CARE, AND HEATING?: NOT VERY HARD

## 2024-08-19 SDOH — ECONOMIC STABILITY: FOOD INSECURITY: WITHIN THE PAST 12 MONTHS, YOU WORRIED THAT YOUR FOOD WOULD RUN OUT BEFORE YOU GOT MONEY TO BUY MORE.: NEVER TRUE

## 2024-08-19 SDOH — ECONOMIC STABILITY: TRANSPORTATION INSECURITY
IN THE PAST 12 MONTHS, HAS THE LACK OF TRANSPORTATION KEPT YOU FROM MEDICAL APPOINTMENTS OR FROM GETTING MEDICATIONS?: YES

## 2024-08-19 SDOH — ECONOMIC STABILITY: TRANSPORTATION INSECURITY
IN THE PAST 12 MONTHS, HAS LACK OF TRANSPORTATION KEPT YOU FROM MEETINGS, WORK, OR FROM GETTING THINGS NEEDED FOR DAILY LIVING?: YES

## 2024-08-19 SDOH — ECONOMIC STABILITY: INCOME INSECURITY: IN THE LAST 12 MONTHS, WAS THERE A TIME WHEN YOU WERE NOT ABLE TO PAY THE MORTGAGE OR RENT ON TIME?: NO

## 2024-08-19 SDOH — ECONOMIC STABILITY: FOOD INSECURITY: WITHIN THE PAST 12 MONTHS, THE FOOD YOU BOUGHT JUST DIDN'T LAST AND YOU DIDN'T HAVE MONEY TO GET MORE.: NEVER TRUE

## 2024-08-19 SDOH — HEALTH STABILITY: PHYSICAL HEALTH: ON AVERAGE, HOW MANY MINUTES DO YOU ENGAGE IN EXERCISE AT THIS LEVEL?: 0 MIN

## 2024-08-19 SDOH — SOCIAL STABILITY: SOCIAL NETWORK
IN A TYPICAL WEEK, HOW MANY TIMES DO YOU TALK ON THE PHONE WITH FAMILY, FRIENDS, OR NEIGHBORS?: MORE THAN THREE TIMES A WEEK

## 2024-08-19 ASSESSMENT — ENCOUNTER SYMPTOMS: DYSPNEA ASSOCIATED WITH: EXERTION

## 2024-08-19 ASSESSMENT — PAIN - FUNCTIONAL ASSESSMENT: PAIN_FUNCTIONAL_ASSESSMENT: NONE - DENIES PAIN

## 2024-08-19 NOTE — CARE COORDINATION
Ambulatory Care Coordination Note     2024 1:51 PM     Patient Current Location:  Home: [address] [place], [place] [id number]     ACM contacted the patient by telephone. Verified name and  with patient as identifiers.         ACM: Millie Perry RN     Challenges to be reviewed by the provider   Additional needs identified to be addressed with provider No  none               Method of communication with provider: none.    Care Summary Note: Spoke with wife for continued Care Coordination follow up  Continues active with HH  Discussed other resources for when HH discharges  She declines additional support or referrals at this time  States Stone is basically bedbound and their granddaughter helps with his needs  Using [employer] for transportation support  Discussed AAA, palliative care and getting aide support in home  She declines these services at this time  Discussed hospital bed, she declines at this time  States he does have SOB with COPD but states he is not \"up and about moving\"  Discussed possible referral to  for support but she declined at this time  Was agreeable to ACM follow up and support    Plan  Reinforce education completed  Continue to assess for resources and refer as she allows  Reinforce importance of early symptom recognition and reporting to prevent exacerbation and unnecessary hospitalization    Offered patient enrollment in the Remote Patient Monitoring (RPM) program for in-home monitoring: Yes, but did not enroll at this time: declined to enroll in the program becausenot interested .     Assessments Completed:   COPD Assessment    Does the patient understand envrionmental exposure?: Yes  Is the patient able to verbalize Rescue vs. Long Acting medications?: Yes  Does the patient have a nebulizer?: Yes  Does the patient use a space with inhaled medications?: No            Symptoms:     Symptom course: stable  Breathlessness: exertion  Increase use of rapid acting/rescue inhaled

## 2024-08-20 ENCOUNTER — APPOINTMENT (OUTPATIENT)
Age: 89
DRG: 871 | End: 2024-08-20
Payer: MEDICARE

## 2024-08-20 ENCOUNTER — CARE COORDINATION (OUTPATIENT)
Dept: CARE COORDINATION | Age: 89
End: 2024-08-20

## 2024-08-20 ENCOUNTER — APPOINTMENT (OUTPATIENT)
Dept: GENERAL RADIOLOGY | Age: 89
DRG: 871 | End: 2024-08-20
Payer: MEDICARE

## 2024-08-20 ENCOUNTER — PROCEDURE VISIT (OUTPATIENT)
Dept: CARDIOLOGY CLINIC | Age: 89
End: 2024-08-20
Payer: MEDICARE

## 2024-08-20 DIAGNOSIS — R55 SYNCOPE, UNSPECIFIED SYNCOPE TYPE: Primary | ICD-10-CM

## 2024-08-20 PROBLEM — J96.01 ACUTE HYPOXEMIC RESPIRATORY FAILURE DUE TO COVID-19 (HCC): Status: ACTIVE | Noted: 2024-08-20

## 2024-08-20 PROBLEM — U07.1 ACUTE HYPOXEMIC RESPIRATORY FAILURE DUE TO COVID-19 (HCC): Status: ACTIVE | Noted: 2024-08-20

## 2024-08-20 LAB
ANION GAP SERPL CALC-SCNC: 12 MEQ/L (ref 8–16)
APTT PPP: 124.3 SECONDS (ref 22–38)
APTT PPP: 48.4 SECONDS (ref 22–38)
ARTERIAL PATENCY WRIST A: POSITIVE
BACTERIA URNS QL MICRO: ABNORMAL /HPF
BACTERIA: ABNORMAL
BASE EXCESS BLDA CALC-SCNC: -5.3 MMOL/L (ref -2.5–2.5)
BDY SITE: ABNORMAL
BILIRUB UR QL STRIP.AUTO: ABNORMAL
BILIRUB UR QL STRIP: NEGATIVE
BUN SERPL-MCNC: 16 MG/DL (ref 7–22)
CALCIUM SERPL-MCNC: 8 MG/DL (ref 8.5–10.5)
CASTS #/AREA URNS LPF: ABNORMAL /LPF
CASTS 2: ABNORMAL /LPF
CHARACTER UR: ABNORMAL
CHARACTER UR: ABNORMAL
CHARACTER, BODY FLUID: NORMAL
CHARCOAL URNS QL MICRO: ABNORMAL
CHLORIDE SERPL-SCNC: 110 MEQ/L (ref 98–111)
CO2 SERPL-SCNC: 17 MEQ/L (ref 23–33)
COLLECTED BY:: ABNORMAL
COLOR FLD: NORMAL
COLOR UR: ABNORMAL
COLOR, UA: ABNORMAL
CREAT SERPL-MCNC: 1 MG/DL (ref 0.4–1.2)
CRP SERPL-MCNC: 6.2 MG/DL (ref 0–1)
CRYSTALS URNS MICRO: ABNORMAL
CRYSTALS URNS QL MICRO: ABNORMAL
DEPRECATED RDW RBC AUTO: 57.3 FL (ref 35–45)
DEVICE: ABNORMAL
ECHO AO ASC DIAM: 2.9 CM
ECHO LA AREA 4C: 14.2 CM2
ECHO LA MAJOR AXIS: 5.7 CM
ECHO LA VOL MOD A4C: 28 ML (ref 18–58)
ECHO LV EDV A4C: 85 ML
ECHO LV EF PHYSICIAN: 40 %
ECHO LV EJECTION FRACTION A4C: 39 %
ECHO LV ESV A4C: 52 ML
EPITHELIAL CELLS, UA: ABNORMAL /HPF
EPITHELIAL CELLS, UA: ABNORMAL /HPF
ERYTHROCYTE [DISTWIDTH] IN BLOOD BY AUTOMATED COUNT: 15.4 % (ref 11.5–14.5)
ERYTHROCYTE [SEDIMENTATION RATE] IN BLOOD BY WESTERGREN METHOD: 61 MM/HR (ref 0–10)
FIO2 ON VENT O2 ANALYZER: 30 %
GFR SERPL CREATININE-BSD FRML MDRD: 73 ML/MIN/1.73M2
GLUCOSE FLD-MCNC: 153 MG/DL
GLUCOSE SERPL-MCNC: 168 MG/DL (ref 70–108)
GLUCOSE UR QL STRIP.AUTO: NEGATIVE MG/DL
GLUCOSE UR QL STRIP.AUTO: NEGATIVE MG/DL
GRANULOCYTES NFR FLD AUTO: 0 %
HCO3 BLDA-SCNC: 20 MMOL/L (ref 23–28)
HCT VFR BLD AUTO: 39.9 % (ref 42–52)
HGB BLD-MCNC: 11.6 GM/DL (ref 14–18)
HGB UR QL STRIP.AUTO: ABNORMAL
HGB UR QL STRIP.AUTO: ABNORMAL
INR PPP: 1.91 (ref 0.85–1.13)
KETONES UR QL STRIP.AUTO: NEGATIVE
KETONES UR QL STRIP.AUTO: NEGATIVE
LACTIC ACID, SEPSIS: 3.4 MMOL/L (ref 0.5–1.9)
LDH FLD L TO P-CCNC: 192 U/L
LEUKOCYTE ESTERASE UR QL STRIP.AUTO: ABNORMAL
LYMPHOCYTES NFR FLD MANUAL: 95 %
MCH RBC QN AUTO: 29.6 PG (ref 26–33)
MCHC RBC AUTO-ENTMCNC: 29.1 GM/DL (ref 32.2–35.5)
MCV RBC AUTO: 101.8 FL (ref 80–94)
MISCELLANEOUS 2: ABNORMAL
MONOCYTES NFR FLD MANUAL: 3 %
MONONUC CELLS NFR FLD AUTO: 0 %
MRSA DNA SPEC QL NAA+PROBE: NEGATIVE
NEUTS SEG NFR FLD MANUAL: 2 %
NITRITE UR QL STRIP.AUTO: NEGATIVE
NITRITE UR QL STRIP: POSITIVE
NUC CELL # FLD AUTO: 159 /CUMM (ref 0–500)
PATHOLOGIST REVIEW: NORMAL
PCO2 BLDA: 37 MMHG (ref 35–45)
PEEP SETTING VENT: 10 MMHG
PH BIFL: 7.73 [PH]
PH BLDA: 7.34 [PH] (ref 7.35–7.45)
PH UR STRIP.AUTO: 5 [PH] (ref 5–9)
PH UR STRIP.AUTO: 5.5 [PH] (ref 5–9)
PIP: 16 CMH2O
PLATELET # BLD AUTO: 217 THOU/MM3 (ref 130–400)
PMV BLD AUTO: 10 FL (ref 9.4–12.4)
PO2 BLDA: 76 MMHG (ref 71–104)
POTASSIUM SERPL-SCNC: 5.3 MEQ/L (ref 3.5–5.2)
PRESSURE SUPPORT SETTING VENT: 6 CMH2O
PROT FLD-MCNC: 3.2 GM/DL
PROT SERPL-MCNC: 5.5 G/DL (ref 6.1–8)
PROT UR STRIP.AUTO-MCNC: 100 MG/DL
PROT UR STRIP.AUTO-MCNC: 30 MG/DL
RBC # BLD AUTO: 3.92 MILL/MM3 (ref 4.7–6.1)
RBC # FLD AUTO: 7000 /CUMM
RBC #/AREA URNS HPF: > 200 /HPF
RBC URINE: ABNORMAL /HPF
REASON FOR REJECTION: NORMAL
REJECTED TEST: NORMAL
RENAL EPI CELLS #/AREA URNS HPF: ABNORMAL /[HPF]
RENAL EPI CELLS #/AREA URNS HPF: ABNORMAL /[HPF]
SAO2 % BLDA: 94 %
SODIUM SERPL-SCNC: 139 MEQ/L (ref 135–145)
SP GR UR REFRACT.AUTO: 1.01 (ref 1–1.03)
SP GR UR REFRACT.AUTO: > 1.03 (ref 1–1.03)
SPECIMEN: NORMAL
TOTAL VOLUME RECEIVED BODY FLUID: 20 ML
UROBILINOGEN UR QL STRIP.AUTO: 1 EU/DL (ref 0–1)
UROBILINOGEN, URINE: 1 EU/DL (ref 0–1)
VENTILATION MODE VENT: ABNORMAL
WBC # BLD AUTO: 14.8 THOU/MM3 (ref 4.8–10.8)
WBC #/AREA URNS HPF: ABNORMAL /HPF
WBC #/AREA URNS HPF: ABNORMAL /HPF
WBC #/AREA URNS HPF: ABNORMAL /[HPF]
YEAST LIKE FUNGI URNS QL MICRO: ABNORMAL
YEAST LIKE FUNGI URNS QL MICRO: ABNORMAL

## 2024-08-20 PROCEDURE — 93307 TTE W/O DOPPLER COMPLETE: CPT | Performed by: NUCLEAR MEDICINE

## 2024-08-20 PROCEDURE — 32551 INSERTION OF CHEST TUBE: CPT | Performed by: INTERNAL MEDICINE

## 2024-08-20 PROCEDURE — 84157 ASSAY OF PROTEIN OTHER: CPT

## 2024-08-20 PROCEDURE — 85651 RBC SED RATE NONAUTOMATED: CPT

## 2024-08-20 PROCEDURE — 6360000002 HC RX W HCPCS

## 2024-08-20 PROCEDURE — 2500000003 HC RX 250 WO HCPCS

## 2024-08-20 PROCEDURE — 2100000000 HC CCU R&B

## 2024-08-20 PROCEDURE — 85027 COMPLETE CBC AUTOMATED: CPT

## 2024-08-20 PROCEDURE — 93307 TTE W/O DOPPLER COMPLETE: CPT

## 2024-08-20 PROCEDURE — 83605 ASSAY OF LACTIC ACID: CPT

## 2024-08-20 PROCEDURE — 93298 REM INTERROG DEV EVAL SCRMS: CPT | Performed by: NUCLEAR MEDICINE

## 2024-08-20 PROCEDURE — 86140 C-REACTIVE PROTEIN: CPT

## 2024-08-20 PROCEDURE — 51702 INSERT TEMP BLADDER CATH: CPT

## 2024-08-20 PROCEDURE — 6370000000 HC RX 637 (ALT 250 FOR IP)

## 2024-08-20 PROCEDURE — 2580000003 HC RX 258

## 2024-08-20 PROCEDURE — 5A1945Z RESPIRATORY VENTILATION, 24-96 CONSECUTIVE HOURS: ICD-10-PCS | Performed by: NURSE PRACTITIONER

## 2024-08-20 PROCEDURE — 71045 X-RAY EXAM CHEST 1 VIEW: CPT

## 2024-08-20 PROCEDURE — 87075 CULTR BACTERIA EXCEPT BLOOD: CPT

## 2024-08-20 PROCEDURE — 51798 US URINE CAPACITY MEASURE: CPT

## 2024-08-20 PROCEDURE — 94640 AIRWAY INHALATION TREATMENT: CPT

## 2024-08-20 PROCEDURE — 36600 WITHDRAWAL OF ARTERIAL BLOOD: CPT

## 2024-08-20 PROCEDURE — 36415 COLL VENOUS BLD VENIPUNCTURE: CPT

## 2024-08-20 PROCEDURE — 83986 ASSAY PH BODY FLUID NOS: CPT

## 2024-08-20 PROCEDURE — 32551 INSERTION OF CHEST TUBE: CPT

## 2024-08-20 PROCEDURE — 82945 GLUCOSE OTHER FLUID: CPT

## 2024-08-20 PROCEDURE — 0BH17EZ INSERTION OF ENDOTRACHEAL AIRWAY INTO TRACHEA, VIA NATURAL OR ARTIFICIAL OPENING: ICD-10-PCS | Performed by: NURSE PRACTITIONER

## 2024-08-20 PROCEDURE — 6360000002 HC RX W HCPCS: Performed by: EMERGENCY MEDICINE

## 2024-08-20 PROCEDURE — 89051 BODY FLUID CELL COUNT: CPT

## 2024-08-20 PROCEDURE — 6370000000 HC RX 637 (ALT 250 FOR IP): Performed by: NURSE PRACTITIONER

## 2024-08-20 PROCEDURE — 2580000003 HC RX 258: Performed by: EMERGENCY MEDICINE

## 2024-08-20 PROCEDURE — 81001 URINALYSIS AUTO W/SCOPE: CPT

## 2024-08-20 PROCEDURE — 94002 VENT MGMT INPAT INIT DAY: CPT

## 2024-08-20 PROCEDURE — 80048 BASIC METABOLIC PNL TOTAL CA: CPT

## 2024-08-20 PROCEDURE — 84155 ASSAY OF PROTEIN SERUM: CPT

## 2024-08-20 PROCEDURE — 0W9B30Z DRAINAGE OF LEFT PLEURAL CAVITY WITH DRAINAGE DEVICE, PERCUTANEOUS APPROACH: ICD-10-PCS | Performed by: NURSE PRACTITIONER

## 2024-08-20 PROCEDURE — 83615 LACTATE (LD) (LDH) ENZYME: CPT

## 2024-08-20 PROCEDURE — 87205 SMEAR GRAM STAIN: CPT

## 2024-08-20 PROCEDURE — 99291 CRITICAL CARE FIRST HOUR: CPT | Performed by: INTERNAL MEDICINE

## 2024-08-20 PROCEDURE — 87641 MR-STAPH DNA AMP PROBE: CPT

## 2024-08-20 PROCEDURE — 87070 CULTURE OTHR SPECIMN AEROBIC: CPT

## 2024-08-20 PROCEDURE — 82803 BLOOD GASES ANY COMBINATION: CPT

## 2024-08-20 PROCEDURE — 85610 PROTHROMBIN TIME: CPT

## 2024-08-20 PROCEDURE — P9047 ALBUMIN (HUMAN), 25%, 50ML: HCPCS

## 2024-08-20 PROCEDURE — 74018 RADEX ABDOMEN 1 VIEW: CPT

## 2024-08-20 PROCEDURE — 88108 CYTOPATH CONCENTRATE TECH: CPT

## 2024-08-20 PROCEDURE — 31500 INSERT EMERGENCY AIRWAY: CPT

## 2024-08-20 PROCEDURE — 2500000003 HC RX 250 WO HCPCS: Performed by: STUDENT IN AN ORGANIZED HEALTH CARE EDUCATION/TRAINING PROGRAM

## 2024-08-20 PROCEDURE — 85730 THROMBOPLASTIN TIME PARTIAL: CPT

## 2024-08-20 RX ORDER — ALBUMIN (HUMAN) 12.5 G/50ML
25 SOLUTION INTRAVENOUS ONCE
Status: COMPLETED | OUTPATIENT
Start: 2024-08-20 | End: 2024-08-20

## 2024-08-20 RX ORDER — ESCITALOPRAM OXALATE 10 MG/1
10 TABLET ORAL DAILY
Status: DISCONTINUED | OUTPATIENT
Start: 2024-08-20 | End: 2024-09-01 | Stop reason: HOSPADM

## 2024-08-20 RX ORDER — CHLORHEXIDINE GLUCONATE ORAL RINSE 1.2 MG/ML
15 SOLUTION DENTAL 2 TIMES DAILY
Status: DISCONTINUED | OUTPATIENT
Start: 2024-08-20 | End: 2024-08-22

## 2024-08-20 RX ORDER — MAGNESIUM SULFATE IN WATER 40 MG/ML
2000 INJECTION, SOLUTION INTRAVENOUS PRN
Status: DISCONTINUED | OUTPATIENT
Start: 2024-08-20 | End: 2024-09-01 | Stop reason: HOSPADM

## 2024-08-20 RX ORDER — DEXAMETHASONE SODIUM PHOSPHATE 4 MG/ML
6 INJECTION, SOLUTION INTRA-ARTICULAR; INTRALESIONAL; INTRAMUSCULAR; INTRAVENOUS; SOFT TISSUE DAILY
Status: DISCONTINUED | OUTPATIENT
Start: 2024-08-20 | End: 2024-08-20

## 2024-08-20 RX ORDER — DEXMEDETOMIDINE HYDROCHLORIDE 4 UG/ML
.1-1.5 INJECTION, SOLUTION INTRAVENOUS CONTINUOUS
Status: DISCONTINUED | OUTPATIENT
Start: 2024-08-20 | End: 2024-08-22

## 2024-08-20 RX ORDER — HEPARIN SODIUM 1000 [USP'U]/ML
2000 INJECTION, SOLUTION INTRAVENOUS; SUBCUTANEOUS PRN
Status: DISCONTINUED | OUTPATIENT
Start: 2024-08-20 | End: 2024-08-27

## 2024-08-20 RX ORDER — ROCURONIUM BROMIDE 10 MG/ML
100 INJECTION, SOLUTION INTRAVENOUS ONCE
Status: COMPLETED | OUTPATIENT
Start: 2024-08-20 | End: 2024-08-20

## 2024-08-20 RX ORDER — SODIUM CHLORIDE 0.9 % (FLUSH) 0.9 %
5-40 SYRINGE (ML) INJECTION PRN
Status: DISCONTINUED | OUTPATIENT
Start: 2024-08-20 | End: 2024-09-01 | Stop reason: HOSPADM

## 2024-08-20 RX ORDER — ASPIRIN 81 MG/1
81 TABLET, CHEWABLE ORAL DAILY
Status: DISCONTINUED | OUTPATIENT
Start: 2024-08-20 | End: 2024-08-23

## 2024-08-20 RX ORDER — HEPARIN SODIUM 10000 [USP'U]/100ML
5-30 INJECTION, SOLUTION INTRAVENOUS CONTINUOUS
Status: DISCONTINUED | OUTPATIENT
Start: 2024-08-20 | End: 2024-08-27

## 2024-08-20 RX ORDER — SODIUM CHLORIDE 0.9 % (FLUSH) 0.9 %
5-40 SYRINGE (ML) INJECTION EVERY 12 HOURS SCHEDULED
Status: DISCONTINUED | OUTPATIENT
Start: 2024-08-20 | End: 2024-09-01 | Stop reason: HOSPADM

## 2024-08-20 RX ORDER — SODIUM CHLORIDE 9 MG/ML
INJECTION, SOLUTION INTRAVENOUS PRN
Status: DISCONTINUED | OUTPATIENT
Start: 2024-08-20 | End: 2024-09-01 | Stop reason: HOSPADM

## 2024-08-20 RX ORDER — HEPARIN SODIUM 1000 [USP'U]/ML
4000 INJECTION, SOLUTION INTRAVENOUS; SUBCUTANEOUS ONCE
Status: COMPLETED | OUTPATIENT
Start: 2024-08-20 | End: 2024-08-20

## 2024-08-20 RX ORDER — FENTANYL CITRATE-0.9 % NACL/PF 10 MCG/ML
PLASTIC BAG, INJECTION (ML) INTRAVENOUS
Status: COMPLETED
Start: 2024-08-20 | End: 2024-08-20

## 2024-08-20 RX ORDER — ONDANSETRON 2 MG/ML
4 INJECTION INTRAMUSCULAR; INTRAVENOUS EVERY 6 HOURS PRN
Status: DISCONTINUED | OUTPATIENT
Start: 2024-08-20 | End: 2024-08-25

## 2024-08-20 RX ORDER — POTASSIUM CHLORIDE 29.8 MG/ML
20 INJECTION INTRAVENOUS PRN
Status: DISCONTINUED | OUTPATIENT
Start: 2024-08-20 | End: 2024-09-01 | Stop reason: HOSPADM

## 2024-08-20 RX ORDER — MULTIVITAMIN WITH IRON
1 TABLET ORAL DAILY
Status: DISCONTINUED | OUTPATIENT
Start: 2024-08-20 | End: 2024-09-01 | Stop reason: HOSPADM

## 2024-08-20 RX ORDER — POLYETHYLENE GLYCOL 3350 17 G/17G
17 POWDER, FOR SOLUTION ORAL DAILY PRN
Status: DISCONTINUED | OUTPATIENT
Start: 2024-08-20 | End: 2024-09-01 | Stop reason: HOSPADM

## 2024-08-20 RX ORDER — DEXAMETHASONE SODIUM PHOSPHATE 4 MG/ML
10 INJECTION, SOLUTION INTRA-ARTICULAR; INTRALESIONAL; INTRAMUSCULAR; INTRAVENOUS; SOFT TISSUE ONCE
Status: COMPLETED | OUTPATIENT
Start: 2024-08-21 | End: 2024-08-20

## 2024-08-20 RX ORDER — METOPROLOL TARTRATE 100 MG
100 TABLET ORAL 2 TIMES DAILY
Status: DISCONTINUED | OUTPATIENT
Start: 2024-08-20 | End: 2024-08-22

## 2024-08-20 RX ORDER — HEPARIN SODIUM 1000 [USP'U]/ML
4000 INJECTION, SOLUTION INTRAVENOUS; SUBCUTANEOUS PRN
Status: DISCONTINUED | OUTPATIENT
Start: 2024-08-20 | End: 2024-08-27

## 2024-08-20 RX ORDER — NOREPINEPHRINE BITARTRATE 0.06 MG/ML
1-100 INJECTION, SOLUTION INTRAVENOUS CONTINUOUS
Status: DISCONTINUED | OUTPATIENT
Start: 2024-08-20 | End: 2024-08-23

## 2024-08-20 RX ORDER — DEXAMETHASONE SODIUM PHOSPHATE 4 MG/ML
10 INJECTION, SOLUTION INTRA-ARTICULAR; INTRALESIONAL; INTRAMUSCULAR; INTRAVENOUS; SOFT TISSUE ONCE
Status: DISCONTINUED | OUTPATIENT
Start: 2024-08-20 | End: 2024-08-20

## 2024-08-20 RX ORDER — PROPOFOL 10 MG/ML
5-50 INJECTION, EMULSION INTRAVENOUS CONTINUOUS
Status: DISCONTINUED | OUTPATIENT
Start: 2024-08-20 | End: 2024-08-22

## 2024-08-20 RX ORDER — ACETAMINOPHEN 325 MG/1
650 TABLET ORAL EVERY 6 HOURS PRN
Status: DISCONTINUED | OUTPATIENT
Start: 2024-08-20 | End: 2024-09-01 | Stop reason: HOSPADM

## 2024-08-20 RX ORDER — ASPIRIN 81 MG/1
81 TABLET, CHEWABLE ORAL DAILY
Status: CANCELLED | OUTPATIENT
Start: 2024-08-20

## 2024-08-20 RX ORDER — ALBUTEROL SULFATE 0.83 MG/ML
2.5 SOLUTION RESPIRATORY (INHALATION) EVERY 6 HOURS PRN
Status: DISCONTINUED | OUTPATIENT
Start: 2024-08-20 | End: 2024-08-26

## 2024-08-20 RX ORDER — ETOMIDATE 2 MG/ML
20 INJECTION INTRAVENOUS ONCE
Status: COMPLETED | OUTPATIENT
Start: 2024-08-20 | End: 2024-08-20

## 2024-08-20 RX ORDER — FENTANYL CITRATE-0.9 % NACL/PF 10 MCG/ML
25-200 PLASTIC BAG, INJECTION (ML) INTRAVENOUS CONTINUOUS
Status: DISCONTINUED | OUTPATIENT
Start: 2024-08-20 | End: 2024-08-22

## 2024-08-20 RX ORDER — ATORVASTATIN CALCIUM 10 MG/1
10 TABLET, FILM COATED ORAL NIGHTLY
Status: DISCONTINUED | OUTPATIENT
Start: 2024-08-20 | End: 2024-08-28

## 2024-08-20 RX ORDER — ACETAMINOPHEN 650 MG/1
650 SUPPOSITORY RECTAL EVERY 6 HOURS PRN
Status: DISCONTINUED | OUTPATIENT
Start: 2024-08-20 | End: 2024-09-01 | Stop reason: HOSPADM

## 2024-08-20 RX ORDER — ALBUMIN (HUMAN) 12.5 G/50ML
SOLUTION INTRAVENOUS
Status: COMPLETED
Start: 2024-08-20 | End: 2024-08-20

## 2024-08-20 RX ORDER — POTASSIUM CHLORIDE 7.45 MG/ML
10 INJECTION INTRAVENOUS PRN
Status: DISCONTINUED | OUTPATIENT
Start: 2024-08-20 | End: 2024-09-01 | Stop reason: HOSPADM

## 2024-08-20 RX ORDER — PROPOFOL 10 MG/ML
INJECTION, EMULSION INTRAVENOUS
Status: DISPENSED
Start: 2024-08-20 | End: 2024-08-20

## 2024-08-20 RX ORDER — OXYBUTYNIN CHLORIDE 5 MG/1
5 TABLET ORAL 2 TIMES DAILY
Status: DISCONTINUED | OUTPATIENT
Start: 2024-08-20 | End: 2024-09-01 | Stop reason: HOSPADM

## 2024-08-20 RX ORDER — BUMETANIDE 0.25 MG/ML
2 INJECTION INTRAMUSCULAR; INTRAVENOUS 2 TIMES DAILY
Status: DISCONTINUED | OUTPATIENT
Start: 2024-08-20 | End: 2024-08-26

## 2024-08-20 RX ORDER — FENTANYL CITRATE-0.9 % NACL/PF 10 MCG/ML
PLASTIC BAG, INJECTION (ML) INTRAVENOUS
Status: DISPENSED
Start: 2024-08-20 | End: 2024-08-21

## 2024-08-20 RX ORDER — VITAMIN B COMPLEX
5000 TABLET ORAL DAILY
Status: DISCONTINUED | OUTPATIENT
Start: 2024-08-20 | End: 2024-09-01 | Stop reason: HOSPADM

## 2024-08-20 RX ORDER — CARBIDOPA/LEVODOPA 10MG-100MG
1 TABLET ORAL 4 TIMES DAILY
Status: DISCONTINUED | OUTPATIENT
Start: 2024-08-20 | End: 2024-09-01 | Stop reason: HOSPADM

## 2024-08-20 RX ORDER — ONDANSETRON 4 MG/1
4 TABLET, ORALLY DISINTEGRATING ORAL EVERY 8 HOURS PRN
Status: DISCONTINUED | OUTPATIENT
Start: 2024-08-20 | End: 2024-08-25

## 2024-08-20 RX ADMIN — ALBUMIN (HUMAN) 25 G: 0.25 INJECTION, SOLUTION INTRAVENOUS at 10:24

## 2024-08-20 RX ADMIN — CARBIDOPA AND LEVODOPA 1 TABLET: 10; 100 TABLET ORAL at 17:34

## 2024-08-20 RX ADMIN — ETOMIDATE 20 MG: 2 INJECTION, SOLUTION INTRAVENOUS at 01:39

## 2024-08-20 RX ADMIN — Medication 125 MCG/HR: at 12:33

## 2024-08-20 RX ADMIN — Medication 5 MCG/MIN: at 09:20

## 2024-08-20 RX ADMIN — Medication 0.2 MCG/KG/HR: at 11:07

## 2024-08-20 RX ADMIN — SODIUM CHLORIDE, PRESERVATIVE FREE 10 ML: 5 INJECTION INTRAVENOUS at 20:12

## 2024-08-20 RX ADMIN — DEXAMETHASONE SODIUM PHOSPHATE 6 MG: 4 INJECTION, SOLUTION INTRA-ARTICULAR; INTRALESIONAL; INTRAMUSCULAR; INTRAVENOUS; SOFT TISSUE at 09:38

## 2024-08-20 RX ADMIN — DEXAMETHASONE SODIUM PHOSPHATE 10 MG: 4 INJECTION, SOLUTION INTRA-ARTICULAR; INTRALESIONAL; INTRAMUSCULAR; INTRAVENOUS; SOFT TISSUE at 22:07

## 2024-08-20 RX ADMIN — BUMETANIDE 2 MG: 0.25 INJECTION INTRAMUSCULAR; INTRAVENOUS at 09:37

## 2024-08-20 RX ADMIN — CHLORHEXIDINE GLUCONATE 0.12% ORAL RINSE 15 ML: 1.2 LIQUID ORAL at 20:11

## 2024-08-20 RX ADMIN — HEPARIN SODIUM 10 UNITS/KG/HR: 10000 INJECTION, SOLUTION INTRAVENOUS at 05:54

## 2024-08-20 RX ADMIN — SODIUM CHLORIDE, PRESERVATIVE FREE 10 ML: 5 INJECTION INTRAVENOUS at 09:39

## 2024-08-20 RX ADMIN — HEPARIN SODIUM 4000 UNITS: 1000 INJECTION INTRAVENOUS; SUBCUTANEOUS at 05:52

## 2024-08-20 RX ADMIN — ALBUMIN (HUMAN) 25 G: 12.5 SOLUTION INTRAVENOUS at 10:24

## 2024-08-20 RX ADMIN — ATORVASTATIN CALCIUM 10 MG: 10 TABLET, FILM COATED ORAL at 20:13

## 2024-08-20 RX ADMIN — CARBIDOPA AND LEVODOPA 1 TABLET: 10; 100 TABLET ORAL at 20:13

## 2024-08-20 RX ADMIN — HEPARIN SODIUM 2000 UNITS: 1000 INJECTION INTRAVENOUS; SUBCUTANEOUS at 17:42

## 2024-08-20 RX ADMIN — ROCURONIUM BROMIDE 100 MG: 10 INJECTION, SOLUTION INTRAVENOUS at 01:40

## 2024-08-20 RX ADMIN — TIOTROPIUM BROMIDE AND OLODATEROL 2 PUFF: 3.124; 2.736 SPRAY, METERED RESPIRATORY (INHALATION) at 09:29

## 2024-08-20 RX ADMIN — Medication 50 MCG/HR: at 02:06

## 2024-08-20 RX ADMIN — PROPOFOL 10 MCG/KG/MIN: 10 INJECTION, EMULSION INTRAVENOUS at 08:35

## 2024-08-20 RX ADMIN — BUMETANIDE 2 MG: 0.25 INJECTION INTRAMUSCULAR; INTRAVENOUS at 17:36

## 2024-08-20 RX ADMIN — OXYBUTYNIN CHLORIDE 5 MG: 5 TABLET ORAL at 20:13

## 2024-08-20 RX ADMIN — Medication 125 MCG/HR: at 20:00

## 2024-08-20 RX ADMIN — CHLORHEXIDINE GLUCONATE 0.12% ORAL RINSE 15 ML: 1.2 LIQUID ORAL at 15:50

## 2024-08-20 ASSESSMENT — PULMONARY FUNCTION TESTS
PIF_VALUE: 23
PIF_VALUE: 24

## 2024-08-20 ASSESSMENT — PAIN SCALES - GENERAL
PAINLEVEL_OUTOF10: 0
PAINLEVEL_OUTOF10: 0

## 2024-08-20 NOTE — H&P
CRITICAL CARE PROGRESS NOTE      Patient:  Stone Sharp    Unit/Bed:23/023A  YOB: 1935  MRN: [id number]   PCP: Juve Mcintosh DO  Date of Admission: 8/19/2024  Chief Complaint:- SOB    Assessment and Plan:    AHRF 2/2 COVID 19 + large left pleural effusion:  Progressively worsening SOB and tachypnea. No improvement w/ BiPAP, increasing respiratory distress w/ accessory muscle use, RR 40s, intubated in ED. Given Solumedrol, duonebs, and Zosyn in ED.  Continue ventilator settings, wean as tolerated.   Start daily decadron, may also need baricitinib, will check ESR/CRP  Stiolto + prn albuerol, hx of COPD.  Daily CXR  BAL ordered, send specimen for PNA panel/respiratory culture  Large left pleural effusion: Noted on CT chest, unclear etiology. BNP elevated but less than prior values. ECHO w/ 60-65% EF + diastolic dysfunction.   Will try to diuresis w/ Bumex  Will order limited ECHO to evaluate EF   May benefit from therapeutic/diagnostic thoracentesis  Will hold Eliquis given this procedure may be needed, will start heparin instead  Lactic acidosis:  Noted. Trend LA.  Paroxysmal atrial fibrillation: [id number].  On Eliquis outpatient.  Rate controlled with Lopressor 100 mg twice daily. Hold Eliquis in setting of potential procedure.  Will start heparin gtt.  Ischemic cardiomyopathy: Prior stenting.  Echo 7/29/2024 with preserved ejection fraction 60 to 65%, diastolic dysfunction present.  On Lasix outpatient.  Follows with cardiology outpatient.  Repeating limited echo, follow results. Hold ASA for potential upcoming procedure.  Parkinson's disease: Continue home Sinemet  Urinary retention: Continue home oxybutynin and finasteride  Hyperlipidemia: Statin  Hx COPD: Noted in chart review.  On Breo outpatient.    INITIAL H AND P AND ICU COURSE:  88-year-old male past medical history COPD, Parkinson's, ischemic cardiomyopathy, urinary retention, hyperlipidemia presenting with steadily

## 2024-08-20 NOTE — ED PROVIDER NOTES
Intubation    Date/Time: 8/20/2024 2:51 AM    Performed by: Zenon Zhao MD  Authorized by: Omi Jenkins MD    Consent:     Consent obtained:  Emergent situation    Alternatives discussed:  No treatment  Universal protocol:     Patient identity confirmed:  Arm band  Pre-procedure details:     Indications: airway protection, altered consciousness and respiratory failure      Patient status:  Altered mental status    Look externally: facial hair and large incisors      Mouth opening - incisor distance:  2 finger widths    Hyoid-mental distance: 2 finger widths      Hyoid-thyroid distance: 2 or more finger widths      Mallampati score:  II    Neck mobility: reduced      Pharmacologic strategy: RSI      Induction agents:  Etomidate    Paralytics:  Rocuronium  Procedure details:     Preoxygenation:  BiLevel    CPR in progress: no      Number of attempts:  1  Successful intubation attempt details:     Intubation method:  Oral    Intubation technique: video assisted      Laryngoscope blade:  Hypercurved    Bougie used: no      Grade view: II      Tube size (mm):  8.0    Tube type:  Cuffed    Tube visualized through cords: yes    Placement assessment:     ETT at teeth/gumline (cm):  26    Tube secured with:  ETT arnold    Breath sounds:  Equal and absent over the epigastrium    Placement verification: chest rise, colorimetric ETCO2, CXR verification, direct visualization and equal breath sounds      CXR findings:  Appropriate position  Post-procedure details:     Procedure completion:  Tolerated

## 2024-08-20 NOTE — SIGNIFICANT EVENT
Contact Marely Antonio, pt's daughter.  It was explained to the daughter about the need for central line, art line, and chest tube.  Daughter was also explained that with chest tube today is always a risk for bleeding, pneumothorax. Daughter was explained that chest tube will help us to drain the fluid in the lungs, which will help with faster recovery. With central line/A-line there is a risk of bleeding.  Daughter understands the need for the procedures,  and is in agreement for the procedure.    Electronically signed by Jagdeep Russell DO on 8/20/2024 at 10:59 AM

## 2024-08-20 NOTE — CARE COORDINATION
08/20/24 1506   Readmission Assessment   Number of Days since last admission? 8-30 days   Previous Disposition Home with Home Health  ([facility])   Who is being Interviewed Caregiver  (Wife Bharathi)   What was the patient's/caregiver's perception as to why they think they needed to return back to the hospital? Other (Comment)  (Decreased consciousness, \"threw up\", and wheezing)   Did you visit your Primary Care Physician after you left the hospital, before you returned this time? Yes   Did you see a specialist, such as Cardiac, Pulmonary, Orthopedic Physician, etc. after you left the hospital? No   Who advised the patient to return to the hospital? Caregiver  (Wife)   Does the patient report anything that got in the way of taking their medications? No   In our efforts to provide the best possible care to you and others like you, can you think of anything that we could have done to help you after you left the hospital the first time, so that you might not have needed to return so soon? Other (Comment)  (\"No.\")

## 2024-08-20 NOTE — PROCEDURES
PROCEDURE NOTE  Date: 8/20/2024   Name: Stone Sharp  YOB: 1935    Procedures    PLEURAL CATHETER PLACEMENT:        Risks and benefits to the procedure were discussed.  Alternatives and their risks were discussed as well.    Indication:  Left pleural effusion    US Interpretation:   Moderate size left pleural effusion    Complications:   None    Fluid:    Yellow and clear  Side:    Left-sided  Pleural catheter: 14 Marshallese.    Procedure Note:  Informed consent was obtained prior to procedure. The patient was in the semirecumbent position and fluid level was identified with percussion and subsequent US.  Utilizing maximum barrier precautions, patient was prepped with chlorhexidine prep, and patient was draped with sterile glove, sterile gown, sterile OR towels, mask and hair net.  Patient received 10 ml of 1% lidocaine at entry point to the chest wall pleura. Utilizing a scalpel, a small incision was made in the skin at access point.  Utilizing Rollinger syringe, an introducer needle was placed until fluid was returned.  Utilizing a guide wire, it was placed into the thoracic cavity, and introducer needle was withdrawn.  Progressive dilatation was subsequently performed. Pleural catheter was attached to hollow stiffening device and placed into the thoracic cavity. Guide wire and dilator were removed simultaneously, with good fluid returned from the pleural catheter. The catheter was subsequently placed to the pleurevac with good fluid return.  Secondary cleansing with chlorhexidine prep.  Pleural catheter was secured to the skin with suture and subsequent chlorhexidine impregnated Tegaderm dressing placed. Blood loss was less than 5 cc.      Electronically signed by Omi Jenkins M.D.

## 2024-08-20 NOTE — CARE COORDINATION
Stone is currently admitted to [facility].  ACM will follow up upon discharge.

## 2024-08-20 NOTE — PROGRESS NOTES
[facility] [employer] linq     Is intubated on 4B currently/respiratory failure/covid     Current egm 8/19/24 PVC's   1 tachy episode 8/7/24 AF/on eliquis as outpatient

## 2024-08-20 NOTE — ED TRIAGE NOTES
Patient presents to ED via ems from home with wife with chief complaint of shortness of breath after possible aspiration. Per wife, patient was vomiting and then making a weird noise. Audible rhonchi heard. Patient has history of CHF and reports taking home medications as prescribed. Edema noted to bilateral lower extremities. EKG completed. Patient on 4L nasal cannula baseline room air.

## 2024-08-20 NOTE — ED PROVIDER NOTES
[facility]  EMERGENCY DEPARTMENT ENCOUNTER        PATIENT NAME: Stone Sharp  MRN: [id number]  : 1935  ALFONSO: 2024  PROVIDER: Steve Zhang MD    Patient was seen and evaluated at 8:24 PM EDT. Nurses Notes are reviewed and I agree except as noted in the HPI.  Chief Complaint   Patient presents with    Shortness of Breath    Aspiration    Emesis     HISTORY OF PRESENT ILLNESS     Stone Sharp is a 88 y.o. male with PMH of Parkinsons, Afib on Eliquis, PAF detected on loop recorder, CAD/PCI-LCx (), HTN, HPL, CKD, hx of hemorrhagic stroke, recurrent DVT and loop recorder (10/22/2020) implanted for falls and AV block, thoracic aortic aneurysm rupture s/p TEVAR ([place] TBE) at [facility] on 3/16/2024, CHF, and morbid obesity brought in for evaluation of increasing SOB and respiratory distress since 5 PM today.    Patient was eating supper including fish, and rice around 5 PM.  He suddenly vomited.  His wife thought he choked also.  He then developed rapidly progressing breathing difficulty.  On arrival, he was tachypneic with mild-moderate respiratory distress.  4 L/min nasal cannula was applied by nurse he still has significant tachycardia and tachypnea.  Baseline he is not on home O2.  He has no headache, no chest pain, no abdominal pain.  He has chronic abdominal distention.  He does not ambulate at baseline.  He has chronic bilateral lower extremity venous status changes.    This HPI was provided by patient's wife.    PAST MEDICAL HISTORY    has a past medical history of ASHD (arteriosclerotic heart disease), Atrial fibrillation (HCC), BPH (benign prostatic hyperplasia), CKD (chronic kidney disease) stage 3, GFR 30-59 ml/min (HCC), COPD (chronic obstructive pulmonary disease) (HCC), Dyslipidemia, Former smoker, Heart failure with preserved ejection fraction (HCC), History of CVA (cerebrovascular accident), History of dissection of thoracic aorta, History of fracture of nasal bone, History of

## 2024-08-20 NOTE — PROCEDURES
PROCEDURE NOTE  Date: 8/20/2024   Name: Stone Sharp  YOB: 1935    Procedures  Bladder scan completed at 0640 and results told to Anselmo QUEEN. Scan showed 210 mL in the patients bladder. Last void was unknown.

## 2024-08-20 NOTE — CARE COORDINATION
Case Management Assessment Initial Evaluation    Date/Time of Evaluation: 8/20/2024 3:03 PM  Assessment Completed by: Oneida Justin RN    If patient is discharged prior to next notation, then this note serves as note for discharge by case management.    Patient Name: Stone Sharp                   YOB: 1935  Diagnosis: Troponin level elevated [R79.89]  Pleural effusion, left [J90]  Acute hypoxemic respiratory failure due to COVID-19 (HCC) [U07.1, J96.01]                   Date / Time: 8/19/2024  8:09 PM  Location: [address] [place] [address]     Patient Admission Status: Inpatient   Readmission Risk Low 0-14, Mod 15-19), High > 20: Readmission Risk Score: 22.8    Current PCP: Juve Mcintosh DO  Health Care Decision Makers:   Primary Decision Maker: Bharathi Sharp - Spouse - [phone]    Secondary Decision Maker: Marely Antonio - Child - [phone]    Additional Case Management Notes: Presented to ED with c/o increased SOB. Pt was tachypneic and tachycardic in ED. Placed on NC O2, then HFNC, escalated to bipap, but remained in respiratory distress with RR 40's and required intubation. +COVID 19. Admitted to ICU. Echo ordered. Hypotensive and started having increased pressor requirements. Left chest tube placed for large left pleural effusion. BP better after chest tube placed. Having diarrhea. Plan to start TF.     Remains on vent w/ETT on AC/PC, peep 8, FIO2 40%, sats 96%. Tmax 99.9. Unable to follow commands. Left chest tube to -20 sx. Dietitian consulted. +COVID 19. Telemetry, OG w/TF to start, chest tube care. Precedex @ 0.2 mcg/kg/hr, fentanyl @ 125 mcg/hr, heparin drip, levo @ 5 mcg/min, diprivan @ 10 mcg/kg/min, lipitor, baricitinib, IV bumex 2 mg bid, sinemet, peridex, IV decadron 6 mg daily, lexapro, ditropan, inhaler, vit D, Electrolyte replacement protocols. PO meds held d/t NPO. Received IV albumin x1 today. Received 125 mg IV solumedrol x1, 1L fld bolus, and IV zosyn x1 yesterday. K+

## 2024-08-21 ENCOUNTER — APPOINTMENT (OUTPATIENT)
Dept: GENERAL RADIOLOGY | Age: 89
DRG: 871 | End: 2024-08-21
Payer: MEDICARE

## 2024-08-21 ENCOUNTER — APPOINTMENT (OUTPATIENT)
Dept: ULTRASOUND IMAGING | Age: 89
DRG: 871 | End: 2024-08-21
Payer: MEDICARE

## 2024-08-21 LAB
ANION GAP SERPL CALC-SCNC: 14 MEQ/L (ref 8–16)
APTT PPP: 28.4 SECONDS (ref 22–38)
APTT PPP: 77.9 SECONDS (ref 22–38)
APTT PPP: 87.2 SECONDS (ref 22–38)
BUN SERPL-MCNC: 29 MG/DL (ref 7–22)
CALCIUM SERPL-MCNC: 8.3 MG/DL (ref 8.5–10.5)
CHARACTER, BODY FLUID: NORMAL
CHLORIDE SERPL-SCNC: 107 MEQ/L (ref 98–111)
CO2 SERPL-SCNC: 19 MEQ/L (ref 23–33)
COLOR FLD: NORMAL
CORTIS SERPL-MCNC: 7.06 UG/DL
CORTISOL COLLECTION INFO: NORMAL
CREAT SERPL-MCNC: 1.4 MG/DL (ref 0.4–1.2)
CREAT UR-MCNC: 15.9 MG/DL
DEPRECATED RDW RBC AUTO: 55 FL (ref 35–45)
EOSINOPHIL SMEAR, URINE: NORMAL
ERYTHROCYTE [DISTWIDTH] IN BLOOD BY AUTOMATED COUNT: 15.4 % (ref 11.5–14.5)
GFR SERPL CREATININE-BSD FRML MDRD: 48 ML/MIN/1.73M2
GLUCOSE BLD STRIP.AUTO-MCNC: 144 MG/DL (ref 70–108)
GLUCOSE BLD STRIP.AUTO-MCNC: 175 MG/DL (ref 70–108)
GLUCOSE BLD STRIP.AUTO-MCNC: 194 MG/DL (ref 70–108)
GLUCOSE SERPL-MCNC: 178 MG/DL (ref 70–108)
GRANULOCYTES NFR FLD AUTO: 0 %
HCT VFR BLD AUTO: 39.2 % (ref 42–52)
HGB BLD-MCNC: 12.1 GM/DL (ref 14–18)
LACTATE SERPL-SCNC: 1.9 MMOL/L (ref 0.5–2)
LACTATE SERPL-SCNC: 2.3 MMOL/L (ref 0.5–2)
LACTATE SERPL-SCNC: 2.5 MMOL/L (ref 0.5–2)
LACTATE SERPL-SCNC: 2.9 MMOL/L (ref 0.5–2)
LYMPHOCYTES NFR FLD MANUAL: 26 %
MCH RBC QN AUTO: 30.2 PG (ref 26–33)
MCHC RBC AUTO-ENTMCNC: 30.9 GM/DL (ref 32.2–35.5)
MCV RBC AUTO: 97.8 FL (ref 80–94)
MESOTHELIAL CELLS BODY FLUID: NORMAL
MONOCYTES NFR FLD MANUAL: 3 %
MONONUC CELLS NFR FLD AUTO: 0 %
NEUTS SEG NFR FLD MANUAL: 71 %
NUC CELL # FLD AUTO: 341 /CUMM (ref 0–500)
PATHOLOGIST REVIEW: NORMAL
PLATELET # BLD AUTO: 227 THOU/MM3 (ref 130–400)
PMV BLD AUTO: 10.1 FL (ref 9.4–12.4)
POTASSIUM SERPL-SCNC: 5.2 MEQ/L (ref 3.5–5.2)
RBC # BLD AUTO: 4.01 MILL/MM3 (ref 4.7–6.1)
RBC # FLD AUTO: NORMAL /CUMM
SODIUM SERPL-SCNC: 140 MEQ/L (ref 135–145)
SODIUM UR-SCNC: 108 MEQ/L
SPECIMEN: NORMAL
T4 FREE SERPL-MCNC: 1.34 NG/DL (ref 0.93–1.68)
TOTAL VOLUME RECEIVED BODY FLUID: 4 ML
TROPONIN, HIGH SENSITIVITY: 60 NG/L (ref 0–12)
TSH SERPL DL<=0.005 MIU/L-ACNC: 1.74 UIU/ML (ref 0.4–4.2)
UUN 24H UR-MCNC: 164 MG/DL
WBC # BLD AUTO: 12.5 THOU/MM3 (ref 4.8–10.8)

## 2024-08-21 PROCEDURE — 6370000000 HC RX 637 (ALT 250 FOR IP)

## 2024-08-21 PROCEDURE — 82533 TOTAL CORTISOL: CPT

## 2024-08-21 PROCEDURE — 82570 ASSAY OF URINE CREATININE: CPT

## 2024-08-21 PROCEDURE — 6370000000 HC RX 637 (ALT 250 FOR IP): Performed by: NURSE PRACTITIONER

## 2024-08-21 PROCEDURE — 89190 NASAL SMEAR FOR EOSINOPHILS: CPT

## 2024-08-21 PROCEDURE — 6360000002 HC RX W HCPCS

## 2024-08-21 PROCEDURE — 82948 REAGENT STRIP/BLOOD GLUCOSE: CPT

## 2024-08-21 PROCEDURE — 87116 MYCOBACTERIA CULTURE: CPT

## 2024-08-21 PROCEDURE — 84439 ASSAY OF FREE THYROXINE: CPT

## 2024-08-21 PROCEDURE — 6370000000 HC RX 637 (ALT 250 FOR IP): Performed by: PHARMACIST

## 2024-08-21 PROCEDURE — 36415 COLL VENOUS BLD VENIPUNCTURE: CPT

## 2024-08-21 PROCEDURE — 2100000000 HC CCU R&B

## 2024-08-21 PROCEDURE — 88112 CYTOPATH CELL ENHANCE TECH: CPT

## 2024-08-21 PROCEDURE — 2500000003 HC RX 250 WO HCPCS

## 2024-08-21 PROCEDURE — 99291 CRITICAL CARE FIRST HOUR: CPT | Performed by: INTERNAL MEDICINE

## 2024-08-21 PROCEDURE — 2700000000 HC OXYGEN THERAPY PER DAY

## 2024-08-21 PROCEDURE — 89051 BODY FLUID CELL COUNT: CPT

## 2024-08-21 PROCEDURE — 84540 ASSAY OF URINE/UREA-N: CPT

## 2024-08-21 PROCEDURE — 80048 BASIC METABOLIC PNL TOTAL CA: CPT

## 2024-08-21 PROCEDURE — 94761 N-INVAS EAR/PLS OXIMETRY MLT: CPT

## 2024-08-21 PROCEDURE — 94640 AIRWAY INHALATION TREATMENT: CPT

## 2024-08-21 PROCEDURE — 84443 ASSAY THYROID STIM HORMONE: CPT

## 2024-08-21 PROCEDURE — 71045 X-RAY EXAM CHEST 1 VIEW: CPT

## 2024-08-21 PROCEDURE — 2580000003 HC RX 258

## 2024-08-21 PROCEDURE — 88108 CYTOPATH CONCENTRATE TECH: CPT

## 2024-08-21 PROCEDURE — 76770 US EXAM ABDO BACK WALL COMP: CPT

## 2024-08-21 PROCEDURE — 85730 THROMBOPLASTIN TIME PARTIAL: CPT

## 2024-08-21 PROCEDURE — 83605 ASSAY OF LACTIC ACID: CPT

## 2024-08-21 PROCEDURE — 88305 TISSUE EXAM BY PATHOLOGIST: CPT

## 2024-08-21 PROCEDURE — 94003 VENT MGMT INPAT SUBQ DAY: CPT

## 2024-08-21 PROCEDURE — 85027 COMPLETE CBC AUTOMATED: CPT

## 2024-08-21 PROCEDURE — 84484 ASSAY OF TROPONIN QUANT: CPT

## 2024-08-21 PROCEDURE — 84300 ASSAY OF URINE SODIUM: CPT

## 2024-08-21 RX ORDER — PANTOPRAZOLE SODIUM 40 MG/10ML
40 INJECTION, POWDER, LYOPHILIZED, FOR SOLUTION INTRAVENOUS DAILY
Status: DISCONTINUED | OUTPATIENT
Start: 2024-08-21 | End: 2024-09-01 | Stop reason: HOSPADM

## 2024-08-21 RX ORDER — DEXAMETHASONE SODIUM PHOSPHATE 10 MG/ML
10 INJECTION, EMULSION INTRAMUSCULAR; INTRAVENOUS EVERY 24 HOURS
Status: DISCONTINUED | OUTPATIENT
Start: 2024-08-21 | End: 2024-08-25

## 2024-08-21 RX ORDER — HYDRALAZINE HYDROCHLORIDE 20 MG/ML
5 INJECTION INTRAMUSCULAR; INTRAVENOUS EVERY 6 HOURS PRN
Status: DISCONTINUED | OUTPATIENT
Start: 2024-08-21 | End: 2024-08-24

## 2024-08-21 RX ADMIN — BUMETANIDE 2 MG: 0.25 INJECTION INTRAMUSCULAR; INTRAVENOUS at 08:35

## 2024-08-21 RX ADMIN — DEXAMETHASONE SODIUM PHOSPHATE 10 MG: 10 INJECTION, EMULSION INTRAMUSCULAR; INTRAVENOUS at 19:30

## 2024-08-21 RX ADMIN — METOPROLOL TARTRATE 100 MG: 100 TABLET, FILM COATED ORAL at 08:36

## 2024-08-21 RX ADMIN — HYDRALAZINE HYDROCHLORIDE 5 MG: 20 INJECTION, SOLUTION INTRAMUSCULAR; INTRAVENOUS at 19:35

## 2024-08-21 RX ADMIN — OXYBUTYNIN CHLORIDE 5 MG: 5 TABLET ORAL at 08:36

## 2024-08-21 RX ADMIN — SODIUM CHLORIDE, PRESERVATIVE FREE 10 ML: 5 INJECTION INTRAVENOUS at 19:30

## 2024-08-21 RX ADMIN — Medication 0.5 MCG/KG/HR: at 22:42

## 2024-08-21 RX ADMIN — Medication 0.9 MCG/KG/HR: at 16:22

## 2024-08-21 RX ADMIN — Medication 5000 UNITS: at 08:35

## 2024-08-21 RX ADMIN — Medication 1 TABLET: at 08:36

## 2024-08-21 RX ADMIN — CARBIDOPA AND LEVODOPA 1 TABLET: 10; 100 TABLET ORAL at 08:36

## 2024-08-21 RX ADMIN — BUMETANIDE 2 MG: 0.25 INJECTION INTRAMUSCULAR; INTRAVENOUS at 17:14

## 2024-08-21 RX ADMIN — PANTOPRAZOLE SODIUM 40 MG: 40 INJECTION, POWDER, FOR SOLUTION INTRAVENOUS at 08:34

## 2024-08-21 RX ADMIN — HEPARIN SODIUM 14 UNITS/KG/HR: 10000 INJECTION, SOLUTION INTRAVENOUS at 08:25

## 2024-08-21 RX ADMIN — CHLORHEXIDINE GLUCONATE 0.12% ORAL RINSE 15 ML: 1.2 LIQUID ORAL at 08:35

## 2024-08-21 RX ADMIN — Medication 125 MCG/HR: at 03:40

## 2024-08-21 RX ADMIN — Medication 0.2 MCG/KG/HR: at 05:43

## 2024-08-21 RX ADMIN — TIOTROPIUM BROMIDE AND OLODATEROL 2 PUFF: 3.124; 2.736 SPRAY, METERED RESPIRATORY (INHALATION) at 08:15

## 2024-08-21 RX ADMIN — ESCITALOPRAM OXALATE 10 MG: 10 TABLET ORAL at 08:36

## 2024-08-21 RX ADMIN — BARICITINIB 2 MG: 2 TABLET, FILM COATED ORAL at 08:36

## 2024-08-21 RX ADMIN — CARBIDOPA AND LEVODOPA 1 TABLET: 10; 100 TABLET ORAL at 13:06

## 2024-08-21 RX ADMIN — HEPARIN SODIUM 4000 UNITS: 1000 INJECTION INTRAVENOUS; SUBCUTANEOUS at 01:42

## 2024-08-21 RX ADMIN — SODIUM CHLORIDE, PRESERVATIVE FREE 10 ML: 5 INJECTION INTRAVENOUS at 08:35

## 2024-08-21 ASSESSMENT — PULMONARY FUNCTION TESTS
PIF_VALUE: 15
PIF_VALUE: 22
PIF_VALUE: 18
PIF_VALUE: 18

## 2024-08-21 ASSESSMENT — PAIN SCALES - GENERAL
PAINLEVEL_OUTOF10: 0

## 2024-08-22 ENCOUNTER — APPOINTMENT (OUTPATIENT)
Dept: CT IMAGING | Age: 89
DRG: 871 | End: 2024-08-22
Payer: MEDICARE

## 2024-08-22 ENCOUNTER — APPOINTMENT (OUTPATIENT)
Dept: INTERVENTIONAL RADIOLOGY/VASCULAR | Age: 89
DRG: 871 | End: 2024-08-22
Payer: MEDICARE

## 2024-08-22 ENCOUNTER — TELEPHONE (OUTPATIENT)
Dept: UROLOGY | Age: 89
End: 2024-08-22

## 2024-08-22 ENCOUNTER — APPOINTMENT (OUTPATIENT)
Dept: GENERAL RADIOLOGY | Age: 89
DRG: 871 | End: 2024-08-22
Payer: MEDICARE

## 2024-08-22 LAB
ALBUMIN SERPL BCG-MCNC: 2.9 G/DL (ref 3.5–5.1)
ALP SERPL-CCNC: 54 U/L (ref 38–126)
ALT SERPL W/O P-5'-P-CCNC: 10 U/L (ref 11–66)
AMMONIA PLAS-MCNC: 22 UMOL/L (ref 11–60)
APTT PPP: 129.6 SECONDS (ref 22–38)
APTT PPP: 30.2 SECONDS (ref 22–38)
ARTERIAL PATENCY WRIST A: POSITIVE
AST SERPL-CCNC: 25 U/L (ref 5–40)
BACTERIA SPEC RESP CULT: NORMAL
BACTERIA URNS QL MICRO: ABNORMAL /HPF
BASE EXCESS BLDA CALC-SCNC: 3.2 MMOL/L (ref -2.5–2.5)
BDY SITE: ABNORMAL
BILIRUB CONJ SERPL-MCNC: 0.3 MG/DL (ref 0.1–13.8)
BILIRUB SERPL-MCNC: 0.6 MG/DL (ref 0.3–1.2)
BILIRUB UR QL STRIP.AUTO: NEGATIVE
CASTS #/AREA URNS LPF: ABNORMAL /LPF
CASTS 2: ABNORMAL /LPF
CHARACTER UR: ABNORMAL
CHARACTER, BODY FLUID: NORMAL
COLLECTED BY:: ABNORMAL
COLOR FLD: NORMAL
COLOR, UA: YELLOW
CRYSTALS URNS MICRO: ABNORMAL
DEVICE: ABNORMAL
EPITHELIAL CELLS, UA: ABNORMAL /HPF
FOLATE SERPL-MCNC: 11.7 NG/ML (ref 4.8–24.2)
GLUCOSE BLD STRIP.AUTO-MCNC: 123 MG/DL (ref 70–108)
GLUCOSE BLD STRIP.AUTO-MCNC: 139 MG/DL (ref 70–108)
GLUCOSE BLD STRIP.AUTO-MCNC: 142 MG/DL (ref 70–108)
GLUCOSE UR QL STRIP.AUTO: NEGATIVE MG/DL
GRAM STN SPEC: NORMAL
GRANULOCYTES NFR FLD AUTO: 0 %
HCO3 BLDA-SCNC: 27 MMOL/L (ref 23–28)
HGB UR QL STRIP.AUTO: ABNORMAL
KETONES UR QL STRIP.AUTO: NEGATIVE
LACTATE SERPL-SCNC: 1.5 MMOL/L (ref 0.5–2)
LYMPHOCYTES NFR FLD MANUAL: 37 %
MISCELLANEOUS 2: ABNORMAL
MONOCYTES NFR FLD MANUAL: 12 %
MONONUC CELLS NFR FLD AUTO: 0 %
NEUTS SEG NFR FLD MANUAL: 51 %
NITRITE UR QL STRIP: NEGATIVE
NUC CELL # FLD AUTO: 129 /CUMM (ref 0–500)
PATHOLOGIST REVIEW: NORMAL
PCO2 BLDA: 40 MMHG (ref 35–45)
PH BLDA: 7.45 [PH] (ref 7.35–7.45)
PH UR STRIP.AUTO: 5 [PH] (ref 5–9)
PO2 BLDA: 64 MMHG (ref 71–104)
PROT SERPL-MCNC: 5.8 G/DL (ref 6.1–8)
PROT UR STRIP.AUTO-MCNC: NEGATIVE MG/DL
RBC # FLD AUTO: NORMAL /CUMM
RBC URINE: ABNORMAL /HPF
REASON FOR REJECTION: NORMAL
REJECTED TEST: NORMAL
RENAL EPI CELLS #/AREA URNS HPF: ABNORMAL /[HPF]
SAO2 % BLDA: 93 %
SP GR UR REFRACT.AUTO: 1.01 (ref 1–1.03)
SPECIMEN: NORMAL
TOTAL VOLUME RECEIVED BODY FLUID: 3 ML
UROBILINOGEN, URINE: 0.2 EU/DL (ref 0–1)
UUN 24H UR-MCNC: 474 MG/DL
VENTILATION MODE VENT: ABNORMAL
VIT B12 SERPL-MCNC: 1963 PG/ML (ref 211–911)
WBC #/AREA URNS HPF: ABNORMAL /HPF
WBC #/AREA URNS HPF: ABNORMAL /[HPF]
YEAST LIKE FUNGI URNS QL MICRO: ABNORMAL

## 2024-08-22 PROCEDURE — 86480 TB TEST CELL IMMUN MEASURE: CPT

## 2024-08-22 PROCEDURE — 82607 VITAMIN B-12: CPT

## 2024-08-22 PROCEDURE — 83605 ASSAY OF LACTIC ACID: CPT

## 2024-08-22 PROCEDURE — 6360000002 HC RX W HCPCS

## 2024-08-22 PROCEDURE — 36556 INSERT NON-TUNNEL CV CATH: CPT

## 2024-08-22 PROCEDURE — 76937 US GUIDE VASCULAR ACCESS: CPT

## 2024-08-22 PROCEDURE — 82948 REAGENT STRIP/BLOOD GLUCOSE: CPT

## 2024-08-22 PROCEDURE — 84155 ASSAY OF PROTEIN SERUM: CPT

## 2024-08-22 PROCEDURE — 92610 EVALUATE SWALLOWING FUNCTION: CPT

## 2024-08-22 PROCEDURE — 2580000003 HC RX 258

## 2024-08-22 PROCEDURE — 71045 X-RAY EXAM CHEST 1 VIEW: CPT

## 2024-08-22 PROCEDURE — 82746 ASSAY OF FOLIC ACID SERUM: CPT

## 2024-08-22 PROCEDURE — 85730 THROMBOPLASTIN TIME PARTIAL: CPT

## 2024-08-22 PROCEDURE — 99232 SBSQ HOSP IP/OBS MODERATE 35: CPT | Performed by: INTERNAL MEDICINE

## 2024-08-22 PROCEDURE — 36410 VNPNXR 3YR/> PHY/QHP DX/THER: CPT

## 2024-08-22 PROCEDURE — 36415 COLL VENOUS BLD VENIPUNCTURE: CPT

## 2024-08-22 PROCEDURE — 70450 CT HEAD/BRAIN W/O DYE: CPT

## 2024-08-22 PROCEDURE — 2100000000 HC CCU R&B

## 2024-08-22 PROCEDURE — 05H933Z INSERTION OF INFUSION DEVICE INTO RIGHT BRACHIAL VEIN, PERCUTANEOUS APPROACH: ICD-10-PCS | Performed by: NURSE PRACTITIONER

## 2024-08-22 PROCEDURE — 2709999900 IR FLUORO GUIDED CVA DEVICE PLMT/REPLACE/REMOVAL

## 2024-08-22 PROCEDURE — 77001 FLUOROGUIDE FOR VEIN DEVICE: CPT

## 2024-08-22 PROCEDURE — 6370000000 HC RX 637 (ALT 250 FOR IP): Performed by: NURSE PRACTITIONER

## 2024-08-22 PROCEDURE — 82140 ASSAY OF AMMONIA: CPT

## 2024-08-22 PROCEDURE — 89051 BODY FLUID CELL COUNT: CPT

## 2024-08-22 PROCEDURE — 87116 MYCOBACTERIA CULTURE: CPT

## 2024-08-22 PROCEDURE — 81001 URINALYSIS AUTO W/SCOPE: CPT

## 2024-08-22 PROCEDURE — 83615 LACTATE (LD) (LDH) ENZYME: CPT

## 2024-08-22 PROCEDURE — 84540 ASSAY OF URINE/UREA-N: CPT

## 2024-08-22 PROCEDURE — C1751 CATH, INF, PER/CENT/MIDLINE: HCPCS

## 2024-08-22 PROCEDURE — 82803 BLOOD GASES ANY COMBINATION: CPT

## 2024-08-22 PROCEDURE — 80076 HEPATIC FUNCTION PANEL: CPT

## 2024-08-22 RX ORDER — LIDOCAINE HYDROCHLORIDE 10 MG/ML
50 INJECTION, SOLUTION EPIDURAL; INFILTRATION; INTRACAUDAL; PERINEURAL ONCE
Status: DISCONTINUED | OUTPATIENT
Start: 2024-08-22 | End: 2024-09-01 | Stop reason: HOSPADM

## 2024-08-22 RX ORDER — METOPROLOL TARTRATE 50 MG
50 TABLET ORAL 2 TIMES DAILY
Status: DISCONTINUED | OUTPATIENT
Start: 2024-08-22 | End: 2024-08-23

## 2024-08-22 RX ORDER — SODIUM CHLORIDE 0.9 % (FLUSH) 0.9 %
5-40 SYRINGE (ML) INJECTION EVERY 12 HOURS SCHEDULED
Status: DISCONTINUED | OUTPATIENT
Start: 2024-08-22 | End: 2024-08-28 | Stop reason: SDUPTHER

## 2024-08-22 RX ORDER — SODIUM CHLORIDE 0.9 % (FLUSH) 0.9 %
5-40 SYRINGE (ML) INJECTION PRN
Status: DISCONTINUED | OUTPATIENT
Start: 2024-08-22 | End: 2024-08-28 | Stop reason: SDUPTHER

## 2024-08-22 RX ORDER — SODIUM CHLORIDE 9 MG/ML
INJECTION, SOLUTION INTRAVENOUS PRN
Status: DISCONTINUED | OUTPATIENT
Start: 2024-08-22 | End: 2024-09-01 | Stop reason: HOSPADM

## 2024-08-22 RX ADMIN — HEPARIN SODIUM 14 UNITS/KG/HR: 10000 INJECTION, SOLUTION INTRAVENOUS at 02:58

## 2024-08-22 RX ADMIN — BUMETANIDE 2 MG: 0.25 INJECTION INTRAMUSCULAR; INTRAVENOUS at 08:38

## 2024-08-22 RX ADMIN — CHLORHEXIDINE GLUCONATE 0.12% ORAL RINSE 15 ML: 1.2 LIQUID ORAL at 08:45

## 2024-08-22 RX ADMIN — PANTOPRAZOLE SODIUM 40 MG: 40 INJECTION, POWDER, FOR SOLUTION INTRAVENOUS at 08:38

## 2024-08-22 RX ADMIN — DEXAMETHASONE SODIUM PHOSPHATE 10 MG: 10 INJECTION, EMULSION INTRAMUSCULAR; INTRAVENOUS at 19:30

## 2024-08-22 RX ADMIN — SODIUM CHLORIDE, PRESERVATIVE FREE 10 ML: 5 INJECTION INTRAVENOUS at 08:39

## 2024-08-22 RX ADMIN — HEPARIN SODIUM 15 UNITS/KG/HR: 10000 INJECTION, SOLUTION INTRAVENOUS at 22:06

## 2024-08-22 RX ADMIN — SODIUM CHLORIDE, PRESERVATIVE FREE 10 ML: 5 INJECTION INTRAVENOUS at 19:30

## 2024-08-22 RX ADMIN — HYDROMORPHONE HYDROCHLORIDE 0.5 MG: 1 INJECTION, SOLUTION INTRAMUSCULAR; INTRAVENOUS; SUBCUTANEOUS at 20:54

## 2024-08-22 RX ADMIN — SODIUM CHLORIDE, PRESERVATIVE FREE 10 ML: 5 INJECTION INTRAVENOUS at 08:44

## 2024-08-22 RX ADMIN — BUMETANIDE 2 MG: 0.25 INJECTION INTRAMUSCULAR; INTRAVENOUS at 18:11

## 2024-08-22 RX ADMIN — SODIUM CHLORIDE, PRESERVATIVE FREE 10 ML: 5 INJECTION INTRAVENOUS at 19:31

## 2024-08-22 RX ADMIN — HEPARIN SODIUM 4000 UNITS: 1000 INJECTION INTRAVENOUS; SUBCUTANEOUS at 14:45

## 2024-08-22 ASSESSMENT — PAIN DESCRIPTION - LOCATION: LOCATION: GENERALIZED

## 2024-08-22 ASSESSMENT — PAIN SCALES - GENERAL
PAINLEVEL_OUTOF10: 8
PAINLEVEL_OUTOF10: 0
PAINLEVEL_OUTOF10: 0

## 2024-08-22 NOTE — CARE COORDINATION
8/22/24, 2:21 PM EDT    DISCHARGE ON GOING EVALUATION    Stone [facility] day: 2  Location: -06/006-A Reason for admit: Troponin level elevated [R79.89]  Pleural effusion, left [J90]  Acute hypoxemic respiratory failure due to COVID-19 (HCC) [U07.1, J96.01]     Procedures:   8/20 Intubated  8/20 Left chest tube placed  8/21 Extubated    Imaging since last note:   8/21 Renal US: 1. Heterogeneous structure with possible peripheral calcification in the right  lower kidney of indeterminate etiology but suspicious for malignancy. Multiphase  contrast-enhanced CT or MRI is recommended for further evaluation.  2. Mildly elevated left arcuate resistive index.  8/22 CT Head: No acute process  8/22 CXR: 1. Interval extubation and removal of enteric tube. Similar position of   left basilar thoracostomy tube.  2. Stable to mildly improved left basilar pleural/parenchymal opacities.    Barriers to Discharge: Extubated yesterday. Chest tube yesterday had 1670 ml out in 24 hours. Was following commands yesterday. Overnight was not following commands, nonverbal; continued this morning. CT Head was negative. Failed swallow eval this morning. NPO. This afternoon pt verbalizing and following commands.     On room air. Afebrile. SB 50's. Alert and follows commands. Left chest tube to -20 sx with 90 ml out today. Dietitian consulted. +COVID 19. Telemetry, chest tube care, sanders, SCDs. Heparin drip, IV bumex 2 mg bid, IV decadron 10 mg daily, prn IV hydralazine, IV protonix, inhaler, Electrolyte replacement protocols. Alb 2.9. Pleural fluid sent for culture, quantiferon TB gold, AFB.      PCP: Juve Mcintosh DO  Readmission Risk Score: 23.6    Patient Goals/Plan/Treatment Preferences: Home w/wife. Wife assists with all ADLs. Has DME. Room air at baseline. Current with [facility]  for RN. Uses [employer] for transportation. SW on case.

## 2024-08-22 NOTE — CARE COORDINATION
DISCHARGE PLANNING EVALUATION  8/22/24, 12:02 PM EDT    Reason for Referral: Current with [facility]  Decision Maker: pt and wife make decisions together.  Current Services: [facility] and [facility] for transportation. Pt has a walker, w/c and alisia lift at home.  New Services Requested: None at this time.  Family/ Social/ Home environment: Assessment completed with pts wife, pt extubated yesterday and not responding at this time. Wife states pt is mostly bed bound at home and will occasionally sit on side of bed to eat. Wife states she feeds pt due to shaky hands from Parkinson's. Wife does all of pts personal care and household chores with the support of her daughter and gr'son.  Wife states pt went to [facility] from March to July 2024 and feels he did not make much progress if any. Wife feels pt can return home at discharge and family will continue care.   Payment Source: Medicare and BC/  Transportation at Discharge: to be determined.  Post-acute (PAC) provider list was provided to patient. Patient was informed of their freedom to choose PAC provider. Discussed and offered to show the patient the relevant PAC Providers quality and resource use measures on Medicare Compare web site via computer based on patient's goals of care and treatment preferences. Questions regarding selection process were answered.      Teach Back Method used with wife regarding care plan and discharge planning.  Wife verbalized understanding of the plan of care and contribute to goal setting.       Patient preferences and discharge plan: planning home with wife and family assisting with pt care. [facility]puneet  is current.  Wife denies SNF at this time.    Electronically signed by TALI Mercado on 8/22/2024 at 12:02 PM

## 2024-08-23 ENCOUNTER — APPOINTMENT (OUTPATIENT)
Dept: INTERVENTIONAL RADIOLOGY/VASCULAR | Age: 89
DRG: 871 | End: 2024-08-23
Payer: MEDICARE

## 2024-08-23 ENCOUNTER — APPOINTMENT (OUTPATIENT)
Dept: GENERAL RADIOLOGY | Age: 89
DRG: 871 | End: 2024-08-23
Payer: MEDICARE

## 2024-08-23 LAB
ANION GAP SERPL CALC-SCNC: 11 MEQ/L (ref 8–16)
ANION GAP SERPL CALC-SCNC: 12 MEQ/L (ref 8–16)
APTT PPP: 144.2 SECONDS (ref 22–38)
APTT PPP: 79.4 SECONDS (ref 22–38)
APTT PPP: 87.8 SECONDS (ref 22–38)
BUN SERPL-MCNC: 37 MG/DL (ref 7–22)
BUN SERPL-MCNC: 46 MG/DL (ref 7–22)
CALCIUM SERPL-MCNC: 8.5 MG/DL (ref 8.5–10.5)
CALCIUM SERPL-MCNC: 8.7 MG/DL (ref 8.5–10.5)
CHLORIDE SERPL-SCNC: 101 MEQ/L (ref 98–111)
CHLORIDE SERPL-SCNC: 105 MEQ/L (ref 98–111)
CO2 SERPL-SCNC: 27 MEQ/L (ref 23–33)
CO2 SERPL-SCNC: 28 MEQ/L (ref 23–33)
CREAT SERPL-MCNC: 1.1 MG/DL (ref 0.4–1.2)
CREAT SERPL-MCNC: 1.5 MG/DL (ref 0.4–1.2)
DEPRECATED RDW RBC AUTO: 51.8 FL (ref 35–45)
ECHO BSA: 2.1 M2
ERYTHROCYTE [DISTWIDTH] IN BLOOD BY AUTOMATED COUNT: 14.5 % (ref 11.5–14.5)
GFR SERPL CREATININE-BSD FRML MDRD: 45 ML/MIN/1.73M2
GFR SERPL CREATININE-BSD FRML MDRD: 65 ML/MIN/1.73M2
GLUCOSE BLD STRIP.AUTO-MCNC: 158 MG/DL (ref 70–108)
GLUCOSE BLD STRIP.AUTO-MCNC: 169 MG/DL (ref 70–108)
GLUCOSE SERPL-MCNC: 169 MG/DL (ref 70–108)
GLUCOSE SERPL-MCNC: 170 MG/DL (ref 70–108)
HCT VFR BLD AUTO: 38 % (ref 42–52)
HGB BLD-MCNC: 11.5 GM/DL (ref 14–18)
LDH SERPL L TO P-CCNC: 258 U/L (ref 100–190)
MCH RBC QN AUTO: 29.3 PG (ref 26–33)
MCHC RBC AUTO-ENTMCNC: 30.3 GM/DL (ref 32.2–35.5)
MCV RBC AUTO: 96.7 FL (ref 80–94)
PLATELET # BLD AUTO: 210 THOU/MM3 (ref 130–400)
PMV BLD AUTO: 10.7 FL (ref 9.4–12.4)
POTASSIUM SERPL-SCNC: 3.8 MEQ/L (ref 3.5–5.2)
POTASSIUM SERPL-SCNC: 4.1 MEQ/L (ref 3.5–5.2)
PROT SERPL-MCNC: 5.9 G/DL (ref 6.1–8)
RBC # BLD AUTO: 3.93 MILL/MM3 (ref 4.7–6.1)
SODIUM SERPL-SCNC: 140 MEQ/L (ref 135–145)
SODIUM SERPL-SCNC: 144 MEQ/L (ref 135–145)
WBC # BLD AUTO: 11.5 THOU/MM3 (ref 4.8–10.8)

## 2024-08-23 PROCEDURE — 2580000003 HC RX 258

## 2024-08-23 PROCEDURE — 6370000000 HC RX 637 (ALT 250 FOR IP)

## 2024-08-23 PROCEDURE — 6360000002 HC RX W HCPCS

## 2024-08-23 PROCEDURE — 2700000000 HC OXYGEN THERAPY PER DAY

## 2024-08-23 PROCEDURE — 80048 BASIC METABOLIC PNL TOTAL CA: CPT

## 2024-08-23 PROCEDURE — 94761 N-INVAS EAR/PLS OXIMETRY MLT: CPT

## 2024-08-23 PROCEDURE — 2060000000 HC ICU INTERMEDIATE R&B

## 2024-08-23 PROCEDURE — 85027 COMPLETE CBC AUTOMATED: CPT

## 2024-08-23 PROCEDURE — 94640 AIRWAY INHALATION TREATMENT: CPT

## 2024-08-23 PROCEDURE — 93971 EXTREMITY STUDY: CPT

## 2024-08-23 PROCEDURE — 87116 MYCOBACTERIA CULTURE: CPT

## 2024-08-23 PROCEDURE — 0D9670Z DRAINAGE OF STOMACH WITH DRAINAGE DEVICE, VIA NATURAL OR ARTIFICIAL OPENING: ICD-10-PCS | Performed by: NURSE PRACTITIONER

## 2024-08-23 PROCEDURE — 82948 REAGENT STRIP/BLOOD GLUCOSE: CPT

## 2024-08-23 PROCEDURE — 92526 ORAL FUNCTION THERAPY: CPT

## 2024-08-23 PROCEDURE — 97167 OT EVAL HIGH COMPLEX 60 MIN: CPT

## 2024-08-23 PROCEDURE — 51798 US URINE CAPACITY MEASURE: CPT

## 2024-08-23 PROCEDURE — 85730 THROMBOPLASTIN TIME PARTIAL: CPT

## 2024-08-23 PROCEDURE — 36415 COLL VENOUS BLD VENIPUNCTURE: CPT

## 2024-08-23 PROCEDURE — 74018 RADEX ABDOMEN 1 VIEW: CPT

## 2024-08-23 PROCEDURE — 99232 SBSQ HOSP IP/OBS MODERATE 35: CPT | Performed by: INTERNAL MEDICINE

## 2024-08-23 RX ORDER — METOPROLOL TARTRATE 50 MG
50 TABLET ORAL 2 TIMES DAILY
Status: DISCONTINUED | OUTPATIENT
Start: 2024-08-23 | End: 2024-08-29

## 2024-08-23 RX ORDER — ALBUTEROL SULFATE 0.83 MG/ML
2.5 SOLUTION RESPIRATORY (INHALATION) ONCE
Status: COMPLETED | OUTPATIENT
Start: 2024-08-23 | End: 2024-08-23

## 2024-08-23 RX ORDER — METOPROLOL TARTRATE 100 MG
100 TABLET ORAL 2 TIMES DAILY
Status: DISCONTINUED | OUTPATIENT
Start: 2024-08-23 | End: 2024-08-23

## 2024-08-23 RX ADMIN — CARBIDOPA AND LEVODOPA 1 TABLET: 10; 100 TABLET ORAL at 18:09

## 2024-08-23 RX ADMIN — BUMETANIDE 2 MG: 0.25 INJECTION INTRAMUSCULAR; INTRAVENOUS at 08:30

## 2024-08-23 RX ADMIN — BUMETANIDE 2 MG: 0.25 INJECTION INTRAMUSCULAR; INTRAVENOUS at 18:08

## 2024-08-23 RX ADMIN — SODIUM CHLORIDE: 9 INJECTION, SOLUTION INTRAVENOUS at 13:51

## 2024-08-23 RX ADMIN — HYDROMORPHONE HYDROCHLORIDE 0.5 MG: 1 INJECTION, SOLUTION INTRAMUSCULAR; INTRAVENOUS; SUBCUTANEOUS at 11:14

## 2024-08-23 RX ADMIN — HYDROMORPHONE HYDROCHLORIDE 0.5 MG: 1 INJECTION, SOLUTION INTRAMUSCULAR; INTRAVENOUS; SUBCUTANEOUS at 08:29

## 2024-08-23 RX ADMIN — METOPROLOL TARTRATE 50 MG: 50 TABLET, FILM COATED ORAL at 21:38

## 2024-08-23 RX ADMIN — ATORVASTATIN CALCIUM 10 MG: 10 TABLET, FILM COATED ORAL at 21:38

## 2024-08-23 RX ADMIN — PANTOPRAZOLE SODIUM 40 MG: 40 INJECTION, POWDER, FOR SOLUTION INTRAVENOUS at 08:30

## 2024-08-23 RX ADMIN — METOPROLOL TARTRATE 50 MG: 50 TABLET, FILM COATED ORAL at 12:05

## 2024-08-23 RX ADMIN — BARICITINIB 4 MG: 2 TABLET, FILM COATED ORAL at 11:27

## 2024-08-23 RX ADMIN — CARBIDOPA AND LEVODOPA 1 TABLET: 10; 100 TABLET ORAL at 21:38

## 2024-08-23 RX ADMIN — AZITHROMYCIN DIHYDRATE 250 MG: 500 INJECTION, POWDER, LYOPHILIZED, FOR SOLUTION INTRAVENOUS at 13:52

## 2024-08-23 RX ADMIN — OXYBUTYNIN CHLORIDE 5 MG: 5 TABLET ORAL at 21:38

## 2024-08-23 RX ADMIN — ESCITALOPRAM OXALATE 10 MG: 10 TABLET ORAL at 11:30

## 2024-08-23 RX ADMIN — SODIUM CHLORIDE, PRESERVATIVE FREE 10 ML: 5 INJECTION INTRAVENOUS at 08:31

## 2024-08-23 RX ADMIN — Medication 5000 UNITS: at 11:28

## 2024-08-23 RX ADMIN — SODIUM CHLORIDE, PRESERVATIVE FREE 10 ML: 5 INJECTION INTRAVENOUS at 21:39

## 2024-08-23 RX ADMIN — TIOTROPIUM BROMIDE AND OLODATEROL 2 PUFF: 3.124; 2.736 SPRAY, METERED RESPIRATORY (INHALATION) at 09:32

## 2024-08-23 RX ADMIN — HEPARIN SODIUM 11 UNITS/KG/HR: 10000 INJECTION, SOLUTION INTRAVENOUS at 21:24

## 2024-08-23 RX ADMIN — CARBIDOPA AND LEVODOPA 1 TABLET: 10; 100 TABLET ORAL at 11:28

## 2024-08-23 RX ADMIN — ALBUTEROL SULFATE 2.5 MG: 2.5 SOLUTION RESPIRATORY (INHALATION) at 14:41

## 2024-08-23 RX ADMIN — DEXAMETHASONE SODIUM PHOSPHATE 10 MG: 10 INJECTION, EMULSION INTRAMUSCULAR; INTRAVENOUS at 21:39

## 2024-08-23 RX ADMIN — Medication 1 TABLET: at 11:28

## 2024-08-23 RX ADMIN — OXYBUTYNIN CHLORIDE 5 MG: 5 TABLET ORAL at 11:30

## 2024-08-23 ASSESSMENT — PAIN SCALES - GENERAL
PAINLEVEL_OUTOF10: 0
PAINLEVEL_OUTOF10: 7
PAINLEVEL_OUTOF10: 4
PAINLEVEL_OUTOF10: 5
PAINLEVEL_OUTOF10: 8
PAINLEVEL_OUTOF10: 4

## 2024-08-23 ASSESSMENT — PAIN DESCRIPTION - DESCRIPTORS
DESCRIPTORS: SHARP
DESCRIPTORS: SHARP

## 2024-08-23 ASSESSMENT — PAIN DESCRIPTION - LOCATION
LOCATION: RIB CAGE
LOCATION: RIB CAGE

## 2024-08-23 ASSESSMENT — PAIN DESCRIPTION - ORIENTATION
ORIENTATION: LEFT
ORIENTATION: LEFT

## 2024-08-23 NOTE — CARE COORDINATION
8/23/24, 3:05 PM EDT    DISCHARGE ON GOING EVALUATION    Stone [facility] day: 3  Location: 4-22/022-A Reason for admit: Troponin level elevated [R79.89]  Pleural effusion, left [J90]  Acute hypoxemic respiratory failure due to COVID-19 (HCC) [U07.1, J96.01]     Procedures:   8/20 Intubated  8/20 Left chest tube placed  8/21 Extubated  8/22 CVC RIJ    Imaging since last note:   8/23 LUE Venous doppler: Neg for DVT    Barriers to Discharge: Failed swallow. NGT placed today. Continues with left chest tube. Transferred to stepdown today.     Sats 97% on 2L O2. Tmax 99.1. Afib 80's. Alert. Oriented to self.  Left chest tube to -20 sx with 100 ml out in 24 hours. SLP/PT/OT. Dietitian consulted. +COVID 19. Telemetry, CVC, NG clamped, chest tube care, SCDs. Heparin drip, IV zithromax daily, baricitinib, IV bumex 2 mg bid, IV decadron 10 mg daily, prn IV hydralazine, prn IV dilaudid, IV protonix, inhaler, Electrolyte replacement protocols. WBC 11.5, hgb 11.5. Vit B12 1963.     PCP: Juve Mcintosh,   Readmission Risk Score: 23.5    Patient Goals/Plan/Treatment Preferences: Home w/wife. Wife assists with all ADLs. Sang to wheelchair at base. Has DME. Room air at baseline. Current with [facility] for RN. Uses [facility] for transportation. SW on case.     Pt transferred to 4K22. Handoff report given to Norris 4LARRY 22.

## 2024-08-23 NOTE — CARE COORDINATION
8/23/24, 3:12 PM EDT    DISCHARGE PLANNING EVALUATION     Sw spoke with [facility], update provided. HH is providing skilled care and PT/OT.

## 2024-08-24 ENCOUNTER — APPOINTMENT (OUTPATIENT)
Dept: GENERAL RADIOLOGY | Age: 89
DRG: 871 | End: 2024-08-24
Payer: MEDICARE

## 2024-08-24 PROBLEM — J90 PLEURAL EFFUSION, LEFT: Status: ACTIVE | Noted: 2024-08-24

## 2024-08-24 LAB
ANION GAP SERPL CALC-SCNC: 13 MEQ/L (ref 8–16)
APTT PPP: 78.3 SECONDS (ref 22–38)
APTT PPP: 87.8 SECONDS (ref 22–38)
APTT PPP: 91.7 SECONDS (ref 22–38)
BACTERIA BLD AEROBE CULT: NORMAL
BACTERIA BLD AEROBE CULT: NORMAL
BASOPHILS ABSOLUTE: 0.1 THOU/MM3 (ref 0–0.1)
BASOPHILS NFR BLD AUTO: 0.6 %
BUN SERPL-MCNC: 50 MG/DL (ref 7–22)
CALCIUM SERPL-MCNC: 8.8 MG/DL (ref 8.5–10.5)
CHLORIDE SERPL-SCNC: 100 MEQ/L (ref 98–111)
CO2 SERPL-SCNC: 27 MEQ/L (ref 23–33)
CREAT SERPL-MCNC: 1.7 MG/DL (ref 0.4–1.2)
DEPRECATED RDW RBC AUTO: 53.9 FL (ref 35–45)
EOSINOPHIL NFR BLD AUTO: 0 %
EOSINOPHILS ABSOLUTE: 0 THOU/MM3 (ref 0–0.4)
ERYTHROCYTE [DISTWIDTH] IN BLOOD BY AUTOMATED COUNT: 15.2 % (ref 11.5–14.5)
GFR SERPL CREATININE-BSD FRML MDRD: 38 ML/MIN/1.73M2
GLUCOSE SERPL-MCNC: 181 MG/DL (ref 70–108)
HCT VFR BLD AUTO: 38.8 % (ref 42–52)
HGB BLD-MCNC: 11.8 GM/DL (ref 14–18)
IMM GRANULOCYTES # BLD AUTO: 0.25 THOU/MM3 (ref 0–0.07)
IMM GRANULOCYTES NFR BLD AUTO: 2.9 %
LYMPHOCYTES ABSOLUTE: 0.4 THOU/MM3 (ref 1–4.8)
LYMPHOCYTES NFR BLD AUTO: 4.7 %
MCH RBC QN AUTO: 29.3 PG (ref 26–33)
MCHC RBC AUTO-ENTMCNC: 30.4 GM/DL (ref 32.2–35.5)
MCV RBC AUTO: 96.3 FL (ref 80–94)
MONOCYTES ABSOLUTE: 0.4 THOU/MM3 (ref 0.4–1.3)
MONOCYTES NFR BLD AUTO: 4.8 %
NEUTROPHILS ABSOLUTE: 7.5 THOU/MM3 (ref 1.8–7.7)
NEUTROPHILS NFR BLD AUTO: 87 %
NRBC BLD AUTO-RTO: 0 /100 WBC
PLATELET # BLD AUTO: 206 THOU/MM3 (ref 130–400)
PMV BLD AUTO: 10.6 FL (ref 9.4–12.4)
POTASSIUM SERPL-SCNC: 3.5 MEQ/L (ref 3.5–5.2)
POTASSIUM SERPL-SCNC: 4.6 MEQ/L (ref 3.5–5.2)
RBC # BLD AUTO: 4.03 MILL/MM3 (ref 4.7–6.1)
REASON FOR REJECTION: NORMAL
REJECTED TEST: NORMAL
SODIUM SERPL-SCNC: 140 MEQ/L (ref 135–145)
WBC # BLD AUTO: 8.6 THOU/MM3 (ref 4.8–10.8)

## 2024-08-24 PROCEDURE — 80048 BASIC METABOLIC PNL TOTAL CA: CPT

## 2024-08-24 PROCEDURE — 6370000000 HC RX 637 (ALT 250 FOR IP)

## 2024-08-24 PROCEDURE — 85025 COMPLETE CBC W/AUTO DIFF WBC: CPT

## 2024-08-24 PROCEDURE — 2580000003 HC RX 258

## 2024-08-24 PROCEDURE — 51701 INSERT BLADDER CATHETER: CPT

## 2024-08-24 PROCEDURE — 2060000000 HC ICU INTERMEDIATE R&B

## 2024-08-24 PROCEDURE — 99233 SBSQ HOSP IP/OBS HIGH 50: CPT | Performed by: INTERNAL MEDICINE

## 2024-08-24 PROCEDURE — 85730 THROMBOPLASTIN TIME PARTIAL: CPT

## 2024-08-24 PROCEDURE — 36415 COLL VENOUS BLD VENIPUNCTURE: CPT

## 2024-08-24 PROCEDURE — 74018 RADEX ABDOMEN 1 VIEW: CPT

## 2024-08-24 PROCEDURE — 99222 1ST HOSP IP/OBS MODERATE 55: CPT | Performed by: NURSE PRACTITIONER

## 2024-08-24 PROCEDURE — 6360000002 HC RX W HCPCS

## 2024-08-24 PROCEDURE — 84132 ASSAY OF SERUM POTASSIUM: CPT

## 2024-08-24 RX ORDER — TAMSULOSIN HYDROCHLORIDE 0.4 MG/1
0.4 CAPSULE ORAL DAILY
Status: DISCONTINUED | OUTPATIENT
Start: 2024-08-24 | End: 2024-08-24

## 2024-08-24 RX ADMIN — METOPROLOL TARTRATE 50 MG: 50 TABLET, FILM COATED ORAL at 20:14

## 2024-08-24 RX ADMIN — SODIUM CHLORIDE, PRESERVATIVE FREE 10 ML: 5 INJECTION INTRAVENOUS at 20:14

## 2024-08-24 RX ADMIN — CARBIDOPA AND LEVODOPA 1 TABLET: 10; 100 TABLET ORAL at 20:14

## 2024-08-24 RX ADMIN — PANTOPRAZOLE SODIUM 40 MG: 40 INJECTION, POWDER, FOR SOLUTION INTRAVENOUS at 09:13

## 2024-08-24 RX ADMIN — CARBIDOPA AND LEVODOPA 1 TABLET: 10; 100 TABLET ORAL at 16:17

## 2024-08-24 RX ADMIN — HYDROMORPHONE HYDROCHLORIDE 0.5 MG: 1 INJECTION, SOLUTION INTRAMUSCULAR; INTRAVENOUS; SUBCUTANEOUS at 05:22

## 2024-08-24 RX ADMIN — METOPROLOL TARTRATE 50 MG: 50 TABLET, FILM COATED ORAL at 08:52

## 2024-08-24 RX ADMIN — OXYBUTYNIN CHLORIDE 5 MG: 5 TABLET ORAL at 20:14

## 2024-08-24 RX ADMIN — POTASSIUM CHLORIDE 20 MEQ: 29.8 INJECTION, SOLUTION INTRAVENOUS at 09:17

## 2024-08-24 RX ADMIN — OXYBUTYNIN CHLORIDE 5 MG: 5 TABLET ORAL at 08:52

## 2024-08-24 RX ADMIN — ESCITALOPRAM OXALATE 10 MG: 10 TABLET ORAL at 08:52

## 2024-08-24 RX ADMIN — CARBIDOPA AND LEVODOPA 1 TABLET: 10; 100 TABLET ORAL at 13:06

## 2024-08-24 RX ADMIN — HYDROMORPHONE HYDROCHLORIDE 0.5 MG: 1 INJECTION, SOLUTION INTRAMUSCULAR; INTRAVENOUS; SUBCUTANEOUS at 09:30

## 2024-08-24 RX ADMIN — ATORVASTATIN CALCIUM 10 MG: 10 TABLET, FILM COATED ORAL at 20:14

## 2024-08-24 RX ADMIN — CARBIDOPA AND LEVODOPA 1 TABLET: 10; 100 TABLET ORAL at 08:52

## 2024-08-24 RX ADMIN — HEPARIN SODIUM 11 UNITS/KG/HR: 10000 INJECTION, SOLUTION INTRAVENOUS at 20:10

## 2024-08-24 RX ADMIN — ACETAMINOPHEN 650 MG: 325 TABLET ORAL at 16:18

## 2024-08-24 RX ADMIN — POTASSIUM CHLORIDE 20 MEQ: 29.8 INJECTION, SOLUTION INTRAVENOUS at 10:15

## 2024-08-24 RX ADMIN — HYDROMORPHONE HYDROCHLORIDE 0.5 MG: 1 INJECTION, SOLUTION INTRAMUSCULAR; INTRAVENOUS; SUBCUTANEOUS at 20:13

## 2024-08-24 RX ADMIN — AZITHROMYCIN DIHYDRATE 250 MG: 500 INJECTION, POWDER, LYOPHILIZED, FOR SOLUTION INTRAVENOUS at 13:01

## 2024-08-24 RX ADMIN — SODIUM CHLORIDE: 9 INJECTION, SOLUTION INTRAVENOUS at 13:00

## 2024-08-24 RX ADMIN — DEXAMETHASONE SODIUM PHOSPHATE 10 MG: 10 INJECTION, EMULSION INTRAMUSCULAR; INTRAVENOUS at 20:14

## 2024-08-24 RX ADMIN — Medication 1 TABLET: at 08:53

## 2024-08-24 RX ADMIN — Medication 5000 UNITS: at 08:52

## 2024-08-24 ASSESSMENT — PAIN SCALES - GENERAL
PAINLEVEL_OUTOF10: 8
PAINLEVEL_OUTOF10: 0

## 2024-08-24 ASSESSMENT — PAIN DESCRIPTION - ORIENTATION
ORIENTATION: POSTERIOR
ORIENTATION: POSTERIOR

## 2024-08-24 ASSESSMENT — PAIN DESCRIPTION - FREQUENCY
FREQUENCY: CONTINUOUS
FREQUENCY: CONTINUOUS

## 2024-08-24 ASSESSMENT — PAIN DESCRIPTION - DESCRIPTORS
DESCRIPTORS: SHARP
DESCRIPTORS: ACHING

## 2024-08-24 ASSESSMENT — PAIN DESCRIPTION - ONSET
ONSET: ON-GOING
ONSET: ON-GOING

## 2024-08-24 ASSESSMENT — PAIN SCALES - WONG BAKER
WONGBAKER_NUMERICALRESPONSE: NO HURT
WONGBAKER_NUMERICALRESPONSE: NO HURT

## 2024-08-24 ASSESSMENT — PAIN DESCRIPTION - PAIN TYPE
TYPE: ACUTE PAIN
TYPE: ACUTE PAIN

## 2024-08-24 ASSESSMENT — PAIN DESCRIPTION - LOCATION
LOCATION: BUTTOCKS
LOCATION: BUTTOCKS

## 2024-08-24 ASSESSMENT — PAIN - FUNCTIONAL ASSESSMENT
PAIN_FUNCTIONAL_ASSESSMENT: PREVENTS OR INTERFERES SOME ACTIVE ACTIVITIES AND ADLS
PAIN_FUNCTIONAL_ASSESSMENT: ACTIVITIES ARE NOT PREVENTED

## 2024-08-25 ENCOUNTER — APPOINTMENT (OUTPATIENT)
Dept: GENERAL RADIOLOGY | Age: 89
DRG: 871 | End: 2024-08-25
Payer: MEDICARE

## 2024-08-25 PROBLEM — K56.7 ILEUS (HCC): Status: ACTIVE | Noted: 2024-08-25

## 2024-08-25 PROBLEM — G20.A1 PARKINSON'S DISEASE (HCC): Status: ACTIVE | Noted: 2024-08-25

## 2024-08-25 PROBLEM — Z71.89 GOALS OF CARE, COUNSELING/DISCUSSION: Status: ACTIVE | Noted: 2024-08-25

## 2024-08-25 LAB
ANION GAP SERPL CALC-SCNC: 12 MEQ/L (ref 8–16)
APTT PPP: 70.9 SECONDS (ref 22–38)
APTT PPP: 73 SECONDS (ref 22–38)
BACTERIA SPEC ANAEROBE CULT: NORMAL
BACTERIA SPEC BFLD CULT: NORMAL
BASOPHILS ABSOLUTE: 0.1 THOU/MM3 (ref 0–0.1)
BASOPHILS NFR BLD AUTO: 0.7 %
BUN SERPL-MCNC: 51 MG/DL (ref 7–22)
CALCIUM SERPL-MCNC: 9.1 MG/DL (ref 8.5–10.5)
CHLORIDE SERPL-SCNC: 103 MEQ/L (ref 98–111)
CO2 SERPL-SCNC: 29 MEQ/L (ref 23–33)
CREAT SERPL-MCNC: 1.4 MG/DL (ref 0.4–1.2)
DEPRECATED RDW RBC AUTO: 51.8 FL (ref 35–45)
EOSINOPHIL NFR BLD AUTO: 0 %
EOSINOPHILS ABSOLUTE: 0 THOU/MM3 (ref 0–0.4)
ERYTHROCYTE [DISTWIDTH] IN BLOOD BY AUTOMATED COUNT: 14.6 % (ref 11.5–14.5)
GAMMA INTERFERON BACKGROUND BLD IA-ACNC: 0.01 IU/ML
GFR SERPL CREATININE-BSD FRML MDRD: 48 ML/MIN/1.73M2
GLUCOSE SERPL-MCNC: 179 MG/DL (ref 70–108)
GRAM STN SPEC: NORMAL
HCT VFR BLD AUTO: 37.3 % (ref 42–52)
HGB BLD-MCNC: 11.5 GM/DL (ref 14–18)
IMM GRANULOCYTES # BLD AUTO: 0.08 THOU/MM3 (ref 0–0.07)
IMM GRANULOCYTES NFR BLD AUTO: 1.1 %
LYMPHOCYTES ABSOLUTE: 0.2 THOU/MM3 (ref 1–4.8)
LYMPHOCYTES NFR BLD AUTO: 2.5 %
M TB IFN-G BLD-IMP: NORMAL
M TB IFN-G CD4+ BCKGRND COR BLD-ACNC: 0 IU/ML
M TB IFN-G CD4+CD8+ BCKGRND COR BLD-ACNC: 0.01 IU/ML
MCH RBC QN AUTO: 29.9 PG (ref 26–33)
MCHC RBC AUTO-ENTMCNC: 30.8 GM/DL (ref 32.2–35.5)
MCV RBC AUTO: 97.1 FL (ref 80–94)
MITOGEN IGNF BCKGRD COR BLD-ACNC: 0 IU/ML
MONOCYTES ABSOLUTE: 0.2 THOU/MM3 (ref 0.4–1.3)
MONOCYTES NFR BLD AUTO: 2.2 %
NEUTROPHILS ABSOLUTE: 6.7 THOU/MM3 (ref 1.8–7.7)
NEUTROPHILS NFR BLD AUTO: 93.5 %
NRBC BLD AUTO-RTO: 0 /100 WBC
PLATELET # BLD AUTO: 221 THOU/MM3 (ref 130–400)
PMV BLD AUTO: 11.2 FL (ref 9.4–12.4)
POTASSIUM SERPL-SCNC: 4 MEQ/L (ref 3.5–5.2)
RBC # BLD AUTO: 3.84 MILL/MM3 (ref 4.7–6.1)
SCAN OF BLOOD SMEAR: NORMAL
SODIUM SERPL-SCNC: 144 MEQ/L (ref 135–145)
TOXIC GRANULES BLD QL SMEAR: PRESENT
WBC # BLD AUTO: 7.2 THOU/MM3 (ref 4.8–10.8)

## 2024-08-25 PROCEDURE — 99231 SBSQ HOSP IP/OBS SF/LOW 25: CPT | Performed by: NURSE PRACTITIONER

## 2024-08-25 PROCEDURE — 6360000002 HC RX W HCPCS

## 2024-08-25 PROCEDURE — 71045 X-RAY EXAM CHEST 1 VIEW: CPT

## 2024-08-25 PROCEDURE — 80048 BASIC METABOLIC PNL TOTAL CA: CPT

## 2024-08-25 PROCEDURE — 2580000003 HC RX 258

## 2024-08-25 PROCEDURE — 85025 COMPLETE CBC W/AUTO DIFF WBC: CPT

## 2024-08-25 PROCEDURE — 85730 THROMBOPLASTIN TIME PARTIAL: CPT

## 2024-08-25 PROCEDURE — 6370000000 HC RX 637 (ALT 250 FOR IP)

## 2024-08-25 PROCEDURE — 99233 SBSQ HOSP IP/OBS HIGH 50: CPT | Performed by: INTERNAL MEDICINE

## 2024-08-25 PROCEDURE — 2060000000 HC ICU INTERMEDIATE R&B

## 2024-08-25 PROCEDURE — 36415 COLL VENOUS BLD VENIPUNCTURE: CPT

## 2024-08-25 RX ORDER — SCOLOPAMINE TRANSDERMAL SYSTEM 1 MG/1
1 PATCH, EXTENDED RELEASE TRANSDERMAL
Status: DISCONTINUED | OUTPATIENT
Start: 2024-08-25 | End: 2024-08-25

## 2024-08-25 RX ORDER — DEXAMETHASONE SODIUM PHOSPHATE 4 MG/ML
6 INJECTION, SOLUTION INTRA-ARTICULAR; INTRALESIONAL; INTRAMUSCULAR; INTRAVENOUS; SOFT TISSUE EVERY MORNING
Status: DISCONTINUED | OUTPATIENT
Start: 2024-08-26 | End: 2024-08-26 | Stop reason: SDUPTHER

## 2024-08-25 RX ORDER — DEXAMETHASONE SODIUM PHOSPHATE 4 MG/ML
6 INJECTION, SOLUTION INTRA-ARTICULAR; INTRALESIONAL; INTRAMUSCULAR; INTRAVENOUS; SOFT TISSUE EVERY 24 HOURS
Status: DISCONTINUED | OUTPATIENT
Start: 2024-08-25 | End: 2024-08-25

## 2024-08-25 RX ORDER — DOXYCYCLINE HYCLATE 100 MG
100 TABLET ORAL EVERY 12 HOURS SCHEDULED
Status: COMPLETED | OUTPATIENT
Start: 2024-08-25 | End: 2024-09-01

## 2024-08-25 RX ADMIN — CARBIDOPA AND LEVODOPA 1 TABLET: 10; 100 TABLET ORAL at 20:31

## 2024-08-25 RX ADMIN — HYDROMORPHONE HYDROCHLORIDE 0.5 MG: 1 INJECTION, SOLUTION INTRAMUSCULAR; INTRAVENOUS; SUBCUTANEOUS at 07:49

## 2024-08-25 RX ADMIN — CARBIDOPA AND LEVODOPA 1 TABLET: 10; 100 TABLET ORAL at 17:34

## 2024-08-25 RX ADMIN — SODIUM CHLORIDE: 9 INJECTION, SOLUTION INTRAVENOUS at 12:27

## 2024-08-25 RX ADMIN — ATORVASTATIN CALCIUM 10 MG: 10 TABLET, FILM COATED ORAL at 20:31

## 2024-08-25 RX ADMIN — ESCITALOPRAM OXALATE 10 MG: 10 TABLET ORAL at 07:44

## 2024-08-25 RX ADMIN — SODIUM CHLORIDE, PRESERVATIVE FREE 10 ML: 5 INJECTION INTRAVENOUS at 07:48

## 2024-08-25 RX ADMIN — CEFTRIAXONE SODIUM 1000 MG: 1 INJECTION, POWDER, FOR SOLUTION INTRAMUSCULAR; INTRAVENOUS at 20:39

## 2024-08-25 RX ADMIN — METOPROLOL TARTRATE 50 MG: 50 TABLET, FILM COATED ORAL at 20:31

## 2024-08-25 RX ADMIN — HYDROMORPHONE HYDROCHLORIDE 0.25 MG: 1 INJECTION, SOLUTION INTRAMUSCULAR; INTRAVENOUS; SUBCUTANEOUS at 14:20

## 2024-08-25 RX ADMIN — METOPROLOL TARTRATE 50 MG: 50 TABLET, FILM COATED ORAL at 07:44

## 2024-08-25 RX ADMIN — HYDROMORPHONE HYDROCHLORIDE 0.5 MG: 1 INJECTION, SOLUTION INTRAMUSCULAR; INTRAVENOUS; SUBCUTANEOUS at 00:18

## 2024-08-25 RX ADMIN — DOXYCYCLINE HYCLATE 100 MG: 100 TABLET, COATED ORAL at 20:31

## 2024-08-25 RX ADMIN — OXYBUTYNIN CHLORIDE 5 MG: 5 TABLET ORAL at 07:44

## 2024-08-25 RX ADMIN — CARBIDOPA AND LEVODOPA 1 TABLET: 10; 100 TABLET ORAL at 12:29

## 2024-08-25 RX ADMIN — Medication 5000 UNITS: at 07:45

## 2024-08-25 RX ADMIN — HEPARIN SODIUM 11 UNITS/KG/HR: 10000 INJECTION, SOLUTION INTRAVENOUS at 22:49

## 2024-08-25 RX ADMIN — HYDROMORPHONE HYDROCHLORIDE 0.5 MG: 1 INJECTION, SOLUTION INTRAMUSCULAR; INTRAVENOUS; SUBCUTANEOUS at 04:26

## 2024-08-25 RX ADMIN — ACETAMINOPHEN 650 MG: 325 TABLET ORAL at 20:31

## 2024-08-25 RX ADMIN — Medication 1 TABLET: at 07:46

## 2024-08-25 RX ADMIN — AZITHROMYCIN DIHYDRATE 250 MG: 500 INJECTION, POWDER, LYOPHILIZED, FOR SOLUTION INTRAVENOUS at 12:27

## 2024-08-25 RX ADMIN — HYDROMORPHONE HYDROCHLORIDE 0.25 MG: 1 INJECTION, SOLUTION INTRAMUSCULAR; INTRAVENOUS; SUBCUTANEOUS at 18:17

## 2024-08-25 RX ADMIN — CARBIDOPA AND LEVODOPA 1 TABLET: 10; 100 TABLET ORAL at 07:45

## 2024-08-25 RX ADMIN — PANTOPRAZOLE SODIUM 40 MG: 40 INJECTION, POWDER, FOR SOLUTION INTRAVENOUS at 07:48

## 2024-08-25 ASSESSMENT — PAIN DESCRIPTION - LOCATION
LOCATION: GENERALIZED
LOCATION: BUTTOCKS
LOCATION: GENERALIZED

## 2024-08-25 ASSESSMENT — PAIN SCALES - GENERAL
PAINLEVEL_OUTOF10: 0
PAINLEVEL_OUTOF10: 8
PAINLEVEL_OUTOF10: 4

## 2024-08-25 ASSESSMENT — PAIN DESCRIPTION - FREQUENCY
FREQUENCY: CONTINUOUS

## 2024-08-25 ASSESSMENT — PAIN DESCRIPTION - DESCRIPTORS
DESCRIPTORS: PATIENT UNABLE TO DESCRIBE

## 2024-08-25 ASSESSMENT — PAIN SCALES - WONG BAKER
WONGBAKER_NUMERICALRESPONSE: NO HURT
WONGBAKER_NUMERICALRESPONSE: NO HURT
WONGBAKER_NUMERICALRESPONSE: HURTS LITTLE MORE
WONGBAKER_NUMERICALRESPONSE: HURTS WHOLE LOT
WONGBAKER_NUMERICALRESPONSE: NO HURT
WONGBAKER_NUMERICALRESPONSE: HURTS LITTLE MORE
WONGBAKER_NUMERICALRESPONSE: NO HURT

## 2024-08-25 ASSESSMENT — PAIN DESCRIPTION - ONSET
ONSET: ON-GOING

## 2024-08-25 ASSESSMENT — PAIN DESCRIPTION - ORIENTATION
ORIENTATION: OTHER (COMMENT)
ORIENTATION: POSTERIOR
ORIENTATION: OTHER (COMMENT)

## 2024-08-25 ASSESSMENT — PAIN DESCRIPTION - PAIN TYPE
TYPE: ACUTE PAIN

## 2024-08-25 ASSESSMENT — PAIN - FUNCTIONAL ASSESSMENT
PAIN_FUNCTIONAL_ASSESSMENT: PREVENTS OR INTERFERES SOME ACTIVE ACTIVITIES AND ADLS
PAIN_FUNCTIONAL_ASSESSMENT: PREVENTS OR INTERFERES SOME ACTIVE ACTIVITIES AND ADLS
PAIN_FUNCTIONAL_ASSESSMENT: PREVENTS OR INTERFERES WITH MANY ACTIVE NOT PASSIVE ACTIVITIES

## 2024-08-26 ENCOUNTER — APPOINTMENT (OUTPATIENT)
Dept: GENERAL RADIOLOGY | Age: 89
DRG: 871 | End: 2024-08-26
Payer: MEDICARE

## 2024-08-26 ENCOUNTER — APPOINTMENT (OUTPATIENT)
Dept: CT IMAGING | Age: 89
DRG: 871 | End: 2024-08-26
Payer: MEDICARE

## 2024-08-26 LAB
ANION GAP SERPL CALC-SCNC: 10 MEQ/L (ref 8–16)
APTT PPP: 57.3 SECONDS (ref 22–38)
APTT PPP: 73.4 SECONDS (ref 22–38)
ARTERIAL PATENCY WRIST A: ABNORMAL
BASE EXCESS BLDA CALC-SCNC: 8.5 MMOL/L (ref -2.5–2.5)
BASOPHILS ABSOLUTE: 0 THOU/MM3 (ref 0–0.1)
BASOPHILS NFR BLD AUTO: 0.1 %
BDY SITE: ABNORMAL
BUN SERPL-MCNC: 49 MG/DL (ref 7–22)
CALCIUM SERPL-MCNC: 9.1 MG/DL (ref 8.5–10.5)
CHLORIDE SERPL-SCNC: 106 MEQ/L (ref 98–111)
CO2 SERPL-SCNC: 29 MEQ/L (ref 23–33)
COLLECTED BY:: ABNORMAL
CREAT SERPL-MCNC: 1.2 MG/DL (ref 0.4–1.2)
DEPRECATED RDW RBC AUTO: 53.1 FL (ref 35–45)
DEVICE: ABNORMAL
EOSINOPHIL NFR BLD AUTO: 0 %
EOSINOPHILS ABSOLUTE: 0 THOU/MM3 (ref 0–0.4)
ERYTHROCYTE [DISTWIDTH] IN BLOOD BY AUTOMATED COUNT: 14.6 % (ref 11.5–14.5)
FIO2 ON VENT O2 ANALYZER: 90 %
GFR SERPL CREATININE-BSD FRML MDRD: 58 ML/MIN/1.73M2
GLUCOSE SERPL-MCNC: 177 MG/DL (ref 70–108)
HCO3 BLDA-SCNC: 35 MMOL/L (ref 23–28)
HCT VFR BLD AUTO: 35.9 % (ref 42–52)
HGB BLD-MCNC: 10.8 GM/DL (ref 14–18)
IMM GRANULOCYTES # BLD AUTO: 0.13 THOU/MM3 (ref 0–0.07)
IMM GRANULOCYTES NFR BLD AUTO: 0.9 %
LYMPHOCYTES ABSOLUTE: 0.3 THOU/MM3 (ref 1–4.8)
LYMPHOCYTES NFR BLD AUTO: 2 %
MCH RBC QN AUTO: 29.6 PG (ref 26–33)
MCHC RBC AUTO-ENTMCNC: 30.1 GM/DL (ref 32.2–35.5)
MCV RBC AUTO: 98.4 FL (ref 80–94)
MONOCYTES ABSOLUTE: 0.2 THOU/MM3 (ref 0.4–1.3)
MONOCYTES NFR BLD AUTO: 1 %
NEUTROPHILS ABSOLUTE: 14.6 THOU/MM3 (ref 1.8–7.7)
NEUTROPHILS NFR BLD AUTO: 96 %
NRBC BLD AUTO-RTO: 0 /100 WBC
PATHOLOGIST REVIEW: ABNORMAL
PCO2 BLDA: 55 MMHG (ref 35–45)
PH BLDA: 7.41 [PH] (ref 7.35–7.45)
PLATELET # BLD AUTO: 227 THOU/MM3 (ref 130–400)
PLATELET BLD QL SMEAR: ADEQUATE
PMV BLD AUTO: 11.4 FL (ref 9.4–12.4)
PO2 BLDA: 82 MMHG (ref 71–104)
POTASSIUM SERPL-SCNC: 4.2 MEQ/L (ref 3.5–5.2)
RBC # BLD AUTO: 3.65 MILL/MM3 (ref 4.7–6.1)
SAO2 % BLDA: 96 %
SCAN OF BLOOD SMEAR: NORMAL
SODIUM SERPL-SCNC: 145 MEQ/L (ref 135–145)
VENTILATION MODE VENT: ABNORMAL
WBC # BLD AUTO: 15.2 THOU/MM3 (ref 4.8–10.8)

## 2024-08-26 PROCEDURE — 2060000000 HC ICU INTERMEDIATE R&B

## 2024-08-26 PROCEDURE — 82803 BLOOD GASES ANY COMBINATION: CPT

## 2024-08-26 PROCEDURE — 6370000000 HC RX 637 (ALT 250 FOR IP)

## 2024-08-26 PROCEDURE — 2700000000 HC OXYGEN THERAPY PER DAY

## 2024-08-26 PROCEDURE — 2580000003 HC RX 258

## 2024-08-26 PROCEDURE — 74018 RADEX ABDOMEN 1 VIEW: CPT

## 2024-08-26 PROCEDURE — 85730 THROMBOPLASTIN TIME PARTIAL: CPT

## 2024-08-26 PROCEDURE — 6360000002 HC RX W HCPCS

## 2024-08-26 PROCEDURE — 94640 AIRWAY INHALATION TREATMENT: CPT

## 2024-08-26 PROCEDURE — 71250 CT THORAX DX C-: CPT

## 2024-08-26 PROCEDURE — 6360000002 HC RX W HCPCS: Performed by: INTERNAL MEDICINE

## 2024-08-26 PROCEDURE — 80048 BASIC METABOLIC PNL TOTAL CA: CPT

## 2024-08-26 PROCEDURE — 94761 N-INVAS EAR/PLS OXIMETRY MLT: CPT

## 2024-08-26 PROCEDURE — 6370000000 HC RX 637 (ALT 250 FOR IP): Performed by: NURSE PRACTITIONER

## 2024-08-26 PROCEDURE — 85025 COMPLETE CBC W/AUTO DIFF WBC: CPT

## 2024-08-26 PROCEDURE — 87040 BLOOD CULTURE FOR BACTERIA: CPT

## 2024-08-26 PROCEDURE — 71045 X-RAY EXAM CHEST 1 VIEW: CPT

## 2024-08-26 PROCEDURE — 6360000002 HC RX W HCPCS: Performed by: NURSE PRACTITIONER

## 2024-08-26 PROCEDURE — 94667 MNPJ CHEST WALL 1ST: CPT

## 2024-08-26 PROCEDURE — P9047 ALBUMIN (HUMAN), 25%, 50ML: HCPCS

## 2024-08-26 PROCEDURE — 36415 COLL VENOUS BLD VENIPUNCTURE: CPT

## 2024-08-26 PROCEDURE — 32551 INSERTION OF CHEST TUBE: CPT

## 2024-08-26 PROCEDURE — 99233 SBSQ HOSP IP/OBS HIGH 50: CPT | Performed by: INTERNAL MEDICINE

## 2024-08-26 RX ORDER — BUMETANIDE 0.25 MG/ML
1 INJECTION INTRAMUSCULAR; INTRAVENOUS DAILY
Status: DISCONTINUED | OUTPATIENT
Start: 2024-08-26 | End: 2024-08-26

## 2024-08-26 RX ORDER — ACETYLCYSTEINE 200 MG/ML
1 SOLUTION ORAL; RESPIRATORY (INHALATION)
Status: DISCONTINUED | OUTPATIENT
Start: 2024-08-26 | End: 2024-08-28

## 2024-08-26 RX ORDER — ALBUMIN (HUMAN) 12.5 G/50ML
25 SOLUTION INTRAVENOUS ONCE
Status: COMPLETED | OUTPATIENT
Start: 2024-08-26 | End: 2024-08-26

## 2024-08-26 RX ORDER — BUMETANIDE 0.25 MG/ML
1 INJECTION INTRAMUSCULAR; INTRAVENOUS DAILY
Status: DISCONTINUED | OUTPATIENT
Start: 2024-08-26 | End: 2024-09-01 | Stop reason: HOSPADM

## 2024-08-26 RX ORDER — DEXAMETHASONE SODIUM PHOSPHATE 4 MG/ML
6 INJECTION, SOLUTION INTRA-ARTICULAR; INTRALESIONAL; INTRAMUSCULAR; INTRAVENOUS; SOFT TISSUE EVERY 24 HOURS
Status: COMPLETED | OUTPATIENT
Start: 2024-08-26 | End: 2024-08-29

## 2024-08-26 RX ORDER — 0.9 % SODIUM CHLORIDE 0.9 %
250 INTRAVENOUS SOLUTION INTRAVENOUS ONCE
Status: DISCONTINUED | OUTPATIENT
Start: 2024-08-26 | End: 2024-08-26

## 2024-08-26 RX ADMIN — Medication 5000 UNITS: at 08:26

## 2024-08-26 RX ADMIN — CARBIDOPA AND LEVODOPA 1 TABLET: 10; 100 TABLET ORAL at 12:15

## 2024-08-26 RX ADMIN — PANTOPRAZOLE SODIUM 40 MG: 40 INJECTION, POWDER, FOR SOLUTION INTRAVENOUS at 08:25

## 2024-08-26 RX ADMIN — HYDROMORPHONE HYDROCHLORIDE 0.5 MG: 1 INJECTION, SOLUTION INTRAMUSCULAR; INTRAVENOUS; SUBCUTANEOUS at 01:48

## 2024-08-26 RX ADMIN — SODIUM CHLORIDE, PRESERVATIVE FREE 10 ML: 5 INJECTION INTRAVENOUS at 08:27

## 2024-08-26 RX ADMIN — BARICITINIB 2 MG: 2 TABLET, FILM COATED ORAL at 12:15

## 2024-08-26 RX ADMIN — CEFTRIAXONE SODIUM 1000 MG: 1 INJECTION, POWDER, FOR SOLUTION INTRAMUSCULAR; INTRAVENOUS at 20:16

## 2024-08-26 RX ADMIN — CARBIDOPA AND LEVODOPA 1 TABLET: 10; 100 TABLET ORAL at 16:41

## 2024-08-26 RX ADMIN — DOXYCYCLINE HYCLATE 100 MG: 100 TABLET, COATED ORAL at 21:42

## 2024-08-26 RX ADMIN — HEPARIN SODIUM 13 UNITS/KG/HR: 10000 INJECTION, SOLUTION INTRAVENOUS at 06:11

## 2024-08-26 RX ADMIN — CARBIDOPA AND LEVODOPA 1 TABLET: 10; 100 TABLET ORAL at 21:42

## 2024-08-26 RX ADMIN — IPRATROPIUM BROMIDE 0.5 MG: 0.5 SOLUTION RESPIRATORY (INHALATION) at 21:28

## 2024-08-26 RX ADMIN — SODIUM CHLORIDE, PRESERVATIVE FREE 10 ML: 5 INJECTION INTRAVENOUS at 08:26

## 2024-08-26 RX ADMIN — ESCITALOPRAM OXALATE 10 MG: 10 TABLET ORAL at 08:26

## 2024-08-26 RX ADMIN — ALBUMIN (HUMAN) 25 G: 0.25 INJECTION, SOLUTION INTRAVENOUS at 06:58

## 2024-08-26 RX ADMIN — HYDROMORPHONE HYDROCHLORIDE 0.5 MG: 1 INJECTION, SOLUTION INTRAMUSCULAR; INTRAVENOUS; SUBCUTANEOUS at 05:24

## 2024-08-26 RX ADMIN — BUMETANIDE 1 MG: 0.25 INJECTION INTRAMUSCULAR; INTRAVENOUS at 16:41

## 2024-08-26 RX ADMIN — Medication 1 TABLET: at 08:26

## 2024-08-26 RX ADMIN — SODIUM CHLORIDE, PRESERVATIVE FREE 10 ML: 5 INJECTION INTRAVENOUS at 20:17

## 2024-08-26 RX ADMIN — DOXYCYCLINE HYCLATE 100 MG: 100 TABLET, COATED ORAL at 08:27

## 2024-08-26 RX ADMIN — METOPROLOL TARTRATE 50 MG: 50 TABLET, FILM COATED ORAL at 20:17

## 2024-08-26 RX ADMIN — DEXAMETHASONE SODIUM PHOSPHATE 6 MG: 4 INJECTION, SOLUTION INTRA-ARTICULAR; INTRALESIONAL; INTRAMUSCULAR; INTRAVENOUS; SOFT TISSUE at 10:00

## 2024-08-26 RX ADMIN — HEPARIN SODIUM 13 UNITS/KG/HR: 10000 INJECTION, SOLUTION INTRAVENOUS at 21:41

## 2024-08-26 RX ADMIN — CARBIDOPA AND LEVODOPA 1 TABLET: 10; 100 TABLET ORAL at 08:25

## 2024-08-26 RX ADMIN — METOPROLOL TARTRATE 50 MG: 50 TABLET, FILM COATED ORAL at 08:26

## 2024-08-26 RX ADMIN — POLYETHYLENE GLYCOL 3350 17 G: 17 POWDER, FOR SOLUTION ORAL at 12:19

## 2024-08-26 RX ADMIN — ACETYLCYSTEINE 200 MG: 200 SOLUTION ORAL; RESPIRATORY (INHALATION) at 21:24

## 2024-08-26 RX ADMIN — HYDROMORPHONE HYDROCHLORIDE 0.5 MG: 1 INJECTION, SOLUTION INTRAMUSCULAR; INTRAVENOUS; SUBCUTANEOUS at 17:44

## 2024-08-26 RX ADMIN — HYDROMORPHONE HYDROCHLORIDE 0.5 MG: 1 INJECTION, SOLUTION INTRAMUSCULAR; INTRAVENOUS; SUBCUTANEOUS at 08:40

## 2024-08-26 RX ADMIN — ATORVASTATIN CALCIUM 10 MG: 10 TABLET, FILM COATED ORAL at 20:17

## 2024-08-26 RX ADMIN — IPRATROPIUM BROMIDE 0.5 MG: 0.5 SOLUTION RESPIRATORY (INHALATION) at 16:49

## 2024-08-26 RX ADMIN — CEFTRIAXONE SODIUM 1000 MG: 1 INJECTION, POWDER, FOR SOLUTION INTRAMUSCULAR; INTRAVENOUS at 08:24

## 2024-08-26 RX ADMIN — HYDROMORPHONE HYDROCHLORIDE 0.5 MG: 1 INJECTION, SOLUTION INTRAMUSCULAR; INTRAVENOUS; SUBCUTANEOUS at 22:47

## 2024-08-26 ASSESSMENT — PAIN SCALES - GENERAL
PAINLEVEL_OUTOF10: 3
PAINLEVEL_OUTOF10: 7
PAINLEVEL_OUTOF10: 9
PAINLEVEL_OUTOF10: 7
PAINLEVEL_OUTOF10: 0
PAINLEVEL_OUTOF10: 8
PAINLEVEL_OUTOF10: 5

## 2024-08-26 ASSESSMENT — PAIN DESCRIPTION - DESCRIPTORS
DESCRIPTORS: DISCOMFORT
DESCRIPTORS: DISCOMFORT

## 2024-08-26 ASSESSMENT — PAIN SCALES - WONG BAKER
WONGBAKER_NUMERICALRESPONSE: HURTS LITTLE MORE
WONGBAKER_NUMERICALRESPONSE: HURTS LITTLE MORE

## 2024-08-26 ASSESSMENT — PAIN - FUNCTIONAL ASSESSMENT
PAIN_FUNCTIONAL_ASSESSMENT: ACTIVITIES ARE NOT PREVENTED
PAIN_FUNCTIONAL_ASSESSMENT: INTOLERABLE, UNABLE TO DO ANY ACTIVE OR PASSIVE ACTIVITIES

## 2024-08-26 ASSESSMENT — PAIN DESCRIPTION - ORIENTATION
ORIENTATION: OTHER (COMMENT)
ORIENTATION: OTHER (COMMENT)

## 2024-08-26 ASSESSMENT — PAIN DESCRIPTION - LOCATION
LOCATION: GENERALIZED
LOCATION: GENERALIZED

## 2024-08-26 ASSESSMENT — PAIN DESCRIPTION - FREQUENCY: FREQUENCY: INTERMITTENT

## 2024-08-26 ASSESSMENT — PAIN DESCRIPTION - PAIN TYPE: TYPE: ACUTE PAIN

## 2024-08-26 ASSESSMENT — PAIN DESCRIPTION - ONSET: ONSET: ON-GOING

## 2024-08-26 NOTE — PALLIATIVE CARE
Follow Up / Progress Note        Patient:   Stone Sharp  YOB: 1935  Age:  88 y.o.  Room:  [address] [place] [address]  MRN:  [id number]         Family/Patient Discussion:  Met with Bharathi (spouse) and Marely (daughter) at bedside today as follow up from earlier phone conversations. Encouraged family to make decisions based upon what they believe Stone would want. Bharathi (spouse) stated they have had the conversation before and the patient wouldn't want to be on \"life support.\" After clarifying they endorsed he would not want the ventilator long term but are ok with short term. Clarified again that they are ok with Stone being intubated again if needed but they do not want a trach if he would not be able to come off the ventilator. Discussed underlying chronic conditions can contribute to overall ability to come off ventilator. Encouraged family to continue to review goals of care as patient progresses.      Plan/Follow-Up:  Palliative care will continue to be available as needed. Will attempt to round frequently with family and encourage goals of care conversations. Please call for additional needs.         Electronically signed by Maico Corral RN on 8/26/2024 at 3:37 PM             Palliative Care Office: [phone]

## 2024-08-26 NOTE — PALLIATIVE CARE
Initial Evaluation        Patient:   Stone Sharp  YOB: 1935  Age:  88 y.o.  Room:  [facility]/022-  MRN:  [id number]   Acct: [id number]    Date of Admission:  8/19/2024  8:09 PM  Date of Service:  8/26/2024  Completed By:  Maico Corral RN        Reason for Palliative Care Evaluation:-   Goals of Care     Current Concerns   shortness of breath     Palliative Performance Scale   30%  Bed Bound; Extensive disease; Total care; intake reduced; LOC full/confusion     History    Patient admitted 8/20 from home with respiratory failure. Patient intubated in ICU 8/20-8/21 and moved to stepdown 8/23. Patient is positive for Covid. Patient also has left sided pleural effusion requiring a chest tube. Other chronic conditions include Parkinson's, a-fib CAD, COPD, recurrent DVTs.      Goals of Care Discussions and Plan         Advance Care Planning   Goals of Care/Advance Care Planning (ACP) Conversation    Date of Conversation: 08/26/24    Individuals present for the conversation: Spouse Bharathi     ACP documents on file prior to discussion:  -None    Previously completed document/s not on file: Not discussed.    Healthcare Power of /Healthcare Surrogate Decision Makers:  Bharathi Sharp    Conversation Summary: Attempted to see patient. Update given by primary RN on declining respiratory status of patient. Call placed to Bharathi (spouse) to discuss goals of care. Updated on Bharathi that patient's respiratory status is declining and he may need intubation and ventilation again. Discussed his chronic conditions, co-morbidities that can contribute to difficulty in weaning off the ventilator. Bharathi asked if that means a tracheostomy for the patient as he would not want a trach. Discussed that if the ventilator can not be weaned they may need to consider if a trach is necessary for him. Discussed the option of changing the goals of care to ensuring the patient is comfortable if they did not want aggressive

## 2024-08-26 NOTE — CARE COORDINATION
24, 1:48 PM EDT    DISCHARGE ON GOING EVALUATION    Stone SHAH [facility] day: 6  Location: --A Reason for admit: Troponin level elevated [R79.89]  Pleural effusion, left [J90]  Acute hypoxemic respiratory failure due to COVID-19 (HCC) [U07.1, J96.01]   Procedures:   Left Chest Tube- Left Chest Tube Removed   Midline  Imagin/27 CXR  1. Moderate cardiomegaly. An endograft is present in the aortic arch. NG tube   passes into the stomach. Pigtail catheter in the pleural space left side. Right   side jugular PICC line with tip in right atrium. There appears to be mild shift   of the heart and mediastinal structures to the left.   2. Moderate atelectasis/pneumonia involving the entire left lung as well as the   right mid and lower lung fields.   3. Overall appearance of chest slightly worse than prior.   Barriers to Discharge:   From  ICU (ventilator/pressors there)/A-fib  COVID-19 PNA/Paralytic Ileus/Left Pleural Effusion/CHF/ALDA  History: A-fib CKD, CVA, Prostate Cancer, IVC Filter, PCI  Heparin Gtt, IV Rocephin, IV PPI, IV Steroids  Elevated WBC; monitor  100% FIO2 NRB  Left Chest Tube = 10 ml/24h  Rectal Tube = 40 ml/hr  Merna TF w Ileus  Monitor Long Term Nutrition Plan; question PEG  Await LTACH Referral  PCP: Juve Mcintosh DO  Readmission Risk Score: 25.4  Patient Goals/Plan/Treatment Preferences:  Lives w spouse Bharathi, full LTACH referral-[facility] transports, has walker, WC, alisia lift, therapy following, bed bound

## 2024-08-27 ENCOUNTER — APPOINTMENT (OUTPATIENT)
Dept: GENERAL RADIOLOGY | Age: 89
DRG: 871 | End: 2024-08-27
Payer: MEDICARE

## 2024-08-27 LAB
ANION GAP SERPL CALC-SCNC: 9 MEQ/L (ref 8–16)
APTT PPP: 73.4 SECONDS (ref 22–38)
ARTERIAL PATENCY WRIST A: POSITIVE
BASE EXCESS BLDA CALC-SCNC: 7.8 MMOL/L (ref -2.5–2.5)
BDY SITE: ABNORMAL
BUN SERPL-MCNC: 46 MG/DL (ref 7–22)
CALCIUM SERPL-MCNC: 9.2 MG/DL (ref 8.5–10.5)
CHLORIDE SERPL-SCNC: 107 MEQ/L (ref 98–111)
CO2 SERPL-SCNC: 30 MEQ/L (ref 23–33)
COLLECTED BY:: ABNORMAL
CREAT SERPL-MCNC: 1.2 MG/DL (ref 0.4–1.2)
DEVICE: ABNORMAL
GFR SERPL CREATININE-BSD FRML MDRD: 58 ML/MIN/1.73M2
GLUCOSE SERPL-MCNC: 202 MG/DL (ref 70–108)
HCO3 BLDA-SCNC: 32 MMOL/L (ref 23–28)
PCO2 BLDA: 43 MMHG (ref 35–45)
PH BLDA: 7.48 [PH] (ref 7.35–7.45)
PO2 BLDA: 47 MMHG (ref 71–104)
POTASSIUM SERPL-SCNC: 4.1 MEQ/L (ref 3.5–5.2)
SAO2 % BLDA: 85 %
SODIUM SERPL-SCNC: 146 MEQ/L (ref 135–145)
VENTILATION MODE VENT: ABNORMAL

## 2024-08-27 PROCEDURE — 6370000000 HC RX 637 (ALT 250 FOR IP)

## 2024-08-27 PROCEDURE — 36600 WITHDRAWAL OF ARTERIAL BLOOD: CPT

## 2024-08-27 PROCEDURE — 2060000000 HC ICU INTERMEDIATE R&B

## 2024-08-27 PROCEDURE — 85730 THROMBOPLASTIN TIME PARTIAL: CPT

## 2024-08-27 PROCEDURE — 82803 BLOOD GASES ANY COMBINATION: CPT

## 2024-08-27 PROCEDURE — 2700000000 HC OXYGEN THERAPY PER DAY

## 2024-08-27 PROCEDURE — 2580000003 HC RX 258

## 2024-08-27 PROCEDURE — 6360000002 HC RX W HCPCS

## 2024-08-27 PROCEDURE — 94761 N-INVAS EAR/PLS OXIMETRY MLT: CPT

## 2024-08-27 PROCEDURE — 6370000000 HC RX 637 (ALT 250 FOR IP): Performed by: NURSE PRACTITIONER

## 2024-08-27 PROCEDURE — 6360000002 HC RX W HCPCS: Performed by: INTERNAL MEDICINE

## 2024-08-27 PROCEDURE — 2580000003 HC RX 258: Performed by: INTERNAL MEDICINE

## 2024-08-27 PROCEDURE — 36415 COLL VENOUS BLD VENIPUNCTURE: CPT

## 2024-08-27 PROCEDURE — 94640 AIRWAY INHALATION TREATMENT: CPT

## 2024-08-27 PROCEDURE — 94668 MNPJ CHEST WALL SBSQ: CPT

## 2024-08-27 PROCEDURE — 71045 X-RAY EXAM CHEST 1 VIEW: CPT

## 2024-08-27 PROCEDURE — 80048 BASIC METABOLIC PNL TOTAL CA: CPT

## 2024-08-27 PROCEDURE — 6360000002 HC RX W HCPCS: Performed by: NURSE PRACTITIONER

## 2024-08-27 PROCEDURE — 99233 SBSQ HOSP IP/OBS HIGH 50: CPT | Performed by: INTERNAL MEDICINE

## 2024-08-27 PROCEDURE — 2500000003 HC RX 250 WO HCPCS

## 2024-08-27 PROCEDURE — P9047 ALBUMIN (HUMAN), 25%, 50ML: HCPCS

## 2024-08-27 RX ORDER — ENOXAPARIN SODIUM 150 MG/ML
1 INJECTION SUBCUTANEOUS EVERY 12 HOURS
Status: DISCONTINUED | OUTPATIENT
Start: 2024-08-27 | End: 2024-09-01 | Stop reason: HOSPADM

## 2024-08-27 RX ORDER — 0.9 % SODIUM CHLORIDE 0.9 %
500 INTRAVENOUS SOLUTION INTRAVENOUS ONCE
Status: DISCONTINUED | OUTPATIENT
Start: 2024-08-27 | End: 2024-08-27

## 2024-08-27 RX ORDER — SODIUM CHLORIDE 9 MG/ML
INJECTION, SOLUTION INTRAVENOUS CONTINUOUS
Status: DISCONTINUED | OUTPATIENT
Start: 2024-08-27 | End: 2024-08-28

## 2024-08-27 RX ORDER — DIGOXIN 0.25 MG/ML
125 INJECTION INTRAMUSCULAR; INTRAVENOUS DAILY
Status: DISCONTINUED | OUTPATIENT
Start: 2024-08-27 | End: 2024-09-01 | Stop reason: HOSPADM

## 2024-08-27 RX ORDER — ENOXAPARIN SODIUM 150 MG/ML
1 INJECTION SUBCUTANEOUS DAILY
Status: DISCONTINUED | OUTPATIENT
Start: 2024-08-27 | End: 2024-08-27 | Stop reason: DRUGHIGH

## 2024-08-27 RX ORDER — MIDODRINE HYDROCHLORIDE 10 MG/1
10 TABLET ORAL ONCE
Status: DISCONTINUED | OUTPATIENT
Start: 2024-08-27 | End: 2024-09-01 | Stop reason: HOSPADM

## 2024-08-27 RX ORDER — ALBUMIN (HUMAN) 12.5 G/50ML
25 SOLUTION INTRAVENOUS ONCE
Status: COMPLETED | OUTPATIENT
Start: 2024-08-27 | End: 2024-08-27

## 2024-08-27 RX ADMIN — CARBIDOPA AND LEVODOPA 1 TABLET: 10; 100 TABLET ORAL at 09:16

## 2024-08-27 RX ADMIN — ESCITALOPRAM OXALATE 10 MG: 10 TABLET ORAL at 09:22

## 2024-08-27 RX ADMIN — IPRATROPIUM BROMIDE 0.5 MG: 0.5 SOLUTION RESPIRATORY (INHALATION) at 17:04

## 2024-08-27 RX ADMIN — Medication 1 TABLET: at 09:23

## 2024-08-27 RX ADMIN — ACETYLCYSTEINE 200 MG: 200 SOLUTION ORAL; RESPIRATORY (INHALATION) at 13:18

## 2024-08-27 RX ADMIN — HYDROMORPHONE HYDROCHLORIDE 0.5 MG: 1 INJECTION, SOLUTION INTRAMUSCULAR; INTRAVENOUS; SUBCUTANEOUS at 04:07

## 2024-08-27 RX ADMIN — IPRATROPIUM BROMIDE 0.5 MG: 0.5 SOLUTION RESPIRATORY (INHALATION) at 13:18

## 2024-08-27 RX ADMIN — HYDROMORPHONE HYDROCHLORIDE 0.5 MG: 1 INJECTION, SOLUTION INTRAMUSCULAR; INTRAVENOUS; SUBCUTANEOUS at 09:37

## 2024-08-27 RX ADMIN — Medication 5000 UNITS: at 09:20

## 2024-08-27 RX ADMIN — METOPROLOL TARTRATE 50 MG: 50 TABLET, FILM COATED ORAL at 09:16

## 2024-08-27 RX ADMIN — CEFTRIAXONE SODIUM 1000 MG: 1 INJECTION, POWDER, FOR SOLUTION INTRAMUSCULAR; INTRAVENOUS at 09:43

## 2024-08-27 RX ADMIN — AMIODARONE HYDROCHLORIDE 0.5 MG/MIN: 1.8 INJECTION, SOLUTION INTRAVENOUS at 16:15

## 2024-08-27 RX ADMIN — AMIODARONE HYDROCHLORIDE 1 MG/MIN: 1.8 INJECTION, SOLUTION INTRAVENOUS at 10:47

## 2024-08-27 RX ADMIN — DIGOXIN 125 MCG: 0.25 INJECTION INTRAMUSCULAR; INTRAVENOUS at 14:18

## 2024-08-27 RX ADMIN — ENOXAPARIN SODIUM 105 MG: 150 INJECTION SUBCUTANEOUS at 17:27

## 2024-08-27 RX ADMIN — PANTOPRAZOLE SODIUM 40 MG: 40 INJECTION, POWDER, FOR SOLUTION INTRAVENOUS at 09:17

## 2024-08-27 RX ADMIN — BARICITINIB 2 MG: 2 TABLET, FILM COATED ORAL at 09:21

## 2024-08-27 RX ADMIN — CARBIDOPA AND LEVODOPA 1 TABLET: 10; 100 TABLET ORAL at 17:30

## 2024-08-27 RX ADMIN — DOXYCYCLINE HYCLATE 100 MG: 100 TABLET, COATED ORAL at 09:25

## 2024-08-27 RX ADMIN — IPRATROPIUM BROMIDE 0.5 MG: 0.5 SOLUTION RESPIRATORY (INHALATION) at 08:12

## 2024-08-27 RX ADMIN — SODIUM CHLORIDE, PRESERVATIVE FREE 10 ML: 5 INJECTION INTRAVENOUS at 09:17

## 2024-08-27 RX ADMIN — ACETYLCYSTEINE 200 MG: 200 SOLUTION ORAL; RESPIRATORY (INHALATION) at 08:12

## 2024-08-27 RX ADMIN — BUMETANIDE 1 MG: 0.25 INJECTION INTRAMUSCULAR; INTRAVENOUS at 09:17

## 2024-08-27 RX ADMIN — ALBUMIN (HUMAN) 25 G: 0.25 INJECTION, SOLUTION INTRAVENOUS at 12:09

## 2024-08-27 RX ADMIN — SODIUM CHLORIDE: 9 INJECTION, SOLUTION INTRAVENOUS at 17:26

## 2024-08-27 RX ADMIN — DEXAMETHASONE SODIUM PHOSPHATE 6 MG: 4 INJECTION, SOLUTION INTRA-ARTICULAR; INTRALESIONAL; INTRAMUSCULAR; INTRAVENOUS; SOFT TISSUE at 09:21

## 2024-08-27 RX ADMIN — SODIUM CHLORIDE, PRESERVATIVE FREE 10 ML: 5 INJECTION INTRAVENOUS at 20:13

## 2024-08-27 RX ADMIN — CARBIDOPA AND LEVODOPA 1 TABLET: 10; 100 TABLET ORAL at 14:19

## 2024-08-27 RX ADMIN — ATORVASTATIN CALCIUM 10 MG: 10 TABLET, FILM COATED ORAL at 20:14

## 2024-08-27 RX ADMIN — AMIODARONE HYDROCHLORIDE 150 MG: 1.5 INJECTION, SOLUTION INTRAVENOUS at 10:35

## 2024-08-27 RX ADMIN — DOXYCYCLINE HYCLATE 100 MG: 100 TABLET, COATED ORAL at 21:07

## 2024-08-27 RX ADMIN — SODIUM CHLORIDE, PRESERVATIVE FREE 10 ML: 5 INJECTION INTRAVENOUS at 09:25

## 2024-08-27 RX ADMIN — CARBIDOPA AND LEVODOPA 1 TABLET: 10; 100 TABLET ORAL at 20:14

## 2024-08-27 RX ADMIN — CEFTRIAXONE SODIUM 1000 MG: 1 INJECTION, POWDER, FOR SOLUTION INTRAMUSCULAR; INTRAVENOUS at 20:19

## 2024-08-27 ASSESSMENT — PAIN SCALES - PAIN ASSESSMENT IN ADVANCED DEMENTIA (PAINAD)
FACIALEXPRESSION: SAD, FRIGHTENED, FROWN
BODYLANGUAGE: RELAXED
TOTALSCORE: 3
BREATHING: NOISY LABORED BREATHING, LONG PERIODS HYPERVENTILATION, CHEYNE-STOKES RESPIRATIONS
CONSOLABILITY: NO NEED TO CONSOLE

## 2024-08-27 ASSESSMENT — PAIN SCALES - GENERAL
PAINLEVEL_OUTOF10: 0
PAINLEVEL_OUTOF10: 8

## 2024-08-27 NOTE — PALLIATIVE CARE
Follow Up / Progress Note        Patient:   Stone Sharp  YOB: 1935  Age:  88 y.o.  Room:  [facility]12/[facility]-  MRN:  [id number]         Family/Patient Discussion:  Was asked by primary RN, Brian, to revisit patient since family was at bedside and patient has continued to decline more over the last day.     Patient's wife, Bharathi, and daughter, Marely, at bedside. This RN explained patient's increased oxygen requirements. Explained that next step could be CPAP/BiPAP; however, that may be contraindicated due to patient potentially having aspirated on tube feed. This RN explained that options are limited and patient is nearing the max of what we can do for his respiratory status. Also brought up patient's elevated heart rate and low BP and we are running into issues trying to manage that. Also explained that patient was not tolerating the tube feeding. Explained that patient may be at the point where his body is unable to absorb that nutrition and continuing feeding can result in discomfort and actually do more harm than good.     This RN confirmed changes made yesterday to code status. Family met with Dr. Edwards and Handy Nava, VERNON and made the decision to change patient to a LIMITED NO x4. Family still agrees with this decision. They would not want patient intubated for respiratory distress.     Family explains that they still want to continue doing what they can to help him and make him better. This RN brought up comfort measures; family is not ready for that at this time. This RN asked family to discuss amongst themselves at what point they may deem their efforts \"enough\" and focus on comfort and to reflect on what the patient would want.They declined any changes to care at this time.       Plan/Follow-Up:  BERNICE Torres updated. Palliative care will continue to follow PRN. Please contact if needs arise.          Electronically signed by Sandie Inman RN on 8/27/2024 at 1:12

## 2024-08-27 NOTE — CONSULTS
Comprehensive Nutrition Assessment    Type and Reason for Visit: Initial, Consult (TF ordering and management)    Nutrition Recommendations/Plan:   Continue NPO status.   Initiate Vital 1.2 at 10 ml/hr and increase by 10 ml q6hr to goal rate of 40 ml/hr.   Water flushes per physician.      Malnutrition Assessment:  Malnutrition Status: No malnutrition  Context: Acute Illness       Findings of the 6 clinical characteristics of malnutrition:  Energy Intake:  Unable to assess (patient intubated- no family present)  Weight Loss:  No significant weight loss     Body Fat Loss:  No significant body fat loss     Muscle Mass Loss:  No significant muscle mass loss    Fluid Accumulation:  No significant fluid accumulation     Strength:  Not Performed    Nutrition Assessment:    Pt. nutritionally compromised AEB intubated and sedated on propofol. At risk for further nutrition compromise r/t admitted d/t SOB, required NC then HFNC, then BiPap and then was intubated on 8/20. Pleural catheter was placed on 8/20. Noted underlying medical condition (PMHx ASHD, Afib, BPH, CKD stg 3, COPD, dyslipidemia, CVA, hx prostate cancer, DVT, spinal stenosis s/p laminectomy).    Nutrition Related Findings:    Pt. Report/Treatments/Miscellaneous: Patient seen through window in room, in isolation for COVID. Spoke to RN through door. Propofol is currently running at 10 ml/hr, RN reports he just started the Propofol.   GI Status: Last BM 8/20  Wound:  (sacral wound)   Edema: none, per flowsheet   Pertinent Labs:   Lab Results   Component Value Date    LABA1C 5.2 11/28/2020    LABA1C 5.5 04/11/2018    LABA1C 5.3 02/08/2018     Recent Labs     08/19/24 2057 08/20/24  0716    139   K 4.4 5.3*    110   GLUCOSE 154* 168*   BUN 12 16   CREATININE 1.0 1.0     Pertinent Medications: statin, vitamin D, metoprolol, MVI, propofol at 10 ml/hr    Current Nutrition Intake & Therapies:    Recent PO intake: NPO  Recent Supplement Intake: NPO 
Fragile 88-year-old man with  COVID-19 positive test (U07.1, COVID-19) with Acute Pneumonia (J12.89, Other viral pneumonia)  (If respiratory failure or sepsis present, add as separate assessment)    Complete opacification of left lung.  Chest tube was placed.  Recent CAT scan does not show any residual left pleural effusion.  Patient recently made limited code  Chest tube put out 19 cc over the last 12 hours    No indication for surgical intervention  Will continue chest tube 1 more day.  Consider removing chest tube tomorrow if drainage continues to be minimal  Signing off  
this admission   - Large left sided effusion- CT placed 8/20/2024. Serosanguineous fluid. 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 Afib   - Parkinson's Disease   - History of DVTs   - FULL CODE  Plan     -Daily CXR imaging   -Labs and cultures reviewed   -Continue current antibiotics.  -Turn patient every 2 hours   -Continue current inpatient Covid treatment as per primary   -Monitor out of chest tube every shift- output to be on flowsheet - will monitor CT over the weekend no plans to remove until Monday   - Continue CT at -20 mmHg  -Accuchecks monitoring ACHS with regular insulin coverage with Low dose regimen.  -Acapella Q4h as tolerated.  -Titrate Oxygen to keep Spo2 >90%.  -Deep Venous Thrombosis Prophylaxis: heparin.  -Home oxygen evaluation prior to DC home   -Goals of care to be discussed per primary and nursing staff  -Discussed POC with wife at bedside     \"Thank you for asking us to see this patient\"     -Discussed with registered nurse taking care of patient regarding patient condition and my management plan.  Stone Sharp educated about my impression and plan. He verbalizes understanding.  Questions and concerns addressed.  Meds and orders reviewed     Follow up 3 months at [facility] with PFT and CT chest w/o prior to appt- testing ordered in Epic     Electronically signed by   ALESSANDRA Low CNP on 8/24/2024 at 1:45 PM

## 2024-08-28 ENCOUNTER — APPOINTMENT (OUTPATIENT)
Dept: GENERAL RADIOLOGY | Age: 89
DRG: 871 | End: 2024-08-28
Payer: MEDICARE

## 2024-08-28 DIAGNOSIS — C61 PROSTATE CANCER (HCC): Primary | ICD-10-CM

## 2024-08-28 PROBLEM — E44.1 MILD MALNUTRITION (HCC): Status: ACTIVE | Noted: 2024-08-28

## 2024-08-28 LAB
ANION GAP SERPL CALC-SCNC: 11 MEQ/L (ref 8–16)
BUN SERPL-MCNC: 53 MG/DL (ref 7–22)
CALCIUM SERPL-MCNC: 9.2 MG/DL (ref 8.5–10.5)
CHLORIDE SERPL-SCNC: 109 MEQ/L (ref 98–111)
CO2 SERPL-SCNC: 30 MEQ/L (ref 23–33)
CREAT SERPL-MCNC: 1.3 MG/DL (ref 0.4–1.2)
DEPRECATED RDW RBC AUTO: 54.6 FL (ref 35–45)
DIGOXIN SERPL-MCNC: 2.2 NG/ML (ref 0.5–2)
ERYTHROCYTE [DISTWIDTH] IN BLOOD BY AUTOMATED COUNT: 14.8 % (ref 11.5–14.5)
GFR SERPL CREATININE-BSD FRML MDRD: 53 ML/MIN/1.73M2
GLUCOSE SERPL-MCNC: 165 MG/DL (ref 70–108)
HCT VFR BLD AUTO: 29.6 % (ref 42–52)
HGB BLD-MCNC: 8.9 GM/DL (ref 14–18)
LACTATE SERPL-SCNC: 1.2 MMOL/L (ref 0.5–2)
MAGNESIUM SERPL-MCNC: 2.3 MG/DL (ref 1.6–2.4)
MCH RBC QN AUTO: 30 PG (ref 26–33)
MCHC RBC AUTO-ENTMCNC: 30.1 GM/DL (ref 32.2–35.5)
MCV RBC AUTO: 99.7 FL (ref 80–94)
PLATELET # BLD AUTO: 192 THOU/MM3 (ref 130–400)
PMV BLD AUTO: 11.3 FL (ref 9.4–12.4)
POTASSIUM SERPL-SCNC: 3.4 MEQ/L (ref 3.5–5.2)
POTASSIUM SERPL-SCNC: 3.9 MEQ/L (ref 3.5–5.2)
RBC # BLD AUTO: 2.97 MILL/MM3 (ref 4.7–6.1)
SODIUM SERPL-SCNC: 150 MEQ/L (ref 135–145)
WBC # BLD AUTO: 12.3 THOU/MM3 (ref 4.8–10.8)

## 2024-08-28 PROCEDURE — 6370000000 HC RX 637 (ALT 250 FOR IP)

## 2024-08-28 PROCEDURE — 2580000003 HC RX 258

## 2024-08-28 PROCEDURE — 0WPBX0Z REMOVAL OF DRAINAGE DEVICE FROM LEFT PLEURAL CAVITY, EXTERNAL APPROACH: ICD-10-PCS | Performed by: THORACIC SURGERY (CARDIOTHORACIC VASCULAR SURGERY)

## 2024-08-28 PROCEDURE — 6360000002 HC RX W HCPCS: Performed by: INTERNAL MEDICINE

## 2024-08-28 PROCEDURE — 6360000002 HC RX W HCPCS: Performed by: NURSE PRACTITIONER

## 2024-08-28 PROCEDURE — 6360000002 HC RX W HCPCS

## 2024-08-28 PROCEDURE — 84132 ASSAY OF SERUM POTASSIUM: CPT

## 2024-08-28 PROCEDURE — 71045 X-RAY EXAM CHEST 1 VIEW: CPT

## 2024-08-28 PROCEDURE — 2500000003 HC RX 250 WO HCPCS: Performed by: NURSE PRACTITIONER

## 2024-08-28 PROCEDURE — 80162 ASSAY OF DIGOXIN TOTAL: CPT

## 2024-08-28 PROCEDURE — 83605 ASSAY OF LACTIC ACID: CPT

## 2024-08-28 PROCEDURE — 6370000000 HC RX 637 (ALT 250 FOR IP): Performed by: NURSE PRACTITIONER

## 2024-08-28 PROCEDURE — 2500000003 HC RX 250 WO HCPCS: Performed by: INTERNAL MEDICINE

## 2024-08-28 PROCEDURE — 85027 COMPLETE CBC AUTOMATED: CPT

## 2024-08-28 PROCEDURE — 99233 SBSQ HOSP IP/OBS HIGH 50: CPT | Performed by: INTERNAL MEDICINE

## 2024-08-28 PROCEDURE — 83735 ASSAY OF MAGNESIUM: CPT

## 2024-08-28 PROCEDURE — 80048 BASIC METABOLIC PNL TOTAL CA: CPT

## 2024-08-28 PROCEDURE — 2700000000 HC OXYGEN THERAPY PER DAY

## 2024-08-28 PROCEDURE — 2500000003 HC RX 250 WO HCPCS

## 2024-08-28 PROCEDURE — 94761 N-INVAS EAR/PLS OXIMETRY MLT: CPT

## 2024-08-28 PROCEDURE — 2580000003 HC RX 258: Performed by: INTERNAL MEDICINE

## 2024-08-28 PROCEDURE — 94669 MECHANICAL CHEST WALL OSCILL: CPT

## 2024-08-28 PROCEDURE — 89220 SPUTUM SPECIMEN COLLECTION: CPT

## 2024-08-28 PROCEDURE — 2060000000 HC ICU INTERMEDIATE R&B

## 2024-08-28 PROCEDURE — 36415 COLL VENOUS BLD VENIPUNCTURE: CPT

## 2024-08-28 PROCEDURE — 94640 AIRWAY INHALATION TREATMENT: CPT

## 2024-08-28 RX ORDER — SODIUM CHLORIDE, SODIUM LACTATE, POTASSIUM CHLORIDE, CALCIUM CHLORIDE 600; 310; 30; 20 MG/100ML; MG/100ML; MG/100ML; MG/100ML
INJECTION, SOLUTION INTRAVENOUS CONTINUOUS
Status: DISCONTINUED | OUTPATIENT
Start: 2024-08-28 | End: 2024-08-28

## 2024-08-28 RX ORDER — GUAIFENESIN 200 MG/10ML
400 LIQUID ORAL EVERY 6 HOURS
Status: DISCONTINUED | OUTPATIENT
Start: 2024-08-28 | End: 2024-09-01 | Stop reason: HOSPADM

## 2024-08-28 RX ORDER — GUAIFENESIN 200 MG/10ML
400 LIQUID ORAL EVERY 6 HOURS
Status: DISCONTINUED | OUTPATIENT
Start: 2024-08-28 | End: 2024-08-28

## 2024-08-28 RX ORDER — ADENOSINE 3 MG/ML
INJECTION, SOLUTION INTRAVENOUS
Status: COMPLETED
Start: 2024-08-28 | End: 2024-08-28

## 2024-08-28 RX ORDER — METOCLOPRAMIDE HYDROCHLORIDE 5 MG/ML
5 INJECTION INTRAMUSCULAR; INTRAVENOUS 2 TIMES DAILY
Status: DISCONTINUED | OUTPATIENT
Start: 2024-08-28 | End: 2024-08-30

## 2024-08-28 RX ORDER — DEXTROSE, SODIUM CHLORIDE, SODIUM LACTATE, POTASSIUM CHLORIDE, AND CALCIUM CHLORIDE 5; .6; .31; .03; .02 G/100ML; G/100ML; G/100ML; G/100ML; G/100ML
INJECTION, SOLUTION INTRAVENOUS CONTINUOUS
Status: DISCONTINUED | OUTPATIENT
Start: 2024-08-28 | End: 2024-08-30

## 2024-08-28 RX ADMIN — SODIUM CHLORIDE: 9 INJECTION, SOLUTION INTRAVENOUS at 07:02

## 2024-08-28 RX ADMIN — DEXAMETHASONE SODIUM PHOSPHATE 6 MG: 4 INJECTION, SOLUTION INTRA-ARTICULAR; INTRALESIONAL; INTRAMUSCULAR; INTRAVENOUS; SOFT TISSUE at 09:25

## 2024-08-28 RX ADMIN — CEFTRIAXONE SODIUM 1000 MG: 1 INJECTION, POWDER, FOR SOLUTION INTRAMUSCULAR; INTRAVENOUS at 21:01

## 2024-08-28 RX ADMIN — IPRATROPIUM BROMIDE 0.5 MG: 0.5 SOLUTION RESPIRATORY (INHALATION) at 13:40

## 2024-08-28 RX ADMIN — ESCITALOPRAM OXALATE 10 MG: 10 TABLET ORAL at 08:17

## 2024-08-28 RX ADMIN — AMIODARONE HYDROCHLORIDE 0.5 MG/MIN: 1.8 INJECTION, SOLUTION INTRAVENOUS at 17:26

## 2024-08-28 RX ADMIN — HYDROMORPHONE HYDROCHLORIDE 0.5 MG: 1 INJECTION, SOLUTION INTRAMUSCULAR; INTRAVENOUS; SUBCUTANEOUS at 12:42

## 2024-08-28 RX ADMIN — Medication 1 TABLET: at 08:17

## 2024-08-28 RX ADMIN — METOCLOPRAMIDE 5 MG: 5 INJECTION, SOLUTION INTRAMUSCULAR; INTRAVENOUS at 21:03

## 2024-08-28 RX ADMIN — CARBIDOPA AND LEVODOPA 1 TABLET: 10; 100 TABLET ORAL at 08:17

## 2024-08-28 RX ADMIN — SODIUM CHLORIDE, SODIUM LACTATE, POTASSIUM CHLORIDE, CALCIUM CHLORIDE AND DEXTROSE MONOHYDRATE: 5; 600; 310; 30; 20 INJECTION, SOLUTION INTRAVENOUS at 17:23

## 2024-08-28 RX ADMIN — IPRATROPIUM BROMIDE 0.5 MG: 0.5 SOLUTION RESPIRATORY (INHALATION) at 10:30

## 2024-08-28 RX ADMIN — DOXYCYCLINE HYCLATE 100 MG: 100 TABLET, COATED ORAL at 21:02

## 2024-08-28 RX ADMIN — ENOXAPARIN SODIUM 105 MG: 150 INJECTION SUBCUTANEOUS at 16:14

## 2024-08-28 RX ADMIN — POTASSIUM CHLORIDE 20 MEQ: 29.8 INJECTION, SOLUTION INTRAVENOUS at 11:04

## 2024-08-28 RX ADMIN — CARBIDOPA AND LEVODOPA 1 TABLET: 10; 100 TABLET ORAL at 16:14

## 2024-08-28 RX ADMIN — GUAIFENESIN 400 MG: 200 SOLUTION ORAL at 11:01

## 2024-08-28 RX ADMIN — PANTOPRAZOLE SODIUM 40 MG: 40 INJECTION, POWDER, FOR SOLUTION INTRAVENOUS at 08:03

## 2024-08-28 RX ADMIN — SODIUM CHLORIDE, PRESERVATIVE FREE 10 ML: 5 INJECTION INTRAVENOUS at 08:03

## 2024-08-28 RX ADMIN — HYDROMORPHONE HYDROCHLORIDE 0.5 MG: 1 INJECTION, SOLUTION INTRAMUSCULAR; INTRAVENOUS; SUBCUTANEOUS at 07:00

## 2024-08-28 RX ADMIN — AMIODARONE HYDROCHLORIDE 0.5 MG/MIN: 1.8 INJECTION, SOLUTION INTRAVENOUS at 05:06

## 2024-08-28 RX ADMIN — GUAIFENESIN 400 MG: 200 SOLUTION ORAL at 23:57

## 2024-08-28 RX ADMIN — SODIUM CHLORIDE: 9 INJECTION, SOLUTION INTRAVENOUS at 08:25

## 2024-08-28 RX ADMIN — CARBIDOPA AND LEVODOPA 1 TABLET: 10; 100 TABLET ORAL at 14:06

## 2024-08-28 RX ADMIN — IPRATROPIUM BROMIDE 0.5 MG: 0.5 SOLUTION RESPIRATORY (INHALATION) at 20:58

## 2024-08-28 RX ADMIN — SODIUM CHLORIDE, PRESERVATIVE FREE 10 ML: 5 INJECTION INTRAVENOUS at 08:18

## 2024-08-28 RX ADMIN — CARBIDOPA AND LEVODOPA 1 TABLET: 10; 100 TABLET ORAL at 21:02

## 2024-08-28 RX ADMIN — ACETAMINOPHEN 650 MG: 325 TABLET ORAL at 08:17

## 2024-08-28 RX ADMIN — Medication 5000 UNITS: at 08:17

## 2024-08-28 RX ADMIN — POTASSIUM CHLORIDE 20 MEQ: 29.8 INJECTION, SOLUTION INTRAVENOUS at 12:09

## 2024-08-28 RX ADMIN — AMIODARONE HYDROCHLORIDE 150 MG: 1.5 INJECTION, SOLUTION INTRAVENOUS at 08:07

## 2024-08-28 RX ADMIN — BARICITINIB 2 MG: 2 TABLET, FILM COATED ORAL at 08:17

## 2024-08-28 RX ADMIN — DIGOXIN 125 MCG: 0.25 INJECTION INTRAMUSCULAR; INTRAVENOUS at 08:04

## 2024-08-28 RX ADMIN — ADENOSINE 6 MG: 3 INJECTION INTRAVENOUS at 09:14

## 2024-08-28 RX ADMIN — IPRATROPIUM BROMIDE 0.5 MG: 0.5 SOLUTION RESPIRATORY (INHALATION) at 16:37

## 2024-08-28 RX ADMIN — CEFTRIAXONE SODIUM 1000 MG: 1 INJECTION, POWDER, FOR SOLUTION INTRAMUSCULAR; INTRAVENOUS at 08:26

## 2024-08-28 RX ADMIN — ENOXAPARIN SODIUM 105 MG: 150 INJECTION SUBCUTANEOUS at 04:15

## 2024-08-28 RX ADMIN — GUAIFENESIN 400 MG: 200 SOLUTION ORAL at 16:14

## 2024-08-28 RX ADMIN — DOXYCYCLINE HYCLATE 100 MG: 100 TABLET, COATED ORAL at 08:17

## 2024-08-28 ASSESSMENT — PAIN SCALES - WONG BAKER
WONGBAKER_NUMERICALRESPONSE: HURTS EVEN MORE

## 2024-08-28 ASSESSMENT — PAIN SCALES - GENERAL
PAINLEVEL_OUTOF10: 8
PAINLEVEL_OUTOF10: 3

## 2024-08-28 NOTE — PROCEDURES
Chest Tube Removal Procedure Note     Indications:  Less than 50mL of drainage from Chest tube in 48 hours.  CXR shows no return of pneumothorax at this point.    Pre-operative Diagnosis: Left-sided Exudative Pleural Effusion     Post-operative Diagnosis: Left-sided Exudative Pleural Effusion     Performing Provider : Guanaco Gr MD, PGY2 IM resident    Assisted by: Handy Edwards MD     Procedure Details   Patient currently on Dilaudid.  No anesthesia was needed.     Sterile gloves were worn for the procedure.    The sutures were then removed and the site was verified to not have any sutures remaining.   A piece of Petroleum Gauze was held over the site after the chest tube removal.  No drainage was seen at the site.  3 4x4 gauze pieces were placed over the site with petroleum gauze left in place, and 2 pieces of clear occlusive dressing was used to secure to patient's skin.  The chest tube was placed into a biohazard red bag, sharps were disposed of in sharps container.     Findings:  None     Estimated Blood Loss:  0mL           CXR STAT ordered to check for complications of procedure.                Complications:  Awaiting results of CXR           Disposition: Inpatient           Condition: stable    I peformed the procedure.    Electronically signed by   Guanaco Gr MD on 8/28/2024 at 10:08 AM  Proctored by Handy Edwards MD

## 2024-08-28 NOTE — PALLIATIVE CARE
Follow Up / Progress Note        Patient:   Stone Sharp  YOB: 1935  Age:  88 y.o.  Room:  [facility]12/[facility] [place]  MRN:  [id number]           Received call from  stating care team is wanting to know if family would consider a peg tube for patient.   Discussed patient with Dr Gr and Handy JUNIOR.    Family/Patient Discussion:  In room to speak with family. Bharathi and Marely at bedside. Discussed current plan of care. Family state that they were informed the patient is going to go to [place]. Discussed current ongoing issues, including concerns with patient not tolerating tube feed. Discussed NG being temporary. Family state they had discussed, briefly, a peg tube with a member of the care team. Family states they would want to proceed with this. Dicussed risks associated with peg tube, as well as patient would need to be able to tolerate tube feed even with peg tube. Discussed continued risk of aspiration. Family asking \"well could we give him ensure\". Educated on ensure vs tube feed. Family asking what their options would be if they did not place peg tube. Educated on comfort care. Family state they would want to proceed with peg tube if this is an option. Family denied further questions/concerns at this time.     Updated Dr Gr on above conversation.       Plan/Follow-Up:  At this time family would agree to peg tube. Will continue to follow to assist with goals of care conversations.          Electronically signed by Larisa Sen RN on 8/28/2024 at 2:34 PM             Palliative Care Office: [phone]

## 2024-08-28 NOTE — FLOWSHEET NOTE
08/28/24 0900 08/28/24 0907 08/28/24 0908   Vital Signs   Pulse (!) 113 (!) 182 (!) 180   Heart Rate Source  --  Monitor Monitor   Respirations (!) 32 (!) 40 (!) 41   BP (!) 159/83 114/72 124/71   MAP (Calculated) 108 86 89   MAP (mmHg) 104 84 86   BP Location  --   --   --    BP Method  --   --   --    Patient Position  --   --   --    Oxygen Therapy   SpO2  --  92 %  --    Pulse Oximetry Type  --   --   --       08/28/24 0909 08/28/24 0910 08/28/24 0920   Vital Signs   Pulse (!) 179 (!) 178 (!) 116   Heart Rate Source Monitor Monitor Monitor   Respirations (!) 42 (!) 38 29   /74 (!) 111/91 (!) 157/91   MAP (Calculated) 88 98 113   MAP (mmHg) 86 99 108   BP Location  --   --  Right lower arm   BP Method  --   --  Automatic   Patient Position  --   --  Semi fowlers   Oxygen Therapy   SpO2  --   --  92 %   Pulse Oximetry Type  --   --  Continuous         Adenosine given (See MAR for details) with MD Edwards and floor staff at bedside. Patient's HR improves after administration.

## 2024-08-29 ENCOUNTER — APPOINTMENT (OUTPATIENT)
Dept: GENERAL RADIOLOGY | Age: 89
DRG: 871 | End: 2024-08-29
Payer: MEDICARE

## 2024-08-29 PROBLEM — I47.10 SVT (SUPRAVENTRICULAR TACHYCARDIA) (HCC): Status: ACTIVE | Noted: 2024-02-29

## 2024-08-29 LAB
ANION GAP SERPL CALC-SCNC: 11 MEQ/L (ref 8–16)
BUN SERPL-MCNC: 53 MG/DL (ref 7–22)
CALCIUM SERPL-MCNC: 9.6 MG/DL (ref 8.5–10.5)
CHLORIDE SERPL-SCNC: 110 MEQ/L (ref 98–111)
CO2 SERPL-SCNC: 30 MEQ/L (ref 23–33)
CREAT SERPL-MCNC: 1.3 MG/DL (ref 0.4–1.2)
EKG ATRIAL RATE: 125 BPM
EKG P AXIS: 58 DEGREES
EKG P-R INTERVAL: 160 MS
EKG Q-T INTERVAL: 276 MS
EKG QRS DURATION: 82 MS
EKG QTC CALCULATION (BAZETT): 398 MS
EKG R AXIS: -25 DEGREES
EKG T AXIS: 88 DEGREES
EKG VENTRICULAR RATE: 125 BPM
GFR SERPL CREATININE-BSD FRML MDRD: 53 ML/MIN/1.73M2
GLUCOSE SERPL-MCNC: 180 MG/DL (ref 70–108)
POTASSIUM SERPL-SCNC: 4 MEQ/L (ref 3.5–5.2)
SODIUM SERPL-SCNC: 151 MEQ/L (ref 135–145)

## 2024-08-29 PROCEDURE — 6360000002 HC RX W HCPCS

## 2024-08-29 PROCEDURE — 6370000000 HC RX 637 (ALT 250 FOR IP)

## 2024-08-29 PROCEDURE — 2700000000 HC OXYGEN THERAPY PER DAY

## 2024-08-29 PROCEDURE — 2500000003 HC RX 250 WO HCPCS

## 2024-08-29 PROCEDURE — 2580000003 HC RX 258

## 2024-08-29 PROCEDURE — 6360000002 HC RX W HCPCS: Performed by: NURSE PRACTITIONER

## 2024-08-29 PROCEDURE — 93010 ELECTROCARDIOGRAM REPORT: CPT | Performed by: NUCLEAR MEDICINE

## 2024-08-29 PROCEDURE — 2500000003 HC RX 250 WO HCPCS: Performed by: NURSE PRACTITIONER

## 2024-08-29 PROCEDURE — 6370000000 HC RX 637 (ALT 250 FOR IP): Performed by: INTERNAL MEDICINE

## 2024-08-29 PROCEDURE — 71045 X-RAY EXAM CHEST 1 VIEW: CPT

## 2024-08-29 PROCEDURE — 31720 CLEARANCE OF AIRWAYS: CPT

## 2024-08-29 PROCEDURE — 6360000002 HC RX W HCPCS: Performed by: INTERNAL MEDICINE

## 2024-08-29 PROCEDURE — 99233 SBSQ HOSP IP/OBS HIGH 50: CPT | Performed by: INTERNAL MEDICINE

## 2024-08-29 PROCEDURE — 94640 AIRWAY INHALATION TREATMENT: CPT

## 2024-08-29 PROCEDURE — 93005 ELECTROCARDIOGRAM TRACING: CPT | Performed by: INTERNAL MEDICINE

## 2024-08-29 PROCEDURE — 80048 BASIC METABOLIC PNL TOTAL CA: CPT

## 2024-08-29 PROCEDURE — 94669 MECHANICAL CHEST WALL OSCILL: CPT

## 2024-08-29 PROCEDURE — 36415 COLL VENOUS BLD VENIPUNCTURE: CPT

## 2024-08-29 PROCEDURE — 94761 N-INVAS EAR/PLS OXIMETRY MLT: CPT

## 2024-08-29 PROCEDURE — 2060000000 HC ICU INTERMEDIATE R&B

## 2024-08-29 RX ORDER — ACETYLCYSTEINE 200 MG/ML
1 SOLUTION ORAL; RESPIRATORY (INHALATION)
Status: DISCONTINUED | OUTPATIENT
Start: 2024-08-29 | End: 2024-08-29

## 2024-08-29 RX ORDER — ACETYLCYSTEINE 200 MG/ML
600 SOLUTION ORAL; RESPIRATORY (INHALATION)
Status: DISCONTINUED | OUTPATIENT
Start: 2024-08-29 | End: 2024-08-29

## 2024-08-29 RX ORDER — ADENOSINE 3 MG/ML
INJECTION, SOLUTION INTRAVENOUS
Status: DISCONTINUED
Start: 2024-08-29 | End: 2024-08-29 | Stop reason: WASHOUT

## 2024-08-29 RX ORDER — AMIODARONE HYDROCHLORIDE 200 MG/1
200 TABLET ORAL DAILY
Status: DISCONTINUED | OUTPATIENT
Start: 2024-08-29 | End: 2024-09-01 | Stop reason: HOSPADM

## 2024-08-29 RX ORDER — METOPROLOL TARTRATE 50 MG
50 TABLET ORAL 2 TIMES DAILY
Status: DISCONTINUED | OUTPATIENT
Start: 2024-08-29 | End: 2024-09-01 | Stop reason: HOSPADM

## 2024-08-29 RX ADMIN — DOXYCYCLINE HYCLATE 100 MG: 100 TABLET, COATED ORAL at 20:20

## 2024-08-29 RX ADMIN — CEFTRIAXONE SODIUM 1000 MG: 1 INJECTION, POWDER, FOR SOLUTION INTRAMUSCULAR; INTRAVENOUS at 20:23

## 2024-08-29 RX ADMIN — METOCLOPRAMIDE 5 MG: 5 INJECTION, SOLUTION INTRAMUSCULAR; INTRAVENOUS at 20:21

## 2024-08-29 RX ADMIN — GUAIFENESIN 400 MG: 200 SOLUTION ORAL at 04:45

## 2024-08-29 RX ADMIN — ACETAMINOPHEN 650 MG: 325 TABLET ORAL at 02:30

## 2024-08-29 RX ADMIN — IPRATROPIUM BROMIDE 0.5 MG: 0.5 SOLUTION RESPIRATORY (INHALATION) at 21:49

## 2024-08-29 RX ADMIN — DIGOXIN 125 MCG: 0.25 INJECTION INTRAMUSCULAR; INTRAVENOUS at 09:08

## 2024-08-29 RX ADMIN — CARBIDOPA AND LEVODOPA 1 TABLET: 10; 100 TABLET ORAL at 11:54

## 2024-08-29 RX ADMIN — DOXYCYCLINE HYCLATE 100 MG: 100 TABLET, COATED ORAL at 09:07

## 2024-08-29 RX ADMIN — SODIUM CHLORIDE, PRESERVATIVE FREE 10 ML: 5 INJECTION INTRAVENOUS at 09:09

## 2024-08-29 RX ADMIN — CEFTRIAXONE SODIUM 1000 MG: 1 INJECTION, POWDER, FOR SOLUTION INTRAMUSCULAR; INTRAVENOUS at 08:58

## 2024-08-29 RX ADMIN — IPRATROPIUM BROMIDE 0.5 MG: 0.5 SOLUTION RESPIRATORY (INHALATION) at 16:36

## 2024-08-29 RX ADMIN — IPRATROPIUM BROMIDE 0.5 MG: 0.5 SOLUTION RESPIRATORY (INHALATION) at 05:03

## 2024-08-29 RX ADMIN — METOPROLOL TARTRATE 50 MG: 50 TABLET, FILM COATED ORAL at 15:13

## 2024-08-29 RX ADMIN — SODIUM CHLORIDE, PRESERVATIVE FREE 10 ML: 5 INJECTION INTRAVENOUS at 20:37

## 2024-08-29 RX ADMIN — CARBIDOPA AND LEVODOPA 1 TABLET: 10; 100 TABLET ORAL at 16:40

## 2024-08-29 RX ADMIN — GUAIFENESIN 400 MG: 200 SOLUTION ORAL at 16:40

## 2024-08-29 RX ADMIN — ENOXAPARIN SODIUM 105 MG: 150 INJECTION SUBCUTANEOUS at 16:41

## 2024-08-29 RX ADMIN — AMIODARONE HYDROCHLORIDE 200 MG: 200 TABLET ORAL at 15:13

## 2024-08-29 RX ADMIN — Medication 5000 UNITS: at 09:06

## 2024-08-29 RX ADMIN — ENOXAPARIN SODIUM 105 MG: 150 INJECTION SUBCUTANEOUS at 04:45

## 2024-08-29 RX ADMIN — CARBIDOPA AND LEVODOPA 1 TABLET: 10; 100 TABLET ORAL at 20:20

## 2024-08-29 RX ADMIN — PANTOPRAZOLE SODIUM 40 MG: 40 INJECTION, POWDER, FOR SOLUTION INTRAVENOUS at 09:08

## 2024-08-29 RX ADMIN — Medication 1 TABLET: at 09:07

## 2024-08-29 RX ADMIN — DEXAMETHASONE SODIUM PHOSPHATE 6 MG: 4 INJECTION, SOLUTION INTRA-ARTICULAR; INTRALESIONAL; INTRAMUSCULAR; INTRAVENOUS; SOFT TISSUE at 09:08

## 2024-08-29 RX ADMIN — IPRATROPIUM BROMIDE 0.5 MG: 0.5 SOLUTION RESPIRATORY (INHALATION) at 13:16

## 2024-08-29 RX ADMIN — METOPROLOL TARTRATE 50 MG: 50 TABLET, FILM COATED ORAL at 20:20

## 2024-08-29 RX ADMIN — ESCITALOPRAM OXALATE 10 MG: 10 TABLET ORAL at 09:07

## 2024-08-29 RX ADMIN — AMIODARONE HYDROCHLORIDE 0.5 MG/MIN: 1.8 INJECTION, SOLUTION INTRAVENOUS at 04:51

## 2024-08-29 RX ADMIN — ACETYLCYSTEINE 200 MG: 200 SOLUTION ORAL; RESPIRATORY (INHALATION) at 05:03

## 2024-08-29 RX ADMIN — GUAIFENESIN 400 MG: 200 SOLUTION ORAL at 11:54

## 2024-08-29 RX ADMIN — METOCLOPRAMIDE 5 MG: 5 INJECTION, SOLUTION INTRAMUSCULAR; INTRAVENOUS at 09:07

## 2024-08-29 RX ADMIN — CARBIDOPA AND LEVODOPA 1 TABLET: 10; 100 TABLET ORAL at 08:30

## 2024-08-29 RX ADMIN — AMIODARONE HYDROCHLORIDE 150 MG: 1.5 INJECTION, SOLUTION INTRAVENOUS at 03:16

## 2024-08-29 ASSESSMENT — PAIN SCALES - GENERAL
PAINLEVEL_OUTOF10: 0

## 2024-08-29 NOTE — SIGNIFICANT EVENT
I received a message from the nurse that the patients HR was in the 190s on telemetry, with bp 100/70. 12 lead ECG done showed sinus tach with .  Developed tachycardia again within few minutes. Amiodarone 150 mg bolus given, patient has paroxysmal Afib with RVR responded to amiodarone dose 8/27/24. Patient appears fluid overloaded, was on 5%DRL for hypernatremia, IV fluids held.

## 2024-08-29 NOTE — CARE COORDINATION
8/29/24, 1:41 PM EDT    DISCHARGE ON GOING EVALUATION    Stone SHAH [facility] day: 9  Location: -12/012-A Reason for admit: Troponin level elevated [R79.89]  Pleural effusion, left [J90]  Acute hypoxemic respiratory failure due to COVID-19 (HCC) [U07.1, J96.01]   Procedures:  8/20 Left Chest Tube-8/28 Left Chest Tube Removed  8/22 Midline  Barriers to Discharge:   From  ICU (ventilator/pressors there)/A-fib  COVID-19 PNA/Paralytic Ileus/Left Pleural Effusion/CHF/ALDA  History: A-fib CKD, CVA, Prostate Cancer, IVC Filter, PCI, Pakinson/s  IVF,  IV Reglan, Midodrine, IV PPI, IV Amiodarone, IV Steroids  IV Rocephin stop date 9/1  HF Oxygen 40% FIO2/50L  Na+ 151, Creatinine 1.3; monitor  Trickle TF w Ileus  Monitor Long Term Nutrition Plan; question PEG  Await LTACH Referral  Await Cultures  PCP: Juve Mcintosh, DO  Readmission Risk Score: 25.4  Patient Goals/Plan/Treatment Preferences:  Lives w spouse Bharathi, has [facility] (nsg); full LTACH referral-[facility] transports, has walker, WC, alisia lift, therapy following, bed bound, nonverbal

## 2024-08-29 NOTE — PROCEDURES
PROCEDURE NOTE  Date: 8/29/2024   Name: Stone Sharp  YOB: 1935    Procedures    12 lead EKG completed. Results handed to Brian QUEEN.

## 2024-08-30 ENCOUNTER — APPOINTMENT (OUTPATIENT)
Dept: INTERVENTIONAL RADIOLOGY/VASCULAR | Age: 89
DRG: 871 | End: 2024-08-30
Payer: MEDICARE

## 2024-08-30 LAB
ANION GAP SERPL CALC-SCNC: 9 MEQ/L (ref 8–16)
BUN SERPL-MCNC: 54 MG/DL (ref 7–22)
CALCIUM SERPL-MCNC: 9.7 MG/DL (ref 8.5–10.5)
CHLORIDE SERPL-SCNC: 112 MEQ/L (ref 98–111)
CO2 SERPL-SCNC: 30 MEQ/L (ref 23–33)
CREAT SERPL-MCNC: 1.1 MG/DL (ref 0.4–1.2)
DEPRECATED RDW RBC AUTO: 56.7 FL (ref 35–45)
ERYTHROCYTE [DISTWIDTH] IN BLOOD BY AUTOMATED COUNT: 15.1 % (ref 11.5–14.5)
GFR SERPL CREATININE-BSD FRML MDRD: 65 ML/MIN/1.73M2
GLUCOSE SERPL-MCNC: 131 MG/DL (ref 70–108)
HCT VFR BLD AUTO: 32.6 % (ref 42–52)
HGB BLD-MCNC: 9.6 GM/DL (ref 14–18)
MCH RBC QN AUTO: 29.6 PG (ref 26–33)
MCHC RBC AUTO-ENTMCNC: 29.4 GM/DL (ref 32.2–35.5)
MCV RBC AUTO: 100.6 FL (ref 80–94)
PLATELET # BLD AUTO: 194 THOU/MM3 (ref 130–400)
PMV BLD AUTO: 11.8 FL (ref 9.4–12.4)
POTASSIUM SERPL-SCNC: 4.2 MEQ/L (ref 3.5–5.2)
RBC # BLD AUTO: 3.24 MILL/MM3 (ref 4.7–6.1)
SODIUM SERPL-SCNC: 151 MEQ/L (ref 135–145)
WBC # BLD AUTO: 12.8 THOU/MM3 (ref 4.8–10.8)

## 2024-08-30 PROCEDURE — 2580000003 HC RX 258

## 2024-08-30 PROCEDURE — 2709999900 IR PLACE NG TUBE BY DR W FLUORO

## 2024-08-30 PROCEDURE — 99233 SBSQ HOSP IP/OBS HIGH 50: CPT | Performed by: INTERNAL MEDICINE

## 2024-08-30 PROCEDURE — 94669 MECHANICAL CHEST WALL OSCILL: CPT

## 2024-08-30 PROCEDURE — 6370000000 HC RX 637 (ALT 250 FOR IP): Performed by: INTERNAL MEDICINE

## 2024-08-30 PROCEDURE — 2060000000 HC ICU INTERMEDIATE R&B

## 2024-08-30 PROCEDURE — 6360000002 HC RX W HCPCS

## 2024-08-30 PROCEDURE — 6370000000 HC RX 637 (ALT 250 FOR IP)

## 2024-08-30 PROCEDURE — 2700000000 HC OXYGEN THERAPY PER DAY

## 2024-08-30 PROCEDURE — 85027 COMPLETE CBC AUTOMATED: CPT

## 2024-08-30 PROCEDURE — 36415 COLL VENOUS BLD VENIPUNCTURE: CPT

## 2024-08-30 PROCEDURE — 2500000003 HC RX 250 WO HCPCS: Performed by: INTERNAL MEDICINE

## 2024-08-30 PROCEDURE — 6360000002 HC RX W HCPCS: Performed by: NURSE PRACTITIONER

## 2024-08-30 PROCEDURE — 2500000003 HC RX 250 WO HCPCS: Performed by: NURSE PRACTITIONER

## 2024-08-30 PROCEDURE — 6360000002 HC RX W HCPCS: Performed by: INTERNAL MEDICINE

## 2024-08-30 PROCEDURE — 80048 BASIC METABOLIC PNL TOTAL CA: CPT

## 2024-08-30 PROCEDURE — 44500 INTRO GASTROINTESTINAL TUBE: CPT

## 2024-08-30 PROCEDURE — 31720 CLEARANCE OF AIRWAYS: CPT

## 2024-08-30 PROCEDURE — 94761 N-INVAS EAR/PLS OXIMETRY MLT: CPT

## 2024-08-30 PROCEDURE — 94640 AIRWAY INHALATION TREATMENT: CPT

## 2024-08-30 PROCEDURE — 74340 X-RAY GUIDE FOR GI TUBE: CPT

## 2024-08-30 RX ORDER — DEXTROSE MONOHYDRATE, SODIUM CHLORIDE, AND POTASSIUM CHLORIDE 50; 1.49; 4.5 G/1000ML; G/1000ML; G/1000ML
INJECTION, SOLUTION INTRAVENOUS CONTINUOUS
Status: DISCONTINUED | OUTPATIENT
Start: 2024-08-30 | End: 2024-08-31

## 2024-08-30 RX ORDER — ERYTHROMYCIN ETHYLSUCCINATE 400 MG/5ML
400 SUSPENSION ORAL
Status: DISCONTINUED | OUTPATIENT
Start: 2024-08-30 | End: 2024-09-01 | Stop reason: HOSPADM

## 2024-08-30 RX ADMIN — SODIUM CHLORIDE, PRESERVATIVE FREE 10 ML: 5 INJECTION INTRAVENOUS at 09:45

## 2024-08-30 RX ADMIN — CARBIDOPA AND LEVODOPA 1 TABLET: 10; 100 TABLET ORAL at 13:51

## 2024-08-30 RX ADMIN — CARBIDOPA AND LEVODOPA 1 TABLET: 10; 100 TABLET ORAL at 09:45

## 2024-08-30 RX ADMIN — IPRATROPIUM BROMIDE 0.5 MG: 0.5 SOLUTION RESPIRATORY (INHALATION) at 08:23

## 2024-08-30 RX ADMIN — ESCITALOPRAM OXALATE 10 MG: 10 TABLET ORAL at 09:46

## 2024-08-30 RX ADMIN — CARBIDOPA AND LEVODOPA 1 TABLET: 10; 100 TABLET ORAL at 20:23

## 2024-08-30 RX ADMIN — GUAIFENESIN 400 MG: 200 SOLUTION ORAL at 04:48

## 2024-08-30 RX ADMIN — METOPROLOL TARTRATE 50 MG: 50 TABLET, FILM COATED ORAL at 20:23

## 2024-08-30 RX ADMIN — GUAIFENESIN 400 MG: 200 SOLUTION ORAL at 11:29

## 2024-08-30 RX ADMIN — CEFTRIAXONE SODIUM 1000 MG: 1 INJECTION, POWDER, FOR SOLUTION INTRAMUSCULAR; INTRAVENOUS at 20:34

## 2024-08-30 RX ADMIN — GUAIFENESIN 400 MG: 200 SOLUTION ORAL at 01:18

## 2024-08-30 RX ADMIN — SODIUM CHLORIDE, PRESERVATIVE FREE 10 ML: 5 INJECTION INTRAVENOUS at 20:38

## 2024-08-30 RX ADMIN — Medication 1 TABLET: at 09:46

## 2024-08-30 RX ADMIN — AMIODARONE HYDROCHLORIDE 200 MG: 200 TABLET ORAL at 09:46

## 2024-08-30 RX ADMIN — IPRATROPIUM BROMIDE 0.5 MG: 0.5 SOLUTION RESPIRATORY (INHALATION) at 20:05

## 2024-08-30 RX ADMIN — DIGOXIN 125 MCG: 0.25 INJECTION INTRAMUSCULAR; INTRAVENOUS at 09:46

## 2024-08-30 RX ADMIN — ENOXAPARIN SODIUM 105 MG: 150 INJECTION SUBCUTANEOUS at 17:35

## 2024-08-30 RX ADMIN — METOPROLOL TARTRATE 50 MG: 50 TABLET, FILM COATED ORAL at 09:46

## 2024-08-30 RX ADMIN — POTASSIUM CHLORIDE, DEXTROSE MONOHYDRATE AND SODIUM CHLORIDE: 150; 5; 450 INJECTION, SOLUTION INTRAVENOUS at 20:30

## 2024-08-30 RX ADMIN — GUAIFENESIN 400 MG: 200 SOLUTION ORAL at 23:53

## 2024-08-30 RX ADMIN — IPRATROPIUM BROMIDE 0.5 MG: 0.5 SOLUTION RESPIRATORY (INHALATION) at 12:42

## 2024-08-30 RX ADMIN — METOCLOPRAMIDE 5 MG: 5 INJECTION, SOLUTION INTRAMUSCULAR; INTRAVENOUS at 09:47

## 2024-08-30 RX ADMIN — Medication 5000 UNITS: at 09:44

## 2024-08-30 RX ADMIN — DOXYCYCLINE HYCLATE 100 MG: 100 TABLET, COATED ORAL at 09:46

## 2024-08-30 RX ADMIN — IPRATROPIUM BROMIDE 0.5 MG: 0.5 SOLUTION RESPIRATORY (INHALATION) at 16:17

## 2024-08-30 RX ADMIN — CEFTRIAXONE SODIUM 1000 MG: 1 INJECTION, POWDER, FOR SOLUTION INTRAMUSCULAR; INTRAVENOUS at 10:16

## 2024-08-30 RX ADMIN — ENOXAPARIN SODIUM 105 MG: 150 INJECTION SUBCUTANEOUS at 04:48

## 2024-08-30 RX ADMIN — CARBIDOPA AND LEVODOPA 1 TABLET: 10; 100 TABLET ORAL at 17:35

## 2024-08-30 RX ADMIN — DOXYCYCLINE HYCLATE 100 MG: 100 TABLET, COATED ORAL at 20:23

## 2024-08-30 RX ADMIN — ERYTHROMYCIN 400 MG: 400 SUSPENSION ORAL at 13:51

## 2024-08-30 RX ADMIN — PANTOPRAZOLE SODIUM 40 MG: 40 INJECTION, POWDER, FOR SOLUTION INTRAVENOUS at 09:47

## 2024-08-30 ASSESSMENT — PAIN SCALES - GENERAL
PAINLEVEL_OUTOF10: 0

## 2024-08-30 NOTE — OP NOTE
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    Glenn Ta MD   8/30/2024 6:38 PM

## 2024-08-30 NOTE — H&P
Formulation and discussion of sedation / procedure plans, risks, benefits, side effects and alternatives with patient and/or responsible adult completed.    History and Physical reviewed and unchanged.    Electronically signed by Glenn Ta MD on 8/30/24 at 6:02 PM EDT

## 2024-08-31 PROBLEM — I48.0 PAROXYSMAL ATRIAL FIBRILLATION (HCC): Status: ACTIVE | Noted: 2024-08-31

## 2024-08-31 LAB
ALBUMIN SERPL BCG-MCNC: 2.1 G/DL (ref 3.5–5.1)
ALP SERPL-CCNC: 55 U/L (ref 38–126)
ALT SERPL W/O P-5'-P-CCNC: 15 U/L (ref 11–66)
ANION GAP SERPL CALC-SCNC: 10 MEQ/L (ref 8–16)
AST SERPL-CCNC: 26 U/L (ref 5–40)
BACTERIA BLD AEROBE CULT: NORMAL
BACTERIA BLD AEROBE CULT: NORMAL
BILIRUB SERPL-MCNC: 0.3 MG/DL (ref 0.3–1.2)
BUN SERPL-MCNC: 44 MG/DL (ref 7–22)
CALCIUM SERPL-MCNC: 8.2 MG/DL (ref 8.5–10.5)
CHLORIDE SERPL-SCNC: 119 MEQ/L (ref 98–111)
CO2 SERPL-SCNC: 27 MEQ/L (ref 23–33)
CREAT SERPL-MCNC: 0.8 MG/DL (ref 0.4–1.2)
DEPRECATED RDW RBC AUTO: 57.6 FL (ref 35–45)
EKG ATRIAL RATE: 94 BPM
EKG P AXIS: 42 DEGREES
EKG P-R INTERVAL: 164 MS
EKG Q-T INTERVAL: 298 MS
EKG QRS DURATION: 82 MS
EKG QTC CALCULATION (BAZETT): 372 MS
EKG R AXIS: -34 DEGREES
EKG T AXIS: 89 DEGREES
EKG VENTRICULAR RATE: 94 BPM
ERYTHROCYTE [DISTWIDTH] IN BLOOD BY AUTOMATED COUNT: 15.2 % (ref 11.5–14.5)
GFR SERPL CREATININE-BSD FRML MDRD: 85 ML/MIN/1.73M2
GLUCOSE SERPL-MCNC: 263 MG/DL (ref 70–108)
HCT VFR BLD AUTO: 27.8 % (ref 42–52)
HGB BLD-MCNC: 7.9 GM/DL (ref 14–18)
MCH RBC QN AUTO: 29.4 PG (ref 26–33)
MCHC RBC AUTO-ENTMCNC: 28.4 GM/DL (ref 32.2–35.5)
MCV RBC AUTO: 103.3 FL (ref 80–94)
PLATELET # BLD AUTO: 177 THOU/MM3 (ref 130–400)
PMV BLD AUTO: 11.3 FL (ref 9.4–12.4)
POTASSIUM SERPL-SCNC: 4.6 MEQ/L (ref 3.5–5.2)
PROT SERPL-MCNC: 5.2 G/DL (ref 6.1–8)
RBC # BLD AUTO: 2.69 MILL/MM3 (ref 4.7–6.1)
SODIUM SERPL-SCNC: 147 MEQ/L (ref 135–145)
SODIUM SERPL-SCNC: 156 MEQ/L (ref 135–145)
WBC # BLD AUTO: 11.2 THOU/MM3 (ref 4.8–10.8)

## 2024-08-31 PROCEDURE — 6360000002 HC RX W HCPCS

## 2024-08-31 PROCEDURE — 2580000003 HC RX 258

## 2024-08-31 PROCEDURE — 94640 AIRWAY INHALATION TREATMENT: CPT

## 2024-08-31 PROCEDURE — 85027 COMPLETE CBC AUTOMATED: CPT

## 2024-08-31 PROCEDURE — 93010 ELECTROCARDIOGRAM REPORT: CPT | Performed by: NUCLEAR MEDICINE

## 2024-08-31 PROCEDURE — 2580000003 HC RX 258: Performed by: INTERNAL MEDICINE

## 2024-08-31 PROCEDURE — 6370000000 HC RX 637 (ALT 250 FOR IP)

## 2024-08-31 PROCEDURE — 99232 SBSQ HOSP IP/OBS MODERATE 35: CPT

## 2024-08-31 PROCEDURE — 2060000000 HC ICU INTERMEDIATE R&B

## 2024-08-31 PROCEDURE — 93005 ELECTROCARDIOGRAM TRACING: CPT

## 2024-08-31 PROCEDURE — 36415 COLL VENOUS BLD VENIPUNCTURE: CPT

## 2024-08-31 PROCEDURE — 94761 N-INVAS EAR/PLS OXIMETRY MLT: CPT

## 2024-08-31 PROCEDURE — 6360000002 HC RX W HCPCS: Performed by: NURSE PRACTITIONER

## 2024-08-31 PROCEDURE — 94669 MECHANICAL CHEST WALL OSCILL: CPT

## 2024-08-31 PROCEDURE — 80053 COMPREHEN METABOLIC PANEL: CPT

## 2024-08-31 PROCEDURE — 99232 SBSQ HOSP IP/OBS MODERATE 35: CPT | Performed by: INTERNAL MEDICINE

## 2024-08-31 PROCEDURE — 2500000003 HC RX 250 WO HCPCS: Performed by: NURSE PRACTITIONER

## 2024-08-31 PROCEDURE — 6370000000 HC RX 637 (ALT 250 FOR IP): Performed by: INTERNAL MEDICINE

## 2024-08-31 PROCEDURE — 2700000000 HC OXYGEN THERAPY PER DAY

## 2024-08-31 PROCEDURE — 84295 ASSAY OF SERUM SODIUM: CPT

## 2024-08-31 PROCEDURE — 94668 MNPJ CHEST WALL SBSQ: CPT

## 2024-08-31 RX ORDER — DEXTROSE MONOHYDRATE 50 MG/ML
INJECTION, SOLUTION INTRAVENOUS CONTINUOUS
Status: DISCONTINUED | OUTPATIENT
Start: 2024-08-31 | End: 2024-09-01 | Stop reason: HOSPADM

## 2024-08-31 RX ADMIN — CARBIDOPA AND LEVODOPA 1 TABLET: 10; 100 TABLET ORAL at 12:26

## 2024-08-31 RX ADMIN — PANTOPRAZOLE SODIUM 40 MG: 40 INJECTION, POWDER, FOR SOLUTION INTRAVENOUS at 08:56

## 2024-08-31 RX ADMIN — CARBIDOPA AND LEVODOPA 1 TABLET: 10; 100 TABLET ORAL at 16:40

## 2024-08-31 RX ADMIN — ENOXAPARIN SODIUM 105 MG: 150 INJECTION SUBCUTANEOUS at 03:47

## 2024-08-31 RX ADMIN — IPRATROPIUM BROMIDE 0.5 MG: 0.5 SOLUTION RESPIRATORY (INHALATION) at 12:40

## 2024-08-31 RX ADMIN — IPRATROPIUM BROMIDE 0.5 MG: 0.5 SOLUTION RESPIRATORY (INHALATION) at 19:45

## 2024-08-31 RX ADMIN — CEFTRIAXONE SODIUM 1000 MG: 1 INJECTION, POWDER, FOR SOLUTION INTRAMUSCULAR; INTRAVENOUS at 08:51

## 2024-08-31 RX ADMIN — GUAIFENESIN 400 MG: 200 SOLUTION ORAL at 16:40

## 2024-08-31 RX ADMIN — IPRATROPIUM BROMIDE 0.5 MG: 0.5 SOLUTION RESPIRATORY (INHALATION) at 16:04

## 2024-08-31 RX ADMIN — GUAIFENESIN 400 MG: 200 SOLUTION ORAL at 12:27

## 2024-08-31 RX ADMIN — METOPROLOL TARTRATE 50 MG: 50 TABLET, FILM COATED ORAL at 21:53

## 2024-08-31 RX ADMIN — CARBIDOPA AND LEVODOPA 1 TABLET: 10; 100 TABLET ORAL at 08:57

## 2024-08-31 RX ADMIN — IPRATROPIUM BROMIDE 0.5 MG: 0.5 SOLUTION RESPIRATORY (INHALATION) at 09:01

## 2024-08-31 RX ADMIN — CARBIDOPA AND LEVODOPA 1 TABLET: 10; 100 TABLET ORAL at 21:53

## 2024-08-31 RX ADMIN — ENOXAPARIN SODIUM 105 MG: 150 INJECTION SUBCUTANEOUS at 16:40

## 2024-08-31 RX ADMIN — ERYTHROMYCIN 400 MG: 400 SUSPENSION ORAL at 12:22

## 2024-08-31 RX ADMIN — Medication 5000 UNITS: at 08:56

## 2024-08-31 RX ADMIN — DEXTROSE MONOHYDRATE: 50 INJECTION, SOLUTION INTRAVENOUS at 13:30

## 2024-08-31 RX ADMIN — ESCITALOPRAM OXALATE 10 MG: 10 TABLET ORAL at 08:57

## 2024-08-31 RX ADMIN — GUAIFENESIN 400 MG: 200 SOLUTION ORAL at 21:54

## 2024-08-31 RX ADMIN — ERYTHROMYCIN 400 MG: 400 SUSPENSION ORAL at 16:40

## 2024-08-31 RX ADMIN — DOXYCYCLINE HYCLATE 100 MG: 100 TABLET, COATED ORAL at 08:56

## 2024-08-31 RX ADMIN — DOXYCYCLINE HYCLATE 100 MG: 100 TABLET, COATED ORAL at 21:53

## 2024-08-31 RX ADMIN — AMIODARONE HYDROCHLORIDE 200 MG: 200 TABLET ORAL at 08:56

## 2024-08-31 RX ADMIN — METOPROLOL TARTRATE 50 MG: 50 TABLET, FILM COATED ORAL at 08:56

## 2024-08-31 RX ADMIN — ERYTHROMYCIN 400 MG: 400 SUSPENSION ORAL at 08:36

## 2024-08-31 RX ADMIN — SODIUM CHLORIDE, PRESERVATIVE FREE 10 ML: 5 INJECTION INTRAVENOUS at 08:57

## 2024-08-31 RX ADMIN — Medication 1 TABLET: at 08:56

## 2024-08-31 RX ADMIN — GUAIFENESIN 400 MG: 200 SOLUTION ORAL at 03:47

## 2024-08-31 RX ADMIN — DIGOXIN 125 MCG: 0.25 INJECTION INTRAMUSCULAR; INTRAVENOUS at 08:56

## 2024-08-31 RX ADMIN — CEFTRIAXONE SODIUM 1000 MG: 1 INJECTION, POWDER, FOR SOLUTION INTRAMUSCULAR; INTRAVENOUS at 23:48

## 2024-08-31 RX ADMIN — POLYETHYLENE GLYCOL 3350 17 G: 17 POWDER, FOR SOLUTION ORAL at 08:56

## 2024-08-31 RX ADMIN — SODIUM CHLORIDE, PRESERVATIVE FREE 10 ML: 5 INJECTION INTRAVENOUS at 21:53

## 2024-08-31 ASSESSMENT — PAIN SCALES - GENERAL
PAINLEVEL_OUTOF10: 1
PAINLEVEL_OUTOF10: 0

## 2024-08-31 NOTE — PROCEDURES
PROCEDURE NOTE  Date: 8/31/2024   Name: Stone Sharp  YOB: 1935    Procedures  12 lead EKG completed. Results handed to Jacy Monet CET

## 2024-09-01 ENCOUNTER — HOSPITAL ENCOUNTER (OUTPATIENT)
Age: 89
Discharge: HOME | End: 2024-09-22
Attending: STUDENT IN AN ORGANIZED HEALTH CARE EDUCATION/TRAINING PROGRAM
Payer: COMMERCIAL

## 2024-09-01 ENCOUNTER — APPOINTMENT (OUTPATIENT)
Dept: GENERAL RADIOLOGY | Age: 89
DRG: 871 | End: 2024-09-01
Payer: MEDICARE

## 2024-09-01 VITALS
WEIGHT: 216.49 LBS | BODY MASS INDEX: 32.07 KG/M2 | OXYGEN SATURATION: 92 % | HEART RATE: 92 BPM | TEMPERATURE: 97.3 F | RESPIRATION RATE: 20 BRPM | HEIGHT: 69 IN | SYSTOLIC BLOOD PRESSURE: 122 MMHG | DIASTOLIC BLOOD PRESSURE: 70 MMHG

## 2024-09-01 LAB
ANION GAP SERPL CALC-SCNC: 2 MEQ/L (ref 8–16)
BASOPHILS ABSOLUTE: 0 THOU/MM3 (ref 0–0.1)
BASOPHILS NFR BLD AUTO: 0.1 %
BUN SERPL-MCNC: 37 MG/DL (ref 7–22)
CALCIUM SERPL-MCNC: 8.9 MG/DL (ref 8.5–10.5)
CHLORIDE SERPL-SCNC: 111 MEQ/L (ref 98–111)
CO2 SERPL-SCNC: 30 MEQ/L (ref 23–33)
CREAT SERPL-MCNC: 0.7 MG/DL (ref 0.4–1.2)
DEPRECATED RDW RBC AUTO: 56.7 FL (ref 35–45)
DIGOXIN SERPL-MCNC: 0.7 NG/ML (ref 0.5–2)
EOSINOPHIL NFR BLD AUTO: 0.3 %
EOSINOPHILS ABSOLUTE: 0 THOU/MM3 (ref 0–0.4)
ERYTHROCYTE [DISTWIDTH] IN BLOOD BY AUTOMATED COUNT: 15.2 % (ref 11.5–14.5)
GFR SERPL CREATININE-BSD FRML MDRD: 89 ML/MIN/1.73M2
GLUCOSE BLD STRIP.AUTO-MCNC: 166 MG/DL (ref 70–108)
GLUCOSE BLD STRIP.AUTO-MCNC: 167 MG/DL (ref 70–108)
GLUCOSE SERPL-MCNC: 282 MG/DL (ref 70–108)
HCT VFR BLD AUTO: 28.2 % (ref 42–52)
HGB BLD-MCNC: 8.2 GM/DL (ref 14–18)
IMM GRANULOCYTES # BLD AUTO: 0.41 THOU/MM3 (ref 0–0.07)
IMM GRANULOCYTES NFR BLD AUTO: 2.7 %
LYMPHOCYTES ABSOLUTE: 0.3 THOU/MM3 (ref 1–4.8)
LYMPHOCYTES NFR BLD AUTO: 1.8 %
MCH RBC QN AUTO: 29.9 PG (ref 26–33)
MCHC RBC AUTO-ENTMCNC: 29.1 GM/DL (ref 32.2–35.5)
MCV RBC AUTO: 102.9 FL (ref 80–94)
MONOCYTES ABSOLUTE: 0.2 THOU/MM3 (ref 0.4–1.3)
MONOCYTES NFR BLD AUTO: 1.6 %
NEUTROPHILS ABSOLUTE: 14.2 THOU/MM3 (ref 1.8–7.7)
NEUTROPHILS NFR BLD AUTO: 93.5 %
NRBC BLD AUTO-RTO: 0 /100 WBC
PLATELET # BLD AUTO: 203 THOU/MM3 (ref 130–400)
PMV BLD AUTO: 11.7 FL (ref 9.4–12.4)
POTASSIUM SERPL-SCNC: 3.9 MEQ/L (ref 3.5–5.2)
RBC # BLD AUTO: 2.74 MILL/MM3 (ref 4.7–6.1)
SODIUM SERPL-SCNC: 143 MEQ/L (ref 135–145)
WBC # BLD AUTO: 15.2 THOU/MM3 (ref 4.8–10.8)

## 2024-09-01 PROCEDURE — 2580000003 HC RX 258: Performed by: INTERNAL MEDICINE

## 2024-09-01 PROCEDURE — 2580000003 HC RX 258

## 2024-09-01 PROCEDURE — 85025 COMPLETE CBC W/AUTO DIFF WBC: CPT

## 2024-09-01 PROCEDURE — 82948 REAGENT STRIP/BLOOD GLUCOSE: CPT

## 2024-09-01 PROCEDURE — 6370000000 HC RX 637 (ALT 250 FOR IP): Performed by: INTERNAL MEDICINE

## 2024-09-01 PROCEDURE — 94668 MNPJ CHEST WALL SBSQ: CPT

## 2024-09-01 PROCEDURE — 71045 X-RAY EXAM CHEST 1 VIEW: CPT

## 2024-09-01 PROCEDURE — 6360000002 HC RX W HCPCS

## 2024-09-01 PROCEDURE — 6360000002 HC RX W HCPCS: Performed by: NURSE PRACTITIONER

## 2024-09-01 PROCEDURE — 2500000003 HC RX 250 WO HCPCS: Performed by: NURSE PRACTITIONER

## 2024-09-01 PROCEDURE — 6370000000 HC RX 637 (ALT 250 FOR IP)

## 2024-09-01 PROCEDURE — 99239 HOSP IP/OBS DSCHRG MGMT >30: CPT | Performed by: INTERNAL MEDICINE

## 2024-09-01 PROCEDURE — 6360000002 HC RX W HCPCS: Performed by: INTERNAL MEDICINE

## 2024-09-01 PROCEDURE — 2700000000 HC OXYGEN THERAPY PER DAY

## 2024-09-01 PROCEDURE — 94669 MECHANICAL CHEST WALL OSCILL: CPT

## 2024-09-01 PROCEDURE — 94640 AIRWAY INHALATION TREATMENT: CPT

## 2024-09-01 PROCEDURE — 80048 BASIC METABOLIC PNL TOTAL CA: CPT

## 2024-09-01 PROCEDURE — 36415 COLL VENOUS BLD VENIPUNCTURE: CPT

## 2024-09-01 PROCEDURE — 80162 ASSAY OF DIGOXIN TOTAL: CPT

## 2024-09-01 RX ORDER — DEXTROSE MONOHYDRATE 100 MG/ML
INJECTION, SOLUTION INTRAVENOUS CONTINUOUS PRN
Status: DISCONTINUED | OUTPATIENT
Start: 2024-09-01 | End: 2024-09-01 | Stop reason: HOSPADM

## 2024-09-01 RX ORDER — GUAIFENESIN 200 MG/10ML
400 LIQUID ORAL 3 TIMES DAILY PRN
Status: ON HOLD | DISCHARGE
Start: 2024-09-01

## 2024-09-01 RX ORDER — GLUCAGON 1 MG/ML
1 KIT INJECTION PRN
Status: DISCONTINUED | OUTPATIENT
Start: 2024-09-01 | End: 2024-09-01 | Stop reason: HOSPADM

## 2024-09-01 RX ORDER — METOPROLOL TARTRATE 50 MG
50 TABLET ORAL 2 TIMES DAILY
Status: ON HOLD | DISCHARGE
Start: 2024-09-01

## 2024-09-01 RX ORDER — INSULIN LISPRO 100 [IU]/ML
0-4 INJECTION, SOLUTION INTRAVENOUS; SUBCUTANEOUS NIGHTLY
Status: DISCONTINUED | OUTPATIENT
Start: 2024-09-01 | End: 2024-09-01 | Stop reason: SDUPTHER

## 2024-09-01 RX ORDER — ENOXAPARIN SODIUM 150 MG/ML
1 INJECTION SUBCUTANEOUS EVERY 12 HOURS
Status: ON HOLD | DISCHARGE
Start: 2024-09-02

## 2024-09-01 RX ORDER — FUROSEMIDE 10 MG/ML
60 INJECTION INTRAMUSCULAR; INTRAVENOUS EVERY 6 HOURS
Status: COMPLETED | OUTPATIENT
Start: 2024-09-01 | End: 2024-09-01

## 2024-09-01 RX ORDER — INSULIN LISPRO 100 [IU]/ML
0-4 INJECTION, SOLUTION INTRAVENOUS; SUBCUTANEOUS 4 TIMES DAILY
Status: DISCONTINUED | OUTPATIENT
Start: 2024-09-01 | End: 2024-09-01 | Stop reason: HOSPADM

## 2024-09-01 RX ORDER — DIGOXIN 0.25 MG/ML
125 INJECTION INTRAMUSCULAR; INTRAVENOUS DAILY
Status: ON HOLD | DISCHARGE
Start: 2024-09-02

## 2024-09-01 RX ORDER — ERYTHROMYCIN ETHYLSUCCINATE 400 MG/5ML
400 SUSPENSION ORAL
Status: ON HOLD | DISCHARGE
Start: 2024-09-02 | End: 2024-09-12

## 2024-09-01 RX ORDER — AMIODARONE HYDROCHLORIDE 200 MG/1
200 TABLET ORAL DAILY
Status: ON HOLD | DISCHARGE
Start: 2024-09-02

## 2024-09-01 RX ADMIN — CARBIDOPA AND LEVODOPA 1 TABLET: 10; 100 TABLET ORAL at 12:50

## 2024-09-01 RX ADMIN — SODIUM CHLORIDE, PRESERVATIVE FREE 10 ML: 5 INJECTION INTRAVENOUS at 08:26

## 2024-09-01 RX ADMIN — CARBIDOPA AND LEVODOPA 1 TABLET: 10; 100 TABLET ORAL at 08:24

## 2024-09-01 RX ADMIN — DEXTROSE MONOHYDRATE: 50 INJECTION, SOLUTION INTRAVENOUS at 06:27

## 2024-09-01 RX ADMIN — FUROSEMIDE 60 MG: 10 INJECTION, SOLUTION INTRAMUSCULAR; INTRAVENOUS at 11:19

## 2024-09-01 RX ADMIN — GUAIFENESIN 400 MG: 200 SOLUTION ORAL at 11:20

## 2024-09-01 RX ADMIN — Medication 5000 UNITS: at 08:24

## 2024-09-01 RX ADMIN — ESCITALOPRAM OXALATE 10 MG: 10 TABLET ORAL at 08:25

## 2024-09-01 RX ADMIN — ACETAMINOPHEN 650 MG: 325 TABLET ORAL at 08:25

## 2024-09-01 RX ADMIN — FUROSEMIDE 60 MG: 10 INJECTION, SOLUTION INTRAMUSCULAR; INTRAVENOUS at 16:35

## 2024-09-01 RX ADMIN — ERYTHROMYCIN 400 MG: 400 SUSPENSION ORAL at 06:28

## 2024-09-01 RX ADMIN — DIGOXIN 125 MCG: 0.25 INJECTION INTRAMUSCULAR; INTRAVENOUS at 08:25

## 2024-09-01 RX ADMIN — DOXYCYCLINE HYCLATE 100 MG: 100 TABLET, COATED ORAL at 08:24

## 2024-09-01 RX ADMIN — GUAIFENESIN 400 MG: 200 SOLUTION ORAL at 06:27

## 2024-09-01 RX ADMIN — PANTOPRAZOLE SODIUM 40 MG: 40 INJECTION, POWDER, FOR SOLUTION INTRAVENOUS at 08:26

## 2024-09-01 RX ADMIN — Medication 1 TABLET: at 08:25

## 2024-09-01 RX ADMIN — ERYTHROMYCIN 400 MG: 400 SUSPENSION ORAL at 16:36

## 2024-09-01 RX ADMIN — METOPROLOL TARTRATE 50 MG: 50 TABLET, FILM COATED ORAL at 08:25

## 2024-09-01 RX ADMIN — CARBIDOPA AND LEVODOPA 1 TABLET: 10; 100 TABLET ORAL at 16:35

## 2024-09-01 RX ADMIN — ENOXAPARIN SODIUM 105 MG: 150 INJECTION SUBCUTANEOUS at 16:35

## 2024-09-01 RX ADMIN — IPRATROPIUM BROMIDE 0.5 MG: 0.5 SOLUTION RESPIRATORY (INHALATION) at 16:16

## 2024-09-01 RX ADMIN — AMIODARONE HYDROCHLORIDE 200 MG: 200 TABLET ORAL at 08:25

## 2024-09-01 RX ADMIN — IPRATROPIUM BROMIDE 0.5 MG: 0.5 SOLUTION RESPIRATORY (INHALATION) at 12:51

## 2024-09-01 RX ADMIN — IPRATROPIUM BROMIDE 0.5 MG: 0.5 SOLUTION RESPIRATORY (INHALATION) at 08:21

## 2024-09-01 RX ADMIN — ERYTHROMYCIN 400 MG: 400 SUSPENSION ORAL at 11:20

## 2024-09-01 RX ADMIN — ENOXAPARIN SODIUM 105 MG: 150 INJECTION SUBCUTANEOUS at 05:06

## 2024-09-01 RX ADMIN — ACETAZOLAMIDE SODIUM 500 MG: 500 INJECTION, POWDER, LYOPHILIZED, FOR SOLUTION INTRAVENOUS at 12:53

## 2024-09-01 RX ADMIN — CEFTRIAXONE SODIUM 1000 MG: 1 INJECTION, POWDER, FOR SOLUTION INTRAMUSCULAR; INTRAVENOUS at 08:21

## 2024-09-01 RX ADMIN — HYDROMORPHONE HYDROCHLORIDE 0.5 MG: 1 INJECTION, SOLUTION INTRAMUSCULAR; INTRAVENOUS; SUBCUTANEOUS at 11:20

## 2024-09-01 RX ADMIN — GUAIFENESIN 400 MG: 200 SOLUTION ORAL at 16:36

## 2024-09-01 ASSESSMENT — PAIN SCALES - PAIN ASSESSMENT IN ADVANCED DEMENTIA (PAINAD)
BODYLANGUAGE: TENSE, DISTRESSED PACING, FIDGETING
FACIALEXPRESSION: FACIAL GRIMACING
BREATHING: OCCASIONAL LABORED BREATHING, SHORT PERIOD OF HYPERVENTILATION
TOTALSCORE: 4
NEGVOCALIZATION: OCCASIONAL MOAN/GROAN, LOW SPEECH, NEGATIVE/DISAPPROVING QUALITY
CONSOLABILITY: NO NEED TO CONSOLE
NEGVOCALIZATION: OCCASIONAL MOAN/GROAN, LOW SPEECH, NEGATIVE/DISAPPROVING QUALITY
TOTALSCORE: 6
BREATHING: OCCASIONAL LABORED BREATHING, SHORT PERIOD OF HYPERVENTILATION
CONSOLABILITY: DISTRACTED OR REASSURED BY VOICE/TOUCH
FACIALEXPRESSION: FACIAL GRIMACING
BODYLANGUAGE: RELAXED

## 2024-09-01 ASSESSMENT — PAIN SCALES - GENERAL
PAINLEVEL_OUTOF10: 0
PAINLEVEL_OUTOF10: 0

## 2024-09-01 NOTE — DISCHARGE INSTR - COC
/Continuity of Care Form/    Patient Name: Stone Sharp   :  1935  MRN:  [id number]    Admit date:  2024  Discharge date:2024    Code Status Order: Limited   Advance Directives:   Advance Care Flowsheet Documentation             Admitting Physician:  Omi Jenkins MD  PCP: Juve Mcintosh DO    Discharging Nurse: Jacy SANTOYO RN  Discharging Hospital Unit/Room#: 4K-12/012-A  Discharging Unit Phone Number: [phone]    Emergency Contact:   Extended Emergency Contact Information  Primary Emergency Contact: Marely Antonio   [facility]  Home Phone: [phone]  Mobile Phone: [phone]  Relation: Child  Secondary Emergency Contact: Bharathi Sharp  Home Phone: [phone]  Mobile Phone: [phone]  Relation: Spouse  Preferred language: English   needed? No    Past Surgical History:  Past Surgical History:   Procedure Laterality Date    BACK SURGERY      fusion    CARDIAC SURGERY      2 stents    COSMETIC SURGERY      ENDOSCOPY, COLON, DIAGNOSTIC      IVC FILTER INSERTION      NECK SURGERY      OTHER SURGICAL HISTORY  2015    DECOMPRESSIVE LUMBAR LAMINECTOMY L2-3    ROTATOR CUFF REPAIR Left     TOTAL HIP ARTHROPLASTY Bilateral     TOTAL KNEE ARTHROPLASTY Left        Immunization History:   Immunization History   Administered Date(s) Administered    COVID-19, MODERNA BLUE border, Primary or Immunocompromised, (age 12y+), IM, 100 mcg/0.5mL 2021, 2021, 2022       Active Problems:  Patient Active Problem List   Diagnosis Code    CKD (chronic kidney disease) stage 3, GFR 30-59 ml/min ([facility]) N18.30    Hypertension, essential I10    Osteoarthritis M19.90    ASHD (arteriosclerotic heart disease) I25.10    Pneumonia of left lower lobe due to infectious organism J18.9    Constipation K59.00    Dyslipidemia E78.5    Spinal stenosis, lumbar, s/p laminectomy M48.061    S/P laminectomy Z98.890    Presence of IVC filter Z95.828    BPH (benign prostatic

## 2024-09-01 NOTE — PROGRESS NOTES
Hospitalist Progress Note      Patient:  Stone Sharp    Unit/Bed:4-12/012-A  YOB: 1935  MRN: [id number]   Acct: [id number]   PCP: Juve Mcintosh DO  Date of Admission: 8/19/2024    Medications:    Infusion Medications    dextrose      dextrose 60 mL/hr at 09/01/24 0920    sodium chloride 25 mL/hr at 08/28/24 0825    sodium chloride      Scheduled Medications    insulin lispro  0-4 Units SubCUTAneous 4x daily    furosemide  60 mg IntraVENous Q6H    erythromycin  400 mg Oral TID AC    amiodarone  200 mg Oral Daily    metoprolol  50 mg Oral BID    guaiFENesin  400 mg Per NG tube Q6H    midodrine  10 mg Oral Once    digoxin  125 mcg IntraVENous Daily    enoxaparin  1 mg/kg SubCUTAneous Q12H    [Held by provider] bumetanide  1 mg IntraVENous Daily    ipratropium  0.5 mg Nebulization 4x Daily RT    lidocaine 1 % injection  50 mg IntraDERmal Once    pantoprazole  40 mg IntraVENous Daily    carbidopa-levodopa  1 tablet Oral 4x Daily    Vitamin D  5,000 Units Oral Daily    escitalopram  10 mg Oral Daily    multivitamin  1 tablet Oral Daily    [Held by provider] oxyBUTYnin  5 mg Oral BID    sodium chloride flush  5-40 mL IntraVENous 2 times per day    PRN Meds: glucose, dextrose bolus **OR** dextrose bolus, glucagon (rDNA), dextrose, sodium chloride, HYDROmorphone **OR** HYDROmorphone, sodium chloride flush, sodium chloride, potassium chloride **OR** potassium chloride, magnesium sulfate, polyethylene glycol, acetaminophen **OR** acetaminophen    
      Pharmacy Renal Adjustment    Pharmacy renally adjusted the following medication(s) per P&T approved policy: Metoclopramide    Recent Labs     08/27/24  0315 08/28/24  0945   BUN 46* 53*   CREATININE 1.2 1.3*     Estimated Creatinine Clearance: 48 mL/min (A) (based on SCr of 1.3 mg/dL (H)).    Assessment:  CKD stage 3    Plan:   Decrease metoclopramide from 10 mg to 5 mg twice daily    Please call pharmacy with any questions.    Cheryl Davila, PharmD  8/28/2024 5:01 PM    
    Head to Toe Assessment    [facility] Student Nurse    Received report at 0700 from Night shift primary nurse, BERNICE Torres.    Head to Toe Assessment Performed at 0830  Skin  General skin appearance / Description: Warm, dry, intact  Incisions / Wounds: Chest Tube discontinued 08/28/24    Neuro/Head  LOC: A+O x CHRISTIN  Speech: Non-verbal  Eyes PERRLA CHRISTIN mm  Symmetry of face / tongue: Symmetrical, bilaterally  JVD of neck: CHRISTIN    Chest  Heart sounds / Apical Pulse: 130  Dyspnea: Present  Lung sounds: Rhonci  Cough: Not Present    Abdomen/ Pelvis  Bowel sounds: Hypoactive bowel sounds x4  Passing flatus: No  Palpation / Inspection: Round, soft, distended  Last BM: CHRISTIN  Stool assessment: Watery, runny  Any GI issues: Rectal tube   Urinary issues: Navarro  Continence: Navarro  Urine color / turbidity: Yellow    Extremities  Edema: +3, pitting, weeping  Altered Sensation: CHRISTIN  Upper extremity push / pulls: CHRISTIN, Christin  Left Arm Radial Pulse:  +1 weak  Left Upper extremity Capillary Refill: Less than 3 seconds.  Right Arm Radial Pulse: +1 weak   Right Upper extremity Capillary Refill: Less than 3 seconds  Lower extremity pedal push / pulls: CHRISTIN, CHRISTIN  Left pedal pulse: +1 weak   Left posterior tibialis pulse: +1 weak   Left lower extremity capillary refill: Less than 3 seconds  Right pedal pulse: +1 weak   Right posterior tibialis pulse: +1 weak   Right lower extremity capillary refill: less than 3 seconds  Drift of extremities: CHRISTIN  Pain: CHRISTIN    Other issues: CHRISTIN    1400 Reassessment done  Zurdo Land Reported off to primary Brian QUEEN at 1545  Zurdo ROJAS /   Electronically signed by Zurdo Land on 8/30/2024 at 3:36 PM  RSC/ SN    
    Hospitalist Progress Note  Internal Medicine Resident      Patient: Stone Sharp 88 y.o. male      Unit/Bed: -12/012-A    Admit Date: 8/19/2024      ASSESSMENT AND PLAN  Active Problems  Acute hypoxemic respiratory failure-multifactorial:Patient presented with worsening shortness of breath, tachycardia, tachypnea.  He did not see improvement with high flow nasal cannula/BiPAP.  He was intubated in the ED on 8/20/2024, and brought up to the ICU.  He was COVID-positive on 8/19/2024.  Chest x-ray 8/22/2024 showed stable to mildly improved left basilar pleural/parenchymal opacities.  Albumin was ordered, patient was placed into Trendelenburg position to ensure perfusion.    Left-sided pleural effusion-chest tube in place: Chest x-ray 8/20/2024 showed left pleural effusion with left lower lobe consolidation.  CTA 8/19/2024 showed moderate left to large pleural effusion.  Chest tube was placed on 8/20/2024, draining serosanguineous fluid.  8/25/2024 chest x-ray revealed increasing in size of left basilar pleural/parenchymal opacities most consistent with increasing size of left basilar effusion.  Fluid analysis consistent with exudative pleural effusion.  Pleural fluid cultures negative. Bronchoscopy cultures revealed no bacteria seen.  Acid-fast smear pending.  Patient has not had drainage from chest tube in multiple days.  -Pulmonology managing chest tube    Paroxysmal A-fib: [id number] score 6.  On Eliquis and Lopressor 100 mg.  Patient had episode of A-fib with RVR 8/27/2024, concern for clinical deterioration.  Family was notified.  -Patient was started on amiodarone, digoxin    COVID-19 pneumonia: Patient tested positive for COVID-19 on 8/19/2024.  COVID-19 likely contributing to acute hypoxic respiratory failure, though patient quickly improved after left-sided pleural effusion was drained and was de-escalated to 1 to 2 L O2 via NC.  Patient received couple dose of baricitinib prior to this.  Baricitinib 
    Hospitalist Progress Note  Internal Medicine Resident      Patient: Stone Sharp 88 y.o. male      Unit/Bed: -12/012-A    Admit Date: 8/19/2024      ASSESSMENT AND PLAN  Active Problems  Acute hypoxemic respiratory failure-multifactorial:Patient presented with worsening shortness of breath, tachycardia, tachypnea.  He did not see improvement with high flow nasal cannula/BiPAP.  He was intubated in the ED on 8/20/2024, and brought up to the ICU.  He was COVID-positive on 8/19/2024.  Chest x-ray 8/22/2024 showed stable to mildly improved left basilar pleural/parenchymal opacities.  Albumin was ordered, patient was placed into Trendelenburg position to ensure perfusion.   -Will consider transfer to [place]    Left-sided pleural effusion: Chest x-ray 8/20/2024 showed left pleural effusion with left lower lobe consolidation.  CTA 8/19/2024 showed moderate left to large pleural effusion.  Chest tube was placed on 8/20/2024, draining serosanguineous fluid, removed 8/28/2024.  8/25/2024 chest x-ray revealed increasing in size of left basilar pleural/parenchymal opacities most consistent with increasing size of left basilar effusion.  Fluid analysis consistent with exudative pleural effusion.  Pleural fluid cultures negative. Bronchoscopy cultures revealed no bacteria seen.  Acid-fast smear pending.  Patient has not had drainage from chest tube in multiple days.  -Chest tube removed 8/28/2024  -CTS feels that the patient is not a surgical candidate at this time    Paroxysmal A-fib: [id number] score 6.  On Eliquis and Lopressor 100 mg.  Patient had episode of A-fib with RVR 8/27/2024, concern for clinical deterioration.  Family was notified.   -Patient was started on amiodarone, digoxin    Supraventricular tachycardia: Patient had an episode of SVT on 8/28/2024, improved with 6 mg of amiodarone.  Continue to monitor    COVID-19 pneumonia: Patient tested positive for COVID-19 on 8/19/2024.  COVID-19 likely 
    Hospitalist Progress Note  Internal Medicine Resident      Patient: Stone Sharp 88 y.o. male      Unit/Bed: -22/022-A    Admit Date: 8/19/2024      ASSESSMENT AND PLAN  Active Problems  Left-sided pleural effusion-improving: Chest x-ray 8/20/2024 showed left pleural effusion with left lower lobe consolidation.  CTA 8/19/2024 showed moderate left to large pleural effusion.  Chest tube was placed on 8/20/2024, draining serosanguineous fluid.  Cytology was consistent with exudative pleural effusion.  Pleural fluid cultures negative.  Continue chest tube drainage  QuantiFERON ordered, pending  COVID-19 positive: Patient tested + 8/19/2024  Dexamethasone 10 mg  Patient was extubated and transferred out of the ICU  Paralytic ileus: X-ray abdomen 8/22/2024 showed paralytic ileus.  Likely secondary to fentanyl due to sedation from the ICU  Started on azithromycin  NG tube placed  Delirium-improving: Patient's family states that the patient is delirious at baseline.  CT scan 8/22 negative for any ischemic/hemorrhagic stroke.  Continue to monitor GCS and reorient patient as needed.  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 Echo 8/20/2024 reported moderately reduced left ventricular systolic function with an EF of 35 to 40%.  Left ventricular size is normal.  Moderate global hypokinesis present.   Repeat echo as outpatient  Acute respiratory failure secondary to COVID/pulmonary effusion-improving: Patient presented with worsening shortness of breath, tachycardia, tachypnea.  He did not see improvement with high flow nasal cannula/BiPAP.  He was intubated in the ED on 8/20/2024, and brought up to the ICU.  He was COVID-positive on 8/19/2024.  Chest x-ray 8/22/2024 showed stable to mildly improved left basilar pleural/parenchymal opacities.  NG tube was placed 8/23/2024  ALDA: Secondary to hypertension.  Patient's Bumex has been held. US 
    Hospitalist Progress Note  Internal Medicine Resident      Patient: Stone Sharp 88 y.o. male      Unit/Bed: 4-12/012-A    Admit Date: 8/19/2024      ASSESSMENT AND PLAN  Active Problems  Acute hypoxemic respiratory failure-multifactorial:Patient presented with worsening shortness of breath, tachycardia, tachypnea.  He did not see improvement with high flow nasal cannula/BiPAP.  He was intubated in the ED on 8/20/2024, and brought up to the ICU.  He was COVID-positive on 8/19/2024.  Chest x-ray 8/22/2024 showed stable to mildly improved left basilar pleural/parenchymal opacities.  Albumin was ordered, patient was placed into Trendelenburg position to ensure perfusion.   -Will consider transfer to [place]    Left-sided pleural effusion: Chest x-ray 8/20/2024 showed left pleural effusion with left lower lobe consolidation.  CTA 8/19/2024 showed moderate left to large pleural effusion.  Chest tube was placed on 8/20/2024, draining serosanguineous fluid, removed 8/28/2024.  8/25/2024 chest x-ray revealed increasing in size of left basilar pleural/parenchymal opacities most consistent with increasing size of left basilar effusion.  Fluid analysis consistent with exudative pleural effusion.  Pleural fluid cultures negative. Bronchoscopy cultures revealed no bacteria seen.  Acid-fast smear pending.  Patient has not had drainage from chest tube in multiple days.  -Chest tube removed 8/28/2024  -CTS feels that the patient is not a surgical candidate at this time    Paroxysmal A-fib: [id number] score 6.  On Eliquis and Lopressor 100 mg.  Patient had episode of A-fib with RVR 8/27/2024, concern for clinical deterioration.  Family was notified.  Patient developed another episode of A-fib with RVR a.m. 8/29/2024, responded to amiodarone  -Patient was started on amiodarone, digoxin  -Transition to oral amiodarone    Supraventricular tachycardia: Patient had an episode of SVT on 8/28/2024, improved with 6 mg of 
   08/27/24 1215   Encounter Summary   Encounter Overview/Reason Attempted Encounter   Service Provided For Patient   Referral/Consult From Rounding   Support System Family members   Last Encounter  08/27/24   Complexity of Encounter Low   Begin Time 1210   End Time  1215   Total Time Calculated 5 min   Assessment/Intervention/Outcome   Assessment Unable to assess   Intervention Prayer (assurance of)/[place]     During my encounter with the 88 yr old patient, I attempted to visit with the pt on 4K. The patient appears to be resting (not responsive/resting) now and I didn't want to disturb the patient. I or another  will attempt to visit the patient or the family at another time. The pt was admitted due to respiratory failure.   
  CRITICAL CARE PROGRESS NOTE      Patient:  Stone Sharp    Unit/Bed:4B-06/006-A  YOB: 1935  MRN: [id number]   PCP: Juve Mcintosh DO  Date of Admission: 8/19/2024  Chief Complaint:-Shortness of breath    Assessment and Plan:    Acute respiratory failure secondary to COVID/pulmonary effusion.: Patient presented worsening SOB, tachycardia, tachypnea.  No improvement with HFNC/BiPAP.  Intubated in ED on 8/20/2024.  SARS-CoV-2 + on 8/19/2024.  CXR 8/21/2024 shows improving left pleural effusion. leukocytosis improving.  Patient is afebrile.  Patient is on Precedex for sedation.  Patient is on ventilation setting, actively weaning off.   Continue dexamethasone 10 mg daily.   Continue Stiolto 2 puffs daily.  Large left-sided pleural effusion: CXR 8/20/2024 showed left pleural effusion with left lower lobe consolidation.  CTA 8/19/2024 showed moderate left to large pleural effusion. chest tube was placed on 8/20/2024.  Chest tube is draining serosanguineous fluid.  Cytology 8/20/2024 shows leukocytosis.  Pleural fluid analysis shows exudative fluid. CXR 8/21/2024 showed decrease in the left pleural effusion.  Continue chest tube drainage  Cytology ordered, repeat cytology for 1 more consecutive days.  Septic shock: Likely sepsis/COVID-19.  TSH 1.75, morning cortisol 7.06.   On Levophed, actively weaning off.  From 7 to 5.   Continue other management as noted above  COVID-19 positive: SARS-CoV-2 detected on 8/19/2024.  Leukocytosis improving  Continue dexamethasone 10 mg daily.  intubated 8/20/2024: Currently weaning off ventilator settings.  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 Echo 8/20/2024 reported moderately reduced left ventricular systolic function with an EF of 35 to 40%.  Left ventricular size is normal.  Moderate global hypokinesis present.  Repeat echocardiogram as an outpatient.    ALDA: Likely 
  [place] [facility]      Patient - Stone Sharp   MRN -  [id number]   [facility] # - [id number]   - 1935      Date of Admission -  2024  8:09 PM  Date of evaluation -  2024  Room - -12/012-A   Hospital Day - 8  Consulting - Handy Edwards MD Primary Care Physician - Juve Mcintosh DO     Problem List      Active Hospital Problems    Diagnosis Date Noted    Parkinson's disease (HCC) [G20.A1] 2024    Goals of care, counseling/discussion [Z71.89] 2024    Ileus (HCC) [K56.7] 2024    Pleural effusion, left [J90] 2024    Acute hypoxemic respiratory failure due to COVID-19 (HCC) [U07.1, J96.01] 2024    Pneumonia of left lower lobe due to infectious organism [J18.9] 2014     Reason for Consult    Covid-19   HPI   History Obtained From: Patient and electronic medical record.    Patient is an 88-year-old male with past medical history of COPD, Parkinson, CAD s/p LC x2 stents, paroxysmal A-fib, urinary retention, HLD, recurrent DVTs, prostate cancer presented to the ED on 2024 for shortness of breath.  In ED patient was very tachypneic and tachycardic.  Patient failed trial with HFNC and BiPAP.  Patient was intubated in the ED for acute respiratory failure.  Patient was transferred to ICU for further monitoring and management.  ED checks x-ray left-sided pleural effusion and consolidation.      24: Patient is intubated.  Patient is on no ventilator setting.  Continuous breathing trial to assess status ability to wean off ventilator.  Echo showed 35 to 40% ejection fraction from 60 to 65% on admission, secondary to COVID-19.  Patient is on 5 mcg of Levophed, actively weaning off.     24: Patient seen at bedside.  Patient unable to follow command.  Patient responded to pain.  CT scan was ordered for suspected stroke, which is negative for any acute pathologies.  Likely due to delirium secondary to sedation.  Monitor 
  [place] [facility]      Patient - Stone Sharp   MRN -  [id number]   [facility] # - [id number]   - 1935      Date of Admission -  2024  8:09 PM  Date of evaluation -  2024  Room - -12/012-A   Hospital Day - 11  Consulting - Anselmo Alatorre DO Primary Care Physician - Juve Mcintosh DO     Problem List      Active Hospital Problems    Diagnosis Date Noted    Paroxysmal atrial fibrillation (HCC) [I48.0] 2024    Mild malnutrition (HCC) [E44.1] 2024    Parkinson's disease (HCC) [G20.A1] 2024    Goals of care, counseling/discussion [Z71.89] 2024    Ileus (HCC) [K56.7] 2024    Pleural effusion, left [J90] 2024    Acute hypoxemic respiratory failure due to COVID-19 (HCC) [U07.1, J96.01] 2024    SVT (supraventricular tachycardia) ([facility]) [I47.10] 2024    Pneumonia of left lower lobe due to infectious organism [J18.9] 2014     Reason for Consult    COVID-19 pneumonia with respiratory failure requiring HHFNC   HPI   History Obtained From: Patient and electronic medical record.    Patient is an 88-year-old male with past medical history of COPD, Parkinson, CAD s/p LC x2 stents, paroxysmal A-fib, urinary retention, HLD, recurrent DVTs, prostate cancer presented to the ED on 2024 for shortness of breath.  In ED patient was very tachypneic and tachycardic.  Patient failed trial with HFNC and BiPAP.  Patient was intubated in the ED for acute respiratory failure.  Patient was transferred to ICU for further monitoring and management.  ED checks x-ray left-sided pleural effusion and consolidation.      24: Patient is intubated.  Patient is on no ventilator setting.  Continuous breathing trial to assess status ability to wean off ventilator.  Echo showed 35 to 40% ejection fraction from 60 to 65% on admission, secondary to COVID-19.  Patient is on 5 mcg of Levophed, actively weaning off.     24: Patient seen 
  [place] [facility]      Patient - Stone Sharp   MRN -  [id number]   [facility] # - [id number]   - 1935      Date of Admission -  2024  8:09 PM  Date of evaluation -  2024  Room - -12/012-A   Hospital Day - 10  Consulting - Anselmo Alatorre DO Primary Care Physician - Juve Mcintosh DO     Problem List      Active Hospital Problems    Diagnosis Date Noted    Mild malnutrition (HCC) [E44.1] 2024    Parkinson's disease (HCC) [G20.A1] 2024    Goals of care, counseling/discussion [Z71.89] 2024    Ileus (HCC) [K56.7] 2024    Pleural effusion, left [J90] 2024    Acute hypoxemic respiratory failure due to COVID-19 (HCC) [U07.1, J96.01] 2024    SVT (supraventricular tachycardia) ([facility]) [I47.10] 2024    Pneumonia of left lower lobe due to infectious organism [J18.9] 2014     Reason for Consult    Covid-19 HFNC  HPI   History Obtained From: Patient and electronic medical record.    Patient is an 88-year-old male with past medical history of COPD, Parkinson, CAD s/p LC x2 stents, paroxysmal A-fib, urinary retention, HLD, recurrent DVTs, prostate cancer presented to the ED on 2024 for shortness of breath.  In ED patient was very tachypneic and tachycardic.  Patient failed trial with HFNC and BiPAP.  Patient was intubated in the ED for acute respiratory failure.  Patient was transferred to ICU for further monitoring and management.  ED checks x-ray left-sided pleural effusion and consolidation.      24: Patient is intubated.  Patient is on no ventilator setting.  Continuous breathing trial to assess status ability to wean off ventilator.  Echo showed 35 to 40% ejection fraction from 60 to 65% on admission, secondary to COVID-19.  Patient is on 5 mcg of Levophed, actively weaning off.     24: Patient seen at bedside.  Patient unable to follow command.  Patient responded to pain.  CT scan was ordered for 
  [place] [facility]      Patient - Stone Sharp   MRN -  [id number]   [facility] # - [id number]   - 1935      Date of Admission -  2024  8:09 PM  Date of evaluation -  2024  Room - -12/012-   Hospital Day - 7  Consulting - Handy Edwards MD Primary Care Physician - Juve Mcintosh DO     Problem List      Active Hospital Problems    Diagnosis Date Noted    Parkinson's disease (HCC) [G20.A1] 2024    Goals of care, counseling/discussion [Z71.89] 2024    Ileus (HCC) [K56.7] 2024    Pleural effusion, left [J90] 2024    Acute hypoxemic respiratory failure due to COVID-19 (HCC) [U07.1, J96.01] 2024     Reason for Consult    Covid-19, Left side chest tube   HPI   History Obtained From: Patient and electronic medical record.    Patient is an 88-year-old male with past medical history of COPD, Parkinson, CAD s/p LC x2 stents, paroxysmal A-fib, urinary retention, HLD, recurrent DVTs, prostate cancer presented to the ED on 2024 for shortness of breath.  In ED patient was very tachypneic and tachycardic.  Patient failed trial with HFNC and BiPAP.  Patient was intubated in the ED for acute respiratory failure.  Patient was transferred to ICU for further monitoring and management.  ED checks x-ray left-sided pleural effusion and consolidation.      24: Patient is intubated.  Patient is on no ventilator setting.  Continuous breathing trial to assess status ability to wean off ventilator.  Echo showed 35 to 40% ejection fraction from 60 to 65% on admission, secondary to COVID-19.  Patient is on 5 mcg of Levophed, actively weaning off.     24: Patient seen at bedside.  Patient unable to follow command.  Patient responded to pain.  CT scan was ordered for suspected stroke, which is negative for any acute pathologies.  Likely due to delirium secondary to sedation.  Monitor GCS and reorient patient accordingly.     : Patient 
  Pharmacy Note - Extended Infusion Beta-Lactam Dose Adjustment    Piperacillin/Tazobactam 3,380 mg intermittent infusion ordered for the treatment of Community acquired pneumonia. Per [facility] Extended Infusion Beta-Lactam Policy, this will be changed to 4500 mg loading dose x 1     Estimated Creatinine Clearance: CrCl cannot be calculated (Unknown ideal weight.).    Dialysis Status, ALDA, CKD: Normal    BMI: There is no height or weight on file to calculate BMI.    Rationale for Adjustment: Dose adjusted per [facility] Extended Infusion Policy based on renal function and indication. The above medication is renally eliminated and demonstrates time-dependent effects on bacterial eradication. Extended-infusion dosing strategy aims to enhance microbiologic and clinical efficacy.     Pharmacy will monitor renal function daily and adjust dose as necessary.      Please call with any questions.    Thank you,    Cheryl Davila, PharmD  8/19/2024 9:34 PM   
  Physician Progress Note      PATIENT:               CHE VIERA  [facility] #:                  [id number]  :                       1935  ADMIT DATE:       2024 8:09 PM  DISCH DATE:  RESPONDING  PROVIDER #:        Handy Edwards MD          QUERY TEXT:    Pt admitted with COVID. Pt noted to have 8-21 Septic shock: Likely   sepsis/COVID-19. If possible, please document in progress notes and discharge   summary the present on admission status of - Septic shock: Likely   sepsis/COVID-19:    The medical record reflects the following:  Risk Factors: Acute Hypoxic resp failure. COVID SARS-CoV-2 RNA, RT   PCR DETECTED  Clinical Indicators: 8- Septic shock: Likely sepsis/COVID-19.  TSH 1.75,   morning cortisol 7.06.  On Levophed, actively weaning off.  From 7 to 5.  WBC 11.8-12.5, LA 2.7-3.4,   Resp 21-44/min, /min  Treatment: ICU admit, IV atbs and intubation    Thank you.  Kerry Rodriguez RN, Clinical Documentation Integrity, [employer]   [employer], [facility], CRCR  Options provided:  -- Yes, sepsis/COVID-19 was present at the time of the order to admit to the   hospital  -- No, sepsis/COVID-19 was not present on admission and developed during the   inpatient stay  -- Other - I will add my own diagnosis  -- Disagree - Not applicable / Not valid  -- Disagree - Clinically unable to determine / Unknown  -- Refer to Clinical Documentation Reviewer    PROVIDER RESPONSE TEXT:    Yes, sepsis/COVID-19 was present at the time of the order to admit to the   hospital.    Query created by: Kerry Rodriguez on 2024 12:21 PM      QUERY TEXT:    Patient admitted with COVID and Sepsis, noted to have Paroxysmal atrial   fibrillation and is maintained on Eliquis. If possible, please document in   progress notes and discharge summary if you are evaluating and/or treating any   of the following:?  ?  The medical record reflects the following:  Risk Factors: Patient is an 88-year-old male with hypertension and 
  [facility]      Patient - Stone Sharp   MRN -  [id number]   [facility] # - [id number]   - 1935      Date of Admission -  2024  8:09 PM  Date of evaluation -  2024  Room - -12/012-   Hospital Day - 12  Consulting - Anselmo Alatorre DO Primary Care Physician - Juve Mcintosh DO     Problem List      Active Hospital Problems    Diagnosis Date Noted    Paroxysmal atrial fibrillation (HCC) [I48.0] 2024    Mild malnutrition (HCC) [E44.1] 2024    Parkinson's disease (HCC) [G20.A1] 2024    Goals of care, counseling/discussion [Z71.89] 2024    Ileus (HCC) [K56.7] 2024    Pleural effusion, left [J90] 2024    Acute hypoxemic respiratory failure due to COVID-19 (HCC) [U07.1, J96.01] 2024    SVT (supraventricular tachycardia) ([facility]) [I47.10] 2024    Pneumonia of left lower lobe due to infectious organism [J18.9] 2014     Reason for Consult    COVID-19 pneumonia with respiratory failure requiring HHFNC   HPI   History Obtained From: Patient and electronic medical record.    Patient is an 88-year-old male with past medical history of COPD, Parkinson, CAD s/p LC x2 stents, paroxysmal A-fib, urinary retention, HLD, recurrent DVTs, prostate cancer presented to the ED on 2024 for shortness of breath.  In ED patient was very tachypneic and tachycardic.  Patient failed trial with HFNC and BiPAP.  Patient was intubated in the ED for acute respiratory failure.  Patient was transferred to ICU for further monitoring and management.  ED checks x-ray left-sided pleural effusion and consolidation.      24: Patient is intubated.  Patient is on no ventilator setting.  Continuous breathing trial to assess status ability to wean off ventilator.  Echo showed 35 to 40% ejection fraction from 60 to 65% on admission, secondary to COVID-19.  Patient is on 5 mcg of Levophed, actively weaning off.     24: Patient seen 
  [facility]      Patient - Stone Sharp   MRN -  [id number]   [facility] # - [id number]   - 1935      Date of Admission -  2024  8:09 PM  Date of evaluation -  2024  Room - --A   Hospital Day - 5  Consulting - Anselmo Alatorre DO Primary Care Physician - Juve Mcintosh DO     Problem List      Active Hospital Problems    Diagnosis Date Noted    Parkinson's disease (HCC) [G20.A1] 2024    Goals of care, counseling/discussion [Z71.89] 2024    Ileus (HCC) [K56.7] 2024    Pleural effusion, left [J90] 2024    Acute hypoxemic respiratory failure due to COVID-19 (HCC) [U07.1, J96.01] 2024     Reason for Consult    For management of Covid Pneumonia with Left Chest tube 2/2 PTX  HPI   History Obtained From: Patient and electronic medical record.    Patient is an 88-year-old male with past medical history of COPD, Parkinson, CAD s/p LC x2 stents, paroxysmal A-fib, urinary retention, HLD, recurrent DVTs, prostate cancer presented to the ED on 2024 for shortness of breath.  In ED patient was very tachypneic and tachycardic.  Patient failed trial with HFNC and BiPAP.  Patient was intubated in the ED for acute respiratory failure.  Patient was transferred to ICU for further monitoring and management.  ED checks x-ray left-sided pleural effusion and consolidation.      24: Patient is intubated.  Patient is on no ventilator setting.  Continuous breathing trial to assess status ability to wean off ventilator.  Echo showed 35 to 40% ejection fraction from 60 to 65% on admission, secondary to COVID-19.  Patient is on 5 mcg of Levophed, actively weaning off.     24: Patient seen at bedside.  Patient unable to follow command.  Patient responded to pain.  CT scan was ordered for suspected stroke, which is negative for any acute pathologies.  Likely due to delirium secondary to sedation.  Monitor GCS and reorient patient 
 A size 8 endotracheal tube was successfully placed using a size 4 blade. The Lot number for he endotracheal tube was [id number]  
 PowerGlide Insertion Procedure Note  This line is NOT a central line, please do not use this for central solutions.   Line can be removed by the primary nurse or LPN, but should not be removed by the tech.  Line can be used to collect labs, but is NOT guaranteed to draw blood the entire duration of dwell.   Cathflo CANNOT be used on this device.   Patient CAN discharge home/to SNF with this device if ordered.   PowerGlide device can dwell for up to 29 days.     Stone Sharp   Admitted- 8/19/2024  8:09 PM  Admission diagnosis- Troponin level elevated [R79.89]  Pleural effusion, left [J90]  Acute hypoxemic respiratory failure due to COVID-19 (HCC) [U07.1, J96.01]      Attending Physician- Omi Jenkins MD    Indication for Insertion: Poor Vascular Access    Catheter Insertion Date- 8/22/2024   Catheter Brand-Bard  Lot Number- [id number]  Gauge-20  Lumen-single    Insertion Site- LEXX Brachial  Vein Diameter- 3.1 mm  Catheter Length- 10 cm  Internal Length- 10 cm     Midline Tip Terminates in the Axillary- Yes  Upper Arm Circumference- 31cm  Easy insertion- Yes  Able to Aspirate blood- Yes  Easy Flush- Yes    Midline insertion successful- Yes  Ultrasound- yes    Okay To Use Midline- Yes      Electronically signed by My Mercado RN,  on 8/22/2024 at 12:00 PM     
1348 Pt arrived to CT for CT guided chest tube manipulation with possible chest tube replacement under conscious sedation. No family present.  1353 Call placed to patient's spouse to obtain consent.   1403 Spouse remained on 4K.  Procedure explained using teach back method. Pt's wife states understanding. This procedure has been fully reviewed with the patient's wife and written informed consent has been obtained.   1405 Patient assisted to table in supine position. Monitor remains attached to patient. Comfort ensured.  1406 Pre-procedure images obtained.  1429 Dr Sandhu reviewed images and states chest tube is properly placed. There is pneumonia consolidation, but no fluid to remove. Procedure canceled.  1434 Monitor remains intact. Patient assisted to bed in supine position. Comfort ensured.  1442 Patient transported to  in stable condition. Bhanu RN at bedside.  
1530 Pt in specials radiology for PICC insertion. Consent obtained from family. Pt confused. Non verbal. Moved to table and attached to monitor.  1552 Right neck prepped and draped.  1600 Dr Sandhu to start procedure.  1609 Triple PICC placed in right IJ per Dr Sandhu and sutured to right neck.  1612 OP-site with CHG applied to site. Site without redness, swelling or hematoma.  1620 Pt positioned on bed for comfort and transferred to  per bed. Report to Brian QUEEN.  
[facility]  SPEECH THERAPY  STRZ CVICU 4B  Clinical Swallow Evaluation    Discharge Recommendations: SNF  DIET ORDER RECOMMENDATIONS AFTER EVALUATION: NPO    SLP Individual Minutes  Time In: 1448  Time Out: 1456  Minutes: 8  Timed Code Treatment Minutes: 0 Minutes       Date: 2024  Patient Name: Stone Sharp      CSN: [id number]   : 1935  (88 y.o.)  Gender: male   Referring Physician:  Omi Jenkins MD    Diagnosis: Acute hypoxemic respiratory failure due to COVID-19 (HCC)    History of Present Illness/Injury: Patient is an 88-year-old male with past medical history of COPD, Parkinson, CAD s/p LC x2 stents, paroxysmal A-fib, urinary retention, HLD, recurrent DVTs, prostate cancer presented to the ED on 2024 for shortness of breath.  In ED patient was very tachypneic and tachycardic.  Patient failed trial with HFNC and BiPAP.  Patient was intubated in the ED for acute respiratory failure.  Patient was transferred to ICU for further monitoring and management.  ED checks x-ray left-sided pleural effusion and consolidation.      24: Patient is intubated.  Patient is on no ventilator setting.  Continuous breathing trial to assess status ability to wean off ventilator.  Echo showed 35 to 40% ejection fraction from 60 to 65% on admission, secondary to COVID-19.  Patient is on 5 mcg of Levophed, actively weaning off.     24: Patient seen at bedside.  Patient unable to follow command.  Patient responded to pain.  CT scan was ordered for suspected stroke, which is negative for any acute pathologies.  Likely due to delirium secondary to sedation.  Monitor GCS and reorient patient accordingly.  Past Medical History:   Diagnosis Date    ASHD (arteriosclerotic heart disease)     Status post coronary artery stenting    Atrial fibrillation (HCC)     BPH (benign prostatic hyperplasia)     CKD (chronic kidney disease) stage 3, GFR 30-59 ml/min (HCC)     COPD (chronic obstructive pulmonary 
Attempted to call report to [facility].  
Baricitinib Follow-Up    Current Dose: 4 mg once daily    Recent Labs     08/19/24 2057 08/20/24 0716 08/21/24  0636   ALT 6*  --   --    AST 26  --   --    WBC 11.8* 14.8* 12.5*   IMMGRAN 0.9  0.11*  --   --    LYMPHSABS 0.9*  --   --      eGFR:   Recent Labs     08/19/24 2057 08/20/24 0716 08/21/24  0636   LABGLOM 73 73 48*     Plan:  Decrease baricitinib to 2 mg once daily    Renal Dose Adjustments (per [facility] renal dosing policy):  eGFR ? 60: No dosage adjustment necessary.  eGFR 30 to 59: 2 mg once daily.  eGFR 15 to 29: 1 mg once daily.  eGFR < 14: Use is not recommended.    Consider stopping baricitinib if:  Absolute lymphocyte count (ALC) is < 200   Absolute neutrophil count (ANC) is < 500  ALT > 5 x ULN  AST > 10 x ULN  Acute DVT or PE    
Baricitinib Initiation    This patient meets criteria for baricitinib.     Criteria:  Age 2 years or greater  COVID-19 positive & hospitalized  Must also be on dexamethasone - yes  If requiring nasal cannula, high flow nasal cannula, invasive or non-invasive ventilation, or ECMO and has any elevation in CRP, D-dimer, ferritin, or LDH - CRP elevated, on vent    Exclusions:  If patient already received tocilizumab (due to long half-life)  Patients with active TB    Special Considerations (that warrant provider discussion):  Active DVT or PE  Pregnancy  Severe hepatic impairment  Chronic/recurrent infections  Hemoglobin < 8.0  Immunosuppressed patients (drug or disease etiology)    eGFR:  Recent Labs     08/19/24 2057   LABGLOM 73   WBC 11.8*     Plan:  Start baricitinib 2 mg once daily.     EUA Adult Dosing Reference for COVID-19 (FDA 2021):  eGFR ?60: No dosage adjustment necessary.  eGFR 30 to <60: 2 mg once daily.  eGFR 15 to <30: 1 mg once daily.  eGFR <15: Use is not recommended.    *Dosing is different for ages 2 to 9 years old. (see Ambar)*    Theresa Edwards RP, BCPS, BCGP  8/20/2024     5:59 AM      
Comprehensive Nutrition Assessment    Type and Reason for Visit:  Reassess    Nutrition Recommendations/Plan:   1.The nurse addressed concern with me  that he feels pt will continue to aspirate if receives a PEG due to nurse has been working with this pt this admit & notices respiratory status worsens when TF was going to the stomach. We were wondering if pt would be more appropriate for a J-tube? I perfect served Dr Paris about this & asked if J-tube would be appropriate. Dr Soriano responded he will check with Dr Alatorre.    When doctors feel pt appropriate to start tube feeds, consider Nepro 10ml/hr & increase by 5ml/hr every 8 hrs as tolerated to goal 55ml/hr is met ~18 hrs /day due to  WILL NEED TO HOLD FEEDS 1 HOUR BEFORE TO 2 HRS AFTER DOXYCYCLINE ( given every 12 hrs). Tube feeds would yield 990ml, 1752kcals, 80 grams protein, 25 grams fiber, 158 grams CHO, 718 ml water from TF.   Free water flushes per provider. Notice pt has gained~15lbs since (8/20) & notice + 3 edema.    Prognosis very guarded noted.   Larisa from palliative care noted (8/28)\"Family state they would want to proceed with peg tube if this is an option\"     Malnutrition Assessment:  Malnutrition Status:  Mild malnutrition (08/28/24 1445)    Context:  Acute Illness     Findings of the 6 clinical characteristics of malnutrition:  Energy Intake:  50% or less of estimated energy requirements for 5 or more days  Weight Loss:  No significant weight loss     Body Fat Loss:  No significant body fat loss     Muscle Mass Loss:  No significant muscle mass loss    Fluid Accumulation:  No significant fluid accumulation     Strength:  Not Performed    Nutrition Assessment:     Pt declining  from a nutritional standpoint AEB pt NPO & has been receiving less than 50% needs for atleast 9 days per documentation ). Remains at risk for further nutritional compromise r/t admitted d/t SBO, left sided pleural effusion- improving, COVID-19+, paralytic ileus, 
Comprehensive Nutrition Assessment    Type and Reason for Visit:  Reassess    Nutrition Recommendations/Plan:   Continue NPO per SLP and Provider.   Continue to advance Nepro by 10 ml within 6 hours as tolerated to goal rate of 40 ml/hr.   Flush 250 ml free water every 8 hours per Dr. Edwards on 8/26/24.     Malnutrition Assessment:  Malnutrition Status:  No malnutrition (08/20/24 1034)    Context:  Acute Illness     Findings of the 6 clinical characteristics of malnutrition:  Energy Intake:  Unable to assess (patient intubated- no family present)  Weight Loss:  No significant weight loss     Body Fat Loss:  No significant body fat loss     Muscle Mass Loss:  No significant muscle mass loss    Fluid Accumulation:  No significant fluid accumulation     Strength:  Not Performed    Nutrition Assessment: Pt improving from a nutritional standpoint AEB RN report of pt tolerating Nepro at 30 ml/hr- pt not yet at goal. Remains at risk for further nutritional compromise r/t admitted d/t SBO, left sided pleural effusion- improving, COVID-19+, paralytic ileus, cardiomyopathy, ALDA, acute respiratory failure, intubated on 8/20- extubated on 8/21- pt extubated but not responsive and unable to follow commands and underlying medical condition (PMHx: ASHD, Afib, BPH, CKD stage 3, COPD, dyslipidemia, CVA, hx prostate cancer, DVT, spinal stenosis s/p laminectomy).     Nutrition Related Findings:    Pt. Report/Treatments/Miscellaneous: Pt seen this morning with RN present; COVID+; pt unresponsive and unable to follow commands or communicate. Nepro tube feed running at 30 ml/hr this morning- tolerating it well per RN. Free water flushes per Dr. Edwards. TF not yet at goal rate of 40 ml/hr.  GI Status: Last BM x1 on 8/22.  Pertinent Labs: sodium 146, BUN 46, Glucose 202  Pertinent Meds: Lipitor, Bumex, Decadron, Multivitamin, Vitamin D     Wound Type: Pressure Injury, Stage II (on sacrum)       Current Nutrition Intake & Therapies:  
Comprehensive Nutrition Assessment    Type and Reason for Visit:  Reassess (Norris () request followup to see what tube feeding plans are)    Nutrition Recommendations/Plan:   Continue NPO per orders due to Dr Stratton mentioned via perfect serve\"Lets keep him npo at thisvtime. Concern about aspiration yesterday \".   When doctors feel pt appropriate to start tube feeds, consider Nepro 10ml/hr as appropriate.WILL NEED TO HOLD FEEDS 1 HOUR BEFORE TO 2 HRS AFTER DOXYCYCLINE ( given every 12 hrs).   Prognosis very guarded noted.   Larisa from palliative care noted (8/28)\"Family state they would want to proceed with peg tube if this is an option\"      Malnutrition Assessment:  Malnutrition Status:  Mild malnutrition (08/28/24 9565)    Context:  Acute Illness     Findings of the 6 clinical characteristics of malnutrition:  Energy Intake:  50% or less of estimated energy requirements for 5 or more days  Weight Loss:  No significant weight loss     Body Fat Loss:  No significant body fat loss     Muscle Mass Loss:  No significant muscle mass loss    Fluid Accumulation:  No significant fluid accumulation     Strength:  Not Performed    Nutrition Assessment:      Pt declining  from a nutritional standpoint AEB pt NPO due to concern about aspiration yesterday per Dr Viral castro. ( Pt has been receiving less than 50% needs for atleast 8 days per documentation ). Remains at risk for further nutritional compromise r/t admitted d/t SBO, left sided pleural effusion- improving, COVID-19+, paralytic ileus, cardiomyopathy, ALDA, acute respiratory failure, intubated on 8/20- extubated on 8/21- pt extubated but not responsive and unable to follow commands and underlying medical condition (PMHx: ASHD, Afib, BPH, CKD stage 3, COPD, dyslipidemia, CVA, hx prostate cancer, DVT, spinal stenosis s/p laminectomy, Parkinson's    Nutrition Related Findings:    Pt. Report/Treatments/Miscellaneous: Pt with Covid (onset 
Comprehensive Nutrition Assessment    Type and Reason for Visit: Reassess    Nutrition Recommendations/Plan:   Continue NPO status- pending swallow evaluation and mentation improvement.   Should patient require NGT placement, recommend initiating Nepro at 10 ml/hr and increasing by 10 ml q6hr to goal rate of 40 ml/hr.   Water flushes per MD.      Malnutrition Assessment:  Malnutrition Status: No malnutrition  Context: Acute Illness       Findings of the 6 clinical characteristics of malnutrition:  Energy Intake:  Unable to assess (patient intubated- no family present)  Weight Loss:  No significant weight loss     Body Fat Loss:  No significant body fat loss     Muscle Mass Loss:  No significant muscle mass loss    Fluid Accumulation:  No significant fluid accumulation     Strength:  Not Performed    Nutrition Assessment:    Pt. with no improvement from a nutritional standpoint AEB extubated, but not responsive- unable to follow commands. Per Morris RN patient not appropriate for swallow evaluation at this time.  At risk for further nutrition compromise r/t admitted d/t SOB- COVID+, intubated on 8/20- extubated on 8/21. Pleural catheter was placed on 8/20- 90 ml fluid drained in last 24 hours. Noted underlying medical condition (PMHx ASHD, Afib, BPH, CKD stg 3, COPD, dyslipidemia, CVA, hx prostate cancer, DVT, spinal stenosis s/p laminectomy).     Nutrition Related Findings:    Pt. Report/Treatments/Miscellaneous: Patient seen through door window, RN reports that patient is not responsive to verbal stimulation at this time and may require an NGT in the near future.   GI Status: Last BM 8/21  Wound:  (sacral wound)   Edema:  +3 pitting b/l LE edema & +2 pitting b/l UE edema, per flowsheet   Pertinent Labs:   Lab Results   Component Value Date    LABA1C 5.2 11/28/2020    LABA1C 5.5 04/11/2018    LABA1C 5.3 02/08/2018     Recent Labs     08/19/24 2057 08/20/24  0716 08/21/24  0636    139 140   K 4.4 5.3* 
[facility]  PHYSICAL THERAPY MISSED TREATMENT NOTE  STRZ ICU STEPDOWN TELEMETRY 4K    Date: 2024  Patient Name: Stone Sharp        MRN: [id number]   : 1935  (88 y.o.)  Gender: male                REASON FOR MISSED TREATMENT:  Missed Treat.      Discharge PT per MD. Pt not medically stable. Change in status to limited x4. Pt on HFNC with HR >157 bpm. MD stated will reorder therapy if pt becomes appropriate.       
[facility]  INPATIENT SPEECH THERAPY  STRZ CVICU 4B  DAILY NOTE    TIME   SLP Individual Minutes  Time In: 0830  Time Out: 0842  Minutes: 12  Timed Code Treatment Minutes: 0 Minutes       Date: 2024  Patient Name: Stone Sharp      CSN: [id number]   : 1935  (88 y.o.)  Gender: male   Referring Physician:  Omi Jenkins MD    Diagnosis: Acute hypoxemic respiratory failure due to COVID-19 (HCC)  Precautions: HIGH aspiration risk   Current Diet: NPO, q2h oral care  Respiratory Status: Nasal Canula: 2LPM  Swallowing Strategies: Not Applicable  Date of Last MBS/FEES: Not Applicable    Pain:  unable to rate; however, suspect presence of pain s/t continuous moaning + elevated HR (116)    Subjective:  Patient seen with RN Christy's approval. RN reports patient largely \"the same\" from previous date. Upon arrival to room alternate Lupis QUEEN present in room, patient supine in bed moaning. Absence for basic command following evident. BERNICE Baugh reports heart rate in 160s prior to dilaudid administration ~15 minutes ago. RN reports crackles bilaterally in upper lungs.  No family present at bedside.     Short-Term Goals:  SHORT TERM GOAL #1:  Goal 1: Patient will consume conservative PO offerings demonstrating adequate endurance and consistent pharyngeal trigger to determine potential readiness for dietary initiation vs need for formal instrumental assessment  INTERVENTIONS: Not medically appropriate for PO trials on this date d/t decreased alertness, heightened oral defensiveness with limited mandibular opening for adequate oral acceptance. Anticipate patient will require alternative means of nutrition (NGT). Recommend continuation of NPO + Q2H oral care.     SHORT TERM GOAL #2:  Goal 2: Staff will adhere to q2h oral care to decrease colonization of oral bacteria and reduce risk for infection  INTERVENTIONS: Initiated oral care on this date. Patient with LIMITED mandibular opening requiring tactile 
[facility]  INPATIENT OCCUPATIONAL THERAPY  STRZ ICU STEPDOWN TELEMETRY 4K  EVALUATION      Discharge Recommendations: 24 hour supervision or assist, Other (Comment) (return home with spouse and family who provide  physical assistance.)  Equipment Recommendations: Equipment Needed: No      Time In: 908  Time Out: 923  Timed Code Treatment Minutes: 0 Minutes  Minutes: 15          Date: 2024  Patient Name: Stone Sharp,   Gender: male      MRN: [id number]  : 1935  (88 y.o.)  Referring Practitioner: Jagdeep Russell DO  Diagnosis: Troponin Level Elevated  Additional Pertinent Hx: Patient is an 88-year-old male with past medical history of COPD, Parkinson, CAD s/p LC x2 stents, paroxysmal A-fib, urinary retention, HLD, recurrent DVTs, prostate cancer presented to the ED on 2024 for shortness of breath.  In ED patient was very tachypneic and tachycardic.  Patient failed trial with HFNC and BiPAP.  Patient was intubated in the ED for acute respiratory failure.  Patient was transferred to ICU for further monitoring and management.  ED checks x-ray left-sided pleural effusion and consolidation. EXTUATED     Restrictions/Precautions:  Restrictions/Precautions: Contact Precautions  Position Activity Restriction  Other position/activity restrictions: chest tube, rectal tube.    Subjective  Chart Reviewed: Yes, Previous Admission, Progress Notes, Orders, History and Physical  Patient assessed for rehabilitation services?: Yes  Family / Caregiver Present: No    Subjective: RN approved OT session and Resident in person states patient is OK to work with therapy and states will update bedrest orders. Patient unable to follow commands and groaning throughout session, however spontaneously patient states \"bless you\" x2 when this therapist sneezed.    Pain: patient unable to verbalize pain however when moving of left arm patient noted to have nonverbal signs of pain and deferred 
[facility] [place]  SPEECH THERAPY MISSED TREATMENT NOTE  STRZ ICU STEPDOWN TELEMETRY 4K      Date: 2024  Patient Name: Stone Sharp        MRN: [id number]    : 1935  (88 y.o.)    REASON FOR MISSED TREATMENT:  ST attempted to see patient this AM for completion of dysphagia tx; however, upon arrival to room, RN Jenny indicating patient is not appropriate at this time. ST to re-attempt at a later date/time as patient is medically appropriate and available.     Tri Ayoub M.A., CCC-SLP [id number]            
NICKOLAS CRUZ AND I HAD A LONG DISCUSSION WITH THE FAMILY RE HIS CONDITION AND THE POSSIBILITY OF NEED FOR LONG TERM INTUBATION IF HE DETERIORATES; THE FAMILY DOES NOT WANT THAT AND IT WAS EXPLAINED IF HIS BREATHING DETERIORATES IT WILL LIKELY LEAD TO CARDIAC ARREST.    GIVEN THAT, FAMILY AGREES TO LIMITED CODE X 4.  
No output from external catheter so far this shift so second bladder scan completed showing 346 mL.  Patient straight cathed per hospitalist order and 350 mL of output of dark shane output obtained.  
Nutrition Note:   I asked RN if we are able to start TF yet & RN mentions they are trialing fluid boluses first and not planning in starting any tube feeding yet. ( No tube feeding orders)On (8/28) Dr Stratton pt mentioned to me via perfect serve : let's keep him NPO at this time. Concern about aspiration yesterday(8/27)\" . I reinforced with RN that when Dr allows tube feeds to be re-started, would suggest Nepro 10ml/hr & will need to hold feeds 1 hr before to 2 hrs after doxycycline. Pt has been receiving less than 50% estimated needs atleast 9 days per documentation. May refer to ful note from (8/28)Will follow. Mar Hawkins, RD, LD  (8/29/24)  
PT had midline placed today RUE and now has specials calling to have a PICC placed that was ordered this morning at 07:00. RN asked  if he still wanted PT to have PICC line placed and MD said to have the PICC line placed. PT taken to specials for placement. Will continue to monitor   
Patient arrived to unit from ED via stretcher. Patient transferred to ICU bed and placed on continuous ICU bedside monitor. Patient admitted for Troponin level elevated [R79.89]  Pleural effusion, left [J90]  Acute hypoxemic respiratory failure due to COVID-19 (HCC) [U07.1, J96.01]. Vitals obtained. See flowsheets. Patient's IV access includes RFA 20g, Lwrist 20g, LAC 20g. Current infusions and rates of infusion include fentanyl 100mcg/hr and heparin 10u/kg/hr. OG at 58 and ETT 27 at the lip. RT at beside. Two nurse skin assessment completed by jose RN and Anselmo RN. Policies and procedures of ICU unable to be explained to patient at this time. Pt intubated and sedated. No family present at this time.  
Patient extubated per order by RT to 10L NC decreased to 2L.  Tube feeding turned off; OG removed.  Restraints discontinued.  Patient tolerated well.  Call light in reach.  
Pt developed SVT, rate 180.  Broke with 6 mg adenosine iv.  Tolerated fairly well.  
Pt moved to 4K12 for Negative pressure room.    1852- Updated spouse Bharathi about move to 4K12.  
RN messaged  and informed resident that  PT was extubated yesterday and non verbal since and last APTT was yesterday @ 14:30. Lab has not been able to get blood due to anasarca and weeping edema. PT fought me to open his eyes and moans out but does not follow commands. RN asked if resident can come see PT and order CT head.  Resident told RN that he will evaluate him. Will continue to monitor     
Stone Sharp was extubated to 6L nasal cannula with no complications noted.  
[facility]  OCCUPATIONAL THERAPY MISSED TREATMENT NOTE  [facility] ICU STEPDOWN TELEMETRY 4K  4K-012-A      Date: 2024  Patient Name: Stone Sharp        CSN: [id number]   : 1935  (88 y.o.)  Gender: male   Referring Practitioner: Jagdeep Russell DO  Diagnosis: Troponin Level Elevated         REASON FOR MISSED TREATMENT: Hold Treatment per Nursing. Per PT, pt has high HR, on HFNC, changed status to limitedx4, not medically stable to participate in therapy. Will discontinue orders, please reorder if appropriate.          
[facility]   PROGRESS NOTE      Patient: Stone SHAH [facility]  Room #: 4B-06/006-A            YOB: 1935  Age: 88 y.o.  Gender: male            Admit Date & Time: 8/19/2024  8:09 PM    Assessment:  Stone is being cared for on 4B for respiratory issues related to COVID-19.  At the time of my visit, Stone was resting.  He had a family member standing at his bedside.    Interventions:  I introduced spiritual care services to Stone's family member.    Outcomes:  Family knows that  support is available and can be requested through unit staff.    Plan:    Continue to support patient and family through presence and encouragement.    Electronically signed by Chaplain MICKI, on 8/22/2024 at 2:14 PM.  Spiritual Care Department  [facility]  [phone]   
MVI, protonix, vitamin D,     Current Nutrition Intake & Therapies:    Recent PO intake: NPO  Recent Supplement Intake: NPO   Diet NPO  ADULT TUBE FEEDING; Nasogastric; Peptide Based; Continuous; 10; No; 30; Q 6 hours  Current Tube Feeding (TF) Orders:  Feeding Route: Nasogastric  Formula: Renal Formula  Schedule: Continuous  Feeding Regimen: Nepro at 10 ml/hr and increase by 10 ml q6hr to goal rate of 40 ml/hr  Additives/Modulars: None  Water Flushes: per MD  Current TF & Flush Orders Provides:    Goal TF & Flush Orders Provides: Nepro at 40 ml/hr will provide 1700 kcal, 78 g pro, 154 g CHO, 24 g fiber, and 698 ml free water in 960 ml volume per 24 hours    Anthropometric Measures:  Height: 175.3 cm (5' 9\")  Ideal Body Weight (IBW): 160 lbs  Admission Body Bev: 97.1 kg (214 lb) (8/20, no edema)  Current Body Weight: 91.2 kg (201 lb) (8/22, lower extremities +3 pitting edema and upper extremities +2 piting edema)  Current BMI: Body mass index is 29.56 kg/m².  Usual Body Weight:  (7/27: 208 lb; 3/16: 225  lb; 11/15: 230 lb)  Weight Adjustment for: No Adjustment  BMI Categories: Overweight (25-29.9)    Estimated Daily Nutrient Needs:  Energy (kcal/day): 5699-0549 kcal (20-25 kcal/kg)  Weight Used for energy calculation:  91.6 kg  Protein (g/day): 66-79 g  (1-1.2 g/kg IBW) * renal status    Weight Used for protein calculation: 66 kg    Fluid (ml/day): per MD     Nutrition Diagnosis:   Inadequate oral intake related to cognitive or neurological impairment as evidenced by NPO or clear liquid status due to medical condition, nutrition support - enteral nutrition    Nutrition Interventions:   Nutrition and/ or Nutrient Delivery: Continue NPO, Start Tube Feeding   Nutrition Education/ Counseling: Education not appropriate   Coordination of Nutrition Care: Continue to monitor while inpatient     Goals:  Previous Recommendations Implemented: Yes   Previous Goal Met: Progressing toward Goal(s)   Goals: Initiate nutrition 
Malnutrition confirmed    Query created by: Kerry Rodriguez on 8/29/2024 8:58 AM      Electronically signed by:  Handy Edwards MD 8/29/2024 9:54 AM          
improving, alert and oriented x 2.  Monitor GCS.  Reorient patient.  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 Echo 8/20/2024 reported moderately reduced left ventricular systolic function with an EF of 35 to 40%.  Left ventricular size is normal.  Moderate global hypokinesis present.   Repeat echocardiogram as an outpatient.    ALDA: secondary to hypotension.  Patient is on Bumex 2 mg.  US renal on 8/21/2024 reported heterogeneous structure with possible peripheral calcification in the right lower kidney of indeterminate etiology but suspicious of malignancy.  FEUrea 23.4%, prerenal  Maintain adequate blood pressure, MAP> 65  Avoid nephrotoxic drugs  Monitor I's and O's.  Bradycardia: secondary to metoprolol 100 mg BID.  Switch to 50 metoprolol twice daily.  Monitor for hypotension/bradycardia  hypocalcemia, resolved: 8.5 from 8.3 (8/23/2024-8/21/2024)  Monitor with repeat BMPs  Borderline macrocytic anemia (stable): Hemoglobin 12.1 from 12 OA.  MCV 97.8  Trend H&H daily.  B12 and folate level ordered for tomorrow.  Paroxysmal A-fib: [id number] score 6.  On Eliquis and Lopressor 100 mg.  Holding Lopressor due to hypotension.  Eliquis switched to heparin.   CAD: s/p LC x2 2015.  At home on Eliquis.  Switched to heparin ggt.   Parkinson disease: Continue Sinemet   Urine retention:  Continue home oxybutynin and finasteride  HLD: Continue atorvastatin 10 mg  COPD: Continue Stiolto, albuterol as needed  Recurrent DVTs: IVC filter, Eliquis  Holding Eliquis, switch to heparin.   Prostate cancer: Status post EBRT 2006.   Continue finasteride  GI prophylaxis: Pantoprazole 40 mg  History of DVTs:   SCDs, on heparin GGT  Septic shock (resolved): Likely sepsis/COVID-19.  TSH 1.75, morning cortisol 7.06.   On Levophed, actively weaning off.  From 7 to 5.   Continue other management as noted above  Lactic acidosis, resolved: 1.5.  Stop 
[x]Yes / []No  IVF (still needed?): [x]Yes / []No    Level of care: [x]Step Down / []Med-Surg  Bed Status: [x]Inpatient / []Observation  Telemetry: [x]Yes / []No  PT/OT: [x]Yes / []No    DVT Prophylaxis: [] Lovenox / [x] Heparin / [] SCDs / [] Already on Systemic Anticoagulation / [] None     Expected discharge date: Unknown  Disposition: Unknown  Code status: Full Code     ===================================================================    Chief Complaint: Shortness of breath  Subjective (past 24 hours): Patient was seen this morning at the bedside.  He was minimally responsive to attempts at questioning.  Unable to obtain ROS.    HPI / Hospital Course:  Patient is an 88-year-old male with a past medical history of Parkinson's, A-fib on Eliquis, PAF detected on loop recorder, CAD, HTN, HPL, CKD, history of stroke, recurrent DVT, loop recorder brought to the ED for increasing shortness of breath and respiratory distress on 8/19/2024.  Patient was reportedly eating supper including fish and rice, he suddenly vomited.  His wife thought he was choking, he suddenly developed rapidly progressing shortness of breath.  Upon arrival, he was tachypneic and with mild-moderate respiratory distress.  In the ED, he was given 4 L/min nasal cannula, but remains significantly tachycardic and tachypneic.  He is not on O2 at home.  Patient was subsequently intubated and brought to the ICU for pressure support.  Initial head CT found no intracranial findings, CTA chest found no pulmonary embolism, moderate to large left pleural effusion, chest x-ray showed left lower lung consolidation, left pleural effusion.  Chest tube was placed in the ICU.  Patient was found to be COVID-positive on 8/20/2024.    Medications:    Infusion Medications    sodium chloride Stopped (08/25/24 1227)    sodium chloride      heparin (PORCINE) Infusion 11 Units/kg/hr (08/25/24 3470)    Scheduled Medications    [START ON 8/26/2024] dexAMETHasone  6 mg 
transferred to ICU for further monitoring and management.  ED checks x-ray left-sided pleural effusion and consolidation.     8/21/24: Patient is intubated.  Patient is on no ventilator setting.  Continuous breathing trial to assess status ability to wean off ventilator.  Echo showed 35 to 40% ejection fraction from 60 to 65% on admission, secondary to COVID-19.  Patient is on 5 mcg of Levophed, actively weaning off.    8/22/24: Patient seen at bedside.  Patient unable to follow command.  Patient responded to pain.  CT scan was ordered for suspected stroke, which is negative for any acute pathologies.  Likely due to delirium secondary to sedation.  Monitor GCS and reorient patient accordingly.        Past Medical History:  COPD, Parkinson, CAD s/p LC x2 stents, paroxysmal A-fib, urinary retention, HLD, recurrent DVTs, prostate cancer presented .  Family History: N/A.  Social History: 21.1 pack/year open history, quit in 1987.    ROS    Unable to obtain ROS, patient is delirious.    Scheduled Meds:   dexAMETHasone  10 mg IntraVENous Q24H    pantoprazole  40 mg IntraVENous Daily    baricitinib  2 mg Oral Daily    atorvastatin  10 mg Oral Nightly    carbidopa-levodopa  1 tablet Oral 4x Daily    Vitamin D  5,000 Units Oral Daily    escitalopram  10 mg Oral Daily    metoprolol  100 mg Oral BID    multivitamin  1 tablet Oral Daily    oxyBUTYnin  5 mg Oral BID    sodium chloride flush  5-40 mL IntraVENous 2 times per day    tiotropium-olodaterol  2 puff Inhalation Daily    bumetanide  2 mg IntraVENous BID    [Held by provider] aspirin  81 mg Oral Daily    chlorhexidine  15 mL Mouth/Throat BID     Continuous Infusions:   propofol Stopped (08/20/24 0946)    fentaNYL Stopped (08/21/24 1315)    sodium chloride      heparin (PORCINE) Infusion 14 Units/kg/hr (08/22/24 0258)    norepinephrine Stopped (08/21/24 0945)    VASOpressin 20 Units in sodium chloride 0.9 % 100 mL infusion      dexmedeTOMIDine 0.5 mcg/kg/hr (08/21/24 8136) 
Yes  Labs (still needed?): [x]Yes / []No  IVF (still needed?): [x]Yes / []No    Level of care: [x]Step Down / []Med-Surg  Bed Status: [x]Inpatient / []Observation  Telemetry: [x]Yes / []No  PT/OT: [x]Yes / []No    DVT Prophylaxis: [] Lovenox / [x] Heparin / [] SCDs / [] Already on Systemic Anticoagulation / [] None     Expected discharge date: Unknown  Disposition: Unknown  Code status: Limited     ===================================================================    Chief Complaint: Shortness of breath  Subjective (past 24 hours): Patient was seen this morning at the bedside, where he unresponsive to attempts at questioning and commands.  His condition has been unchanged since yesterday.  Unable to gather ROS.  Family was agreeable for placement of Dobbhoff tube placement.    HPI / Hospital Course:  Patient is an 88-year-old male with a past medical history of Parkinson's, A-fib on Eliquis, PAF detected on loop recorder, CAD, HTN, HPL, CKD, history of stroke, recurrent DVT, loop recorder brought to the ED for increasing shortness of breath and respiratory distress on 8/19/2024.  Patient was reportedly eating supper including fish and rice, he suddenly vomited.  His wife thought he was choking, he suddenly developed rapidly progressing shortness of breath.  Upon arrival, he was tachypneic and with mild-moderate respiratory distress.  In the ED, he was given 4 L/min nasal cannula, but remains significantly tachycardic and tachypneic.  He is not on O2 at home.  Patient was subsequently intubated and brought to the ICU for pressure support.  Initial head CT found no intracranial findings, CTA chest found no pulmonary embolism, moderate to large left pleural effusion, chest x-ray showed left lower lung consolidation, left pleural effusion.  Chest tube was placed in the ICU.  Patient was found to be COVID-positive on 8/20/2024.  Patient was transferred to stepdown unit, weaned off of high flow nasal cannula to regular 
[x]Step Down / []Med-Surg  Bed Status: [x]Inpatient / []Observation  Telemetry: [x]Yes / []No  PT/OT: [x]Yes / []No    DVT Prophylaxis: [] Lovenox / [x] Heparin / [] SCDs / [] Already on Systemic Anticoagulation / [] None     Expected discharge date: Unknown  Disposition: Unknown  Code status: Limited     ===================================================================    Chief Complaint: Shortness of breath  Subjective (past 24 hours): Patient was seen this morning at the bedside, where he unresponsive to attempts at questioning and commands.  His condition has been unchanged since yesterday.  Unable to gather ROS.  Family was agreeable for placement of Dobbhoff tube placement.    HPI / Hospital Course:  Patient is an 88-year-old male with a past medical history of Parkinson's, A-fib on Eliquis, PAF detected on loop recorder, CAD, HTN, HPL, CKD, history of stroke, recurrent DVT, loop recorder brought to the ED for increasing shortness of breath and respiratory distress on 8/19/2024.  Patient was reportedly eating supper including fish and rice, he suddenly vomited.  His wife thought he was choking, he suddenly developed rapidly progressing shortness of breath.  Upon arrival, he was tachypneic and with mild-moderate respiratory distress.  In the ED, he was given 4 L/min nasal cannula, but remains significantly tachycardic and tachypneic.  He is not on O2 at home.  Patient was subsequently intubated and brought to the ICU for pressure support.  Initial head CT found no intracranial findings, CTA chest found no pulmonary embolism, moderate to large left pleural effusion, chest x-ray showed left lower lung consolidation, left pleural effusion.  Chest tube was placed in the ICU.  Patient was found to be COVID-positive on 8/20/2024.    Medications:    Infusion Medications    dextrose 5% and 0.45% NaCl with KCl 20 mEq      sodium chloride 25 mL/hr at 08/28/24 0825    sodium chloride      Scheduled Medications    
glycol, acetaminophen **OR** acetaminophen, heparin (porcine), heparin (porcine)    Exam:  BP (!) 145/89   Pulse (!) 118   Temp 98 °F (36.7 °C) (Skin)   Resp 18   Ht 1.753 m (5' 9\")   Wt 104.4 kg (230 lb 2.6 oz)   SpO2 95%   BMI 33.99 kg/m²   General: Ill-appearing, arousable, but does not follow commands.  Eyes:  PERRL. Conjunctivae/corneas clear.  HENT: Head normal appearing. Nares normal. Oral mucosa moist.  Hearing intact.   Neck: Supple, with full range of motion. Trachea midline.  No gross JVD appreciated.  Respiratory: Coarse lung sounds with obvious rhonchi.  Gurgling noises could be heard from the patient's upper airway.  No clear rales.  Cardiovascular: Normal rate, regular rhythm with normal S1/S2 without murmurs.    Edema in lower extremities consistent with previous days  Abdomen: Soft, non-tender, non-distended with normal bowel sounds.  Musculoskeletal: No joint swelling or tenderness. Normal tone. No abnormal movements.   Skin: Warm and dry. No rashes or lesions.  Neurologic:  No focal sensory/motor deficits in the upper or lower extremities. Cranial nerves:  grossly non-focal 2-12.     Psychiatric: Alert and oriented, normal insight and thought content.   Capillary Refill: Brisk,< 3 seconds.  Peripheral Pulses: Palpable in all extremities      Labs/Radiology: See chart or assessment above.     Electronically signed by Guanaco Gr MD PGY 2 IM resident on 8/26/2024 at 5:00 PM  Case was discussed with Attending, Dr. Edwards.    
aorta.  Right IJ catheter and enteric tube are stable.  Loop recorder over the left lower thorax.  No acute fracture.     IMPRESSION:  Improved aeration in the left upper lung. No other significant interval   change.        1 view chest x-ray  08/28/2024   IMPRESSION:  1. No significant interval change. Redemonstration of near-complete   opacification of the left hemithorax with some clearing superiorly.   Unchanged opacities involving the right infrahilar region and right lung   base.       CT Scans    8/19/2024  CT Angiography Chest W Contrast       IMPRESSION:  No pulmonary embolism.  Moderate-to-large left pleural effusion with adjacent atelectasis and/or   infiltrate.  Nonemergent/incidental findings above.              (See actual reports for details)    Assessment    -Acute hypoxic respiratory failure due Covid with parapneumonic effusion- intubated 8/20 and extubated 8/21, placed on HFNC 8/26/2024  - Pneumonia on left side due to Covid 19- baricitininb stopped 8/23, Decadron continued   - Large left sided effusion- Chest Tube placed 8/20. Serosanguineous fluid. Cytology- Negative, AFB Negative x 3   - Paralytic ileus- improved per KUB 8/26  - Congestive heart failure- suspect acutely reduced systolic function  2/2 Covid-19- ECHO 7/27 LVEF 60% noted G1 DDx, ECHO 8/20 LVEF 35-40%  - ALDA- resolved   - P. Afib with RVR   - Parkinson's Disease   - History of DVTs   -Limited Code x 4   Plan   -Monitor SpO2 wean supplemental O2 to maintain SpO2 >90%, on HFNC   -Doxycycline 100 mg PO BID IV end 9/1  -Rocephin 1000 mg IV BID end 9/1  -Dexamethasone 6 mg Daily end 8/29  -baricitinib with pharmacy to dose   -CTS consult appreciated not a surgical candidate at this time, no role for TPA/dornase chest tube removed 8/28/2024 due to no output   -Guaifenesin 400 mg QID via NG tube   -Oral care per shift   -Titrate Oxygen to keep Spo2 >90%.  -Deep Venous Thrombosis Prophylaxis: heparin gtt   -HR and BP management per primary 
Pulmonary service with any questions regarding current plan or changes in Pulmonary status     Electronically signed by ALESSANDRA Cifuentes CNP on 8/26/2024 at 11:51 AM    Addendum 2    CT chest   8/26/2024    Official Read Pending         -Discussed with RT Odette initiate mucomyst and atrovent nebs given RVR RN has notified primary service resident Dr. Gr regarding HR   -Chest physiotherapy   -CT images reviewed with Attending Dr. Patrick will request Cardiothoracic surgery consult for suspect ex vacu PTX given high amount of O2   -Discussed condition and CT chest result with patient Family in room wife and daughter Marely, with Dr. Edwards after explaining current status the topic of Code status was reviewed after discussing possible need for prolonged intubation if he continues to decline family indicated that this is not something the patient would be interested in and it was explained that without intubation he code have further compromise it was decided to change code status to Limited x4     Electronically signed by ALESSANDRA Cifuentes CNP on 8/26/2024 at 3:55 PM    Addendum by Dr. Darnell MD:  Patient seen by me independently including key components of medical care. Face to face evaluation and examination was performed. Case discussed with Mr. Carmonaic HERMINIA Nava. Agree with Certified nurse practitioner's findings and plan as documented in the Certified nurse practitioner's note. Italicized font, if present,  represents changes to the note made by me. More than 50% of the encounter time involved with patient care by the Pulmonary & Critical care service team spent by me.    Please see my modifications mentioned below:  -Dr Sandhu reviewed images and states chest tube is properly placed. There is pneumonia consolidation, but no fluid to remove. Procedure canceled.   -CT surgery consult was placed for Loculated left pleural effusion, Covid-19 positive, CT chest shows left posterior lung

## 2024-09-01 NOTE — PLAN OF CARE
Problem: Chronic Conditions and Co-morbidities  Goal: Patient's chronic conditions and co-morbidity symptoms are monitored and maintained or improved  8/21/2024 2017 by Chinmay Small RN  Outcome: Progressing  Flowsheets (Taken 8/21/2024 2000)  Care Plan - Patient's Chronic Conditions and Co-Morbidity Symptoms are Monitored and Maintained or Improved:   Monitor and assess patient's chronic conditions and comorbid symptoms for stability, deterioration, or improvement   Collaborate with multidisciplinary team to address chronic and comorbid conditions and prevent exacerbation or deterioration   Update acute care plan with appropriate goals if chronic or comorbid symptoms are exacerbated and prevent overall improvement and discharge  8/21/2024 1555 by Lupis Lawson RN  Outcome: Progressing     Problem: Discharge Planning  Goal: Discharge to home or other facility with appropriate resources  8/21/2024 2017 by Chinmay Small RN  Outcome: Progressing  Flowsheets (Taken 8/21/2024 2000)  Discharge to home or other facility with appropriate resources:   Identify barriers to discharge with patient and caregiver   Arrange for needed discharge resources and transportation as appropriate   Identify discharge learning needs (meds, wound care, etc)   Refer to discharge planning if patient needs post-hospital services based on physician order or complex needs related to functional status, cognitive ability or social support system  8/21/2024 1555 by Lupis Lawson RN  Outcome: Progressing     Problem: Safety - Medical Restraint  Goal: Remains free of injury from restraints (Restraint for Interference with Medical Device)  Description: INTERVENTIONS:  1. Determine that other, less restrictive measures have been tried or would not be effective before applying the restraint  2. Evaluate the patient's condition at the time of restraint application  3. Inform patient/family regarding the reason for restraint  4. Q2H: Monitor 
  Problem: Chronic Conditions and Co-morbidities  Goal: Patient's chronic conditions and co-morbidity symptoms are monitored and maintained or improved  8/29/2024 1543 by Marielos Sumner, RN  Outcome: Progressing  Flowsheets (Taken 8/29/2024 1543)  Care Plan - Patient's Chronic Conditions and Co-Morbidity Symptoms are Monitored and Maintained or Improved: Monitor and assess patient's chronic conditions and comorbid symptoms for stability, deterioration, or improvement     Problem: Discharge Planning  Goal: Discharge to home or other facility with appropriate resources  8/29/2024 1543 by Marielos Sumner, RN  Outcome: Progressing  Flowsheets (Taken 8/29/2024 1543)  Discharge to home or other facility with appropriate resources: Identify barriers to discharge with patient and caregiver     Problem: Nutrition Deficit:  Goal: Optimize nutritional status  8/29/2024 1543 by Marielos Sumner, RN  Outcome: Progressing  Flowsheets (Taken 8/29/2024 1543)  Nutrient intake appropriate for improving, restoring, or maintaining nutritional needs: Assess nutritional status and recommend course of action     Problem: Safety - Adult  Goal: Free from fall injury  8/29/2024 1543 by Marielos Sumner, RN  Outcome: Progressing  Flowsheets (Taken 8/29/2024 1543)  Free From Fall Injury: Instruct family/caregiver on patient safety     Problem: Pain  Goal: Verbalizes/displays adequate comfort level or baseline comfort level  8/29/2024 1543 by Marielos Sumner, RN  Outcome: Progressing  Flowsheets (Taken 8/29/2024 1543)  Verbalizes/displays adequate comfort level or baseline comfort level: Encourage patient to monitor pain and request assistance     Problem: Skin/Tissue Integrity  Goal: Absence of new skin breakdown  Description: 1.  Monitor for areas of redness and/or skin breakdown  2.  Assess vascular access sites hourly  3.  Every 4-6 hours minimum:  Change oxygen saturation probe site  4.  Every 4-6 hours:  If on nasal 
  Problem: Chronic Conditions and Co-morbidities  Goal: Patient's chronic conditions and co-morbidity symptoms are monitored and maintained or improved  Outcome: Progressing     Problem: Discharge Planning  Goal: Discharge to home or other facility with appropriate resources  Outcome: Progressing      Problem: Nutrition Deficit:  Goal: Optimize nutritional status  Outcome: Progressing     Problem: Safety - Adult  Goal: Free from fall injury  Outcome: Progressing     Problem: Pain  Goal: Verbalizes/displays adequate comfort level or baseline comfort level  Outcome: Progressing     Problem: Skin/Tissue Integrity  Goal: Absence of new skin breakdown  Description: 1.  Monitor for areas of redness and/or skin breakdown  2.  Assess vascular access sites hourly  3.  Every 4-6 hours minimum:  Change oxygen saturation probe site  4.  Every 4-6 hours:  If on nasal continuous positive airway pressure, respiratory therapy assess nares and determine need for appliance change or resting period.  Outcome: Progressing     Problem: ABCDS Injury Assessment  Goal: Absence of physical injury  Outcome: Progressing     Problem: Respiratory - Adult  Goal: Achieves optimal ventilation and oxygenation  Outcome: Progressing     Care plan reviewed with patient.  Patient unable to verbalize understanding of the plan of care and contribute to goal setting.       
  Problem: Chronic Conditions and Co-morbidities  Goal: Patient's chronic conditions and co-morbidity symptoms are monitored and maintained or improved  Outcome: Progressing     Problem: Discharge Planning  Goal: Discharge to home or other facility with appropriate resources  Outcome: Progressing     Problem: Nutrition Deficit:  Goal: Optimize nutritional status  Outcome: Progressing     Problem: Safety - Adult  Goal: Free from fall injury  Outcome: Progressing     Problem: Pain  Goal: Verbalizes/displays adequate comfort level or baseline comfort level  Outcome: Progressing     Problem: Skin/Tissue Integrity  Goal: Absence of new skin breakdown  Description: 1.  Monitor for areas of redness and/or skin breakdown  2.  Assess vascular access sites hourly  3.  Every 4-6 hours minimum:  Change oxygen saturation probe site  4.  Every 4-6 hours:  If on nasal continuous positive airway pressure, respiratory therapy assess nares and determine need for appliance change or resting period.  Outcome: Progressing     Problem: ABCDS Injury Assessment  Goal: Absence of physical injury  Outcome: Progressing     Problem: Respiratory - Adult  Goal: Achieves optimal ventilation and oxygenation  Outcome: Progressing     
  Problem: Chronic Conditions and Co-morbidities  Goal: Patient's chronic conditions and co-morbidity symptoms are monitored and maintained or improved  Outcome: Progressing     Problem: Discharge Planning  Goal: Discharge to home or other facility with appropriate resources  Outcome: Progressing     Problem: Nutrition Deficit:  Goal: Optimize nutritional status  Outcome: Progressing     Problem: Safety - Adult  Goal: Free from fall injury  Outcome: Progressing     Problem: Pain  Goal: Verbalizes/displays adequate comfort level or baseline comfort level  Outcome: Progressing     Problem: Skin/Tissue Integrity  Goal: Absence of new skin breakdown  Description: 1.  Monitor for areas of redness and/or skin breakdown  2.  Assess vascular access sites hourly  3.  Every 4-6 hours minimum:  Change oxygen saturation probe site  4.  Every 4-6 hours:  If on nasal continuous positive airway pressure, respiratory therapy assess nares and determine need for appliance change or resting period.  Outcome: Progressing     Problem: ABCDS Injury Assessment  Goal: Absence of physical injury  Outcome: Progressing     Problem: Respiratory - Adult  Goal: Achieves optimal ventilation and oxygenation  Outcome: Progressing     
  Problem: Chronic Conditions and Co-morbidities  Goal: Patient's chronic conditions and co-morbidity symptoms are monitored and maintained or improved  Outcome: Progressing     Problem: Discharge Planning  Goal: Discharge to home or other facility with appropriate resources  Outcome: Progressing     Problem: Safety - Medical Restraint  Goal: Remains free of injury from restraints (Restraint for Interference with Medical Device)  Description: INTERVENTIONS:  1. Determine that other, less restrictive measures have been tried or would not be effective before applying the restraint  2. Evaluate the patient's condition at the time of restraint application  3. Inform patient/family regarding the reason for restraint  4. Q2H: Monitor safety, psychosocial status, comfort, nutrition and hydration  Outcome: Progressing     Problem: Nutrition Deficit:  Goal: Optimize nutritional status  Outcome: Progressing     Problem: Safety - Adult  Goal: Free from fall injury  Outcome: Progressing     Problem: Pain  Goal: Verbalizes/displays adequate comfort level or baseline comfort level  Outcome: Progressing     Problem: Skin/Tissue Integrity  Goal: Absence of new skin breakdown  Description: 1.  Monitor for areas of redness and/or skin breakdown  2.  Assess vascular access sites hourly  3.  Every 4-6 hours minimum:  Change oxygen saturation probe site  4.  Every 4-6 hours:  If on nasal continuous positive airway pressure, respiratory therapy assess nares and determine need for appliance change or resting period.  Outcome: Progressing     Problem: ABCDS Injury Assessment  Goal: Absence of physical injury  Outcome: Progressing     Problem: Respiratory - Adult  Goal: Achieves optimal ventilation and oxygenation  8/21/2024 1555 by Lupis Lawson RN  Outcome: Progressing   Care plan reviewed with patient.  Patient unable to verbalize understanding of the plan of care and contribute to goal setting.     
  Problem: Chronic Conditions and Co-morbidities  Goal: Patient's chronic conditions and co-morbidity symptoms are monitored and maintained or improved  Outcome: Progressing  Flowsheets (Taken 8/20/2024 2000)  Care Plan - Patient's Chronic Conditions and Co-Morbidity Symptoms are Monitored and Maintained or Improved:   Monitor and assess patient's chronic conditions and comorbid symptoms for stability, deterioration, or improvement   Collaborate with multidisciplinary team to address chronic and comorbid conditions and prevent exacerbation or deterioration   Update acute care plan with appropriate goals if chronic or comorbid symptoms are exacerbated and prevent overall improvement and discharge     Problem: Discharge Planning  Goal: Discharge to home or other facility with appropriate resources  Outcome: Progressing  Flowsheets (Taken 8/20/2024 2000)  Discharge to home or other facility with appropriate resources:   Identify barriers to discharge with patient and caregiver   Arrange for needed discharge resources and transportation as appropriate   Identify discharge learning needs (meds, wound care, etc)   Refer to discharge planning if patient needs post-hospital services based on physician order or complex needs related to functional status, cognitive ability or social support system     Problem: Safety - Medical Restraint  Goal: Remains free of injury from restraints (Restraint for Interference with Medical Device)  Description: INTERVENTIONS:  1. Determine that other, less restrictive measures have been tried or would not be effective before applying the restraint  2. Evaluate the patient's condition at the time of restraint application  3. Inform patient/family regarding the reason for restraint  4. Q2H: Monitor safety, psychosocial status, comfort, nutrition and hydration  Outcome: Progressing  Flowsheets  Taken 8/20/2024 2200  Remains free of injury from restraints (restraint for interference with medical 
  Problem: Chronic Conditions and Co-morbidities  Goal: Patient's chronic conditions and co-morbidity symptoms are monitored and maintained or improved  Outcome: Progressing  Flowsheets (Taken 8/26/2024 2155)  Care Plan - Patient's Chronic Conditions and Co-Morbidity Symptoms are Monitored and Maintained or Improved:   Monitor and assess patient's chronic conditions and comorbid symptoms for stability, deterioration, or improvement   Collaborate with multidisciplinary team to address chronic and comorbid conditions and prevent exacerbation or deterioration     Problem: Discharge Planning  Goal: Discharge to home or other facility with appropriate resources  Outcome: Progressing  Flowsheets (Taken 8/26/2024 2155)  Discharge to home or other facility with appropriate resources:   Arrange for needed discharge resources and transportation as appropriate   Identify barriers to discharge with patient and caregiver     Problem: Nutrition Deficit:  Goal: Optimize nutritional status  Outcome: Progressing  Flowsheets (Taken 8/26/2024 2155)  Nutrient intake appropriate for improving, restoring, or maintaining nutritional needs:   Assess nutritional status and recommend course of action   Monitor oral intake, labs, and treatment plans   Recommend appropriate diets, oral nutritional supplements, and vitamin/mineral supplements     Problem: Safety - Adult  Goal: Free from fall injury  Outcome: Progressing  Flowsheets (Taken 8/26/2024 2155)  Free From Fall Injury: Instruct family/caregiver on patient safety     
  Problem: Chronic Conditions and Co-morbidities  Goal: Patient's chronic conditions and co-morbidity symptoms are monitored and maintained or improved  Outcome: Progressing  Flowsheets (Taken 9/1/2024 0755)  Care Plan - Patient's Chronic Conditions and Co-Morbidity Symptoms are Monitored and Maintained or Improved: Monitor and assess patient's chronic conditions and comorbid symptoms for stability, deterioration, or improvement     Problem: Discharge Planning  Goal: Discharge to home or other facility with appropriate resources  Outcome: Progressing  Flowsheets (Taken 9/1/2024 0755)  Discharge to home or other facility with appropriate resources: Identify barriers to discharge with patient and caregiver     Problem: Nutrition Deficit:  Goal: Optimize nutritional status  Outcome: Progressing     Problem: Safety - Adult  Goal: Free from fall injury  Outcome: Progressing     Problem: Pain  Goal: Verbalizes/displays adequate comfort level or baseline comfort level  Outcome: Progressing     Problem: Skin/Tissue Integrity  Goal: Absence of new skin breakdown  Description: 1.  Monitor for areas of redness and/or skin breakdown  2.  Assess vascular access sites hourly  3.  Every 4-6 hours minimum:  Change oxygen saturation probe site  4.  Every 4-6 hours:  If on nasal continuous positive airway pressure, respiratory therapy assess nares and determine need for appliance change or resting period.  Outcome: Progressing     Problem: ABCDS Injury Assessment  Goal: Absence of physical injury  Outcome: Progressing     Problem: Respiratory - Adult  Goal: Achieves optimal ventilation and oxygenation  Outcome: Progressing  Flowsheets  Taken 9/1/2024 0815 by Milan Tan RCP  Achieves optimal ventilation and oxygenation:   Assess for changes in respiratory status   Respiratory therapy support as indicated   Assess and instruct to report shortness of breath or any respiratory difficulty  Taken 9/1/2024 0755 by Jacy Guillen 
  Problem: Respiratory - Adult  Goal: Achieves optimal ventilation and oxygenation  Outcome: Progressing     Problem: Respiratory - Adult  Goal: Clear lung sounds  Description: Clear lung sounds  Outcome: Progressing                                                Patient Weaning Progress    The patient's vent settings was able to be weaned this shift.      Ventilator settings that were weaned              [] Mode   [x] Pressure support weaned   [x] Fio2 weaned   [x] Peep weaned      Spontaneous weaning trial  was not attempted.     due to defined parameters for SBT (spontaneous breathing trial) not being met.     *Results of SBT documented under SBTOUTCOME note.    Reason that defined ventilator parameters for SBT was not met              [] Patient condition requires increased ventilator settings  [x] Requires increased sedation   [] Settings not within weaning range   [] SAT not completed   [] Physician orders        Cuff was  deflated to determine cuff leak. A leak  was detected.       Unable to get agreement for goals because no family is present and patient cannot respond.     
Acute respiratory failure:  Intubated on 8/20/2024 for acute hypoxic respiratory failure.  Daily chest x-ray  Continue Stiolto and as needed albuterol  Wean off ventilator setting as tolerated.  Large left pleural effusion: chest x-ray showed large left-sided pleural effusion. Pleural fluid analysis shows exudative effusion.  Chest tube placed on 8/20/2024.  Cytology positive for leukocytes, order cytology for 2 more consecutive days.  Ordered daily x-ray  COVID-positive: On 8/20/2024  Switched to dexamethasone 10 mg from 6 mg dexamethasone 6 mg daily  Lactic acidosis: 3.4 from 2.5  Trend every 6hr  Paroxysmal A-fib: [id number]: 6.  On Eliquis and Lopressor 100 mg twice.  Patient switched to heparin drip.  CAD: status post stenting.  Echo 7/29/2024 reported preserved ejection fraction of 60-65, diastolic dysfunction present.  On Lasix.    Holding aspirin as patient switched to heparin.  Parkinson disease  Continue on Sinemet   Urine retention: Continue home oxybutynin and finasteride.  Hyperlipidemia: Continue on home statin.  COPD: On Breo as an outpatient.  On Stiolto and as needed albuterol.  History of DVTs: IVC filter, Eliquis.  Holding Eliquis, switch to heparin  Prostate cancer: status post EBRT 2006   Continue home dose of finasteride  
Care plan reviewed with patient and patient not able to verbalize understanding of the plan of care and contribute to goal setting.     Problem: Chronic Conditions and Co-morbidities  Goal: Patient's chronic conditions and co-morbidity symptoms are monitored and maintained or improved  8/30/2024 1350 by Morris Curiel RN  Outcome: Not Progressing  8/30/2024 0154 by Morris Jones RN  Outcome: Progressing  Flowsheets (Taken 8/30/2024 0154)  Care Plan - Patient's Chronic Conditions and Co-Morbidity Symptoms are Monitored and Maintained or Improved:   Monitor and assess patient's chronic conditions and comorbid symptoms for stability, deterioration, or improvement   Collaborate with multidisciplinary team to address chronic and comorbid conditions and prevent exacerbation or deterioration   Update acute care plan with appropriate goals if chronic or comorbid symptoms are exacerbated and prevent overall improvement and discharge     Problem: Discharge Planning  Goal: Discharge to home or other facility with appropriate resources  8/30/2024 1350 by Morris Curiel RN  Outcome: Not Progressing  8/30/2024 0154 by Morris Jones RN  Outcome: Progressing  Flowsheets (Taken 8/30/2024 0154)  Discharge to home or other facility with appropriate resources:   Identify barriers to discharge with patient and caregiver   Arrange for needed discharge resources and transportation as appropriate   Identify discharge learning needs (meds, wound care, etc)   Refer to discharge planning if patient needs post-hospital services based on physician order or complex needs related to functional status, cognitive ability or social support system     Problem: Nutrition Deficit:  Goal: Optimize nutritional status  8/30/2024 1350 by Morris Curiel RN  Outcome: Not Progressing  8/30/2024 1006 by Mar Hawkins, QUINN, LD  Flowsheets (Taken 8/30/2024 1006)  Nutrient intake appropriate for improving, restoring, or maintaining nutritional 
Care plan reviewed with patient but patient not able verbalize understanding of the plan of care and contribute to goal setting.     Problem: Chronic Conditions and Co-morbidities  Goal: Patient's chronic conditions and co-morbidity symptoms are monitored and maintained or improved  Outcome: Not Progressing     Problem: Discharge Planning  Goal: Discharge to home or other facility with appropriate resources  Outcome: Not Progressing     Problem: Respiratory - Adult  Goal: Achieves optimal ventilation and oxygenation  Outcome: Not Progressing     Problem: Neurosensory - Adult  Goal: Achieves stable or improved neurological status  Outcome: Not Progressing     Problem: Cardiovascular - Adult  Goal: Maintains optimal cardiac output and hemodynamic stability  Outcome: Not Progressing  Note: SBP 70's this morning  Goal: Absence of cardiac dysrhythmias or at baseline  Outcome: Not Progressing  Note: A-fib with RVR 's     Problem: Musculoskeletal - Adult  Goal: Return mobility to safest level of function  Outcome: Not Progressing     Problem: Gastrointestinal - Adult  Goal: Maintains or returns to baseline bowel function  Outcome: Not Progressing  Goal: Maintains adequate nutritional intake  Outcome: Not Progressing  Note: TF stopped     Problem: Metabolic/Fluid and Electrolytes - Adult  Goal: Hemodynamic stability and optimal renal function maintained  Outcome: Not Progressing     Problem: Confusion  Goal: Confusion, delirium, dementia, or psychosis is improved or at baseline  Description: INTERVENTIONS:  1. Assess for possible contributors to thought disturbance, including medications, impaired vision or hearing, underlying metabolic abnormalities, dehydration, psychiatric diagnoses, and notify attending LIP  2. [place] high risk fall precautions, as indicated  3. Provide frequent short contacts to provide reality reorientation, refocusing and direction  4. Decrease environmental stimuli, including noise as 
Patient stable for transfer out of ICU.  Hospitalist, Dr. Norris, contacted via [email or web address] regarding the transfer to  22.    Electronically Signed by  Omi Almaguer DO, MBA  PGY-2 Internal Medicine Resident  [facility]  On 8/23/2024 at 12:23 PM    This report has been created using voice recognition software. It may contain minor errors which are inherent in voice recognition technology.    
Patient was received as a transfer from the ICU.  Will continue ICU plan.    Electronically signed by Asif Stratton III, DO on 8/23/2024 at 6:53 PM    
Assessment  Goal: Absence of physical injury  Outcome: Progressing  Flowsheets (Taken 8/22/2024 2000)  Absence of Physical Injury: Implement safety measures based on patient assessment     Problem: Respiratory - Adult  Goal: Achieves optimal ventilation and oxygenation  Outcome: Progressing  Flowsheets (Taken 8/22/2024 2000)  Achieves optimal ventilation and oxygenation: Assess for changes in respiratory status     
RN  Outcome: Progressing  Flowsheets (Taken 8/25/2024 2113)  Free From Fall Injury:   Instruct family/caregiver on patient safety   Based on caregiver fall risk screen, instruct family/caregiver to ask for assistance with transferring infant if caregiver noted to have fall risk factors  Note: Bed in low position  Call light in reach  Bed wheel lock  Teaching to use call light for assistance.  Hourly Rounding  Non Skid socks  Bed Alarm in use    8/25/2024 1003 by Arun Waterman, RN  Outcome: Progressing  Flowsheets (Taken 8/25/2024 1003)  Free From Fall Injury:   Instruct family/caregiver on patient safety   Based on caregiver fall risk screen, instruct family/caregiver to ask for assistance with transferring infant if caregiver noted to have fall risk factors     Problem: Pain  Goal: Verbalizes/displays adequate comfort level or baseline comfort level  8/25/2024 2113 by Suri Thornton, RN  Outcome: Progressing  Flowsheets (Taken 8/25/2024 2113)  Verbalizes/displays adequate comfort level or baseline comfort level:   Encourage patient to monitor pain and request assistance   Administer analgesics based on type and severity of pain and evaluate response   Consider cultural and social influences on pain and pain management   Implement non-pharmacological measures as appropriate and evaluate response   Notify Licensed Independent Practitioner if interventions unsuccessful or patient reports new pain   Assess pain using appropriate pain scale  Note: Utilize correct pain assessment tool based on patient abilities.  Use appropriate pain medications base on appropriate tools.  Utilize non-pharmacologic pain reduction methods to supplement ordered pain medications.Educate patient on pain control.  Educate patient on acceptable pain level with chronic pain.   Talk to patient about non-pharmaceutical pain interventions.Encourage use of medication when needed.  Promote rest and distractions from pain.  8/25/2024 1003 by 
respiratory status   Position to facilitate oxygenation and minimize respiratory effort   Assess for changes in mentation and behavior   Oxygen supplementation based on oxygen saturation or arterial blood gases  8/24/2024 0444 by Zion Douglas, RN  Outcome: Progressing     
Progressing  Flowsheets (Taken 8/25/2024 1003)  Absence of infection during hospitalization:   Assess and monitor for signs and symptoms of infection   Monitor all insertion sites i.e., indwelling lines, tubes and drains   Instruct and encourage patient and family to use good hand hygiene technique   Monitor lab/diagnostic results   Administer medications as ordered   Identify and instruct in appropriate isolation precautions for identified infection/condition     Problem: Metabolic/Fluid and Electrolytes - Adult  Goal: Electrolytes maintained within normal limits  Outcome: Progressing  Flowsheets (Taken 8/25/2024 1003)  Electrolytes maintained within normal limits:   Monitor labs and assess patient for signs and symptoms of electrolyte imbalances   Monitor response to electrolyte replacements, including repeat lab results as appropriate   Instruct patient on fluid and nutrition restrictions as appropriate   Administer electrolyte replacement as ordered  Goal: Hemodynamic stability and optimal renal function maintained  Outcome: Progressing  Flowsheets (Taken 8/25/2024 1003)  Hemodynamic stability and optimal renal function maintained:   Monitor labs and assess for signs and symptoms of volume excess or deficit   Monitor intake, output and patient weight   Monitor urine specific gravity, serum osmolarity and serum sodium as indicated or ordered   Monitor response to interventions for patient's volume status, including labs, urine output, blood pressure (other measures as available)   Encourage oral intake as appropriate     Electronically signed by Arun Waterman RN on 8/25/2024 at 10:06 AM    
symptoms of infection   Monitor lab/diagnostic results   Administer medications as ordered   Instruct and encourage patient and family to use good hand hygiene technique     Problem: Metabolic/Fluid and Electrolytes - Adult  Goal: Electrolytes maintained within normal limits  Outcome: Progressing  Flowsheets (Taken 8/29/2024 0455)  Electrolytes maintained within normal limits:   Monitor labs and assess patient for signs and symptoms of electrolyte imbalances   Administer electrolyte replacement as ordered   Fluid restriction as ordered   Monitor response to electrolyte replacements, including repeat lab results as appropriate     Problem: Metabolic/Fluid and Electrolytes - Adult  Goal: Hemodynamic stability and optimal renal function maintained  Outcome: Progressing  Flowsheets (Taken 8/29/2024 0455)  Hemodynamic stability and optimal renal function maintained:   Monitor labs and assess for signs and symptoms of volume excess or deficit   Monitor intake, output and patient weight   Encourage oral intake as appropriate     Problem: Confusion  Goal: Confusion, delirium, dementia, or psychosis is improved or at baseline  Description: INTERVENTIONS:  1. Assess for possible contributors to thought disturbance, including medications, impaired vision or hearing, underlying metabolic abnormalities, dehydration, psychiatric diagnoses, and notify attending LIP  2. [place] high risk fall precautions, as indicated  3. Provide frequent short contacts to provide reality reorientation, refocusing and direction  4. Decrease environmental stimuli, including noise as appropriate  5. Monitor and intervene to maintain adequate nutrition, hydration, elimination, sleep and activity  6. If unable to ensure safety without constant attention obtain sitter and review sitter guidelines with assigned personnel  7. Initiate Psychosocial CNS and Spiritual Care consult, as indicated  Outcome: Progressing  Flowsheets (Taken 8/29/2024 
dementia, or psychosis is improved or at baseline  Description: INTERVENTIONS:  1. Assess for possible contributors to thought disturbance, including medications, impaired vision or hearing, underlying metabolic abnormalities, dehydration, psychiatric diagnoses, and notify attending LIP  2. [place] high risk fall precautions, as indicated  3. Provide frequent short contacts to provide reality reorientation, refocusing and direction  4. Decrease environmental stimuli, including noise as appropriate  5. Monitor and intervene to maintain adequate nutrition, hydration, elimination, sleep and activity  6. If unable to ensure safety without constant attention obtain sitter and review sitter guidelines with assigned personnel  7. Initiate Psychosocial CNS and Spiritual Care consult, as indicated  8/30/2024 0154 by Morris Jones, RN  Outcome: Progressing  Flowsheets (Taken 8/30/2024 0154)  Effect of thought disturbance (confusion, delirium, dementia, or psychosis) are managed with adequate functional status:   Assess for contributors to thought disturbance, including medications, impaired vision or hearing, underlying metabolic abnormalities, dehydration, psychiatric diagnoses, notify LIP   [place] high risk fall precautions, as indicated   Provide frequent short contacts to provide reality reorientation, refocusing and direction   Decrease environmental stimuli, including noise as appropriate   Monitor and intervene to maintain adequate nutrition, hydration, elimination, sleep and activity   If unable to ensure safety without constant attention obtain sitter and review sitter guidelines with assigned personnel     Problem: Nutrition Deficit:  Goal: Optimize nutritional status  8/30/2024 0154 by Morris Jones, RN  Outcome: Progressing  Flowsheets (Taken 8/30/2024 0154)  Nutrient intake appropriate for improving, restoring, or maintaining nutritional needs:   Assess nutritional status and recommend course of 
appropriate   Monitor and intervene to maintain adequate nutrition, hydration, elimination, sleep and activity   If unable to ensure safety without constant attention obtain sitter and review sitter guidelines with assigned personnel   Initiate Psychosocial Clinical Nurse Specialist and Spiritual Care consult, as indicated     Care plan reviewed with patient.  Patient unable to verbalize understanding of the plan of care and cannot contribute to goal setting.         
parameters to optimize cerebral perfusion and minimize risk of hemorrhage   Monitor temperature, glucose, and sodium. Initiate appropriate interventions as ordered  8/27/2024 1230 by Morris Curiel RN  Outcome: Not Progressing     Problem: Cardiovascular - Adult  Goal: Maintains optimal cardiac output and hemodynamic stability  8/27/2024 2228 by Marely Hendrix RN  Outcome: Not Progressing  Flowsheets (Taken 8/27/2024 2010)  Maintains optimal cardiac output and hemodynamic stability:   Monitor blood pressure and heart rate   Monitor urine output and notify Licensed Independent Practitioner for values outside of normal range   Assess for signs of decreased cardiac output  8/27/2024 1230 by Morris Curiel RN  Outcome: Not Progressing  Note: SBP 70's this morning  Goal: Absence of cardiac dysrhythmias or at baseline  8/27/2024 2228 by Marely Hendrix RN  Outcome: Not Progressing  Flowsheets (Taken 8/27/2024 2010)  Absence of cardiac dysrhythmias or at baseline:   Monitor cardiac rate and rhythm   Assess for signs of decreased cardiac output   Administer antiarrhythmia medication and electrolyte replacement as ordered  8/27/2024 1230 by Morris Curiel RN  Outcome: Not Progressing  Note: A-fib with RVR 's     Problem: Musculoskeletal - Adult  Goal: Return mobility to safest level of function  8/27/2024 2228 by Marely Hendrix RN  Outcome: Not Progressing  Flowsheets (Taken 8/27/2024 2010)  Return Mobility to Safest Level of Function: Assess patient stability and activity tolerance for standing, transferring and ambulating with or without assistive devices  8/27/2024 1230 by oMrris Curiel RN  Outcome: Not Progressing     Problem: Gastrointestinal - Adult  Goal: Maintains or returns to baseline bowel function  8/27/2024 2228 by Marely Hendrix, RN  Outcome: Not Progressing  Flowsheets (Taken 8/27/2024 2010)  Maintains or returns to baseline bowel function:   Assess bowel function   Encourage oral fluids to ensure 
[facility] high risk fall precautions, as indicated  8/30/2024 2236 by Morris Jones RN  Outcome: Progressing  Flowsheets (Taken 8/30/2024 2236)  Effect of thought disturbance (confusion, delirium, dementia, or psychosis) are managed with adequate functional status:   Assess for contributors to thought disturbance, including medications, impaired vision or hearing, underlying metabolic abnormalities, dehydration, psychiatric diagnoses, notify [facility] high risk fall precautions, as indicated   Provide frequent short contacts to provide reality reorientation, refocusing and direction   Decrease environmental stimuli, including noise as appropriate   Monitor and intervene to maintain adequate nutrition, hydration, elimination, sleep and activity   If unable to ensure safety without constant attention obtain sitter and review sitter guidelines with assigned personnel   Initiate Psychosocial Clinical Nurse Specialist and Spiritual Care consult, as indicated

## 2024-09-02 ENCOUNTER — APPOINTMENT (OUTPATIENT)
Dept: GENERAL RADIOLOGY | Age: 89
End: 2024-09-02
Attending: STUDENT IN AN ORGANIZED HEALTH CARE EDUCATION/TRAINING PROGRAM
Payer: COMMERCIAL

## 2024-09-02 DIAGNOSIS — E44.1 MILD MALNUTRITION (HCC): ICD-10-CM

## 2024-09-02 DIAGNOSIS — R60.9 SWELLING: Primary | ICD-10-CM

## 2024-09-02 LAB
ACINETOBACTER CALCOACETICUS-BAUMANNII BY PCR: NOT DETECTED
ALBUMIN SERPL BCG-MCNC: 1.9 G/DL (ref 3.5–5.1)
ALP SERPL-CCNC: 61 U/L (ref 38–126)
ALT SERPL W/O P-5'-P-CCNC: 23 U/L (ref 11–66)
ANION GAP SERPL CALC-SCNC: 9 MEQ/L (ref 8–16)
ANION GAP SERPL CALC-SCNC: 9 MEQ/L (ref 8–16)
AST SERPL-CCNC: 24 U/L (ref 5–40)
BACTERIA URNS QL MICRO: ABNORMAL /HPF
BASOPHILS ABSOLUTE: 0 THOU/MM3 (ref 0–0.1)
BASOPHILS NFR BLD AUTO: 0.2 %
BILIRUB SERPL-MCNC: 0.3 MG/DL (ref 0.3–1.2)
BILIRUB UR QL STRIP.AUTO: NEGATIVE
BLACTX-M ISLT/SPM QL: NOT DETECTED
BLAIMP ISLT/SPM QL: NOT DETECTED
BLAKPC ISLT/SPM QL: NOT DETECTED
BLAOXA-48-LIKE ISLT/SPM QL: NOT DETECTED
BLAVIM ISLT/SPM QL: NOT DETECTED
BUN SERPL-MCNC: 29 MG/DL (ref 7–22)
BUN SERPL-MCNC: 32 MG/DL (ref 7–22)
C PNEUM DNA LOWER RESP QL NAA+NON-PROBE: NOT DETECTED
CALCIUM SERPL-MCNC: 8.3 MG/DL (ref 8.5–10.5)
CALCIUM SERPL-MCNC: 8.4 MG/DL (ref 8.5–10.5)
CASTS #/AREA URNS LPF: ABNORMAL /LPF
CASTS 2: ABNORMAL /LPF
CHARACTER UR: ABNORMAL
CHLORIDE SERPL-SCNC: 102 MEQ/L (ref 98–111)
CHLORIDE SERPL-SCNC: 105 MEQ/L (ref 98–111)
CO2 SERPL-SCNC: 26 MEQ/L (ref 23–33)
CO2 SERPL-SCNC: 27 MEQ/L (ref 23–33)
COLOR, UA: YELLOW
CREAT SERPL-MCNC: 0.8 MG/DL (ref 0.4–1.2)
CREAT SERPL-MCNC: 0.8 MG/DL (ref 0.4–1.2)
CRYSTALS URNS MICRO: ABNORMAL
DEPRECATED RDW RBC AUTO: 54.9 FL (ref 35–45)
ENTEROBACTER CLOACAE COMPLEX BY PCR: NOT DETECTED
EOSINOPHIL NFR BLD AUTO: 0.1 %
EOSINOPHILS ABSOLUTE: 0 THOU/MM3 (ref 0–0.4)
EPITHELIAL CELLS, UA: ABNORMAL /HPF
ERYTHROCYTE [DISTWIDTH] IN BLOOD BY AUTOMATED COUNT: 15.1 % (ref 11.5–14.5)
ESCHERICHIA COLI BY PCR: DETECTED
FLUAV RNA LOWER RESP QL NAA+NON-PROBE: NOT DETECTED
FLUBV RNA LOWER RESP QL NAA+NON-PROBE: NOT DETECTED
GFR SERPL CREATININE-BSD FRML MDRD: 85 ML/MIN/1.73M2
GFR SERPL CREATININE-BSD FRML MDRD: 85 ML/MIN/1.73M2
GLUCOSE SERPL-MCNC: 177 MG/DL (ref 70–108)
GLUCOSE SERPL-MCNC: 191 MG/DL (ref 70–108)
GLUCOSE UR QL STRIP.AUTO: NEGATIVE MG/DL
HADV DNA LOWER RESP QL NAA+NON-PROBE: NOT DETECTED
HAEMOPHILUS INFLUENZAE BY PCR: NOT DETECTED
HCOV RNA LOWER RESP QL NAA+NON-PROBE: NOT DETECTED
HCT VFR BLD AUTO: 26.9 % (ref 42–52)
HGB BLD-MCNC: 7.9 GM/DL (ref 14–18)
HGB UR QL STRIP.AUTO: ABNORMAL
HMPV RNA LOWER RESP QL NAA+NON-PROBE: NOT DETECTED
HPIV RNA LOWER RESP QL NAA+NON-PROBE: NOT DETECTED
IMM GRANULOCYTES # BLD AUTO: 0.46 THOU/MM3 (ref 0–0.07)
IMM GRANULOCYTES NFR BLD AUTO: 2.1 %
KETONES UR QL STRIP.AUTO: NEGATIVE
KLEBSIELLA AEROGENES BY PCR: NOT DETECTED
KLEBSIELLA OXYTOCA BY PCR: NOT DETECTED
KLEBSIELLA PNEUMONIAE GROUP BY PCR: NOT DETECTED
L PNEUMO DNA LOWER RESP QL NAA+NON-PROBE: NOT DETECTED
LYMPHOCYTES ABSOLUTE: 0.3 THOU/MM3 (ref 1–4.8)
LYMPHOCYTES NFR BLD AUTO: 1.4 %
M PNEUMO DNA LOWER RESP QL NAA+NON-PROBE: NOT DETECTED
MAGNESIUM SERPL-MCNC: 2 MG/DL (ref 1.6–2.4)
MCH RBC QN AUTO: 29.5 PG (ref 26–33)
MCHC RBC AUTO-ENTMCNC: 29.4 GM/DL (ref 32.2–35.5)
MCV RBC AUTO: 100.4 FL (ref 80–94)
MISCELLANEOUS 2: ABNORMAL
MONOCYTES ABSOLUTE: 0.2 THOU/MM3 (ref 0.4–1.3)
MONOCYTES NFR BLD AUTO: 0.7 %
MORAXELLA CATARRHALIS BY PCR: NOT DETECTED
NEUTROPHILS ABSOLUTE: 21.2 THOU/MM3 (ref 1.8–7.7)
NEUTROPHILS NFR BLD AUTO: 95.5 %
NITRITE UR QL STRIP: NEGATIVE
NRBC BLD AUTO-RTO: 0 /100 WBC
PH UR STRIP.AUTO: 5 [PH] (ref 5–9)
PLATELET # BLD AUTO: 212 THOU/MM3 (ref 130–400)
PLATELET BLD QL SMEAR: ADEQUATE
PMV BLD AUTO: 12 FL (ref 9.4–12.4)
POIKILOCYTES: ABNORMAL
POLYCHROMASIA BLD QL SMEAR: ABNORMAL
POTASSIUM SERPL-SCNC: 3 MEQ/L (ref 3.5–5.2)
POTASSIUM SERPL-SCNC: 4.7 MEQ/L (ref 3.5–5.2)
PREALB SERPL-MCNC: 7.6 MG/DL (ref 20–40)
PROCALCITONIN SERPL IA-MCNC: 1.14 NG/ML (ref 0.01–0.09)
PROT SERPL-MCNC: 4.9 G/DL (ref 6.1–8)
PROT UR STRIP.AUTO-MCNC: NEGATIVE MG/DL
PROTEUS SPECIES BY PCR: NOT DETECTED
PSEUDOMONAS AERUGINOSA BY PCR: DETECTED
RBC # BLD AUTO: 2.68 MILL/MM3 (ref 4.7–6.1)
RBC URINE: ABNORMAL /HPF
RENAL EPI CELLS #/AREA URNS HPF: ABNORMAL /[HPF]
RESISTANT GENE MECA/C & MREJ BY PCR: ABNORMAL
RESISTANT GENE NDM BY PCR: NOT DETECTED
RSV RNA LOWER RESP QL NAA+NON-PROBE: NOT DETECTED
RV+EV RNA LOWER RESP QL NAA+NON-PROBE: NOT DETECTED
SCAN OF BLOOD SMEAR: NORMAL
SERRATIA MARCESCENS BY PCR: NOT DETECTED
SODIUM SERPL-SCNC: 137 MEQ/L (ref 135–145)
SODIUM SERPL-SCNC: 141 MEQ/L (ref 135–145)
SOURCE: ABNORMAL
SP GR UR REFRACT.AUTO: 1.01 (ref 1–1.03)
SPECIMEN ACCEPTABILITY: ABNORMAL
STAPH AUREUS BY PCR: NOT DETECTED
STREP AGALACTIAE BY PCR: NOT DETECTED
STREP PNEUMONIAE BY PCR: NOT DETECTED
STREP PYOGENES BY PCR: NOT DETECTED
TOXIC GRANULES BLD QL SMEAR: PRESENT
UROBILINOGEN, URINE: 0.2 EU/DL (ref 0–1)
WBC # BLD AUTO: 22.2 THOU/MM3 (ref 4.8–10.8)
WBC #/AREA URNS HPF: ABNORMAL /HPF
WBC #/AREA URNS HPF: NEGATIVE /[HPF]
YEAST LIKE FUNGI URNS QL MICRO: ABNORMAL

## 2024-09-02 PROCEDURE — 71045 X-RAY EXAM CHEST 1 VIEW: CPT

## 2024-09-03 ENCOUNTER — CARE COORDINATION (OUTPATIENT)
Dept: CARE COORDINATION | Age: 89
End: 2024-09-03

## 2024-09-03 ENCOUNTER — APPOINTMENT (OUTPATIENT)
Dept: GENERAL RADIOLOGY | Age: 89
End: 2024-09-03
Attending: STUDENT IN AN ORGANIZED HEALTH CARE EDUCATION/TRAINING PROGRAM
Payer: COMMERCIAL

## 2024-09-03 LAB
AFB CULTURE & SMEAR: NORMAL
ALBUMIN SERPL BCG-MCNC: 2 G/DL (ref 3.5–5.1)
ALP SERPL-CCNC: 62 U/L (ref 38–126)
ALT SERPL W/O P-5'-P-CCNC: 24 U/L (ref 11–66)
ANION GAP SERPL CALC-SCNC: 13 MEQ/L (ref 8–16)
ANION GAP SERPL CALC-SCNC: 9 MEQ/L (ref 8–16)
AST SERPL-CCNC: 31 U/L (ref 5–40)
BASOPHILS ABSOLUTE: 0 THOU/MM3 (ref 0–0.1)
BASOPHILS NFR BLD AUTO: 0.2 %
BILIRUB SERPL-MCNC: 0.3 MG/DL (ref 0.3–1.2)
BUN SERPL-MCNC: 30 MG/DL (ref 7–22)
BUN SERPL-MCNC: 32 MG/DL (ref 7–22)
CALCIUM SERPL-MCNC: 8.7 MG/DL (ref 8.5–10.5)
CALCIUM SERPL-MCNC: 8.7 MG/DL (ref 8.5–10.5)
CHLORIDE SERPL-SCNC: 100 MEQ/L (ref 98–111)
CHLORIDE SERPL-SCNC: 100 MEQ/L (ref 98–111)
CO2 SERPL-SCNC: 26 MEQ/L (ref 23–33)
CO2 SERPL-SCNC: 27 MEQ/L (ref 23–33)
CREAT SERPL-MCNC: 0.8 MG/DL (ref 0.4–1.2)
CREAT SERPL-MCNC: 0.8 MG/DL (ref 0.4–1.2)
DEPRECATED RDW RBC AUTO: 55.9 FL (ref 35–45)
EOSINOPHIL NFR BLD AUTO: 0 %
EOSINOPHILS ABSOLUTE: 0 THOU/MM3 (ref 0–0.4)
ERYTHROCYTE [DISTWIDTH] IN BLOOD BY AUTOMATED COUNT: 14.9 % (ref 11.5–14.5)
GFR SERPL CREATININE-BSD FRML MDRD: 85 ML/MIN/1.73M2
GFR SERPL CREATININE-BSD FRML MDRD: 85 ML/MIN/1.73M2
GLUCOSE SERPL-MCNC: 209 MG/DL (ref 70–108)
GLUCOSE SERPL-MCNC: 229 MG/DL (ref 70–108)
HCT VFR BLD AUTO: 28.4 % (ref 42–52)
HGB BLD-MCNC: 8.3 GM/DL (ref 14–18)
IMM GRANULOCYTES # BLD AUTO: 0.33 THOU/MM3 (ref 0–0.07)
IMM GRANULOCYTES NFR BLD AUTO: 1.7 %
LYMPHOCYTES ABSOLUTE: 0.2 THOU/MM3 (ref 1–4.8)
LYMPHOCYTES NFR BLD AUTO: 1 %
MAGNESIUM SERPL-MCNC: 2.1 MG/DL (ref 1.6–2.4)
MCH RBC QN AUTO: 29.6 PG (ref 26–33)
MCHC RBC AUTO-ENTMCNC: 29.2 GM/DL (ref 32.2–35.5)
MCV RBC AUTO: 101.4 FL (ref 80–94)
MONOCYTES ABSOLUTE: 0.1 THOU/MM3 (ref 0.4–1.3)
MONOCYTES NFR BLD AUTO: 0.6 %
NEUTROPHILS ABSOLUTE: 18.9 THOU/MM3 (ref 1.8–7.7)
NEUTROPHILS NFR BLD AUTO: 96.5 %
NRBC BLD AUTO-RTO: 0 /100 WBC
PLATELET # BLD AUTO: 222 THOU/MM3 (ref 130–400)
PMV BLD AUTO: 11.8 FL (ref 9.4–12.4)
POTASSIUM SERPL-SCNC: 3.7 MEQ/L (ref 3.5–5.2)
POTASSIUM SERPL-SCNC: 4.3 MEQ/L (ref 3.5–5.2)
PROT SERPL-MCNC: 5.4 G/DL (ref 6.1–8)
RBC # BLD AUTO: 2.8 MILL/MM3 (ref 4.7–6.1)
SODIUM SERPL-SCNC: 136 MEQ/L (ref 135–145)
SODIUM SERPL-SCNC: 139 MEQ/L (ref 135–145)
WBC # BLD AUTO: 19.6 THOU/MM3 (ref 4.8–10.8)

## 2024-09-03 PROCEDURE — 71045 X-RAY EXAM CHEST 1 VIEW: CPT

## 2024-09-03 PROCEDURE — 99233 SBSQ HOSP IP/OBS HIGH 50: CPT | Performed by: INTERNAL MEDICINE

## 2024-09-03 NOTE — CARE COORDINATION
Patient has been admitted to [place].  [facility] will discharge at this time.

## 2024-09-04 LAB
ALBUMIN SERPL BCG-MCNC: 2.2 G/DL (ref 3.5–5.1)
ALP SERPL-CCNC: 59 U/L (ref 38–126)
ALT SERPL W/O P-5'-P-CCNC: 35 U/L (ref 11–66)
ANION GAP SERPL CALC-SCNC: 13 MEQ/L (ref 8–16)
AST SERPL-CCNC: 47 U/L (ref 5–40)
BACTERIA SPEC RESP CULT: ABNORMAL
BACTERIA SPEC RESP CULT: ABNORMAL
BASOPHILS ABSOLUTE: 0 THOU/MM3 (ref 0–0.1)
BASOPHILS NFR BLD AUTO: 0.1 %
BILIRUB SERPL-MCNC: 0.3 MG/DL (ref 0.3–1.2)
BUN SERPL-MCNC: 30 MG/DL (ref 7–22)
CALCIUM SERPL-MCNC: 8.4 MG/DL (ref 8.5–10.5)
CHLORIDE SERPL-SCNC: 100 MEQ/L (ref 98–111)
CO2 SERPL-SCNC: 25 MEQ/L (ref 23–33)
CREAT SERPL-MCNC: 0.8 MG/DL (ref 0.4–1.2)
DEPRECATED RDW RBC AUTO: 54.9 FL (ref 35–45)
EOSINOPHIL NFR BLD AUTO: 0 %
EOSINOPHILS ABSOLUTE: 0 THOU/MM3 (ref 0–0.4)
ERYTHROCYTE [DISTWIDTH] IN BLOOD BY AUTOMATED COUNT: 14.9 % (ref 11.5–14.5)
GFR SERPL CREATININE-BSD FRML MDRD: 85 ML/MIN/1.73M2
GLUCOSE SERPL-MCNC: 211 MG/DL (ref 70–108)
GRAM STN SPEC: ABNORMAL
HCT VFR BLD AUTO: 27.5 % (ref 42–52)
HGB BLD-MCNC: 7.9 GM/DL (ref 14–18)
HYPOCHROMIA BLD QL SMEAR: PRESENT
IMM GRANULOCYTES # BLD AUTO: 0.38 THOU/MM3 (ref 0–0.07)
IMM GRANULOCYTES NFR BLD AUTO: 1.8 %
LYMPHOCYTES ABSOLUTE: 0.4 THOU/MM3 (ref 1–4.8)
LYMPHOCYTES NFR BLD AUTO: 1.7 %
MAGNESIUM SERPL-MCNC: 2 MG/DL (ref 1.6–2.4)
MCH RBC QN AUTO: 29.4 PG (ref 26–33)
MCHC RBC AUTO-ENTMCNC: 28.7 GM/DL (ref 32.2–35.5)
MCV RBC AUTO: 102.2 FL (ref 80–94)
MONOCYTES ABSOLUTE: 0.3 THOU/MM3 (ref 0.4–1.3)
MONOCYTES NFR BLD AUTO: 1.3 %
NEUTROPHILS ABSOLUTE: 20 THOU/MM3 (ref 1.8–7.7)
NEUTROPHILS NFR BLD AUTO: 95.1 %
NRBC BLD AUTO-RTO: 0 /100 WBC
ORGANISM: ABNORMAL
ORGANISM: ABNORMAL
PLATELET # BLD AUTO: 232 THOU/MM3 (ref 130–400)
PMV BLD AUTO: 11.9 FL (ref 9.4–12.4)
POTASSIUM SERPL-SCNC: 3.7 MEQ/L (ref 3.5–5.2)
PROT SERPL-MCNC: 5.3 G/DL (ref 6.1–8)
RBC # BLD AUTO: 2.69 MILL/MM3 (ref 4.7–6.1)
SODIUM SERPL-SCNC: 138 MEQ/L (ref 135–145)
WBC # BLD AUTO: 21 THOU/MM3 (ref 4.8–10.8)

## 2024-09-05 LAB
ALBUMIN SERPL BCG-MCNC: 2.1 G/DL (ref 3.5–5.1)
ALP SERPL-CCNC: 56 U/L (ref 38–126)
ALT SERPL W/O P-5'-P-CCNC: 39 U/L (ref 11–66)
ANION GAP SERPL CALC-SCNC: 10 MEQ/L (ref 8–16)
AST SERPL-CCNC: 31 U/L (ref 5–40)
BASOPHILS ABSOLUTE: 0 THOU/MM3 (ref 0–0.1)
BASOPHILS NFR BLD AUTO: 0.2 %
BILIRUB SERPL-MCNC: 0.3 MG/DL (ref 0.3–1.2)
BUN SERPL-MCNC: 30 MG/DL (ref 7–22)
CALCIUM SERPL-MCNC: 8.3 MG/DL (ref 8.5–10.5)
CHLORIDE SERPL-SCNC: 99 MEQ/L (ref 98–111)
CO2 SERPL-SCNC: 23 MEQ/L (ref 23–33)
CREAT SERPL-MCNC: 0.7 MG/DL (ref 0.4–1.2)
DEPRECATED RDW RBC AUTO: 53.9 FL (ref 35–45)
EOSINOPHIL NFR BLD AUTO: 0 %
EOSINOPHILS ABSOLUTE: 0 THOU/MM3 (ref 0–0.4)
ERYTHROCYTE [DISTWIDTH] IN BLOOD BY AUTOMATED COUNT: 14.9 % (ref 11.5–14.5)
GFR SERPL CREATININE-BSD FRML MDRD: 89 ML/MIN/1.73M2
GLUCOSE SERPL-MCNC: 316 MG/DL (ref 70–108)
HCT VFR BLD AUTO: 25.2 % (ref 42–52)
HGB BLD-MCNC: 7.5 GM/DL (ref 14–18)
IMM GRANULOCYTES # BLD AUTO: 0.32 THOU/MM3 (ref 0–0.07)
IMM GRANULOCYTES NFR BLD AUTO: 1.8 %
LYMPHOCYTES ABSOLUTE: 0.2 THOU/MM3 (ref 1–4.8)
LYMPHOCYTES NFR BLD AUTO: 1.3 %
MCH RBC QN AUTO: 29.4 PG (ref 26–33)
MCHC RBC AUTO-ENTMCNC: 29.8 GM/DL (ref 32.2–35.5)
MCV RBC AUTO: 98.8 FL (ref 80–94)
MONOCYTES ABSOLUTE: 0.2 THOU/MM3 (ref 0.4–1.3)
MONOCYTES NFR BLD AUTO: 1.4 %
NEUTROPHILS ABSOLUTE: 16.6 THOU/MM3 (ref 1.8–7.7)
NEUTROPHILS NFR BLD AUTO: 95.3 %
NRBC BLD AUTO-RTO: 0 /100 WBC
PLATELET # BLD AUTO: 219 THOU/MM3 (ref 130–400)
PMV BLD AUTO: 11.5 FL (ref 9.4–12.4)
POTASSIUM SERPL-SCNC: 4.3 MEQ/L (ref 3.5–5.2)
PROT SERPL-MCNC: 5.2 G/DL (ref 6.1–8)
RBC # BLD AUTO: 2.55 MILL/MM3 (ref 4.7–6.1)
REASON FOR REJECTION: NORMAL
REJECTED TEST: NORMAL
SODIUM SERPL-SCNC: 132 MEQ/L (ref 135–145)
WBC # BLD AUTO: 17.4 THOU/MM3 (ref 4.8–10.8)

## 2024-09-05 PROCEDURE — 99233 SBSQ HOSP IP/OBS HIGH 50: CPT | Performed by: INTERNAL MEDICINE

## 2024-09-06 ENCOUNTER — APPOINTMENT (OUTPATIENT)
Dept: INTERVENTIONAL RADIOLOGY/VASCULAR | Age: 89
End: 2024-09-06
Attending: INTERNAL MEDICINE
Payer: COMMERCIAL

## 2024-09-06 LAB
ALBUMIN SERPL BCG-MCNC: 2.2 G/DL (ref 3.5–5.1)
ALP SERPL-CCNC: 54 U/L (ref 38–126)
ALT SERPL W/O P-5'-P-CCNC: 40 U/L (ref 11–66)
ANION GAP SERPL CALC-SCNC: 9 MEQ/L (ref 8–16)
AST SERPL-CCNC: 26 U/L (ref 5–40)
BILIRUB SERPL-MCNC: 0.2 MG/DL (ref 0.3–1.2)
BUN SERPL-MCNC: 31 MG/DL (ref 7–22)
CALCIUM SERPL-MCNC: 8.1 MG/DL (ref 8.5–10.5)
CHLORIDE SERPL-SCNC: 97 MEQ/L (ref 98–111)
CO2 SERPL-SCNC: 29 MEQ/L (ref 23–33)
CREAT SERPL-MCNC: 0.7 MG/DL (ref 0.4–1.2)
DEPRECATED RDW RBC AUTO: 54.2 FL (ref 35–45)
ERYTHROCYTE [DISTWIDTH] IN BLOOD BY AUTOMATED COUNT: 15 % (ref 11.5–14.5)
GFR SERPL CREATININE-BSD FRML MDRD: 89 ML/MIN/1.73M2
GLUCOSE SERPL-MCNC: 298 MG/DL (ref 70–108)
HCT VFR BLD AUTO: 27.5 % (ref 42–52)
HGB BLD-MCNC: 8 GM/DL (ref 14–18)
MAGNESIUM SERPL-MCNC: 2.3 MG/DL (ref 1.6–2.4)
MCH RBC QN AUTO: 29.6 PG (ref 26–33)
MCHC RBC AUTO-ENTMCNC: 29.1 GM/DL (ref 32.2–35.5)
MCV RBC AUTO: 101.9 FL (ref 80–94)
PLATELET # BLD AUTO: 228 THOU/MM3 (ref 130–400)
PMV BLD AUTO: 12.1 FL (ref 9.4–12.4)
POTASSIUM SERPL-SCNC: 4.3 MEQ/L (ref 3.5–5.2)
PROT SERPL-MCNC: 5.2 G/DL (ref 6.1–8)
RBC # BLD AUTO: 2.7 MILL/MM3 (ref 4.7–6.1)
REASON FOR REJECTION: NORMAL
REJECTED TEST: NORMAL
SODIUM SERPL-SCNC: 135 MEQ/L (ref 135–145)
WBC # BLD AUTO: 15.1 THOU/MM3 (ref 4.8–10.8)

## 2024-09-06 PROCEDURE — 93971 EXTREMITY STUDY: CPT

## 2024-09-07 LAB
ALBUMIN SERPL BCG-MCNC: 2.2 G/DL (ref 3.5–5.1)
ALP SERPL-CCNC: 53 U/L (ref 38–126)
ALT SERPL W/O P-5'-P-CCNC: 36 U/L (ref 11–66)
ANION GAP SERPL CALC-SCNC: 9 MEQ/L (ref 8–16)
AST SERPL-CCNC: 29 U/L (ref 5–40)
BACTERIA BLD AEROBE CULT: NORMAL
BACTERIA BLD AEROBE CULT: NORMAL
BASOPHILS ABSOLUTE: 0 THOU/MM3 (ref 0–0.1)
BASOPHILS NFR BLD AUTO: 0.1 %
BILIRUB SERPL-MCNC: 0.2 MG/DL (ref 0.3–1.2)
BUN SERPL-MCNC: 31 MG/DL (ref 7–22)
CALCIUM SERPL-MCNC: 8.1 MG/DL (ref 8.5–10.5)
CHLORIDE SERPL-SCNC: 95 MEQ/L (ref 98–111)
CO2 SERPL-SCNC: 27 MEQ/L (ref 23–33)
CREAT SERPL-MCNC: 0.7 MG/DL (ref 0.4–1.2)
DEPRECATED RDW RBC AUTO: 54.6 FL (ref 35–45)
DEPRECATED RDW RBC AUTO: 55.9 FL (ref 35–45)
EOSINOPHIL NFR BLD AUTO: 0 %
EOSINOPHILS ABSOLUTE: 0 THOU/MM3 (ref 0–0.4)
ERYTHROCYTE [DISTWIDTH] IN BLOOD BY AUTOMATED COUNT: 15.6 % (ref 11.5–14.5)
ERYTHROCYTE [DISTWIDTH] IN BLOOD BY AUTOMATED COUNT: 16.3 % (ref 11.5–14.5)
GFR SERPL CREATININE-BSD FRML MDRD: 89 ML/MIN/1.73M2
GLUCOSE SERPL-MCNC: 244 MG/DL (ref 70–108)
HCT VFR BLD AUTO: 24.3 % (ref 42–52)
HCT VFR BLD AUTO: 27.1 % (ref 42–52)
HGB BLD-MCNC: 7.5 GM/DL (ref 14–18)
HGB BLD-MCNC: 8.2 GM/DL (ref 14–18)
IMM GRANULOCYTES # BLD AUTO: 0.22 THOU/MM3 (ref 0–0.07)
IMM GRANULOCYTES NFR BLD AUTO: 1.7 %
LYMPHOCYTES ABSOLUTE: 0.2 THOU/MM3 (ref 1–4.8)
LYMPHOCYTES NFR BLD AUTO: 1.5 %
MCH RBC QN AUTO: 30.1 PG (ref 26–33)
MCH RBC QN AUTO: 30.6 PG (ref 26–33)
MCHC RBC AUTO-ENTMCNC: 30.3 GM/DL (ref 32.2–35.5)
MCHC RBC AUTO-ENTMCNC: 30.9 GM/DL (ref 32.2–35.5)
MCV RBC AUTO: 101.1 FL (ref 80–94)
MCV RBC AUTO: 97.6 FL (ref 80–94)
MONOCYTES ABSOLUTE: 0.1 THOU/MM3 (ref 0.4–1.3)
MONOCYTES NFR BLD AUTO: 1.1 %
NEUTROPHILS ABSOLUTE: 12 THOU/MM3 (ref 1.8–7.7)
NEUTROPHILS NFR BLD AUTO: 95.6 %
NRBC BLD AUTO-RTO: 1 /100 WBC
PLATELET # BLD AUTO: 240 THOU/MM3 (ref 130–400)
PLATELET # BLD AUTO: 243 THOU/MM3 (ref 130–400)
PMV BLD AUTO: 11.7 FL (ref 9.4–12.4)
PMV BLD AUTO: 12 FL (ref 9.4–12.4)
POTASSIUM SERPL-SCNC: 3.7 MEQ/L (ref 3.5–5.2)
PROT SERPL-MCNC: 5 G/DL (ref 6.1–8)
RBC # BLD AUTO: 2.49 MILL/MM3 (ref 4.7–6.1)
RBC # BLD AUTO: 2.68 MILL/MM3 (ref 4.7–6.1)
SODIUM SERPL-SCNC: 131 MEQ/L (ref 135–145)
WBC # BLD AUTO: 12.6 THOU/MM3 (ref 4.8–10.8)
WBC # BLD AUTO: 13.7 THOU/MM3 (ref 4.8–10.8)

## 2024-09-08 LAB
ABO: NORMAL
ALBUMIN SERPL BCG-MCNC: 2.3 G/DL (ref 3.5–5.1)
ALP SERPL-CCNC: 46 U/L (ref 38–126)
ALT SERPL W/O P-5'-P-CCNC: 30 U/L (ref 11–66)
ANION GAP SERPL CALC-SCNC: 8 MEQ/L (ref 8–16)
ANTIBODY IDENTIFICATION: NORMAL
ANTIBODY SCREEN: NORMAL
AST SERPL-CCNC: 19 U/L (ref 5–40)
BASOPHILS ABSOLUTE: 0 THOU/MM3 (ref 0–0.1)
BASOPHILS NFR BLD AUTO: 0 %
BILIRUB SERPL-MCNC: 0.2 MG/DL (ref 0.3–1.2)
BUN SERPL-MCNC: 31 MG/DL (ref 7–22)
CALCIUM SERPL-MCNC: 7.6 MG/DL (ref 8.5–10.5)
CHLORIDE SERPL-SCNC: 89 MEQ/L (ref 98–111)
CO2 SERPL-SCNC: 31 MEQ/L (ref 23–33)
CREAT SERPL-MCNC: 0.8 MG/DL (ref 0.4–1.2)
DEPRECATED RDW RBC AUTO: 56 FL (ref 35–45)
EOSINOPHIL NFR BLD AUTO: 0 %
EOSINOPHILS ABSOLUTE: 0 THOU/MM3 (ref 0–0.4)
ERYTHROCYTE [DISTWIDTH] IN BLOOD BY AUTOMATED COUNT: 16.4 % (ref 11.5–14.5)
GFR SERPL CREATININE-BSD FRML MDRD: 85 ML/MIN/1.73M2
GLUCOSE SERPL-MCNC: 480 MG/DL (ref 70–108)
HCT VFR BLD AUTO: 23.1 % (ref 42–52)
HCT VFR BLD AUTO: 24 % (ref 42–52)
HCT VFR BLD AUTO: 24.1 % (ref 42–52)
HCT VFR BLD AUTO: 25.2 % (ref 42–52)
HGB BLD-MCNC: 7.1 GM/DL (ref 14–18)
HGB BLD-MCNC: 7.2 GM/DL (ref 14–18)
HGB BLD-MCNC: 7.3 GM/DL (ref 14–18)
HGB BLD-MCNC: 7.4 GM/DL (ref 14–18)
IMM GRANULOCYTES # BLD AUTO: 0.17 THOU/MM3 (ref 0–0.07)
IMM GRANULOCYTES NFR BLD AUTO: 1.7 %
LYMPHOCYTES ABSOLUTE: 0.3 THOU/MM3 (ref 1–4.8)
LYMPHOCYTES NFR BLD AUTO: 2.5 %
MCH RBC QN AUTO: 29.8 PG (ref 26–33)
MCHC RBC AUTO-ENTMCNC: 29.9 GM/DL (ref 32.2–35.5)
MCV RBC AUTO: 99.6 FL (ref 80–94)
MONOCYTES ABSOLUTE: 0.2 THOU/MM3 (ref 0.4–1.3)
MONOCYTES NFR BLD AUTO: 1.9 %
NEUTROPHILS ABSOLUTE: 9.5 THOU/MM3 (ref 1.8–7.7)
NEUTROPHILS NFR BLD AUTO: 93.9 %
NRBC BLD AUTO-RTO: 1 /100 WBC
PLATELET # BLD AUTO: 241 THOU/MM3 (ref 130–400)
PMV BLD AUTO: 11.4 FL (ref 9.4–12.4)
POTASSIUM SERPL-SCNC: 3.4 MEQ/L (ref 3.5–5.2)
PROT SERPL-MCNC: 4.6 G/DL (ref 6.1–8)
RBC # BLD AUTO: 2.42 MILL/MM3 (ref 4.7–6.1)
RH FACTOR: NORMAL
SODIUM SERPL-SCNC: 128 MEQ/L (ref 135–145)
WBC # BLD AUTO: 10.1 THOU/MM3 (ref 4.8–10.8)

## 2024-09-09 ENCOUNTER — APPOINTMENT (OUTPATIENT)
Dept: GENERAL RADIOLOGY | Age: 89
End: 2024-09-09
Attending: STUDENT IN AN ORGANIZED HEALTH CARE EDUCATION/TRAINING PROGRAM
Payer: COMMERCIAL

## 2024-09-09 LAB
AFB CULTURE & SMEAR: NORMAL
ANION GAP SERPL CALC-SCNC: 10 MEQ/L (ref 8–16)
BUN SERPL-MCNC: 34 MG/DL (ref 7–22)
CA-I BLD ISE-SCNC: 1.04 MMOL/L (ref 1.12–1.32)
CALCIUM SERPL-MCNC: 7.8 MG/DL (ref 8.5–10.5)
CHLORIDE SERPL-SCNC: 97 MEQ/L (ref 98–111)
CO2 SERPL-SCNC: 31 MEQ/L (ref 23–33)
CREAT SERPL-MCNC: 0.7 MG/DL (ref 0.4–1.2)
DIGOXIN SERPL-MCNC: 1 NG/ML (ref 0.5–2)
GFR SERPL CREATININE-BSD FRML MDRD: 89 ML/MIN/1.73M2
GLUCOSE SERPL-MCNC: 183 MG/DL (ref 70–108)
HCT VFR BLD AUTO: 27.3 % (ref 42–52)
HCT VFR BLD AUTO: 28.4 % (ref 42–52)
HEMOCCULT STL QL: POSITIVE
HGB BLD-MCNC: 8.1 GM/DL (ref 14–18)
HGB BLD-MCNC: 8.5 GM/DL (ref 14–18)
MAGNESIUM SERPL-MCNC: 2.2 MG/DL (ref 1.6–2.4)
POTASSIUM SERPL-SCNC: 3.6 MEQ/L (ref 3.5–5.2)
SODIUM SERPL-SCNC: 138 MEQ/L (ref 135–145)

## 2024-09-09 PROCEDURE — 71045 X-RAY EXAM CHEST 1 VIEW: CPT

## 2024-09-09 PROCEDURE — 99291 CRITICAL CARE FIRST HOUR: CPT | Performed by: INTERNAL MEDICINE

## 2024-09-10 LAB
C DIFF GDH STL QL: NEGATIVE
CA-I BLD ISE-SCNC: 1.11 MMOL/L (ref 1.12–1.32)
DEPRECATED RDW RBC AUTO: 65.7 FL (ref 35–45)
ERYTHROCYTE [DISTWIDTH] IN BLOOD BY AUTOMATED COUNT: 18.2 % (ref 11.5–14.5)
HCT VFR BLD AUTO: 27.2 % (ref 42–52)
HGB BLD-MCNC: 8.3 GM/DL (ref 14–18)
MCH RBC QN AUTO: 30.5 PG (ref 26–33)
MCHC RBC AUTO-ENTMCNC: 30.5 GM/DL (ref 32.2–35.5)
MCV RBC AUTO: 100 FL (ref 80–94)
PLATELET # BLD AUTO: 236 THOU/MM3 (ref 130–400)
PMV BLD AUTO: 11 FL (ref 9.4–12.4)
RBC # BLD AUTO: 2.72 MILL/MM3 (ref 4.7–6.1)
SCAN OF BLOOD SMEAR: NORMAL
WBC # BLD AUTO: 8.4 THOU/MM3 (ref 4.8–10.8)

## 2024-09-11 LAB
AUTO DIFF PNL BLD: ABNORMAL
BASOPHILS ABSOLUTE: 0 THOU/MM3 (ref 0–0.1)
BASOPHILS NFR BLD AUTO: 0 %
CA-I BLD ISE-SCNC: 1.15 MMOL/L (ref 1.12–1.32)
DEPRECATED RDW RBC AUTO: 64.8 FL (ref 35–45)
EOSINOPHIL NFR BLD AUTO: 3.2 %
EOSINOPHILS ABSOLUTE: 0.2 THOU/MM3 (ref 0–0.4)
ERYTHROCYTE [DISTWIDTH] IN BLOOD BY AUTOMATED COUNT: 17.8 % (ref 11.5–14.5)
HCT VFR BLD AUTO: 22.8 % (ref 42–52)
HCT VFR BLD AUTO: 27.1 % (ref 42–52)
HGB BLD-MCNC: 6.8 GM/DL (ref 14–18)
HGB BLD-MCNC: 8.3 GM/DL (ref 14–18)
IAT IGG-SP REAG SERPL QL: NORMAL
IMM GRANULOCYTES # BLD AUTO: 0.06 THOU/MM3 (ref 0–0.07)
IMM GRANULOCYTES NFR BLD AUTO: 1.1 %
LYMPHOCYTES ABSOLUTE: 0.4 THOU/MM3 (ref 1–4.8)
LYMPHOCYTES NFR BLD AUTO: 6.5 %
MCH RBC QN AUTO: 30 PG (ref 26–33)
MCHC RBC AUTO-ENTMCNC: 29.8 GM/DL (ref 32.2–35.5)
MCV RBC AUTO: 100.4 FL (ref 80–94)
MONOCYTES ABSOLUTE: 0.1 THOU/MM3 (ref 0.4–1.3)
MONOCYTES NFR BLD AUTO: 1.6 %
NEUTROPHILS ABSOLUTE: 4.9 THOU/MM3 (ref 1.8–7.7)
NEUTROPHILS NFR BLD AUTO: 87.6 %
NRBC BLD AUTO-RTO: 0 /100 WBC
PATHOLOGIST REVIEW: ABNORMAL
PLATELET # BLD AUTO: 214 THOU/MM3 (ref 130–400)
PLATELET BLD QL SMEAR: ADEQUATE
PMV BLD AUTO: 11.1 FL (ref 9.4–12.4)
POIKILOCYTES: ABNORMAL
POLYCHROMASIA BLD QL SMEAR: ABNORMAL
RBC # BLD AUTO: 2.27 MILL/MM3 (ref 4.7–6.1)
SCAN OF BLOOD SMEAR: NORMAL
WBC # BLD AUTO: 5.6 THOU/MM3 (ref 4.8–10.8)

## 2024-09-12 LAB
ANION GAP SERPL CALC-SCNC: 10 MEQ/L (ref 8–16)
BASOPHILS ABSOLUTE: 0 THOU/MM3 (ref 0–0.1)
BASOPHILS NFR BLD AUTO: 0.2 %
BUN SERPL-MCNC: 30 MG/DL (ref 7–22)
CALCIUM SERPL-MCNC: 8.2 MG/DL (ref 8.5–10.5)
CHLORIDE SERPL-SCNC: 95 MEQ/L (ref 98–111)
CO2 SERPL-SCNC: 31 MEQ/L (ref 23–33)
CREAT SERPL-MCNC: 0.6 MG/DL (ref 0.4–1.2)
DEPRECATED RDW RBC AUTO: 64.8 FL (ref 35–45)
EOSINOPHIL NFR BLD AUTO: 3.6 %
EOSINOPHILS ABSOLUTE: 0.2 THOU/MM3 (ref 0–0.4)
ERYTHROCYTE [DISTWIDTH] IN BLOOD BY AUTOMATED COUNT: 18.6 % (ref 11.5–14.5)
GFR SERPL CREATININE-BSD FRML MDRD: > 90 ML/MIN/1.73M2
GLUCOSE SERPL-MCNC: 131 MG/DL (ref 70–108)
HCT VFR BLD AUTO: 26.9 % (ref 42–52)
HGB BLD-MCNC: 8.1 GM/DL (ref 14–18)
IMM GRANULOCYTES # BLD AUTO: 0.06 THOU/MM3 (ref 0–0.07)
IMM GRANULOCYTES NFR BLD AUTO: 0.9 %
LYMPHOCYTES ABSOLUTE: 0.4 THOU/MM3 (ref 1–4.8)
LYMPHOCYTES NFR BLD AUTO: 6.1 %
MAGNESIUM SERPL-MCNC: 1.9 MG/DL (ref 1.6–2.4)
MCH RBC QN AUTO: 29.5 PG (ref 26–33)
MCHC RBC AUTO-ENTMCNC: 30.1 GM/DL (ref 32.2–35.5)
MCV RBC AUTO: 97.8 FL (ref 80–94)
MONOCYTES ABSOLUTE: 0.1 THOU/MM3 (ref 0.4–1.3)
MONOCYTES NFR BLD AUTO: 2 %
NEUTROPHILS ABSOLUTE: 5.6 THOU/MM3 (ref 1.8–7.7)
NEUTROPHILS NFR BLD AUTO: 87.2 %
NRBC BLD AUTO-RTO: 0 /100 WBC
PLATELET # BLD AUTO: 227 THOU/MM3 (ref 130–400)
PLATELET BLD QL SMEAR: ADEQUATE
PMV BLD AUTO: 11.2 FL (ref 9.4–12.4)
POTASSIUM SERPL-SCNC: 4.2 MEQ/L (ref 3.5–5.2)
RBC # BLD AUTO: 2.75 MILL/MM3 (ref 4.7–6.1)
SCAN OF BLOOD SMEAR: NORMAL
SODIUM SERPL-SCNC: 136 MEQ/L (ref 135–145)
WBC # BLD AUTO: 6.4 THOU/MM3 (ref 4.8–10.8)

## 2024-09-13 ENCOUNTER — APPOINTMENT (OUTPATIENT)
Dept: GENERAL RADIOLOGY | Age: 89
End: 2024-09-13
Attending: STUDENT IN AN ORGANIZED HEALTH CARE EDUCATION/TRAINING PROGRAM
Payer: COMMERCIAL

## 2024-09-13 LAB
DEPRECATED RDW RBC AUTO: 63.7 FL (ref 35–45)
ERYTHROCYTE [DISTWIDTH] IN BLOOD BY AUTOMATED COUNT: 18.2 % (ref 11.5–14.5)
HCT VFR BLD AUTO: 25.9 % (ref 42–52)
HGB BLD-MCNC: 7.7 GM/DL (ref 14–18)
MCH RBC QN AUTO: 29.2 PG (ref 26–33)
MCHC RBC AUTO-ENTMCNC: 29.7 GM/DL (ref 32.2–35.5)
MCV RBC AUTO: 98.1 FL (ref 80–94)
PLATELET # BLD AUTO: 191 THOU/MM3 (ref 130–400)
PMV BLD AUTO: 10.4 FL (ref 9.4–12.4)
RBC # BLD AUTO: 2.64 MILL/MM3 (ref 4.7–6.1)
WBC # BLD AUTO: 6.2 THOU/MM3 (ref 4.8–10.8)

## 2024-09-13 PROCEDURE — 74018 RADEX ABDOMEN 1 VIEW: CPT

## 2024-09-13 PROCEDURE — 71045 X-RAY EXAM CHEST 1 VIEW: CPT

## 2024-09-13 PROCEDURE — 99233 SBSQ HOSP IP/OBS HIGH 50: CPT | Performed by: INTERNAL MEDICINE

## 2024-09-14 ENCOUNTER — APPOINTMENT (OUTPATIENT)
Dept: GENERAL RADIOLOGY | Age: 89
End: 2024-09-14
Attending: STUDENT IN AN ORGANIZED HEALTH CARE EDUCATION/TRAINING PROGRAM
Payer: COMMERCIAL

## 2024-09-14 LAB
BASOPHILS ABSOLUTE: 0 THOU/MM3 (ref 0–0.1)
BASOPHILS NFR BLD AUTO: 0.2 %
DEPRECATED RDW RBC AUTO: 64.4 FL (ref 35–45)
EOSINOPHIL NFR BLD AUTO: 1.7 %
EOSINOPHILS ABSOLUTE: 0.1 THOU/MM3 (ref 0–0.4)
ERYTHROCYTE [DISTWIDTH] IN BLOOD BY AUTOMATED COUNT: 18.1 % (ref 11.5–14.5)
HCT VFR BLD AUTO: 28.7 % (ref 42–52)
HGB BLD-MCNC: 8.5 GM/DL (ref 14–18)
IMM GRANULOCYTES # BLD AUTO: 0.07 THOU/MM3 (ref 0–0.07)
IMM GRANULOCYTES NFR BLD AUTO: 1.2 %
LYMPHOCYTES ABSOLUTE: 0.4 THOU/MM3 (ref 1–4.8)
LYMPHOCYTES NFR BLD AUTO: 7 %
MCH RBC QN AUTO: 29.3 PG (ref 26–33)
MCHC RBC AUTO-ENTMCNC: 29.6 GM/DL (ref 32.2–35.5)
MCV RBC AUTO: 99 FL (ref 80–94)
MONOCYTES ABSOLUTE: 0.2 THOU/MM3 (ref 0.4–1.3)
MONOCYTES NFR BLD AUTO: 3.8 %
NEUTROPHILS ABSOLUTE: 5 THOU/MM3 (ref 1.8–7.7)
NEUTROPHILS NFR BLD AUTO: 86.1 %
NRBC BLD AUTO-RTO: 0 /100 WBC
PLATELET # BLD AUTO: 180 THOU/MM3 (ref 130–400)
PLATELET BLD QL SMEAR: ADEQUATE
PMV BLD AUTO: 10.5 FL (ref 9.4–12.4)
POLYCHROMASIA BLD QL SMEAR: ABNORMAL
RBC # BLD AUTO: 2.9 MILL/MM3 (ref 4.7–6.1)
SCAN OF BLOOD SMEAR: NORMAL
WBC # BLD AUTO: 5.8 THOU/MM3 (ref 4.8–10.8)

## 2024-09-14 PROCEDURE — 74018 RADEX ABDOMEN 1 VIEW: CPT

## 2024-09-15 LAB
BACTERIA: ABNORMAL
BASOPHILS ABSOLUTE: 0 THOU/MM3 (ref 0–0.1)
BASOPHILS NFR BLD AUTO: 0.3 %
BILIRUB UR QL STRIP: NEGATIVE
CASTS #/AREA URNS LPF: ABNORMAL /LPF
CASTS #/AREA URNS LPF: ABNORMAL /LPF
CHARACTER UR: CLEAR
CHARCOAL URNS QL MICRO: ABNORMAL
COLOR UR: YELLOW
CRYSTALS URNS QL MICRO: ABNORMAL
DEPRECATED RDW RBC AUTO: 64.2 FL (ref 35–45)
EOSINOPHIL NFR BLD AUTO: 2.1 %
EOSINOPHILS ABSOLUTE: 0.1 THOU/MM3 (ref 0–0.4)
EPITHELIAL CELLS, UA: ABNORMAL /HPF
ERYTHROCYTE [DISTWIDTH] IN BLOOD BY AUTOMATED COUNT: 17.8 % (ref 11.5–14.5)
GLUCOSE UR QL STRIP.AUTO: NEGATIVE MG/DL
GRAM STN SPEC: NORMAL
HCT VFR BLD AUTO: 27 % (ref 42–52)
HGB BLD-MCNC: 7.9 GM/DL (ref 14–18)
HGB UR QL STRIP.AUTO: ABNORMAL
IMM GRANULOCYTES # BLD AUTO: 0.07 THOU/MM3 (ref 0–0.07)
IMM GRANULOCYTES NFR BLD AUTO: 1 %
KETONES UR QL STRIP.AUTO: NEGATIVE
LACTIC ACID, SEPSIS: 1.7 MMOL/L (ref 0.5–1.9)
LEUKOCYTE ESTERASE UR QL STRIP.AUTO: ABNORMAL
LYMPHOCYTES ABSOLUTE: 0.3 THOU/MM3 (ref 1–4.8)
LYMPHOCYTES NFR BLD AUTO: 4.6 %
MCH RBC QN AUTO: 29.2 PG (ref 26–33)
MCHC RBC AUTO-ENTMCNC: 29.3 GM/DL (ref 32.2–35.5)
MCV RBC AUTO: 99.6 FL (ref 80–94)
MONOCYTES ABSOLUTE: 0.2 THOU/MM3 (ref 0.4–1.3)
MONOCYTES NFR BLD AUTO: 3.6 %
NEUTROPHILS ABSOLUTE: 5.9 THOU/MM3 (ref 1.8–7.7)
NEUTROPHILS NFR BLD AUTO: 88.4 %
NITRITE UR QL STRIP.AUTO: NEGATIVE
NRBC BLD AUTO-RTO: 0 /100 WBC
PH UR STRIP.AUTO: 7 [PH] (ref 5–9)
PLATELET # BLD AUTO: 214 THOU/MM3 (ref 130–400)
PLATELET BLD QL SMEAR: ADEQUATE
PMV BLD AUTO: 10.5 FL (ref 9.4–12.4)
POLYCHROMASIA BLD QL SMEAR: ABNORMAL
PROT UR STRIP.AUTO-MCNC: NEGATIVE MG/DL
RBC # BLD AUTO: 2.71 MILL/MM3 (ref 4.7–6.1)
RBC #/AREA URNS HPF: ABNORMAL /HPF
RENAL EPI CELLS #/AREA URNS HPF: ABNORMAL /[HPF]
SCAN OF BLOOD SMEAR: NORMAL
SP GR UR REFRACT.AUTO: 1.01 (ref 1–1.03)
UROBILINOGEN UR QL STRIP.AUTO: 0.2 EU/DL (ref 0–1)
WBC # BLD AUTO: 6.7 THOU/MM3 (ref 4.8–10.8)
WBC #/AREA URNS HPF: ABNORMAL /HPF
YEAST LIKE FUNGI URNS QL MICRO: ABNORMAL

## 2024-09-16 ENCOUNTER — APPOINTMENT (OUTPATIENT)
Dept: GENERAL RADIOLOGY | Age: 89
End: 2024-09-16
Attending: STUDENT IN AN ORGANIZED HEALTH CARE EDUCATION/TRAINING PROGRAM
Payer: COMMERCIAL

## 2024-09-16 LAB
AFB CULTURE & SMEAR: NORMAL
ANION GAP SERPL CALC-SCNC: 9 MEQ/L (ref 8–16)
ANISOCYTOSIS BLD QL SMEAR: PRESENT
BASOPHILS ABSOLUTE: 0 THOU/MM3 (ref 0–0.1)
BASOPHILS NFR BLD AUTO: 0.2 %
BUN SERPL-MCNC: 31 MG/DL (ref 7–22)
CALCIUM SERPL-MCNC: 8.4 MG/DL (ref 8.5–10.5)
CHLORIDE SERPL-SCNC: 94 MEQ/L (ref 98–111)
CO2 SERPL-SCNC: 33 MEQ/L (ref 23–33)
CREAT SERPL-MCNC: 0.7 MG/DL (ref 0.4–1.2)
DEPRECATED RDW RBC AUTO: 66.1 FL (ref 35–45)
EOSINOPHIL NFR BLD AUTO: 1 %
EOSINOPHILS ABSOLUTE: 0.1 THOU/MM3 (ref 0–0.4)
ERYTHROCYTE [DISTWIDTH] IN BLOOD BY AUTOMATED COUNT: 17.8 % (ref 11.5–14.5)
GFR SERPL CREATININE-BSD FRML MDRD: 89 ML/MIN/1.73M2
GLUCOSE SERPL-MCNC: 153 MG/DL (ref 70–108)
HCT VFR BLD AUTO: 28.1 % (ref 42–52)
HGB BLD-MCNC: 8.1 GM/DL (ref 14–18)
HYPOCHROMIA BLD QL SMEAR: PRESENT
IMM GRANULOCYTES # BLD AUTO: 0.11 THOU/MM3 (ref 0–0.07)
IMM GRANULOCYTES NFR BLD AUTO: 1 %
LACTATE SERPL-SCNC: 1.5 MMOL/L (ref 0.5–2)
LACTATE SERPL-SCNC: 2.9 MMOL/L (ref 0.5–2)
LYMPHOCYTES ABSOLUTE: 0.4 THOU/MM3 (ref 1–4.8)
LYMPHOCYTES NFR BLD AUTO: 3.4 %
MCH RBC QN AUTO: 29.6 PG (ref 26–33)
MCHC RBC AUTO-ENTMCNC: 28.8 GM/DL (ref 32.2–35.5)
MCV RBC AUTO: 102.6 FL (ref 80–94)
MONOCYTES ABSOLUTE: 0.3 THOU/MM3 (ref 0.4–1.3)
MONOCYTES NFR BLD AUTO: 2.6 %
NEUTROPHILS ABSOLUTE: 10 THOU/MM3 (ref 1.8–7.7)
NEUTROPHILS NFR BLD AUTO: 91.8 %
NRBC BLD AUTO-RTO: 0 /100 WBC
PLATELET # BLD AUTO: 167 THOU/MM3 (ref 130–400)
PMV BLD AUTO: 10.9 FL (ref 9.4–12.4)
POIKILOCYTES: ABNORMAL
POLYCHROMASIA BLD QL SMEAR: ABNORMAL
POTASSIUM SERPL-SCNC: 4.1 MEQ/L (ref 3.5–5.2)
PROCALCITONIN SERPL IA-MCNC: 1 NG/ML (ref 0.01–0.09)
RBC # BLD AUTO: 2.74 MILL/MM3 (ref 4.7–6.1)
SCAN OF BLOOD SMEAR: NORMAL
SODIUM SERPL-SCNC: 136 MEQ/L (ref 135–145)
TOXIC GRANULES BLD QL SMEAR: PRESENT
WBC # BLD AUTO: 10.9 THOU/MM3 (ref 4.8–10.8)

## 2024-09-16 PROCEDURE — 71045 X-RAY EXAM CHEST 1 VIEW: CPT

## 2024-09-16 PROCEDURE — 95816 EEG AWAKE AND DROWSY: CPT | Performed by: PSYCHIATRY & NEUROLOGY

## 2024-09-16 PROCEDURE — 95816 EEG AWAKE AND DROWSY: CPT

## 2024-09-17 ENCOUNTER — APPOINTMENT (OUTPATIENT)
Dept: GENERAL RADIOLOGY | Age: 89
End: 2024-09-17
Attending: STUDENT IN AN ORGANIZED HEALTH CARE EDUCATION/TRAINING PROGRAM
Payer: COMMERCIAL

## 2024-09-17 ENCOUNTER — APPOINTMENT (OUTPATIENT)
Dept: INTERVENTIONAL RADIOLOGY/VASCULAR | Age: 89
End: 2024-09-17
Attending: STUDENT IN AN ORGANIZED HEALTH CARE EDUCATION/TRAINING PROGRAM
Payer: COMMERCIAL

## 2024-09-17 VITALS
DIASTOLIC BLOOD PRESSURE: 60 MMHG | SYSTOLIC BLOOD PRESSURE: 155 MMHG | HEART RATE: 106 BPM | RESPIRATION RATE: 28 BRPM | OXYGEN SATURATION: 94 %

## 2024-09-17 LAB
BASE EXCESS BLDA CALC-SCNC: 9.2 MMOL/L (ref -2–3)
BASOPHILS ABSOLUTE: 0 THOU/MM3 (ref 0–0.1)
BASOPHILS NFR BLD AUTO: 0.3 %
COLLECTED BY:: ABNORMAL
DEPRECATED RDW RBC AUTO: 61.5 FL (ref 35–45)
DEVICE: ABNORMAL
EOSINOPHIL NFR BLD AUTO: 1 %
EOSINOPHILS ABSOLUTE: 0.1 THOU/MM3 (ref 0–0.4)
ERYTHROCYTE [DISTWIDTH] IN BLOOD BY AUTOMATED COUNT: 17.9 % (ref 11.5–14.5)
HCO3 BLDA-SCNC: 33 MMOL/L (ref 23–28)
HCT VFR BLD AUTO: 24.9 % (ref 42–52)
HGB BLD-MCNC: 7.4 GM/DL (ref 14–18)
IMM GRANULOCYTES # BLD AUTO: 0.15 THOU/MM3 (ref 0–0.07)
IMM GRANULOCYTES NFR BLD AUTO: 1.3 %
LYMPHOCYTES ABSOLUTE: 0.5 THOU/MM3 (ref 1–4.8)
LYMPHOCYTES NFR BLD AUTO: 4.5 %
MCH RBC QN AUTO: 28.6 PG (ref 26–33)
MCHC RBC AUTO-ENTMCNC: 29.7 GM/DL (ref 32.2–35.5)
MCV RBC AUTO: 96.1 FL (ref 80–94)
MONOCYTES ABSOLUTE: 0.3 THOU/MM3 (ref 0.4–1.3)
MONOCYTES NFR BLD AUTO: 2.7 %
NEUTROPHILS ABSOLUTE: 10.6 THOU/MM3 (ref 1.8–7.7)
NEUTROPHILS NFR BLD AUTO: 90.2 %
NRBC BLD AUTO-RTO: 0 /100 WBC
PCO2 TEMP ADJ BLDMV: 44 MMHG (ref 41–51)
PH BLDMV: 7.49 [PH] (ref 7.31–7.41)
PLATELET # BLD AUTO: 244 THOU/MM3 (ref 130–400)
PLATELET BLD QL SMEAR: ADEQUATE
PMV BLD AUTO: 10.3 FL (ref 9.4–12.4)
PO2 BLDMV: 31 MMHG (ref 25–40)
RBC # BLD AUTO: 2.59 MILL/MM3 (ref 4.7–6.1)
SAO2 % BLDMV: 64 %
SCAN OF BLOOD SMEAR: NORMAL
SITE: ABNORMAL
VENTILATION MODE VENT: ABNORMAL
WBC # BLD AUTO: 11.7 THOU/MM3 (ref 4.8–10.8)

## 2024-09-17 PROCEDURE — 2709999900 IR GI TUBE W FLUOROSCOPY

## 2024-09-17 PROCEDURE — 6360000004 HC RX CONTRAST MEDICATION: Performed by: RADIOLOGY

## 2024-09-17 PROCEDURE — 71045 X-RAY EXAM CHEST 1 VIEW: CPT

## 2024-09-17 PROCEDURE — 49440 PLACE GASTROSTOMY TUBE PERC: CPT

## 2024-09-17 PROCEDURE — 99233 SBSQ HOSP IP/OBS HIGH 50: CPT | Performed by: INTERNAL MEDICINE

## 2024-09-17 PROCEDURE — 6360000002 HC RX W HCPCS: Performed by: RADIOLOGY

## 2024-09-17 RX ORDER — FENTANYL CITRATE 50 UG/ML
INJECTION, SOLUTION INTRAMUSCULAR; INTRAVENOUS PRN
Status: COMPLETED | OUTPATIENT
Start: 2024-09-17 | End: 2024-09-17

## 2024-09-17 RX ORDER — DIATRIZOATE MEGLUMINE AND DIATRIZOATE SODIUM 660; 100 MG/ML; MG/ML
SOLUTION ORAL; RECTAL PRN
Status: COMPLETED | OUTPATIENT
Start: 2024-09-17 | End: 2024-09-17

## 2024-09-17 RX ORDER — MIDAZOLAM HYDROCHLORIDE 1 MG/ML
INJECTION INTRAMUSCULAR; INTRAVENOUS PRN
Status: COMPLETED | OUTPATIENT
Start: 2024-09-17 | End: 2024-09-17

## 2024-09-17 RX ADMIN — MIDAZOLAM 1 MG: 1 INJECTION INTRAMUSCULAR; INTRAVENOUS at 15:36

## 2024-09-17 RX ADMIN — DIATRIZOATE MEGLUMINE AND DIATRIZOATE SODIUM 10 ML: 600; 100 SOLUTION ORAL; RECTAL at 15:54

## 2024-09-17 RX ADMIN — FENTANYL CITRATE 50 MCG: 50 INJECTION, SOLUTION INTRAMUSCULAR; INTRAVENOUS at 15:36

## 2024-09-18 LAB
ANISOCYTOSIS BLD QL SMEAR: PRESENT
BACTERIA SPEC AEROBE CULT: ABNORMAL
BACTERIA SPEC ANAEROBE CULT: ABNORMAL
BASOPHILS ABSOLUTE: 0 THOU/MM3 (ref 0–0.1)
BASOPHILS NFR BLD AUTO: 0.2 %
DEPRECATED RDW RBC AUTO: 65.9 FL (ref 35–45)
EOSINOPHIL NFR BLD AUTO: 0.6 %
EOSINOPHILS ABSOLUTE: 0.1 THOU/MM3 (ref 0–0.4)
ERYTHROCYTE [DISTWIDTH] IN BLOOD BY AUTOMATED COUNT: 18.4 % (ref 11.5–14.5)
GRAM STN SPEC: ABNORMAL
HCT VFR BLD AUTO: 24.7 % (ref 42–52)
HGB BLD-MCNC: 7.2 GM/DL (ref 14–18)
HYPOCHROMIA BLD QL SMEAR: PRESENT
IMM GRANULOCYTES # BLD AUTO: 0.15 THOU/MM3 (ref 0–0.07)
IMM GRANULOCYTES NFR BLD AUTO: 1.3 %
LYMPHOCYTES ABSOLUTE: 0.5 THOU/MM3 (ref 1–4.8)
LYMPHOCYTES NFR BLD AUTO: 4.2 %
MCH RBC QN AUTO: 29 PG (ref 26–33)
MCHC RBC AUTO-ENTMCNC: 29.1 GM/DL (ref 32.2–35.5)
MCV RBC AUTO: 99.6 FL (ref 80–94)
MONOCYTES ABSOLUTE: 0.3 THOU/MM3 (ref 0.4–1.3)
MONOCYTES NFR BLD AUTO: 2.6 %
NEUTROPHILS ABSOLUTE: 10.6 THOU/MM3 (ref 1.8–7.7)
NEUTROPHILS NFR BLD AUTO: 91.1 %
NRBC BLD AUTO-RTO: 0 /100 WBC
ORGANISM: ABNORMAL
ORGANISM: ABNORMAL
PH BLDV: 7.49 [PH] (ref 7.31–7.41)
PLATELET # BLD AUTO: 253 THOU/MM3 (ref 130–400)
PLATELET BLD QL SMEAR: ADEQUATE
PMV BLD AUTO: 10.5 FL (ref 9.4–12.4)
POLYCHROMASIA BLD QL SMEAR: ABNORMAL
RBC # BLD AUTO: 2.48 MILL/MM3 (ref 4.7–6.1)
SCAN OF BLOOD SMEAR: NORMAL
TOXIC GRANULES BLD QL SMEAR: PRESENT
WBC # BLD AUTO: 11.6 THOU/MM3 (ref 4.8–10.8)

## 2024-09-19 LAB
DEPRECATED RDW RBC AUTO: 65.7 FL (ref 35–45)
ERYTHROCYTE [DISTWIDTH] IN BLOOD BY AUTOMATED COUNT: 18.5 % (ref 11.5–14.5)
HCT VFR BLD AUTO: 24.5 % (ref 42–52)
HGB BLD-MCNC: 7.1 GM/DL (ref 14–18)
MCH RBC QN AUTO: 29 PG (ref 26–33)
MCHC RBC AUTO-ENTMCNC: 29 GM/DL (ref 32.2–35.5)
MCV RBC AUTO: 100 FL (ref 80–94)
PLATELET # BLD AUTO: 260 THOU/MM3 (ref 130–400)
PMV BLD AUTO: 10.4 FL (ref 9.4–12.4)
RBC # BLD AUTO: 2.45 MILL/MM3 (ref 4.7–6.1)
WBC # BLD AUTO: 11.3 THOU/MM3 (ref 4.8–10.8)

## 2024-09-20 LAB
ABO GROUP BLD: NORMAL
ABO GROUP BLD: NORMAL
ANION GAP SERPL CALC-SCNC: 10 MEQ/L (ref 8–16)
ANISOCYTOSIS BLD QL SMEAR: PRESENT
BACTERIA BLD AEROBE CULT: NORMAL
BACTERIA BLD AEROBE CULT: NORMAL
BASOPHILS ABSOLUTE: 0 THOU/MM3 (ref 0–0.1)
BASOPHILS NFR BLD AUTO: 0.4 %
BUN SERPL-MCNC: 32 MG/DL (ref 7–22)
CALCIUM SERPL-MCNC: 8.9 MG/DL (ref 8.5–10.5)
CHLORIDE SERPL-SCNC: 95 MEQ/L (ref 98–111)
CO2 SERPL-SCNC: 34 MEQ/L (ref 23–33)
CREAT SERPL-MCNC: 0.7 MG/DL (ref 0.4–1.2)
DEPRECATED RDW RBC AUTO: 65.3 FL (ref 35–45)
EOSINOPHIL NFR BLD AUTO: 0.9 %
EOSINOPHILS ABSOLUTE: 0.1 THOU/MM3 (ref 0–0.4)
ERYTHROCYTE [DISTWIDTH] IN BLOOD BY AUTOMATED COUNT: 18.7 % (ref 11.5–14.5)
FERRITIN SERPL IA-MCNC: 762 NG/ML (ref 22–322)
GFR SERPL CREATININE-BSD FRML MDRD: 89 ML/MIN/1.73M2
GLUCOSE SERPL-MCNC: 182 MG/DL (ref 70–108)
HCT VFR BLD AUTO: 23.3 % (ref 42–52)
HCT VFR BLD AUTO: 23.4 % (ref 42–52)
HEMOCCULT STL QL: NEGATIVE
HGB BLD-MCNC: 6.6 GM/DL (ref 14–18)
HGB BLD-MCNC: 6.6 GM/DL (ref 14–18)
HYPOCHROMIA BLD QL SMEAR: PRESENT
IMM GRANULOCYTES # BLD AUTO: 0.49 THOU/MM3 (ref 0–0.07)
IMM GRANULOCYTES NFR BLD AUTO: 4.4 %
IRON SATN MFR SERPL: 26 % (ref 20–50)
IRON SERPL-MCNC: 20 UG/DL (ref 65–195)
LYMPHOCYTES ABSOLUTE: 0.4 THOU/MM3 (ref 1–4.8)
LYMPHOCYTES NFR BLD AUTO: 3.6 %
MCH RBC QN AUTO: 27.7 PG (ref 26–33)
MCHC RBC AUTO-ENTMCNC: 28.3 GM/DL (ref 32.2–35.5)
MCV RBC AUTO: 97.9 FL (ref 80–94)
MONOCYTES ABSOLUTE: 0.5 THOU/MM3 (ref 0.4–1.3)
MONOCYTES NFR BLD AUTO: 4.4 %
NEUTROPHILS ABSOLUTE: 9.5 THOU/MM3 (ref 1.8–7.7)
NEUTROPHILS NFR BLD AUTO: 86.3 %
NRBC BLD AUTO-RTO: 1 /100 WBC
PLATELET # BLD AUTO: 262 THOU/MM3 (ref 130–400)
PMV BLD AUTO: 10.3 FL (ref 9.4–12.4)
POTASSIUM SERPL-SCNC: 3.6 MEQ/L (ref 3.5–5.2)
RBC # BLD AUTO: 2.38 MILL/MM3 (ref 4.7–6.1)
REASON FOR REJECTION: NORMAL
REJECTED TEST: NORMAL
SODIUM SERPL-SCNC: 139 MEQ/L (ref 135–145)
TIBC SERPL-MCNC: 77 UG/DL (ref 171–450)
WBC # BLD AUTO: 11 THOU/MM3 (ref 4.8–10.8)

## 2024-09-21 LAB
ANISOCYTOSIS BLD QL SMEAR: PRESENT
BASOPHILS ABSOLUTE: 0 THOU/MM3 (ref 0–0.1)
BASOPHILS NFR BLD AUTO: 0.3 %
DEPRECATED RDW RBC AUTO: 68.5 FL (ref 35–45)
EOSINOPHIL NFR BLD AUTO: 0.8 %
EOSINOPHILS ABSOLUTE: 0.1 THOU/MM3 (ref 0–0.4)
ERYTHROCYTE [DISTWIDTH] IN BLOOD BY AUTOMATED COUNT: 19.8 % (ref 11.5–14.5)
HCT VFR BLD AUTO: 26.4 % (ref 42–52)
HCT VFR BLD AUTO: 26.6 % (ref 42–52)
HGB BLD-MCNC: 7.8 GM/DL (ref 14–18)
HGB BLD-MCNC: 7.8 GM/DL (ref 14–18)
IMM GRANULOCYTES # BLD AUTO: 0.89 THOU/MM3 (ref 0–0.07)
IMM GRANULOCYTES NFR BLD AUTO: 6.6 %
LYMPHOCYTES ABSOLUTE: 0.6 THOU/MM3 (ref 1–4.8)
LYMPHOCYTES NFR BLD AUTO: 4.6 %
MCH RBC QN AUTO: 28.4 PG (ref 26–33)
MCHC RBC AUTO-ENTMCNC: 29.3 GM/DL (ref 32.2–35.5)
MCV RBC AUTO: 96.7 FL (ref 80–94)
MONOCYTES ABSOLUTE: 0.7 THOU/MM3 (ref 0.4–1.3)
MONOCYTES NFR BLD AUTO: 4.9 %
NEUTROPHILS ABSOLUTE: 11.2 THOU/MM3 (ref 1.8–7.7)
NEUTROPHILS NFR BLD AUTO: 82.8 %
NRBC BLD AUTO-RTO: 1 /100 WBC
PATHOLOGIST REVIEW: ABNORMAL
PLATELET # BLD AUTO: 281 THOU/MM3 (ref 130–400)
PMV BLD AUTO: 10.2 FL (ref 9.4–12.4)
POIKILOCYTES: ABNORMAL
POLYCHROMASIA BLD QL SMEAR: ABNORMAL
RBC # BLD AUTO: 2.75 MILL/MM3 (ref 4.7–6.1)
SCAN OF BLOOD SMEAR: NORMAL
WBC # BLD AUTO: 13.5 THOU/MM3 (ref 4.8–10.8)

## 2024-09-22 LAB
ANION GAP SERPL CALC-SCNC: 14 MEQ/L (ref 8–16)
BUN SERPL-MCNC: 35 MG/DL (ref 7–22)
CALCIUM SERPL-MCNC: 9.5 MG/DL (ref 8.5–10.5)
CHLORIDE SERPL-SCNC: 94 MEQ/L (ref 98–111)
CO2 SERPL-SCNC: 33 MEQ/L (ref 23–33)
CREAT SERPL-MCNC: 0.8 MG/DL (ref 0.4–1.2)
DEPRECATED RDW RBC AUTO: 67.7 FL (ref 35–45)
ERYTHROCYTE [DISTWIDTH] IN BLOOD BY AUTOMATED COUNT: 19.8 % (ref 11.5–14.5)
GFR SERPL CREATININE-BSD FRML MDRD: 85 ML/MIN/1.73M2
GLUCOSE SERPL-MCNC: 177 MG/DL (ref 70–108)
HCT VFR BLD AUTO: 29.3 % (ref 42–52)
HGB BLD-MCNC: 8.4 GM/DL (ref 14–18)
MCH RBC QN AUTO: 28.2 PG (ref 26–33)
MCHC RBC AUTO-ENTMCNC: 28.7 GM/DL (ref 32.2–35.5)
MCV RBC AUTO: 98.3 FL (ref 80–94)
PLATELET # BLD AUTO: 339 THOU/MM3 (ref 130–400)
PMV BLD AUTO: 10.3 FL (ref 9.4–12.4)
POTASSIUM SERPL-SCNC: 4.5 MEQ/L (ref 3.5–5.2)
RBC # BLD AUTO: 2.98 MILL/MM3 (ref 4.7–6.1)
SODIUM SERPL-SCNC: 141 MEQ/L (ref 135–145)
WBC # BLD AUTO: 17.9 THOU/MM3 (ref 4.8–10.8)

## 2024-09-23 LAB
AFB CULTURE & SMEAR: NORMAL

## 2024-09-30 LAB
AFB CULTURE & SMEAR: NORMAL

## 2024-10-07 LAB
AFB CULTURE & SMEAR: NORMAL

## 2025-03-24 NOTE — ED NOTES
Bedside report given to BERNICE Narayanan.   
Dr. Alvarez and respiratory at the bedside preparing to intubate.  
Dr. Zhang at the bedside confirmed ET placement and OG placement via x-ray at this time.     
Geeta care provided to patient at this time. Patient repositioned with pillows. Patient placed on external catheter.   
Nae with RT paged for PROMISE  
Patient intubated at this time by Dr. Alvarez. 27 @ lip. Size 8 ET tube used. Positive color change noted. Bilateral lung sounds heard.   
Patient resting in bed. Respirations easy and unlabored. No distress noted.     
Patient resting in bed. Respirations easy and unlabored. No distress noted. Patient remains on ventilator.   
Patient resting in bed.Call light within reach. Family at the bedside.   
Provider at the bedside updating patient and family on plan of care.   
Report given to 4B BERNICE Clarke over the phone.   
Respiratory at the bedside placing patient on heated high flow oxygen.   
Respiratory at the bedside.   
Upon first contact with patient this RN receives bedside shift report from BERNICE Chicas. Patient resting in bed. Respirations easy and unlabored. No distress noted.     
Verbal order per Dr. Robertson to give 50 mcg fentanyl bolus due to agitation. 50 mcg bolus given at this time.   
generalized